# Patient Record
Sex: FEMALE | Race: WHITE | NOT HISPANIC OR LATINO | Employment: OTHER | URBAN - METROPOLITAN AREA
[De-identification: names, ages, dates, MRNs, and addresses within clinical notes are randomized per-mention and may not be internally consistent; named-entity substitution may affect disease eponyms.]

---

## 2017-02-03 ENCOUNTER — OFFICE VISIT (OUTPATIENT)
Dept: INTERNAL MEDICINE | Facility: CLINIC | Age: 57
End: 2017-02-03

## 2017-02-03 VITALS
HEIGHT: 65 IN | TEMPERATURE: 97.6 F | OXYGEN SATURATION: 99 % | SYSTOLIC BLOOD PRESSURE: 118 MMHG | BODY MASS INDEX: 36.02 KG/M2 | WEIGHT: 216.2 LBS | DIASTOLIC BLOOD PRESSURE: 80 MMHG | HEART RATE: 90 BPM

## 2017-02-03 DIAGNOSIS — G47.00 INSOMNIA, UNSPECIFIED TYPE: ICD-10-CM

## 2017-02-03 DIAGNOSIS — E03.8 HYPOTHYROIDISM DUE TO HASHIMOTO'S THYROIDITIS: ICD-10-CM

## 2017-02-03 DIAGNOSIS — E55.9 VITAMIN D DEFICIENCY: Primary | ICD-10-CM

## 2017-02-03 DIAGNOSIS — F41.9 ANXIETY: ICD-10-CM

## 2017-02-03 DIAGNOSIS — M85.80 OSTEOPENIA: ICD-10-CM

## 2017-02-03 DIAGNOSIS — F43.21 ADJUSTMENT REACTION WITH PROLONGED DEPRESSIVE REACTION: ICD-10-CM

## 2017-02-03 DIAGNOSIS — E06.3 HYPOTHYROIDISM DUE TO HASHIMOTO'S THYROIDITIS: ICD-10-CM

## 2017-02-03 PROCEDURE — 99214 OFFICE O/P EST MOD 30 MIN: CPT | Performed by: FAMILY MEDICINE

## 2017-02-03 RX ORDER — ZOLPIDEM TARTRATE 10 MG/1
10 TABLET ORAL NIGHTLY PRN
Qty: 30 TABLET | Refills: 2 | Status: SHIPPED | OUTPATIENT
Start: 2017-02-03 | End: 2017-05-18 | Stop reason: SDUPTHER

## 2017-02-03 RX ORDER — DESVENLAFAXINE SUCCINATE 100 MG/1
100 TABLET, EXTENDED RELEASE ORAL DAILY
Qty: 90 TABLET | Refills: 1 | Status: SHIPPED | OUTPATIENT
Start: 2017-02-03 | End: 2017-05-18 | Stop reason: SDUPTHER

## 2017-02-03 RX ORDER — ERGOCALCIFEROL 1.25 MG/1
50000 CAPSULE ORAL 2 TIMES WEEKLY
Qty: 24 CAPSULE | Refills: 1 | Status: SHIPPED | OUTPATIENT
Start: 2017-02-06 | End: 2017-05-18 | Stop reason: SDUPTHER

## 2017-02-03 RX ORDER — DESVENLAFAXINE SUCCINATE 50 MG/1
50 TABLET, EXTENDED RELEASE ORAL DAILY
Qty: 90 TABLET | Refills: 1 | Status: SHIPPED | OUTPATIENT
Start: 2017-02-03 | End: 2017-05-18 | Stop reason: SDUPTHER

## 2017-02-03 RX ORDER — LEVOTHYROXINE SODIUM 112 UG/1
112 TABLET ORAL DAILY
Qty: 90 TABLET | Refills: 1 | Status: SHIPPED | OUTPATIENT
Start: 2017-02-03 | End: 2017-03-06 | Stop reason: DRUGHIGH

## 2017-02-03 NOTE — PROGRESS NOTES
"Sierra Kraft is a 56 y.o. female.     Chief Complaint   Patient presents with   • Hypothyroidism     3 month follow up   • Anxiety   • Insomnia   • Med Refill       Vitals:    02/03/17 1524   BP: 118/80   Pulse: 90   Temp: 97.6 °F (36.4 °C)   SpO2: 99%   Weight: 216 lb 3.2 oz (98.1 kg)   Height: 65\" (165.1 cm)       Hypothyroidism   This is a chronic problem. The current episode started more than 1 year ago. The problem occurs constantly. The problem has been unchanged. Pertinent negatives include no abdominal pain, anorexia, arthralgias, change in bowel habit, chest pain, chills, congestion, coughing, diaphoresis, fatigue, fever, headaches, joint swelling, myalgias, nausea, neck pain, numbness, rash, sore throat, swollen glands, urinary symptoms, vertigo, visual change, vomiting or weakness. Nothing aggravates the symptoms. Treatments tried: thyroid. The treatment provided significant relief.   Anxiety   Presents for follow-up visit. Symptoms include insomnia. Patient reports no chest pain, compulsions, confusion, decreased concentration, depressed mood, dizziness, dry mouth, excessive worry, feeling of choking, hyperventilation, irritability, malaise, muscle tension, nausea, nervous/anxious behavior, obsessions, palpitations, panic, restlessness, shortness of breath or suicidal ideas. Symptoms occur rarely. The severity of symptoms is mild. The quality of sleep is fair. Nighttime awakenings: one to two.     Her past medical history is significant for depression. Compliance with medications is %.   Insomnia   This is a chronic problem. The current episode started more than 1 year ago. The problem occurs constantly. The problem has been unchanged. Pertinent negatives include no abdominal pain, anorexia, arthralgias, change in bowel habit, chest pain, chills, congestion, coughing, diaphoresis, fatigue, fever, headaches, joint swelling, myalgias, nausea, neck pain, numbness, rash, sore throat, " swollen glands, urinary symptoms, vertigo, visual change, vomiting or weakness. Nothing aggravates the symptoms. Treatments tried: ambien. The treatment provided moderate relief.   Depression   Visit Type: follow-up  Patient presents with the following symptoms: insomnia.  Patient is not experiencing: anhedonia, chest pain, choking sensation, compulsions, confusion, decreased concentration, depressed mood, dizziness, dry mouth, excessive worry, fatigue, feelings of hopelessness, feelings of worthlessness, hypersomnia, hyperventilation, irritability, malaise, memory impairment, muscle tension, nausea, nervousness/anxiety, obsessions, palpitations, panic, psychomotor agitation, psychomotor retardation, restlessness, shortness of breath, suicidal ideas, suicidal planning, thoughts of death, weight gain and weight loss.  Frequency of symptoms: rarely   Severity: mild   Sleep quality: fair  Nighttime awakenings: several  Compliance with medications:  %         Pt is a nonsmoker    The following portions of the patient's history were reviewed and updated as appropriate: allergies, current medications, past family history, past medical history, past social history, past surgical history and problem list.    Past Medical History   Diagnosis Date   • Abnormal thyroid blood test    • Abscess    • Anxiety    • Bilateral ovarian cysts    • Depression    • Encounter for routine gynecological examination    • Foot pain    • H/O bone density study 06/2014   • H/O mammogram 07/2016     [facility]   • Hot flashes, menopausal    • Hypothyroidism    • Hypothyroidism due to Hashimoto's thyroiditis    • Insomnia    • Miscarriage      x3   • Osteopenia    • Pap smear for cervical cancer screening 06/2014   • Routine general medical examination at a health care facility    • Urinary incontinence    • Urinary tract infection    • Vitamin D deficiency        Past Surgical History   Procedure Laterality Date   • Cholecystectomy  2008    • Hernia repair  2010   • Abdominoplasty  2001   • Colonoscopy  07/2012     [facility] in [place] normal   • Ectopic pregnancy surgery Left 1994       No Known Allergies    Family History   Problem Relation Age of Onset   • Mental illness Mother    • Depression Mother    • Thyroid disease Mother    • Hypertension Father    • Thyroid disease Sister    • Breast cancer Other    • Breast cancer Maternal Aunt        Social History     Social History   • Marital status: Unknown     Spouse name: N/A   • Number of children: N/A   • Years of education: N/A     Occupational History   • Not on file.     Social History Main Topics   • Smoking status: Never Smoker   • Smokeless tobacco: Never Used   • Alcohol use No   • Drug use: No   • Sexual activity: Yes     Partners: Male     Birth control/ protection: None, Post-menopausal      Comment:      Other Topics Concern   • Not on file     Social History Narrative       Review of Systems   Constitutional: Negative.  Negative for chills, diaphoresis, fatigue, fever, irritability, weight gain and weight loss.   HENT: Negative.  Negative for congestion, ear pain, postnasal drip, rhinorrhea, sinus pressure, sneezing and sore throat.    Eyes: Negative.  Negative for redness and itching.   Respiratory: Negative.  Negative for cough, choking, shortness of breath and wheezing.    Cardiovascular: Negative.  Negative for chest pain and palpitations.   Gastrointestinal: Negative.  Negative for abdominal pain, anorexia, blood in stool, change in bowel habit, constipation, diarrhea, nausea and vomiting.   Endocrine: Negative.  Negative for cold intolerance and heat intolerance.   Genitourinary: Negative.  Negative for dysuria, hematuria and urgency.   Musculoskeletal: Negative.  Negative for arthralgias, back pain, joint swelling, myalgias and neck pain.   Skin: Negative.  Negative for color change and rash.   Allergic/Immunologic: Negative.  Negative for environmental allergies.    Neurological: Negative.  Negative for dizziness, vertigo, tremors, weakness, numbness and headaches.   Hematological: Negative.  Negative for adenopathy. Does not bruise/bleed easily.   Psychiatric/Behavioral: Negative for confusion, decreased concentration, dysphoric mood and suicidal ideas. The patient has insomnia. The patient is not nervous/anxious.         Depression and anxiety stable on medication       Objective   Physical Exam   Constitutional: She is oriented to person, place, and time. She appears well-developed.   HENT:   Head: Normocephalic.   Right Ear: External ear normal.   Left Ear: External ear normal.   Nose: Nose normal.   Mouth/Throat: Oropharynx is clear and moist.   Eyes: Conjunctivae and EOM are normal. Pupils are equal, round, and reactive to light.   Neck: Normal range of motion. Neck supple.   Cardiovascular: Normal rate and regular rhythm.    No murmur heard.  Pulmonary/Chest: Effort normal and breath sounds normal.   Abdominal: Soft. Bowel sounds are normal. There is no tenderness.   Musculoskeletal: Normal range of motion.   Neurological: She is alert and oriented to person, place, and time.   Skin: Skin is warm and dry.   Psychiatric: She has a normal mood and affect. Her behavior is normal.   Nursing note and vitals reviewed.      Assessment/Plan   Jessica was seen today for hypothyroidism, anxiety, insomnia and med refill.    Diagnoses and all orders for this visit:    Vitamin D deficiency, unspecified  -     vitamin D (ERGOCALCIFEROL) 30400 UNITS capsule capsule; Take 1 capsule by mouth 2 (Two) Times a Week.    Hypothyroidism due to Hashimoto's thyroiditis  -     levothyroxine (SYNTHROID, LEVOTHROID) 112 MCG tablet; Take 1 tablet by mouth Daily.    Osteopenia    Insomnia, unspecified type  -     zolpidem (AMBIEN) 10 MG tablet; Take 1 tablet by mouth At Night As Needed for sleep.    Adjustment reaction with prolonged depressive reaction  -     PRISTIQ 50 MG 24 hr tablet; Take 1  tablet by mouth Daily.  -     PRISTIQ 100 MG 24 hr tablet; Take 1 tablet by mouth Daily. Take with 50 mg    Anxiety  -     PRISTIQ 50 MG 24 hr tablet; Take 1 tablet by mouth Daily.  -     PRISTIQ 100 MG 24 hr tablet; Take 1 tablet by mouth Daily. Take with 50 mg                   Current Outpatient Prescriptions:   •  aspirin 81 MG chewable tablet, Chew 81 mg daily., Disp: , Rfl:   •  levothyroxine (SYNTHROID, LEVOTHROID) 112 MCG tablet, Take 1 tablet by mouth Daily., Disp: 90 tablet, Rfl: 1  •  PRISTIQ 100 MG 24 hr tablet, Take 1 tablet by mouth Daily. Take with 50 mg, Disp: 90 tablet, Rfl: 1  •  PRISTIQ 50 MG 24 hr tablet, Take 1 tablet by mouth Daily., Disp: 90 tablet, Rfl: 1  •  Testosterone Micronized crystals, 1 dose daily., Disp: , Rfl:   •  [START ON 2/6/2017] vitamin D (ERGOCALCIFEROL) 49736 UNITS capsule capsule, Take 1 capsule by mouth 2 (Two) Times a Week., Disp: 24 capsule, Rfl: 1  •  zolpidem (AMBIEN) 10 MG tablet, Take 1 tablet by mouth At Night As Needed for sleep., Disp: 30 tablet, Rfl: 2  •  nitrofurantoin, macrocrystal-monohydrate, (MACROBID) 100 MG capsule, Take 1 capsule by mouth 2 (Two) Times a Day., Disp: 14 capsule, Rfl: 0  •  phentermine 37.5 MG capsule, Take 1 capsule by mouth every morning., Disp: 30 capsule, Rfl: 0    Return in about 3 months (around 5/3/2017) for Recheck.

## 2017-03-06 ENCOUNTER — OFFICE VISIT (OUTPATIENT)
Dept: ENDOCRINOLOGY | Facility: CLINIC | Age: 57
End: 2017-03-06

## 2017-03-06 VITALS
HEART RATE: 77 BPM | BODY MASS INDEX: 35.35 KG/M2 | OXYGEN SATURATION: 99 % | HEIGHT: 65 IN | DIASTOLIC BLOOD PRESSURE: 75 MMHG | WEIGHT: 212.2 LBS | SYSTOLIC BLOOD PRESSURE: 115 MMHG

## 2017-03-06 DIAGNOSIS — E03.8 HYPOTHYROIDISM DUE TO HASHIMOTO'S THYROIDITIS: Primary | ICD-10-CM

## 2017-03-06 DIAGNOSIS — E06.3 HYPOTHYROIDISM DUE TO HASHIMOTO'S THYROIDITIS: Primary | ICD-10-CM

## 2017-03-06 PROCEDURE — 99213 OFFICE O/P EST LOW 20 MIN: CPT | Performed by: INTERNAL MEDICINE

## 2017-03-06 RX ORDER — PHENTERMINE HYDROCHLORIDE 37.5 MG/1
TABLET ORAL
Refills: 0 | COMMUNITY
Start: 2017-02-06 | End: 2017-05-18

## 2017-03-06 RX ORDER — LEVOTHYROXINE SODIUM 125 UG/1
125 TABLET ORAL EVERY MORNING
Qty: 30 TABLET | Refills: 11 | Status: SHIPPED | OUTPATIENT
Start: 2017-03-06 | End: 2017-05-18 | Stop reason: SDUPTHER

## 2017-03-06 NOTE — PROGRESS NOTES
Chief Complaint   Patient presents with   • Hypothyroidism     Follow UP        HPI:   Jessica Kraft is a 56 y.o.female who returns to endocrine clinic for follow-up evaluation of hypothyroidism due to Hashimoto's thyroiditis.  Last visit 5/16/2016. Her history is as follows:    Initially sent to clinic by her [facility] for elevated TPO Abs consistent with her h/o Hashimoto's hypothyroidism.    1) hypothyroidism due to Hashimoto's thyroiditis:  - diagnosed in 1986 after the birth of her second child. She has been on varying doses of levothyroxine since diagnosis.   - She reports that at her last visit in May 2016, she reported the wrong dose of levothyroxine on her medication list. She reported taking levothyroxine 150 mcg; however, she confirmed with her pharmacy she has been taking 112 µg of generic levothyroxine since January 2015.     Current Dose: Levothyroxine 112 µg  - Takes in the a.m. with her antidepressant.  Waits at least 30 minutes before eating or drinking hot liquids.  Does not take in close proximity to iron or calcium supplements.  Does not take with soy products.  Has good compliance with medication. Denies her medication being exposed to heat.    Since her last visit she reports having fluctuating TSH values on the same dose of generic levothyroxine. She uses the same pharmacy.    Lab review:  (03/2016): TSH 1.46 (0.34 - 4.00) on 112 mcg levothyroxine  (08/2016): TSH 0.876 (0.450 - 4.500) on 112 mcg  (02/2017): TSH 6.23 (0.34 - 5.00) on 112 mcg    Review of Systems   Constitutional: Negative for fatigue.        Weight stable   HENT: Negative.    Eyes: Negative.    Respiratory: Negative.    Cardiovascular: Negative.    Gastrointestinal: Negative.    Endocrine: Negative.    Genitourinary: Negative.    Musculoskeletal: Negative.    Skin: Negative.    Neurological: Negative.    Psychiatric/Behavioral: Negative.      The following portions of the patient's history were reviewed and updated as  "appropriate: allergies, current medications, past family history, past medical history, past social history, past surgical history and problem list.    No Known Allergies    Visit Vitals   • /75   • Pulse 77   • Ht 65\" (165.1 cm)   • Wt 212 lb 3.2 oz (96.3 kg)   • SpO2 99%   • BMI 35.31 kg/m2     Physical Exam   Constitutional: She is oriented to person, place, and time. She appears well-developed and well-nourished.   HENT:   Mouth/Throat: Oropharynx is clear and moist.   Eyes: Conjunctivae are normal. Pupils are equal, round, and reactive to light.   Neck: Neck supple. No thyromegaly present.   No palpable thyroid nodules     Cardiovascular: Normal rate, regular rhythm and normal heart sounds.    Pulmonary/Chest: Effort normal and breath sounds normal.   Lymphadenopathy:     She has no cervical adenopathy.   Neurological: She is alert and oriented to person, place, and time. No cranial nerve deficit.   Skin: Skin is warm and dry.   Psychiatric: She has a normal mood and affect. Her behavior is normal.   Vitals reviewed.    LABS/IMAGING: outside records reviewed and summarized in HPI    ASSESSMENT/PLAN:  1) Hypothyroidism due to Hashimoto's thyroiditis:  - clinically euthyroid on exam  - confirmed that patient is taking levothyroxine appropriately without interfering substances, taking with good compliance, and storing the medication appropriately  - based on the fluctuation of her TSH level on various generic levothyroxine prescriptions of the same dose of 112 mcg, will have patient try taking Brand thyroid hormone consistently.  - Starting Levoxyl 125 mcg daily  - Reviewed proper levothyroxine administration, and factors to avoid that decrease medication potency and medication absorption.     Will have patient check TSH level on 4/28/2017 on the day of her PCP visit with Dr. Guajardo. Will adjust dose if indicated.     Pt to RTC in 6 months                    "

## 2017-05-18 ENCOUNTER — OFFICE VISIT (OUTPATIENT)
Dept: INTERNAL MEDICINE | Facility: CLINIC | Age: 57
End: 2017-05-18

## 2017-05-18 VITALS
WEIGHT: 214.6 LBS | OXYGEN SATURATION: 97 % | HEART RATE: 94 BPM | SYSTOLIC BLOOD PRESSURE: 116 MMHG | BODY MASS INDEX: 35.75 KG/M2 | DIASTOLIC BLOOD PRESSURE: 78 MMHG | TEMPERATURE: 98.2 F | HEIGHT: 65 IN

## 2017-05-18 DIAGNOSIS — E06.3 HYPOTHYROIDISM DUE TO HASHIMOTO'S THYROIDITIS: ICD-10-CM

## 2017-05-18 DIAGNOSIS — E03.8 HYPOTHYROIDISM DUE TO HASHIMOTO'S THYROIDITIS: ICD-10-CM

## 2017-05-18 DIAGNOSIS — G47.00 INSOMNIA, UNSPECIFIED TYPE: ICD-10-CM

## 2017-05-18 DIAGNOSIS — F43.21 ADJUSTMENT REACTION WITH PROLONGED DEPRESSIVE REACTION: ICD-10-CM

## 2017-05-18 DIAGNOSIS — F41.9 ANXIETY: ICD-10-CM

## 2017-05-18 DIAGNOSIS — E55.9 VITAMIN D DEFICIENCY: ICD-10-CM

## 2017-05-18 PROCEDURE — 99214 OFFICE O/P EST MOD 30 MIN: CPT | Performed by: FAMILY MEDICINE

## 2017-05-18 RX ORDER — DESVENLAFAXINE SUCCINATE 50 MG/1
50 TABLET, EXTENDED RELEASE ORAL DAILY
Qty: 90 TABLET | Refills: 1 | Status: SHIPPED | OUTPATIENT
Start: 2017-05-18

## 2017-05-18 RX ORDER — ZOLPIDEM TARTRATE 10 MG/1
10 TABLET ORAL NIGHTLY PRN
Qty: 30 TABLET | Refills: 2 | Status: SHIPPED | OUTPATIENT
Start: 2017-05-18 | End: 2017-08-02 | Stop reason: SDUPTHER

## 2017-05-18 RX ORDER — LEVOTHYROXINE SODIUM 125 UG/1
125 TABLET ORAL EVERY MORNING
Qty: 90 TABLET | Refills: 1 | Status: SHIPPED | OUTPATIENT
Start: 2017-05-18

## 2017-05-18 RX ORDER — DESVENLAFAXINE SUCCINATE 100 MG/1
100 TABLET, EXTENDED RELEASE ORAL DAILY
Qty: 90 TABLET | Refills: 1 | Status: SHIPPED | OUTPATIENT
Start: 2017-05-18

## 2017-05-18 RX ORDER — ERGOCALCIFEROL 1.25 MG/1
50000 CAPSULE ORAL 2 TIMES WEEKLY
Qty: 24 CAPSULE | Refills: 1 | Status: SHIPPED | OUTPATIENT
Start: 2017-05-18

## 2017-05-18 RX ORDER — LEVOTHYROXINE SODIUM 125 UG/1
125 TABLET ORAL EVERY MORNING
Qty: 90 TABLET | Refills: 1 | Status: SHIPPED | OUTPATIENT
Start: 2017-05-18 | End: 2017-05-18 | Stop reason: SDUPTHER

## 2017-06-07 ENCOUNTER — TELEPHONE (OUTPATIENT)
Dept: INTERNAL MEDICINE | Facility: CLINIC | Age: 57
End: 2017-06-07

## 2017-06-07 NOTE — TELEPHONE ENCOUNTER
PATIENTS OTHER PHYSICIAN CHANGED HER THYROID MEDICATION. SHE WOULD LIKE A CALL BACK FROM OUR OFFICE TO GO OVER THIS INFORMATION. PLEASE CALL HER @ [phone]

## 2017-06-12 NOTE — TELEPHONE ENCOUNTER
Spoke to patient. TSH was 0.23 (0.40 - 4.50). Asked her to stay on Levoxyl 125 mcg until her follow-up with me in August. If her repeat TSH is again low, will decrease to Levoxyl to 112 mcg.    Maame Nuno MD          please get these thyroid lab results

## 2017-07-11 ENCOUNTER — TELEPHONE (OUTPATIENT)
Dept: INTERNAL MEDICINE | Facility: CLINIC | Age: 57
End: 2017-07-11

## 2017-07-11 NOTE — TELEPHONE ENCOUNTER
Would like to get medicine for anxiety, she will be leaving flying, says she was prescribed a anxiety med last year for a trip she had taken, but the med did not work. Please call to advise.

## 2017-07-12 RX ORDER — DIAZEPAM 5 MG/1
5 TABLET ORAL EVERY 12 HOURS PRN
Qty: 4 TABLET | Refills: 0 | Status: SHIPPED | OUTPATIENT
Start: 2017-07-12

## 2017-08-02 ENCOUNTER — OFFICE VISIT (OUTPATIENT)
Dept: INTERNAL MEDICINE | Facility: CLINIC | Age: 57
End: 2017-08-02

## 2017-08-02 VITALS
HEIGHT: 65 IN | TEMPERATURE: 98 F | HEART RATE: 71 BPM | BODY MASS INDEX: 37.36 KG/M2 | OXYGEN SATURATION: 94 % | WEIGHT: 224.2 LBS | SYSTOLIC BLOOD PRESSURE: 126 MMHG | DIASTOLIC BLOOD PRESSURE: 84 MMHG

## 2017-08-02 DIAGNOSIS — R35.1 NOCTURIA: ICD-10-CM

## 2017-08-02 DIAGNOSIS — E55.9 VITAMIN D DEFICIENCY: ICD-10-CM

## 2017-08-02 DIAGNOSIS — G47.00 INSOMNIA, UNSPECIFIED TYPE: ICD-10-CM

## 2017-08-02 DIAGNOSIS — F41.9 ANXIETY: Primary | ICD-10-CM

## 2017-08-02 DIAGNOSIS — F33.42 RECURRENT MAJOR DEPRESSIVE DISORDER, IN FULL REMISSION (HCC): ICD-10-CM

## 2017-08-02 LAB
BILIRUB BLD-MCNC: NEGATIVE MG/DL
CLARITY, POC: CLEAR
COLOR UR: YELLOW
GLUCOSE UR STRIP-MCNC: NEGATIVE MG/DL
KETONES UR QL: NEGATIVE
LEUKOCYTE EST, POC: NEGATIVE
NITRITE UR-MCNC: NEGATIVE MG/ML
PH UR: 6 [PH] (ref 5–8)
PROT UR STRIP-MCNC: NEGATIVE MG/DL
RBC # UR STRIP: NEGATIVE /UL
SP GR UR: 1.03 (ref 1–1.03)
UROBILINOGEN UR QL: NORMAL

## 2017-08-02 PROCEDURE — 99214 OFFICE O/P EST MOD 30 MIN: CPT | Performed by: FAMILY MEDICINE

## 2017-08-02 PROCEDURE — 81003 URINALYSIS AUTO W/O SCOPE: CPT | Performed by: FAMILY MEDICINE

## 2017-08-02 RX ORDER — BUSPIRONE HYDROCHLORIDE 10 MG/1
10 TABLET ORAL 2 TIMES DAILY
Qty: 60 TABLET | Refills: 2 | Status: SHIPPED | OUTPATIENT
Start: 2017-08-02

## 2017-08-02 RX ORDER — ZOLPIDEM TARTRATE 10 MG/1
10 TABLET ORAL NIGHTLY PRN
Qty: 30 TABLET | Refills: 2 | Status: SHIPPED | OUTPATIENT
Start: 2017-08-02

## 2017-08-02 RX ORDER — ERGOCALCIFEROL 1.25 MG/1
50000 CAPSULE ORAL 2 TIMES WEEKLY
Qty: 24 CAPSULE | Refills: 1 | Status: CANCELLED | OUTPATIENT
Start: 2017-08-03

## 2017-08-02 NOTE — PROGRESS NOTES
"Subjective   Jessica Kraft is a 56 y.o. female.     Chief Complaint   Patient presents with   • Hypothyroidism   • Depression   • Insomnia   • Anxiety     discuss Pristique, valium and muscle relaxer   • Med Refill       Visit Vitals   • /84   • Pulse 71   • Temp 98 °F (36.7 °C)   • Ht 65\" (165.1 cm)   • Wt 224 lb 3.2 oz (102 kg)   • LMP  (LMP Unknown)  Comment: N/A   • SpO2 94%   • BMI 37.31 kg/m2       Anxiety   Presents for follow-up visit. Symptoms include compulsions, depressed mood, excessive worry, insomnia, irritability, muscle tension, nervous/anxious behavior, obsessions and palpitations. Patient reports no chest pain, confusion, decreased concentration, dizziness, dry mouth, feeling of choking, hyperventilation, malaise, nausea, panic, restlessness, shortness of breath or suicidal ideas. Symptoms occur constantly. The severity of symptoms is severe. The quality of sleep is poor. Nighttime awakenings: several.     Compliance with medications is %.   Insomnia   This is a chronic problem. The current episode started more than 1 year ago. The problem occurs constantly. The problem has been unchanged. Pertinent negatives include no abdominal pain, anorexia, arthralgias, change in bowel habit, chest pain, chills, congestion, coughing, diaphoresis, fatigue, fever, headaches, joint swelling, myalgias, nausea, neck pain, numbness, rash, sore throat, swollen glands, urinary symptoms, vertigo, visual change, vomiting or weakness. Nothing aggravates the symptoms. Treatments tried: ambien. The treatment provided moderate relief.      Pt tried her brother's muscle relaxer which helped.  Brother recently had MRSA and was in hospital for 3 months.    Pt is very anxious today.   Pt states that the valium 5mg that she uses for plane rides does not help her anxiety.  Pt cannot relax at home.     Pt went on vacation and spent 6 hours on pool side and did relax.   Pt's sister is on zoloft.  Pt worries about " leaves on the ground and if house needs cleaning.   Pt's knees are shaky.   Pt has gained weight 10# since last visit.     The following portions of the patient's history were reviewed and updated as appropriate: allergies, current medications, past family history, past medical history, past social history, past surgical history and problem list.    Past Medical History:   Diagnosis Date   • Abscess    • Anxiety    • Bilateral ovarian cysts    • Depression    • Encounter for routine gynecological examination    • Foot pain    • H/O bone density study 06/2014   • H/[facility] mammogram 07/2016    [facility]   • Hot flashes, menopausal    • Hypothyroidism due to Hashimoto's thyroiditis    • Insomnia    • Miscarriage     x3   • Osteopenia    • Pap smear for cervical cancer screening 06/2014   • Routine general medical examination at a health care facility    • Urinary incontinence    • Urinary tract infection    • Vitamin D deficiency        Past Surgical History:   Procedure Laterality Date   • ABDOMINOPLASTY  2001   • CHOLECYSTECTOMY  2008   • COLONOSCOPY  07/2012    [facility] in [place] normal   • ECTOPIC PREGNANCY SURGERY Left 1994   • HERNIA REPAIR  2010       No Known Allergies    Family History   Problem Relation Age of Onset   • Mental illness Mother    • Depression Mother    • Thyroid disease Mother    • Hypertension Father    • Thyroid disease Sister    • Breast cancer Other    • Breast cancer Maternal Aunt        Social History     Social History   • Marital status: Unknown     Spouse name: N/A   • Number of children: N/A   • Years of education: N/A     Occupational History   • Not on file.     Social History Main Topics   • Smoking status: Never Smoker   • Smokeless tobacco: Never Used   • Alcohol use No   • Drug use: No   • Sexual activity: Yes     Partners: Male     Birth control/ protection: None, Post-menopausal      Comment:      Other Topics Concern   • Not on file     Social History Narrative        Review of Systems   Constitutional: Positive for irritability. Negative for chills, diaphoresis, fatigue and fever.   HENT: Negative for congestion and sore throat.    Respiratory: Negative for cough and shortness of breath.    Cardiovascular: Positive for palpitations. Negative for chest pain.   Gastrointestinal: Negative for abdominal pain, anorexia, change in bowel habit, nausea and vomiting.   Genitourinary:        Nocturia   Musculoskeletal: Negative for arthralgias, joint swelling, myalgias and neck pain.   Skin: Negative for rash.   Neurological: Negative for dizziness, vertigo, weakness, numbness and headaches.   Psychiatric/Behavioral: Positive for dysphoric mood and sleep disturbance. Negative for confusion, decreased concentration and suicidal ideas. The patient is nervous/anxious and has insomnia.        Objective   Physical Exam   Constitutional: She is oriented to person, place, and time. She appears well-developed.   HENT:   Head: Normocephalic.   Right Ear: External ear normal.   Left Ear: External ear normal.   Nose: Nose normal.   Eyes: Conjunctivae and EOM are normal. Pupils are equal, round, and reactive to light.   Neck: Normal range of motion. Neck supple.   Cardiovascular: Normal rate and regular rhythm.    No murmur heard.  Pulmonary/Chest: Effort normal and breath sounds normal.   Abdominal: Soft. Bowel sounds are normal.   Musculoskeletal: Normal range of motion.   Neurological: She is alert and oriented to person, place, and time.   Skin: Skin is warm and dry.   Psychiatric: She has a normal mood and affect. Her behavior is normal.   Nursing note and vitals reviewed.      Assessment/Plan   Jessica was seen today for hypothyroidism, depression, insomnia, anxiety and med refill.    Diagnoses and all orders for this visit:    Anxiety  -     Ambulatory Referral to Behavioral Health  -     busPIRone (BUSPAR) 10 MG tablet; Take 1 tablet by mouth 2 (Two) Times a Day.    Insomnia,  unspecified type  -     zolpidem (AMBIEN) 10 MG tablet; Take 1 tablet by mouth At Night As Needed for Sleep.    Vitamin D deficiency, unspecified    Recurrent major depressive disorder, in full remission  -     Ambulatory Referral to Behavioral Health    Nocturia  -     POC Urinalysis Dipstick, Automated    Other orders  -     Cancel: vitamin D (ERGOCALCIFEROL) 86046 UNITS capsule capsule; Take 1 capsule by mouth 2 (Two) Times a Week.      Patient stating she was extremely anxious and requesting higher dose of Valium to 5 mg she was given to fly with.  Patient states she needed it more than just when she flew home.  Will refer to behavioral health. Declined Valium prescription.  Declined prescription for muscle relaxer to use for anxiety.             Current Outpatient Prescriptions:   •  aspirin 81 MG chewable tablet, Chew 81 mg daily., Disp: , Rfl:   •  LEVOXYL 125 MCG tablet, Take 1 tablet by mouth Every Morning. On an empty stomach, do not substitute, Disp: 90 tablet, Rfl: 1  •  PRISTIQ 100 MG 24 hr tablet, Take 1 tablet by mouth Daily. Take with 50 mg, Disp: 90 tablet, Rfl: 1  •  PRISTIQ 50 MG 24 hr tablet, Take 1 tablet by mouth Daily., Disp: 90 tablet, Rfl: 1  •  Testosterone Micronized crystals, 1 dose daily., Disp: , Rfl:   •  vitamin D (ERGOCALCIFEROL) 87970 UNITS capsule capsule, Take 1 capsule by mouth 2 (Two) Times a Week., Disp: 24 capsule, Rfl: 1  •  zolpidem (AMBIEN) 10 MG tablet, Take 1 tablet by mouth At Night As Needed for Sleep., Disp: 30 tablet, Rfl: 2  •  busPIRone (BUSPAR) 10 MG tablet, Take 1 tablet by mouth 2 (Two) Times a Day., Disp: 60 tablet, Rfl: 2  •  diazePAM (VALIUM) 5 MG tablet, Take 1 tablet by mouth Every 12 (Twelve) Hours As Needed for Anxiety., Disp: 4 tablet, Rfl: 0    Return in about 3 months (around 11/2/2017), or if symptoms worsen or fail to improve, for Recheck.'  Barrett report reviewed and is consistent #[id number]    Recent Results (from the past 168 hour(s))   POC  Urinalysis Dipstick, Automated    Collection Time: 08/02/17  1:31 PM   Result Value Ref Range    Color Yellow Yellow, Straw, Dark Yellow, Kati    Clarity, UA Clear Clear    Glucose, UA Negative Negative, 1000 mg/dL (3+) mg/dL    Bilirubin Negative Negative    Ketones, UA Negative Negative    Specific Gravity  1.030 1.005 - 1.030    Blood, UA Negative Negative    pH, Urine 6.0 5.0 - 8.0    Protein, POC Negative Negative mg/dL    Urobilinogen, UA Normal Normal    Leukocytes Negative Negative    Nitrite, UA Negative Negative

## 2017-08-09 ENCOUNTER — TELEPHONE (OUTPATIENT)
Dept: INTERNAL MEDICINE | Facility: CLINIC | Age: 57
End: 2017-08-09

## 2017-08-10 ENCOUNTER — TELEPHONE (OUTPATIENT)
Dept: INTERNAL MEDICINE | Facility: CLINIC | Age: 57
End: 2017-08-10

## 2017-08-10 NOTE — TELEPHONE ENCOUNTER
Ms. Kraft is taking antibiotic, needs a med sent in for yeast infection, and sinus med.please call to advise

## 2018-01-04 ENCOUNTER — TELEPHONE (OUTPATIENT)
Dept: INTERNAL MEDICINE | Facility: CLINIC | Age: 58
End: 2018-01-04

## 2018-01-04 NOTE — TELEPHONE ENCOUNTER
Pharmacy faxed PA request for pristiq. PA came back that it is a covered drug and does not need PA. Notified pharmacy.

## 2021-02-25 ENCOUNTER — TRANSCRIBE ORDERS (OUTPATIENT)
Dept: ADMINISTRATIVE | Facility: HOSPITAL | Age: 61
End: 2021-02-25

## 2021-02-25 DIAGNOSIS — Z01.818 PRE-OPERATIVE CLEARANCE: Primary | ICD-10-CM

## 2021-03-01 ENCOUNTER — LAB (OUTPATIENT)
Dept: LAB | Facility: HOSPITAL | Age: 61
End: 2021-03-01

## 2021-03-01 DIAGNOSIS — Z01.818 PRE-OPERATIVE CLEARANCE: ICD-10-CM

## 2021-03-01 PROCEDURE — U0004 COV-19 TEST NON-CDC HGH THRU: HCPCS

## 2021-03-01 PROCEDURE — C9803 HOPD COVID-19 SPEC COLLECT: HCPCS

## 2021-03-02 LAB — SARS-COV-2 RNA RESP QL NAA+PROBE: NOT DETECTED

## 2021-03-12 ENCOUNTER — IMMUNIZATION (OUTPATIENT)
Dept: VACCINE CLINIC | Facility: HOSPITAL | Age: 61
End: 2021-03-12

## 2021-03-12 PROCEDURE — 91300 HC SARSCOV02 VAC 30MCG/0.3ML IM: CPT | Performed by: INTERNAL MEDICINE

## 2021-03-12 PROCEDURE — 0001A: CPT | Performed by: INTERNAL MEDICINE

## 2021-04-02 ENCOUNTER — IMMUNIZATION (OUTPATIENT)
Dept: VACCINE CLINIC | Facility: HOSPITAL | Age: 61
End: 2021-04-02

## 2021-04-02 PROCEDURE — 0002A: CPT | Performed by: INTERNAL MEDICINE

## 2021-04-02 PROCEDURE — 91300 HC SARSCOV02 VAC 30MCG/0.3ML IM: CPT | Performed by: INTERNAL MEDICINE

## 2023-02-24 NOTE — TELEPHONE ENCOUNTER
Ice to affected area for approximately 10-15 minutes, 4 times a day for 2 days.  After 2 days use heat for 10-15 minutes 4 times a day.  If heat makes things worse, go back to ice.  Range of motion exercises as tolerated.  Take medications as directed.  GI (Gastrointestinal) side effects with anti-inflammatories discussed.  Take daily for the next 3-5 days then as needed.  Follow-up in 2 week's for follow up and other concern or seek medical attention earlier if worsens.     Printed and placed on your desk for review    Pt stated she went to [facility] and they stated her thyroid levels were off and  wanted to change her thyroid medication.  She did not want to change until/unless you were aware of it.  Did not know if we had received  the Labs.  [facility] , [place].  Cyn Sánchez  [phone] (Checked chart, not there, have called for Labs )

## 2023-10-26 ENCOUNTER — TRANSCRIBE ORDERS (OUTPATIENT)
Dept: ADMINISTRATIVE | Facility: HOSPITAL | Age: 63
End: 2023-10-26
Payer: COMMERCIAL

## 2023-10-26 DIAGNOSIS — R19.06 EPIGASTRIC SWELLING: Primary | ICD-10-CM

## 2023-11-10 ENCOUNTER — HOSPITAL ENCOUNTER (OUTPATIENT)
Dept: CT IMAGING | Facility: HOSPITAL | Age: 63
Discharge: HOME | End: 2023-11-10
Admitting: INTERNAL MEDICINE
Payer: COMMERCIAL

## 2023-11-10 DIAGNOSIS — R19.06 EPIGASTRIC SWELLING: ICD-10-CM

## 2023-11-10 PROCEDURE — 74176 CT ABD & PELVIS W/O CONTRAST: CPT

## 2023-12-06 ENCOUNTER — APPOINTMENT (OUTPATIENT)
Dept: CT IMAGING | Facility: HOSPITAL | Age: 63
End: 2023-12-06
Payer: COMMERCIAL

## 2023-12-06 ENCOUNTER — HOSPITAL ENCOUNTER (EMERGENCY)
Facility: HOSPITAL | Age: 63
Discharge: HOME | End: 2023-12-06
Attending: EMERGENCY MEDICINE
Payer: COMMERCIAL

## 2023-12-06 VITALS
WEIGHT: 214 LBS | TEMPERATURE: 97.7 F | DIASTOLIC BLOOD PRESSURE: 86 MMHG | OXYGEN SATURATION: 93 % | BODY MASS INDEX: 34.39 KG/M2 | HEART RATE: 84 BPM | HEIGHT: 66 IN | RESPIRATION RATE: 14 BRPM | SYSTOLIC BLOOD PRESSURE: 126 MMHG

## 2023-12-06 DIAGNOSIS — R18.8 OTHER ASCITES: ICD-10-CM

## 2023-12-06 DIAGNOSIS — R10.84 GENERALIZED ABDOMINAL PAIN: Primary | ICD-10-CM

## 2023-12-06 LAB
ALBUMIN SERPL-MCNC: 3.5 G/DL (ref 3.5–5.2)
ALBUMIN/GLOB SERPL: 1.1 G/DL
ALP SERPL-CCNC: 118 U/L (ref 39–117)
ALT SERPL W P-5'-P-CCNC: 7 U/L (ref 1–33)
ANION GAP SERPL CALCULATED.3IONS-SCNC: 9 MMOL/L (ref 5–15)
AST SERPL-CCNC: 13 U/L (ref 1–32)
BACTERIA UR QL AUTO: ABNORMAL /HPF
BASOPHILS # BLD AUTO: 0.06 10*3/MM3 (ref 0–0.2)
BASOPHILS NFR BLD AUTO: 0.8 % (ref 0–1.5)
BILIRUB SERPL-MCNC: 0.3 MG/DL (ref 0–1.2)
BILIRUB UR QL STRIP: NEGATIVE
BUN SERPL-MCNC: 17 MG/DL (ref 8–23)
BUN/CREAT SERPL: 22.1 (ref 7–25)
CALCIUM SPEC-SCNC: 9 MG/DL (ref 8.6–10.5)
CHLORIDE SERPL-SCNC: 104 MMOL/L (ref 98–107)
CLARITY UR: ABNORMAL
CO2 SERPL-SCNC: 27 MMOL/L (ref 22–29)
COD CRY URNS QL: ABNORMAL /HPF
COLOR UR: ABNORMAL
CREAT BLDA-MCNC: 0.6 MG/DL
CREAT BLDA-MCNC: 0.6 MG/DL (ref 0.6–1.3)
CREAT SERPL-MCNC: 0.77 MG/DL (ref 0.57–1)
DEPRECATED RDW RBC AUTO: 46.1 FL (ref 37–54)
EGFRCR SERPLBLD CKD-EPI 2021: 87.3 ML/MIN/1.73
EOSINOPHIL # BLD AUTO: 0.26 10*3/MM3 (ref 0–0.4)
EOSINOPHIL NFR BLD AUTO: 3.4 % (ref 0.3–6.2)
ERYTHROCYTE [DISTWIDTH] IN BLOOD BY AUTOMATED COUNT: 14.1 % (ref 12.3–15.4)
GLOBULIN UR ELPH-MCNC: 3.2 GM/DL
GLUCOSE SERPL-MCNC: 119 MG/DL (ref 65–99)
GLUCOSE UR STRIP-MCNC: NEGATIVE MG/DL
HCT VFR BLD AUTO: 39.7 % (ref 34–46.6)
HGB BLD-MCNC: 12.2 G/DL (ref 12–15.9)
HGB UR QL STRIP.AUTO: NEGATIVE
HYALINE CASTS UR QL AUTO: ABNORMAL /LPF
IMM GRANULOCYTES # BLD AUTO: 0.04 10*3/MM3 (ref 0–0.05)
IMM GRANULOCYTES NFR BLD AUTO: 0.5 % (ref 0–0.5)
KETONES UR QL STRIP: ABNORMAL
LEUKOCYTE ESTERASE UR QL STRIP.AUTO: NEGATIVE
LIPASE SERPL-CCNC: 14 U/L (ref 13–60)
LYMPHOCYTES # BLD AUTO: 0.7 10*3/MM3 (ref 0.7–3.1)
LYMPHOCYTES NFR BLD AUTO: 9.1 % (ref 19.6–45.3)
MCH RBC QN AUTO: 27.5 PG (ref 26.6–33)
MCHC RBC AUTO-ENTMCNC: 30.7 G/DL (ref 31.5–35.7)
MCV RBC AUTO: 89.4 FL (ref 79–97)
MONOCYTES # BLD AUTO: 0.64 10*3/MM3 (ref 0.1–0.9)
MONOCYTES NFR BLD AUTO: 8.4 % (ref 5–12)
MUCOUS THREADS URNS QL MICRO: ABNORMAL /HPF
NEUTROPHILS NFR BLD AUTO: 5.96 10*3/MM3 (ref 1.7–7)
NEUTROPHILS NFR BLD AUTO: 77.8 % (ref 42.7–76)
NITRITE UR QL STRIP: NEGATIVE
NRBC BLD AUTO-RTO: 0 /100 WBC (ref 0–0.2)
PH UR STRIP.AUTO: 5.5 [PH] (ref 5–8)
PLATELET # BLD AUTO: 282 10*3/MM3 (ref 140–450)
PMV BLD AUTO: 10.1 FL (ref 6–12)
POTASSIUM SERPL-SCNC: 3.9 MMOL/L (ref 3.5–5.2)
PROT SERPL-MCNC: 6.7 G/DL (ref 6–8.5)
PROT UR QL STRIP: NEGATIVE
RBC # BLD AUTO: 4.44 10*6/MM3 (ref 3.77–5.28)
RBC # UR STRIP: ABNORMAL /HPF
REF LAB TEST METHOD: ABNORMAL
SODIUM SERPL-SCNC: 140 MMOL/L (ref 136–145)
SP GR UR STRIP: 1.03 (ref 1–1.03)
SQUAMOUS #/AREA URNS HPF: ABNORMAL /HPF
UROBILINOGEN UR QL STRIP: ABNORMAL
WBC # UR STRIP: ABNORMAL /HPF
WBC NRBC COR # BLD AUTO: 7.66 10*3/MM3 (ref 3.4–10.8)

## 2023-12-06 PROCEDURE — 83690 ASSAY OF LIPASE: CPT | Performed by: EMERGENCY MEDICINE

## 2023-12-06 PROCEDURE — 96375 TX/PRO/DX INJ NEW DRUG ADDON: CPT

## 2023-12-06 PROCEDURE — 82565 ASSAY OF CREATININE: CPT

## 2023-12-06 PROCEDURE — 25010000002 ONDANSETRON PER 1 MG: Performed by: EMERGENCY MEDICINE

## 2023-12-06 PROCEDURE — 25810000003 SODIUM CHLORIDE 0.9 % SOLUTION: Performed by: EMERGENCY MEDICINE

## 2023-12-06 PROCEDURE — 25010000002 HYDROMORPHONE PER 4 MG: Performed by: EMERGENCY MEDICINE

## 2023-12-06 PROCEDURE — 96374 THER/PROPH/DIAG INJ IV PUSH: CPT

## 2023-12-06 PROCEDURE — 25510000001 IOPAMIDOL 61 % SOLUTION: Performed by: EMERGENCY MEDICINE

## 2023-12-06 PROCEDURE — 74177 CT ABD & PELVIS W/CONTRAST: CPT

## 2023-12-06 PROCEDURE — 81001 URINALYSIS AUTO W/SCOPE: CPT | Performed by: EMERGENCY MEDICINE

## 2023-12-06 PROCEDURE — 85025 COMPLETE CBC W/AUTO DIFF WBC: CPT | Performed by: EMERGENCY MEDICINE

## 2023-12-06 PROCEDURE — 80053 COMPREHEN METABOLIC PANEL: CPT | Performed by: EMERGENCY MEDICINE

## 2023-12-06 PROCEDURE — 36415 COLL VENOUS BLD VENIPUNCTURE: CPT

## 2023-12-06 PROCEDURE — 99285 EMERGENCY DEPT VISIT HI MDM: CPT

## 2023-12-06 RX ORDER — RABEPRAZOLE SODIUM 20 MG/1
20 TABLET, DELAYED RELEASE ORAL DAILY
Status: ON HOLD | COMMUNITY

## 2023-12-06 RX ORDER — IPRATROPIUM BROMIDE AND ALBUTEROL SULFATE 2.5; .5 MG/3ML; MG/3ML
3 SOLUTION RESPIRATORY (INHALATION) ONCE
Status: DISCONTINUED | OUTPATIENT
Start: 2023-12-06 | End: 2023-12-06

## 2023-12-06 RX ORDER — SODIUM CHLORIDE 0.9 % (FLUSH) 0.9 %
10 SYRINGE (ML) INJECTION AS NEEDED
Status: DISCONTINUED | OUTPATIENT
Start: 2023-12-06 | End: 2023-12-06 | Stop reason: HOSPADM

## 2023-12-06 RX ORDER — CYCLOBENZAPRINE HCL 10 MG
10 TABLET ORAL DAILY
Status: ON HOLD | COMMUNITY

## 2023-12-06 RX ORDER — METOCLOPRAMIDE 5 MG/1
5 TABLET ORAL 3 TIMES DAILY PRN
Qty: 20 TABLET | Refills: 0 | Status: ON HOLD | OUTPATIENT
Start: 2023-12-06

## 2023-12-06 RX ORDER — ONDANSETRON 2 MG/ML
4 INJECTION INTRAMUSCULAR; INTRAVENOUS ONCE
Status: COMPLETED | OUTPATIENT
Start: 2023-12-06 | End: 2023-12-06

## 2023-12-06 RX ORDER — ALPRAZOLAM 2 MG/1
1 TABLET ORAL NIGHTLY PRN
COMMUNITY
End: 2023-12-08

## 2023-12-06 RX ORDER — DICYCLOMINE HCL 20 MG
20 TABLET ORAL EVERY 8 HOURS PRN
Qty: 20 TABLET | Refills: 0 | Status: ON HOLD | OUTPATIENT
Start: 2023-12-06

## 2023-12-06 RX ORDER — VENLAFAXINE HYDROCHLORIDE 150 MG/1
150 CAPSULE, EXTENDED RELEASE ORAL DAILY
Status: ON HOLD | COMMUNITY

## 2023-12-06 RX ORDER — HYDROMORPHONE HYDROCHLORIDE 1 MG/ML
0.5 INJECTION, SOLUTION INTRAMUSCULAR; INTRAVENOUS; SUBCUTANEOUS ONCE
Status: COMPLETED | OUTPATIENT
Start: 2023-12-06 | End: 2023-12-06

## 2023-12-06 RX ORDER — METHYLPREDNISOLONE SODIUM SUCCINATE 125 MG/2ML
125 INJECTION, POWDER, LYOPHILIZED, FOR SOLUTION INTRAMUSCULAR; INTRAVENOUS ONCE
Status: DISCONTINUED | OUTPATIENT
Start: 2023-12-06 | End: 2023-12-06

## 2023-12-06 RX ADMIN — HYDROMORPHONE HYDROCHLORIDE 0.5 MG: 1 INJECTION, SOLUTION INTRAMUSCULAR; INTRAVENOUS; SUBCUTANEOUS at 12:01

## 2023-12-06 RX ADMIN — IOPAMIDOL 85 ML: 612 INJECTION, SOLUTION INTRAVENOUS at 12:38

## 2023-12-06 RX ADMIN — SODIUM CHLORIDE 500 ML: 9 INJECTION, SOLUTION INTRAVENOUS at 12:00

## 2023-12-06 RX ADMIN — ONDANSETRON 4 MG: 2 INJECTION INTRAMUSCULAR; INTRAVENOUS at 12:01

## 2023-12-06 NOTE — DISCHARGE INSTRUCTIONS
Please follow-up with your gastroenterologist, Dr. Josue, within the next week as previously scheduled.

## 2023-12-06 NOTE — ED PROVIDER NOTES
Subjective   History of Present Illness  63-year-old female presents for evaluation of acute on chronic abdominal pain and distention.  The patient tells me that she began experiencing abdominal pain and distention about 6 months ago.  She saw her primary care physician regarding her symptoms last month and had an outpatient CT scan that showed some mild ascites.  However, she notes that over the past few weeks she has had worsening pain and distention and notes constipation as well despite trying multiple modalities at home to help her with her bowel movement issues.  As a result of her ongoing issues, she came here to the emergency department to be evaluated today.  She denies any fevers.  No chills.  No night sweats.  No unintentional weight loss.  She has been referred to gastroenterology in [place], [place] and has upcoming outpatient studies scheduled for next week.  She currently rates her pain at 6 out of 10 in severity.      Review of Systems   Gastrointestinal:  Positive for abdominal pain, constipation and nausea.   All other systems reviewed and are negative.      Past Medical History:   Diagnosis Date    Abscess     Anxiety     Bilateral ovarian cysts     Depression     Encounter for routine gynecological examination     Foot pain     H/O bone density study 06/2014    H/O mammogram 07/2016    [facility]    Hot flashes, menopausal     Hypothyroidism due to Hashimoto's thyroiditis     Insomnia     Miscarriage     x3    Osteopenia     Pap smear for cervical cancer screening 06/2014    Routine general medical examination at a health care facility     Urinary incontinence     Urinary tract infection     Vitamin D deficiency        No Known Allergies    Past Surgical History:   Procedure Laterality Date    ABDOMINOPLASTY  2001    CHOLECYSTECTOMY  2008    COLONOSCOPY  07/2012    Quang Gaines in [facility] normal    ECTOPIC PREGNANCY SURGERY Left 1994    HERNIA REPAIR  2010       Family History   Problem Relation  Age of Onset    Mental illness Mother     Depression Mother     Thyroid disease Mother     Hypertension Father     Thyroid disease Sister     Breast cancer Other     Breast cancer Maternal Aunt        Social History     Socioeconomic History    Marital status:    Tobacco Use    Smoking status: Never    Smokeless tobacco: Never   Substance and Sexual Activity    Alcohol use: No    Drug use: No    Sexual activity: Yes     Partners: Male     Birth control/protection: None, Post-menopausal     Comment:            Objective   Physical Exam  Vitals and nursing note reviewed.   Constitutional:       General: She is not in acute distress.     Appearance: She is well-developed. She is obese. She is not diaphoretic.      Comments: Nontoxic-appearing female   HENT:      Head: Normocephalic and atraumatic.   Eyes:      Pupils: Pupils are equal, round, and reactive to light.   Cardiovascular:      Rate and Rhythm: Normal rate and regular rhythm.      Heart sounds: Normal heart sounds. No murmur heard.     No friction rub. No gallop.   Pulmonary:      Effort: Pulmonary effort is normal. No respiratory distress.      Breath sounds: Normal breath sounds. No wheezing or rales.   Abdominal:      General: Bowel sounds are normal. There is no distension.      Palpations: Abdomen is soft. There is no mass.      Tenderness: There is abdominal tenderness. There is no rebound.      Comments: Mild generalized abdominal tenderness present, no peritoneal signs or pain out of proportion to exam, abdomen appears mildly distended   Genitourinary:     Comments: No CVA tenderness noted  Musculoskeletal:         General: Normal range of motion.      Cervical back: Neck supple.   Skin:     General: Skin is warm and dry.      Findings: No erythema or rash.      Comments: No dermatomal rash noted   Neurological:      Mental Status: She is alert and oriented to person, place, and time.   Psychiatric:         Mood and Affect: Mood normal.          Thought Content: Thought content normal.         Judgment: Judgment normal.         Procedures           ED Course  ED Course as of 12/06/23 1525   Wed Dec 06, 2023   1113 63-year-old female presents for evaluation of acute on chronic abdominal pain and distention.  She tells me that she began experiencing abdominal pain and distention about 6 months ago.  She saw her primary care physician regarding her symptoms last month and had an outpatient CT scan that showed mild ascites.  However, she notes that over the past few weeks she has had worsening pain and distention and notes constipation as well despite trying multiple modalities at home to help her with her bowel movement issues.  Given her ongoing symptoms, she came here to the ED to be evaluated today.  On arrival, the patient is nontoxic-appearing.  She has generalized abdominal tenderness.  Her abdomen is mildly distended.  No pain out of proportion to exam.  I reviewed the patient's prior records as well as her CT scan results from 4 weeks ago. [DD]   1114 We will obtain labs and imaging, and we will reassess following initial interventions. [DD]   1306 I personally and independently reviewed the patient's CT images and findings, and I am in agreement with the radiologist regarding CT interpretation--particularly there is no emergent/surgical intra-abdominal process present.  Her ascites does seem to be increased when compared to prior imaging. [DD]   1322 Clinical presentation clearly is not consistent with pneumonia. [DD]   1328 So back that is salty alwaysUpon reevaluation, the patient looks and feels improved.  We discussed her CT findings most notably her, cirrhotic appearing liver and her increased ascites fluid when compared to her prior scan from 4 weeks ago.  She has outpatient follow-up scheduled with her gastroenterologist, Dr. Josue, in [facility] scheduled for next week.  She will follow-up as previously scheduled.  No indication  for admission or emergent paracentesis at this time.  Prescription for Reglan and Bentyl as needed in the interim for symptom relief.  She will follow-up as directed.  Agreeable with plan and given appropriate strict return precautions. [DD]      ED Course User Index  [DD] Sam Shearer MD                                   Recent Results (from the past 24 hour(s))   Comprehensive Metabolic Panel    Collection Time: 12/06/23 11:42 AM    Specimen: Blood   Result Value Ref Range    Glucose 119 (H) 65 - 99 mg/dL    BUN 17 8 - 23 mg/dL    Creatinine 0.77 0.57 - 1.00 mg/dL    Sodium 140 136 - 145 mmol/L    Potassium 3.9 3.5 - 5.2 mmol/L    Chloride 104 98 - 107 mmol/L    CO2 27.0 22.0 - 29.0 mmol/L    Calcium 9.0 8.6 - 10.5 mg/dL    Total Protein 6.7 6.0 - 8.5 g/dL    Albumin 3.5 3.5 - 5.2 g/dL    ALT (SGPT) 7 1 - 33 U/L    AST (SGOT) 13 1 - 32 U/L    Alkaline Phosphatase 118 (H) 39 - 117 U/L    Total Bilirubin 0.3 0.0 - 1.2 mg/dL    Globulin 3.2 gm/dL    A/G Ratio 1.1 g/dL    BUN/Creatinine Ratio 22.1 7.0 - 25.0    Anion Gap 9.0 5.0 - 15.0 mmol/L    eGFR 87.3 >60.0 mL/min/1.73   Lipase    Collection Time: 12/06/23 11:42 AM    Specimen: Blood   Result Value Ref Range    Lipase 14 13 - 60 U/L   CBC Auto Differential    Collection Time: 12/06/23 11:42 AM    Specimen: Blood   Result Value Ref Range    WBC 7.66 3.40 - 10.80 10*3/mm3    RBC 4.44 3.77 - 5.28 10*6/mm3    Hemoglobin 12.2 12.0 - 15.9 g/dL    Hematocrit 39.7 34.0 - 46.6 %    MCV 89.4 79.0 - 97.0 fL    MCH 27.5 26.6 - 33.0 pg    MCHC 30.7 (L) 31.5 - 35.7 g/dL    RDW 14.1 12.3 - 15.4 %    RDW-SD 46.1 37.0 - 54.0 fl    MPV 10.1 6.0 - 12.0 fL    Platelets 282 140 - 450 10*3/mm3    Neutrophil % 77.8 (H) 42.7 - 76.0 %    Lymphocyte % 9.1 (L) 19.6 - 45.3 %    Monocyte % 8.4 5.0 - 12.0 %    Eosinophil % 3.4 0.3 - 6.2 %    Basophil % 0.8 0.0 - 1.5 %    Immature Grans % 0.5 0.0 - 0.5 %    Neutrophils, Absolute 5.96 1.70 - 7.00 10*3/mm3    Lymphocytes, Absolute 0.70  0.70 - 3.10 10*3/mm3    Monocytes, Absolute 0.64 0.10 - 0.90 10*3/mm3    Eosinophils, Absolute 0.26 0.00 - 0.40 10*3/mm3    Basophils, Absolute 0.06 0.00 - 0.20 10*3/mm3    Immature Grans, Absolute 0.04 0.00 - 0.05 10*3/mm3    nRBC 0.0 0.0 - 0.2 /100 WBC   Urinalysis With Culture If Indicated - Urine, Clean Catch    Collection Time: 12/06/23 11:43 AM    Specimen: Urine, Clean Catch   Result Value Ref Range    Color, UA Dark Yellow (A) Yellow, Straw    Appearance, UA Cloudy (A) Clear    pH, UA 5.5 5.0 - 8.0    Specific Gravity, UA 1.032 (H) 1.001 - 1.030    Glucose, UA Negative Negative    Ketones, UA Trace (A) Negative    Bilirubin, UA Negative Negative    Blood, UA Negative Negative    Protein, UA Negative Negative    Leuk Esterase, UA Negative Negative    Nitrite, UA Negative Negative    Urobilinogen, UA 1.0 E.U./dL 0.2 - 1.0 E.U./dL   Urinalysis, Microscopic Only - Urine, Clean Catch    Collection Time: 12/06/23 11:43 AM    Specimen: Urine, Clean Catch   Result Value Ref Range    RBC, UA 0-2 None Seen, 0-2 /HPF    WBC, UA 0-2 None Seen, 0-2 /HPF    Bacteria, UA None Seen None Seen, Trace /HPF    Squamous Epithelial Cells, UA 0-2 None Seen, 0-2 /HPF    Hyaline Casts, UA 0-6 0 - 6 /LPF    Calcium Oxalate Crystals, UA Moderate/2+ None Seen /HPF    Mucus, UA Small/1+ (A) None Seen, Trace /HPF    Methodology Manual Light Microscopy    POC Creatinine    Collection Time: 12/06/23 11:51 AM    Specimen: Blood   Result Value Ref Range    Creatinine 0.60 0.60 - 1.30 mg/dL   POC Creatinine    Collection Time: 12/06/23 11:52 AM    Specimen: Blood   Result Value Ref Range    Creatinine 0.60 mg/dL     Note: In addition to lab results from this visit, the labs listed above may include labs taken at another facility or during a different encounter within the last 24 hours. Please correlate lab times with ED admission and discharge times for further clarification of the services performed during this visit.    CT Abdomen Pelvis  With Contrast   Final Result   Impression:      1. Bibasilar atelectasis or pneumonia and small bilateral pleural effusions      2. Moderate hiatal hernia      3. Cirrhotic appearing liver. There are few small hypodense liver lesions appear to represent cysts but few of them too small to characterize.      4. Moderate ascites increased as compared to the previous exam      5. Diffuse pancreatic atrophy. Diffusely nonspecific dilated pancreatic duct along the body and tail of the pancreas. Clinical correlation and follow-up recommended         Electronically Signed: Leighton Spivey MD     12/6/2023 1:16 PM EST     Workstation ID: [id number]        Vitals:    12/06/23 1215 12/06/23 1217 12/06/23 1300 12/06/23 1330   BP:   130/91 126/86   BP Location:       Patient Position:       Pulse: 92  87 84   Resp:       Temp:       TempSrc:       SpO2: (!) 88% 92% 96% 93%   Weight:       Height:         Medications   sodium chloride 0.9 % flush 10 mL (has no administration in time range)   sodium chloride 0.9 % bolus 500 mL (0 mL Intravenous Stopped 12/6/23 1347)   HYDROmorphone (DILAUDID) injection 0.5 mg (0.5 mg Intravenous Given 12/6/23 1201)   ondansetron (ZOFRAN) injection 4 mg (4 mg Intravenous Given 12/6/23 1201)   iopamidol (ISOVUE-300) 61 % injection 100 mL (85 mL Intravenous Given 12/6/23 1238)     ECG/EMG Results (last 24 hours)       ** No results found for the last 24 hours. **          No orders to display                 Medical Decision Making  Problems Addressed:  Generalized abdominal pain: complicated acute illness or injury  Other ascites: complicated acute illness or injury    Amount and/or Complexity of Data Reviewed  Labs: ordered.  Radiology: ordered.    Risk  Prescription drug management.        Final diagnoses:   Generalized abdominal pain   Other ascites       ED Disposition  ED Disposition       ED Disposition   Discharge    Condition   Stable    Comment   --               No follow-up provider  specified.       Medication List        New Prescriptions      dicyclomine 20 MG tablet  Commonly known as: BENTYL  Take 1 tablet by mouth Every 8 (Eight) Hours As Needed for Abdominal Cramping.     metoclopramide 5 MG tablet  Commonly known as: REGLAN  Take 1 tablet by mouth 3 (Three) Times a Day As Needed (nausea, abd pain).               Where to Get Your Medications        These medications were sent to [facility] [id number] - [place], [place] - [address] [address] - [phone] [facility] [phone]   [address] [address]      Phone: [phone]   dicyclomine 20 MG tablet  metoclopramide 5 MG tablet            Sam Shearer MD  12/06/23 1854

## 2023-12-07 ENCOUNTER — HOSPITAL ENCOUNTER (EMERGENCY)
Facility: HOSPITAL | Age: 63
Discharge: LEFT BEFORE DISCHARGE | End: 2023-12-07
Payer: COMMERCIAL

## 2023-12-07 ENCOUNTER — APPOINTMENT (OUTPATIENT)
Dept: GENERAL RADIOLOGY | Facility: HOSPITAL | Age: 63
DRG: 435 | End: 2023-12-07
Payer: COMMERCIAL

## 2023-12-07 ENCOUNTER — HOSPITAL ENCOUNTER (INPATIENT)
Facility: HOSPITAL | Age: 63
LOS: 2 days | Discharge: HOME | DRG: 435 | End: 2023-12-15
Attending: EMERGENCY MEDICINE | Admitting: INTERNAL MEDICINE
Payer: COMMERCIAL

## 2023-12-07 ENCOUNTER — APPOINTMENT (OUTPATIENT)
Dept: CT IMAGING | Facility: HOSPITAL | Age: 63
DRG: 435 | End: 2023-12-07
Payer: COMMERCIAL

## 2023-12-07 VITALS
SYSTOLIC BLOOD PRESSURE: 153 MMHG | BODY MASS INDEX: 33.74 KG/M2 | RESPIRATION RATE: 18 BRPM | OXYGEN SATURATION: 91 % | TEMPERATURE: 97.7 F | WEIGHT: 215 LBS | DIASTOLIC BLOOD PRESSURE: 93 MMHG | HEART RATE: 92 BPM | HEIGHT: 67 IN

## 2023-12-07 DIAGNOSIS — A41.9 SEPSIS, DUE TO UNSPECIFIED ORGANISM, UNSPECIFIED WHETHER ACUTE ORGAN DYSFUNCTION PRESENT: Primary | ICD-10-CM

## 2023-12-07 DIAGNOSIS — C25.9 MALIGNANT NEOPLASM OF PANCREAS, UNSPECIFIED LOCATION OF MALIGNANCY: ICD-10-CM

## 2023-12-07 DIAGNOSIS — K86.89 PANCREATIC MASS: ICD-10-CM

## 2023-12-07 DIAGNOSIS — K20.90 ESOPHAGITIS: ICD-10-CM

## 2023-12-07 LAB
ALBUMIN SERPL-MCNC: 4 G/DL (ref 3.5–5.2)
ALBUMIN/GLOB SERPL: 1 G/DL
ALP SERPL-CCNC: 149 U/L (ref 39–117)
ALT SERPL W P-5'-P-CCNC: 11 U/L (ref 1–33)
ANION GAP SERPL CALCULATED.3IONS-SCNC: 15 MMOL/L (ref 5–15)
AST SERPL-CCNC: 21 U/L (ref 1–32)
BACTERIA UR QL AUTO: ABNORMAL /HPF
BASOPHILS # BLD AUTO: 0.05 10*3/MM3 (ref 0–0.2)
BASOPHILS NFR BLD AUTO: 0.2 % (ref 0–1.5)
BILIRUB SERPL-MCNC: 0.6 MG/DL (ref 0–1.2)
BILIRUB UR QL STRIP: ABNORMAL
BUN SERPL-MCNC: 21 MG/DL (ref 8–23)
BUN/CREAT SERPL: 22.8 (ref 7–25)
CALCIUM SPEC-SCNC: 9.3 MG/DL (ref 8.6–10.5)
CHLORIDE SERPL-SCNC: 101 MMOL/L (ref 98–107)
CLARITY UR: ABNORMAL
CO2 SERPL-SCNC: 21 MMOL/L (ref 22–29)
COD CRY URNS QL: ABNORMAL /HPF
COLOR UR: ABNORMAL
CREAT SERPL-MCNC: 0.92 MG/DL (ref 0.57–1)
D-LACTATE SERPL-SCNC: 2.3 MMOL/L (ref 0.5–2)
DEPRECATED RDW RBC AUTO: 45.6 FL (ref 37–54)
EGFRCR SERPLBLD CKD-EPI 2021: 70.5 ML/MIN/1.73
EOSINOPHIL # BLD AUTO: 0.01 10*3/MM3 (ref 0–0.4)
EOSINOPHIL NFR BLD AUTO: 0 % (ref 0.3–6.2)
ERYTHROCYTE [DISTWIDTH] IN BLOOD BY AUTOMATED COUNT: 14.3 % (ref 12.3–15.4)
FINE GRAN CASTS URNS QL MICRO: ABNORMAL /LPF
GEN 5 2HR TROPONIN T REFLEX: 20 NG/L
GLOBULIN UR ELPH-MCNC: 4 GM/DL
GLUCOSE SERPL-MCNC: 163 MG/DL (ref 65–99)
GLUCOSE UR STRIP-MCNC: NEGATIVE MG/DL
HCT VFR BLD AUTO: 47.3 % (ref 34–46.6)
HGB BLD-MCNC: 14.6 G/DL (ref 12–15.9)
HGB UR QL STRIP.AUTO: NEGATIVE
HOLD SPECIMEN: NORMAL
HOLD SPECIMEN: NORMAL
HYALINE CASTS UR QL AUTO: ABNORMAL /LPF
IMM GRANULOCYTES # BLD AUTO: 0.09 10*3/MM3 (ref 0–0.05)
IMM GRANULOCYTES NFR BLD AUTO: 0.4 % (ref 0–0.5)
INR PPP: 1.12 (ref 0.89–1.12)
KETONES UR QL STRIP: ABNORMAL
LEUKOCYTE ESTERASE UR QL STRIP.AUTO: ABNORMAL
LIPASE SERPL-CCNC: 9 U/L (ref 13–60)
LYMPHOCYTES # BLD AUTO: 0.66 10*3/MM3 (ref 0.7–3.1)
LYMPHOCYTES NFR BLD AUTO: 2.9 % (ref 19.6–45.3)
MCH RBC QN AUTO: 27.3 PG (ref 26.6–33)
MCHC RBC AUTO-ENTMCNC: 30.9 G/DL (ref 31.5–35.7)
MCV RBC AUTO: 88.6 FL (ref 79–97)
MONOCYTES # BLD AUTO: 1.88 10*3/MM3 (ref 0.1–0.9)
MONOCYTES NFR BLD AUTO: 8.2 % (ref 5–12)
MUCOUS THREADS URNS QL MICRO: ABNORMAL /HPF
NEUTROPHILS NFR BLD AUTO: 20.35 10*3/MM3 (ref 1.7–7)
NEUTROPHILS NFR BLD AUTO: 88.3 % (ref 42.7–76)
NITRITE UR QL STRIP: POSITIVE
NRBC BLD AUTO-RTO: 0 /100 WBC (ref 0–0.2)
NT-PROBNP SERPL-MCNC: 107.5 PG/ML (ref 0–900)
PH UR STRIP.AUTO: <=5 [PH] (ref 5–8)
PLATELET # BLD AUTO: 468 10*3/MM3 (ref 140–450)
PMV BLD AUTO: 9.8 FL (ref 6–12)
POTASSIUM SERPL-SCNC: 4.1 MMOL/L (ref 3.5–5.2)
PROCALCITONIN SERPL-MCNC: 0.49 NG/ML (ref 0–0.25)
PROT SERPL-MCNC: 8 G/DL (ref 6–8.5)
PROT UR QL STRIP: ABNORMAL
PROTHROMBIN TIME: 14.6 SECONDS (ref 12.2–14.5)
RBC # BLD AUTO: 5.34 10*6/MM3 (ref 3.77–5.28)
RBC # UR STRIP: ABNORMAL /HPF
REF LAB TEST METHOD: ABNORMAL
SODIUM SERPL-SCNC: 137 MMOL/L (ref 136–145)
SP GR UR STRIP: 1.05 (ref 1–1.03)
SQUAMOUS #/AREA URNS HPF: ABNORMAL /HPF
TROPONIN T DELTA: -10 NG/L
TROPONIN T SERPL HS-MCNC: 30 NG/L
UROBILINOGEN UR QL STRIP: ABNORMAL
WBC # UR STRIP: ABNORMAL /HPF
WBC NRBC COR # BLD AUTO: 23.04 10*3/MM3 (ref 3.4–10.8)
WHOLE BLOOD HOLD COAG: NORMAL
WHOLE BLOOD HOLD SPECIMEN: NORMAL

## 2023-12-07 PROCEDURE — 80050 GENERAL HEALTH PANEL: CPT | Performed by: EMERGENCY MEDICINE

## 2023-12-07 PROCEDURE — 36415 COLL VENOUS BLD VENIPUNCTURE: CPT

## 2023-12-07 PROCEDURE — 99285 EMERGENCY DEPT VISIT HI MDM: CPT

## 2023-12-07 PROCEDURE — 83036 HEMOGLOBIN GLYCOSYLATED A1C: CPT | Performed by: INTERNAL MEDICINE

## 2023-12-07 PROCEDURE — 25010000002 ONDANSETRON PER 1 MG: Performed by: EMERGENCY MEDICINE

## 2023-12-07 PROCEDURE — 87040 BLOOD CULTURE FOR BACTERIA: CPT | Performed by: EMERGENCY MEDICINE

## 2023-12-07 PROCEDURE — 25010000002 VANCOMYCIN 10 G RECONSTITUTED SOLUTION: Performed by: PHYSICIAN ASSISTANT

## 2023-12-07 PROCEDURE — 83605 ASSAY OF LACTIC ACID: CPT | Performed by: EMERGENCY MEDICINE

## 2023-12-07 PROCEDURE — 87088 URINE BACTERIA CULTURE: CPT | Performed by: PHYSICIAN ASSISTANT

## 2023-12-07 PROCEDURE — 82550 ASSAY OF CK (CPK): CPT | Performed by: INTERNAL MEDICINE

## 2023-12-07 PROCEDURE — 93005 ELECTROCARDIOGRAM TRACING: CPT

## 2023-12-07 PROCEDURE — 85610 PROTHROMBIN TIME: CPT | Performed by: PHYSICIAN ASSISTANT

## 2023-12-07 PROCEDURE — 74177 CT ABD & PELVIS W/CONTRAST: CPT

## 2023-12-07 PROCEDURE — 93005 ELECTROCARDIOGRAM TRACING: CPT | Performed by: EMERGENCY MEDICINE

## 2023-12-07 PROCEDURE — 87086 URINE CULTURE/COLONY COUNT: CPT | Performed by: PHYSICIAN ASSISTANT

## 2023-12-07 PROCEDURE — 87186 SC STD MICRODIL/AGAR DIL: CPT | Performed by: PHYSICIAN ASSISTANT

## 2023-12-07 PROCEDURE — 71045 X-RAY EXAM CHEST 1 VIEW: CPT

## 2023-12-07 PROCEDURE — 84145 PROCALCITONIN (PCT): CPT | Performed by: EMERGENCY MEDICINE

## 2023-12-07 PROCEDURE — 25510000001 IOPAMIDOL PER 1 ML: Performed by: EMERGENCY MEDICINE

## 2023-12-07 PROCEDURE — 25010000002 PIPERACILLIN SOD-TAZOBACTAM PER 1 G: Performed by: PHYSICIAN ASSISTANT

## 2023-12-07 PROCEDURE — 83880 ASSAY OF NATRIURETIC PEPTIDE: CPT | Performed by: EMERGENCY MEDICINE

## 2023-12-07 PROCEDURE — 25810000003 SODIUM CHLORIDE 0.9 % SOLUTION: Performed by: PHYSICIAN ASSISTANT

## 2023-12-07 PROCEDURE — 71275 CT ANGIOGRAPHY CHEST: CPT

## 2023-12-07 PROCEDURE — 83690 ASSAY OF LIPASE: CPT | Performed by: EMERGENCY MEDICINE

## 2023-12-07 PROCEDURE — 25010000002 HYDROMORPHONE PER 4 MG: Performed by: EMERGENCY MEDICINE

## 2023-12-07 PROCEDURE — 99211 OFF/OP EST MAY X REQ PHY/QHP: CPT

## 2023-12-07 PROCEDURE — 80307 DRUG TEST PRSMV CHEM ANLYZR: CPT | Performed by: NURSE PRACTITIONER

## 2023-12-07 PROCEDURE — 84484 ASSAY OF TROPONIN QUANT: CPT | Performed by: EMERGENCY MEDICINE

## 2023-12-07 PROCEDURE — 81001 URINALYSIS AUTO W/SCOPE: CPT | Performed by: PHYSICIAN ASSISTANT

## 2023-12-07 RX ORDER — VANCOMYCIN 2 GRAM/500 ML IN 0.9 % SODIUM CHLORIDE INTRAVENOUS
20 ONCE
Qty: 500 ML | Refills: 0 | Status: COMPLETED | OUTPATIENT
Start: 2023-12-07 | End: 2023-12-08

## 2023-12-07 RX ORDER — ONDANSETRON 2 MG/ML
4 INJECTION INTRAMUSCULAR; INTRAVENOUS ONCE
Status: COMPLETED | OUTPATIENT
Start: 2023-12-07 | End: 2023-12-07

## 2023-12-07 RX ORDER — SODIUM CHLORIDE 0.9 % (FLUSH) 0.9 %
10 SYRINGE (ML) INJECTION AS NEEDED
Status: DISCONTINUED | OUTPATIENT
Start: 2023-12-07 | End: 2023-12-07

## 2023-12-07 RX ORDER — HYDROMORPHONE HYDROCHLORIDE 1 MG/ML
0.5 INJECTION, SOLUTION INTRAMUSCULAR; INTRAVENOUS; SUBCUTANEOUS ONCE
Status: COMPLETED | OUTPATIENT
Start: 2023-12-07 | End: 2023-12-07

## 2023-12-07 RX ORDER — SODIUM CHLORIDE 0.9 % (FLUSH) 0.9 %
10 SYRINGE (ML) INJECTION AS NEEDED
Status: DISCONTINUED | OUTPATIENT
Start: 2023-12-07 | End: 2023-12-15 | Stop reason: HOSPADM

## 2023-12-07 RX ORDER — ASPIRIN 81 MG/1
324 TABLET, CHEWABLE ORAL ONCE
Status: COMPLETED | OUTPATIENT
Start: 2023-12-07 | End: 2023-12-07

## 2023-12-07 RX ADMIN — HYDROMORPHONE HYDROCHLORIDE 0.5 MG: 1 INJECTION, SOLUTION INTRAMUSCULAR; INTRAVENOUS; SUBCUTANEOUS at 21:46

## 2023-12-07 RX ADMIN — PIPERACILLIN SODIUM AND TAZOBACTAM SODIUM 3.38 G: 3; .375 INJECTION, SOLUTION INTRAVENOUS at 21:22

## 2023-12-07 RX ADMIN — VANCOMYCIN HYDROCHLORIDE 2000 MG: 10 INJECTION, POWDER, LYOPHILIZED, FOR SOLUTION INTRAVENOUS at 22:12

## 2023-12-07 RX ADMIN — ONDANSETRON 4 MG: 2 INJECTION INTRAMUSCULAR; INTRAVENOUS at 21:46

## 2023-12-07 RX ADMIN — IOPAMIDOL 90 ML: 755 INJECTION, SOLUTION INTRAVENOUS at 21:35

## 2023-12-07 RX ADMIN — ASPIRIN 324 MG: 81 TABLET, CHEWABLE ORAL at 19:55

## 2023-12-07 NOTE — Clinical Note
Level of Care: Telemetry [5]   Diagnosis: Sepsis [[id number]]   Admitting Physician: LORI VERDUZCO [[id number]]   Attending Physician: LORI VERDUZCO [[id number]]   Bed Request Comments: tele

## 2023-12-08 ENCOUNTER — APPOINTMENT (OUTPATIENT)
Dept: ULTRASOUND IMAGING | Facility: HOSPITAL | Age: 63
DRG: 435 | End: 2023-12-08
Payer: COMMERCIAL

## 2023-12-08 ENCOUNTER — APPOINTMENT (OUTPATIENT)
Dept: GENERAL RADIOLOGY | Facility: HOSPITAL | Age: 63
DRG: 435 | End: 2023-12-08
Payer: COMMERCIAL

## 2023-12-08 PROBLEM — N39.0 ACUTE UTI (URINARY TRACT INFECTION): Status: ACTIVE | Noted: 2023-12-08

## 2023-12-08 PROBLEM — C76.2 ABDOMINAL CARCINOMATOSIS: Status: ACTIVE | Noted: 2023-12-08

## 2023-12-08 PROBLEM — K86.89 PANCREATIC MASS: Status: ACTIVE | Noted: 2023-12-08

## 2023-12-08 PROBLEM — R91.8 LUNG NODULES: Status: ACTIVE | Noted: 2023-12-08

## 2023-12-08 PROBLEM — C78.6 PERITONEAL CARCINOMATOSIS: Status: ACTIVE | Noted: 2023-12-08

## 2023-12-08 PROBLEM — K21.9 GERD WITHOUT ESOPHAGITIS: Status: ACTIVE | Noted: 2023-12-08

## 2023-12-08 PROBLEM — K76.9 LIVER LESION: Status: ACTIVE | Noted: 2023-12-08

## 2023-12-08 PROBLEM — R13.19 OTHER DYSPHAGIA: Status: ACTIVE | Noted: 2023-12-08

## 2023-12-08 PROBLEM — A41.9 SEPSIS: Status: ACTIVE | Noted: 2023-12-08

## 2023-12-08 PROBLEM — R18.8 ASCITES: Status: ACTIVE | Noted: 2023-12-08

## 2023-12-08 PROBLEM — K44.9 HIATAL HERNIA: Status: ACTIVE | Noted: 2023-12-08

## 2023-12-08 LAB
ALBUMIN FLD-MCNC: 2.9 G/DL
ALBUMIN SERPL-MCNC: 3.7 G/DL (ref 3.5–5.2)
ALBUMIN/GLOB SERPL: 1 G/DL
ALP SERPL-CCNC: 137 U/L (ref 39–117)
ALT SERPL W P-5'-P-CCNC: 15 U/L (ref 1–33)
AMPHET+METHAMPHET UR QL: POSITIVE
AMPHETAMINES UR QL: NEGATIVE
AMYLASE FLD-CCNC: 32 U/L
ANION GAP SERPL CALCULATED.3IONS-SCNC: 14 MMOL/L (ref 5–15)
APPEARANCE FLD: ABNORMAL
ARTERIAL PATENCY WRIST A: ABNORMAL
AST SERPL-CCNC: 26 U/L (ref 1–32)
ATMOSPHERIC PRESS: ABNORMAL MM[HG]
B PARAPERT DNA SPEC QL NAA+PROBE: NOT DETECTED
B PERT DNA SPEC QL NAA+PROBE: NOT DETECTED
BARBITURATES UR QL SCN: NEGATIVE
BASE EXCESS BLDA CALC-SCNC: -3.8 MMOL/L (ref 0–2)
BASOPHILS # BLD MANUAL: 0 10*3/MM3 (ref 0–0.2)
BASOPHILS NFR BLD MANUAL: 0 % (ref 0–1.5)
BDY SITE: ABNORMAL
BENZODIAZ UR QL SCN: POSITIVE
BILIRUB SERPL-MCNC: 0.6 MG/DL (ref 0–1.2)
BODY TEMPERATURE: 37
BUN SERPL-MCNC: 24 MG/DL (ref 8–23)
BUN/CREAT SERPL: 24 (ref 7–25)
BUPRENORPHINE SERPL-MCNC: NEGATIVE NG/ML
C PNEUM DNA NPH QL NAA+NON-PROBE: NOT DETECTED
CALCIUM SPEC-SCNC: 9.2 MG/DL (ref 8.6–10.5)
CANCER AG125 SERPL QL: 323 U/ML (ref 0–38.1)
CANCER AG15-3 SERPL-ACNC: 21.5 U/ML
CANCER AG19-9 SERPL-ACNC: 6.7 U/ML
CANNABINOIDS SERPL QL: NEGATIVE
CEA SERPL-MCNC: 5.75 NG/ML
CHLORIDE SERPL-SCNC: 100 MMOL/L (ref 98–107)
CK SERPL-CCNC: 59 U/L (ref 20–180)
CO2 BLDA-SCNC: 21.7 MMOL/L (ref 22–33)
CO2 SERPL-SCNC: 21 MMOL/L (ref 22–29)
COCAINE UR QL: NEGATIVE
COHGB MFR BLD: 1.3 % (ref 0–2)
COLOR FLD: YELLOW
CREAT SERPL-MCNC: 1 MG/DL (ref 0.57–1)
D-LACTATE SERPL-SCNC: 1.3 MMOL/L (ref 0.5–2)
DEPRECATED RDW RBC AUTO: 45.7 FL (ref 37–54)
EGFRCR SERPLBLD CKD-EPI 2021: 63.8 ML/MIN/1.73
EOSINOPHIL # BLD MANUAL: 0 10*3/MM3 (ref 0–0.4)
EOSINOPHIL NFR BLD MANUAL: 0 % (ref 0.3–6.2)
EOSINOPHIL NFR FLD MANUAL: 2 %
EPAP: 0
ERYTHROCYTE [DISTWIDTH] IN BLOOD BY AUTOMATED COUNT: 14.3 % (ref 12.3–15.4)
FENTANYL UR-MCNC: NEGATIVE NG/ML
FLUAV SUBTYP SPEC NAA+PROBE: NOT DETECTED
FLUBV RNA ISLT QL NAA+PROBE: NOT DETECTED
GLOBULIN UR ELPH-MCNC: 3.7 GM/DL
GLUCOSE FLD-MCNC: 150 MG/DL
GLUCOSE SERPL-MCNC: 164 MG/DL (ref 65–99)
HADV DNA SPEC NAA+PROBE: NOT DETECTED
HBA1C MFR BLD: 5.7 % (ref 4.8–5.6)
HCO3 BLDA-SCNC: 20.7 MMOL/L (ref 20–26)
HCOV 229E RNA SPEC QL NAA+PROBE: NOT DETECTED
HCOV HKU1 RNA SPEC QL NAA+PROBE: NOT DETECTED
HCOV NL63 RNA SPEC QL NAA+PROBE: NOT DETECTED
HCOV OC43 RNA SPEC QL NAA+PROBE: NOT DETECTED
HCT VFR BLD AUTO: 46.2 % (ref 34–46.6)
HCT VFR BLD CALC: 43.8 % (ref 38–51)
HGB BLD-MCNC: 14.2 G/DL (ref 12–15.9)
HGB BLDA-MCNC: 14.3 G/DL (ref 14–18)
HMPV RNA NPH QL NAA+NON-PROBE: NOT DETECTED
HOLD SPECIMEN: NORMAL
HPIV1 RNA ISLT QL NAA+PROBE: NOT DETECTED
HPIV2 RNA SPEC QL NAA+PROBE: NOT DETECTED
HPIV3 RNA NPH QL NAA+PROBE: NOT DETECTED
HPIV4 P GENE NPH QL NAA+PROBE: NOT DETECTED
INHALED O2 CONCENTRATION: 36 %
IPAP: 0
LDH FLD-CCNC: 169 U/L
LYMPHOCYTES # BLD MANUAL: 0.59 10*3/MM3 (ref 0.7–3.1)
LYMPHOCYTES NFR BLD MANUAL: 11 % (ref 5–12)
LYMPHOCYTES NFR FLD MANUAL: 20 %
M PNEUMO IGG SER IA-ACNC: NOT DETECTED
MACROPHAGE FLUID: 47 %
MAGNESIUM SERPL-MCNC: 2.3 MG/DL (ref 1.6–2.4)
MCH RBC QN AUTO: 26.9 PG (ref 26.6–33)
MCHC RBC AUTO-ENTMCNC: 30.7 G/DL (ref 31.5–35.7)
MCV RBC AUTO: 87.5 FL (ref 79–97)
MESOTHL CELL NFR FLD MANUAL: 2 %
METAMYELOCYTES NFR BLD MANUAL: 2 % (ref 0–0)
METHADONE UR QL SCN: NEGATIVE
METHGB BLD QL: 0.6 % (ref 0–1.5)
MODALITY: ABNORMAL
MONOCYTES # BLD: 1.61 10*3/MM3 (ref 0.1–0.9)
MYELOCYTES NFR BLD MANUAL: 1 % (ref 0–0)
NEUTROPHILS # BLD AUTO: 12 10*3/MM3 (ref 1.7–7)
NEUTROPHILS NFR BLD MANUAL: 51 % (ref 42.7–76)
NEUTROPHILS NFR FLD MANUAL: 29 %
NEUTS BAND NFR BLD MANUAL: 31 % (ref 0–5)
OPIATES UR QL: POSITIVE
OXYCODONE UR QL SCN: NEGATIVE
OXYHGB MFR BLDV: 94.7 % (ref 94–99)
PAW @ PEAK INSP FLOW SETTING VENT: 0 CMH2O
PCO2 BLDA: 35 MM HG (ref 35–45)
PCO2 TEMP ADJ BLD: 35 MM HG (ref 35–45)
PCP UR QL SCN: NEGATIVE
PH BLDA: 7.38 PH UNITS (ref 7.35–7.45)
PH, TEMP CORRECTED: 7.38 PH UNITS
PHOSPHATE SERPL-MCNC: 4.5 MG/DL (ref 2.5–4.5)
PLAT MORPH BLD: NORMAL
PLATELET # BLD AUTO: 469 10*3/MM3 (ref 140–450)
PMV BLD AUTO: 10.2 FL (ref 6–12)
PO2 BLDA: 80.8 MM HG (ref 83–108)
PO2 TEMP ADJ BLD: 80.8 MM HG (ref 83–108)
POTASSIUM SERPL-SCNC: 4.8 MMOL/L (ref 3.5–5.2)
PROT FLD-MCNC: 4.2 G/DL
PROT SERPL-MCNC: 7.4 G/DL (ref 6–8.5)
RBC # BLD AUTO: 5.28 10*6/MM3 (ref 3.77–5.28)
RBC # FLD AUTO: <2000 /MM3
RBC MORPH BLD: NORMAL
RHINOVIRUS RNA SPEC NAA+PROBE: NOT DETECTED
RSV RNA NPH QL NAA+NON-PROBE: NOT DETECTED
SARS-COV-2 RNA NPH QL NAA+NON-PROBE: NOT DETECTED
SODIUM SERPL-SCNC: 135 MMOL/L (ref 136–145)
TOTAL RATE: 0 BREATHS/MINUTE
TRICYCLICS UR QL SCN: POSITIVE
TSH SERPL DL<=0.05 MIU/L-ACNC: 1.73 UIU/ML (ref 0.27–4.2)
VARIANT LYMPHS NFR BLD MANUAL: 4 % (ref 19.6–45.3)
WBC # FLD AUTO: 830 /MM3
WBC MORPH BLD: NORMAL
WBC NRBC COR # BLD AUTO: 14.64 10*3/MM3 (ref 3.4–10.8)

## 2023-12-08 PROCEDURE — 25010000002 PIPERACILLIN SOD-TAZOBACTAM PER 1 G: Performed by: INTERNAL MEDICINE

## 2023-12-08 PROCEDURE — 83735 ASSAY OF MAGNESIUM: CPT | Performed by: INTERNAL MEDICINE

## 2023-12-08 PROCEDURE — 25010000002 VANCOMYCIN PER 500 MG

## 2023-12-08 PROCEDURE — 82247 BILIRUBIN TOTAL: CPT | Performed by: NURSE PRACTITIONER

## 2023-12-08 PROCEDURE — 87205 SMEAR GRAM STAIN: CPT | Performed by: NURSE PRACTITIONER

## 2023-12-08 PROCEDURE — 0202U NFCT DS 22 TRGT SARS-COV-2: CPT | Performed by: INTERNAL MEDICINE

## 2023-12-08 PROCEDURE — 99204 OFFICE O/P NEW MOD 45 MIN: CPT | Performed by: INTERNAL MEDICINE

## 2023-12-08 PROCEDURE — 89051 BODY FLUID CELL COUNT: CPT | Performed by: NURSE PRACTITIONER

## 2023-12-08 PROCEDURE — 87070 CULTURE OTHR SPECIMN AEROBIC: CPT | Performed by: NURSE PRACTITIONER

## 2023-12-08 PROCEDURE — 83615 LACTATE (LD) (LDH) ENZYME: CPT | Performed by: NURSE PRACTITIONER

## 2023-12-08 PROCEDURE — 86301 IMMUNOASSAY TUMOR CA 19-9: CPT | Performed by: INTERNAL MEDICINE

## 2023-12-08 PROCEDURE — 99222 1ST HOSP IP/OBS MODERATE 55: CPT | Performed by: INTERNAL MEDICINE

## 2023-12-08 PROCEDURE — 84157 ASSAY OF PROTEIN OTHER: CPT | Performed by: NURSE PRACTITIONER

## 2023-12-08 PROCEDURE — 87102 FUNGUS ISOLATION CULTURE: CPT | Performed by: NURSE PRACTITIONER

## 2023-12-08 PROCEDURE — 0W9G3ZZ DRAINAGE OF PERITONEAL CAVITY, PERCUTANEOUS APPROACH: ICD-10-PCS | Performed by: RADIOLOGY

## 2023-12-08 PROCEDURE — G0378 HOSPITAL OBSERVATION PER HR: HCPCS

## 2023-12-08 PROCEDURE — 86304 IMMUNOASSAY TUMOR CA 125: CPT | Performed by: INTERNAL MEDICINE

## 2023-12-08 PROCEDURE — 82805 BLOOD GASES W/O2 SATURATION: CPT

## 2023-12-08 PROCEDURE — 84100 ASSAY OF PHOSPHORUS: CPT | Performed by: INTERNAL MEDICINE

## 2023-12-08 PROCEDURE — 86300 IMMUNOASSAY TUMOR CA 15-3: CPT | Performed by: INTERNAL MEDICINE

## 2023-12-08 PROCEDURE — 85025 COMPLETE CBC W/AUTO DIFF WBC: CPT | Performed by: INTERNAL MEDICINE

## 2023-12-08 PROCEDURE — 82042 OTHER SOURCE ALBUMIN QUAN EA: CPT | Performed by: NURSE PRACTITIONER

## 2023-12-08 PROCEDURE — 25010000002 ONDANSETRON PER 1 MG: Performed by: INTERNAL MEDICINE

## 2023-12-08 PROCEDURE — 94799 UNLISTED PULMONARY SVC/PX: CPT

## 2023-12-08 PROCEDURE — 82375 ASSAY CARBOXYHB QUANT: CPT

## 2023-12-08 PROCEDURE — 99223 1ST HOSP IP/OBS HIGH 75: CPT | Performed by: INTERNAL MEDICINE

## 2023-12-08 PROCEDURE — 82945 GLUCOSE OTHER FLUID: CPT | Performed by: NURSE PRACTITIONER

## 2023-12-08 PROCEDURE — 94761 N-INVAS EAR/PLS OXIMETRY MLT: CPT

## 2023-12-08 PROCEDURE — 82378 CARCINOEMBRYONIC ANTIGEN: CPT | Performed by: INTERNAL MEDICINE

## 2023-12-08 PROCEDURE — 76856 US EXAM PELVIC COMPLETE: CPT

## 2023-12-08 PROCEDURE — 80053 COMPREHEN METABOLIC PANEL: CPT | Performed by: INTERNAL MEDICINE

## 2023-12-08 PROCEDURE — 87015 SPECIMEN INFECT AGNT CONCNTJ: CPT | Performed by: NURSE PRACTITIONER

## 2023-12-08 PROCEDURE — 76942 ECHO GUIDE FOR BIOPSY: CPT

## 2023-12-08 PROCEDURE — 36600 WITHDRAWAL OF ARTERIAL BLOOD: CPT

## 2023-12-08 PROCEDURE — 87075 CULTR BACTERIA EXCEPT BLOOD: CPT | Performed by: NURSE PRACTITIONER

## 2023-12-08 PROCEDURE — 87206 SMEAR FLUORESCENT/ACID STAI: CPT | Performed by: NURSE PRACTITIONER

## 2023-12-08 PROCEDURE — 82150 ASSAY OF AMYLASE: CPT | Performed by: NURSE PRACTITIONER

## 2023-12-08 PROCEDURE — 83050 HGB METHEMOGLOBIN QUAN: CPT

## 2023-12-08 RX ORDER — HYDROMORPHONE HYDROCHLORIDE 1 MG/ML
0.5 INJECTION, SOLUTION INTRAMUSCULAR; INTRAVENOUS; SUBCUTANEOUS
Status: DISCONTINUED | OUTPATIENT
Start: 2023-12-08 | End: 2023-12-14

## 2023-12-08 RX ORDER — SODIUM CHLORIDE 0.9 % (FLUSH) 0.9 %
10 SYRINGE (ML) INJECTION AS NEEDED
Status: DISCONTINUED | OUTPATIENT
Start: 2023-12-08 | End: 2023-12-15 | Stop reason: HOSPADM

## 2023-12-08 RX ORDER — VANCOMYCIN HYDROCHLORIDE 1 G/200ML
1000 INJECTION, SOLUTION INTRAVENOUS EVERY 12 HOURS
Status: DISCONTINUED | OUTPATIENT
Start: 2023-12-08 | End: 2023-12-09

## 2023-12-08 RX ORDER — PROMETHAZINE HYDROCHLORIDE 12.5 MG/1
6.25 SUPPOSITORY RECTAL EVERY 6 HOURS PRN
Status: DISCONTINUED | OUTPATIENT
Start: 2023-12-08 | End: 2023-12-15 | Stop reason: HOSPADM

## 2023-12-08 RX ORDER — ALPRAZOLAM 1 MG/1
1 TABLET ORAL NIGHTLY PRN
Status: DISCONTINUED | OUTPATIENT
Start: 2023-12-08 | End: 2023-12-15 | Stop reason: HOSPADM

## 2023-12-08 RX ORDER — ACETAMINOPHEN 325 MG/1
650 TABLET ORAL EVERY 4 HOURS PRN
Status: DISCONTINUED | OUTPATIENT
Start: 2023-12-08 | End: 2023-12-15 | Stop reason: HOSPADM

## 2023-12-08 RX ORDER — POLYETHYLENE GLYCOL 3350 17 G/17G
17 POWDER, FOR SOLUTION ORAL DAILY PRN
Status: DISCONTINUED | OUTPATIENT
Start: 2023-12-08 | End: 2023-12-15

## 2023-12-08 RX ORDER — AMOXICILLIN 250 MG
2 CAPSULE ORAL 2 TIMES DAILY
Status: DISCONTINUED | OUTPATIENT
Start: 2023-12-08 | End: 2023-12-15 | Stop reason: HOSPADM

## 2023-12-08 RX ORDER — SODIUM CHLORIDE 9 MG/ML
40 INJECTION, SOLUTION INTRAVENOUS AS NEEDED
Status: DISCONTINUED | OUTPATIENT
Start: 2023-12-08 | End: 2023-12-15 | Stop reason: HOSPADM

## 2023-12-08 RX ORDER — FAMOTIDINE 10 MG/ML
20 INJECTION, SOLUTION INTRAVENOUS EVERY 12 HOURS SCHEDULED
Status: DISCONTINUED | OUTPATIENT
Start: 2023-12-08 | End: 2023-12-08

## 2023-12-08 RX ORDER — POLYETHYLENE GLYCOL 3350 17 G/17G
17 POWDER, FOR SOLUTION ORAL DAILY
Status: DISCONTINUED | OUTPATIENT
Start: 2023-12-08 | End: 2023-12-15 | Stop reason: HOSPADM

## 2023-12-08 RX ORDER — PROCHLORPERAZINE EDISYLATE 5 MG/ML
2.5 INJECTION INTRAMUSCULAR; INTRAVENOUS EVERY 6 HOURS PRN
Status: DISCONTINUED | OUTPATIENT
Start: 2023-12-08 | End: 2023-12-15 | Stop reason: HOSPADM

## 2023-12-08 RX ORDER — ACETAMINOPHEN 650 MG/1
650 SUPPOSITORY RECTAL EVERY 4 HOURS PRN
Status: DISCONTINUED | OUTPATIENT
Start: 2023-12-08 | End: 2023-12-15 | Stop reason: HOSPADM

## 2023-12-08 RX ORDER — BISACODYL 10 MG
10 SUPPOSITORY, RECTAL RECTAL DAILY PRN
Status: DISCONTINUED | OUTPATIENT
Start: 2023-12-08 | End: 2023-12-15 | Stop reason: HOSPADM

## 2023-12-08 RX ORDER — PROMETHAZINE HYDROCHLORIDE 12.5 MG/1
6.25 TABLET ORAL EVERY 6 HOURS PRN
Status: DISCONTINUED | OUTPATIENT
Start: 2023-12-08 | End: 2023-12-15 | Stop reason: HOSPADM

## 2023-12-08 RX ORDER — ROSUVASTATIN CALCIUM 10 MG/1
5 TABLET, COATED ORAL DAILY
Status: DISCONTINUED | OUTPATIENT
Start: 2023-12-08 | End: 2023-12-15 | Stop reason: HOSPADM

## 2023-12-08 RX ORDER — LIDOCAINE HYDROCHLORIDE 10 MG/ML
5 INJECTION, SOLUTION EPIDURAL; INFILTRATION; INTRACAUDAL; PERINEURAL ONCE
Status: COMPLETED | OUTPATIENT
Start: 2023-12-08 | End: 2023-12-08

## 2023-12-08 RX ORDER — VENLAFAXINE HYDROCHLORIDE 75 MG/1
150 CAPSULE, EXTENDED RELEASE ORAL DAILY
Status: DISCONTINUED | OUTPATIENT
Start: 2023-12-08 | End: 2023-12-15 | Stop reason: HOSPADM

## 2023-12-08 RX ORDER — PANTOPRAZOLE SODIUM 40 MG/10ML
40 INJECTION, POWDER, LYOPHILIZED, FOR SOLUTION INTRAVENOUS EVERY 12 HOURS SCHEDULED
Status: DISCONTINUED | OUTPATIENT
Start: 2023-12-08 | End: 2023-12-13

## 2023-12-08 RX ORDER — ONDANSETRON 2 MG/ML
4 INJECTION INTRAMUSCULAR; INTRAVENOUS EVERY 6 HOURS PRN
Status: DISCONTINUED | OUTPATIENT
Start: 2023-12-08 | End: 2023-12-08

## 2023-12-08 RX ORDER — DICYCLOMINE HCL 20 MG
20 TABLET ORAL EVERY 8 HOURS PRN
Status: DISCONTINUED | OUTPATIENT
Start: 2023-12-08 | End: 2023-12-15 | Stop reason: HOSPADM

## 2023-12-08 RX ORDER — BISACODYL 5 MG/1
5 TABLET, DELAYED RELEASE ORAL DAILY PRN
Status: DISCONTINUED | OUTPATIENT
Start: 2023-12-08 | End: 2023-12-15 | Stop reason: HOSPADM

## 2023-12-08 RX ORDER — CHOLECALCIFEROL (VITAMIN D3) 125 MCG
5 CAPSULE ORAL NIGHTLY PRN
Status: DISCONTINUED | OUTPATIENT
Start: 2023-12-08 | End: 2023-12-15 | Stop reason: HOSPADM

## 2023-12-08 RX ORDER — NALOXONE HCL 0.4 MG/ML
0.4 VIAL (ML) INJECTION
Status: DISCONTINUED | OUTPATIENT
Start: 2023-12-08 | End: 2023-12-15 | Stop reason: HOSPADM

## 2023-12-08 RX ORDER — IPRATROPIUM BROMIDE AND ALBUTEROL SULFATE 2.5; .5 MG/3ML; MG/3ML
3 SOLUTION RESPIRATORY (INHALATION)
Status: DISCONTINUED | OUTPATIENT
Start: 2023-12-08 | End: 2023-12-10

## 2023-12-08 RX ORDER — ROSUVASTATIN CALCIUM 5 MG/1
5 TABLET, COATED ORAL DAILY
Status: ON HOLD | COMMUNITY
Start: 2023-10-27

## 2023-12-08 RX ORDER — SODIUM CHLORIDE 0.9 % (FLUSH) 0.9 %
10 SYRINGE (ML) INJECTION EVERY 12 HOURS SCHEDULED
Status: DISCONTINUED | OUTPATIENT
Start: 2023-12-08 | End: 2023-12-15 | Stop reason: HOSPADM

## 2023-12-08 RX ORDER — ASPIRIN 81 MG/1
81 TABLET, CHEWABLE ORAL DAILY
Status: DISCONTINUED | OUTPATIENT
Start: 2023-12-08 | End: 2023-12-15 | Stop reason: HOSPADM

## 2023-12-08 RX ORDER — NITROGLYCERIN 0.4 MG/1
0.4 TABLET SUBLINGUAL
Status: DISCONTINUED | OUTPATIENT
Start: 2023-12-08 | End: 2023-12-15 | Stop reason: HOSPADM

## 2023-12-08 RX ORDER — ACETAMINOPHEN 160 MG/5ML
650 SOLUTION ORAL EVERY 4 HOURS PRN
Status: DISCONTINUED | OUTPATIENT
Start: 2023-12-08 | End: 2023-12-15 | Stop reason: HOSPADM

## 2023-12-08 RX ORDER — ONDANSETRON 4 MG/1
4 TABLET, FILM COATED ORAL EVERY 6 HOURS PRN
Status: DISCONTINUED | OUTPATIENT
Start: 2023-12-08 | End: 2023-12-08

## 2023-12-08 RX ORDER — ONDANSETRON 2 MG/ML
4 INJECTION INTRAMUSCULAR; INTRAVENOUS EVERY 6 HOURS
Status: DISPENSED | OUTPATIENT
Start: 2023-12-08 | End: 2023-12-10

## 2023-12-08 RX ORDER — LEVOTHYROXINE SODIUM 0.12 MG/1
125 TABLET ORAL EVERY MORNING
Status: DISCONTINUED | OUTPATIENT
Start: 2023-12-09 | End: 2023-12-15 | Stop reason: HOSPADM

## 2023-12-08 RX ADMIN — VENLAFAXINE HYDROCHLORIDE 150 MG: 75 CAPSULE, EXTENDED RELEASE ORAL at 08:09

## 2023-12-08 RX ADMIN — VANCOMYCIN HYDROCHLORIDE 1000 MG: 1 INJECTION, SOLUTION INTRAVENOUS at 08:07

## 2023-12-08 RX ADMIN — ONDANSETRON 4 MG: 2 INJECTION INTRAMUSCULAR; INTRAVENOUS at 20:21

## 2023-12-08 RX ADMIN — IPRATROPIUM BROMIDE AND ALBUTEROL SULFATE 3 ML: 2.5; .5 SOLUTION RESPIRATORY (INHALATION) at 07:55

## 2023-12-08 RX ADMIN — ONDANSETRON 4 MG: 2 INJECTION INTRAMUSCULAR; INTRAVENOUS at 14:13

## 2023-12-08 RX ADMIN — ROSUVASTATIN 5 MG: 10 TABLET, FILM COATED ORAL at 08:09

## 2023-12-08 RX ADMIN — Medication 10 ML: at 19:35

## 2023-12-08 RX ADMIN — PIPERACILLIN SODIUM AND TAZOBACTAM SODIUM 3.38 G: 3; .375 INJECTION, SOLUTION INTRAVENOUS at 06:53

## 2023-12-08 RX ADMIN — IPRATROPIUM BROMIDE AND ALBUTEROL SULFATE 3 ML: 2.5; .5 SOLUTION RESPIRATORY (INHALATION) at 12:42

## 2023-12-08 RX ADMIN — Medication 10 ML: at 20:21

## 2023-12-08 RX ADMIN — PIPERACILLIN SODIUM AND TAZOBACTAM SODIUM 3.38 G: 3; .375 INJECTION, SOLUTION INTRAVENOUS at 22:32

## 2023-12-08 RX ADMIN — LIDOCAINE HYDROCHLORIDE 5 ML: 10 INJECTION, SOLUTION EPIDURAL; INFILTRATION; INTRACAUDAL; PERINEURAL at 10:46

## 2023-12-08 RX ADMIN — IPRATROPIUM BROMIDE AND ALBUTEROL SULFATE 3 ML: 2.5; .5 SOLUTION RESPIRATORY (INHALATION) at 16:34

## 2023-12-08 RX ADMIN — PIPERACILLIN SODIUM AND TAZOBACTAM SODIUM 3.38 G: 3; .375 INJECTION, SOLUTION INTRAVENOUS at 16:45

## 2023-12-08 RX ADMIN — PANTOPRAZOLE SODIUM 40 MG: 40 INJECTION, POWDER, LYOPHILIZED, FOR SOLUTION INTRAVENOUS at 08:08

## 2023-12-08 RX ADMIN — PANTOPRAZOLE SODIUM 40 MG: 40 INJECTION, POWDER, LYOPHILIZED, FOR SOLUTION INTRAVENOUS at 20:21

## 2023-12-08 RX ADMIN — ONDANSETRON 4 MG: 2 INJECTION INTRAMUSCULAR; INTRAVENOUS at 08:29

## 2023-12-08 RX ADMIN — ASPIRIN 81 MG CHEWABLE TABLET 81 MG: 81 TABLET CHEWABLE at 08:09

## 2023-12-08 RX ADMIN — VANCOMYCIN HYDROCHLORIDE 1000 MG: 1 INJECTION, SOLUTION INTRAVENOUS at 22:33

## 2023-12-08 RX ADMIN — IPRATROPIUM BROMIDE AND ALBUTEROL SULFATE 3 ML: 2.5; .5 SOLUTION RESPIRATORY (INHALATION) at 19:44

## 2023-12-08 NOTE — CONSULTS
HEMATOLOGY/ONCOLOGY INPATIENT CONSULTATION      REFERRING PHYSICIAN: Emily Leach MD    PRIMARY CARE PROVIDER: Leonarda Díaz MD    REASON FOR CONSULTATION: ascites, omental nodules and pancreatic mass concerning for metastatic cancer    HISTORY OF PRESENT ILLNESS: 63-year-old health female with no substantial medical history admitted with progressive abdominal pain and distention and with findings concerning for metastatic malignancy.     She endorses a several month history of gradually worsening abdominal pain and bloating associated with early satiety and weight loss. She endorses back pain worse when lying flat. No fatigue. No cough.  She ultimately developed painful constipation prompting ER evaluation.  She was seen in the ER on 12/6 and then returned on 12/7/2023.    CT chest/abdomen/pelvis showed a mass involving the mid body of the pancreas spanning 2.4 cm concerning for pancreatic adenocarcinoma.  There was narrowing of the portal vein with encasement of the celiac axis.  Peritoneal and omental carcinomatosis and large volume ascites.  Small indeterminate liver lesions.  Nodularity adjacent to the ovary.  Small pulmonary nodules up to 3 mm.  Bilateral pleural effusions.  Pelvic ultrasound did not confirm an adnexal mass.    She underwent a diagnostic and therapeutic paracentesis today.  Cytology is pending.  She is feeling symptomatically much improved compared to prior.  She was able to have a bowel movement today.    She is healthy.  Very active at baseline.    Family history notable for a maternal aunt with breast cancer postmenopausal and a maternal cousin with breast cancer prior to the age of 50.    No Known Allergies    Past Medical History:   Diagnosis Date    Abscess     Anxiety     Bilateral ovarian cysts     Depression     Encounter for routine gynecological examination     Foot pain     H/O bone density study 06/2014    H/O mammogram 07/2016    [facility] clinic    Hot flashes, menopausal      Hypothyroidism due to Hashimoto's thyroiditis     Insomnia     Miscarriage     x3    Osteopenia     Pap smear for cervical cancer screening 06/2014    Routine general medical examination at a health care facility     Urinary incontinence     Urinary tract infection     Vitamin D deficiency          Current Facility-Administered Medications:     [START ON 12/9/2023] !vanc trough 12/9 with am labs, please do not hang dose until lab drawn, , Does not apply, Once, Lukas Acosta, PharmD    acetaminophen (TYLENOL) tablet 650 mg, 650 mg, Oral, Q4H PRN **OR** acetaminophen (TYLENOL) 160 MG/5ML oral solution 650 mg, 650 mg, Oral, Q4H PRN **OR** acetaminophen (TYLENOL) suppository 650 mg, 650 mg, Rectal, Q4H PRN, Danielle Loera, DO    aspirin chewable tablet 81 mg, 81 mg, Oral, Daily, Jen Lucas, ANGELICA, 81 mg at 12/08/23 0809    sennosides-docusate (PERICOLACE) 8.6-50 MG per tablet 2 tablet, 2 tablet, Oral, BID **AND** polyethylene glycol (MIRALAX) packet 17 g, 17 g, Oral, Daily PRN **AND** bisacodyl (DULCOLAX) EC tablet 5 mg, 5 mg, Oral, Daily PRN **AND** bisacodyl (DULCOLAX) suppository 10 mg, 10 mg, Rectal, Daily PRN, Danielle Loera, DO    dicyclomine (BENTYL) tablet 20 mg, 20 mg, Oral, Q8H PRN, Jen Lucas, APRN    HYDROmorphone (DILAUDID) injection 0.5 mg, 0.5 mg, Intravenous, Q2H PRN **AND** naloxone (NARCAN) injection 0.4 mg, 0.4 mg, Intravenous, Q5 Min PRN, Danielle Loera, DO    influenza vac split quad (FLUZONE,FLUARIX,AFLURIA,FLULAVAL) injection 0.5 mL, 0.5 mL, Intramuscular, During Hospitalization, Danielle Loera, DO    ipratropium-albuterol (DUO-NEB) nebulizer solution 3 mL, 3 mL, Nebulization, 4x Daily - RT, Jen Lucas, APRN, 3 mL at 12/08/23 1242    melatonin tablet 5 mg, 5 mg, Oral, Nightly PRN, Danielle Loera, DO    nitroglycerin (NITROSTAT) SL tablet 0.4 mg, 0.4 mg, Sublingual, Q5 Min PRN, Danielle Loera,     ondansetron (ZOFRAN) injection 4 mg, 4 mg, Intravenous, Q6H, Flakito Abrams MD     pantoprazole (PROTONIX) injection 40 mg, 40 mg, Intravenous, Q12H, Jen Lucas, APRN, 40 mg at 12/08/23 0808    Pharmacy to dose vancomycin, , Does not apply, Continuous PRN, Marianne Loerasie G, DO    piperacillin-tazobactam (ZOSYN) 3.375 g in iso-osmotic dextrose 50 ml (premix), 3.375 g, Intravenous, Q8H, Evaristo, Danielle G, DO, 3.375 g at 12/08/23 0653    polyethylene glycol (MIRALAX) packet 17 g, 17 g, Oral, Daily, Flakito Abrams MD    prochlorperazine (COMPAZINE) injection 2.5 mg, 2.5 mg, Intravenous, Q6H PRN, Marianne Loerasie G, DO    promethazine (PHENERGAN) tablet 6.25 mg, 6.25 mg, Oral, Q6H PRN **OR** promethazine (PHENERGAN) suppository 6.25 mg, 6.25 mg, Rectal, Q6H PRN, Emily Leach MD    rosuvastatin (CRESTOR) tablet 5 mg, 5 mg, Oral, Daily, Jen Lucas, APRN, 5 mg at 12/08/23 0809    sodium chloride 0.9 % flush 10 mL, 10 mL, Intravenous, PRN, Evaristo, Danielle G, DO    sodium chloride 0.9 % flush 10 mL, 10 mL, Intravenous, Q12H, Evaristo, Danielle G, DO    sodium chloride 0.9 % flush 10 mL, 10 mL, Intravenous, PRN, Evaristo, Danielle G, DO    sodium chloride 0.9 % infusion 40 mL, 40 mL, Intravenous, PRN, Evaristo, Danielle G, DO    vancomycin (VANCOCIN) 1000 mg/200 mL dextrose 5% IVPB, 1,000 mg, Intravenous, Q12H, Lukas Acosta, PharmD, 1,000 mg at 12/08/23 0807    venlafaxine XR (EFFEXOR-XR) 24 hr capsule 150 mg, 150 mg, Oral, Daily, Jen Lucas, APRN, 150 mg at 12/08/23 0809    Past Surgical History:   Procedure Laterality Date    ABDOMINOPLASTY  2001    CHOLECYSTECTOMY  2008    COLONOSCOPY  07/2012    Quang Gaines in [facility] normal    ECTOPIC PREGNANCY SURGERY Left 1994    HERNIA REPAIR  2010       Social History     Socioeconomic History    Marital status:    Tobacco Use    Smoking status: Never    Smokeless tobacco: Never   Vaping Use    Vaping Use: Never used   Substance and Sexual Activity    Alcohol use: No    Drug use: No    Sexual activity: Yes     Partners: Male     Birth control/protection: None,  Post-menopausal     Comment:    Retired .  Lives in [facility].    Family History   Problem Relation Age of Onset    Mental illness Mother     Depression Mother     Thyroid disease Mother     Hypertension Father     Thyroid disease Sister     Breast cancer Other     Breast cancer Maternal Aunt        Oncology/Hematology History    No history exists.       Objective     Vitals:    12/08/23 1015 12/08/23 1030 12/08/23 1040 12/08/23 1242   BP: 138/91 130/84 139/82    BP Location:       Patient Position:       Pulse: 108 105 101 97   Resp: 20 20 16 18   Temp:       TempSrc:       SpO2: 95% 93% 96% 98%   Weight:       Height:                       Temp:  [97.5 °F (36.4 °C)-97.8 °F (36.6 °C)] 97.7 °F (36.5 °C)     Performance Status: 1    Physical Exam    General: well appearing female in no acute distress  HEENT: sclerae anicteric, oropharynx clear  Lymphatics: no cervical, supraclavicular, or axillary adenopathy  Cardiovascular: regular rate and rhythm, no murmurs  Lungs: clear to auscultation bilaterally  Abdomen: Distended.  Positive ascites.  1.5 cm subcutaneous nodule superior to her navel.  Extremities: no lower extremity edema  Skin: no rashes, lesions, bruising, or petechiae      LABS:    Lab Results   Component Value Date    HGB 14.2 12/08/2023    HCT 46.2 12/08/2023    MCV 87.5 12/08/2023     (H) 12/08/2023    WBC 14.64 (H) 12/08/2023    NEUTROABS 12.00 (H) 12/08/2023    LYMPHSABS 0.66 (L) 12/07/2023    MONOSABS 1.88 (H) 12/07/2023    EOSABS 0.00 12/08/2023    BASOSABS 0.00 12/08/2023     Lab Results   Component Value Date    GLUCOSE 164 (H) 12/08/2023    BUN 24 (H) 12/08/2023    CREATININE 1.00 12/08/2023     (L) 12/08/2023    K 4.8 12/08/2023     12/08/2023    CO2 21.0 (L) 12/08/2023    CALCIUM 9.2 12/08/2023    PROTEINTOT 7.4 12/08/2023    ALBUMIN 3.7 12/08/2023    BILITOT 0.6 12/08/2023    ALKPHOS 137 (H) 12/08/2023    AST 26 12/08/2023    ALT 15 12/08/2023          IMAGING    US Paracentesis    Result Date: 12/8/2023  DATE OF EXAM: 12/8/2023 8:59 AM EST PROCEDURE: US PARACENTESIS INDICATIONS: large volume ascites COMPARISON: No Comparisons Available FLUOROSCOPIC TIME: None PHYSICIAN MONITORED CONSCIOUS SEDATION TIME: None minutes TECHNIQUE: A detailed explanation of the procedure, including the risks, benefits, and alternatives was provided. A preprocedure timeout was performed. The interventional radiology nurse monitored the patient at all times. Limited ultrasound of the abdomen demonstrated a moderate amount of ascites. An appropriate access site was selected and the area was prepped and draped in the usual sterile fashion. The skin was anesthetized with 1% lidocaine and a small skin incision was made. Next, a catheter was advanced into the ascitic fluid. A total of 4.2 L of clear fluid was removed and the catheter was removed. The patient tolerated the procedure well without immediate complication. FINDINGS: See above     1. Successful right lower quadrant paracentesis yielding 4.2 L of clear fluid Electronically Signed: Hesham Eastman MD  12/8/2023 12:41 PM EST  Workstation ID: [id number]    US Pelvis Complete    Result Date: 12/8/2023  US PELVIS COMPLETE Date of Exam: 12/8/2023 9:50 AM CST Indication: r/o right adnexal mass. Comparison: CT abdomen and pelvis 12/7/2023 Technique: Transabdominal ultrasound evaluation of the pelvis was performed utilizing grayscale and color Doppler technique.  Doppler spectral analysis was performed. Transvaginal exam unable to be performed. Findings: The uterus measures 6.0 x 2.7 x 4.6 cm. The uterus is mildly heterogeneous without focal parenchymal abnormality. The endometrial stripe measures 0.3 mm. The right ovary measures 3.2 x 3.1 x 2.1 cm and the left ovary measures 2.5 x 1.8 x 1.9 cm. Ovarian vascularity appears intact.  There is no evidence of a solid ovarian mass. There is no significant free fluid identified within the  pelvis. No definite adnexal abnormality noted however exam is limited due to bowel gas.     Impression: Limited exam without identifiable adnexal mass on this study. Electronically Signed: Vy Gomez MD  12/8/2023 10:37 AM CST  Workstation ID: [id number]    CT Abdomen Pelvis With Contrast    Result Date: 12/7/2023  CT ABDOMEN PELVIS W CONTRAST Date of Exam: 12/7/2023 9:26 PM EST Indication: worsening abdominal pain and swelling. Comparison: CT abdomen pelvis 12/6/2023 Technique: Axial CT images were obtained of the abdomen and pelvis following the uneventful intravenous administration of 90 mL Isovue 370. Reconstructed coronal and sagittal images were also obtained. Automated exposure control and iterative construction methods were used. Findings: Please see separate dictated chest CT for findings above the diaphragm. Indeterminate right hepatic lobe lesion measuring 9 mm (5/41). Left hepatic lobe lesion too small to characterize measuring 8 mm (5/32). The spleen is normal in size. Gallbladder absent. The adrenal glands are normal. There is atrophy of the pancreatic tail with dilatation of the main pancreatic duct which is dilated up to 8 mm (2/27). There is abrupt termination of the main pancreatic duct at the mid pancreatic body with hypoenhancing pancreatic mass measuring approximately 2.4 x 2.3 cm (2/47). Abnormal soft tissue extends from this mass proximally to the level of the celiac axis bifurcation encasing splenic artery and common hepatic artery (2/46). There is severe short segment narrowing of the portal vein (2/50) with portal vein encasement. Splenic vein is poorly visualized but suspected narrowed in the region of the mass. The SMV is patent. No abutment of the superior mesenteric artery. Kidneys are symmetrically enhancing. Negative for hydronephrosis or hydroureter. Urinary bladder is collapsed. Uterus and left adnexa are without acute abnormality. Nodularity adjacent to the right ovary which  appears secondary to collapsed adjacent bowel loops (2/124). Large hiatal hernia. Large volume ascites. There is diffuse infiltrative appearance of the omentum most consistent with peritoneal and omental carcinomatosis. Multiple areas of peritoneal nodularity noted for example in the right lower quadrant peritoneal nodule measuring 8 mm (2/136). Peritoneal nodularity within the left lower pelvis (2/149).. Nodularity involving the anterior abdominal wall which measures 1.8 x 1.8 cm (2/105) concerning for metastatic peritoneal involvement extending to the rectus muscle on the right. Scattered irregular areas of nodularity are noted in the mesentery concerning for metastasis for example nodule measuring 1.3 x 1.3 cm likely carcinomatosis and/or metastatic adenopathy (2/112). Gastrohepatic ligament node measures up to 10 mm (2/27). There is fluid-filled distention of the colon. Colonic diverticulosis without diverticulitis. The appendix is nonvisualized. Abdominal aortic branch vasculature patent. No aggressive osseous lesion or acute fracture. Delayed images demonstrate normal contrast excretion into the nondilated kidneys. Normal filling of the bladder. No acute fracture or aggressive osseous lesion.     Impression: 1. Mass involving the mid body of pancreas measuring 2.4 cm highly concerning for pancreatic adenocarcinoma. This results in severe narrowing of the portal vein with encasement of the celiac axis bifurcation as above. 2. Peritoneal and omental carcinomatosis with large volume ascites. 3. Several small indeterminate low-attenuation liver lesions which could relate to metastasis, nonspecific, small size limits assessment. These could be better assessed with MRI follow-up if clinically indicated. 4. Nodularity adjacent to right ovary appears secondary to adjacent bowel loops which are underdistended, follow-up pelvic ultrasound may be helpful to exclude right adnexal mass as large ascites limits assessment.  5. See separately dictated chest CT for findings above the diaphragm. Electronically Signed: Fransico Ramírez MD  12/7/2023 10:52 PM EST  Workstation ID: [id number]    CT Angiogram Chest Pulmonary Embolism    Result Date: 12/7/2023  CT ANGIOGRAM CHEST PULMONARY EMBOLISM Date of Exam: 12/7/2023 9:26 PM EST Indication: soa. Comparison: Chest radiograph 12/7/2023, CT abdomen pelvis 12/6/2023 Technique: Axial CT images were obtained of the chest after the uneventful intravenous administration of 90 mL Isovue 370 utilizing pulmonary embolism protocol.  Reconstructed coronal and sagittal images were also obtained. Automated exposure control and  iterative construction methods were used. Findings: Soft tissue of the lower neck are without acute abnormality. The aortic arch branch vasculature is patent. There is fluid-filled distention of the esophagus. There is a large hiatal hernia again noted. Heart size normal. Pulmonary arteries are well-opacified with contrast. Negative for pulmonary embolus. No mediastinal or hilar adenopathy. No axillary adenopathy. Small bilateral pleural effusions with bibasilar atelectasis. Trachea and mainstem bronchi are patent. No pneumothorax. Several small bilateral pulmonary nodules are nonspecific for example in the right upper lobe measuring 5mm (6/25). Within the right upper lobe measuring 3 mm (6/22). Within the left upper lobe measuring 3 mm (6/46). Several other small to 3 mm nodules are noted bilaterally. Linear atelectasis at the lingula. Thoracic vertebral bodies are maintained in height. No aggressive osseous lesion or fracture. Please see separately dictated abdominal CT for findings below the diaphragm.     Impression: 1. Negative for pulmonary embolus. 2. Increase in size of small bilateral pleural effusions with lower lobe airspace disease likely atelectasis. 3. Multiple small nonspecific pulmonary nodules most pronounced in the right upper lobe measuring up to 5 mm, recommend  attention on follow-up imaging, these could be reassessed in 6 months. 4. Large hiatal hernia with fluid-filled distention of esophagus which may relate to delayed transit or reflux, the amount of fluid in the esophagus places patient at risk for aspiration. 5. Please see separately dictated abdominal CT for findings below the diaphragm. Electronically Signed: Fransico Ramírez MD  12/7/2023 10:17 PM EST  Workstation ID: [id number]    XR Chest 1 View    Result Date: 12/7/2023  XR CHEST 1 VW Date of Exam: 12/7/2023 7:56 PM EST Indication: Chest Pain Triage Protocol Comparison: None available. Findings: The lungs are poorly inflated with bibasilar atelectasis particularly on the left. Small bilateral pleural effusions are not excluded. The remainder of the lungs are clear cardiac, hilar, and mediastinal silhouettes are within normal limits. No pneumothorax. The trachea is midline. Pulmonary vascularity is normal. Visualized bony structures are intact.     Impression: Poor inspiration with bibasilar atelectasis and possible small bilateral pleural effusions. Electronically Signed: Danilo Yip DO  12/7/2023 8:20 PM EST  Workstation ID: [id number]    CT Abdomen Pelvis With Contrast    Result Date: 12/6/2023  CT ABDOMEN PELVIS W CONTRAST Date of Exam: 12/6/2023 12:36 PM EST Indication: abd pain and distension. Comparison: CT abdomen pelvis dated 11/10/2023 Technique: Axial CT images were obtained of the abdomen and pelvis following the uneventful intravenous administration of 83 mL Isovue-300. Reconstructed coronal and sagittal images were also obtained. Automated exposure control and iterative construction methods were used. Findings: Lung Bases:  There are basilar atelectasis or pneumonia and small bilateral pleural effusions Moderate to large hiatal hernia Liver: The liver shows mild diffuse nodular surface suspicious for cirrhosis few small round hypodense nonenhancing liver lesions compatible with cysts. There is moderate  ascites Biliary/Gallbladder: Cholecystectomy changes with postcholecystectomy dilatation of the common bile duct Spleen: Punctate calcification in the spleen. The spleen does not appear enlarged Pancreas: Diffuse pancreatic atrophy. There is dilatation of the pancreatic duct in particular along the body and tail of the pancreas. No definite pancreatic lesions or peripancreatic inflammatory changes Kidneys: Kidneys are normal in size. There are no stones or hydronephrosis. Adrenals: Adrenal glands are unremarkable. Retroperitoneal/Lymph Nodes/Vasculature: There are multiple predominately subcentimeter mesenteric and retroperitoneal lymph nodes Gastrointestinal/Mesentery: The small bowel loops are normal in caliber. The large bowel loops are normal in caliber. There is similar reticulosis Bladder: The bladder is unremarkable   Bony Structures:  Visualized bony structures are consistent with the patient's age.     Impression: 1. Bibasilar atelectasis or pneumonia and small bilateral pleural effusions 2. Moderate hiatal hernia 3. Cirrhotic appearing liver. There are few small hypodense liver lesions appear to represent cysts but few of them too small to characterize. 4. Moderate ascites increased as compared to the previous exam 5. Diffuse pancreatic atrophy. Diffusely nonspecific dilated pancreatic duct along the body and tail of the pancreas. Clinical correlation and follow-up recommended Electronically Signed: Leighton Spivey MD  12/6/2023 1:16 PM EST  Workstation ID: [id number]    CT Abdomen Pelvis Without Contrast    Result Date: 11/10/2023  EXAM:   CT Abdomen and Pelvis Without Intravenous Contrast  EXAM DATE:   11/10/2023 11:48 AM  CLINICAL HISTORY:   R17.06; R19.06-Epigastric swelling, mass or lump  TECHNIQUE:   Axial computed tomography images of the abdomen and pelvis without intravenous contrast.  Sagittal and coronal reformatted images were created and reviewed.  This CT exam was performed using one or more of  the following dose reduction techniques:  automated exposure control, adjustment of the mA and/or kV according to patient size, and/or use of iterative reconstruction technique.  COMPARISON:   No relevant prior studies available.  FINDINGS:   LUNG BASES:  Bibasilar atelectasis.   PLEURAL SPACE:  Tiny bilateral pleural effusions.   HEART:  Cardiomegaly.   MEDIASTINUM:  Moderate hiatal hernia.   ABDOMEN:   LIVER:  Unremarkable as visualized.   GALLBLADDER AND BILE DUCTS:  Unremarkable as visualized.  No calcified stones.  No ductal dilation.   PANCREAS:  Unremarkable as visualized.  No ductal dilation.   SPLEEN:  Unremarkable as visualized.  No splenomegaly.   ADRENALS:  Unremarkable as visualized.  No mass.   KIDNEYS AND URETERS:  Unremarkable as visualized.  No obstructing stones.  No hydronephrosis.   STOMACH AND BOWEL:  Unremarkable as visualized.  No obstruction.  No mucosal thickening.   PELVIS:   APPENDIX:  No findings to suggest acute appendicitis.   BLADDER:  Unremarkable as visualized.  No stones.   REPRODUCTIVE:  Unremarkable as visualized.   ABDOMEN and PELVIS:   INTRAPERITONEAL SPACE:  Small ascites.  Anterior mesenteric and omental stranding noted.  No free air.   BONES/JOINTS:  Degenerative disc disease throughout the lumbar spine. Degenerative facet arthropathy throughout the lumbar spine, most prominent in the lower lumbar spine.  Grade 1 anterolisthesis of L3-4 which appears to be on a degenerative basis.  No acute fracture.  No dislocation.   SOFT TISSUES:  Unremarkable as visualized.   VASCULATURE:  Atherosclerotic disease.  No abdominal aortic aneurysm.   LYMPH NODES:  Unremarkable as visualized.  No enlarged lymph nodes.      1.  Small ascites. 2.  Tiny bilateral pleural effusions. 3.  Cardiomegaly. 4.  Moderate hiatal hernia. 5.  Degenerative changes lumbar spine as described.   This report was finalized on 11/10/2023 10:56 AM by Dr. Roberto Carrero MD.        ASSESSMENT/PLAN:  1.  Pancreatic  mass  2.  Peritoneal/omental implants  3.  Indeterminate lung nodules  4.  Indeterminate liver hypodensities  5.  Ascites  -I reviewed the patient's CT chest abdomen and pelvis personally together with the patient.  I reviewed the results of her CBC and CMP and pelvic ultrasound.  -I met with the patient together with her supportive sisters, son, and   -We discussed the clinical impression of metastatic malignancy.  We reviewed the rationale for awaiting biopsy results to confirm malignancy and determine treatment plan.  -Leading differential is metastatic pancreatic cancer.  -Will obtain tumor markers including CA 19-9, , CA 15-3, CA 2729, and CEA.  Will await pending cytology.  -Reviewed gastroenterology notes.  Agree with obtaining EUS with pancreatic mass biopsy for further tissue.   -May benefit from an outpatient PET if unclear primary site of malignancy.  -Will obtain a brain MRI while inpatient to complete staging    -Discussed general prognosis and treatment options of metastatic malignancy.  Discussed rationale for awaiting cytology results and additional testing to determine definitive treatment plan.    -Anticipate outpatient genomic testing.  With her family history she could also consider genetic counseling.  -Discussed potential port placement once malignancy is confirmed.    -Discussed the potential role of chemotherapy with a goal of disease control pending final confirmation of diagnosis  -Oncology service will continue to follow.    Sherri العراقي MD    12/8/2023

## 2023-12-08 NOTE — PROGRESS NOTES
"Pharmacy Consult-Vancomycin Dosing  Jessica Kraft is a  63 y.o. female receiving vancomycin therapy.     Indication: sepsis  Consulting Provider: hospitalist  ID Consult:     Goal AUC: 400 - 600 mg/L*hr    Current Antimicrobial Therapy  Anti-Infectives (From admission, onward)      Ordered     Dose/Rate Route Frequency Start Stop    12/08/23 0228  vancomycin (VANCOCIN) 1000 mg/200 mL dextrose 5% IVPB        Ordering Provider: Lukas Acosta PharmD    1,000 mg  over 60 Minutes Intravenous Every 12 Hours 12/08/23 0900 12/13/23 0859    12/08/23 0225  piperacillin-tazobactam (ZOSYN) 3.375 g in iso-osmotic dextrose 50 ml (premix)        Ordering Provider: Danielle Loera DO    3.375 g  over 4 Hours Intravenous Every 8 Hours 12/08/23 0600 12/11/23 0559    12/08/23 0225  Pharmacy to dose vancomycin        Ordering Provider: Danielle Loera DO     Does not apply Continuous PRN 12/08/23 0225 12/11/23 0224 12/07/23 2115  vancomycin IVPB 2000 mg in 0.9% Sodium Chloride 500 mL        Ordering Provider: Dalton Estrada Jr., PA-C    20 mg/kg × 95.3 kg  250 mL/hr over 120 Minutes Intravenous Once 12/07/23 2200 12/08/23 0038    12/07/23 2115  piperacillin-tazobactam (ZOSYN) 3.375 g in iso-osmotic dextrose 50 ml (premix)        Ordering Provider: Dalton Estrada Jr., PA-C    3.375 g  over 30 Minutes Intravenous Once 12/07/23 2130 12/07/23 2200            Allergies  Allergies as of 12/07/2023    (No Known Allergies)       Labs    Results from last 7 days   Lab Units 12/07/23 1959 12/06/23  1152 12/06/23  1151 12/06/23  1142   BUN mg/dL 21  --   --  17   CREATININE mg/dL 0.92 0.60 0.60 0.77       Results from last 7 days   Lab Units 12/07/23 1959 12/06/23  1142   WBC 10*3/mm3 23.04* 7.66       Evaluation of Dosing     Last Dose Received in the ED/Outside Facility: 2000mg  Is Patient on Dialysis or Renal Replacement: no    Ht - 167.6 cm (66\")  Wt - 95.3 kg (210 lb)    Estimated Creatinine Clearance: 73.8 mL/min (by C-G " formula based on SCr of 0.92 mg/dL).    Intake & Output (last 3 days)         12/05 0701  12/06 0700 12/06 0701 12/07 0700 12/07 0701  12/08 0700    IV Piggyback   550    Total Intake(mL/kg)   550 (5.8)    Net   +550                   Microbiology and Radiology  Microbiology Results (last 10 days)       ** No results found for the last 240 hours. **            Reported Vancomycin Levels                         [facility] AUC Calculation:    Current AUC:   0 mg/L*hr    Predicted Steady State AUC :   529 mg/L*hr    Assessment/Plan: The patient will be started on a bolus of 20mg/kg for a dose of 2000mg . Will initiate maintenance dose at 1000 mg IV every 12 hours.  Plan for trough as patient approaches steady state, prior to the 4th dose.  Due to infection severity, will target an AUC of 400-600.      Pharmacy will continue to follow the patient’s culture results and clinical progress daily.    Lukas Acosta, CarolineD

## 2023-12-08 NOTE — NURSING NOTE
Image guided paracentesis performed per Dr Eastman. Patient tolerted procedure well. VSS. 4.2L fluid drained from right lower abdomen. Report called to JESS Matthew.

## 2023-12-08 NOTE — PROGRESS NOTES
[facility] Medicine Services  ADMISSION FOLLOW-UP NOTE          Patient admitted after midnight, H&P by my partner performed earlier on today's date reviewed.  Interim findings, labs, and charting also reviewed.        The [facility] Hospital Problem List has been managed and updated to include any new diagnoses:  Active Hospital Problems    Diagnosis  POA    **Sepsis [A41.9]  Yes    Acute UTI (urinary tract infection) [N39.0]  Unknown    Pancreatic mass [K86.89]  Unknown    Lung nodules [R91.8]  Unknown    Liver lesion [K76.9]  Unknown    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         ADDITIONAL PLAN:  - detailed assessment and plan from admission reviewed    Jessica Kraft is a 63 y.o. female with hx of anxiety, depression, insomnia, hypothyroidism, osteopenia, Vit D deficiency and UTI who presents to the ED for evaluation of abdominal pain with swelling and fever.  CT abdomen pelvis with contrast in ED showed a 2.4 cm pancreatic mass concerning for pancreatic adenocarcinoma with narrowing of the portal vein with encasement of the celiac axis bifurcation.  Peritoneal and omental carcinomatosis with large volume ascites, several small indeterminate low-attenuation liver lesions which could represent metastases.  Initial presentation was concerning for sepsis with the source of the acute UTI    - Sepsis / Acute UTI  - likely r/t acute UTI  - blood cultures x 2 obtained in ED  - lactate improved w/o fluids  - afebrile  - send ascites for culture  - continue broad spectrum coverage with  vancomycin IV and zosyn for now       Hypoxia--improving after paracentesis  - respiratory PCR  - chest XR w/o concern for pneumonia, small bilateral pleural effusions  - CTA chest w/o PE  - likely r/t abdominal distention and difficulty taking a deep breath currently  - IS / pulmonary toilet  - nebs  - oxygen prn       Ascites, concern for malignancy  Pancreatic mass, concern for metastasis  Omental and peritoneal carcinomatosis  Liver lesions  Pulmonary nodules  - IR consult for CT guided paracentesis, send fluid for analysis / cytology  - GI consult, appreciate input  -  hem/onc consult, new diagnosis  - pain control  - nausea control  - obtain pelvic US to evaluate right adnexal abnormality     Constipation  - likely r/t ascites  - bowel regimen     GERD  Dysphagia  Hiatal hernia  - protonix BID  - s/p NG tube w decompression  -CLD per GI     Anxiety   Depression  Insomnia  - check UDS    Expected Discharge   Expected Discharge Date: 12/10/2023; Expected Discharge Time:      Emily Leach MD  12/08/23

## 2023-12-08 NOTE — CONSULTS
Clinical Nutrition   Nutrition Support Assessment  Reason for Visit: Consult      Patient Name: Jessica Kraft  YOB: 1960  MRN: [id number]  Date of Encounter: 12/08/23 09:42 EST  Admission date: 12/7/2023    Comments:  Pt meets criteria for severe malnutrition in the context of acute illness indicated by po intake </=50% EEN x >/=5 days, moderate muscle wasting and subcutaneous fat loss. Of note, pt also reports 15lb wt loss x 3-4 months however limited data available in EMR to verify wt changes.     Ordered Boost daily w/dinner once diet is advanced.    Nutrition Assessment   Admission Diagnosis:  Sepsis [A41.9]      Problem List:    Sepsis    Hypothyroidism due to Hashimoto's thyroiditis    Depression    Insomnia    Anxiety    Acute UTI (urinary tract infection)    Pancreatic mass    Lung nodules    Liver lesion    Abdominal carcinomatosis - omental    Peritoneal carcinomatosis    GERD without esophagitis    Hiatal hernia    Other dysphagia    Ascites        PMH:   She  has a past medical history of Abscess, Anxiety, Bilateral ovarian cysts, Depression, Encounter for routine gynecological examination, Foot pain, H/O bone density study (06/2014), H/O mammogram (07/2016), Hot flashes, menopausal, Hypothyroidism due to Hashimoto's thyroiditis, Insomnia, Miscarriage, Osteopenia, Pap smear for cervical cancer screening (06/2014), Routine general medical examination at a health care facility, Urinary incontinence, Urinary tract infection, and Vitamin D deficiency.    PSH:  She  has a past surgical history that includes Cholecystectomy (2008); Hernia repair (2010); Abdominoplasty (2001); Colonoscopy (07/2012); and Ectopic pregnancy surgery (Left, 1994).    Applicable Nutrition Concerns:   Skin:  Oral:  GI:    Applicable Interval History:         Reported/Observed/Food/Nutrition Related History:     Pt reports poor po intake x 3-4 months exacerbated in the past 3 weeks, eating mainly  "crackers, ginger ale. Pt also reports 15lb wt loss x 3-4 months, stating [id number]. Pt does not like milk to drink but is agreeable to trying Boost once diet advanced. H&P notes hx of dysphagia however pt denies chewing/swallowing difficulty at this time.     Anthropometrics     Flowsheet Rows      Flowsheet Row First Filed Value   Admission Height 167.6 cm (66\") Documented at 12/07/2023 1928   Admission Weight 95.3 kg (210 lb) Documented at 12/07/2023 1928              Height: Height: 167.6 cm (66\")  Last Filed Weight: Weight: 99 kg (218 lb 3.2 oz) (12/08/23 0233)  Method: Weight Method: Standing scale  BMI: BMI (Calculated): 35.2  BMI classification: Obese Class II: 35-39.9kg/m2  IBW:  59kg    UBW:  225lbs per pt report  Weight change:      Weight       Weight (kg) Weight (lbs) Weight Method Visit Report   8/2/2017 101.696 kg  224 lb 3.2 oz   --    12/6/2023 97.07 kg  214 lb  Stated     12/7/2023 95.255 kg  210 lb  Stated      97.523 kg  215 lb  Stated     12/8/2023 98.975 kg  218 lb 3.2 oz  Standing scale       Nutrition Focused Physical Exam     Date:     12/8    Patient meets criteria for malnutrition diagnosis, see MSA note.    Current Nutrition Prescription   PO: NPO Diet NPO Type: Sips with Meds  Oral Nutrition Supplement:   Intake: N/A    Nutrition Diagnosis   Date:  12/8            Updated:    Problem Malnutrition severe acute   Etiology Early satiety, bloating   Signs/Symptoms po intake </=50% EEN x >/=5 days, moderate muscle wasting and subcutaneous fat loss   Status:     Date:   12/8    Updated:     Problem Inadequate oral intake   Etiology Pending procedure   Signs/Symptoms NPO   Status:     Goal:   General: Nutrition to support treatment  PO: Advace diet as medically feasible/appropriate  EN/PN: N/A    Nutrition Intervention      Follow treatment progress, Care plan reviewed, Supplement provided    Boost (caity) once advanced to appropriate diet.   ? Pending results of SLP " eval    Monitoring/Evaluation:   Per protocol, PO intake, Supplement intake, GI status, Swallow function      Kitty Santacruz, MS,RD,LD  Time Spent: 30

## 2023-12-08 NOTE — CONSULTS
[facility]    Referring Provider: No ref. provider found    PCP: Leonarda Díaz MD    Reason for Consultation: Pancreatic mass, ascites, abdominal pain    Chief complaint: Pancreatic mass, ascites, abdominal pain    History of present illness:    Jessica Kraft is a 63 y.o. female with history of anxiety, depression, hypothyroidism who presents to the hospital for abdominal pain.    She reports she has had abdominal pain with radiation to her upper back and shoulders that began in July 2023.  She reports associated abdominal swelling and distention along with constipation for the past 3 weeks.  She reports she was finally able to have a fairly substantial bowel movement today.  She reports taking several enemas yesterday.  She reports poor p.o. intake with early satiety as well as weight loss.  No fever or chills currently.  She reports generalized fatigue and has felt dyspneic recently.  She has required O2 since admission and has been on 4 L this morning.  She reports she had been having some nausea as well as emesis however reports her nausea as well as her breathing is much improved after paracentesis this morning.    She denies any history of pancreatitis.  She denies any history of pancreatic cancer in the family.    CTA of her chest negative for PE but does show pulmonary nodules, small bilateral pleural effusions, large hiatal hernia.    CT abdomen pelvis with contrast shows atrophic pancreatic tail with dilation of the pancreatic duct up to 8 mm with termination of the duct in the pancreatic body where there is a 2.4 x 2.3 cm hypoenhancing mass with encasement of the splenic artery and common hepatic artery as well as severe short segment narrowing of the portal vein with portal vein encasement and suspected narrowing of the splenic vein in the region of the mass, large volume ascites as well as diffuse infiltrative appearance of the omentum consistent with peritoneal and omental  carcinomatosis with multiple areas of peritoneal nodularity noted as well as nodularity involving anterior abdominal wall concerning for metastatic peritoneal involvement extending to the rectus muscle, as well as indeterminate hepatic lesions.        Allergies:  Patient has no known allergies.    Scheduled Meds:  [START ON 12/9/2023] Pharmacy Consult, , Does not apply, Once  aspirin, 81 mg, Oral, Daily  ipratropium-albuterol, 3 mL, Nebulization, 4x Daily - RT  pantoprazole, 40 mg, Intravenous, Q12H  piperacillin-tazobactam, 3.375 g, Intravenous, Q8H  rosuvastatin, 5 mg, Oral, Daily  senna-docusate sodium, 2 tablet, Oral, BID  sodium chloride, 10 mL, Intravenous, Q12H  vancomycin, 1,000 mg, Intravenous, Q12H  venlafaxine XR, 150 mg, Oral, Daily         Infusions:  Pharmacy to dose vancomycin,         PRN Meds:    acetaminophen **OR** acetaminophen **OR** acetaminophen    senna-docusate sodium **AND** polyethylene glycol **AND** bisacodyl **AND** bisacodyl    dicyclomine    HYDROmorphone **AND** naloxone    influenza vaccine    melatonin    nitroglycerin    ondansetron **OR** ondansetron    Pharmacy to dose vancomycin    prochlorperazine    sodium chloride    sodium chloride    sodium chloride    Home Meds:  Medications Prior to Admission   Medication Sig Dispense Refill Last Dose    cyclobenzaprine (FLEXERIL) 10 MG tablet Take 1 tablet by mouth Daily.       dicyclomine (BENTYL) 20 MG tablet Take 1 tablet by mouth Every 8 (Eight) Hours As Needed for Abdominal Cramping. 20 tablet 0     LEVOXYL 125 MCG tablet Take 1 tablet by mouth Every Morning. On an empty stomach, do not substitute 90 tablet 1     metoclopramide (REGLAN) 5 MG tablet Take 1 tablet by mouth 3 (Three) Times a Day As Needed (nausea, abd pain). 20 tablet 0     RABEprazole (ACIPHEX) 20 MG EC tablet Take 1 tablet by mouth Daily.       rosuvastatin (CRESTOR) 5 MG tablet Take 1 tablet by mouth Daily.       venlafaxine XR (EFFEXOR-XR) 150 MG 24 hr capsule  Take 1 capsule by mouth Daily.       aspirin 81 MG chewable tablet Chew 1 tablet Daily.          ROS: Review of Systems   Constitutional:  Negative for chills and fever.   Respiratory:  Positive for shortness of breath. Negative for cough.    Gastrointestinal:  Positive for abdominal distention, abdominal pain, nausea and vomiting.   Skin:  Negative for color change and rash.   All other systems reviewed and are negative.      PAST MED HX:  Past Medical History:   Diagnosis Date    Abscess     Anxiety     Bilateral ovarian cysts     Depression     Encounter for routine gynecological examination     Foot pain     H/O bone density study 06/2014    H/O mammogram 07/2016    [facility]    Hot flashes, menopausal     Hypothyroidism due to Hashimoto's thyroiditis     Insomnia     Miscarriage     x3    Osteopenia     Pap smear for cervical cancer screening 06/2014    Routine general medical examination at a health care facility     Urinary incontinence     Urinary tract infection     Vitamin D deficiency        PAST SURG HX:  Past Surgical History:   Procedure Laterality Date    ABDOMINOPLASTY  2001    CHOLECYSTECTOMY  2008    COLONOSCOPY  07/2012    [facility] in [facility] normal    ECTOPIC PREGNANCY SURGERY Left 1994    HERNIA REPAIR  2010       FAM HX:  Family History   Problem Relation Age of Onset    Mental illness Mother     Depression Mother     Thyroid disease Mother     Hypertension Father     Thyroid disease Sister     Breast cancer Other     Breast cancer Maternal Aunt        SOC HX:  Social History     Socioeconomic History    Marital status:    Tobacco Use    Smoking status: Never    Smokeless tobacco: Never   Vaping Use    Vaping Use: Never used   Substance and Sexual Activity    Alcohol use: No    Drug use: No    Sexual activity: Yes     Partners: Male     Birth control/protection: None, Post-menopausal     Comment:        PHYSICAL EXAM  /88 (BP Location: Left arm, Patient Position:  "Lying)   Pulse 101   Temp 97.7 °F (36.5 °C) (Oral)   Resp 16   Ht 167.6 cm (66\")   Wt 99 kg (218 lb 3.2 oz)   LMP  (LMP Unknown) Comment: N/A  SpO2 96%   BMI 35.22 kg/m²   Wt Readings from Last 3 Encounters:   12/08/23 99 kg (218 lb 3.2 oz)   12/07/23 97.5 kg (215 lb)   12/06/23 97.1 kg (214 lb)   ,body mass index is 35.22 kg/m².  Physical Exam   General: Patient awake, alert and cooperative   Eyes: Normal lids and lashes, no scleral icterus   Neck: Supple, normal ROM   Skin: Warm and dry, not jaundiced   Cardiovascular: Regular rate, well-perfused extremities   Pulm: Equal expansion bilaterally, no increased WOB   Abdomen: Nontender, distended, somewhat more firm to palpation than expected   Extremities: No rash or edema              Neuro: A&O, No obvious sign of focal deficit   Psychiatric: Normal mood and behavior; memory intact    Results Review:   I reviewed the patient's new clinical results.    Lab Results   Component Value Date    WBC 14.64 (H) 12/08/2023    HGB 14.2 12/08/2023    HGB 14.6 12/07/2023    HGB 12.2 12/06/2023    HCT 46.2 12/08/2023    MCV 87.5 12/08/2023     (H) 12/08/2023       Lab Results   Component Value Date    INR 1.12 12/07/2023       Lab Results   Component Value Date    GLUCOSE 164 (H) 12/08/2023    BUN 24 (H) 12/08/2023    CREATININE 1.00 12/08/2023    EGFRIFNONA 85 08/22/2016    EGFRIFAFRI 98 08/22/2016    BCR 24.0 12/08/2023     (L) 12/08/2023    K 4.8 12/08/2023    CO2 21.0 (L) 12/08/2023    CALCIUM 9.2 12/08/2023    PROTENTOTREF 6.6 08/22/2016    ALBUMIN 3.7 12/08/2023    ALKPHOS 137 (H) 12/08/2023    BILITOT 0.6 12/08/2023    ALT 15 12/08/2023    AST 26 12/08/2023       ASSESSMENTS/PLANS  Impression:  Abdominal pain  Pancreatic mass in the body of the pancreas with vascular involvement  Ascites  Image findings concerning for peritoneal and omental carcinomatosis  Pulmonary nodules  Hiatal hernia, large  Leukocytosis    Plan:  -Overall constellation of " signs/symptoms and image findings concerning for metastatic malignancy with suspected pancreatic primary  -I discussed image findings in detail with patient and her family at bedside today  -Agree with paracentesis and sending fluid studies for cytology along with cell count, culture, albumin, protein, LDH and glucose.  Given findings, ascites is highly suspicious for malignant etiology.  4.2 L removed today.  Cell count notable for 830 white blood cells and 29% neutrophils (240) consistent with bacterial peritonitis.  Still awaiting majority of the studies.  -Consult hematology/oncology to weigh in on diagnostic approach and potential treatment option discussions moving forward.  EUS with FNB of pancreatic body mass can be considered following ascitic fluid analysis and input from hematology/oncology to help ensure the benefit of EUS with FNB outweighs risks.  If needed this could be considered early next week, possibly Monday.  -Start daily MiraLAX as bowel regimen, reports fairly substantial bowel movement today  -I removed NG tube at bedside which was partially protruding from her mouth.  Will schedule Zofran for nausea.  If has persistent emesis and nausea can consider replacement of NG tube to intermittent low wall suction but hopefully this will not be needed.  -Okay with clear liquid diet today from GI standpoint.    I discussed the patient's findings and my recommendations with patient, family, and nursing staff    Flakito Abrams MD  12/08/23  10:00 EST

## 2023-12-08 NOTE — H&P
[facility] Medicine Services  HISTORY AND PHYSICAL    Patient Name: Jessica Kraft  : 1960  MRN: [id number]  Primary Care Physician: Leonarda Díaz MD  Date of admission: 2023    Subjective   Subjective     Chief Complaint:  Abdominal pain and swelling, fever    HPI:  Jessica Kraft is a 63 y.o. female with hx of anxiety, depression, insomnia, hypothyroidism, osteopenia, Vit D deficiency and UTI who presents to the ED for evaluation of abdominal pain with swelling and fever. Pt reports generalized abdominal pain with radiation to her upper back, shoulders and neck started in 2023; most recently the pain is generalized abdominal pain w/o radiation and over the past 3 weeks she reports abdominal swelling / distention and constipation. She has early satiety, feels full all the time, not eating; has been drinking liquids - no difficulty urinating; had subjective fever today. Denies hematemesis or bloody / dark stools. She does report acid reflux and has been taking Tums w/o improvement. Also has history of dysphagia which she has not been evaluated for, it is only occasional per her report. She has been followed by her PCP, has an outpatient GI consult scheduled. She had recent CT abd/pelvis 11/10 showing small ascites, tiny bilateral pleural effusions, cardiomegaly, moderate hiatal hernia and degenerative changes lumbar spine. Had colonoscopy 3/3/21 normal w/ stage III hemorrhoids.    CT abd/pelvis w/ contrast in the ED tonight showin.4 cm pancreatic mass concerning for pancreatic adenocarcinoma w/ narrowing of the portal vein w/ encasement of the celiac axis bifurcation; also peritoneal and omental carcinomatosis w/ large volume ascites; several small indeterminate low attenuation liver lesions which could r/t metastasis, could be better assessed w/ MRI followup; nodularity adjacent to right ovary appears secondary to adjacent bowel loops which are under distended,  f/u pelvic US d/t difficult visualization to exclude adnexal mass. CTA chest negative for PE; multiple small nonspecific pulmonary nodules most prononuced in the RUL measuring 5 mm; large hiatal hernia w/ fluid filled distention of the esophagus which may relate to the delayed transit or reflux, the amount of fluid places the patient at risk for aspiration. Pertinent labs: HS troponin 30, repeat 20; proBNP 107.5; ; lactate 2.3, reflex 1.3; lipase 9; procal 0.49; pt/inr 14.6/1.12; WBC 23.04, plts 468. Urinalysis w/ +nitrites, 1+ leuks, 3+ bilirubin, 3+ bacteria, mod mucous.    On my exam she is on 6 liters oxygen by NC w/ oxygen saturation 91%. She denies respiratory symptoms but is unable to take a deep breath d/t nausea and abdominal distention. She is tachycardic, HR low 100's; BP is elevated 153/104. She reports generalized abdominal discomfort rating 8/10. She is asking for nausea medication. She will be admitted to the hospital medicine service for further evaluation and treatment.    Review of Systems   Constitutional:  Positive for activity change, appetite change, fatigue and fever. Negative for chills.   Respiratory:  Positive for shortness of breath.    Gastrointestinal:  Positive for abdominal distention, abdominal pain, constipation, nausea and vomiting.   Genitourinary:  Positive for dysuria.   Musculoskeletal:  Positive for back pain.   Psychiatric/Behavioral:  Positive for sleep disturbance.    All other systems reviewed and are negative.               Personal History     Past Medical History:   Diagnosis Date    Abscess     Anxiety     Bilateral ovarian cysts     Depression     Encounter for routine gynecological examination     Foot pain     H/O bone density study 06/2014    H/O mammogram 07/2016    [facility]    Hot flashes, menopausal     Hypothyroidism due to Hashimoto's thyroiditis     Insomnia     Miscarriage     x3    Osteopenia     Pap smear for cervical cancer screening 06/2014     Routine general medical examination at a health care facility     Urinary incontinence     Urinary tract infection     Vitamin D deficiency              Past Surgical History:   Procedure Laterality Date    ABDOMINOPLASTY  2001    CHOLECYSTECTOMY  2008    COLONOSCOPY  07/2012    [facility] in [place] normal    ECTOPIC PREGNANCY SURGERY Left 1994    HERNIA REPAIR  2010       Family History:  family history includes Breast cancer in her maternal aunt and another family member; Depression in her mother; Hypertension in her father; Mental illness in her mother; Thyroid disease in her mother and sister.     Social History:  reports that she has never smoked. She has never used smokeless tobacco. She reports that she does not drink alcohol and does not use drugs.  Social History     Social History Narrative    Not on file       Medications:  RABEprazole, aspirin, cyclobenzaprine, dicyclomine, levothyroxine, metoclopramide, rosuvastatin, and venlafaxine XR    No Known Allergies    Objective   Objective     Vital Signs:   Temp:  [97.5 °F (36.4 °C)-97.8 °F (36.6 °C)] 97.8 °F (36.6 °C)  Heart Rate:  [] 104  Resp:  [18] 18  BP: (143-163)/() 157/101  Flow (L/min):  [4] 4    Physical Exam  Constitutional:       General: She is not in acute distress.     Appearance: She is well-developed. She is ill-appearing.   HENT:      Head: Normocephalic and atraumatic.      Nose: Nose normal.      Mouth/Throat:      Pharynx: Oropharynx is clear.   Eyes:      Extraocular Movements: Extraocular movements intact.      Conjunctiva/sclera: Conjunctivae normal.      Pupils: Pupils are equal, round, and reactive to light.   Cardiovascular:      Rate and Rhythm: Regular rhythm. Tachycardia present.      Pulses: Normal pulses.      Heart sounds: Normal heart sounds. No murmur heard.  Pulmonary:      Effort: Pulmonary effort is normal. No respiratory distress.      Breath sounds: Normal breath sounds. No wheezing.      Comments:  Diminished throughout    Abdominal:      General: Bowel sounds are normal. There is distension (large, taut abdomen).      Palpations: Abdomen is soft.      Tenderness: There is abdominal tenderness (generalized).   Musculoskeletal:         General: Normal range of motion.      Cervical back: Normal range of motion and neck supple.      Right lower leg: No edema.      Left lower leg: No edema.   Skin:     General: Skin is warm and dry.      Capillary Refill: Capillary refill takes less than 2 seconds.   Neurological:      Mental Status: She is alert and oriented to person, place, and time.      Cranial Nerves: No cranial nerve deficit.   Psychiatric:         Mood and Affect: Mood normal.         Behavior: Behavior normal.            Result Review:  I have personally reviewed the results from the time of this admission to 12/8/2023 02:54 EST and agree with these findings:  [x]  Laboratory list / accordion  [x]  Microbiology  [x]  Radiology  [x]  EKG/Telemetry   []  Cardiology/Vascular   []  Pathology  []  Old records  []  Other:  Most notable findings include:     LAB RESULTS:      Lab 12/07/23 2311 12/07/23 1959 12/06/23  1142   WBC  --  23.04* 7.66   HEMOGLOBIN  --  14.6 12.2   HEMATOCRIT  --  47.3* 39.7   PLATELETS  --  468* 282   NEUTROS ABS  --  20.35* 5.96   IMMATURE GRANS (ABS)  --  0.09* 0.04   LYMPHS ABS  --  0.66* 0.70   MONOS ABS  --  1.88* 0.64   EOS ABS  --  0.01 0.26   MCV  --  88.6 89.4   PROCALCITONIN  --  0.49*  --    LACTATE 1.3 2.3*  --    PROTIME  --  14.6*  --          Lab 12/07/23 1959 12/06/23  1152 12/06/23  1151 12/06/23  1142   SODIUM 137  --   --  140   POTASSIUM 4.1  --   --  3.9   CHLORIDE 101  --   --  104   CO2 21.0*  --   --  27.0   ANION GAP 15.0  --   --  9.0   BUN 21  --   --  17   CREATININE 0.92 0.60 0.60 0.77   EGFR 70.5  --   --  87.3   GLUCOSE 163*  --   --  119*   CALCIUM 9.3  --   --  9.0         Lab 12/07/23 1959 12/06/23  1142   TOTAL PROTEIN 8.0 6.7   ALBUMIN 4.0 3.5    GLOBULIN 4.0 3.2   ALT (SGPT) 11 7   AST (SGOT) 21 13   BILIRUBIN 0.6 0.3   ALK PHOS 149* 118*   LIPASE 9* 14         Lab 12/07/23 2157 12/07/23 1959   PROBNP  --  107.5   HSTROP T 20* 30*   PROTIME  --  14.6*   INR  --  1.12                 Brief Urine Lab Results  (Last result in the past 365 days)        Color   Clarity   Blood   Leuk Est   Nitrite   Protein   CREAT   Urine HCG        12/07/23 2157 [place]   Cloudy   Negative   Small (1+)   Positive   30 mg/dL (1+)                 Microbiology Results (last 10 days)       ** No results found for the last 240 hours. **            CT Abdomen Pelvis With Contrast    Result Date: 12/7/2023  CT ABDOMEN PELVIS W CONTRAST Date of Exam: 12/7/2023 9:26 PM EST Indication: worsening abdominal pain and swelling. Comparison: CT abdomen pelvis 12/6/2023 Technique: Axial CT images were obtained of the abdomen and pelvis following the uneventful intravenous administration of 90 mL Isovue 370. Reconstructed coronal and sagittal images were also obtained. Automated exposure control and iterative construction methods were used. Findings: Please see separate dictated chest CT for findings above the diaphragm. Indeterminate right hepatic lobe lesion measuring 9 mm (5/41). Left hepatic lobe lesion too small to characterize measuring 8 mm (5/32). The spleen is normal in size. Gallbladder absent. The adrenal glands are normal. There is atrophy of the pancreatic tail with dilatation of the main pancreatic duct which is dilated up to 8 mm (2/27). There is abrupt termination of the main pancreatic duct at the mid pancreatic body with hypoenhancing pancreatic mass measuring approximately 2.4 x 2.3 cm (2/47). Abnormal soft tissue extends from this mass proximally to the level of the celiac axis bifurcation encasing splenic artery and common hepatic artery (2/46). There is severe short segment narrowing of the portal vein (2/50) with portal vein encasement. Splenic vein is poorly  visualized but suspected narrowed in the region of the mass. The SMV is patent. No abutment of the superior mesenteric artery. Kidneys are symmetrically enhancing. Negative for hydronephrosis or hydroureter. Urinary bladder is collapsed. Uterus and left adnexa are without acute abnormality. Nodularity adjacent to the right ovary which appears secondary to collapsed adjacent bowel loops (2/124). Large hiatal hernia. Large volume ascites. There is diffuse infiltrative appearance of the omentum most consistent with peritoneal and omental carcinomatosis. Multiple areas of peritoneal nodularity noted for example in the right lower quadrant peritoneal nodule measuring 8 mm (2/136). Peritoneal nodularity within the left lower pelvis (2/149).. Nodularity involving the anterior abdominal wall which measures 1.8 x 1.8 cm (2/105) concerning for metastatic peritoneal involvement extending to the rectus muscle on the right. Scattered irregular areas of nodularity are noted in the mesentery concerning for metastasis for example nodule measuring 1.3 x 1.3 cm likely carcinomatosis and/or metastatic adenopathy (2/112). Gastrohepatic ligament node measures up to 10 mm (2/27). There is fluid-filled distention of the colon. Colonic diverticulosis without diverticulitis. The appendix is nonvisualized. Abdominal aortic branch vasculature patent. No aggressive osseous lesion or acute fracture. Delayed images demonstrate normal contrast excretion into the nondilated kidneys. Normal filling of the bladder. No acute fracture or aggressive osseous lesion.     Impression: Impression: 1. Mass involving the mid body of pancreas measuring 2.4 cm highly concerning for pancreatic adenocarcinoma. This results in severe narrowing of the portal vein with encasement of the celiac axis bifurcation as above. 2. Peritoneal and omental carcinomatosis with large volume ascites. 3. Several small indeterminate low-attenuation liver lesions which could relate  to metastasis, nonspecific, small size limits assessment. These could be better assessed with MRI follow-up if clinically indicated. 4. Nodularity adjacent to right ovary appears secondary to adjacent bowel loops which are underdistended, follow-up pelvic ultrasound may be helpful to exclude right adnexal mass as large ascites limits assessment. 5. See separately dictated chest CT for findings above the diaphragm. Electronically Signed: Fransico Ramírez MD  12/7/2023 10:52 PM EST  Workstation ID: [id number]    CT Angiogram Chest Pulmonary Embolism    Result Date: 12/7/2023  CT ANGIOGRAM CHEST PULMONARY EMBOLISM Date of Exam: 12/7/2023 9:26 PM EST Indication: soa. Comparison: Chest radiograph 12/7/2023, CT abdomen pelvis 12/6/2023 Technique: Axial CT images were obtained of the chest after the uneventful intravenous administration of 90 mL Isovue 370 utilizing pulmonary embolism protocol.  Reconstructed coronal and sagittal images were also obtained. Automated exposure control and  iterative construction methods were used. Findings: Soft tissue of the lower neck are without acute abnormality. The aortic arch branch vasculature is patent. There is fluid-filled distention of the esophagus. There is a large hiatal hernia again noted. Heart size normal. Pulmonary arteries are well-opacified with contrast. Negative for pulmonary embolus. No mediastinal or hilar adenopathy. No axillary adenopathy. Small bilateral pleural effusions with bibasilar atelectasis. Trachea and mainstem bronchi are patent. No pneumothorax. Several small bilateral pulmonary nodules are nonspecific for example in the right upper lobe measuring 5mm (6/25). Within the right upper lobe measuring 3 mm (6/22). Within the left upper lobe measuring 3 mm (6/46). Several other small to 3 mm nodules are noted bilaterally. Linear atelectasis at the lingula. Thoracic vertebral bodies are maintained in height. No aggressive osseous lesion or fracture. Please see  separately dictated abdominal CT for findings below the diaphragm.     Impression: Impression: 1. Negative for pulmonary embolus. 2. Increase in size of small bilateral pleural effusions with lower lobe airspace disease likely atelectasis. 3. Multiple small nonspecific pulmonary nodules most pronounced in the right upper lobe measuring up to 5 mm, recommend attention on follow-up imaging, these could be reassessed in 6 months. 4. Large hiatal hernia with fluid-filled distention of esophagus which may relate to delayed transit or reflux, the amount of fluid in the esophagus places patient at risk for aspiration. 5. Please see separately dictated abdominal CT for findings below the diaphragm. Electronically Signed: Fransico Ramírez MD  12/7/2023 10:17 PM EST  Workstation ID: [id number]    XR Chest 1 View    Result Date: 12/7/2023  XR CHEST 1 VW Date of Exam: 12/7/2023 7:56 PM EST Indication: Chest Pain Triage Protocol Comparison: None available. Findings: The lungs are poorly inflated with bibasilar atelectasis particularly on the left. Small bilateral pleural effusions are not excluded. The remainder of the lungs are clear cardiac, hilar, and mediastinal silhouettes are within normal limits. No pneumothorax. The trachea is midline. Pulmonary vascularity is normal. Visualized bony structures are intact.     Impression: Impression: Poor inspiration with bibasilar atelectasis and possible small bilateral pleural effusions. Electronically Signed: Danilo Yip DO  12/7/2023 8:20 PM EST  Workstation ID: [id number]    CT Abdomen Pelvis With Contrast    Result Date: 12/6/2023  CT ABDOMEN PELVIS W CONTRAST Date of Exam: 12/6/2023 12:36 PM EST Indication: abd pain and distension. Comparison: CT abdomen pelvis dated 11/10/2023 Technique: Axial CT images were obtained of the abdomen and pelvis following the uneventful intravenous administration of 83 mL Isovue-300. Reconstructed coronal and sagittal images were also obtained.  Automated exposure control and iterative construction methods were used. Findings: Lung Bases:  There are basilar atelectasis or pneumonia and small bilateral pleural effusions Moderate to large hiatal hernia Liver: The liver shows mild diffuse nodular surface suspicious for cirrhosis few small round hypodense nonenhancing liver lesions compatible with cysts. There is moderate ascites Biliary/Gallbladder: Cholecystectomy changes with postcholecystectomy dilatation of the common bile duct Spleen: Punctate calcification in the spleen. The spleen does not appear enlarged Pancreas: Diffuse pancreatic atrophy. There is dilatation of the pancreatic duct in particular along the body and tail of the pancreas. No definite pancreatic lesions or peripancreatic inflammatory changes Kidneys: Kidneys are normal in size. There are no stones or hydronephrosis. Adrenals: Adrenal glands are unremarkable. Retroperitoneal/Lymph Nodes/Vasculature: There are multiple predominately subcentimeter mesenteric and retroperitoneal lymph nodes Gastrointestinal/Mesentery: The small bowel loops are normal in caliber. The large bowel loops are normal in caliber. There is similar reticulosis Bladder: The bladder is unremarkable   Bony Structures:  Visualized bony structures are consistent with the patient's age.     Impression: Impression: 1. Bibasilar atelectasis or pneumonia and small bilateral pleural effusions 2. Moderate hiatal hernia 3. Cirrhotic appearing liver. There are few small hypodense liver lesions appear to represent cysts but few of them too small to characterize. 4. Moderate ascites increased as compared to the previous exam 5. Diffuse pancreatic atrophy. Diffusely nonspecific dilated pancreatic duct along the body and tail of the pancreas. Clinical correlation and follow-up recommended Electronically Signed: Leighton Spivey MD  12/6/2023 1:16 PM EST  Workstation ID: [id number]         Assessment & Plan   Assessment & Plan        Sepsis    Hypothyroidism due to Hashimoto's thyroiditis    Depression    Insomnia    Anxiety    Acute UTI (urinary tract infection)    Pancreatic mass    Lung nodules    Liver lesion    Abdominal carcinomatosis - omental    Peritoneal carcinomatosis    GERD without esophagitis    Hiatal hernia    Other dysphagia    Ascites    Sepsis / Acute UTI  - likely r/t acute UTI  - blood cultures x 2 obtained in ED  - lactate improved w/o fluids  - afebrile  - send ascites for culture  - continue broad spectrum coverage with vancomycin IV and zosyn for now  - hold on fluid sepsis bolus, BP elevated, pleural effusions; would reconsider if pt becomes hypotensive    Hypoxia  - check ABG  - respiratory PCR  - chest XR w/o concern for pneumonia, small bilateral pleural effusions  - CTA chest w/o PE  - likely r/t abdominal distention and difficulty taking a deep breath currently  - IS / pulmonary toilet  - nebs  - oxygen prn    Ascites, concern for malignancy  Pancreatic mass, concern for metastasis  Omental and peritoneal carcinomatosis  Liver lesions  Pulmonary nodules  - IR consult for CT guided paracentesis, send fluid for analysis / cytology  - GI consult, appreciate input  - consider hem/onc consult, new diagnosis  - pain control  - nausea control  - obtain pelvic US to evaluate right adnexal abnormality    Constipation  - likely r/t ascites  - bowel regimen    GERD  Dysphagia  Hiatal hernia  - protonix BID  - concern for fluid in esophagus increasing risk for aspiration, try to place NG tube to decompress stomach if pt can tolerate  - may need SLP evaluation, defer for now as pt is NPO    Anxiety   Depression  Insomnia  - check UDS      DVT prophylaxis:  SCDS    CODE STATUS:    Code Status (Patient has no pulse and is not breathing): CPR (Attempt to Resuscitate)  Medical Interventions (Patient has pulse or is breathing): Full Support      Expected Discharge    Expected Discharge Date: 12/11/2023; Expected Discharge Time:        This note has been completed as part of a split-shared workflow.     Signature: Electronically signed by ANGELICA Arias, 12/08/23, 2:54 AM EST      Total time: 80 minutes      Attending   Admission Attestation       I have performed an independent face-to-face diagnostic evaluation including performing an independent physical examination.  The documented plan of care above was reviewed and developed with the advanced practice clinician (APC).  I have updated the HPI as appropriate.    Brief HPI    This is a 63-year-old female patient with a PMH significant for anxiety and depression, insomnia, hypothyroidism who comes to the ED due to abdominal pain and fever.  Patient complains of diffuse abdominal pain which began this past July.  3 weeks ago she had new onset constipation, has not had a bowel movement since then.  She was seen by GI outpatient, plans for colonoscopy soon and pill endoscopy.  And a week ago she began to experience significant abdominal distention.  Yesterday she had increased abdominal pain, nausea, some vomiting.    Attending Physical Exam:  Temp:  [97.5 °F (36.4 °C)-97.8 °F (36.6 °C)] 97.8 °F (36.6 °C)  Heart Rate:  [] 115  Resp:  [18] 18  BP: (143-163)/() 157/101  Flow (L/min):  [4] 4    Constitutional: Awake, alert  Eyes: PERRLA, sclerae anicteric, no conjunctival injection  HENT: NCAT, mucous membranes moist  Neck: Supple, no thyromegaly, no lymphadenopathy, trachea midline  Respiratory: Clear to auscultation bilaterally, nonlabored respirations   Cardiovascular: RRR, no murmurs, rubs, or gallops, palpable pedal pulses bilaterally  Gastrointestinal: Positive bowel sounds, soft, nontender, distended  Musculoskeletal: No bilateral ankle edema, no clubbing or cyanosis to extremities  Psychiatric: Appropriate affect, cooperative  Neurologic: Oriented x 3, strength symmetric in all extremities, Cranial Nerves grossly intact to confrontation, speech clear  Skin: No  aman      Assessment and Plan:    See assessment and plan documented by APC above and updated/edited by me as appropriate.    Danielle Loera,   12/08/23

## 2023-12-08 NOTE — PLAN OF CARE
Goal Outcome Evaluation:  Plan of Care Reviewed With: patient           Outcome Evaluation: Pt is A&O with VSS, sinus tachy on the monitor, and on 4L NC. We attempted to do an NG to LWS 3 times but the pt projectile vomited during it so we held it off and the pt then had multiple very large incontinent BMs this morning. She states she feels much better but is now starting to feel weak and dizzy. Pt has remained NPO except sips with meds. There are no other complaints at this time.

## 2023-12-08 NOTE — ED PROVIDER NOTES
Subjective   History of Present Illness    Review of Systems    Past Medical History:   Diagnosis Date    Abscess     Anxiety     Bilateral ovarian cysts     Depression     Encounter for routine gynecological examination     Foot pain     H/O bone density study 06/2014    H/O mammogram 07/2016    [facility]    Hot flashes, menopausal     Hypothyroidism due to Hashimoto's thyroiditis     Insomnia     Miscarriage     x3    Osteopenia     Pap smear for cervical cancer screening 06/2014    Routine general medical examination at a health care facility     Urinary incontinence     Urinary tract infection     Vitamin D deficiency        No Known Allergies    Past Surgical History:   Procedure Laterality Date    ABDOMINOPLASTY  2001    CHOLECYSTECTOMY  2008    COLONOSCOPY  07/2012    [facility] G[facility] in [facility] normal    ECTOPIC PREGNANCY SURGERY Left 1994    HERNIA REPAIR  2010       Family History   Problem Relation Age of Onset    Mental illness Mother     Depression Mother     Thyroid disease Mother     Hypertension Father     Thyroid disease Sister     Breast cancer Other     Breast cancer Maternal Aunt        Social History     Socioeconomic History    Marital status:    Tobacco Use    Smoking status: Never    Smokeless tobacco: Never   Vaping Use    Vaping Use: Never used   Substance and Sexual Activity    Alcohol use: No    Drug use: No    Sexual activity: Yes     Partners: Male     Birth control/protection: None, Post-menopausal     Comment:            Objective   Physical Exam    Critical Care    Performed by: Dalton Estrada Jr., PA-C  Authorized by: Jamar Sheth MD    Critical care provider statement:     Critical care time (minutes):  45    Critical care time was exclusive of:  Separately billable procedures and treating other patients and teaching time    Critical care was necessary to treat or prevent imminent or life-threatening deterioration of the following conditions:  Sepsis (Sepsis  requiring broad-spectrum antibiotics, close monitoring, and admission intra-abdominal infection, and urinary tract infection)    Critical care was time spent personally by me on the following activities:  Blood draw for specimens, development of treatment plan with patient or surrogate, discussions with consultants, discussions with primary provider, evaluation of patient's response to treatment, examination of patient, interpretation of cardiac output measurements, obtaining history from patient or surrogate, ordering and performing treatments and interventions, ordering and review of laboratory studies, ordering and review of radiographic studies, pulse oximetry, re-evaluation of patient's condition and review of old charts    Care discussed with: admitting provider               ED Course  ED Course as of 12/08/23 1843   Thu Dec 07, 2023   2307 Message sent to the hospitalist for admission [CS]   2335 Call placed through the exchange for hospitalist for admission [CS]   2345 Discussed with Dr. Loera the hospitalist on-call, she requested GI consult before admission [CS]   Fri Dec 08, 2023   0003 Attempted 2nd call to Dr. Abrams thru the exchange no answer.      [CS]   0004 Discussed with Dr. Reza, he stated he does not operate pancreatic masses [CS]   0014 Discussed with Dr. Narvaez at , the patient would not be an acute surgical candidate, they are on medical divert, they do not feel that the pancreatic mass, omental or peritoneal carcinoma is causing her sepsis, they are on [facility] divert and recommended we take care of the patient and have her follow-up at a later date. [CS]   0021 Dr. Loera accepted the patient for admission [CS]      ED Course User Index  [CS] Dalton Estrada Jr., PA-C                                             Medical Decision Making  Patient Presentation 63-year-old female presenting with abdominal pain, intermittent fever, distended abdomen, decreased bowel movement    DDX bowel  obstruction, sepsis, UTI, enteritis, gastroenteritis, colitis,    Data Review/ Non ED Records /Analysis/Ordering unique tests. Review of previous visits, prior labs, prior imaging, available notes from prior evaluations or visits with specialists, medication list, allergies, past medical history, past surgical history CBC, CMP, lipase, lactic, procalcitonin, urinalysis were performed        Independent Review Studies I independent reviewed and interpreted all labs including the CBC, CMP, lipase, lactic, procalcitonin and urinalysis, discussed all results with patient and family    Intervention/Re-evaluation intervention included broad-spectrum IV antibiotics pain medicine and antiemetics on reevaluation she remained guarded but stable    Independent Clinician discussed with the on-call hospitalist Dr. Dutton, discussed with my supervising physician Dr. Sheth, discussed with  transfer physician, discussed with the on-call general surgeon Dr. Reza    Risk Stratification tools/clinical decision rules patient presented with abdominal pain, distention, decreased bowel movement, tachycardia, fever, found to be septic, had a UTI, intra-abdominal ascites, pancreatic mass, and carcinomatosis.  Significant risk for worsening sepsis, endorgan damage, disability or death, she was admitted to the hospital, for further care    Shared Decision Making discussed this plan with the patient and family they agree    Disposition patient stable for admission    Problems Addressed:  Sepsis, due to unspecified organism, unspecified whether acute organ dysfunction present: complicated acute illness or injury    Amount and/or Complexity of Data Reviewed  Labs: ordered.  Radiology: ordered.  ECG/medicine tests: ordered.    Risk  OTC drugs.  Prescription drug management.  Decision regarding hospitalization.        Final diagnoses:   Sepsis, due to unspecified organism, unspecified whether acute organ dysfunction present       ED  Disposition  ED Disposition       ED Disposition   Decision to Admit    Condition   --    Comment   Level of Care: Telemetry [5]   Diagnosis: Sepsis [[id number]]   Admitting Physician: LORI VERDUZCO [[id number]]   Attending Physician: LORI VERDUZCO [[id number]]   Bed Request Comments: tele                 No follow-up provider specified.       Medication List      No changes were made to your prescriptions during this visit.            Dalton Estrada Jr., PA-C  12/08/23 0182

## 2023-12-08 NOTE — CASE MANAGEMENT/SOCIAL WORK
Discharge Planning Assessment  [facility]     Patient Name: Jessica Kraft  MRN: [id number]  Today's Date: 12/8/2023    Admit Date: 12/7/2023    Plan: Home   Discharge Needs Assessment       Row Name 12/08/23 1246       Living Environment    People in Home spouse    Current Living Arrangements home    Potentially Unsafe Housing Conditions none    Primary Care Provided by self    Provides Primary Care For no one    Family Caregiver if Needed spouse;child(rachel), adult    Quality of Family Relationships involved;supportive    Able to Return to Prior Arrangements yes       Resource/Environmental Concerns    Resource/Environmental Concerns none       Transition Planning    Patient/Family Anticipates Transition to home with family    Patient/Family Anticipated Services at Transition     Transportation Anticipated family or friend will provide       Discharge Needs Assessment    Readmission Within the Last 30 Days no previous admission in last 30 days    Equipment Currently Used at Home none    Concerns to be Addressed discharge planning    Anticipated Changes Related to Illness none                   Discharge Plan       Row Name 12/08/23 1247       Plan    Plan Home    Patient/Family in Agreement with Plan yes    Plan Comments Spoke with patient in room to initiate discharge planning.  She lives with her  in [facility].   She is independent with ADL's.  She has no DME at home and is not current with home health.  Her PCP is Leonarda Díaz.  She does not have an advanced directive.  Verified that she has [place].  Ms. Kraft has RX coverage and has her scripts [facility].  Her plan is to return home at discharge.  No needs noted at this time.  CM will continue to follow.    Final Discharge Disposition Code 01 - home or self-care                  Continued Care and Services - Admitted Since 12/7/2023    Coordination has not been started for this encounter.       Expected Discharge Date and Time        Expected Discharge Date Expected Discharge Time    Dec 10, 2023            Demographic Summary       Row Name 12/08/23 1245       General Information    Admission Type observation    Arrived From emergency department    Referral Source admission list    Reason for Consult discharge planning    Preferred Language English                   Functional Status       Row Name 12/08/23 1246       Functional Status    Usual Activity Tolerance moderate    Current Activity Tolerance moderate       Functional Status, IADL    Medications independent    Meal Preparation independent    Housekeeping independent    Laundry independent    Shopping independent                   Psychosocial    No documentation.                  Abuse/Neglect    No documentation.                  Legal    No documentation.                  Substance Abuse    No documentation.                  Patient Forms    No documentation.                     Deanna Saha RN

## 2023-12-09 ENCOUNTER — APPOINTMENT (OUTPATIENT)
Dept: MRI IMAGING | Facility: HOSPITAL | Age: 63
DRG: 435 | End: 2023-12-09
Payer: COMMERCIAL

## 2023-12-09 ENCOUNTER — APPOINTMENT (OUTPATIENT)
Dept: GENERAL RADIOLOGY | Facility: HOSPITAL | Age: 63
DRG: 435 | End: 2023-12-09
Payer: COMMERCIAL

## 2023-12-09 PROBLEM — E43 SEVERE MALNUTRITION: Status: ACTIVE | Noted: 2023-12-09

## 2023-12-09 LAB
ANION GAP SERPL CALCULATED.3IONS-SCNC: 10 MMOL/L (ref 5–15)
BUN SERPL-MCNC: 21 MG/DL (ref 8–23)
BUN/CREAT SERPL: 28.4 (ref 7–25)
CALCIUM SPEC-SCNC: 8.7 MG/DL (ref 8.6–10.5)
CHLORIDE SERPL-SCNC: 104 MMOL/L (ref 98–107)
CO2 SERPL-SCNC: 25 MMOL/L (ref 22–29)
CREAT SERPL-MCNC: 0.74 MG/DL (ref 0.57–1)
EGFRCR SERPLBLD CKD-EPI 2021: 91.6 ML/MIN/1.73
GLUCOSE SERPL-MCNC: 116 MG/DL (ref 65–99)
POTASSIUM SERPL-SCNC: 4 MMOL/L (ref 3.5–5.2)
QT INTERVAL: 340 MS
QT INTERVAL: 378 MS
QTC INTERVAL: 445 MS
QTC INTERVAL: 495 MS
SODIUM SERPL-SCNC: 139 MMOL/L (ref 136–145)
VANCOMYCIN TROUGH SERPL-MCNC: 17.5 MCG/ML (ref 5–20)

## 2023-12-09 PROCEDURE — 99214 OFFICE O/P EST MOD 30 MIN: CPT | Performed by: INTERNAL MEDICINE

## 2023-12-09 PROCEDURE — 70200 X-RAY EXAM OF EYE SOCKETS: CPT

## 2023-12-09 PROCEDURE — 80202 ASSAY OF VANCOMYCIN: CPT

## 2023-12-09 PROCEDURE — 80048 BASIC METABOLIC PNL TOTAL CA: CPT

## 2023-12-09 PROCEDURE — 25010000002 VANCOMYCIN PER 500 MG

## 2023-12-09 PROCEDURE — 94799 UNLISTED PULMONARY SVC/PX: CPT

## 2023-12-09 PROCEDURE — G0378 HOSPITAL OBSERVATION PER HR: HCPCS

## 2023-12-09 PROCEDURE — 70553 MRI BRAIN STEM W/O & W/DYE: CPT

## 2023-12-09 PROCEDURE — 99232 SBSQ HOSP IP/OBS MODERATE 35: CPT | Performed by: STUDENT IN AN ORGANIZED HEALTH CARE EDUCATION/TRAINING PROGRAM

## 2023-12-09 PROCEDURE — 25010000002 PIPERACILLIN SOD-TAZOBACTAM PER 1 G: Performed by: INTERNAL MEDICINE

## 2023-12-09 PROCEDURE — 94664 DEMO&/EVAL PT USE INHALER: CPT

## 2023-12-09 PROCEDURE — A9577 INJ MULTIHANCE: HCPCS | Performed by: STUDENT IN AN ORGANIZED HEALTH CARE EDUCATION/TRAINING PROGRAM

## 2023-12-09 PROCEDURE — 99233 SBSQ HOSP IP/OBS HIGH 50: CPT | Performed by: INTERNAL MEDICINE

## 2023-12-09 PROCEDURE — 25010000002 ONDANSETRON PER 1 MG: Performed by: INTERNAL MEDICINE

## 2023-12-09 PROCEDURE — 0 GADOBENATE DIMEGLUMINE 529 MG/ML SOLUTION: Performed by: STUDENT IN AN ORGANIZED HEALTH CARE EDUCATION/TRAINING PROGRAM

## 2023-12-09 RX ADMIN — PANTOPRAZOLE SODIUM 40 MG: 40 INJECTION, POWDER, LYOPHILIZED, FOR SOLUTION INTRAVENOUS at 21:34

## 2023-12-09 RX ADMIN — IPRATROPIUM BROMIDE AND ALBUTEROL SULFATE 3 ML: 2.5; .5 SOLUTION RESPIRATORY (INHALATION) at 08:30

## 2023-12-09 RX ADMIN — IPRATROPIUM BROMIDE AND ALBUTEROL SULFATE 3 ML: 2.5; .5 SOLUTION RESPIRATORY (INHALATION) at 15:11

## 2023-12-09 RX ADMIN — POLYETHYLENE GLYCOL 3350 17 G: 17 POWDER, FOR SOLUTION ORAL at 09:25

## 2023-12-09 RX ADMIN — Medication 10 ML: at 21:34

## 2023-12-09 RX ADMIN — LEVOTHYROXINE SODIUM 125 MCG: 125 TABLET ORAL at 06:16

## 2023-12-09 RX ADMIN — IPRATROPIUM BROMIDE AND ALBUTEROL SULFATE 3 ML: 2.5; .5 SOLUTION RESPIRATORY (INHALATION) at 19:22

## 2023-12-09 RX ADMIN — ASPIRIN 81 MG CHEWABLE TABLET 81 MG: 81 TABLET CHEWABLE at 09:25

## 2023-12-09 RX ADMIN — VENLAFAXINE HYDROCHLORIDE 150 MG: 75 CAPSULE, EXTENDED RELEASE ORAL at 09:24

## 2023-12-09 RX ADMIN — SENNOSIDES AND DOCUSATE SODIUM 2 TABLET: 8.6; 5 TABLET ORAL at 09:24

## 2023-12-09 RX ADMIN — PIPERACILLIN SODIUM AND TAZOBACTAM SODIUM 3.38 G: 3; .375 INJECTION, SOLUTION INTRAVENOUS at 22:59

## 2023-12-09 RX ADMIN — PANTOPRAZOLE SODIUM 40 MG: 40 INJECTION, POWDER, LYOPHILIZED, FOR SOLUTION INTRAVENOUS at 09:24

## 2023-12-09 RX ADMIN — GADOBENATE DIMEGLUMINE 20 ML: 529 INJECTION, SOLUTION INTRAVENOUS at 04:22

## 2023-12-09 RX ADMIN — PIPERACILLIN SODIUM AND TAZOBACTAM SODIUM 3.38 G: 3; .375 INJECTION, SOLUTION INTRAVENOUS at 06:12

## 2023-12-09 RX ADMIN — PIPERACILLIN SODIUM AND TAZOBACTAM SODIUM 3.38 G: 3; .375 INJECTION, SOLUTION INTRAVENOUS at 19:33

## 2023-12-09 RX ADMIN — Medication 10 ML: at 09:27

## 2023-12-09 RX ADMIN — VANCOMYCIN HYDROCHLORIDE 1000 MG: 1 INJECTION, SOLUTION INTRAVENOUS at 09:24

## 2023-12-09 RX ADMIN — ONDANSETRON 4 MG: 2 INJECTION INTRAMUSCULAR; INTRAVENOUS at 02:26

## 2023-12-09 RX ADMIN — SENNOSIDES AND DOCUSATE SODIUM 2 TABLET: 8.6; 5 TABLET ORAL at 21:34

## 2023-12-09 RX ADMIN — ONDANSETRON 4 MG: 2 INJECTION INTRAMUSCULAR; INTRAVENOUS at 21:34

## 2023-12-09 RX ADMIN — ROSUVASTATIN 5 MG: 10 TABLET, FILM COATED ORAL at 09:25

## 2023-12-09 NOTE — PROGRESS NOTES
"Pharmacy Consult-Vancomycin Dosing  Jessica Kraft is a  63 y.o. female receiving vancomycin therapy.     Indication: sepsis  Consulting Provider: hospitalist  ID Consult: No    Goal AUC: 400 - 600 mg/L*hr    Current Antimicrobial Therapy  Anti-Infectives (From admission, onward)      Ordered     Dose/Rate Route Frequency Start Stop    12/08/23 0228  vancomycin (VANCOCIN) 1000 mg/200 mL dextrose 5% IVPB        Ordering Provider: Lukas Acosta PharmD    1,000 mg  over 60 Minutes Intravenous Every 12 Hours 12/08/23 0900 12/13/23 0859    12/08/23 0225  piperacillin-tazobactam (ZOSYN) 3.375 g in iso-osmotic dextrose 50 ml (premix)        Ordering Provider: Danielle Loera DO    3.375 g  over 4 Hours Intravenous Every 8 Hours 12/08/23 0600 12/11/23 0559    12/08/23 0225  Pharmacy to dose vancomycin        Ordering Provider: Danielle Loera DO     Does not apply Continuous PRN 12/08/23 0225 12/11/23 0224 12/07/23 2115  vancomycin IVPB 2000 mg in 0.9% Sodium Chloride 500 mL        Ordering Provider: Dalton Estrada Jr., PA-C    20 mg/kg × 95.3 kg  250 mL/hr over 120 Minutes Intravenous Once 12/07/23 2200 12/08/23 0038    12/07/23 2115  piperacillin-tazobactam (ZOSYN) 3.375 g in iso-osmotic dextrose 50 ml (premix)        Ordering Provider: Dalton Estrada Jr., PA-C    3.375 g  over 30 Minutes Intravenous Once 12/07/23 2130 12/07/23 2200            Allergies  Allergies as of 12/07/2023    (No Known Allergies)       Labs    Results from last 7 days   Lab Units 12/09/23  0640 12/08/23  0321 12/07/23 1959   BUN mg/dL 21 24* 21   CREATININE mg/dL 0.74 1.00 0.92       Results from last 7 days   Lab Units 12/08/23  0321 12/07/23 1959 12/06/23  1142   WBC 10*3/mm3 14.64* 23.04* 7.66       Evaluation of Dosing     Last Dose Received in the ED/Outside Facility: 2000mg  Is Patient on Dialysis or Renal Replacement: no    Ht - 167.6 cm (66\")  Wt - 97.6 kg (215 lb 1.6 oz)    Estimated Creatinine Clearance: 92.8 mL/min (by " C-G formula based on SCr of 0.74 mg/dL).    Intake & Output (last 3 days)         12/06 0701 12/07 0700 12/07 0701  12/08 0700 12/08 0701  12/09 0700 12/09 0701  12/10 0700    P.O.   840     IV Piggyback  550      Total Intake(mL/kg)  550 (5.6) 840 (8.6)     Urine (mL/kg/hr)   0 (0)     Emesis/NG output   200     Stool   0     Total Output   200     Net  +550 +640             Urine Unmeasured Occurrence  1 x 1 x     Stool Unmeasured Occurrence  6 x 4 x     Emesis Unmeasured Occurrence  1 x 1 x             Microbiology and Radiology  Microbiology Results (last 10 days)       Procedure Component Value - Date/Time    Body Fluid Culture - Body Fluid, Peritoneum [[id number]] Collected: 12/08/23 1036    Lab Status: Preliminary result Specimen: Body Fluid from Peritoneum Updated: 12/08/23 1421     Gram Stain Rare (1+) WBCs seen      No organisms seen    Respiratory Panel PCR w/COVID-19(SARS-CoV-2) [place] In-House, NP Swab in UTM/VTM, 2 HR TAT - Swab, Nasopharynx [[id number]]  (Normal) Collected: 12/08/23 0259    Lab Status: Final result Specimen: Swab from Nasopharynx Updated: 12/08/23 0427     ADENOVIRUS, PCR Not Detected     Coronavirus 229E Not Detected     Coronavirus HKU1 Not Detected     Coronavirus NL63 Not Detected     Coronavirus OC43 Not Detected     COVID19 Not Detected     Human Metapneumovirus Not Detected     Human Rhinovirus/Enterovirus Not Detected     Influenza A PCR Not Detected     Influenza B PCR Not Detected     Parainfluenza Virus 1 Not Detected     Parainfluenza Virus 2 Not Detected     Parainfluenza Virus 3 Not Detected     Parainfluenza Virus 4 Not Detected     RSV, PCR Not Detected     Bordetella pertussis pcr Not Detected     Bordetella parapertussis PCR Not Detected     Chlamydophila pneumoniae PCR Not Detected     Mycoplasma pneumo by PCR Not Detected    Narrative:      In the setting of a positive respiratory panel with a viral infection PLUS a negative procalcitonin without  other underlying concern for bacterial infection, consider observing off antibiotics or discontinuation of antibiotics and continue supportive care. If the respiratory panel is positive for atypical bacterial infection (Bordetella pertussis, Chlamydophila pneumoniae, or Mycoplasma pneumoniae), consider antibiotic de-escalation to target atypical bacterial infection.    Urine Culture - Urine, Urine, Clean Catch [[id number]]  (Normal) Collected: 12/07/23 2157    Lab Status: Preliminary result Specimen: Urine, Clean Catch Updated: 12/08/23 1015     Urine Culture Culture in progress    Blood Culture - Blood, Arm, Left [[id number]]  (Normal) Collected: 12/07/23 2042    Lab Status: Preliminary result Specimen: Blood from Arm, Left Updated: 12/08/23 2246     Blood Culture No growth at 24 hours    Blood Culture - Blood, Arm, Right [[id number]]  (Normal) Collected: 12/07/23 2042    Lab Status: Preliminary result Specimen: Blood from Arm, Right Updated: 12/08/23 2246     Blood Culture No growth at 24 hours            Reported Vancomycin Levels                Results from last 7 days   Lab Units 12/09/23  0640   VANCOMYCIN TR mcg/mL 17.50          [employer] AUC Calculation:    Current AUC:   432 mg/L*hr    Predicted Steady State AUC :   442 mg/L*hr    Assessment/Plan:     Pharmacy consulted to does vancomycin for sepsis/Acute UTI.  Patient was loaded with vancomycin 2000mg (20mg/kg) and started on a maintenance dose of 1000mg IV Q12h.  Vancomycin level today (prior to 4th dose) is 17.5 mg/L (~8h level).  Current AUC is 432, with predicted AUC at steady state of 442.  Will continue with current dose of vancomycin 1000mg IV q12h.  Pharmacy will conitnue to follow the patient's culture results and clinical progress daily.      Delisa Lynne, PharmD  12/9/2023  08:32 EST

## 2023-12-09 NOTE — PLAN OF CARE
Goal Outcome Evaluation:           Progress: improving  Outcome Evaluation: PT is A/O x 4, on 2/3 L NC humidified. Paracentesis procedure took of 4.2 L of fluid this afternoon. She reports an improvement in her breathing. Clear liquid diet. PT verbalizes no needs at this time, bed low, call light in reach.

## 2023-12-09 NOTE — PROGRESS NOTES
"HEMATOLOGY/ONCOLOGY PROGRESS NOTE    S:She continues to feel improved from admission, denies nausea or vomiting. No persistent abdominal pain.         Past medical history, social history and family history was reviewed and unchanged from prior visit.      Medications:  The current medication list was reviewed in the EMR    ALLERGIES:  No Known Allergies      Physical Exam    VITAL SIGNS:  /73 (BP Location: Right arm, Patient Position: Lying)   Pulse 97   Temp 98.3 °F (36.8 °C) (Oral)   Resp 16   Ht 167.6 cm (66\")   Wt 97.6 kg (215 lb 1.6 oz)   LMP  (LMP Unknown) Comment: N/A  SpO2 94%   BMI 34.72 kg/m²   Temp:  [98.2 °F (36.8 °C)-98.6 °F (37 °C)] 98.3 °F (36.8 °C)      Performance Status: 0                Physical Exam    General: well appearing, in no acute distress  HEENT: sclerae anicteric, oropharynx clear, neck is supple  Lymphatics: no cervical, supraclavicular, or axillary adenopathy  Cardiovascular: regular rate and rhythm, no murmurs, rubs or gallops  Lungs: clear to auscultation bilaterally  Abdomen: +ascites  Extremities: no lower extremity edema  Skin: palpable nodule superior to umbilicus  Msk:  Shows no weakness of the large muscle groups  Psych: Mood is stable        RECENT LABS:    Lab Results   Component Value Date    HGB 14.2 12/08/2023    HCT 46.2 12/08/2023    MCV 87.5 12/08/2023     (H) 12/08/2023    WBC 14.64 (H) 12/08/2023    NEUTROABS 12.00 (H) 12/08/2023    LYMPHSABS 0.66 (L) 12/07/2023    MONOSABS 1.88 (H) 12/07/2023    EOSABS 0.00 12/08/2023    BASOSABS 0.00 12/08/2023       Lab Results   Component Value Date    GLUCOSE 116 (H) 12/09/2023    BUN 21 12/09/2023    CREATININE 0.74 12/09/2023     12/09/2023    K 4.0 12/09/2023     12/09/2023    CO2 25.0 12/09/2023    CALCIUM 8.7 12/09/2023    PROTEINTOT 7.4 12/08/2023    ALBUMIN 3.7 12/08/2023    BILITOT 0.6 12/08/2023    ALKPHOS 137 (H) 12/08/2023    AST 26 12/08/2023    ALT 15 12/08/2023     Component      " Latest Ref Rng 12/8/2023   CEA      ng/mL 5.75    CA 15-3      <=25.0 U/mL 21.5    CA 19-9      <=35.0 U/mL 6.7          0.0 - 38.1 U/mL 323.0 (H)      Assessment/Plan    1.  Pancreatic mass  2.  Peritoneal/omental implants  3.  Indeterminate lung nodules  4.  Indeterminate liver hypodensities  5. Indeterminate right ovarian abnormality  6.  Ascites  -I reviewed the patient's CT chest abdomen and pelvis personally together with the patient.  I reviewed the results of her CBC and CMP and pelvic ultrasound.  -I met with the patient together with her supportive sisters, son, and   -We discussed the clinical impression of metastatic malignancy.  We reviewed the rationale for awaiting Cytology results to confirm malignancy and determine treatment plan.  -Tumor CA 19-9, , CA 15-3, CA 2729, and CEA reviewed and notable for CA 19-9 normal and  markedly elevated. This is suggestive but not diagnostic of GYN primary. No convincing findings of a primary malignancy on pelvic US but noted to be a limited study.  Will await pending cytology.  -Recommend GYN oncology consult on Monday and awaiting their input prior to obtaining EUS/pancreas biopsy. Subcutaneous umbicical nodule may also be amenable to percutaneous biopsy.    -May benefit from an outpatient PET if unclear primary site of malignancy.  -MRI reviewed and negative.     Sherri العراقي MD  [facility] Hematology [facility] Oncology    12/9/2023      Time spent on day of service 50 min including chart/imaging/lab review, time at bedside evaluating,examining, counseling pt, communicating with GYN, placing orders, documentation

## 2023-12-09 NOTE — PROGRESS NOTES
[facility] Medicine Services  PROGRESS NOTE    Patient Name: Jessica Kraft  : 1960  MRN: [id number]    Date of Admission: 2023  Primary Care Physician: Leonarda Díaz MD    Subjective   Subjective     CC:  F/u abd pain, new malignancy diagnosis    HPI:  She feels much better after paracentesis yesterday.  Wanting to advance her diet.  Pain is controlled.  She also feels more energetic today      Objective   Objective     Vital Signs:   Temp:  [98.2 °F (36.8 °C)-98.6 °F (37 °C)] 98.3 °F (36.8 °C)  Heart Rate:  [] 97  Resp:  [16-20] 16  BP: (121-135)/(63-78) 121/73  Flow (L/min):  [2-3] 3     Physical Exam:  Constitutional: No acute distress, awake, alert  HENT: NCAT, mucous membranes moist  Respiratory: Clear to auscultation bilaterally, respiratory effort normal   Cardiovascular: RRR, no murmurs, rubs, or gallops  Gastrointestinal: Positive bowel sounds, soft, very slight tenderness, nondistended  Musculoskeletal: No bilateral ankle edema  Psychiatric: Appropriate affect, cooperative  Neurologic: Oriented x 3, strength symmetric in all extremities, Cranial Nerves grossly intact to confrontation, speech clear  Skin: No rashes      Results Reviewed:  LAB RESULTS:      Lab 23  0321 23  2311 23  1959 23  1142   WBC 14.64*  --  23.04* 7.66   HEMOGLOBIN 14.2  --  14.6 12.2   HEMATOCRIT 46.2  --  47.3* 39.7   PLATELETS 469*  --  468* 282   NEUTROS ABS 12.00*  --  20.35* 5.96   IMMATURE GRANS (ABS)  --   --  0.09* 0.04   LYMPHS ABS  --   --  0.66* 0.70   MONOS ABS  --   --  1.88* 0.64   EOS ABS 0.00  --  0.01 0.26   MCV 87.5  --  88.6 89.4   PROCALCITONIN  --   --  0.49*  --    LACTATE  --  1.3 2.3*  --    PROTIME  --   --  14.6*  --          Lab 23  0640 23  0321 23  2157 23  1959 23  1152 23  1151 23  1142   SODIUM 139 135*  --  137  --   --  140   POTASSIUM 4.0 4.8  --  4.1  --   --  3.9   CHLORIDE 104 100   --  101  --   --  104   CO2 25.0 21.0*  --  21.0*  --   --  27.0   ANION GAP 10.0 14.0  --  15.0  --   --  9.0   BUN 21 24*  --  21  --   --  17   CREATININE 0.74 1.00  --  0.92 0.60 0.60 0.77   EGFR 91.6 63.8  --  70.5  --   --  87.3   GLUCOSE 116* 164*  --  163*  --   --  119*   CALCIUM 8.7 9.2  --  9.3  --   --  9.0   MAGNESIUM  --  2.3  --   --   --   --   --    PHOSPHORUS  --  4.5  --   --   --   --   --    HEMOGLOBIN A1C  --   --   --  5.70*  --   --   --    TSH  --   --  1.730  --   --   --   --          Lab 12/08/23  0321 12/07/23 1959 12/06/23  1142   TOTAL PROTEIN 7.4 8.0 6.7   ALBUMIN 3.7 4.0 3.5   GLOBULIN 3.7 4.0 3.2   ALT (SGPT) 15 11 7   AST (SGOT) 26 21 13   BILIRUBIN 0.6 0.6 0.3   ALK PHOS 137* 149* 118*   LIPASE  --  9* 14         Lab 12/07/23 2157 12/07/23 1959   PROBNP  --  107.5   HSTROP T 20* 30*   PROTIME  --  14.6*   INR  --  1.12                 Lab 12/08/23  0332   PH, ARTERIAL 7.379   PCO2, ARTERIAL 35.0   PO2 ART 80.8*   FIO2 36   HCO3 ART 20.7   BASE EXCESS ART -3.8*   CARBOXYHEMOGLOBIN 1.3     Brief Urine Lab Results  (Last result in the past 365 days)        Color   Clarity   Blood   Leuk Est   Nitrite   Protein   CREAT   Urine HCG        12/07/23 2157 [place]   Cloudy   Negative   Small (1+)   Positive   30 mg/dL (1+)                   Microbiology Results Abnormal       Procedure Component Value - Date/Time    Body Fluid Culture - Body Fluid, Peritoneum [[id number]] Collected: 12/08/23 1036    Lab Status: Preliminary result Specimen: Body Fluid from Peritoneum Updated: 12/09/23 1012     Body Fluid Culture No growth     Gram Stain Rare (1+) WBCs seen      No organisms seen    Blood Culture - Blood, Arm, Left [[id number]]  (Normal) Collected: 12/07/23 2042    Lab Status: Preliminary result Specimen: Blood from Arm, Left Updated: 12/08/23 2246     Blood Culture No growth at 24 hours    Blood Culture - Blood, Arm, Right [[id number]]  (Normal) Collected: 12/07/23 2042    Lab Status:  Preliminary result Specimen: Blood from Arm, Right Updated: 12/08/23 2246     Blood Culture No growth at 24 hours    Urine Culture - Urine, Urine, Clean Catch [[id number]]  (Normal) Collected: 12/07/23 2157    Lab Status: Preliminary result Specimen: Urine, Clean Catch Updated: 12/08/23 1015     Urine Culture Culture in progress    Respiratory Panel PCR w/COVID-19(SARS-CoV-2) [place]/[facility] In-House, NP Swab in UTM/VTM, 2 HR TAT - Swab, Nasopharynx [[id number]]  (Normal) Collected: 12/08/23 0259    Lab Status: Final result Specimen: Swab from Nasopharynx Updated: 12/08/23 0427     ADENOVIRUS, PCR Not Detected     Coronavirus 229E Not Detected     Coronavirus HKU1 Not Detected     Coronavirus NL63 Not Detected     Coronavirus OC43 Not Detected     COVID19 Not Detected     Human Metapneumovirus Not Detected     Human Rhinovirus/Enterovirus Not Detected     Influenza A PCR Not Detected     Influenza B PCR Not Detected     Parainfluenza Virus 1 Not Detected     Parainfluenza Virus 2 Not Detected     Parainfluenza Virus 3 Not Detected     Parainfluenza Virus 4 Not Detected     RSV, PCR Not Detected     Bordetella pertussis pcr Not Detected     Bordetella parapertussis PCR Not Detected     Chlamydophila pneumoniae PCR Not Detected     Mycoplasma pneumo by PCR Not Detected    Narrative:      In the setting of a positive respiratory panel with a viral infection PLUS a negative procalcitonin without other underlying concern for bacterial infection, consider observing off antibiotics or discontinuation of antibiotics and continue supportive care. If the respiratory panel is positive for atypical bacterial infection (Bordetella pertussis, Chlamydophila pneumoniae, or Mycoplasma pneumoniae), consider antibiotic de-escalation to target atypical bacterial infection.            MRI Brain With & Without Contrast    Result Date: 12/9/2023  MRI BRAIN W WO CONTRAST Date of Exam: 12/9/2023 3:51 AM EST Indication: metastatic  cancer staging.  Comparison: None available. Technique:  Routine multiplanar/multisequence sequence images of the brain were obtained before and after the uneventful administration of 20 mL Multihance. Findings: There is no diffusion restriction to suggest acute infarct. There is no evidence of acute or chronic intracranial hemorrhage.  No mass effect or midline shift. No abnormal extra-axial collections. There are numerous scattered small rounded and punctate foci of FLAIR hyperintense signal within the cerebral white matter. The major vascular flow voids appear intact.  The basal ganglia, brainstem and cerebellum appear within normal limits.  Calvarial and superficial soft tissue signal is within normal limits. There is asymmetric thinning of the right orbital lens suggesting prior cataract surgery. The paranasal sinuses and the mastoid air cells appear well aerated.  Midline structures are intact.  There is no abnormal intracranial enhancement.  There is normal enhancement within the intracranial vasculature including the dural venous sinuses.     Impression: Impression: 1.No acute intracranial abnormality. No evidence of intracranial metastatic disease. 2.Mild chronic small vessel ischemic change. Electronically Signed: Ildefonso Newman MD  12/9/2023 5:05 AM EST  Workstation ID: [id number]    XR Orbits 4+ View    Result Date: 12/9/2023  XR ORBITS 4+ VW Date of Exam: 12/9/2023 3:27 AM EST Indication: mri clearance Comparison: None available. Findings: Orbits appear within normal limits. There is metallic dental restoration hardware. No unexpected metal artifact. Facial bones appear grossly intact.     Impression: Impression: No contraindication to MRI. Electronically Signed: Ildefonso Newman MD  12/9/2023 3:45 AM EST  Workstation ID: [id number]    US Paracentesis    Result Date: 12/8/2023  DATE OF EXAM: 12/8/2023 8:59 AM EST PROCEDURE: US PARACENTESIS INDICATIONS: large volume ascites COMPARISON: No Comparisons Available  FLUOROSCOPIC TIME: None PHYSICIAN MONITORED CONSCIOUS SEDATION TIME: None minutes TECHNIQUE: A detailed explanation of the procedure, including the risks, benefits, and alternatives was provided. A preprocedure timeout was performed. The interventional radiology nurse monitored the patient at all times. Limited ultrasound of the abdomen demonstrated a moderate amount of ascites. An appropriate access site was selected and the area was prepped and draped in the usual sterile fashion. The skin was anesthetized with 1% lidocaine and a small skin incision was made. Next, a catheter was advanced into the ascitic fluid. A total of 4.2 L of clear fluid was removed and the catheter was removed. The patient tolerated the procedure well without immediate complication. FINDINGS: See above     Impression: 1. Successful right lower quadrant paracentesis yielding 4.2 L of clear fluid Electronically Signed: Hesham Eastman MD  12/8/2023 12:41 PM EST  Workstation ID: [id number]    US Pelvis Complete    Result Date: 12/8/2023  US PELVIS COMPLETE Date of Exam: 12/8/2023 9:50 AM CST Indication: r/o right adnexal mass. Comparison: CT abdomen and pelvis 12/7/2023 Technique: Transabdominal ultrasound evaluation of the pelvis was performed utilizing grayscale and color Doppler technique.  Doppler spectral analysis was performed. Transvaginal exam unable to be performed. Findings: The uterus measures 6.0 x 2.7 x 4.6 cm. The uterus is mildly heterogeneous without focal parenchymal abnormality. The endometrial stripe measures 0.3 mm. The right ovary measures 3.2 x 3.1 x 2.1 cm and the left ovary measures 2.5 x 1.8 x 1.9 cm. Ovarian vascularity appears intact.  There is no evidence of a solid ovarian mass. There is no significant free fluid identified within the pelvis. No definite adnexal abnormality noted however exam is limited due to bowel gas.     Impression: Impression: Limited exam without identifiable adnexal mass on this study.  Electronically Signed: Vy Gomez MD  12/8/2023 10:37 AM CST  Workstation ID: [id number]    CT Abdomen Pelvis With Contrast    Result Date: 12/7/2023  CT ABDOMEN PELVIS W CONTRAST Date of Exam: 12/7/2023 9:26 PM EST Indication: worsening abdominal pain and swelling. Comparison: CT abdomen pelvis 12/6/2023 Technique: Axial CT images were obtained of the abdomen and pelvis following the uneventful intravenous administration of 90 mL Isovue 370. Reconstructed coronal and sagittal images were also obtained. Automated exposure control and iterative construction methods were used. Findings: Please see separate dictated chest CT for findings above the diaphragm. Indeterminate right hepatic lobe lesion measuring 9 mm (5/41). Left hepatic lobe lesion too small to characterize measuring 8 mm (5/32). The spleen is normal in size. Gallbladder absent. The adrenal glands are normal. There is atrophy of the pancreatic tail with dilatation of the main pancreatic duct which is dilated up to 8 mm (2/27). There is abrupt termination of the main pancreatic duct at the mid pancreatic body with hypoenhancing pancreatic mass measuring approximately 2.4 x 2.3 cm (2/47). Abnormal soft tissue extends from this mass proximally to the level of the celiac axis bifurcation encasing splenic artery and common hepatic artery (2/46). There is severe short segment narrowing of the portal vein (2/50) with portal vein encasement. Splenic vein is poorly visualized but suspected narrowed in the region of the mass. The SMV is patent. No abutment of the superior mesenteric artery. Kidneys are symmetrically enhancing. Negative for hydronephrosis or hydroureter. Urinary bladder is collapsed. Uterus and left adnexa are without acute abnormality. Nodularity adjacent to the right ovary which appears secondary to collapsed adjacent bowel loops (2/124). Large hiatal hernia. Large volume ascites. There is diffuse infiltrative appearance of the omentum most  consistent with peritoneal and omental carcinomatosis. Multiple areas of peritoneal nodularity noted for example in the right lower quadrant peritoneal nodule measuring 8 mm (2/136). Peritoneal nodularity within the left lower pelvis (2/149).. Nodularity involving the anterior abdominal wall which measures 1.8 x 1.8 cm (2/105) concerning for metastatic peritoneal involvement extending to the rectus muscle on the right. Scattered irregular areas of nodularity are noted in the mesentery concerning for metastasis for example nodule measuring 1.3 x 1.3 cm likely carcinomatosis and/or metastatic adenopathy (2/112). Gastrohepatic ligament node measures up to 10 mm (2/27). There is fluid-filled distention of the colon. Colonic diverticulosis without diverticulitis. The appendix is nonvisualized. Abdominal aortic branch vasculature patent. No aggressive osseous lesion or acute fracture. Delayed images demonstrate normal contrast excretion into the nondilated kidneys. Normal filling of the bladder. No acute fracture or aggressive osseous lesion.     Impression: Impression: 1. Mass involving the mid body of pancreas measuring 2.4 cm highly concerning for pancreatic adenocarcinoma. This results in severe narrowing of the portal vein with encasement of the celiac axis bifurcation as above. 2. Peritoneal and omental carcinomatosis with large volume ascites. 3. Several small indeterminate low-attenuation liver lesions which could relate to metastasis, nonspecific, small size limits assessment. These could be better assessed with MRI follow-up if clinically indicated. 4. Nodularity adjacent to right ovary appears secondary to adjacent bowel loops which are underdistended, follow-up pelvic ultrasound may be helpful to exclude right adnexal mass as large ascites limits assessment. 5. See separately dictated chest CT for findings above the diaphragm. Electronically Signed: Fransico Ramírez MD  12/7/2023 10:52 PM EST  Workstation ID:  [id number]    CT Angiogram Chest Pulmonary Embolism    Result Date: 12/7/2023  CT ANGIOGRAM CHEST PULMONARY EMBOLISM Date of Exam: 12/7/2023 9:26 PM EST Indication: soa. Comparison: Chest radiograph 12/7/2023, CT abdomen pelvis 12/6/2023 Technique: Axial CT images were obtained of the chest after the uneventful intravenous administration of 90 mL Isovue 370 utilizing pulmonary embolism protocol.  Reconstructed coronal and sagittal images were also obtained. Automated exposure control and  iterative construction methods were used. Findings: Soft tissue of the lower neck are without acute abnormality. The aortic arch branch vasculature is patent. There is fluid-filled distention of the esophagus. There is a large hiatal hernia again noted. Heart size normal. Pulmonary arteries are well-opacified with contrast. Negative for pulmonary embolus. No mediastinal or hilar adenopathy. No axillary adenopathy. Small bilateral pleural effusions with bibasilar atelectasis. Trachea and mainstem bronchi are patent. No pneumothorax. Several small bilateral pulmonary nodules are nonspecific for example in the right upper lobe measuring 5mm (6/25). Within the right upper lobe measuring 3 mm (6/22). Within the left upper lobe measuring 3 mm (6/46). Several other small to 3 mm nodules are noted bilaterally. Linear atelectasis at the lingula. Thoracic vertebral bodies are maintained in height. No aggressive osseous lesion or fracture. Please see separately dictated abdominal CT for findings below the diaphragm.     Impression: Impression: 1. Negative for pulmonary embolus. 2. Increase in size of small bilateral pleural effusions with lower lobe airspace disease likely atelectasis. 3. Multiple small nonspecific pulmonary nodules most pronounced in the right upper lobe measuring up to 5 mm, recommend attention on follow-up imaging, these could be reassessed in 6 months. 4. Large hiatal hernia with fluid-filled distention of esophagus which  may relate to delayed transit or reflux, the amount of fluid in the esophagus places patient at risk for aspiration. 5. Please see separately dictated abdominal CT for findings below the diaphragm. Electronically Signed: Fransico Ramírez MD  12/7/2023 10:17 PM EST  Workstation ID: [id number]    XR Chest 1 View    Result Date: 12/7/2023  XR CHEST 1 VW Date of Exam: 12/7/2023 7:56 PM EST Indication: Chest Pain Triage Protocol Comparison: None available. Findings: The lungs are poorly inflated with bibasilar atelectasis particularly on the left. Small bilateral pleural effusions are not excluded. The remainder of the lungs are clear cardiac, hilar, and mediastinal silhouettes are within normal limits. No pneumothorax. The trachea is midline. Pulmonary vascularity is normal. Visualized bony structures are intact.     Impression: Impression: Poor inspiration with bibasilar atelectasis and possible small bilateral pleural effusions. Electronically Signed: Danilo Yip DO  12/7/2023 8:20 PM EST  Workstation ID: [id number]         Current medications:  Scheduled Meds:aspirin, 81 mg, Oral, Daily  ipratropium-albuterol, 3 mL, Nebulization, 4x Daily - RT  levothyroxine, 125 mcg, Oral, QAM  ondansetron, 4 mg, Intravenous, Q6H  pantoprazole, 40 mg, Intravenous, Q12H  piperacillin-tazobactam, 3.375 g, Intravenous, Q8H  polyethylene glycol, 17 g, Oral, Daily  rosuvastatin, 5 mg, Oral, Daily  senna-docusate sodium, 2 tablet, Oral, BID  sodium chloride, 10 mL, Intravenous, Q12H  venlafaxine XR, 150 mg, Oral, Daily      Continuous Infusions:   PRN Meds:.  acetaminophen **OR** acetaminophen **OR** acetaminophen    ALPRAZolam    senna-docusate sodium **AND** polyethylene glycol **AND** bisacodyl **AND** bisacodyl    dicyclomine    HYDROmorphone **AND** naloxone    influenza vaccine    melatonin    nitroglycerin    prochlorperazine    promethazine **OR** promethazine    sodium chloride    sodium chloride    sodium chloride    Assessment &  Plan   Assessment & Plan     Active Hospital Problems    Diagnosis  POA    **Sepsis [A41.9]  Yes    Acute UTI (urinary tract infection) [N39.0]  Unknown    Pancreatic mass [K86.89]  Unknown    Lung nodules [R91.8]  Unknown    Liver lesion [K76.9]  Unknown    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        Brief Hospital Course to date:  Jessica Kraft is a 63 y.o. female with hx of anxiety, depression, insomnia, hypothyroidism, osteopenia, Vit D deficiency and UTI who presents to the ED for evaluation of abdominal pain with swelling and fever.  CT abdomen pelvis with contrast in ED showed a 2.4 cm pancreatic mass concerning for pancreatic adenocarcinoma, ascites, omental carcinomatosis, metastatic lesions involving the liver.  Malignancy diagnosis is new for the patient.       Ascites, concern for malignancy  Pancreatic mass, concern for metastasis  Omental and peritoneal carcinomatosis  Liver lesions  Pulmonary nodules  - s/p paracentesis on 12/8, fluid studies still in process.  Preliminary data with elevated white blood cells, will continue Zosyn for now.  Okay to discontinue vancomycin  - GI consult, appreciate input.  Tentative plan for biopsy on Monday with EUS with FNB of pancreatic body mass  -  hem/onc consult, new diagnosis.  Tumor markers ordered.  So far  is positive.  Brain MRI negative for metastases.  Patient has a lot of questions about treatment plan.  I did discuss with her that finalize treatment plan will depend on biopsy and final fluid results.  Appreciate oncology assistance  -  pain control  - nausea control    UTI  -covered w current abx. Cultures in process       Constipation  - likely r/t ascites  - bowel regimen.  Advance diet as tolerated today     GERD  Dysphagia  Hiatal hernia  - protonix BID  - s/p NG tube w decompression. Now removed  -advance diet as tolerated     Anxiety   Depression  Insomnia    Expected Discharge Location and Transportation: home  Expected Discharge   Expected Discharge Date: 12/12/2023; Expected Discharge Time:      DVT prophylaxis:  Mechanical DVT prophylaxis orders are present.     AM-PAC 6 Clicks Score (PT): 24 (12/09/23 0800)    CODE STATUS:   Code Status and Medical Interventions:   Ordered at: 12/08/23 0205     Code Status (Patient has no pulse and is not breathing):    CPR (Attempt to Resuscitate)     Medical Interventions (Patient has pulse or is breathing):    Full Support       Emily Leach MD  12/09/23

## 2023-12-09 NOTE — PROGRESS NOTES
"GI Daily Progress Note  Subjective:    Chief Complaint: Abdominal pain    Patient reports feeling some better today.  Denies any emesis.  Reports Zofran helping.  Tolerating some p.o. intake.  No bowel movements today but reports she had 2 large bowel movements within the past couple days.    Objective:    /73 (BP Location: Right arm, Patient Position: Lying)   Pulse 97   Temp 98.3 °F (36.8 °C) (Oral)   Resp 16   Ht 167.6 cm (66\")   Wt 97.6 kg (215 lb 1.6 oz)   LMP  (LMP Unknown) Comment: N/A  SpO2 94%   BMI 34.72 kg/m²     Physical Exam   General: Patient awake, alert and cooperative   Eyes: Normal lids and lashes, no scleral icterus   Skin: Warm and dry, not jaundiced   Cardiovascular: Regular rate, well-perfused extremities   Pulm: Equal expansion bilaterally, no increased WOB   Abdomen: Mild distention              Neuro: A&O, No obvious sign of focal deficit   Psychiatric: Normal mood and behavior; memory intact    Lab  Lab Results   Component Value Date    WBC 14.64 (H) 12/08/2023    HGB 14.2 12/08/2023    HGB 14.6 12/07/2023    HGB 12.2 12/06/2023    MCV 87.5 12/08/2023     (H) 12/08/2023    INR 1.12 12/07/2023       Lab Results   Component Value Date    GLUCOSE 116 (H) 12/09/2023    BUN 21 12/09/2023    CREATININE 0.74 12/09/2023    EGFRIFNONA 85 08/22/2016    EGFRIFAFRI 98 08/22/2016    BCR 28.4 (H) 12/09/2023     12/09/2023    K 4.0 12/09/2023    CO2 25.0 12/09/2023    CALCIUM 8.7 12/09/2023    PROTENTOTREF 6.6 08/22/2016    ALBUMIN 3.7 12/08/2023    ALKPHOS 137 (H) 12/08/2023    BILITOT 0.6 12/08/2023    ALT 15 12/08/2023    AST 26 12/08/2023       Assessment:  Abdominal pain  Pancreatic mass in the body of the pancreas with vascular involvement  Ascites  Image findings concerning for peritoneal and omental carcinomatosis  Pulmonary nodules  Hiatal hernia, large  Leukocytosis  Elevated     Plan:  -Overall constellation of signs/symptoms and image findings concerning for " metastatic malignancy with possible pancreatic primary however noted  is elevated while CA 19-9 is normal, CEA also normal  -I discussed image findings in detail with patient and her family at bedside today  -Ascites is highly suspicious for malignant etiology.  4.2 L removed 12/9.  Cell count notable for 830 white blood cells and 29% neutrophils (240) consistent with bacterial peritonitis.  SAAG less than 1.1 with elevated total protein, no growth from fluid culture today, cytology  -EUS with FNB of pancreatic body mass can be considered following ascitic fluid analysis and input from hematology/oncology and possibly gynecologic oncology to help ensure the benefit of EUS with FNB outweighs risks.  If needed this could be considered early next week, possibly Monday.  -Started daily MiraLAX as bowel regimen, reports fairly substantial bowel movement yesterday  -Nausea improved on Zofran, will continue on schedule at this time  -Advance diet as tolerated    Flakito Abrams MD  12/09/23  17:57 EST

## 2023-12-10 LAB
ANION GAP SERPL CALCULATED.3IONS-SCNC: 8 MMOL/L (ref 5–15)
BACTERIA SPEC AEROBE CULT: ABNORMAL
BASOPHILS # BLD AUTO: 0.04 10*3/MM3 (ref 0–0.2)
BASOPHILS NFR BLD AUTO: 0.5 % (ref 0–1.5)
BUN SERPL-MCNC: 15 MG/DL (ref 8–23)
BUN/CREAT SERPL: 19.7 (ref 7–25)
CALCIUM SPEC-SCNC: 8.7 MG/DL (ref 8.6–10.5)
CANCER AG27-29 SERPL-ACNC: 28.5 U/ML (ref 0–38.6)
CHLORIDE SERPL-SCNC: 100 MMOL/L (ref 98–107)
CO2 SERPL-SCNC: 27 MMOL/L (ref 22–29)
CREAT SERPL-MCNC: 0.76 MG/DL (ref 0.57–1)
DEPRECATED RDW RBC AUTO: 44.9 FL (ref 37–54)
EGFRCR SERPLBLD CKD-EPI 2021: 88.7 ML/MIN/1.73
EOSINOPHIL # BLD AUTO: 0.17 10*3/MM3 (ref 0–0.4)
EOSINOPHIL NFR BLD AUTO: 2.3 % (ref 0.3–6.2)
ERYTHROCYTE [DISTWIDTH] IN BLOOD BY AUTOMATED COUNT: 14.1 % (ref 12.3–15.4)
GLUCOSE SERPL-MCNC: 99 MG/DL (ref 65–99)
HCT VFR BLD AUTO: 35.2 % (ref 34–46.6)
HGB BLD-MCNC: 11.1 G/DL (ref 12–15.9)
IMM GRANULOCYTES # BLD AUTO: 0.05 10*3/MM3 (ref 0–0.05)
IMM GRANULOCYTES NFR BLD AUTO: 0.7 % (ref 0–0.5)
LYMPHOCYTES # BLD AUTO: 0.63 10*3/MM3 (ref 0.7–3.1)
LYMPHOCYTES NFR BLD AUTO: 8.6 % (ref 19.6–45.3)
MCH RBC QN AUTO: 27.3 PG (ref 26.6–33)
MCHC RBC AUTO-ENTMCNC: 31.5 G/DL (ref 31.5–35.7)
MCV RBC AUTO: 86.5 FL (ref 79–97)
MONOCYTES # BLD AUTO: 0.65 10*3/MM3 (ref 0.1–0.9)
MONOCYTES NFR BLD AUTO: 8.9 % (ref 5–12)
NEUTROPHILS NFR BLD AUTO: 5.78 10*3/MM3 (ref 1.7–7)
NEUTROPHILS NFR BLD AUTO: 79 % (ref 42.7–76)
NRBC BLD AUTO-RTO: 0 /100 WBC (ref 0–0.2)
PLATELET # BLD AUTO: 230 10*3/MM3 (ref 140–450)
PMV BLD AUTO: 9.9 FL (ref 6–12)
POTASSIUM SERPL-SCNC: 3.2 MMOL/L (ref 3.5–5.2)
RBC # BLD AUTO: 4.07 10*6/MM3 (ref 3.77–5.28)
SODIUM SERPL-SCNC: 135 MMOL/L (ref 136–145)
WBC NRBC COR # BLD AUTO: 7.32 10*3/MM3 (ref 3.4–10.8)

## 2023-12-10 PROCEDURE — 99232 SBSQ HOSP IP/OBS MODERATE 35: CPT | Performed by: STUDENT IN AN ORGANIZED HEALTH CARE EDUCATION/TRAINING PROGRAM

## 2023-12-10 PROCEDURE — 25010000002 PIPERACILLIN SOD-TAZOBACTAM PER 1 G

## 2023-12-10 PROCEDURE — 85025 COMPLETE CBC W/AUTO DIFF WBC: CPT | Performed by: STUDENT IN AN ORGANIZED HEALTH CARE EDUCATION/TRAINING PROGRAM

## 2023-12-10 PROCEDURE — 25010000002 PIPERACILLIN SOD-TAZOBACTAM PER 1 G: Performed by: INTERNAL MEDICINE

## 2023-12-10 PROCEDURE — 99223 1ST HOSP IP/OBS HIGH 75: CPT | Performed by: OBSTETRICS & GYNECOLOGY

## 2023-12-10 PROCEDURE — G0378 HOSPITAL OBSERVATION PER HR: HCPCS

## 2023-12-10 PROCEDURE — 25010000002 ONDANSETRON PER 1 MG: Performed by: INTERNAL MEDICINE

## 2023-12-10 PROCEDURE — 80048 BASIC METABOLIC PNL TOTAL CA: CPT | Performed by: STUDENT IN AN ORGANIZED HEALTH CARE EDUCATION/TRAINING PROGRAM

## 2023-12-10 RX ORDER — IPRATROPIUM BROMIDE AND ALBUTEROL SULFATE 2.5; .5 MG/3ML; MG/3ML
3 SOLUTION RESPIRATORY (INHALATION) EVERY 4 HOURS PRN
Status: DISCONTINUED | OUTPATIENT
Start: 2023-12-10 | End: 2023-12-15 | Stop reason: HOSPADM

## 2023-12-10 RX ADMIN — ONDANSETRON 4 MG: 2 INJECTION INTRAMUSCULAR; INTRAVENOUS at 02:46

## 2023-12-10 RX ADMIN — PIPERACILLIN SODIUM AND TAZOBACTAM SODIUM 3.38 G: 3; .375 INJECTION, SOLUTION INTRAVENOUS at 18:53

## 2023-12-10 RX ADMIN — PANTOPRAZOLE SODIUM 40 MG: 40 INJECTION, POWDER, LYOPHILIZED, FOR SOLUTION INTRAVENOUS at 09:12

## 2023-12-10 RX ADMIN — Medication 10 ML: at 20:44

## 2023-12-10 RX ADMIN — PIPERACILLIN SODIUM AND TAZOBACTAM SODIUM 3.38 G: 3; .375 INJECTION, SOLUTION INTRAVENOUS at 20:43

## 2023-12-10 RX ADMIN — SENNOSIDES AND DOCUSATE SODIUM 2 TABLET: 8.6; 5 TABLET ORAL at 20:42

## 2023-12-10 RX ADMIN — POLYETHYLENE GLYCOL 3350 17 G: 17 POWDER, FOR SOLUTION ORAL at 09:13

## 2023-12-10 RX ADMIN — Medication 10 ML: at 09:14

## 2023-12-10 RX ADMIN — LEVOTHYROXINE SODIUM 125 MCG: 125 TABLET ORAL at 06:00

## 2023-12-10 RX ADMIN — PIPERACILLIN SODIUM AND TAZOBACTAM SODIUM 3.38 G: 3; .375 INJECTION, SOLUTION INTRAVENOUS at 06:00

## 2023-12-10 RX ADMIN — ALPRAZOLAM 1 MG: 1 TABLET ORAL at 20:47

## 2023-12-10 RX ADMIN — SENNOSIDES AND DOCUSATE SODIUM 2 TABLET: 8.6; 5 TABLET ORAL at 09:12

## 2023-12-10 RX ADMIN — PANTOPRAZOLE SODIUM 40 MG: 40 INJECTION, POWDER, LYOPHILIZED, FOR SOLUTION INTRAVENOUS at 20:42

## 2023-12-10 RX ADMIN — ROSUVASTATIN 5 MG: 10 TABLET, FILM COATED ORAL at 09:13

## 2023-12-10 RX ADMIN — ASPIRIN 81 MG CHEWABLE TABLET 81 MG: 81 TABLET CHEWABLE at 09:13

## 2023-12-10 RX ADMIN — VENLAFAXINE HYDROCHLORIDE 150 MG: 75 CAPSULE, EXTENDED RELEASE ORAL at 09:13

## 2023-12-10 NOTE — PLAN OF CARE
Goal Outcome Evaluation:  Plan of Care Reviewed With: patient           Outcome Evaluation: Pt is A&O with VSS, NSR on the monitor, and on RA. She complained of some heartburn at the start of shift that was relieved after receiving scheduled IV protonix. She's also had minimal nausea that was relieved with scheduled IV zofran. She hasn't complained of pain in her abdomen or SOB. There are no other complaints at this time.

## 2023-12-10 NOTE — NURSING NOTE
"PT is A/O x 4 on RA. She is improving AEB being on room air, VSS, and she had a shower today and \"feels better.\" She has had no c/o Nausea/Vomiting and I haven't administered any Zofran this shift. Her diet was advanced from, clear liquids, to GI soft diet, to Regular/House diet. Family is visiting and supportive of PT. No acute events today. PT verbalizes no needs at this time, bed low, call light in reach.  "

## 2023-12-10 NOTE — PROGRESS NOTES
[facility] Medicine Services  PROGRESS NOTE    Patient Name: Jessica Kraft  : 1960  MRN: [id number]    Date of Admission: 2023  Primary Care Physician: Leonarda Díaz MD    Subjective   Subjective     CC:  F/u abd pain, new malignancy diagnosis    HPI:  She did not sleep much overnight, she has been thinking a lot about her underlying malignancy diagnosis.  But otherwise tolerating a diet and symptomatically feels better since admission      Objective   Objective     Vital Signs:   Temp:  [98.1 °F (36.7 °C)-98.3 °F (36.8 °C)] 98.3 °F (36.8 °C)  Heart Rate:  [] 83  Resp:  [16-18] 18  BP: (116-133)/(71-81) 133/81     Physical Exam:  Constitutional: No acute distress, awake, alert  HENT: NCAT, mucous membranes moist  Respiratory: Clear to auscultation bilaterally, respiratory effort normal   Cardiovascular: RRR, no murmurs, rubs, or gallops  Gastrointestinal: Positive bowel sounds, soft, very slight tenderness, nondistended  Musculoskeletal: No bilateral ankle edema  Psychiatric: Appropriate affect, cooperative  Neurologic: Oriented x 3, strength symmetric in all extremities, Cranial Nerves grossly intact to confrontation, speech clear  Skin: No rashes      Results Reviewed:  LAB RESULTS:      Lab 12/10/23  0518 23  0321 23  2311 23  1959 23  1142   WBC 7.32 14.64*  --  23.04* 7.66   HEMOGLOBIN 11.1* 14.2  --  14.6 12.2   HEMATOCRIT 35.2 46.2  --  47.3* 39.7   PLATELETS 230 469*  --  468* 282   NEUTROS ABS 5.78 12.00*  --  20.35* 5.96   IMMATURE GRANS (ABS) 0.05  --   --  0.09* 0.04   LYMPHS ABS 0.63*  --   --  0.66* 0.70   MONOS ABS 0.65  --   --  1.88* 0.64   EOS ABS 0.17 0.00  --  0.01 0.26   MCV 86.5 87.5  --  88.6 89.4   PROCALCITONIN  --   --   --  0.49*  --    LACTATE  --   --  1.3 2.3*  --    PROTIME  --   --   --  14.6*  --          Lab 12/10/23  0518 23  0640 23  0321 23  2157 23  1959 23  1152  12/06/23  1151 12/06/23  1142   SODIUM 135* 139 135*  --  137  --   --  140   POTASSIUM 3.2* 4.0 4.8  --  4.1  --   --  3.9   CHLORIDE 100 104 100  --  101  --   --  104   CO2 27.0 25.0 21.0*  --  21.0*  --   --  27.0   ANION GAP 8.0 10.0 14.0  --  15.0  --   --  9.0   BUN 15 21 24*  --  21  --   --  17   CREATININE 0.76 0.74 1.00  --  0.92 0.60   < > 0.77   EGFR 88.7 91.6 63.8  --  70.5  --   --  87.3   GLUCOSE 99 116* 164*  --  163*  --   --  119*   CALCIUM 8.7 8.7 9.2  --  9.3  --   --  9.0   MAGNESIUM  --   --  2.3  --   --   --   --   --    PHOSPHORUS  --   --  4.5  --   --   --   --   --    HEMOGLOBIN A1C  --   --   --   --  5.70*  --   --   --    TSH  --   --   --  1.730  --   --   --   --     < > = values in this interval not displayed.         Lab 12/08/23  0321 12/07/23 1959 12/06/23  1142   TOTAL PROTEIN 7.4 8.0 6.7   ALBUMIN 3.7 4.0 3.5   GLOBULIN 3.7 4.0 3.2   ALT (SGPT) 15 11 7   AST (SGOT) 26 21 13   BILIRUBIN 0.6 0.6 0.3   ALK PHOS 137* 149* 118*   LIPASE  --  9* 14         Lab 12/07/23 2157 12/07/23 1959   PROBNP  --  107.5   HSTROP T 20* 30*   PROTIME  --  14.6*   INR  --  1.12                 Lab 12/08/23  0332   PH, ARTERIAL 7.379   PCO2, ARTERIAL 35.0   PO2 ART 80.8*   FIO2 36   HCO3 ART 20.7   BASE EXCESS ART -3.8*   CARBOXYHEMOGLOBIN 1.3     Brief Urine Lab Results  (Last result in the past 365 days)        Color   Clarity   Blood   Leuk Est   Nitrite   Protein   CREAT   Urine HCG        12/07/23 2157 [place]   Cloudy   Negative   Small (1+)   Positive   30 mg/dL (1+)                   Microbiology Results Abnormal       Procedure Component Value - Date/Time    Body Fluid Culture - Body Fluid, Peritoneum [[id number]] Collected: 12/08/23 1036    Lab Status: Preliminary result Specimen: Body Fluid from Peritoneum Updated: 12/10/23 0808     Body Fluid Culture No growth at 2 days     Gram Stain Rare (1+) WBCs seen      No organisms seen    Blood Culture - Blood, Arm, Left [[id number]]  (Normal)  Collected: 12/07/23 2042    Lab Status: Preliminary result Specimen: Blood from Arm, Left Updated: 12/09/23 2246     Blood Culture No growth at 2 days    Blood Culture - Blood, Arm, Right [[id number]]  (Normal) Collected: 12/07/23 2042    Lab Status: Preliminary result Specimen: Blood from Arm, Right Updated: 12/09/23 2246     Blood Culture No growth at 2 days    Respiratory Panel PCR w/COVID-19(SARS-CoV-2) [place] In-House, NP Swab in UTM/VTM, 2 HR TAT - Swab, Nasopharynx [[id number]]  (Normal) Collected: 12/08/23 0259    Lab Status: Final result Specimen: Swab from Nasopharynx Updated: 12/08/23 0427     ADENOVIRUS, PCR Not Detected     Coronavirus 229E Not Detected     Coronavirus HKU1 Not Detected     Coronavirus NL63 Not Detected     Coronavirus OC43 Not Detected     COVID19 Not Detected     Human Metapneumovirus Not Detected     Human Rhinovirus/Enterovirus Not Detected     Influenza A PCR Not Detected     Influenza B PCR Not Detected     Parainfluenza Virus 1 Not Detected     Parainfluenza Virus 2 Not Detected     Parainfluenza Virus 3 Not Detected     Parainfluenza Virus 4 Not Detected     RSV, PCR Not Detected     Bordetella pertussis pcr Not Detected     Bordetella parapertussis PCR Not Detected     Chlamydophila pneumoniae PCR Not Detected     Mycoplasma pneumo by PCR Not Detected    Narrative:      In the setting of a positive respiratory panel with a viral infection PLUS a negative procalcitonin without other underlying concern for bacterial infection, consider observing off antibiotics or discontinuation of antibiotics and continue supportive care. If the respiratory panel is positive for atypical bacterial infection (Bordetella pertussis, Chlamydophila pneumoniae, or Mycoplasma pneumoniae), consider antibiotic de-escalation to target atypical bacterial infection.            MRI Brain With & Without Contrast    Result Date: 12/9/2023  MRI BRAIN W WO CONTRAST Date of Exam: 12/9/2023 3:51 AM  EST Indication: metastatic cancer staging.  Comparison: None available. Technique:  Routine multiplanar/multisequence sequence images of the brain were obtained before and after the uneventful administration of 20 mL Multihance. Findings: There is no diffusion restriction to suggest acute infarct. There is no evidence of acute or chronic intracranial hemorrhage.  No mass effect or midline shift. No abnormal extra-axial collections. There are numerous scattered small rounded and punctate foci of FLAIR hyperintense signal within the cerebral white matter. The major vascular flow voids appear intact.  The basal ganglia, brainstem and cerebellum appear within normal limits.  Calvarial and superficial soft tissue signal is within normal limits. There is asymmetric thinning of the right orbital lens suggesting prior cataract surgery. The paranasal sinuses and the mastoid air cells appear well aerated.  Midline structures are intact.  There is no abnormal intracranial enhancement.  There is normal enhancement within the intracranial vasculature including the dural venous sinuses.     Impression: Impression: 1.No acute intracranial abnormality. No evidence of intracranial metastatic disease. 2.Mild chronic small vessel ischemic change. Electronically Signed: Ildefonso Newman MD  12/9/2023 5:05 AM EST  Workstation ID: [id number]    XR Orbits 4+ View    Result Date: 12/9/2023  XR ORBITS 4+ VW Date of Exam: 12/9/2023 3:27 AM EST Indication: mri clearance Comparison: None available. Findings: Orbits appear within normal limits. There is metallic dental restoration hardware. No unexpected metal artifact. Facial bones appear grossly intact.     Impression: Impression: No contraindication to MRI. Electronically Signed: Ildefonso Newman MD  12/9/2023 3:45 AM EST  Workstation ID: [id number]    US Paracentesis    Result Date: 12/8/2023  DATE OF EXAM: 12/8/2023 8:59 AM EST PROCEDURE: US PARACENTESIS INDICATIONS: large volume ascites  COMPARISON: No Comparisons Available FLUOROSCOPIC TIME: None PHYSICIAN MONITORED CONSCIOUS SEDATION TIME: None minutes TECHNIQUE: A detailed explanation of the procedure, including the risks, benefits, and alternatives was provided. A preprocedure timeout was performed. The interventional radiology nurse monitored the patient at all times. Limited ultrasound of the abdomen demonstrated a moderate amount of ascites. An appropriate access site was selected and the area was prepped and draped in the usual sterile fashion. The skin was anesthetized with 1% lidocaine and a small skin incision was made. Next, a catheter was advanced into the ascitic fluid. A total of 4.2 L of clear fluid was removed and the catheter was removed. The patient tolerated the procedure well without immediate complication. FINDINGS: See above     Impression: 1. Successful right lower quadrant paracentesis yielding 4.2 L of clear fluid Electronically Signed: Hesham Easmtan MD  12/8/2023 12:41 PM EST  Workstation ID: [id number]    US Pelvis Complete    Result Date: 12/8/2023  US PELVIS COMPLETE Date of Exam: 12/8/2023 9:50 AM CST Indication: r/o right adnexal mass. Comparison: CT abdomen and pelvis 12/7/2023 Technique: Transabdominal ultrasound evaluation of the pelvis was performed utilizing grayscale and color Doppler technique.  Doppler spectral analysis was performed. Transvaginal exam unable to be performed. Findings: The uterus measures 6.0 x 2.7 x 4.6 cm. The uterus is mildly heterogeneous without focal parenchymal abnormality. The endometrial stripe measures 0.3 mm. The right ovary measures 3.2 x 3.1 x 2.1 cm and the left ovary measures 2.5 x 1.8 x 1.9 cm. Ovarian vascularity appears intact.  There is no evidence of a solid ovarian mass. There is no significant free fluid identified within the pelvis. No definite adnexal abnormality noted however exam is limited due to bowel gas.     Impression: Impression: Limited exam without identifiable  adnexal mass on this study. Electronically Signed: Vy Gomez MD  12/8/2023 10:37 AM CST  Workstation ID: [id number]         Current medications:  Scheduled Meds:aspirin, 81 mg, Oral, Daily  levothyroxine, 125 mcg, Oral, QAM  ondansetron, 4 mg, Intravenous, Q6H  pantoprazole, 40 mg, Intravenous, Q12H  piperacillin-tazobactam, 3.375 g, Intravenous, Q8H  polyethylene glycol, 17 g, Oral, Daily  rosuvastatin, 5 mg, Oral, Daily  senna-docusate sodium, 2 tablet, Oral, BID  sodium chloride, 10 mL, Intravenous, Q12H  venlafaxine XR, 150 mg, Oral, Daily      Continuous Infusions:   PRN Meds:.  acetaminophen **OR** acetaminophen **OR** acetaminophen    ALPRAZolam    senna-docusate sodium **AND** polyethylene glycol **AND** bisacodyl **AND** bisacodyl    dicyclomine    HYDROmorphone **AND** naloxone    influenza vaccine    ipratropium-albuterol    melatonin    nitroglycerin    prochlorperazine    promethazine **OR** promethazine    sodium chloride    sodium chloride    sodium chloride    Assessment & Plan   Assessment & Plan     Active Hospital Problems    Diagnosis  POA    **Sepsis [A41.9]  Yes    Severe malnutrition [E43]  Yes    Acute UTI (urinary tract infection) [N39.0]  Unknown    Pancreatic mass [K86.89]  Unknown    Lung nodules [R91.8]  Unknown    Liver lesion [K76.9]  Unknown    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        Brief Hospital Course to date:  Jessica Kraft is a 63 y.o. female with hx of anxiety, depression, insomnia, hypothyroidism, osteopenia, Vit D deficiency and UTI who presents to the ED  for evaluation of abdominal pain with swelling and fever.  CT abdomen pelvis with contrast in ED showed a 2.4 cm pancreatic mass concerning for pancreatic adenocarcinoma, ascites, omental carcinomatosis, metastatic lesions involving the liver.  Malignancy diagnosis is new for the patient.  Workup so far has revealed a elevated  suggesting a potential ovarian primary however ultrasound of the pelvic region was unremarkable.  GI and oncology are following       Ascites, concern for malignancy  Pancreatic mass, concern for metastasis  Omental and peritoneal carcinomatosis  Liver lesions  Pulmonary nodules  - s/p paracentesis on 12/8, fluid studies still in process.  Preliminary data with elevated white blood cells, will continue Zosyn for now.    - GI consult, appreciate input.  Tentative plan for biopsy on Monday with EUS with FNB of pancreatic body mass, if felt appropriate by onc and gyn.   -  hem/onc consult, new diagnosis.  Tumor markers ordered.  So far  is positive.  Brain MRI negative for metastases.  Given elevated tumor marker, suspicion of ovarian primary remains on differential. Oncology has recommended gyn onc input before proceeding with biopsy. Keep NPO at midnight for now  - pain control  - nausea control    UTI  -covered w current abx. Cultures in process--growing E coli susceptible to zosyn. Cont current regimen until more culture data from para       Constipation--resolved  - likely r/t ascites  - bowel regimen.  Tolerating diet     GERD  Dysphagia  Hiatal hernia  - protonix BID  - s/p NG tube w decompression. Now removed  -tolerating diet. Much of GI symptoms on admission related to ascites and has since resolved from para w 4.2L removal     Anxiety   Depression  Insomnia    Expected Discharge Location and Transportation: home  Expected Discharge   Expected Discharge Date: 12/12/2023; Expected Discharge Time:      DVT prophylaxis:  Mechanical DVT prophylaxis orders are present.      AM-PAC 6 Clicks Score (PT): 24 (12/09/23 5264)    CODE STATUS:   Code Status and Medical Interventions:   Ordered at: 12/08/23 0204     Code Status (Patient has no pulse and is not breathing):    CPR (Attempt to Resuscitate)     Medical Interventions (Patient has pulse or is breathing):    Full Support       Emily Leach MD  12/10/23

## 2023-12-10 NOTE — PROGRESS NOTES
Saw patient this afternoon. Imaging most consistent with metastatic pancreatic cancer.  elevated. Agree with plan with biopsy of pancreatic mass with GI tomorrow. Full consult to follow.    Freya Camacho MD  Gyn Onc

## 2023-12-11 LAB
ANION GAP SERPL CALCULATED.3IONS-SCNC: 10 MMOL/L (ref 5–15)
BACTERIA FLD CULT: NORMAL
BASOPHILS # BLD AUTO: 0.05 10*3/MM3 (ref 0–0.2)
BASOPHILS NFR BLD AUTO: 0.6 % (ref 0–1.5)
BUN SERPL-MCNC: 13 MG/DL (ref 8–23)
BUN/CREAT SERPL: 18.6 (ref 7–25)
CALCIUM SPEC-SCNC: 8.6 MG/DL (ref 8.6–10.5)
CHLORIDE SERPL-SCNC: 103 MMOL/L (ref 98–107)
CO2 SERPL-SCNC: 26 MMOL/L (ref 22–29)
CREAT SERPL-MCNC: 0.7 MG/DL (ref 0.57–1)
DEPRECATED RDW RBC AUTO: 44.5 FL (ref 37–54)
EGFRCR SERPLBLD CKD-EPI 2021: 97.9 ML/MIN/1.73
EOSINOPHIL # BLD AUTO: 0.22 10*3/MM3 (ref 0–0.4)
EOSINOPHIL NFR BLD AUTO: 2.4 % (ref 0.3–6.2)
ERYTHROCYTE [DISTWIDTH] IN BLOOD BY AUTOMATED COUNT: 14.1 % (ref 12.3–15.4)
GLUCOSE SERPL-MCNC: 105 MG/DL (ref 65–99)
GRAM STN SPEC: NORMAL
GRAM STN SPEC: NORMAL
HCT VFR BLD AUTO: 35.6 % (ref 34–46.6)
HGB BLD-MCNC: 11.2 G/DL (ref 12–15.9)
IMM GRANULOCYTES # BLD AUTO: 0.1 10*3/MM3 (ref 0–0.05)
IMM GRANULOCYTES NFR BLD AUTO: 1.1 % (ref 0–0.5)
LYMPHOCYTES # BLD AUTO: 0.68 10*3/MM3 (ref 0.7–3.1)
LYMPHOCYTES NFR BLD AUTO: 7.6 % (ref 19.6–45.3)
MCH RBC QN AUTO: 27.2 PG (ref 26.6–33)
MCHC RBC AUTO-ENTMCNC: 31.5 G/DL (ref 31.5–35.7)
MCV RBC AUTO: 86.4 FL (ref 79–97)
MONOCYTES # BLD AUTO: 0.76 10*3/MM3 (ref 0.1–0.9)
MONOCYTES NFR BLD AUTO: 8.5 % (ref 5–12)
NEUTROPHILS NFR BLD AUTO: 7.17 10*3/MM3 (ref 1.7–7)
NEUTROPHILS NFR BLD AUTO: 79.8 % (ref 42.7–76)
NRBC BLD AUTO-RTO: 0 /100 WBC (ref 0–0.2)
PLATELET # BLD AUTO: 230 10*3/MM3 (ref 140–450)
PMV BLD AUTO: 9.9 FL (ref 6–12)
POTASSIUM SERPL-SCNC: 3.5 MMOL/L (ref 3.5–5.2)
POTASSIUM SERPL-SCNC: 4 MMOL/L (ref 3.5–5.2)
RBC # BLD AUTO: 4.12 10*6/MM3 (ref 3.77–5.28)
SODIUM SERPL-SCNC: 139 MMOL/L (ref 136–145)
WBC NRBC COR # BLD AUTO: 8.98 10*3/MM3 (ref 3.4–10.8)

## 2023-12-11 PROCEDURE — G0378 HOSPITAL OBSERVATION PER HR: HCPCS

## 2023-12-11 PROCEDURE — 25010000002 HYDROMORPHONE PER 4 MG: Performed by: INTERNAL MEDICINE

## 2023-12-11 PROCEDURE — 25010000002 PIPERACILLIN SOD-TAZOBACTAM PER 1 G

## 2023-12-11 PROCEDURE — 99232 SBSQ HOSP IP/OBS MODERATE 35: CPT | Performed by: PEDIATRICS

## 2023-12-11 PROCEDURE — 99213 OFFICE O/P EST LOW 20 MIN: CPT | Performed by: INTERNAL MEDICINE

## 2023-12-11 PROCEDURE — 85025 COMPLETE CBC W/AUTO DIFF WBC: CPT | Performed by: STUDENT IN AN ORGANIZED HEALTH CARE EDUCATION/TRAINING PROGRAM

## 2023-12-11 PROCEDURE — 84132 ASSAY OF SERUM POTASSIUM: CPT | Performed by: PEDIATRICS

## 2023-12-11 PROCEDURE — 80048 BASIC METABOLIC PNL TOTAL CA: CPT | Performed by: STUDENT IN AN ORGANIZED HEALTH CARE EDUCATION/TRAINING PROGRAM

## 2023-12-11 RX ORDER — POTASSIUM CHLORIDE 20 MEQ/1
40 TABLET, EXTENDED RELEASE ORAL EVERY 4 HOURS
Status: COMPLETED | OUTPATIENT
Start: 2023-12-11 | End: 2023-12-11

## 2023-12-11 RX ADMIN — ALPRAZOLAM 1 MG: 1 TABLET ORAL at 21:23

## 2023-12-11 RX ADMIN — SENNOSIDES AND DOCUSATE SODIUM 2 TABLET: 8.6; 5 TABLET ORAL at 21:23

## 2023-12-11 RX ADMIN — ASPIRIN 81 MG CHEWABLE TABLET 81 MG: 81 TABLET CHEWABLE at 08:59

## 2023-12-11 RX ADMIN — POTASSIUM CHLORIDE 40 MEQ: 1500 TABLET, EXTENDED RELEASE ORAL at 12:11

## 2023-12-11 RX ADMIN — PIPERACILLIN SODIUM AND TAZOBACTAM SODIUM 3.38 G: 3; .375 INJECTION, SOLUTION INTRAVENOUS at 15:03

## 2023-12-11 RX ADMIN — Medication 10 ML: at 09:01

## 2023-12-11 RX ADMIN — PIPERACILLIN SODIUM AND TAZOBACTAM SODIUM 3.38 G: 3; .375 INJECTION, SOLUTION INTRAVENOUS at 21:22

## 2023-12-11 RX ADMIN — VENLAFAXINE HYDROCHLORIDE 150 MG: 75 CAPSULE, EXTENDED RELEASE ORAL at 08:59

## 2023-12-11 RX ADMIN — PANTOPRAZOLE SODIUM 40 MG: 40 INJECTION, POWDER, LYOPHILIZED, FOR SOLUTION INTRAVENOUS at 21:22

## 2023-12-11 RX ADMIN — ROSUVASTATIN 5 MG: 10 TABLET, FILM COATED ORAL at 09:00

## 2023-12-11 RX ADMIN — HYDROMORPHONE HYDROCHLORIDE 0.5 MG: 1 INJECTION, SOLUTION INTRAMUSCULAR; INTRAVENOUS; SUBCUTANEOUS at 21:27

## 2023-12-11 RX ADMIN — POTASSIUM CHLORIDE 40 MEQ: 1500 TABLET, EXTENDED RELEASE ORAL at 08:59

## 2023-12-11 RX ADMIN — Medication 10 ML: at 21:23

## 2023-12-11 RX ADMIN — PANTOPRAZOLE SODIUM 40 MG: 40 INJECTION, POWDER, LYOPHILIZED, FOR SOLUTION INTRAVENOUS at 09:00

## 2023-12-11 RX ADMIN — PIPERACILLIN SODIUM AND TAZOBACTAM SODIUM 3.38 G: 3; .375 INJECTION, SOLUTION INTRAVENOUS at 05:40

## 2023-12-11 NOTE — PROGRESS NOTES
[facility]  PROGRESS NOTE    Patient Name: Jessica Kraft  : 1960  MRN: [id number]    Date of Admission: 2023  Primary Care Physician: Leonarda Díaz MD    Subjective   Subjective     CC:  F/u abd pain, new malignancy diagnosis    HPI:  Overnight she did well.  Does continue to have some fullness in her belly, and she feels that some of the fluid is reaccumulating.  Otherwise she is doing well.    Spoke with Dr. Camacho this morning in terms of location of biopsy and she will would like to evaluate the possibility of getting a biopsy of the omentum.      Objective   Objective     Vital Signs:   Temp:  [97 °F (36.1 °C)-98.4 °F (36.9 °C)] 98.1 °F (36.7 °C)  Heart Rate:  [72-93] 72  Resp:  [18-20] 18  BP: (127-150)/(78-94) 146/85     Physical Exam:  Constitutional: No acute distress, awake, alert  HENT: NCAT, mucous membranes moist  Respiratory: Clear to auscultation bilaterally, respiratory effort normal   Cardiovascular: RRR, no murmurs, rubs, or gallops  Gastrointestinal: Positive bowel sounds, soft, very slight tenderness and some fullness, nondistended  Musculoskeletal: No bilateral ankle edema  Psychiatric: Appropriate affect, cooperative  Neurologic: Oriented x 3, strength symmetric in all extremities, Cranial Nerves grossly intact to confrontation, speech clear  Skin: No rashes      Results Reviewed:  LAB RESULTS:      Lab 23  0332 12/10/23  0518 23  0321 23  2311 23  1959 23  1142   WBC 8.98 7.32 14.64*  --  23.04* 7.66   HEMOGLOBIN 11.2* 11.1* 14.2  --  14.6 12.2   HEMATOCRIT 35.6 35.2 46.2  --  47.3* 39.7   PLATELETS 230 230 469*  --  468* 282   NEUTROS ABS 7.17* 5.78 12.00*  --  20.35* 5.96   IMMATURE GRANS (ABS) 0.10* 0.05  --   --  0.09* 0.04   LYMPHS ABS 0.68* 0.63*  --   --  0.66* 0.70   MONOS ABS 0.76 0.65  --   --  1.88* 0.64   EOS ABS 0.22 0.17 0.00  --  0.01 0.26   MCV 86.4 86.5 87.5  --  88.6 89.4    PROCALCITONIN  --   --   --   --  0.49*  --    LACTATE  --   --   --  1.3 2.3*  --    PROTIME  --   --   --   --  14.6*  --          Lab 12/11/23 0332 12/10/23  0518 12/09/23  0640 12/08/23 0321 12/07/23 2157 12/07/23 1959   SODIUM 139 135* 139 135*  --  137   POTASSIUM 3.5 3.2* 4.0 4.8  --  4.1   CHLORIDE 103 100 104 100  --  101   CO2 26.0 27.0 25.0 21.0*  --  21.0*   ANION GAP 10.0 8.0 10.0 14.0  --  15.0   BUN 13 15 21 24*  --  21   CREATININE 0.70 0.76 0.74 1.00  --  0.92   EGFR 97.9 88.7 91.6 63.8  --  70.5   GLUCOSE 105* 99 116* 164*  --  163*   CALCIUM 8.6 8.7 8.7 9.2  --  9.3   MAGNESIUM  --   --   --  2.3  --   --    PHOSPHORUS  --   --   --  4.5  --   --    HEMOGLOBIN A1C  --   --   --   --   --  5.70*   TSH  --   --   --   --  1.730  --          Lab 12/08/23 0321 12/07/23 1959 12/06/23  1142   TOTAL PROTEIN 7.4 8.0 6.7   ALBUMIN 3.7 4.0 3.5   GLOBULIN 3.7 4.0 3.2   ALT (SGPT) 15 11 7   AST (SGOT) 26 21 13   BILIRUBIN 0.6 0.6 0.3   ALK PHOS 137* 149* 118*   LIPASE  --  9* 14         Lab 12/07/23 2157 12/07/23 1959   PROBNP  --  107.5   HSTROP T 20* 30*   PROTIME  --  14.6*   INR  --  1.12                 Lab 12/08/23 0332   PH, ARTERIAL 7.379   PCO2, ARTERIAL 35.0   PO2 ART 80.8*   FIO2 36   HCO3 ART 20.7   BASE EXCESS ART -3.8*   CARBOXYHEMOGLOBIN 1.3     Brief Urine Lab Results  (Last result in the past 365 days)        Color   Clarity   Blood   Leuk Est   Nitrite   Protein   CREAT   Urine HCG        12/07/23 2157 Jones   Cloudy   Negative   Small (1+)   Positive   30 mg/dL (1+)                   Microbiology Results Abnormal       Procedure Component Value - Date/Time    Anaerobic Culture - Body Fluid, Peritoneum [[id number]]  (Normal) Collected: 12/08/23 1036    Lab Status: Preliminary result Specimen: Body Fluid from Peritoneum Updated: 12/11/23 0944     Anaerobic Culture No anaerobes isolated at 3 days    Body Fluid Culture - Body Fluid, Peritoneum [[id number]] Collected: 12/08/23 1036     Lab Status: Final result Specimen: Body Fluid from Peritoneum Updated: 12/11/23 0817     Body Fluid Culture No growth at 3 days     Gram Stain Rare (1+) WBCs seen      No organisms seen    Blood Culture - Blood, Arm, Left [[id number]]  (Normal) Collected: 12/07/23 2042    Lab Status: Preliminary result Specimen: Blood from Arm, Left Updated: 12/10/23 2246     Blood Culture No growth at 3 days    Blood Culture - Blood, Arm, Right [[id number]]  (Normal) Collected: 12/07/23 2042    Lab Status: Preliminary result Specimen: Blood from Arm, Right Updated: 12/10/23 2246     Blood Culture No growth at 3 days    Respiratory Panel PCR w/COVID-19(SARS-CoV-2) [place] In-House, NP Swab in UTM/VTM, 2 HR TAT - Swab, Nasopharynx [[id number]]  (Normal) Collected: 12/08/23 0259    Lab Status: Final result Specimen: Swab from Nasopharynx Updated: 12/08/23 0427     ADENOVIRUS, PCR Not Detected     Coronavirus 229E Not Detected     Coronavirus HKU1 Not Detected     Coronavirus NL63 Not Detected     Coronavirus OC43 Not Detected     COVID19 Not Detected     Human Metapneumovirus Not Detected     Human Rhinovirus/Enterovirus Not Detected     Influenza A PCR Not Detected     Influenza B PCR Not Detected     Parainfluenza Virus 1 Not Detected     Parainfluenza Virus 2 Not Detected     Parainfluenza Virus 3 Not Detected     Parainfluenza Virus 4 Not Detected     RSV, PCR Not Detected     Bordetella pertussis pcr Not Detected     Bordetella parapertussis PCR Not Detected     Chlamydophila pneumoniae PCR Not Detected     Mycoplasma pneumo by PCR Not Detected    Narrative:      In the setting of a positive respiratory panel with a viral infection PLUS a negative procalcitonin without other underlying concern for bacterial infection, consider observing off antibiotics or discontinuation of antibiotics and continue supportive care. If the respiratory panel is positive for atypical bacterial infection (Bordetella pertussis,  Chlamydophila pneumoniae, or Mycoplasma pneumoniae), consider antibiotic de-escalation to target atypical bacterial infection.            No radiology results from the last 24 hrs        Current medications:  Scheduled Meds:aspirin, 81 mg, Oral, Daily  levothyroxine, 125 mcg, Oral, QAM  pantoprazole, 40 mg, Intravenous, Q12H  piperacillin-tazobactam, 3.375 g, Intravenous, Q8H  polyethylene glycol, 17 g, Oral, Daily  potassium chloride ER, 40 mEq, Oral, Q4H  rosuvastatin, 5 mg, Oral, Daily  senna-docusate sodium, 2 tablet, Oral, BID  sodium chloride, 10 mL, Intravenous, Q12H  venlafaxine XR, 150 mg, Oral, Daily      Continuous Infusions:   PRN Meds:.  acetaminophen **OR** acetaminophen **OR** acetaminophen    ALPRAZolam    senna-docusate sodium **AND** polyethylene glycol **AND** bisacodyl **AND** bisacodyl    dicyclomine    HYDROmorphone **AND** naloxone    influenza vaccine    ipratropium-albuterol    melatonin    nitroglycerin    Potassium Replacement - Follow Nurse / BPA Driven Protocol    prochlorperazine    promethazine **OR** promethazine    sodium chloride    sodium chloride    sodium chloride    Assessment & Plan   Assessment & Plan     Active Hospital Problems    Diagnosis  POA    **Sepsis [A41.9]  Yes    Severe malnutrition [E43]  Yes    Acute UTI (urinary tract infection) [N39.0]  Unknown    Pancreatic mass [K86.89]  Unknown    Lung nodules [R91.8]  Unknown    Liver lesion [K76.9]  Unknown    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        Brief Hospital Course to date:  Jessica Kraft is a 63 y.o.  female with hx of anxiety, depression, insomnia, hypothyroidism, osteopenia, Vit D deficiency and UTI who presents to the ED for evaluation of abdominal pain with swelling and fever.  CT abdomen pelvis with contrast in ED showed a 2.4 cm pancreatic mass concerning for pancreatic adenocarcinoma, ascites, omental carcinomatosis, metastatic lesions involving the liver.  Malignancy diagnosis is new for the patient.  Workup so far has revealed a elevated  suggesting a potential ovarian primary however ultrasound of the pelvic region was unremarkable.  GI and oncology are following       Ascites, concern for malignancy  Pancreatic mass, concern for metastasis  Omental and peritoneal carcinomatosis  Liver lesions  Pulmonary nodules  - s/p paracentesis on 12/8, fluid studies still in process.  Preliminary data with elevated white blood cells, will continue Zosyn for now.    - GI consult, appreciate input.  Holding off on pancreatic bx at this time.  Plan for an omental biopsy early this week, timing is still pending.    -  hem/onc consult, new diagnosis.  Tumor markers ordered.  So far  is positive.  Brain MRI negative for metastases.  Given elevated tumor marker, suspicion of ovarian primary remains on differential. Appreciate Gyn Onc input.  - pain control  - nausea control  --stop zosyn    UTI--E.coli  --completed treatment.        Constipation--resolved  - likely r/t ascites  - bowel regimen.  Tolerating diet     GERD  Dysphagia  Hiatal hernia  - protonix BID  - s/p NG tube w decompression. Now removed  -tolerating diet. Much of GI symptoms on admission related to ascites and has since resolved from para w 4.2L removal     Anxiety   Depression  Insomnia    Expected Discharge Location and Transportation: home  Expected Discharge   Expected Discharge Date: 12/13/2023; Expected Discharge Time:      DVT prophylaxis:  Mechanical DVT prophylaxis orders are present.     AM-PAC 6 Clicks Score (PT): 24 (12/11/23  0800)    CODE STATUS:   Code Status and Medical Interventions:   Ordered at: 12/08/23 0205     Code Status (Patient has no pulse and is not breathing):    CPR (Attempt to Resuscitate)     Medical Interventions (Patient has pulse or is breathing):    Full Support       Princess Aguirre MD  12/11/23

## 2023-12-11 NOTE — CASE MANAGEMENT/SOCIAL WORK
"Continued Stay Note  [facility]     Patient Name: Jessica Kraft  MRN: [id number]  Today's Date: 12/11/2023    Admit Date: 12/7/2023    Plan: Home   Discharge Plan       Row Name 12/11/23 5867       Plan    Plan Home    Plan Comments Per MDR, Heme/Onc has been consulted r/t \"CT scan findings look most consistent with a metastatic pancreatic cancer\".  Per Provider note, \"Plan for Pancreatic Biopsy soon\".  CM will cont to follow the evolving plan of care and assist with discharge planning as recommendations become available.    Final Discharge Disposition Code 01 - home or self-care                   Discharge Codes    No documentation.                 Expected Discharge Date and Time       Expected Discharge Date Expected Discharge Time    Dec 13, 2023               Amy Abrams RN    "

## 2023-12-11 NOTE — PROGRESS NOTES
"GI Daily Progress Note  Subjective:    Chief Complaint: Abdominal pain    Patient resting this evening in chair at bedside.  She denies any nausea or vomiting.  No bowel movements today.  Reports that she feels like her abdominal distention is increasing.    Objective:    /83 (BP Location: Right arm, Patient Position: Lying)   Pulse 91   Temp 98.1 °F (36.7 °C) (Oral)   Resp 18   Ht 167.6 cm (66\")   Wt 93 kg (205 lb)   LMP  (LMP Unknown) Comment: N/A  SpO2 93%   BMI 33.09 kg/m²     Physical Exam  Physical Exam:   General: Patient awake, alert and cooperative   Skin: Warm and dry, not jaundiced   Pulm: Equal expansion bilaterally, no increased WOB   Abdomen: Mildly distended              Neuro: A&O, No obvious sign of focal deficit   Psychiatric: Normal mood and behavior; memory intact    Lab  Lab Results   Component Value Date    WBC 8.98 12/11/2023    HGB 11.2 (L) 12/11/2023    HGB 11.1 (L) 12/10/2023    HGB 14.2 12/08/2023    MCV 86.4 12/11/2023     12/11/2023    INR 1.12 12/07/2023       Lab Results   Component Value Date    GLUCOSE 105 (H) 12/11/2023    BUN 13 12/11/2023    CREATININE 0.70 12/11/2023    EGFRIFNONA 85 08/22/2016    EGFRIFAFRI 98 08/22/2016    BCR 18.6 12/11/2023     12/11/2023    K 4.0 12/11/2023    CO2 26.0 12/11/2023    CALCIUM 8.6 12/11/2023    PROTENTOTREF 6.6 08/22/2016    ALBUMIN 3.7 12/08/2023    ALKPHOS 137 (H) 12/08/2023    BILITOT 0.6 12/08/2023    ALT 15 12/08/2023    AST 26 12/08/2023       Assessment:    Abdominal pain  Pancreatic mass in the body of the pancreas with vascular involvement  Ascites  Image findings concerning for peritoneal and omental carcinomatosis  Pulmonary nodules  Hiatal hernia, large  Leukocytosis  Elevated     Plan:  -Overall constellation of signs/symptoms and image findings concerning for metastatic malignancy with possible pancreatic primary however noted  is elevated while CA 19-9 is normal, CEA also normal  -Ascites is " highly suspicious for malignant etiology.  4.2 L removed 12/9.  Cell count notable for 830 white blood cells and 29% neutrophils (240) consistent with bacterial peritonitis.  SAAG less than 1.1 with elevated total protein, no growth from fluid culture today, cytology pending  -EUS with FNB of pancreatic body mass can be considered following ascitic fluid analysis and/or omental biopsy with IR.    -Started daily MiraLAX as bowel regimen  -Antiemetics as needed    I discussed plan with patient and family at bedside this evening and discussed with consulting physician.    Flakito Abrams MD  12/11/23  18:47 EST

## 2023-12-11 NOTE — NURSING NOTE
"PT is A/O x 4 on RA. She is improving AEB being on room air, VSS, and she had a shower today and \"feels better.\" She has had no c/o Nausea/Vomiting and I haven't administered any Zofran this shift. Her diet is Regular/House diet and her appetite is good. PT is aware that she has an EGD scheduled in the morning and is NPO at midnite. Family is visiting and supportive of PT. No acute events today. PT verbalizes no needs at this time, bed low, call light in reach.     "

## 2023-12-11 NOTE — PLAN OF CARE
Goal Outcome Evaluation:  Plan of Care Reviewed With: patient, spouse           Outcome Evaluation: PT is A&O x4, VSS on Rm air, No complaints of N/V this shift. EGD cancelled per GI MD, patient aware. Regular diet ordered, tolerated food. Family at bedside, no acute events this shift, call light within reach. Continue POC.

## 2023-12-11 NOTE — CONSULTS
Gynecologic Oncology Admission H&P     Patient Name: Jessica Kraft  : 1960   MRN: [id number]     Reason for Consultation: ? ovarian peritoneal cancer, ascites cytology pending    History of Present Illness: Jessica Kraft is a 63 y.o. female who presents with a several month history of abdominal pain and distension. She was seen in the ED last week and had a scan showing a pancreatic tumor and ascites. She was initially discharged home but represented to the ED with complaints of fevers, pain and decreased bowel movements. She has been clinically improving from a sepsis standpoint. She underwent a paracentesis where 4L of fluid was removed. Her abdominal pain is better and she is now tolerating a regular diet.     During the workup for her likely advanced malignancy, tumor markers were performed and showed an elevated CA-125. I was then consulted for evaluation.    Oncologic History:  Oncology/Hematology History    No history exists.        Past Medical History:   Past Medical History:   Diagnosis Date    Abscess     Anxiety     Bilateral ovarian cysts     Depression     Encounter for routine gynecological examination     Foot pain     H/O bone density study 2014    H/O mammogram 2016    [facility]    Hot flashes, menopausal     Hypothyroidism due to Hashimoto's thyroiditis     Insomnia     Miscarriage     x3    Osteopenia     Pap smear for cervical cancer screening 2014    Routine general medical examination at a health care facility     Urinary incontinence     Urinary tract infection     Vitamin D deficiency        Past Surgical History:   Past Surgical History:   Procedure Laterality Date    ABDOMINOPLASTY      CHOLECYSTECTOMY      COLONOSCOPY  2012    [facility] Gaines in [facility] normal    ECTOPIC PREGNANCY SURGERY Left 1994    HERNIA REPAIR         Family History:   Family History   Problem Relation Age of Onset    Mental illness Mother     Depression Mother     Thyroid  disease Mother     Hypertension Father     Thyroid disease Sister     Breast cancer Other     Breast cancer Maternal Aunt        Social History:   Social History     Socioeconomic History    Marital status:    Tobacco Use    Smoking status: Never    Smokeless tobacco: Never   Vaping Use    Vaping Use: Never used   Substance and Sexual Activity    Alcohol use: No    Drug use: No    Sexual activity: Yes     Partners: Male     Birth control/protection: None, Post-menopausal     Comment:        OB History:   OB History   No obstetric history on file.        Medications:     Current Facility-Administered Medications:     acetaminophen (TYLENOL) tablet 650 mg, 650 mg, Oral, Q4H PRN **OR** acetaminophen (TYLENOL) 160 MG/5ML oral solution 650 mg, 650 mg, Oral, Q4H PRN **OR** acetaminophen (TYLENOL) suppository 650 mg, 650 mg, Rectal, Q4H PRN, Danielle Loera G, DO    ALPRAZolam (XANAX) tablet 1 mg, 1 mg, Oral, Nightly PRN, Emily Leach MD, 1 mg at 12/10/23 2047    aspirin chewable tablet 81 mg, 81 mg, Oral, Daily, Jen Lucas, APRN, 81 mg at 12/10/23 0913    sennosides-docusate (PERICOLACE) 8.6-50 MG per tablet 2 tablet, 2 tablet, Oral, BID, 2 tablet at 12/10/23 2042 **AND** polyethylene glycol (MIRALAX) packet 17 g, 17 g, Oral, Daily PRN **AND** bisacodyl (DULCOLAX) EC tablet 5 mg, 5 mg, Oral, Daily PRN **AND** bisacodyl (DULCOLAX) suppository 10 mg, 10 mg, Rectal, Daily PRN, Danielle Loera G, DO    dicyclomine (BENTYL) tablet 20 mg, 20 mg, Oral, Q8H PRN, Jen Lucas, APRN    HYDROmorphone (DILAUDID) injection 0.5 mg, 0.5 mg, Intravenous, Q2H PRN **AND** naloxone (NARCAN) injection 0.4 mg, 0.4 mg, Intravenous, Q5 Min PRN, Evaristo, Danielle G, DO    influenza vac split quad (FLUZONE,FLUARIX,AFLURIA,FLULAVAL) injection 0.5 mL, 0.5 mL, Intramuscular, During Hospitalization, Danielle Loera DO    ipratropium-albuterol (DUO-NEB) nebulizer solution 3 mL, 3 mL, Nebulization, Q4H PRN, Emily Leach MD     levothyroxine (SYNTHROID, LEVOTHROID) tablet 125 mcg, 125 mcg, Oral, QAM, Emily Leach MD, 125 mcg at 12/10/23 0600    melatonin tablet 5 mg, 5 mg, Oral, Nightly PRN, Evaristo, Danielle G, DO    nitroglycerin (NITROSTAT) SL tablet 0.4 mg, 0.4 mg, Sublingual, Q5 Min PRN, Evaristo, Danielle G, DO    pantoprazole (PROTONIX) injection 40 mg, 40 mg, Intravenous, Q12H, Jen Lucas, APRN, 40 mg at 12/10/23 2042    piperacillin-tazobactam (ZOSYN) 3.375 g in iso-osmotic dextrose 50 ml (premix), 3.375 g, Intravenous, Q8H, Delisa Lynne, [facility], Last Rate: 0 mL/hr at 12/09/23 2135, 3.375 g at 12/11/23 0540    polyethylene glycol (MIRALAX) packet 17 g, 17 g, Oral, Daily, Flakito Abrams MD, 17 g at 12/10/23 0913    Potassium Replacement - Follow Nurse / BPA Driven Protocol, , Does not apply, PRN, Emily Leach MD    prochlorperazine (COMPAZINE) injection 2.5 mg, 2.5 mg, Intravenous, Q6H PRN, Marianne Loerasie G, DO    promethazine (PHENERGAN) tablet 6.25 mg, 6.25 mg, Oral, Q6H PRN **OR** promethazine (PHENERGAN) suppository 6.25 mg, 6.25 mg, Rectal, Q6H PRN, Emily Leach MD    rosuvastatin (CRESTOR) tablet 5 mg, 5 mg, Oral, Daily, Jen Lucas, APRN, 5 mg at 12/10/23 0913    sodium chloride 0.9 % flush 10 mL, 10 mL, Intravenous, PRN, Evaristo, Danielle G, DO    sodium chloride 0.9 % flush 10 mL, 10 mL, Intravenous, Q12H, Evaritso, Danielle G, DO, 10 mL at 12/10/23 2044    sodium chloride 0.9 % flush 10 mL, 10 mL, Intravenous, PRN, Evaristo, Danielle G, DO    sodium chloride 0.9 % infusion 40 mL, 40 mL, Intravenous, PRN, Evaristo, Danielle G, DO    venlafaxine XR (EFFEXOR-XR) 24 hr capsule 150 mg, 150 mg, Oral, Daily, Jen Lucas, APRN, 150 mg at 12/10/23 0913    Allergies:   No Known Allergies      Objective   Physical Exam:  Vital Signs:   Vitals:    12/10/23 1650 12/10/23 2001 12/10/23 2352 12/11/23 0432   BP: 137/85 148/78 142/83 150/81   BP Location: Right arm Right arm Right arm Right arm   Patient Position: Lying Lying Lying Lying   Pulse:  89  93 89   Resp: 20 18 18 18   Temp: 98.2 °F (36.8 °C) 98.4 °F (36.9 °C) 98.2 °F (36.8 °C) 98.1 °F (36.7 °C)   TempSrc: Oral Oral Oral Oral   SpO2:  94% 95% 93%   Weight:    93 kg (205 lb)   Height:         BMI: Body mass index is 33.09 kg/m².   Weight:   Wt Readings from Last 3 Encounters:   12/11/23 93 kg (205 lb)   12/07/23 97.5 kg (215 lb)   12/06/23 97.1 kg (214 lb)       ECOG PS: 2    Physical Examination:   General appearance - alert, well appearing, and in no distress and oriented to person, place, and time  Mental status - normal mood, behavior, speech, dress, motor activity, and thought processes  Eyes - sclera anicteric, left eye normal, right eye normal, no scleral icterus  Ears - right ear normal, left ear normal  Lymphatics - no palpable lymphadenopathy, no hepatosplenomegaly  Lungs - normal respiratory effort, clear to auscultation bilaterally  Heart - normal rate, regular rhythm, normal S1, S2, no murmurs, rubs, clicks or gallops  Abdomen - obese, mildly tender, nondistended, no masses or organomegaly  Neurological - alert, oriented, normal speech, no focal findings or movement disorder noted  Musculoskeletal - no joint tenderness, deformity or swelling  Extremities - peripheral pulses normal, no pedal edema, no clubbing or cyanosis  Skin - normal coloration and turgor, no rashes, no suspicious skin lesions noted     Results:   Lab Results   Component Value Date    WBC 8.98 12/11/2023    HGB 11.2 (L) 12/11/2023    HCT 35.6 12/11/2023    MCV 86.4 12/11/2023     12/11/2023       Lab Results   Component Value Date    GLUCOSE 105 (H) 12/11/2023    BUN 13 12/11/2023    CREATININE 0.70 12/11/2023    EGFR 97.9 12/11/2023    BCR 18.6 12/11/2023    K 3.5 12/11/2023    CO2 26.0 12/11/2023    CALCIUM 8.6 12/11/2023    PROTENTOTREF 6.6 08/22/2016    ALBUMIN 3.7 12/08/2023    BILITOT 0.6 12/08/2023    AST 26 12/08/2023    ALT 15 12/08/2023         Date Value Ref Range Status   12/08/2023 323.0 (H) 0.0 -  38.1 U/mL Final     CA 19-9   Date Value Ref Range Status   12/08/2023 6.7 <=35.0 U/mL Final     CEA   Date Value Ref Range Status   12/08/2023 5.75 ng/mL Final         Imaging:  MRI Brain With & Without Contrast    Result Date: 12/9/2023  MRI BRAIN W WO CONTRAST Date of Exam: 12/9/2023 3:51 AM EST Indication: metastatic cancer staging.  Comparison: None available. Technique:  Routine multiplanar/multisequence sequence images of the brain were obtained before and after the uneventful administration of 20 mL Multihance. Findings: There is no diffusion restriction to suggest acute infarct. There is no evidence of acute or chronic intracranial hemorrhage.  No mass effect or midline shift. No abnormal extra-axial collections. There are numerous scattered small rounded and punctate foci of FLAIR hyperintense signal within the cerebral white matter. The major vascular flow voids appear intact.  The basal ganglia, brainstem and cerebellum appear within normal limits.  Calvarial and superficial soft tissue signal is within normal limits. There is asymmetric thinning of the right orbital lens suggesting prior cataract surgery. The paranasal sinuses and the mastoid air cells appear well aerated.  Midline structures are intact.  There is no abnormal intracranial enhancement.  There is normal enhancement within the intracranial vasculature including the dural venous sinuses.     Impression: 1.No acute intracranial abnormality. No evidence of intracranial metastatic disease. 2.Mild chronic small vessel ischemic change. Electronically Signed: Ildefonso Newman MD  12/9/2023 5:05 AM EST  Workstation ID: [id number]    XR Orbits 4+ View    Result Date: 12/9/2023  XR ORBITS 4+ VW Date of Exam: 12/9/2023 3:27 AM EST Indication: mri clearance Comparison: None available. Findings: Orbits appear within normal limits. There is metallic dental restoration hardware. No unexpected metal artifact. Facial bones appear grossly intact.      Impression: No contraindication to MRI. Electronically Signed: Ildefonso Newman MD  12/9/2023 3:45 AM EST  Workstation ID: [id number]    US Paracentesis    Result Date: 12/8/2023  DATE OF EXAM: 12/8/2023 8:59 AM EST PROCEDURE: US PARACENTESIS INDICATIONS: large volume ascites COMPARISON: No Comparisons Available FLUOROSCOPIC TIME: None PHYSICIAN MONITORED CONSCIOUS SEDATION TIME: None minutes TECHNIQUE: A detailed explanation of the procedure, including the risks, benefits, and alternatives was provided. A preprocedure timeout was performed. The interventional radiology nurse monitored the patient at all times. Limited ultrasound of the abdomen demonstrated a moderate amount of ascites. An appropriate access site was selected and the area was prepped and draped in the usual sterile fashion. The skin was anesthetized with 1% lidocaine and a small skin incision was made. Next, a catheter was advanced into the ascitic fluid. A total of 4.2 L of clear fluid was removed and the catheter was removed. The patient tolerated the procedure well without immediate complication. FINDINGS: See above     1. Successful right lower quadrant paracentesis yielding 4.2 L of clear fluid Electronically Signed: Hesham Eastman MD  12/8/2023 12:41 PM EST  Workstation ID: [id number]    US Pelvis Complete    Result Date: 12/8/2023  US PELVIS COMPLETE Date of Exam: 12/8/2023 9:50 AM CST Indication: r/o right adnexal mass. Comparison: CT abdomen and pelvis 12/7/2023 Technique: Transabdominal ultrasound evaluation of the pelvis was performed utilizing grayscale and color Doppler technique.  Doppler spectral analysis was performed. Transvaginal exam unable to be performed. Findings: The uterus measures 6.0 x 2.7 x 4.6 cm. The uterus is mildly heterogeneous without focal parenchymal abnormality. The endometrial stripe measures 0.3 mm. The right ovary measures 3.2 x 3.1 x 2.1 cm and the left ovary measures 2.5 x 1.8 x 1.9 cm. Ovarian vascularity  appears intact.  There is no evidence of a solid ovarian mass. There is no significant free fluid identified within the pelvis. No definite adnexal abnormality noted however exam is limited due to bowel gas.     Impression: Limited exam without identifiable adnexal mass on this study. Electronically Signed: Vy Gomez MD  12/8/2023 10:37 AM CST  Workstation ID: [id number]    CT Abdomen Pelvis With Contrast    Result Date: 12/7/2023  CT ABDOMEN PELVIS W CONTRAST Date of Exam: 12/7/2023 9:26 PM EST Indication: worsening abdominal pain and swelling. Comparison: CT abdomen pelvis 12/6/2023 Technique: Axial CT images were obtained of the abdomen and pelvis following the uneventful intravenous administration of 90 mL Isovue 370. Reconstructed coronal and sagittal images were also obtained. Automated exposure control and iterative construction methods were used. Findings: Please see separate dictated chest CT for findings above the diaphragm. Indeterminate right hepatic lobe lesion measuring 9 mm (5/41). Left hepatic lobe lesion too small to characterize measuring 8 mm (5/32). The spleen is normal in size. Gallbladder absent. The adrenal glands are normal. There is atrophy of the pancreatic tail with dilatation of the main pancreatic duct which is dilated up to 8 mm (2/27). There is abrupt termination of the main pancreatic duct at the mid pancreatic body with hypoenhancing pancreatic mass measuring approximately 2.4 x 2.3 cm (2/47). Abnormal soft tissue extends from this mass proximally to the level of the celiac axis bifurcation encasing splenic artery and common hepatic artery (2/46). There is severe short segment narrowing of the portal vein (2/50) with portal vein encasement. Splenic vein is poorly visualized but suspected narrowed in the region of the mass. The SMV is patent. No abutment of the superior mesenteric artery. Kidneys are symmetrically enhancing. Negative for hydronephrosis or hydroureter. Urinary  bladder is collapsed. Uterus and left adnexa are without acute abnormality. Nodularity adjacent to the right ovary which appears secondary to collapsed adjacent bowel loops (2/124). Large hiatal hernia. Large volume ascites. There is diffuse infiltrative appearance of the omentum most consistent with peritoneal and omental carcinomatosis. Multiple areas of peritoneal nodularity noted for example in the right lower quadrant peritoneal nodule measuring 8 mm (2/136). Peritoneal nodularity within the left lower pelvis (2/149).. Nodularity involving the anterior abdominal wall which measures 1.8 x 1.8 cm (2/105) concerning for metastatic peritoneal involvement extending to the rectus muscle on the right. Scattered irregular areas of nodularity are noted in the mesentery concerning for metastasis for example nodule measuring 1.3 x 1.3 cm likely carcinomatosis and/or metastatic adenopathy (2/112). Gastrohepatic ligament node measures up to 10 mm (2/27). There is fluid-filled distention of the colon. Colonic diverticulosis without diverticulitis. The appendix is nonvisualized. Abdominal aortic branch vasculature patent. No aggressive osseous lesion or acute fracture. Delayed images demonstrate normal contrast excretion into the nondilated kidneys. Normal filling of the bladder. No acute fracture or aggressive osseous lesion.     Impression: 1. Mass involving the mid body of pancreas measuring 2.4 cm highly concerning for pancreatic adenocarcinoma. This results in severe narrowing of the portal vein with encasement of the celiac axis bifurcation as above. 2. Peritoneal and omental carcinomatosis with large volume ascites. 3. Several small indeterminate low-attenuation liver lesions which could relate to metastasis, nonspecific, small size limits assessment. These could be better assessed with MRI follow-up if clinically indicated. 4. Nodularity adjacent to right ovary appears secondary to adjacent bowel loops which are  underdistended, follow-up pelvic ultrasound may be helpful to exclude right adnexal mass as large ascites limits assessment. 5. See separately dictated chest CT for findings above the diaphragm. Electronically Signed: Fransico Ramírez MD  12/7/2023 10:52 PM EST  Workstation ID: [id number]    CT Angiogram Chest Pulmonary Embolism    Result Date: 12/7/2023  CT ANGIOGRAM CHEST PULMONARY EMBOLISM Date of Exam: 12/7/2023 9:26 PM EST Indication: soa. Comparison: Chest radiograph 12/7/2023, CT abdomen pelvis 12/6/2023 Technique: Axial CT images were obtained of the chest after the uneventful intravenous administration of 90 mL Isovue 370 utilizing pulmonary embolism protocol.  Reconstructed coronal and sagittal images were also obtained. Automated exposure control and  iterative construction methods were used. Findings: Soft tissue of the lower neck are without acute abnormality. The aortic arch branch vasculature is patent. There is fluid-filled distention of the esophagus. There is a large hiatal hernia again noted. Heart size normal. Pulmonary arteries are well-opacified with contrast. Negative for pulmonary embolus. No mediastinal or hilar adenopathy. No axillary adenopathy. Small bilateral pleural effusions with bibasilar atelectasis. Trachea and mainstem bronchi are patent. No pneumothorax. Several small bilateral pulmonary nodules are nonspecific for example in the right upper lobe measuring 5mm (6/25). Within the right upper lobe measuring 3 mm (6/22). Within the left upper lobe measuring 3 mm (6/46). Several other small to 3 mm nodules are noted bilaterally. Linear atelectasis at the lingula. Thoracic vertebral bodies are maintained in height. No aggressive osseous lesion or fracture. Please see separately dictated abdominal CT for findings below the diaphragm.     Impression: 1. Negative for pulmonary embolus. 2. Increase in size of small bilateral pleural effusions with lower lobe airspace disease likely atelectasis.  3. Multiple small nonspecific pulmonary nodules most pronounced in the right upper lobe measuring up to 5 mm, recommend attention on follow-up imaging, these could be reassessed in 6 months. 4. Large hiatal hernia with fluid-filled distention of esophagus which may relate to delayed transit or reflux, the amount of fluid in the esophagus places patient at risk for aspiration. 5. Please see separately dictated abdominal CT for findings below the diaphragm. Electronically Signed: Fransico Ramírez MD  12/7/2023 10:17 PM EST  Workstation ID: [id number]    XR Chest 1 View    Result Date: 12/7/2023  XR CHEST 1 VW Date of Exam: 12/7/2023 7:56 PM EST Indication: Chest Pain Triage Protocol Comparison: None available. Findings: The lungs are poorly inflated with bibasilar atelectasis particularly on the left. Small bilateral pleural effusions are not excluded. The remainder of the lungs are clear cardiac, hilar, and mediastinal silhouettes are within normal limits. No pneumothorax. The trachea is midline. Pulmonary vascularity is normal. Visualized bony structures are intact.     Impression: Poor inspiration with bibasilar atelectasis and possible small bilateral pleural effusions. Electronically Signed: Danilo Yip DO  12/7/2023 8:20 PM EST  Workstation ID: [id number]    CT Abdomen Pelvis With Contrast    Result Date: 12/6/2023  CT ABDOMEN PELVIS W CONTRAST Date of Exam: 12/6/2023 12:36 PM EST Indication: abd pain and distension. Comparison: CT abdomen pelvis dated 11/10/2023 Technique: Axial CT images were obtained of the abdomen and pelvis following the uneventful intravenous administration of 83 mL Isovue-300. Reconstructed coronal and sagittal images were also obtained. Automated exposure control and iterative construction methods were used. Findings: Lung Bases:  There are basilar atelectasis or pneumonia and small bilateral pleural effusions Moderate to large hiatal hernia Liver: The liver shows mild diffuse nodular surface  suspicious for cirrhosis few small round hypodense nonenhancing liver lesions compatible with cysts. There is moderate ascites Biliary/Gallbladder: Cholecystectomy changes with postcholecystectomy dilatation of the common bile duct Spleen: Punctate calcification in the spleen. The spleen does not appear enlarged Pancreas: Diffuse pancreatic atrophy. There is dilatation of the pancreatic duct in particular along the body and tail of the pancreas. No definite pancreatic lesions or peripancreatic inflammatory changes Kidneys: Kidneys are normal in size. There are no stones or hydronephrosis. Adrenals: Adrenal glands are unremarkable. Retroperitoneal/Lymph Nodes/Vasculature: There are multiple predominately subcentimeter mesenteric and retroperitoneal lymph nodes Gastrointestinal/Mesentery: The small bowel loops are normal in caliber. The large bowel loops are normal in caliber. There is similar reticulosis Bladder: The bladder is unremarkable   Bony Structures:  Visualized bony structures are consistent with the patient's age.     Impression: 1. Bibasilar atelectasis or pneumonia and small bilateral pleural effusions 2. Moderate hiatal hernia 3. Cirrhotic appearing liver. There are few small hypodense liver lesions appear to represent cysts but few of them too small to characterize. 4. Moderate ascites increased as compared to the previous exam 5. Diffuse pancreatic atrophy. Diffusely nonspecific dilated pancreatic duct along the body and tail of the pancreas. Clinical correlation and follow-up recommended Electronically Signed: Leighton Spivey MD  12/6/2023 1:16 PM EST  Workstation ID: [id number]        Assessment / Plan    This is a 63 y.o. woman found to have a pancreatic mass, carcinomatosis, and ascites concerning for malignancy.    Pancreatic mass, carcinomatosis, and ascites concerning for malignancy   -CT scan findings look most consistent with a metastatic pancreatic cancer   - elevated at 323. While this  may suggest gyn cancer, this can also be seen in other metastatic cancers with significant peritoneal involvement   -Primary peritoneal/fallopian tube cancer seems less likely given her normal pelvic ultrasound and in light of the fact that pancreatic mets are quite rare in these cancers   -Cytology from ascites pending   -Strongly agree with GI's plan of EUS with FNB of the pancreatic mass for tissue diagonsis   -In the event a gynecologic malignancy diagnosis, she is not a candidate for primary debulking due to extent and distribution of disease with the pancreatic tumor involving the portal system. She would require neoadjuvant chemotherapy. This was briefly discussed with the patient.    In the event she is stable for discharge following the biopsy, she can follow up with Dr. العراقي as an outpatient. If a gynecologic malignancy is diagnosed Dr. العراقي can facilitate follow up with Dr. Evans as an outpatient.      TURNER Camacho MD  Gynecologic Oncology   Date: 12/10/2023  Time: 13:31 EST     ========================================  Discussed plan with Dr. Abrams. He would like to hold on EUS at this time in hopes that cytology is diagnostic. It would be helpful to have actual tissue for possible molecular testing. Consider IR consult for possible biopsy of one of the peritoneal lesions if possible.    Electronically signed by Freya Camacho MD, 12/11/23, 8:11 AM EST.

## 2023-12-12 ENCOUNTER — PREP FOR SURGERY (OUTPATIENT)
Dept: OTHER | Facility: HOSPITAL | Age: 63
End: 2023-12-12
Payer: COMMERCIAL

## 2023-12-12 DIAGNOSIS — C25.1 MALIGNANT NEOPLASM OF BODY OF PANCREAS: Primary | ICD-10-CM

## 2023-12-12 PROBLEM — C25.9 PANCREATIC CANCER: Status: ACTIVE | Noted: 2023-12-12

## 2023-12-12 LAB
BACTERIA SPEC AEROBE CULT: NORMAL
BACTERIA SPEC AEROBE CULT: NORMAL
GIE STN SPEC: NORMAL
REF LAB TEST METHOD: NORMAL

## 2023-12-12 PROCEDURE — 99233 SBSQ HOSP IP/OBS HIGH 50: CPT | Performed by: PEDIATRICS

## 2023-12-12 PROCEDURE — 99214 OFFICE O/P EST MOD 30 MIN: CPT | Performed by: PHYSICIAN ASSISTANT

## 2023-12-12 PROCEDURE — 25010000002 HYDROMORPHONE PER 4 MG: Performed by: INTERNAL MEDICINE

## 2023-12-12 PROCEDURE — 99233 SBSQ HOSP IP/OBS HIGH 50: CPT | Performed by: INTERNAL MEDICINE

## 2023-12-12 PROCEDURE — G0378 HOSPITAL OBSERVATION PER HR: HCPCS

## 2023-12-12 RX ADMIN — Medication 10 ML: at 08:27

## 2023-12-12 RX ADMIN — PANTOPRAZOLE SODIUM 40 MG: 40 INJECTION, POWDER, LYOPHILIZED, FOR SOLUTION INTRAVENOUS at 08:27

## 2023-12-12 RX ADMIN — VENLAFAXINE HYDROCHLORIDE 150 MG: 75 CAPSULE, EXTENDED RELEASE ORAL at 08:26

## 2023-12-12 RX ADMIN — HYDROMORPHONE HYDROCHLORIDE 0.5 MG: 1 INJECTION, SOLUTION INTRAMUSCULAR; INTRAVENOUS; SUBCUTANEOUS at 12:43

## 2023-12-12 RX ADMIN — SENNOSIDES AND DOCUSATE SODIUM 2 TABLET: 8.6; 5 TABLET ORAL at 20:49

## 2023-12-12 RX ADMIN — SENNOSIDES AND DOCUSATE SODIUM 2 TABLET: 8.6; 5 TABLET ORAL at 08:26

## 2023-12-12 RX ADMIN — LEVOTHYROXINE SODIUM 125 MCG: 125 TABLET ORAL at 08:26

## 2023-12-12 RX ADMIN — Medication 10 ML: at 20:49

## 2023-12-12 RX ADMIN — PANTOPRAZOLE SODIUM 40 MG: 40 INJECTION, POWDER, LYOPHILIZED, FOR SOLUTION INTRAVENOUS at 20:49

## 2023-12-12 RX ADMIN — ROSUVASTATIN 5 MG: 10 TABLET, FILM COATED ORAL at 08:26

## 2023-12-12 RX ADMIN — HYDROMORPHONE HYDROCHLORIDE 0.5 MG: 1 INJECTION, SOLUTION INTRAMUSCULAR; INTRAVENOUS; SUBCUTANEOUS at 20:49

## 2023-12-12 NOTE — PROGRESS NOTES
Cytology consistent with pancreatic or GI primary. Defer the need for additional tissue to Dr. العراقي. Gyn onc will sign off.

## 2023-12-12 NOTE — PROGRESS NOTES
"GI Daily Progress Note  Subjective:    Chief Complaint:  Follow up abdominal pain     In good spirits.  Denies any complaints today.       Objective:    /100 (BP Location: Right arm, Patient Position: Lying)   Pulse 107   Temp 98 °F (36.7 °C) (Oral)   Resp 18   Ht 167.6 cm (66\")   Wt 93.8 kg (206 lb 12.8 oz)   LMP  (LMP Unknown) Comment: N/A  SpO2 95%   BMI 33.38 kg/m²     Physical Exam  Constitutional:       General: She is not in acute distress.  Cardiovascular:      Rate and Rhythm: Normal rate and regular rhythm.   Pulmonary:      Effort: Pulmonary effort is normal. No respiratory distress.   Abdominal:      General: Bowel sounds are normal.      Palpations: Abdomen is soft.      Tenderness: There is no abdominal tenderness.   Skin:     General: Skin is warm and dry.   Neurological:      Mental Status: She is alert and oriented to person, place, and time.       Lab  Lab Results   Component Value Date    WBC 8.98 12/11/2023    HGB 11.2 (L) 12/11/2023    HGB 11.1 (L) 12/10/2023    HGB 14.2 12/08/2023    MCV 86.4 12/11/2023     12/11/2023    INR 1.12 12/07/2023       Lab Results   Component Value Date    GLUCOSE 105 (H) 12/11/2023    BUN 13 12/11/2023    CREATININE 0.70 12/11/2023    EGFRIFNONA 85 08/22/2016    EGFRIFAFRI 98 08/22/2016    BCR 18.6 12/11/2023     12/11/2023    K 4.0 12/11/2023    CO2 26.0 12/11/2023    CALCIUM 8.6 12/11/2023    PROTENTOTREF 6.6 08/22/2016    ALBUMIN 3.7 12/08/2023    ALKPHOS 137 (H) 12/08/2023    BILITOT 0.6 12/08/2023    ALT 15 12/08/2023    AST 26 12/08/2023      Latest Reference Range & Units 12/08/23 18:01   CA 15-3 <=25.0 U/mL 21.5   CA 19-9 <=35.0 U/mL 6.7   CA 27.29 0.0 - 38.6 U/mL 28.5    0.0 - 38.1 U/mL 323.0 (H)   CEA ng/mL 5.75     Cytology:   MICROSCOPIC DESCRIPTION:  Malignant cells are present.  Professional interpretation rendered by Corina Copeland D.O., F.C.A.P. at [facility], [address] [place] [address] " [id number].  COMMENT:  The following immunohistochemical stains were performed and reviewed with  appropriate controls:  TTF-1: Negative  CK7: Positive  CK20: Positive  CDX2: Positive  GATA3: Negative  PAX8: Negative  Given the CK7, CK20 and CDX2 positivity a primary of gastrointestinal or  pancreaticobiliary origin is favored.  Clinical and radiologic correlation is required  to determine primary site.     Assessment:    Abdominal pain  2. Pancreatic mass in the body of the pancreas with vascular involvement  3. Ascites, cytology fluid shows malignant cells with GI or pancreaticobiliary origin.   4. Image findings concerning for peritoneal and omental carcinomatosis  5. Pulmonary nodules  6. Hiatal hernia, large  7. Leukocytosis  8. Elevated     Plan:    Cytology of ascitic fluid shows malignant cells, with gastrointestinal or pancreaticobiliary origin favored.       >> Recommend EUS with FNB of pancreatic body mass tomorrow with Dr. Abrams  >> NPO at midnight     EVERARDO Johnson  12/12/23  14:47 EST

## 2023-12-12 NOTE — H&P (VIEW-ONLY)
"GI Daily Progress Note  Subjective:    Chief Complaint:  Follow up abdominal pain     In good spirits.  Denies any complaints today.       Objective:    /100 (BP Location: Right arm, Patient Position: Lying)   Pulse 107   Temp 98 °F (36.7 °C) (Oral)   Resp 18   Ht 167.6 cm (66\")   Wt 93.8 kg (206 lb 12.8 oz)   LMP  (LMP Unknown) Comment: N/A  SpO2 95%   BMI 33.38 kg/m²     Physical Exam  Constitutional:       General: She is not in acute distress.  Cardiovascular:      Rate and Rhythm: Normal rate and regular rhythm.   Pulmonary:      Effort: Pulmonary effort is normal. No respiratory distress.   Abdominal:      General: Bowel sounds are normal.      Palpations: Abdomen is soft.      Tenderness: There is no abdominal tenderness.   Skin:     General: Skin is warm and dry.   Neurological:      Mental Status: She is alert and oriented to person, place, and time.       Lab  Lab Results   Component Value Date    WBC 8.98 12/11/2023    HGB 11.2 (L) 12/11/2023    HGB 11.1 (L) 12/10/2023    HGB 14.2 12/08/2023    MCV 86.4 12/11/2023     12/11/2023    INR 1.12 12/07/2023       Lab Results   Component Value Date    GLUCOSE 105 (H) 12/11/2023    BUN 13 12/11/2023    CREATININE 0.70 12/11/2023    EGFRIFNONA 85 08/22/2016    EGFRIFAFRI 98 08/22/2016    BCR 18.6 12/11/2023     12/11/2023    K 4.0 12/11/2023    CO2 26.0 12/11/2023    CALCIUM 8.6 12/11/2023    PROTENTOTREF 6.6 08/22/2016    ALBUMIN 3.7 12/08/2023    ALKPHOS 137 (H) 12/08/2023    BILITOT 0.6 12/08/2023    ALT 15 12/08/2023    AST 26 12/08/2023      Latest Reference Range & Units 12/08/23 18:01   CA 15-3 <=25.0 U/mL 21.5   CA 19-9 <=35.0 U/mL 6.7   CA 27.29 0.0 - 38.6 U/mL 28.5    0.0 - 38.1 U/mL 323.0 (H)   CEA ng/mL 5.75     Cytology:   MICROSCOPIC DESCRIPTION:  Malignant cells are present.  Professional interpretation rendered by Corina Copeland D.O., F.C.A.P. at [facility] [facility], [address] [place] [address] " [id number].  COMMENT:  The following immunohistochemical stains were performed and reviewed with  appropriate controls:  TTF-1: Negative  CK7: Positive  CK20: Positive  CDX2: Positive  GATA3: Negative  PAX8: Negative  Given the CK7, CK20 and CDX2 positivity a primary of gastrointestinal or  pancreaticobiliary origin is favored.  Clinical and radiologic correlation is required  to determine primary site.     Assessment:    Abdominal pain  2. Pancreatic mass in the body of the pancreas with vascular involvement  3. Ascites, cytology fluid shows malignant cells with GI or pancreaticobiliary origin.   4. Image findings concerning for peritoneal and omental carcinomatosis  5. Pulmonary nodules  6. Hiatal hernia, large  7. Leukocytosis  8. Elevated     Plan:    Cytology of ascitic fluid shows malignant cells, with gastrointestinal or pancreaticobiliary origin favored.       >> Recommend EUS with FNB of pancreatic body mass tomorrow with Dr. Abrams  >> NPO at midnight     EVERARDO Johnson  12/12/23  14:47 EST

## 2023-12-13 ENCOUNTER — ANESTHESIA EVENT (OUTPATIENT)
Dept: GASTROENTEROLOGY | Facility: HOSPITAL | Age: 63
End: 2023-12-13
Payer: COMMERCIAL

## 2023-12-13 ENCOUNTER — ANESTHESIA (OUTPATIENT)
Dept: GASTROENTEROLOGY | Facility: HOSPITAL | Age: 63
End: 2023-12-13
Payer: COMMERCIAL

## 2023-12-13 DIAGNOSIS — C76.2 ABDOMINAL CARCINOMATOSIS: ICD-10-CM

## 2023-12-13 DIAGNOSIS — C25.1 MALIGNANT NEOPLASM OF BODY OF PANCREAS: Primary | ICD-10-CM

## 2023-12-13 LAB
BACTERIA SPEC ANAEROBE CULT: NORMAL
BILIRUB FLD-MCNC: 0.4 MG/DL

## 2023-12-13 PROCEDURE — 25010000002 PROCHLORPERAZINE 10 MG/2ML SOLUTION: Performed by: INTERNAL MEDICINE

## 2023-12-13 PROCEDURE — 25810000003 LACTATED RINGERS PER 1000 ML: Performed by: ANESTHESIOLOGY

## 2023-12-13 PROCEDURE — 25010000002 ONDANSETRON PER 1 MG

## 2023-12-13 PROCEDURE — 88173 CYTOPATH EVAL FNA REPORT: CPT | Performed by: INTERNAL MEDICINE

## 2023-12-13 PROCEDURE — 99233 SBSQ HOSP IP/OBS HIGH 50: CPT | Performed by: INTERNAL MEDICINE

## 2023-12-13 PROCEDURE — 0DB58ZX EXCISION OF ESOPHAGUS, VIA NATURAL OR ARTIFICIAL OPENING ENDOSCOPIC, DIAGNOSTIC: ICD-10-PCS | Performed by: INTERNAL MEDICINE

## 2023-12-13 PROCEDURE — 0FBG8ZX EXCISION OF PANCREAS, VIA NATURAL OR ARTIFICIAL OPENING ENDOSCOPIC, DIAGNOSTIC: ICD-10-PCS | Performed by: INTERNAL MEDICINE

## 2023-12-13 PROCEDURE — 25010000002 DEXAMETHASONE PER 1 MG

## 2023-12-13 PROCEDURE — 88312 SPECIAL STAINS GROUP 1: CPT | Performed by: INTERNAL MEDICINE

## 2023-12-13 PROCEDURE — 43239 EGD BIOPSY SINGLE/MULTIPLE: CPT | Performed by: INTERNAL MEDICINE

## 2023-12-13 PROCEDURE — 25010000002 HYDROMORPHONE PER 4 MG: Performed by: INTERNAL MEDICINE

## 2023-12-13 PROCEDURE — 99232 SBSQ HOSP IP/OBS MODERATE 35: CPT | Performed by: PEDIATRICS

## 2023-12-13 PROCEDURE — 88172 CYTP DX EVAL FNA 1ST EA SITE: CPT | Performed by: INTERNAL MEDICINE

## 2023-12-13 PROCEDURE — 88305 TISSUE EXAM BY PATHOLOGIST: CPT | Performed by: INTERNAL MEDICINE

## 2023-12-13 PROCEDURE — 25010000002 PROPOFOL 10 MG/ML EMULSION

## 2023-12-13 RX ORDER — IPRATROPIUM BROMIDE AND ALBUTEROL SULFATE 2.5; .5 MG/3ML; MG/3ML
3 SOLUTION RESPIRATORY (INHALATION) ONCE AS NEEDED
Status: DISCONTINUED | OUTPATIENT
Start: 2023-12-13 | End: 2023-12-13 | Stop reason: HOSPADM

## 2023-12-13 RX ORDER — LIDOCAINE HYDROCHLORIDE 10 MG/ML
INJECTION, SOLUTION EPIDURAL; INFILTRATION; INTRACAUDAL; PERINEURAL AS NEEDED
Status: DISCONTINUED | OUTPATIENT
Start: 2023-12-13 | End: 2023-12-13 | Stop reason: SURG

## 2023-12-13 RX ORDER — PROPOFOL 10 MG/ML
VIAL (ML) INTRAVENOUS AS NEEDED
Status: DISCONTINUED | OUTPATIENT
Start: 2023-12-13 | End: 2023-12-13 | Stop reason: SURG

## 2023-12-13 RX ORDER — PHENYLEPHRINE HCL IN 0.9% NACL 1 MG/10 ML
SYRINGE (ML) INTRAVENOUS AS NEEDED
Status: DISCONTINUED | OUTPATIENT
Start: 2023-12-13 | End: 2023-12-13 | Stop reason: SURG

## 2023-12-13 RX ORDER — ONDANSETRON 2 MG/ML
4 INJECTION INTRAMUSCULAR; INTRAVENOUS ONCE AS NEEDED
Status: COMPLETED | OUTPATIENT
Start: 2023-12-13 | End: 2023-12-13

## 2023-12-13 RX ORDER — PANTOPRAZOLE SODIUM 40 MG/1
40 TABLET, DELAYED RELEASE ORAL
Status: DISCONTINUED | OUTPATIENT
Start: 2023-12-13 | End: 2023-12-15 | Stop reason: HOSPADM

## 2023-12-13 RX ORDER — SUCCINYLCHOLINE/SOD CL,ISO/PF 200MG/10ML
SYRINGE (ML) INTRAVENOUS AS NEEDED
Status: DISCONTINUED | OUTPATIENT
Start: 2023-12-13 | End: 2023-12-13 | Stop reason: SURG

## 2023-12-13 RX ORDER — ONDANSETRON 2 MG/ML
INJECTION INTRAMUSCULAR; INTRAVENOUS AS NEEDED
Status: DISCONTINUED | OUTPATIENT
Start: 2023-12-13 | End: 2023-12-13 | Stop reason: SURG

## 2023-12-13 RX ORDER — SODIUM CHLORIDE, SODIUM LACTATE, POTASSIUM CHLORIDE, CALCIUM CHLORIDE 600; 310; 30; 20 MG/100ML; MG/100ML; MG/100ML; MG/100ML
20 INJECTION, SOLUTION INTRAVENOUS CONTINUOUS
Status: DISCONTINUED | OUTPATIENT
Start: 2023-12-13 | End: 2023-12-15 | Stop reason: HOSPADM

## 2023-12-13 RX ORDER — DEXAMETHASONE SODIUM PHOSPHATE 4 MG/ML
INJECTION, SOLUTION INTRA-ARTICULAR; INTRALESIONAL; INTRAMUSCULAR; INTRAVENOUS; SOFT TISSUE AS NEEDED
Status: DISCONTINUED | OUTPATIENT
Start: 2023-12-13 | End: 2023-12-13 | Stop reason: SURG

## 2023-12-13 RX ADMIN — DEXAMETHASONE SODIUM PHOSPHATE 12 MG: 4 INJECTION, SOLUTION INTRAMUSCULAR; INTRAVENOUS at 13:07

## 2023-12-13 RX ADMIN — SODIUM CHLORIDE, POTASSIUM CHLORIDE, SODIUM LACTATE AND CALCIUM CHLORIDE: 600; 310; 30; 20 INJECTION, SOLUTION INTRAVENOUS at 14:30

## 2023-12-13 RX ADMIN — ONDANSETRON 4 MG: 2 INJECTION INTRAMUSCULAR; INTRAVENOUS at 14:52

## 2023-12-13 RX ADMIN — Medication 100 MCG: at 14:27

## 2023-12-13 RX ADMIN — Medication 100 MG: at 13:04

## 2023-12-13 RX ADMIN — HYDROMORPHONE HYDROCHLORIDE 0.5 MG: 1 INJECTION, SOLUTION INTRAMUSCULAR; INTRAVENOUS; SUBCUTANEOUS at 08:25

## 2023-12-13 RX ADMIN — PROCHLORPERAZINE EDISYLATE 2.5 MG: 5 INJECTION INTRAMUSCULAR; INTRAVENOUS at 22:45

## 2023-12-13 RX ADMIN — PROPOFOL 25 MCG/KG/MIN: 10 INJECTION, EMULSION INTRAVENOUS at 13:06

## 2023-12-13 RX ADMIN — Medication 10 ML: at 08:19

## 2023-12-13 RX ADMIN — ONDANSETRON 4 MG: 2 INJECTION INTRAMUSCULAR; INTRAVENOUS at 13:07

## 2023-12-13 RX ADMIN — SENNOSIDES AND DOCUSATE SODIUM 2 TABLET: 8.6; 5 TABLET ORAL at 08:18

## 2023-12-13 RX ADMIN — HYDROMORPHONE HYDROCHLORIDE 0.5 MG: 1 INJECTION, SOLUTION INTRAMUSCULAR; INTRAVENOUS; SUBCUTANEOUS at 04:38

## 2023-12-13 RX ADMIN — PROCHLORPERAZINE EDISYLATE 2.5 MG: 5 INJECTION INTRAMUSCULAR; INTRAVENOUS at 16:41

## 2023-12-13 RX ADMIN — PANTOPRAZOLE SODIUM 40 MG: 40 INJECTION, POWDER, LYOPHILIZED, FOR SOLUTION INTRAVENOUS at 08:18

## 2023-12-13 RX ADMIN — Medication 10 ML: at 21:35

## 2023-12-13 RX ADMIN — HYDROMORPHONE HYDROCHLORIDE 0.5 MG: 1 INJECTION, SOLUTION INTRAMUSCULAR; INTRAVENOUS; SUBCUTANEOUS at 21:34

## 2023-12-13 RX ADMIN — SODIUM CHLORIDE, POTASSIUM CHLORIDE, SODIUM LACTATE AND CALCIUM CHLORIDE 20 ML/HR: 600; 310; 30; 20 INJECTION, SOLUTION INTRAVENOUS at 12:32

## 2023-12-13 RX ADMIN — ONDANSETRON 4 MG: 2 INJECTION INTRAMUSCULAR; INTRAVENOUS at 15:47

## 2023-12-13 RX ADMIN — PANTOPRAZOLE SODIUM 40 MG: 40 TABLET, DELAYED RELEASE ORAL at 18:08

## 2023-12-13 RX ADMIN — Medication 100 MCG: at 13:27

## 2023-12-13 RX ADMIN — LIDOCAINE HYDROCHLORIDE 50 MG: 10 INJECTION, SOLUTION EPIDURAL; INFILTRATION; INTRACAUDAL; PERINEURAL at 13:04

## 2023-12-13 RX ADMIN — SENNOSIDES AND DOCUSATE SODIUM 2 TABLET: 8.6; 5 TABLET ORAL at 21:34

## 2023-12-13 RX ADMIN — Medication 100 MCG: at 13:22

## 2023-12-13 RX ADMIN — PROPOFOL 200 MG: 10 INJECTION, EMULSION INTRAVENOUS at 13:04

## 2023-12-13 RX ADMIN — Medication 200 MCG: at 13:36

## 2023-12-13 RX ADMIN — VENLAFAXINE HYDROCHLORIDE 150 MG: 75 CAPSULE, EXTENDED RELEASE ORAL at 08:18

## 2023-12-13 RX ADMIN — LEVOTHYROXINE SODIUM 125 MCG: 125 TABLET ORAL at 06:11

## 2023-12-13 RX ADMIN — ROSUVASTATIN 5 MG: 10 TABLET, FILM COATED ORAL at 08:18

## 2023-12-13 NOTE — ANESTHESIA PREPROCEDURE EVALUATION
Anesthesia Evaluation     Patient summary reviewed and Nursing notes reviewed   NPO Solid Status: > 8 hours  NPO Liquid Status: > 2 hours           Airway   Mallampati: III  TM distance: >3 FB  Neck ROM: full  Possible difficult intubation  Dental      Pulmonary    (-) asthma, shortness of breath, recent URI, sleep apnea, not a smoker  Cardiovascular     ECG reviewed    (-) hypertension, past MI, dysrhythmias, angina, cardiac stents    ROS comment: ECG ST     Neuro/Psych  (+) psychiatric history  (-) seizures, CVA  GI/Hepatic/Renal/Endo    (+) hiatal hernia, GERD, liver disease (lesion), thyroid problem hypothyroidism  (-) no renal disease, diabetes    ROS Comment: Dilation of the pancreatic duct up to 8 mm   Pancreatic body where there is a 2.4 x 2.3 cm mass  Encasement of the splenic/common hepatic artery   Involvement  portal/splenic vein   Large volume ascites   Diffuse infiltrative appearance of the omentum consistent with peritoneal and omental carcinomatosis     Musculoskeletal     Abdominal    Substance History      OB/GYN          Other      history of cancer (carcinomatosis)    ROS/Med Hx Other: HCT 35   Abdo pain                 Anesthesia Plan    ASA 3     general   Rapid sequence  (RSI CP ETT ( Mass effect )  Propofol infusion as part of an anti PONV technique  [facility] )  intravenous induction     Anesthetic plan, risks, benefits, and alternatives have been provided, discussed and informed consent has been obtained with: patient.    Plan discussed with CRNA.      CODE STATUS:    Code Status (Patient has no pulse and is not breathing): CPR (Attempt to Resuscitate)  Medical Interventions (Patient has pulse or is breathing): Full Support

## 2023-12-13 NOTE — PROGRESS NOTES
"HEMATOLOGY/ONCOLOGY PROGRESS NOTE    S:She feels like the bloating and SOA, abdominal discomfort have continued to gradually recur over the last 48 hours. Underwent endoscopy with EUS today. Limited sample size due to large hiatal hernia, but no obvious alternative primary site. Esophagitis biopsied      Past medical history, social history and family history was reviewed and unchanged from prior visit.      Medications:  The current medication list was reviewed in the EMR    ALLERGIES:  No Known Allergies      Physical Exam    VITAL SIGNS:  /91 (BP Location: Right arm, Patient Position: Lying)   Pulse 93   Temp 97.9 °F (36.6 °C) (Oral)   Resp 16   Ht 167.6 cm (65.98\")   Wt 105 kg (231 lb 7.7 oz)   LMP  (LMP Unknown) Comment: N/A  SpO2 98%   BMI 37.38 kg/m²   Temp:  [96.9 °F (36.1 °C)-98.4 °F (36.9 °C)] 97.9 °F (36.6 °C)      Performance Status: 0                Physical Exam    General: well appearing, in no acute distress  HEENT: sclerae anicteric, oropharynx clear, neck is supple  Lymphatics: no cervical, supraclavicular, or axillary adenopathy  Cardiovascular: regular rate and rhythm, no murmurs, rubs or gallops  Lungs: clear to auscultation bilaterally  Abdomen: +ascites, more distention +BS  Extremities: no lower extremity edema  Skin: palpable nodule superior to umbilicus  Msk:  Shows no weakness of the large muscle groups  Psych: Mood is stable        RECENT LABS:    Lab Results   Component Value Date    HGB 11.2 (L) 12/11/2023    HCT 35.6 12/11/2023    MCV 86.4 12/11/2023     12/11/2023    WBC 8.98 12/11/2023    NEUTROABS 7.17 (H) 12/11/2023    LYMPHSABS 0.68 (L) 12/11/2023    MONOSABS 0.76 12/11/2023    EOSABS 0.22 12/11/2023    BASOSABS 0.05 12/11/2023       Lab Results   Component Value Date    GLUCOSE 105 (H) 12/11/2023    BUN 13 12/11/2023    CREATININE 0.70 12/11/2023     12/11/2023    K 4.0 12/11/2023     12/11/2023    CO2 26.0 12/11/2023    CALCIUM 8.6 12/11/2023    " PROTEINTOT 7.4 12/08/2023    ALBUMIN 3.7 12/08/2023    BILITOT 0.6 12/08/2023    ALKPHOS 137 (H) 12/08/2023    AST 26 12/08/2023    ALT 15 12/08/2023     Component      Latest Ref Rng 12/8/2023   CEA      ng/mL 5.75    CA 15-3      <=25.0 U/mL 21.5    CA 19-9      <=35.0 U/mL 6.7          0.0 - 38.1 U/mL 323.0 (H)           12/12/2023    DIAGNOSIS:  PERITONEAL FLUID:  Malignant, see comment    MICROSCOPIC DESCRIPTION:  Malignant cells are present.    Professional interpretation rendered by Corina Copeland D.O., F.C.A.P. at [facility], [address] [place] [address].    COMMENT:  The following immunohistochemical stains were performed and reviewed with  appropriate controls:    TTF-1: Negative  CK7: Positive  CK20: Positive  CDX2: Positive  GATA3: Negative  PAX8: Negative    Given the CK7, CK20 and CDX2 positivity a primary of gastrointestinal or  pancreaticobiliary origin is favored.  Clinical and radiologic correlation is required  to determine primary site.     Assessment/Plan    1.  Pancreatic mass  2.  Peritoneal/omental implants  3.  Indeterminate lung nodules  4.  Indeterminate liver hypodensities  5.  Malignant Ascites  -We discussed the clinical impression of metastatic malignancy. We extensively reviewed her cytology results and negative flow cytometry. This confirms malignant ascites with upper GI/pancreatobiliary source favored. EGD with pancreatic EUS/biopsy planned tomorrow.   -We discussed presumptive diagnosis of metastatic malignancy of most likely pancreatic origin. Unless another candidate primary site is identified on pending endoscopy, will plan with palliative chemotherapy with FOLFIRINOX first line given healthy and fit. Chemo orders placed, will initiate PA, chemo ed while awaiting final results. She prefers [place] location for treatment.  -Will send [facility] on blood after discharge. This will assess for targetable mutations and will also assess for BRCA mutation in  context of family history. May benefit from outpatient Genetic counseling at a future date.   -Tumor CA 19-9, , CA 15-3, CA 2729, and CEA reviewed and notable for CA 19-9 normal and  markedly elevated.   -Discussed endoscopy results with GI attending and reviewed EGD and EUS reports. Await final pathology but consistent clinically with metastatic pancreatic cancer, reviewed treatment, side effect management and prognosis.   -Port appointment cannot be confirmed until after discharge, but is tentatively scheduled 12/26 at 11 AM.  -Because of worsening ascites reaccumulation, she wishes to consider repeat therapeutic paracentesis prior to discharge.  I will tentatively order this to see if it could be coordinated tomorrow.  She has been n.p.o. for her EGD and if her symptoms improve overnight this can be canceled, but she notes that her symptoms burning began to increase yesterday.  If she is remaining inpatient for a paracentesis tomorrow we could see if the port could be coordinated at the same time but I told the patient I am unsure about whether that would occur.  Otherwise, she is tentatively scheduled 1226.  Hopefully her paracentesis requirements will decrease with treatment initiation, but consider drainage catheter if frequent paracentesis requirements.  -Plan chemotherapy education in 1 week and treatment start 12/27.    Sherri العراقي MD  [facility]ology [facility]    12/13/2023      Time spent 50 minutes throughout the day including direct patient counseling, history and exam, placing orders, discussion with GI, discussing with nursing scheduling/coordinating care

## 2023-12-13 NOTE — INTERVAL H&P NOTE
H&P reviewed. The patient was examined and there are no changes to the H&P.    Peritoneal fluid with malignant cells present likely pancreaticobiliary origin.  Oncology requesting biopsy of pancreatic body mass for additional tissue acquisition for further testing.  Discussed risk/benefits with patient in full and all of her questions were answered at this time.  Plan for EGD and EUS today for FNB of pancreatic body mass.

## 2023-12-13 NOTE — ANESTHESIA PROCEDURE NOTES
Airway  Urgency: elective    Date/Time: 12/13/2023 1:05 PM  Airway not difficult    General Information and Staff    Patient location during procedure: OR  CRNA/CAA: Lizzeth Dalton CRNA    Indications and Patient Condition  Indications for airway management: airway protection    Preoxygenated: yes  MILS not maintained throughout  Mask difficulty assessment: 0 - not attempted    Final Airway Details  Final airway type: endotracheal airway      Successful airway: ETT  Cuffed: yes   Successful intubation technique: video laryngoscopy and RSI  Facilitating devices/methods: intubating stylet and cricoid pressure  Endotracheal tube insertion site: oral  Blade: [place]  Blade size: 3.5  ETT size (mm): 7.5  Cormack-Lehane Classification: grade I - full view of glottis  Placement verified by: chest auscultation and capnometry   Measured from: lips  ETT/EBT  to lips (cm): 20  Number of attempts at approach: 1  Assessment: lips, teeth, and gum same as pre-op and atraumatic intubation    Additional Comments  Negative epigastric sounds, Breath sound equal bilaterally with symmetric chest rise and fall

## 2023-12-13 NOTE — PROGRESS NOTES
[facility] Medicine Services  PROGRESS NOTE    Patient Name: Jessica Kraft  : 1960  MRN: [id number]    Date of Admission: 2023  Primary Care Physician: Leonarda Díaz MD    Subjective   Subjective     CC:  F/u abd pain, new malignancy diagnosis    HPI:  Doing okay today, still having some increased pain but is more willing to accept pain medicine.  No shortness of breath.  Ready to complete this upcoming procedure.  Objective   Objective     Vital Signs:   Temp:  [97.8 °F (36.6 °C)-98.4 °F (36.9 °C)] 98.2 °F (36.8 °C)  Heart Rate:  [86-90] 90  Resp:  [18-22] 22  BP: (138-154)/() 153/101     Physical Exam:  Constitutional: No acute distress, awake, alert, in good spirits  HENT: NCAT, mucous membranes moist  Respiratory: Clear to auscultation bilaterally, respiratory effort normal   Cardiovascular: RRR, no murmurs, rubs, or gallops  Gastrointestinal: Positive bowel sounds, soft, very slight tenderness and some fullness, nondistended  Musculoskeletal: No bilateral ankle edema  Psychiatric: Appropriate affect, cooperative  Neurologic: Oriented x 3, strength symmetric in all extremities, Cranial Nerves grossly intact to confrontation, speech clear  Skin: No rashes    Unchanged from yesterday    Results Reviewed:  LAB RESULTS:      Lab 23  0332 12/10/23  0518 23  0321 23  2311 23   WBC 8.98 7.32 14.64*  --  23.04*   HEMOGLOBIN 11.2* 11.1* 14.2  --  14.6   HEMATOCRIT 35.6 35.2 46.2  --  47.3*   PLATELETS 230 230 469*  --  468*   NEUTROS ABS 7.17* 5.78 12.00*  --  20.35*   IMMATURE GRANS (ABS) 0.10* 0.05  --   --  0.09*   LYMPHS ABS 0.68* 0.63*  --   --  0.66*   MONOS ABS 0.76 0.65  --   --  1.88*   EOS ABS 0.22 0.17 0.00  --  0.01   MCV 86.4 86.5 87.5  --  88.6   PROCALCITONIN  --   --   --   --  0.49*   LACTATE  --   --   --  1.3 2.3*   PROTIME  --   --   --   --  14.6*         Lab 23  1556 23  0332 12/10/23  0518 23  0640  12/08/23  0321 12/07/23 2157 12/07/23 1959   SODIUM  --  139 135* 139 135*  --  137   POTASSIUM 4.0 3.5 3.2* 4.0 4.8  --  4.1   CHLORIDE  --  103 100 104 100  --  101   CO2  --  26.0 27.0 25.0 21.0*  --  21.0*   ANION GAP  --  10.0 8.0 10.0 14.0  --  15.0   BUN  --  13 15 21 24*  --  21   CREATININE  --  0.70 0.76 0.74 1.00  --  0.92   EGFR  --  97.9 88.7 91.6 63.8  --  70.5   GLUCOSE  --  105* 99 116* 164*  --  163*   CALCIUM  --  8.6 8.7 8.7 9.2  --  9.3   MAGNESIUM  --   --   --   --  2.3  --   --    PHOSPHORUS  --   --   --   --  4.5  --   --    HEMOGLOBIN A1C  --   --   --   --   --   --  5.70*   TSH  --   --   --   --   --  1.730  --          Lab 12/08/23 0321 12/07/23 1959   TOTAL PROTEIN 7.4 8.0   ALBUMIN 3.7 4.0   GLOBULIN 3.7 4.0   ALT (SGPT) 15 11   AST (SGOT) 26 21   BILIRUBIN 0.6 0.6   ALK PHOS 137* 149*   LIPASE  --  9*         Lab 12/07/23 2157 12/07/23 1959   PROBNP  --  107.5   HSTROP T 20* 30*   PROTIME  --  14.6*   INR  --  1.12                 Lab 12/08/23  0332   PH, ARTERIAL 7.379   PCO2, ARTERIAL 35.0   PO2 ART 80.8*   FIO2 36   HCO3 ART 20.7   BASE EXCESS ART -3.8*   CARBOXYHEMOGLOBIN 1.3     Brief Urine Lab Results  (Last result in the past 365 days)        Color   Clarity   Blood   Leuk Est   Nitrite   Protein   CREAT   Urine HCG        12/07/23 2157 [place]   Cloudy   Negative   Small (1+)   Positive   30 mg/dL (1+)                   Microbiology Results Abnormal       Procedure Component Value - Date/Time    Fungus Culture - Peritoneal Fluid, Peritoneum [[id number]] Collected: 12/08/23 1037    Lab Status: Preliminary result Specimen: Peritoneal Fluid from Peritoneum Updated: 12/13/23 1102     Fungus Culture No fungus isolated at less than 1 week    Anaerobic Culture - Body Fluid, Peritoneum [[id number]]  (Normal) Collected: 12/08/23 1036    Lab Status: Final result Specimen: Body Fluid from Peritoneum Updated: 12/13/23 0704     Anaerobic Culture No anaerobes isolated at 5 days    Blood  Culture - Blood, Arm, Left [[id number]]  (Normal) Collected: 12/07/23 2042    Lab Status: Final result Specimen: Blood from Arm, Left Updated: 12/12/23 2246     Blood Culture No growth at 5 days    Blood Culture - Blood, Arm, Right [[id number]]  (Normal) Collected: 12/07/23 2042    Lab Status: Final result Specimen: Blood from Arm, Right Updated: 12/12/23 2246     Blood Culture No growth at 5 days    Fungus Smear - Peritoneal Fluid, Peritoneum [[id number]] Collected: 12/08/23 1037    Lab Status: Final result Specimen: Peritoneal Fluid from Peritoneum Updated: 12/12/23 1445     Fungal Stain No yeast or hyphal elements seen    Body Fluid Culture - Body Fluid, Peritoneum [[id number]] Collected: 12/08/23 1036    Lab Status: Final result Specimen: Body Fluid from Peritoneum Updated: 12/11/23 0817     Body Fluid Culture No growth at 3 days     Gram Stain Rare (1+) WBCs seen      No organisms seen    Respiratory Panel PCR w/COVID-19(SARS-CoV-2) [place] In-House, NP Swab in UTM/VTM, 2 HR TAT - Swab, Nasopharynx [[id number]]  (Normal) Collected: 12/08/23 0259    Lab Status: Final result Specimen: Swab from Nasopharynx Updated: 12/08/23 0427     ADENOVIRUS, PCR Not Detected     Coronavirus 229E Not Detected     Coronavirus HKU1 Not Detected     Coronavirus NL63 Not Detected     Coronavirus OC43 Not Detected     COVID19 Not Detected     Human Metapneumovirus Not Detected     Human Rhinovirus/Enterovirus Not Detected     Influenza A PCR Not Detected     Influenza B PCR Not Detected     Parainfluenza Virus 1 Not Detected     Parainfluenza Virus 2 Not Detected     Parainfluenza Virus 3 Not Detected     Parainfluenza Virus 4 Not Detected     RSV, PCR Not Detected     Bordetella pertussis pcr Not Detected     Bordetella parapertussis PCR Not Detected     Chlamydophila pneumoniae PCR Not Detected     Mycoplasma pneumo by PCR Not Detected    Narrative:      In the setting of a positive respiratory panel with a viral  infection PLUS a negative procalcitonin without other underlying concern for bacterial infection, consider observing off antibiotics or discontinuation of antibiotics and continue supportive care. If the respiratory panel is positive for atypical bacterial infection (Bordetella pertussis, Chlamydophila pneumoniae, or Mycoplasma pneumoniae), consider antibiotic de-escalation to target atypical bacterial infection.            No radiology results from the last 24 hrs        Current medications:  Scheduled Meds:[Held by provider] aspirin, 81 mg, Oral, Daily  levothyroxine, 125 mcg, Oral, QAM  pantoprazole, 40 mg, Intravenous, Q12H  polyethylene glycol, 17 g, Oral, Daily  rosuvastatin, 5 mg, Oral, Daily  senna-docusate sodium, 2 tablet, Oral, BID  sodium chloride, 10 mL, Intravenous, Q12H  venlafaxine XR, 150 mg, Oral, Daily      Continuous Infusions:lactated ringers, 20 mL/hr, Last Rate: 20 mL/hr (12/13/23 1258)      PRN Meds:.  acetaminophen **OR** acetaminophen **OR** acetaminophen    ALPRAZolam    senna-docusate sodium **AND** polyethylene glycol **AND** bisacodyl **AND** bisacodyl    dicyclomine    HYDROmorphone **AND** naloxone    influenza vaccine    ipratropium-albuterol    ipratropium-albuterol    melatonin    nitroglycerin    ondansetron    Potassium Replacement - Follow Nurse / BPA Driven Protocol    prochlorperazine    promethazine **OR** promethazine    sodium chloride    sodium chloride    sodium chloride    Assessment & Plan   Assessment & Plan     Active Hospital Problems    Diagnosis  POA    **Sepsis [A41.9]  Yes    Pancreatic cancer [C25.9]  Unknown    Severe malnutrition [E43]  Yes    Acute UTI (urinary tract infection) [N39.0]  Unknown    Pancreatic mass [K86.89]  Unknown    Lung nodules [R91.8]  Unknown    Liver lesion [K76.9]  Unknown    Abdominal carcinomatosis - omental [C76.2]  Unknown    Peritoneal carcinomatosis [C78.6]  Unknown    GERD without esophagitis [K21.9]  Unknown    Hiatal hernia  [K44.9]  Unknown    Other dysphagia [R13.19]  Unknown    Ascites [R18.8]  Unknown    Anxiety [F41.9]  Yes    Insomnia [G47.00]  Yes    Depression [F32.A]  Yes    Hypothyroidism due to Hashimoto's thyroiditis [E03.8, E06.3]  Yes      Resolved Hospital Problems   No resolved problems to display.        Brief Hospital Course to date:  Jessica Kraft is a 63 y.o. female with hx of anxiety, depression, insomnia, hypothyroidism, osteopenia, Vit D deficiency and UTI who presents to the ED for evaluation of abdominal pain with swelling and fever.  CT abdomen pelvis with contrast in ED showed a 2.4 cm pancreatic mass concerning for pancreatic adenocarcinoma, ascites, omental carcinomatosis, metastatic lesions involving the liver.  Malignancy diagnosis is new for the patient.  Workup so far has revealed a elevated .  Cytology from the ascites is consistent with possible GI or pancreaticbiliary primary site.       Ascites, malignancy  Pancreatic mass, concern for metastasis  Omental and peritoneal carcinomatosis  Liver lesions  Pulmonary nodules  - s/p paracentesis on 12/8, Malignant with GI or pancreatic/biliary primary.-   - Spoke with GYN, H/O and GI--regarding her fluid results.  H/O would like further tissue to delineate the primary site and do further testing.  GI-to perform an EGD with EUS for biopsy today.   is positive.  Brain MRI negative for metastases.    - pain control  - nausea control  --Oncology to follow after.  Will need to decide on timing of port placement.    UTI--E.coli  --completed treatment.        Constipation--resolved  - likely r/t ascites  - bowel regimen.  Tolerating diet     GERD  Dysphagia  Hiatal hernia  - protonix BID  - s/p NG tube w decompression. Now removed  -tolerating diet. Much of GI symptoms on admission related to ascites and has since resolved from para w 4.2L removal     Anxiety   Depression  Insomnia    Expected Discharge Location and Transportation: home  Expected  Discharge   Expected Discharge Date: 12/14/2023; Expected Discharge Time:      DVT prophylaxis:  Mechanical DVT prophylaxis orders are present.     AM-PAC 6 Clicks Score (PT): 24 (12/12/23 2049)    CODE STATUS:   Code Status and Medical Interventions:   Ordered at: 12/08/23 0205     Code Status (Patient has no pulse and is not breathing):    CPR (Attempt to Resuscitate)     Medical Interventions (Patient has pulse or is breathing):    Full Support       Princess Aguirre MD  12/13/23

## 2023-12-13 NOTE — PLAN OF CARE
Goal Outcome Evaluation:  Plan of Care Reviewed With: patient        Progress: improving  Outcome Evaluation: Slept most of the night. No acute distress noted. SR/ST on tele. Denies CP or SOA. Pain controlled with PRN med. No c/o N/V. Kept NPO p MN as ordered. VSS. Cont current POC

## 2023-12-13 NOTE — PLAN OF CARE
Goal Outcome Evaluation:  Plan of Care Reviewed With: patient, spouse           Outcome Evaluation: Pt A&O, VSS, sats>90% on Room air. patient ambulated in room this shift. EGD procedure completed this shift, pt tolerated well.  Family at bedside. PRN pain meds administered with relief.No acute events this shift. Call light within reach, bed in lowest postion, no other complaints at this time.

## 2023-12-13 NOTE — ANESTHESIA POSTPROCEDURE EVALUATION
Patient: Jessica Kraft    Procedure Summary       Date: 12/13/23 Room / Location: [facility] ENDOSCOPY 2 /  [facility]    Anesthesia Start: 1258 Anesthesia Stop: 1509    Procedures:       ENDOSCOPIC ULTRASOUND (UPPER)      ESOPHAGOGASTRODUODENOSCOPY Diagnosis:     Surgeons: Flakito Abrams MD Provider: Chuck Matta MD    Anesthesia Type: general ASA Status: 3            Anesthesia Type: general    Vitals  Vitals Value Taken Time   /79 12/13/23 1510   Temp 96.9 °F (36.1 °C) 12/13/23 1504   Pulse 93 12/13/23 1513   Resp 16 12/13/23 1504   SpO2 94 % 12/13/23 1513   Vitals shown include unfiled device data.        Post Anesthesia Care and Evaluation    Patient location during evaluation: PACU  Patient participation: complete - patient participated  Level of consciousness: sleepy but conscious  Pain management: adequate    Airway patency: patent  Anesthetic complications: No anesthetic complications  PONV Status: none  Cardiovascular status: hemodynamically stable and acceptable  Respiratory status: nonlabored ventilation, acceptable and nasal cannula  Hydration status: acceptable

## 2023-12-13 NOTE — PROGRESS NOTES
[facility] Medicine Services  PROGRESS NOTE    Patient Name: Jessica Kraft  : 1960  MRN: [id number]    Date of Admission: 2023  Primary Care Physician: Leonarda Díaz MD    Subjective   Subjective     CC:  F/u abd pain, new malignancy diagnosis    HPI:  Pt overall did well.  Having a little more pain today in her abdomen.  Was able to get some sleep.    Objective   Objective     Vital Signs:   Temp:  [97.9 °F (36.6 °C)-98.3 °F (36.8 °C)] 98.3 °F (36.8 °C)  Heart Rate:  [] 90  Resp:  [18] 18  BP: (138-163)/() 138/88     Physical Exam:  Constitutional: No acute distress, awake, alert  HENT: NCAT, mucous membranes moist  Respiratory: Clear to auscultation bilaterally, respiratory effort normal   Cardiovascular: RRR, no murmurs, rubs, or gallops  Gastrointestinal: Positive bowel sounds, soft, very slight tenderness and some fullness, nondistended  Musculoskeletal: No bilateral ankle edema  Psychiatric: Appropriate affect, cooperative  Neurologic: Oriented x 3, strength symmetric in all extremities, Cranial Nerves grossly intact to confrontation, speech clear  Skin: No rashes    Unchanged from yesterday    Results Reviewed:  LAB RESULTS:      Lab 23  0332 12/10/23  0518 23  0321 23  2311 23  1959 23  1142   WBC 8.98 7.32 14.64*  --  23.04* 7.66   HEMOGLOBIN 11.2* 11.1* 14.2  --  14.6 12.2   HEMATOCRIT 35.6 35.2 46.2  --  47.3* 39.7   PLATELETS 230 230 469*  --  468* 282   NEUTROS ABS 7.17* 5.78 12.00*  --  20.35* 5.96   IMMATURE GRANS (ABS) 0.10* 0.05  --   --  0.09* 0.04   LYMPHS ABS 0.68* 0.63*  --   --  0.66* 0.70   MONOS ABS 0.76 0.65  --   --  1.88* 0.64   EOS ABS 0.22 0.17 0.00  --  0.01 0.26   MCV 86.4 86.5 87.5  --  88.6 89.4   PROCALCITONIN  --   --   --   --  0.49*  --    LACTATE  --   --   --  1.3 2.3*  --    PROTIME  --   --   --   --  14.6*  --          Lab 23  1556 23  0332 12/10/23  0518 23  0640  12/08/23  0321 12/07/23 2157 12/07/23 1959   SODIUM  --  139 135* 139 135*  --  137   POTASSIUM 4.0 3.5 3.2* 4.0 4.8  --  4.1   CHLORIDE  --  103 100 104 100  --  101   CO2  --  26.0 27.0 25.0 21.0*  --  21.0*   ANION GAP  --  10.0 8.0 10.0 14.0  --  15.0   BUN  --  13 15 21 24*  --  21   CREATININE  --  0.70 0.76 0.74 1.00  --  0.92   EGFR  --  97.9 88.7 91.6 63.8  --  70.5   GLUCOSE  --  105* 99 116* 164*  --  163*   CALCIUM  --  8.6 8.7 8.7 9.2  --  9.3   MAGNESIUM  --   --   --   --  2.3  --   --    PHOSPHORUS  --   --   --   --  4.5  --   --    HEMOGLOBIN A1C  --   --   --   --   --   --  5.70*   TSH  --   --   --   --   --  1.730  --          Lab 12/08/23 0321 12/07/23 1959 12/06/23  1142   TOTAL PROTEIN 7.4 8.0 6.7   ALBUMIN 3.7 4.0 3.5   GLOBULIN 3.7 4.0 3.2   ALT (SGPT) 15 11 7   AST (SGOT) 26 21 13   BILIRUBIN 0.6 0.6 0.3   ALK PHOS 137* 149* 118*   LIPASE  --  9* 14         Lab 12/07/23 2157 12/07/23 1959   PROBNP  --  107.5   HSTROP T 20* 30*   PROTIME  --  14.6*   INR  --  1.12                 Lab 12/08/23  0332   PH, ARTERIAL 7.379   PCO2, ARTERIAL 35.0   PO2 ART 80.8*   FIO2 36   HCO3 ART 20.7   BASE EXCESS ART -3.8*   CARBOXYHEMOGLOBIN 1.3     Brief Urine Lab Results  (Last result in the past 365 days)        Color   Clarity   Blood   Leuk Est   Nitrite   Protein   CREAT   Urine HCG        12/07/23 2157 [place]   Cloudy   Negative   Small (1+)   Positive   30 mg/dL (1+)                   Microbiology Results Abnormal       Procedure Component Value - Date/Time    Fungus Smear - Peritoneal Fluid, Peritoneum [[id number]] Collected: 12/08/23 1037    Lab Status: Final result Specimen: Peritoneal Fluid from Peritoneum Updated: 12/12/23 1445     Fungal Stain No yeast or hyphal elements seen    Blood Culture - Blood, Arm, Left [[id number]]  (Normal) Collected: 12/07/23 2042    Lab Status: Preliminary result Specimen: Blood from Arm, Left Updated: 12/11/23 2246     Blood Culture No growth at 4 days     Blood Culture - Blood, Arm, Right [[id number]]  (Normal) Collected: 12/07/23 2042    Lab Status: Preliminary result Specimen: Blood from Arm, Right Updated: 12/11/23 2246     Blood Culture No growth at 4 days    Anaerobic Culture - Body Fluid, Peritoneum [[id number]]  (Normal) Collected: 12/08/23 1036    Lab Status: Preliminary result Specimen: Body Fluid from Peritoneum Updated: 12/11/23 0944     Anaerobic Culture No anaerobes isolated at 3 days    Body Fluid Culture - Body Fluid, Peritoneum [[id number]] Collected: 12/08/23 1036    Lab Status: Final result Specimen: Body Fluid from Peritoneum Updated: 12/11/23 0817     Body Fluid Culture No growth at 3 days     Gram Stain Rare (1+) WBCs seen      No organisms seen    Respiratory Panel PCR w/COVID-19(SARS-CoV-2) [place] In-House, NP Swab in UTM/VTM, 2 HR TAT - Swab, Nasopharynx [[id number]]  (Normal) Collected: 12/08/23 0259    Lab Status: Final result Specimen: Swab from Nasopharynx Updated: 12/08/23 0427     ADENOVIRUS, PCR Not Detected     Coronavirus 229E Not Detected     Coronavirus HKU1 Not Detected     Coronavirus NL63 Not Detected     Coronavirus OC43 Not Detected     COVID19 Not Detected     Human Metapneumovirus Not Detected     Human Rhinovirus/Enterovirus Not Detected     Influenza A PCR Not Detected     Influenza B PCR Not Detected     Parainfluenza Virus 1 Not Detected     Parainfluenza Virus 2 Not Detected     Parainfluenza Virus 3 Not Detected     Parainfluenza Virus 4 Not Detected     RSV, PCR Not Detected     Bordetella pertussis pcr Not Detected     Bordetella parapertussis PCR Not Detected     Chlamydophila pneumoniae PCR Not Detected     Mycoplasma pneumo by PCR Not Detected    Narrative:      In the setting of a positive respiratory panel with a viral infection PLUS a negative procalcitonin without other underlying concern for bacterial infection, consider observing off antibiotics or discontinuation of antibiotics and continue  supportive care. If the respiratory panel is positive for atypical bacterial infection (Bordetella pertussis, Chlamydophila pneumoniae, or Mycoplasma pneumoniae), consider antibiotic de-escalation to target atypical bacterial infection.            No radiology results from the last 24 hrs        Current medications:  Scheduled Meds:[Held by provider] aspirin, 81 mg, Oral, Daily  levothyroxine, 125 mcg, Oral, QAM  pantoprazole, 40 mg, Intravenous, Q12H  polyethylene glycol, 17 g, Oral, Daily  rosuvastatin, 5 mg, Oral, Daily  senna-docusate sodium, 2 tablet, Oral, BID  sodium chloride, 10 mL, Intravenous, Q12H  venlafaxine XR, 150 mg, Oral, Daily      Continuous Infusions:   PRN Meds:.  acetaminophen **OR** acetaminophen **OR** acetaminophen    ALPRAZolam    senna-docusate sodium **AND** polyethylene glycol **AND** bisacodyl **AND** bisacodyl    dicyclomine    HYDROmorphone **AND** naloxone    influenza vaccine    ipratropium-albuterol    melatonin    nitroglycerin    Potassium Replacement - Follow Nurse / BPA Driven Protocol    prochlorperazine    promethazine **OR** promethazine    sodium chloride    sodium chloride    sodium chloride    Assessment & Plan   Assessment & Plan     Active Hospital Problems    Diagnosis  POA    **Sepsis [A41.9]  Yes    Severe malnutrition [E43]  Yes    Acute UTI (urinary tract infection) [N39.0]  Unknown    Pancreatic mass [K86.89]  Unknown    Lung nodules [R91.8]  Unknown    Liver lesion [K76.9]  Unknown    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        Brief Hospital Course to  date:  Jessica Kraft is a 63 y.o. female with hx of anxiety, depression, insomnia, hypothyroidism, osteopenia, Vit D deficiency and UTI who presents to the ED for evaluation of abdominal pain with swelling and fever.  CT abdomen pelvis with contrast in ED showed a 2.4 cm pancreatic mass concerning for pancreatic adenocarcinoma, ascites, omental carcinomatosis, metastatic lesions involving the liver.  Malignancy diagnosis is new for the patient.  Workup so far has revealed a elevated .  Cytology from the ascites is consistent with possible GI or pancreaticbiliary primary site.       Ascites, malignancy  Pancreatic mass, concern for metastasis  Omental and peritoneal carcinomatosis  Liver lesions  Pulmonary nodules  - s/p paracentesis on 12/8, Malignant with GI or pancreatic/biliary primary.-   - Spoke with GYN, H/O and GI today regarding her fluid results.  H/O would like further tissue to delineate the primary site and do further testing.  Discussion with GI--agreeable to perform an EGD with EUS for biopsy.  Plan for tomorrow.  Pt will need to be NPO after MN.   is positive.  Brain MRI negative for metastases.    --given results, GYN is signing off.  - pain control  - nausea control    UTI--E.coli  --completed treatment.        Constipation--resolved  - likely r/t ascites  - bowel regimen.  Tolerating diet     GERD  Dysphagia  Hiatal hernia  - protonix BID  - s/p NG tube w decompression. Now removed  -tolerating diet. Much of GI symptoms on admission related to ascites and has since resolved from para w 4.2L removal     Anxiety   Depression  Insomnia    Expected Discharge Location and Transportation: home  Expected Discharge   Expected Discharge Date: 12/14/2023; Expected Discharge Time:      DVT prophylaxis:  Mechanical DVT prophylaxis orders are present.     AM-PAC 6 Clicks Score (PT): 24 (12/11/23 0800)    CODE STATUS:   Code Status and Medical Interventions:   Ordered at: 12/08/23 0204     Code  Status (Patient has no pulse and is not breathing):    CPR (Attempt to Resuscitate)     Medical Interventions (Patient has pulse or is breathing):    Full Support       Princess Aguirre MD  12/12/23    Total time spent: Time Spent: Time Spent: 60 minutes  Time spent includes time reviewing chart, face-to-face time, counseling patient/family/caregiver, ordering medications/tests/procedures, communicating with other health care professionals, documenting clinical information in the electronic health record, and coordination of care.

## 2023-12-13 NOTE — PROGRESS NOTES
"HEMATOLOGY/ONCOLOGY PROGRESS NOTE    S:Mild abdominal pain after prolonged sitting in recliner, responded to medication and now resolved. No persistent abdominal pain.   Abdominal swelling remains improved from prior to para      Past medical history, social history and family history was reviewed and unchanged from prior visit.      Medications:  The current medication list was reviewed in the EMR    ALLERGIES:  No Known Allergies      Physical Exam    VITAL SIGNS:  /89 (BP Location: Right arm, Patient Position: Lying)   Pulse 90   Temp 97.8 °F (36.6 °C) (Oral)   Resp 18   Ht 167.6 cm (66\")   Wt 93.8 kg (206 lb 12.8 oz)   LMP  (LMP Unknown) Comment: N/A  SpO2 93%   BMI 33.38 kg/m²   Temp:  [97.8 °F (36.6 °C)-98.3 °F (36.8 °C)] 97.8 °F (36.6 °C)      Performance Status: 0                Physical Exam    General: well appearing, in no acute distress  HEENT: sclerae anicteric, oropharynx clear, neck is supple  Lymphatics: no cervical, supraclavicular, or axillary adenopathy  Cardiovascular: regular rate and rhythm, no murmurs, rubs or gallops  Lungs: clear to auscultation bilaterally  Abdomen: +ascites, mild distention +BS  Extremities: no lower extremity edema  Skin: palpable nodule superior to umbilicus  Msk:  Shows no weakness of the large muscle groups  Psych: Mood is stable        RECENT LABS:    Lab Results   Component Value Date    HGB 11.2 (L) 12/11/2023    HCT 35.6 12/11/2023    MCV 86.4 12/11/2023     12/11/2023    WBC 8.98 12/11/2023    NEUTROABS 7.17 (H) 12/11/2023    LYMPHSABS 0.68 (L) 12/11/2023    MONOSABS 0.76 12/11/2023    EOSABS 0.22 12/11/2023    BASOSABS 0.05 12/11/2023       Lab Results   Component Value Date    GLUCOSE 105 (H) 12/11/2023    BUN 13 12/11/2023    CREATININE 0.70 12/11/2023     12/11/2023    K 4.0 12/11/2023     12/11/2023    CO2 26.0 12/11/2023    CALCIUM 8.6 12/11/2023    PROTEINTOT 7.4 12/08/2023    ALBUMIN 3.7 12/08/2023    BILITOT 0.6 12/08/2023 "    ALKPHOS 137 (H) 12/08/2023    AST 26 12/08/2023    ALT 15 12/08/2023     Component      Latest Ref Rng 12/8/2023   CEA      ng/mL 5.75    CA 15-3      <=25.0 U/mL 21.5    CA 19-9      <=35.0 U/mL 6.7          0.0 - 38.1 U/mL 323.0 (H)           12/12/2023    DIAGNOSIS:  PERITONEAL FLUID:  Malignant, see comment    MICROSCOPIC DESCRIPTION:  Malignant cells are present.    Professional interpretation rendered by Corina Copeland D.O., F.C.A.P. at [facility] [employer], [address] [place] [address].    COMMENT:  The following immunohistochemical stains were performed and reviewed with  appropriate controls:    TTF-1: Negative  CK7: Positive  CK20: Positive  CDX2: Positive  GATA3: Negative  PAX8: Negative    Given the CK7, CK20 and CDX2 positivity a primary of gastrointestinal or  pancreaticobiliary origin is favored.  Clinical and radiologic correlation is required  to determine primary site.     Assessment/Plan    1.  Pancreatic mass  2.  Peritoneal/omental implants  3.  Indeterminate lung nodules  4.  Indeterminate liver hypodensities  5.  Malignant Ascites  -We discussed the clinical impression of metastatic malignancy. We extensively reviewed her cytology results and negative flow cytometry. This confirms malignant ascites with upper GI/pancreatobiliary source favored. EGD with pancreatic EUS/biopsy planned tomorrow.   -We discussed presumptive diagnosis of metastatic malignancy of most likely pancreatic origin. Unless another candidate primary site is identified on pending endoscopy, will plan with palliative chemotherapy with FOLFIRINOX first line given healthy and fit. Chemo orders placed, will initiate PA, chemo ed while awaiting final results. She prefers [facility] location for treatment.  -Will send [facility] on blood after discharge. This will assess for targetable mutations and will also assess for BRCA mutation in context of family history. May benefit from outpatient Genetic counseling at a  future date.   -Tumor CA 19-9, , CA 15-3, CA 2729, and CEA reviewed and notable for CA 19-9 normal and  markedly elevated.   -Anticipate she will require port placement. Will see if this can be arranged post discharge but inpatient placement is also acceptable based on patient preference or if unable to obtain timely outpatient date.   -She would like to delay treatment start until after [facility] and we will set her up tentatively for treatment start 12/27.   -Discussed risk of recurrent ascites accumulation/possible need for repeat para.     Sherri العراقي MD  [facility] Hematology and Oncology    12/12/2023      Time spent > 50 min including discussion with hospitalist and nursing, reviewing path, discussion with pt/family, placing orders, coordinating care

## 2023-12-13 NOTE — CASE MANAGEMENT/SOCIAL WORK
Continued Stay Note  [facility]     Patient Name: Jessica Kraft  MRN: [id number]  Today's Date: 12/13/2023    Admit Date: 12/7/2023    Plan: discharge plan   Discharge Plan       Row Name 12/13/23 1612       Plan    Plan discharge plan    Plan Comments Per discussion in MDR, pt having an EGD today. Plan is home with spouse at discharge. CM will cont to follow                   Discharge Codes    No documentation.                 Expected Discharge Date and Time       Expected Discharge Date Expected Discharge Time    Dec 14, 2023               Amy Singleton RN

## 2023-12-14 ENCOUNTER — APPOINTMENT (OUTPATIENT)
Dept: GENERAL RADIOLOGY | Facility: HOSPITAL | Age: 63
DRG: 435 | End: 2023-12-14
Payer: COMMERCIAL

## 2023-12-14 ENCOUNTER — APPOINTMENT (OUTPATIENT)
Dept: INTERVENTIONAL RADIOLOGY/VASCULAR | Facility: HOSPITAL | Age: 63
DRG: 435 | End: 2023-12-14
Payer: COMMERCIAL

## 2023-12-14 ENCOUNTER — APPOINTMENT (OUTPATIENT)
Dept: ULTRASOUND IMAGING | Facility: HOSPITAL | Age: 63
DRG: 435 | End: 2023-12-14
Payer: COMMERCIAL

## 2023-12-14 LAB
ANION GAP SERPL CALCULATED.3IONS-SCNC: 10 MMOL/L (ref 5–15)
BASOPHILS # BLD AUTO: 0.11 10*3/MM3 (ref 0–0.2)
BASOPHILS # BLD AUTO: 0.16 10*3/MM3 (ref 0–0.2)
BASOPHILS NFR BLD AUTO: 0.6 % (ref 0–1.5)
BASOPHILS NFR BLD AUTO: 0.9 % (ref 0–1.5)
BUN SERPL-MCNC: 13 MG/DL (ref 8–23)
BUN/CREAT SERPL: 16.3 (ref 7–25)
CALCIUM SPEC-SCNC: 9.3 MG/DL (ref 8.6–10.5)
CHLORIDE SERPL-SCNC: 100 MMOL/L (ref 98–107)
CO2 SERPL-SCNC: 25 MMOL/L (ref 22–29)
CREAT SERPL-MCNC: 0.8 MG/DL (ref 0.57–1)
CRP SERPL-MCNC: 5.53 MG/DL (ref 0–0.5)
D-LACTATE SERPL-SCNC: 1.6 MMOL/L (ref 0.5–2)
DEPRECATED RDW RBC AUTO: 45.1 FL (ref 37–54)
DEPRECATED RDW RBC AUTO: 45.6 FL (ref 37–54)
EGFRCR SERPLBLD CKD-EPI 2021: 83.4 ML/MIN/1.73
EOSINOPHIL # BLD AUTO: 0.06 10*3/MM3 (ref 0–0.4)
EOSINOPHIL # BLD AUTO: 0.13 10*3/MM3 (ref 0–0.4)
EOSINOPHIL NFR BLD AUTO: 0.3 % (ref 0.3–6.2)
EOSINOPHIL NFR BLD AUTO: 0.8 % (ref 0.3–6.2)
ERYTHROCYTE [DISTWIDTH] IN BLOOD BY AUTOMATED COUNT: 14.2 % (ref 12.3–15.4)
ERYTHROCYTE [DISTWIDTH] IN BLOOD BY AUTOMATED COUNT: 14.5 % (ref 12.3–15.4)
GLUCOSE SERPL-MCNC: 111 MG/DL (ref 65–99)
HCT VFR BLD AUTO: 41.8 % (ref 34–46.6)
HCT VFR BLD AUTO: 41.9 % (ref 34–46.6)
HGB BLD-MCNC: 12.9 G/DL (ref 12–15.9)
HGB BLD-MCNC: 13.1 G/DL (ref 12–15.9)
IMM GRANULOCYTES # BLD AUTO: 0.93 10*3/MM3 (ref 0–0.05)
IMM GRANULOCYTES # BLD AUTO: 1.09 10*3/MM3 (ref 0–0.05)
IMM GRANULOCYTES NFR BLD AUTO: 5.4 % (ref 0–0.5)
IMM GRANULOCYTES NFR BLD AUTO: 6 % (ref 0–0.5)
LYMPHOCYTES # BLD AUTO: 1.39 10*3/MM3 (ref 0.7–3.1)
LYMPHOCYTES # BLD AUTO: 1.49 10*3/MM3 (ref 0.7–3.1)
LYMPHOCYTES NFR BLD AUTO: 8.1 % (ref 19.6–45.3)
LYMPHOCYTES NFR BLD AUTO: 8.2 % (ref 19.6–45.3)
MAGNESIUM SERPL-MCNC: 2 MG/DL (ref 1.6–2.4)
MCH RBC QN AUTO: 26.9 PG (ref 26.6–33)
MCH RBC QN AUTO: 27.1 PG (ref 26.6–33)
MCHC RBC AUTO-ENTMCNC: 30.9 G/DL (ref 31.5–35.7)
MCHC RBC AUTO-ENTMCNC: 31.3 G/DL (ref 31.5–35.7)
MCV RBC AUTO: 86.7 FL (ref 79–97)
MCV RBC AUTO: 87.1 FL (ref 79–97)
MONOCYTES # BLD AUTO: 1.61 10*3/MM3 (ref 0.1–0.9)
MONOCYTES # BLD AUTO: 1.82 10*3/MM3 (ref 0.1–0.9)
MONOCYTES NFR BLD AUTO: 10.6 % (ref 5–12)
MONOCYTES NFR BLD AUTO: 8.8 % (ref 5–12)
NEUTROPHILS NFR BLD AUTO: 12.87 10*3/MM3 (ref 1.7–7)
NEUTROPHILS NFR BLD AUTO: 13.8 10*3/MM3 (ref 1.7–7)
NEUTROPHILS NFR BLD AUTO: 74.5 % (ref 42.7–76)
NEUTROPHILS NFR BLD AUTO: 75.8 % (ref 42.7–76)
NRBC BLD AUTO-RTO: 0 /100 WBC (ref 0–0.2)
NRBC BLD AUTO-RTO: 0 /100 WBC (ref 0–0.2)
PHOSPHATE SERPL-MCNC: 3.9 MG/DL (ref 2.5–4.5)
PLATELET # BLD AUTO: 367 10*3/MM3 (ref 140–450)
PLATELET # BLD AUTO: 372 10*3/MM3 (ref 140–450)
PMV BLD AUTO: 9.5 FL (ref 6–12)
PMV BLD AUTO: 9.5 FL (ref 6–12)
POTASSIUM SERPL-SCNC: 5.1 MMOL/L (ref 3.5–5.2)
PROCALCITONIN SERPL-MCNC: 0.14 NG/ML (ref 0–0.25)
RBC # BLD AUTO: 4.8 10*6/MM3 (ref 3.77–5.28)
RBC # BLD AUTO: 4.83 10*6/MM3 (ref 3.77–5.28)
SODIUM SERPL-SCNC: 135 MMOL/L (ref 136–145)
WBC NRBC COR # BLD AUTO: 17.25 10*3/MM3 (ref 3.4–10.8)
WBC NRBC COR # BLD AUTO: 18.21 10*3/MM3 (ref 3.4–10.8)

## 2023-12-14 PROCEDURE — 85025 COMPLETE CBC W/AUTO DIFF WBC: CPT | Performed by: INTERNAL MEDICINE

## 2023-12-14 PROCEDURE — 86140 C-REACTIVE PROTEIN: CPT | Performed by: INTERNAL MEDICINE

## 2023-12-14 PROCEDURE — 99233 SBSQ HOSP IP/OBS HIGH 50: CPT | Performed by: INTERNAL MEDICINE

## 2023-12-14 PROCEDURE — 25010000002 HYDROMORPHONE PER 4 MG: Performed by: INTERNAL MEDICINE

## 2023-12-14 PROCEDURE — 83605 ASSAY OF LACTIC ACID: CPT | Performed by: NURSE PRACTITIONER

## 2023-12-14 PROCEDURE — 71045 X-RAY EXAM CHEST 1 VIEW: CPT

## 2023-12-14 PROCEDURE — 84100 ASSAY OF PHOSPHORUS: CPT | Performed by: INTERNAL MEDICINE

## 2023-12-14 PROCEDURE — 83735 ASSAY OF MAGNESIUM: CPT | Performed by: INTERNAL MEDICINE

## 2023-12-14 PROCEDURE — 25010000002 LIDOCAINE 1 % SOLUTION: Performed by: INTERNAL MEDICINE

## 2023-12-14 PROCEDURE — 80048 BASIC METABOLIC PNL TOTAL CA: CPT | Performed by: INTERNAL MEDICINE

## 2023-12-14 PROCEDURE — 85025 COMPLETE CBC W/AUTO DIFF WBC: CPT | Performed by: NURSE PRACTITIONER

## 2023-12-14 PROCEDURE — 76942 ECHO GUIDE FOR BIOPSY: CPT

## 2023-12-14 PROCEDURE — 0W9G3ZX DRAINAGE OF PERITONEAL CAVITY, PERCUTANEOUS APPROACH, DIAGNOSTIC: ICD-10-PCS | Performed by: RADIOLOGY

## 2023-12-14 PROCEDURE — 84145 PROCALCITONIN (PCT): CPT | Performed by: NURSE PRACTITIONER

## 2023-12-14 PROCEDURE — 87040 BLOOD CULTURE FOR BACTERIA: CPT | Performed by: NURSE PRACTITIONER

## 2023-12-14 RX ORDER — HYDROCODONE BITARTRATE AND ACETAMINOPHEN 5; 325 MG/1; MG/1
1 TABLET ORAL EVERY 4 HOURS PRN
Status: DISCONTINUED | OUTPATIENT
Start: 2023-12-14 | End: 2023-12-15 | Stop reason: HOSPADM

## 2023-12-14 RX ORDER — LIDOCAINE HYDROCHLORIDE 10 MG/ML
5 INJECTION, SOLUTION INFILTRATION; PERINEURAL ONCE
Status: COMPLETED | OUTPATIENT
Start: 2023-12-14 | End: 2023-12-14

## 2023-12-14 RX ADMIN — HYDROMORPHONE HYDROCHLORIDE 0.5 MG: 1 INJECTION, SOLUTION INTRAMUSCULAR; INTRAVENOUS; SUBCUTANEOUS at 01:45

## 2023-12-14 RX ADMIN — LIDOCAINE HYDROCHLORIDE 5 ML: 10 INJECTION, SOLUTION INFILTRATION; PERINEURAL at 11:38

## 2023-12-14 RX ADMIN — LEVOTHYROXINE SODIUM 125 MCG: 125 TABLET ORAL at 08:52

## 2023-12-14 RX ADMIN — HYDROCODONE BITARTRATE AND ACETAMINOPHEN 1 TABLET: 5; 325 TABLET ORAL at 21:48

## 2023-12-14 RX ADMIN — VENLAFAXINE HYDROCHLORIDE 150 MG: 75 CAPSULE, EXTENDED RELEASE ORAL at 08:50

## 2023-12-14 RX ADMIN — Medication 10 ML: at 08:51

## 2023-12-14 RX ADMIN — PANTOPRAZOLE SODIUM 40 MG: 40 TABLET, DELAYED RELEASE ORAL at 08:52

## 2023-12-14 RX ADMIN — SENNOSIDES AND DOCUSATE SODIUM 2 TABLET: 8.6; 5 TABLET ORAL at 08:49

## 2023-12-14 RX ADMIN — SENNOSIDES AND DOCUSATE SODIUM 2 TABLET: 8.6; 5 TABLET ORAL at 21:40

## 2023-12-14 RX ADMIN — PANTOPRAZOLE SODIUM 40 MG: 40 TABLET, DELAYED RELEASE ORAL at 18:03

## 2023-12-14 RX ADMIN — ROSUVASTATIN 5 MG: 10 TABLET, FILM COATED ORAL at 08:50

## 2023-12-14 RX ADMIN — ALPRAZOLAM 1 MG: 1 TABLET ORAL at 21:44

## 2023-12-14 RX ADMIN — POLYETHYLENE GLYCOL 3350 17 G: 17 POWDER, FOR SOLUTION ORAL at 08:52

## 2023-12-14 NOTE — PRE-SEDATION DOCUMENTATION
[facility]   Vascular Interventional Radiology  History & Physicial        Patient Name:Jessica Kraft    : 1960    MRN: [id number]    Primary Care Physician: Leonarda Díaz MD    Referring Physician: No ref. provider found     Date of admission: 2023    Subjective     Reason for Consult: Para    History of Present Illness     Jessica Kraft is a 63 y.o. female referred to IR as noted above.      Active Hospital Problems:  Active Hospital Problems    Diagnosis     **Sepsis     Pancreatic cancer     Severe malnutrition     Acute UTI (urinary tract infection)     Pancreatic mass     Lung nodules     Liver lesion     Abdominal carcinomatosis - omental     Peritoneal carcinomatosis     GERD without esophagitis     Hiatal hernia     Other dysphagia     Ascites     Anxiety     Insomnia     Depression     Hypothyroidism due to Hashimoto's thyroiditis        Personal History     Past Medical History:   Diagnosis Date    Abscess     Anxiety     Bilateral ovarian cysts     Depression     Encounter for routine gynecological examination     Foot pain     H/O bone density study 2014    H/O mammogram 2016    [facility]    Hot flashes, menopausal     Hypothyroidism due to Hashimoto's thyroiditis     Insomnia     Miscarriage     x3    Osteopenia     Pap smear for cervical cancer screening 2014    Routine general medical examination at a health care facility     Urinary incontinence     Urinary tract infection     Vitamin D deficiency        Past Surgical History:   Procedure Laterality Date    ABDOMINOPLASTY      CHOLECYSTECTOMY      COLONOSCOPY  2012    [facility] in [facility] normal    ECTOPIC PREGNANCY SURGERY Left     ENDOSCOPY N/A 2023    Procedure: ESOPHAGOGASTRODUODENOSCOPY;  Surgeon: Flakito Abrams MD;  Location: [facility] ENDOSCOPY;  Service: Gastroenterology;  Laterality: N/A;    HERNIA REPAIR         Family History: Her family history includes Breast cancer in her  "maternal aunt and another family member; Depression in her mother; Hypertension in her father; Mental illness in her mother; Thyroid disease in her mother and sister.     Social History: She  reports that she has never smoked. She has never used smokeless tobacco. She reports that she does not drink alcohol and does not use drugs.    Home Medications:  RABEprazole, aspirin, cyclobenzaprine, dicyclomine, levothyroxine, metoclopramide, rosuvastatin, and venlafaxine XR    Current Medications:    acetaminophen **OR** acetaminophen **OR** acetaminophen    ALPRAZolam    [Held by provider] aspirin    senna-docusate sodium **AND** polyethylene glycol **AND** bisacodyl **AND** bisacodyl    dicyclomine    influenza vaccine    ipratropium-albuterol    lactated ringers    levothyroxine    melatonin    [DISCONTINUED] HYDROmorphone **AND** naloxone    nitroglycerin    pantoprazole    polyethylene glycol    Potassium Replacement - Follow Nurse / BPA Driven Protocol    prochlorperazine    promethazine **OR** promethazine    rosuvastatin    sodium chloride    sodium chloride    sodium chloride    sodium chloride    venlafaxine XR     Allergies:  No Known Allergies    Review of Systems    IR Procedure pertinent significant findings are mentioned in the PMH and PSH above.    Objective     Visit Vitals  /88   Pulse 83   Temp 97.7 °F (36.5 °C) (Oral)   Resp 16   Ht 167.6 cm (65.98\")   Wt 94.1 kg (207 lb 6.4 oz)   LMP  (LMP Unknown) Comment: N/A   SpO2 99%   BMI 33.49 kg/m²        Physical Exam    A&Ox3.   Able to communicate  No Apparent Distress  Average physique   CVS: VS as noted. Chart reviewed. Stable for the procedure.  Respiratory: Non labored breathing. No signs of respiratory compromise.    Result Review      I have personally reviewed the results from the time of this admission to 12/14/2023 11:31 EST and agree with these findings.  [x]  Laboratory  []  Microbiology  [x]  Radiology  []  EKG/Telemetry   []  " "Cardiology/Vascular   []  Pathology  []  Old records  []  Other:    Most notable findings include: As noted:    Results from last 7 days   Lab Units 12/14/23  0452 12/11/23  0332 12/10/23  0518 12/08/23  0321 12/07/23  1959   INR   --   --   --   --  1.12   WBC 10*3/mm3 18.21* 8.98 7.32 14.64* 23.04*   HEMOGLOBIN g/dL 12.9 11.2* 11.1* 14.2 14.6   HEMATOCRIT % 41.8 35.6 35.2 46.2 47.3*   PLATELETS 10*3/mm3 367 230 230 469* 468*       Results from last 7 days   Lab Units 12/14/23 0452 12/11/23  1556 12/11/23  0332 12/10/23  0518 12/09/23  0640 12/08/23 0321 12/07/23 1959   SODIUM mmol/L 135*  --  139 135* 139 135* 137   POTASSIUM mmol/L 5.1 4.0 3.5 3.2* 4.0 4.8 4.1   CHLORIDE mmol/L 100  --  103 100 104 100 101   CO2 mmol/L 25.0  --  26.0 27.0 25.0 21.0* 21.0*   BUN mg/dL 13  --  13 15 21 24* 21   CREATININE mg/dL 0.80  --  0.70 0.76 0.74 1.00 0.92   EGFR mL/min/1.73 83.4  --  97.9 88.7 91.6 63.8 70.5   GLUCOSE mg/dL 111*  --  105* 99 116* 164* 163*           Lab 12/08/23 0321 12/07/23 1959   TOTAL PROTEIN 7.4 8.0   ALBUMIN 3.7 4.0   GLOBULIN 3.7 4.0   ALT (SGPT) 15 11   AST (SGOT) 26 21   BILIRUBIN 0.6 0.6   ALK PHOS 137* 149*   LIPASE  --  9*       Estimated Creatinine Clearance: 84.3 mL/min (by C-G formula based on SCr of 0.8 mg/dL).   Creatinine   Date Value Ref Range Status   12/14/2023 0.80 0.57 - 1.00 mg/dL Final       COVID19   Date Value Ref Range Status   12/08/2023 Not Detected Not Detected - Ref. Range Final        No results found for: \"PREGTESTUR\", \"PREGSERUM\", \"HCG\", \"HCGQUANT\"     ASA SCALE ASSESSMENT (applicable ONLY if sedation planned):        MALLAMPATI CLASSIFICATION (applicable ONLY if sedation planned):       Assessment / Plan     Jessica Kraft is a 63 y.o. female referred to the IR service with above problem.    Plan:   As above.    Notice: The note was created before the performance of the procedure. It might have been left in the pending status and signed off after the procedure. The " time stamp on the note may be misleading.    Rajat Bhandari MD   Vascular Interventional Radiology  12/14/23   11:31 AM EST

## 2023-12-14 NOTE — PROCEDURES
The following procedure was performed: Para LLQ    Please see corresponding Radiology report for in detail procedural information. The Radiology report will be dictated shortly, if not done so already. Please see the IR RN note for the information regarding medicines administered if any, corrie-procedural vitals and I/O information.

## 2023-12-14 NOTE — NURSING NOTE
US guided paracentesis performed by Dr Bhandari.  1.6 Liters of fluid removed from peritoneum. Patient tolerated well. Report called to RN

## 2023-12-14 NOTE — CASE MANAGEMENT/SOCIAL WORK
Continued Stay Note   [facility]     Patient Name: Jessica Kraft  MRN: [id number]  Today's Date: 12/14/2023    Admit Date: 12/7/2023    Plan: discharge plan   Discharge Plan       Row Name 12/14/23 1513       Plan    Plan discharge plan    Plan Comments I met with pt and pt's spouse in room regarding discharge plan. Plan is home with family. Pt denies discharge needs. CM will cont to follow    Final Discharge Disposition Code 01 - home or self-care                   Discharge Codes    No documentation.                 Expected Discharge Date and Time       Expected Discharge Date Expected Discharge Time    Dec 15, 2023               Amy Singleton RN

## 2023-12-14 NOTE — PROGRESS NOTES
[facility] Medicine Services  PROGRESS NOTE    Patient Name: Jessica Kraft  : 1960  MRN: [id number]    Date of Admission: 2023  Primary Care Physician: Leonarda Díaz MD    Subjective   Subjective     CC:  F/u abd pain, new malignancy diagnosis    HPI:  Resting in bed no acute distress and tells me she feels a little better today.  Her abdomen is not as tight as it was yesterday.  Denies any fever or chills.  No chest pain or palpitation or shortness of breath.  No nausea vomiting or diarrhea.      Objective   Objective     Vital Signs:   Temp:  [97.7 °F (36.5 °C)-98.5 °F (36.9 °C)] 97.8 °F (36.6 °C)  Heart Rate:  [83-95] 95  Resp:  [16-18] 16  BP: (125-172)/(68-93) 141/80  Flow (L/min):  [2] 2     Physical Exam:  Constitutional: No acute distress, looks comfortable  HENT: NCAT, mucous membranes moist  Respiratory: Clear to auscultation bilaterally, respiratory effort normal   Cardiovascular: RRR, no murmurs, rubs, or gallops  Gastrointestinal: Positive bowel sounds, there are hard areas, not tender and slightly distended   musculoskeletal: No bilateral ankle edema  Psychiatric: Appropriate affect, cooperative  Neurologic: Oriented x 3, strength symmetric in all extremities, Cranial Nerves grossly intact to confrontation, speech clear  Skin: No rashes    Unchanged from yesterday    Results Reviewed:  LAB RESULTS:      Lab 23  0452 23  0332 12/10/23  0518 23  0321 23  2311 239   WBC 18.21* 8.98 7.32 14.64*  --  23.04*   HEMOGLOBIN 12.9 11.2* 11.1* 14.2  --  14.6   HEMATOCRIT 41.8 35.6 35.2 46.2  --  47.3*   PLATELETS 367 230 230 469*  --  468*   NEUTROS ABS 13.80* 7.17* 5.78 12.00*  --  20.35*   IMMATURE GRANS (ABS) 1.09* 0.10* 0.05  --   --  0.09*   LYMPHS ABS 1.49 0.68* 0.63*  --   --  0.66*   MONOS ABS 1.61* 0.76 0.65  --   --  1.88*   EOS ABS 0.06 0.22 0.17 0.00  --  0.01   MCV 87.1 86.4 86.5 87.5  --  88.6   PROCALCITONIN  --   --   --    --   --  0.49*   LACTATE  --   --   --   --  1.3 2.3*   PROTIME  --   --   --   --   --  14.6*         Lab 12/14/23  0452 12/11/23  1556 12/11/23  0332 12/10/23  0518 12/09/23  0640 12/08/23 0321 12/07/23 2157 12/07/23 1959   SODIUM 135*  --  139 135* 139 135*  --  137   POTASSIUM 5.1 4.0 3.5 3.2* 4.0 4.8  --  4.1   CHLORIDE 100  --  103 100 104 100  --  101   CO2 25.0  --  26.0 27.0 25.0 21.0*  --  21.0*   ANION GAP 10.0  --  10.0 8.0 10.0 14.0  --  15.0   BUN 13  --  13 15 21 24*  --  21   CREATININE 0.80  --  0.70 0.76 0.74 1.00  --  0.92   EGFR 83.4  --  97.9 88.7 91.6 63.8  --  70.5   GLUCOSE 111*  --  105* 99 116* 164*  --  163*   CALCIUM 9.3  --  8.6 8.7 8.7 9.2  --  9.3   MAGNESIUM 2.0  --   --   --   --  2.3  --   --    PHOSPHORUS 3.9  --   --   --   --  4.5  --   --    HEMOGLOBIN A1C  --   --   --   --   --   --   --  5.70*   TSH  --   --   --   --   --   --  1.730  --          Lab 12/08/23 0321 12/07/23 1959   TOTAL PROTEIN 7.4 8.0   ALBUMIN 3.7 4.0   GLOBULIN 3.7 4.0   ALT (SGPT) 15 11   AST (SGOT) 26 21   BILIRUBIN 0.6 0.6   ALK PHOS 137* 149*   LIPASE  --  9*         Lab 12/07/23 2157 12/07/23 1959   PROBNP  --  107.5   HSTROP T 20* 30*   PROTIME  --  14.6*   INR  --  1.12                 Lab 12/08/23 0332   PH, ARTERIAL 7.379   PCO2, ARTERIAL 35.0   PO2 ART 80.8*   FIO2 36   HCO3 ART 20.7   BASE EXCESS ART -3.8*   CARBOXYHEMOGLOBIN 1.3     Brief Urine Lab Results  (Last result in the past 365 days)        Color   Clarity   Blood   Leuk Est   Nitrite   Protein   CREAT   Urine HCG        12/07/23 2157 [place]   Cloudy   Negative   Small (1+)   Positive   30 mg/dL (1+)                   Microbiology Results Abnormal       Procedure Component Value - Date/Time    Fungus Culture - Peritoneal Fluid, Peritoneum [[id number]] Collected: 12/08/23 1037    Lab Status: Preliminary result Specimen: Peritoneal Fluid from Peritoneum Updated: 12/13/23 1102     Fungus Culture No fungus isolated at less than 1  week    Anaerobic Culture - Body Fluid, Peritoneum [[id number]]  (Normal) Collected: 12/08/23 1036    Lab Status: Final result Specimen: Body Fluid from Peritoneum Updated: 12/13/23 0704     Anaerobic Culture No anaerobes isolated at 5 days    Blood Culture - Blood, Arm, Left [[id number]]  (Normal) Collected: 12/07/23 2042    Lab Status: Final result Specimen: Blood from Arm, Left Updated: 12/12/23 2246     Blood Culture No growth at 5 days    Blood Culture - Blood, Arm, Right [[id number]]  (Normal) Collected: 12/07/23 2042    Lab Status: Final result Specimen: Blood from Arm, Right Updated: 12/12/23 2246     Blood Culture No growth at 5 days    Fungus Smear - Peritoneal Fluid, Peritoneum [[id number]] Collected: 12/08/23 1037    Lab Status: Final result Specimen: Peritoneal Fluid from Peritoneum Updated: 12/12/23 1445     Fungal Stain No yeast or hyphal elements seen    Body Fluid Culture - Body Fluid, Peritoneum [[id number]] Collected: 12/08/23 1036    Lab Status: Final result Specimen: Body Fluid from Peritoneum Updated: 12/11/23 0817     Body Fluid Culture No growth at 3 days     Gram Stain Rare (1+) WBCs seen      No organisms seen    Respiratory Panel PCR w/COVID-19(SARS-CoV-2) [place] In-House, NP Swab in UTM/VTM, 2 HR TAT - Swab, Nasopharynx [[id number]]  (Normal) Collected: 12/08/23 0259    Lab Status: Final result Specimen: Swab from Nasopharynx Updated: 12/08/23 0427     ADENOVIRUS, PCR Not Detected     Coronavirus 229E Not Detected     Coronavirus HKU1 Not Detected     Coronavirus NL63 Not Detected     Coronavirus OC43 Not Detected     COVID19 Not Detected     Human Metapneumovirus Not Detected     Human Rhinovirus/Enterovirus Not Detected     Influenza A PCR Not Detected     Influenza B PCR Not Detected     Parainfluenza Virus 1 Not Detected     Parainfluenza Virus 2 Not Detected     Parainfluenza Virus 3 Not Detected     Parainfluenza Virus 4 Not Detected     RSV, PCR Not Detected      Bordetella pertussis pcr Not Detected     Bordetella parapertussis PCR Not Detected     Chlamydophila pneumoniae PCR Not Detected     Mycoplasma pneumo by PCR Not Detected    Narrative:      In the setting of a positive respiratory panel with a viral infection PLUS a negative procalcitonin without other underlying concern for bacterial infection, consider observing off antibiotics or discontinuation of antibiotics and continue supportive care. If the respiratory panel is positive for atypical bacterial infection (Bordetella pertussis, Chlamydophila pneumoniae, or Mycoplasma pneumoniae), consider antibiotic de-escalation to target atypical bacterial infection.            US Paracentesis    Result Date: 12/14/2023  US PARACENTESIS  History: malignant ascites  : Rajat Bhandari MD.  Modality: Ultrasonography                   Sedation: None. Anesthesia: Lidocaine 1% local infiltration. Estimated blood loss:  0 cc.        Technique: A thorough discussion of the risks, benefits, and alternatives of the procedure, and if applicable, moderate sedation, was carried out with the patient. They were encouraged to ask any questions. Any questions were answered. They verbalized understanding. A written informed consent was then signed.  A multi-component timeout was performed prior to starting the procedure using the departmental protocol. The procedure room personnel used personal protective equipment. The operators used sterile gloves and if indicated, sterile gowns. The surgical site was prepped with chlorhexidine gluconate  and draped in the maximal applicable sterile fashion. A preliminary ultrasonography was performed to assess the target and determine a safe access site. It showed ascites. Pertinent ultrasound images were stored to the PACS for documentation. The access site was sterilely prepped and draped. Local anesthesia was administered. A dermatotomy was performed if needed. A catheter over the needle  system was advanced into the peritoneal cavity. Straw colored fluid was aspirated and the plastic catheter advanced into the peritoneum. The catheter was then connected to a fluid recovery system. At the end of the procedure, the catheter was withdrawn and an aseptic dressing applied. The patient tolerated the procedure well. After uneventful recovery recovery, the patient was discharged from the department in stable condition. The total amount of fluid recovered is given below. Albumin was infused intravenously if ordered by the referring doctor. Complications: None immediate. Specimen: The specimen was labeled and sent to the lab in appropriate carriers & containers if such was requested by the ordering provider.     Impression: Impression:                                                              Successful ultrasound-guided paracentesis using a left lower quadrant access with recovery of 1.6 liters of fluid as described above. Thank you for the opportunity to assist in the care of your patient. Electronically Signed: Rajat Bhandari MD  12/14/2023 12:38 PM EST  Workstation ID: [id number]         Current medications:  Scheduled Meds:[Held by provider] aspirin, 81 mg, Oral, Daily  levothyroxine, 125 mcg, Oral, QAM  pantoprazole, 40 mg, Oral, BID AC  polyethylene glycol, 17 g, Oral, Daily  rosuvastatin, 5 mg, Oral, Daily  senna-docusate sodium, 2 tablet, Oral, BID  sodium chloride, 10 mL, Intravenous, Q12H  venlafaxine XR, 150 mg, Oral, Daily      Continuous Infusions:lactated ringers, 20 mL/hr, Last Rate: 20 mL/hr (12/13/23 1232)      PRN Meds:.  acetaminophen **OR** acetaminophen **OR** acetaminophen    ALPRAZolam    senna-docusate sodium **AND** polyethylene glycol **AND** bisacodyl **AND** bisacodyl    dicyclomine    influenza vaccine    ipratropium-albuterol    melatonin    [DISCONTINUED] HYDROmorphone **AND** naloxone    nitroglycerin    Potassium Replacement - Follow Nurse / BPA Driven Protocol     prochlorperazine    promethazine **OR** promethazine    sodium chloride    sodium chloride    sodium chloride    Assessment & Plan   Assessment & Plan     Active Hospital Problems    Diagnosis  POA    **Sepsis [A41.9]  Yes    Pancreatic cancer [C25.9]  Unknown    Severe malnutrition [E43]  Yes    Acute UTI (urinary tract infection) [N39.0]  Unknown    Pancreatic mass [K86.89]  Unknown    Lung nodules [R91.8]  Unknown    Liver lesion [K76.9]  Unknown    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        Brief Hospital Course to date:  Jessica Kraft is a 63 y.o. female with hx of anxiety, depression, insomnia, hypothyroidism, osteopenia, Vit D deficiency and UTI who presents to the ED for evaluation of abdominal pain with swelling and fever.  CT abdomen pelvis with contrast in ED showed a 2.4 cm pancreatic mass concerning for pancreatic adenocarcinoma, ascites, omental carcinomatosis, metastatic lesions involving the liver.  Malignancy diagnosis is new for the patient.  Workup so far has revealed a elevated .  Cytology from the ascites is consistent with possible GI or pancreaticbiliary primary site.       Ascites, malignancy  Pancreatic mass, concern for metastasis  Omental and peritoneal carcinomatosis  Liver lesions  Pulmonary nodules  - s/p paracentesis on 12/8, Malignant with GI or pancreatic/biliary primary.-   - Spoke with GYN, H/O and GI--regarding her fluid results.  H/O would like further tissue to delineate the primary site and do further testing.  GI-to perform an EGD with EUS for biopsy today.    is currently elevated.  Brain MRI negative for metastases.    - pain control  - nausea control  --Oncology to follow after.  Will need to decide on timing of port placement.  Patient has elevated WBC today.  Although patient is afebrile and there is no known source of infection port placement at this point should be postponed.  -- She also had paracentesis again today and 1.6 L of fluid was removed.  Cell count and differential was ordered and will follow.    UTI--E.coli  --completed treatment.        Constipation--resolved  - likely r/t ascites  - bowel regimen.  Tolerating diet     GERD  Dysphagia  Hiatal hernia  - protonix BID  - s/p NG tube w decompression. Now removed  -tolerating diet. Much of GI symptoms on admission related to ascites and has since resolved from para w 4.2L removal     Anxiety   Depression  Insomnia    Expected Discharge Location and Transportation: home  Expected Discharge   Expected Discharge Date: 12/15/2023; Expected Discharge Time:      DVT prophylaxis:  Mechanical DVT prophylaxis orders are present.     AM-PAC 6 Clicks Score (PT): 24 (12/14/23 0800)    CODE STATUS:   Code Status and Medical Interventions:   Ordered at: 12/08/23 0205     Code Status (Patient has no pulse and is not breathing):    CPR (Attempt to Resuscitate)     Medical Interventions (Patient has pulse or is breathing):    Full Support       Mark Alba MD  12/14/23

## 2023-12-15 ENCOUNTER — APPOINTMENT (OUTPATIENT)
Dept: GENERAL RADIOLOGY | Facility: HOSPITAL | Age: 63
DRG: 435 | End: 2023-12-15
Payer: COMMERCIAL

## 2023-12-15 ENCOUNTER — READMISSION MANAGEMENT (OUTPATIENT)
Dept: CALL CENTER | Facility: HOSPITAL | Age: 63
End: 2023-12-15
Payer: COMMERCIAL

## 2023-12-15 ENCOUNTER — TELEPHONE (OUTPATIENT)
Dept: ONCOLOGY | Facility: CLINIC | Age: 63
End: 2023-12-15
Payer: COMMERCIAL

## 2023-12-15 VITALS
HEART RATE: 91 BPM | HEIGHT: 66 IN | TEMPERATURE: 98 F | DIASTOLIC BLOOD PRESSURE: 89 MMHG | WEIGHT: 203 LBS | BODY MASS INDEX: 32.62 KG/M2 | SYSTOLIC BLOOD PRESSURE: 152 MMHG | RESPIRATION RATE: 18 BRPM | OXYGEN SATURATION: 91 %

## 2023-12-15 DIAGNOSIS — B37.81 CANDIDAL ESOPHAGITIS: Primary | ICD-10-CM

## 2023-12-15 DIAGNOSIS — C25.1 MALIGNANT NEOPLASM OF BODY OF PANCREAS: ICD-10-CM

## 2023-12-15 DIAGNOSIS — C25.1 MALIGNANT NEOPLASM OF BODY OF PANCREAS: Primary | ICD-10-CM

## 2023-12-15 LAB
ANION GAP SERPL CALCULATED.3IONS-SCNC: 9 MMOL/L (ref 5–15)
BACTERIA UR QL AUTO: ABNORMAL /HPF
BASOPHILS # BLD MANUAL: 0 10*3/MM3 (ref 0–0.2)
BASOPHILS NFR BLD MANUAL: 0 % (ref 0–1.5)
BILIRUB UR QL STRIP: NEGATIVE
BUN SERPL-MCNC: 13 MG/DL (ref 8–23)
BUN/CREAT SERPL: 21 (ref 7–25)
CALCIUM SPEC-SCNC: 8.3 MG/DL (ref 8.6–10.5)
CHLORIDE SERPL-SCNC: 101 MMOL/L (ref 98–107)
CLARITY UR: ABNORMAL
CO2 SERPL-SCNC: 27 MMOL/L (ref 22–29)
COD CRY URNS QL: ABNORMAL /HPF
COLOR UR: YELLOW
CREAT SERPL-MCNC: 0.62 MG/DL (ref 0.57–1)
CRP SERPL-MCNC: 4.96 MG/DL (ref 0–0.5)
CYTO UR: NORMAL
DEPRECATED RDW RBC AUTO: 45.1 FL (ref 37–54)
EGFRCR SERPLBLD CKD-EPI 2021: 100.8 ML/MIN/1.73
EOSINOPHIL # BLD MANUAL: 0.22 10*3/MM3 (ref 0–0.4)
EOSINOPHIL NFR BLD MANUAL: 2 % (ref 0.3–6.2)
ERYTHROCYTE [DISTWIDTH] IN BLOOD BY AUTOMATED COUNT: 14.3 % (ref 12.3–15.4)
FUNGUS WND CULT: NORMAL
GEN 5 2HR TROPONIN T REFLEX: 10 NG/L
GLUCOSE SERPL-MCNC: 112 MG/DL (ref 65–99)
GLUCOSE UR STRIP-MCNC: NEGATIVE MG/DL
HCT VFR BLD AUTO: 39.1 % (ref 34–46.6)
HGB BLD-MCNC: 12.2 G/DL (ref 12–15.9)
HGB UR QL STRIP.AUTO: NEGATIVE
HYALINE CASTS UR QL AUTO: ABNORMAL /LPF
HYPOCHROMIA BLD QL: ABNORMAL
KETONES UR QL STRIP: ABNORMAL
LAB AP CASE REPORT: NORMAL
LAB AP CLINICAL INFORMATION: NORMAL
LAB AP DIAGNOSIS COMMENT: NORMAL
LEUKOCYTE ESTERASE UR QL STRIP.AUTO: ABNORMAL
LYMPHOCYTES # BLD MANUAL: 1.1 10*3/MM3 (ref 0.7–3.1)
LYMPHOCYTES NFR BLD MANUAL: 7 % (ref 5–12)
Lab: NORMAL
MCH RBC QN AUTO: 27.1 PG (ref 26.6–33)
MCHC RBC AUTO-ENTMCNC: 31.2 G/DL (ref 31.5–35.7)
MCV RBC AUTO: 86.7 FL (ref 79–97)
METAMYELOCYTES NFR BLD MANUAL: 1 % (ref 0–0)
MONOCYTES # BLD: 0.77 10*3/MM3 (ref 0.1–0.9)
MYELOCYTES NFR BLD MANUAL: 1 % (ref 0–0)
NEUTROPHILS # BLD AUTO: 8.7 10*3/MM3 (ref 1.7–7)
NEUTROPHILS NFR BLD MANUAL: 66 % (ref 42.7–76)
NEUTS BAND NFR BLD MANUAL: 13 % (ref 0–5)
NITRITE UR QL STRIP: NEGATIVE
PATH REPORT.FINAL DX SPEC: NORMAL
PATH REPORT.GROSS SPEC: NORMAL
PH UR STRIP.AUTO: 5.5 [PH] (ref 5–8)
PLAT MORPH BLD: NORMAL
PLATELET # BLD AUTO: 278 10*3/MM3 (ref 140–450)
PMV BLD AUTO: 9.5 FL (ref 6–12)
POTASSIUM SERPL-SCNC: 3.6 MMOL/L (ref 3.5–5.2)
PROT UR QL STRIP: NEGATIVE
RBC # BLD AUTO: 4.51 10*6/MM3 (ref 3.77–5.28)
RBC # UR STRIP: ABNORMAL /HPF
REF LAB TEST METHOD: ABNORMAL
SODIUM SERPL-SCNC: 137 MMOL/L (ref 136–145)
SP GR UR STRIP: 1.02 (ref 1–1.03)
SQUAMOUS #/AREA URNS HPF: ABNORMAL /HPF
TROPONIN T DELTA: 1 NG/L
TROPONIN T SERPL HS-MCNC: 9 NG/L
UROBILINOGEN UR QL STRIP: ABNORMAL
VARIANT LYMPHS NFR BLD MANUAL: 2 % (ref 0–5)
VARIANT LYMPHS NFR BLD MANUAL: 8 % (ref 19.6–45.3)
WBC # UR STRIP: ABNORMAL /HPF
WBC MORPH BLD: NORMAL
WBC NRBC COR # BLD AUTO: 11.01 10*3/MM3 (ref 3.4–10.8)

## 2023-12-15 PROCEDURE — 87086 URINE CULTURE/COLONY COUNT: CPT | Performed by: NURSE PRACTITIONER

## 2023-12-15 PROCEDURE — 85025 COMPLETE CBC W/AUTO DIFF WBC: CPT | Performed by: NURSE PRACTITIONER

## 2023-12-15 PROCEDURE — 80048 BASIC METABOLIC PNL TOTAL CA: CPT | Performed by: NURSE PRACTITIONER

## 2023-12-15 PROCEDURE — 74018 RADEX ABDOMEN 1 VIEW: CPT

## 2023-12-15 PROCEDURE — 63710000001 PROMETHAZINE PER 12.5 MG: Performed by: INTERNAL MEDICINE

## 2023-12-15 PROCEDURE — 99233 SBSQ HOSP IP/OBS HIGH 50: CPT | Performed by: INTERNAL MEDICINE

## 2023-12-15 PROCEDURE — 81001 URINALYSIS AUTO W/SCOPE: CPT | Performed by: NURSE PRACTITIONER

## 2023-12-15 PROCEDURE — 84484 ASSAY OF TROPONIN QUANT: CPT | Performed by: NURSE PRACTITIONER

## 2023-12-15 PROCEDURE — 25010000002 KETOROLAC TROMETHAMINE PER 15 MG: Performed by: NURSE PRACTITIONER

## 2023-12-15 PROCEDURE — 85007 BL SMEAR W/DIFF WBC COUNT: CPT | Performed by: NURSE PRACTITIONER

## 2023-12-15 PROCEDURE — 25010000002 METOCLOPRAMIDE PER 10 MG: Performed by: NURSE PRACTITIONER

## 2023-12-15 PROCEDURE — 86140 C-REACTIVE PROTEIN: CPT | Performed by: NURSE PRACTITIONER

## 2023-12-15 RX ORDER — HYDROCODONE BITARTRATE AND ACETAMINOPHEN 5; 325 MG/1; MG/1
1 TABLET ORAL EVERY 8 HOURS PRN
Qty: 30 TABLET | Refills: 0 | OUTPATIENT
Start: 2023-12-15 | End: 2023-12-15 | Stop reason: HOSPADM

## 2023-12-15 RX ORDER — FLUCONAZOLE 100 MG/1
TABLET ORAL
Qty: 32 TABLET | Refills: 0 | Status: SHIPPED | OUTPATIENT
Start: 2023-12-15 | End: 2023-12-30

## 2023-12-15 RX ORDER — POTASSIUM CHLORIDE 1.5 G/1.58G
40 POWDER, FOR SOLUTION ORAL EVERY 4 HOURS
Status: DISCONTINUED | OUTPATIENT
Start: 2023-12-15 | End: 2023-12-15 | Stop reason: HOSPADM

## 2023-12-15 RX ORDER — METOCLOPRAMIDE HYDROCHLORIDE 5 MG/ML
10 INJECTION INTRAMUSCULAR; INTRAVENOUS ONCE
Status: COMPLETED | OUTPATIENT
Start: 2023-12-15 | End: 2023-12-15

## 2023-12-15 RX ORDER — HYDROCODONE BITARTRATE AND ACETAMINOPHEN 5; 325 MG/1; MG/1
1 TABLET ORAL EVERY 8 HOURS PRN
Qty: 30 TABLET | Refills: 0 | Status: SHIPPED | OUTPATIENT
Start: 2023-12-15 | End: 2023-12-15 | Stop reason: SDUPTHER

## 2023-12-15 RX ORDER — KETOROLAC TROMETHAMINE 15 MG/ML
15 INJECTION, SOLUTION INTRAMUSCULAR; INTRAVENOUS ONCE
Status: COMPLETED | OUTPATIENT
Start: 2023-12-15 | End: 2023-12-15

## 2023-12-15 RX ORDER — HYDROCODONE BITARTRATE AND ACETAMINOPHEN 5; 325 MG/1; MG/1
1-2 TABLET ORAL EVERY 8 HOURS PRN
Qty: 30 TABLET | Refills: 0 | Status: ON HOLD | OUTPATIENT
Start: 2023-12-15

## 2023-12-15 RX ADMIN — METOCLOPRAMIDE 10 MG: 5 INJECTION, SOLUTION INTRAMUSCULAR; INTRAVENOUS at 06:01

## 2023-12-15 RX ADMIN — Medication 5 MG: at 00:54

## 2023-12-15 RX ADMIN — PROMETHAZINE HYDROCHLORIDE 6.25 MG: 12.5 TABLET ORAL at 00:54

## 2023-12-15 RX ADMIN — PANTOPRAZOLE SODIUM 40 MG: 40 TABLET, DELAYED RELEASE ORAL at 08:24

## 2023-12-15 RX ADMIN — KETOROLAC TROMETHAMINE 15 MG: 15 INJECTION, SOLUTION INTRAMUSCULAR; INTRAVENOUS at 06:01

## 2023-12-15 RX ADMIN — POTASSIUM CHLORIDE 40 MEQ: 1.5 FOR SOLUTION ORAL at 08:23

## 2023-12-15 RX ADMIN — HYDROCODONE BITARTRATE AND ACETAMINOPHEN 1 TABLET: 5; 325 TABLET ORAL at 04:21

## 2023-12-15 RX ADMIN — Medication 10 ML: at 08:25

## 2023-12-15 RX ADMIN — LEVOTHYROXINE SODIUM 125 MCG: 125 TABLET ORAL at 06:01

## 2023-12-15 RX ADMIN — VENLAFAXINE HYDROCHLORIDE 150 MG: 75 CAPSULE, EXTENDED RELEASE ORAL at 08:23

## 2023-12-15 RX ADMIN — ROSUVASTATIN 5 MG: 10 TABLET, FILM COATED ORAL at 08:24

## 2023-12-15 NOTE — CASE MANAGEMENT/SOCIAL WORK
Case Management Discharge Note      Final Note: Pt is being discharged today and plan is home with spouse. Pt denies discharge needs.         Selected Continued Care - Discharged on 12/15/2023 Admission date: 12/7/2023 - Discharge disposition: Home or Self Care      Destination    No services have been selected for the patient.                Durable Medical Equipment    No services have been selected for the patient.                Dialysis/Infusion    No services have been selected for the patient.                Home Medical Care    No services have been selected for the patient.                Therapy    No services have been selected for the patient.                Community Resources    No services have been selected for the patient.                Community & DME    No services have been selected for the patient.                         Final Discharge Disposition Code: 01 - home or self-care

## 2023-12-15 NOTE — PROGRESS NOTES
"HEMATOLOGY/ONCOLOGY PROGRESS NOTE    S: She is feeling much better.  She says she feels the best she has in the last 8 days.  She has GERD that has been controlled with as needed Tums at home.  She has also been having intermittent pain and requiring as needed [place] here in the hospital.  She has questions regarding pain management home.  She usually uses Aleve.  She is status post paracentesis yesterday.  She has questions regarding vaccinations, chemo schedule, port schedule, and side effect management.      Past medical history, social history and family history was reviewed and unchanged from prior visit.      Medications:  The current medication list was reviewed in the EMR    ALLERGIES:  No Known Allergies      Physical Exam    VITAL SIGNS:  /89 (BP Location: Right arm, Patient Position: Lying)   Pulse 91   Temp 98 °F (36.7 °C) (Oral)   Resp 18   Ht 167.6 cm (65.98\")   Wt 92.1 kg (203 lb)   LMP  (LMP Unknown) Comment: N/A  SpO2 91%   BMI 32.78 kg/m²   Temp:  [97.8 °F (36.6 °C)-98.3 °F (36.8 °C)] 98 °F (36.7 °C)      Performance Status: 0                Physical Exam    General: well appearing, in no acute distress  HEENT: sclerae anicteric, oropharynx clear, neck is supple  Lymphatics: no cervical, supraclavicular, or axillary adenopathy  Cardiovascular: regular rate and rhythm, no murmurs, rubs or gallops  Lungs: clear to auscultation bilaterally  Abdomen: +ascites, less distended, positive bowel sounds  Extremities: no lower extremity edema  Skin: palpable nodule superior to umbilicus  Msk:  Shows no weakness of the large muscle groups  Psych: Mood is stable        RECENT LABS:    Lab Results   Component Value Date    HGB 12.2 12/15/2023    HCT 39.1 12/15/2023    MCV 86.7 12/15/2023     12/15/2023    WBC 11.01 (H) 12/15/2023    NEUTROABS 12.87 (H) 12/14/2023    LYMPHSABS 1.39 12/14/2023    MONOSABS 1.82 (H) 12/14/2023    EOSABS 0.13 12/14/2023    BASOSABS 0.11 12/14/2023       Lab " Results   Component Value Date    GLUCOSE 112 (H) 12/15/2023    BUN 13 12/15/2023    CREATININE 0.62 12/15/2023     12/15/2023    K 3.6 12/15/2023     12/15/2023    CO2 27.0 12/15/2023    CALCIUM 8.3 (L) 12/15/2023    PROTEINTOT 7.4 12/08/2023    ALBUMIN 3.7 12/08/2023    BILITOT 0.6 12/08/2023    ALKPHOS 137 (H) 12/08/2023    AST 26 12/08/2023    ALT 15 12/08/2023     Component      Latest Ref Rng 12/8/2023   CEA      ng/mL 5.75    CA 15-3      <=25.0 U/mL 21.5    CA 19-9      <=35.0 U/mL 6.7          0.0 - 38.1 U/mL 323.0 (H)           12/12/2023    DIAGNOSIS:  PERITONEAL FLUID:  Malignant, see comment    MICROSCOPIC DESCRIPTION:  Malignant cells are present.    Professional interpretation rendered by Corina Copeland D.O., F.C.A.P. at [employer], [address] [place] [address].    COMMENT:  The following immunohistochemical stains were performed and reviewed with  appropriate controls:    TTF-1: Negative  CK7: Positive  CK20: Positive  CDX2: Positive  GATA3: Negative  PAX8: Negative    Given the CK7, CK20 and CDX2 positivity a primary of gastrointestinal or  pancreaticobiliary origin is favored.  Clinical and radiologic correlation is required  to determine primary site.     Assessment/Plan    1.  Pancreatic mass  2.  Peritoneal/omental implants  3.  Indeterminate lung nodules  4.  Indeterminate liver hypodensities  5.  Malignant Ascites  -We discussed the clinical impression of metastatic malignancy. We extensively reviewed her cytology results and negative flow cytometry. This confirms malignant ascites with upper GI/pancreatobiliary source favored. EGD with pancreatic EUS/biopsy results are still pending.   -Tumor CA 19-9, , CA 15-3, CA 2729, and CEA reviewed and notable for CA 19-9 normal and  markedly elevated.    -She has been seen by GYN and no gynecologic primary identified.  Cytology was most consistent with pancreatic/upper GI origin. Consolation of findings is  consistent with metastatic carcinoma most likely of pancreatic origin.  -We will plan with palliative chemotherapy with FOLFIRINOX first line given healthy and fit.  She prefers to defer treatment start until after the Christmas holiday, so I have tentatively scheduled her for port placement on 12/26 and chemotherapy initiation on 12/27.  Because of expected treatment related cytopenias and distance she lives from the infusion center, have requested On pro.    -Will send Tempus on blood after discharge. This will assess for targetable mutations and will also assess for BRCA mutation in context of family history. May benefit from outpatient Genetic counseling at a future date.     -For her malignant ascites, she is status post repeat paracentesis yesterday with symptomatic relief.  We discussed that if she continues to have rapid reaccumulation of fluid we may consider a drain, but I anticipate that ascites may decrease with treatment initiation so we will defer this for now.  -Discussed endoscopy results with GI attending and reviewed EGD and EUS reports. Await final pathology but consistent clinically with metastatic pancreatic cancer, reviewed treatment, side effect management and prognosis.     -CBC and CMP reviewed today and stable.    6.  Cancer related pain  -I will give her a prescription for as needed Norco on discharge.    Case discussed with attending hospitalist, bedside RN and scheduling team.    Sherri العراقي MD  [facility]    12/15/2023      I have spent a total of 50 min on reviewing test results/preparing to see patient, counseling patient, performing medically appropriate exam, discussion with team members, writing orders, coordinating care and documenting clinical information in the electronic or other health record

## 2023-12-15 NOTE — PLAN OF CARE
Goal Outcome Evaluation:   Please see additional notes.  AOX4, VSS stable, Pain management with prn, ambulated to bathroom but no BM.

## 2023-12-15 NOTE — NURSING NOTE
PT A/O x 4, VSS, independent with ADLs. She was discharged today at 1210 via wheelchair accompanied by her , Dominick. Her IV was removed and the site dressed with coband and a 2x2. Telemetry box was retrieved, disinfected and placed in bed. D/C teaching done with PT and she verbalized understanding of the instructions. PT verbalized no questions at D/C.

## 2023-12-15 NOTE — NURSING NOTE
2115 spoke with NP Jen CADET, new orders placed d/t patient WBC being abnormal.     0440 Spoke with Jen CADET. NP, patient had new lower mediastinal chest discomfort, EKG collected, and new orders placed. D/t patient not having a BM and hypoactive bs new orders were also placed for Colten Francis.

## 2023-12-16 LAB — BACTERIA SPEC AEROBE CULT: ABNORMAL

## 2023-12-16 NOTE — PROGRESS NOTES
I called patient and discussed pathology results this evening.    Biopsies from the esophagus show inflammation with focal area of necrosis and rare fungal organisms.    FNB of pancreatic body mass with rare atypical cells, rare bland appearing epithelial cells and fibrin and blood.    Patient is already following with oncology with plan for chemotherapy already made for metastatic pancreatic cancer.  Should further tissue acquisition be needed, would consider peritoneal or omental biopsy with IR.    I have sent in prescription for 14-day course of fluconazole to treat fungal esophagitis.  She denies any current issues with dysphagia at this time.  She will also continue on p.o. proton pump inhibitor.

## 2023-12-16 NOTE — OUTREACH NOTE
Prep Survey      Flowsheet Row Responses   [facility] facility patient discharged from? [facility]   Is LACE score < 7 ? No   Eligibility Readm Mgmt   Discharge diagnosis Sepsis   Does the patient have one of the following disease processes/diagnoses(primary or secondary)? Sepsis   Does the patient have Home health ordered? No   Is there a DME ordered? No   Prep survey completed? Yes            JAMES LANIER - Registered Nurse

## 2023-12-18 ENCOUNTER — TELEPHONE (OUTPATIENT)
Dept: ONCOLOGY | Facility: CLINIC | Age: 63
End: 2023-12-18
Payer: COMMERCIAL

## 2023-12-18 ENCOUNTER — READMISSION MANAGEMENT (OUTPATIENT)
Dept: CALL CENTER | Facility: HOSPITAL | Age: 63
End: 2023-12-18
Payer: COMMERCIAL

## 2023-12-18 NOTE — TELEPHONE ENCOUNTER
Spoke with Matt regarding denial for Neulasta.  No preferred biosimilar.  Dr. العراقي notified and that peer to peer can be done.  Per MD, no peer to peer, remove from plan, notify authorization team and patient.  Message sent to auth team and patient notified.  See telephone encounter from 12/15/23 for details.

## 2023-12-18 NOTE — TELEPHONE ENCOUNTER
Patient son called back [phone], he is requesting a return phone call to discuss patient care and stated leave a message if he maybe not able to answer.

## 2023-12-18 NOTE — TELEPHONE ENCOUNTER
Notified Ilya that patient's insurance denied Neulasta Onpro and that Dr. العراقي will monitor her blood counts after treatment as well and if she is needing Neulasta or a biosimilar at that point, we can re-submit for it at that time.  He verbalized understanding.  Advised him that if patient wants a referral placed, medical records will be sent but if patient is not wanting a referral at this point, he can call medical records to have records sent.  He verbalized understanding.  Ilya asked if Dr. العراقي thinks a referral for a second opinion is needed at this time and if she is 100 % sure the mass in pancreas is cancer.  Dr. العراقي notified of Ilya's questions and per MD, Ilya advised that Dr. العراقي offers referrals to all of her patients and if she wants one or wants to speak to another oncologist regarding clinical trials, she would be happy to place one.  Ilya verbalized understanding.  Also, per MD, Ilya advised that she is 100 % sure that the cancer is in the fluid or primary site known and that she does have metastatic cancer.  Advised Ilya that if patient is okay with calling him during follow up appointments, he can be included in the discussion, if he cannot make it in person.  Ilya verbalized understanding. Medical records phone number provided to Ilya.

## 2023-12-18 NOTE — TELEPHONE ENCOUNTER
Patient stated she does not want a referral to [facility] at this point.  She stated it is okay to speak to Ilya, her son, regarding her medical care.  Patient stated she is taking Norco and Tylenol intermittently for her dull stomach pain (pt ranked at a 4 on a 0/10 scale and that pain is unchanged).  Patient wanted to know how much Tylenol is to much to take.  She stated she has only taken Tylenol 500 mg 2 tab 3 times over the weekend.  Advised her Dr. العراقي will be notified and someone will contact her back.  Patient verbalized understanding.

## 2023-12-18 NOTE — TELEPHONE ENCOUNTER
"Spoke with patient and advised her that per Dr. العراقي, if the medication for pain is not controlling her pain to let this office know.  Also, educated patient per Dr. العراقي on [place] having acetaminophen in it and taking OTC acetaminophen as well.  Educated her per MD to not take more than 4 g of acetaminophen per day.  Patient verbalized understanding and stated she is \"taking no where near that amount and its controlling it.\"  Advised her that her insurance denied Neulasta Onpro and that Dr. العراقي will monitor her blood counts after treatment as well and if she is needing Neulasta or a biosimilar at that point, we can re-submit for it at that time.  Patient verbalized understanding.   "

## 2023-12-18 NOTE — TELEPHONE ENCOUNTER
PATIENT CALLING IN TO LET NATHANIEL THE NURSE TO KNOW THAT SHE WILL HAVE HER SON DEBBIE CALL WHEN HE CAN HE IS IN COURT RIGHT NOW.    HIS NUMBER [phone] IF NEEDED.

## 2023-12-19 ENCOUNTER — HOSPITAL ENCOUNTER (OUTPATIENT)
Dept: ONCOLOGY | Facility: HOSPITAL | Age: 63
Discharge: HOME | End: 2023-12-19
Payer: COMMERCIAL

## 2023-12-19 ENCOUNTER — OFFICE VISIT (OUTPATIENT)
Dept: ONCOLOGY | Facility: CLINIC | Age: 63
End: 2023-12-19
Payer: COMMERCIAL

## 2023-12-19 ENCOUNTER — SPECIALTY PHARMACY (OUTPATIENT)
Dept: ONCOLOGY | Facility: HOSPITAL | Age: 63
End: 2023-12-19
Payer: COMMERCIAL

## 2023-12-19 ENCOUNTER — LAB (OUTPATIENT)
Dept: LAB | Facility: HOSPITAL | Age: 63
End: 2023-12-19
Payer: COMMERCIAL

## 2023-12-19 VITALS
HEIGHT: 66 IN | BODY MASS INDEX: 32.3 KG/M2 | RESPIRATION RATE: 16 BRPM | OXYGEN SATURATION: 96 % | WEIGHT: 201 LBS | TEMPERATURE: 98.1 F | HEART RATE: 102 BPM | DIASTOLIC BLOOD PRESSURE: 99 MMHG | SYSTOLIC BLOOD PRESSURE: 166 MMHG

## 2023-12-19 DIAGNOSIS — C76.2 ABDOMINAL CARCINOMATOSIS: ICD-10-CM

## 2023-12-19 DIAGNOSIS — C25.1 MALIGNANT NEOPLASM OF BODY OF PANCREAS: Primary | ICD-10-CM

## 2023-12-19 DIAGNOSIS — C25.9 MALIGNANT NEOPLASM OF PANCREAS, UNSPECIFIED LOCATION OF MALIGNANCY: Primary | ICD-10-CM

## 2023-12-19 LAB
BACTERIA SPEC AEROBE CULT: NORMAL
BACTERIA SPEC AEROBE CULT: NORMAL

## 2023-12-19 PROCEDURE — 99214 OFFICE O/P EST MOD 30 MIN: CPT | Performed by: NURSE PRACTITIONER

## 2023-12-19 RX ORDER — LIDOCAINE AND PRILOCAINE 25; 25 MG/G; MG/G
1 CREAM TOPICAL AS NEEDED
Qty: 30 G | Refills: 3 | Status: ON HOLD | OUTPATIENT
Start: 2023-12-19

## 2023-12-19 RX ORDER — ONDANSETRON HYDROCHLORIDE 8 MG/1
8 TABLET, FILM COATED ORAL 3 TIMES DAILY PRN
Qty: 30 TABLET | Refills: 5 | Status: ON HOLD | OUTPATIENT
Start: 2023-12-19

## 2023-12-19 NOTE — OUTREACH NOTE
Sepsis Week 1 Survey      Flowsheet Row Responses   [facility] [place] patient discharged from? [facility]   Does the patient have one of the following disease processes/diagnoses(primary or secondary)? Sepsis   Week 1 attempt successful? Yes   Call start time 1923   Call end time 1927   Discharge diagnosis Sepsis   Meds reviewed with patient/caregiver? Yes   Is the patient having any side effects they believe may be caused by any medication additions or changes? No   Does the patient have all medications related to this admission filled (includes all antibiotics, inhalers, nebulizers,steroids,etc.) Yes   Is the patient taking all medications as directed (includes completed medication regime)? Yes   Does the patient have a primary care provider?  Yes   Does the patient have an appointment with their PCP within 7 days of discharge? Yes   Has the patient kept scheduled appointments due by today? N/A   Psychosocial issues? No   Is the patient/caregiver able to teach back TIME? M ental Decline - confused, sleepy, difficult to arouse, I nfection - may have signs and symptoms of an infection, T emperature - higher or lower than normal, E xtremely Ill - severe pain, discomfort, shortness of breath   Is patient/caregiver able to teach back steps to recovery at home? Set small, achievable goals for return to baseline health, Eat a balanced diet   If the patient is a current smoker, are they able to teach back resources for cessation? Not a smoker   Is the patient/caregiver able to teach back the hierarchy of who to call/visit for symptoms/problems? PCP, Specialist, Home health nurse, Urgent Care, ED, 911 Yes   Additional teach back comments Statest she is doing better.  She will be starting chemo 2 days after [facility].   Week 1 call completed? Yes   Wrap up additional comments Denies questions or needs at this time.   Call end time 1927            Elizabet PRINGLE - Licensed Nurse

## 2023-12-19 NOTE — PROGRESS NOTES
Outpatient Infusion  [address] [id number]  [phone]      CHEMOTHERAPY EDUCATION    NAME:  Jessica Kraft      : 1960           DATE: 23    Medication Education Sheets: (select all that apply)  Fluorouracil, Irinotecan, Leucovorin, and Oxaliplatin    Other Education Sheets: (select all that apply)  CINV, Constipation, Diarrhea, Hand-Foot Syndrome, and Symptom Tracker Sheet and ALE Information    Chemotherapy Regimen:   OP PANCREATIC mFOLFIRINOX (OXALIplatin / Leucovorin / Irinotecan / Fluorouracil CIV) every 14 days  -Leucovorin IV at the infusion center on day 1 of each 14-day cycle  -Irinotecan IV at the infusion center on day 1 of each 14-day cycle  -Oxaliplatin IV at the infusion center on day 1 of each 14-day cycle  -Fluorouracil continuous infusion over 46 hours days 1-3 of each 14-day cycle    TOPICS EDUCATION PROVIDED COMMENTS   ANEMIA:  role of RBC, cause, s/s, ways to manage, role of transfusion [x] Reviewed the role of RBC and the use of transfusions if hemoglobin decreases too much.  Patient to notify us if she experiences shortness of breath, dizziness, or palpitations.  Also let patient know she could feel more tired than usual and to try to stay active, but rest if she needs to.    THROMBOCYTOPENIA:  role of platelet, cause, s/s, ways to prevent bleeding, things to avoid, when to seek help [x] Reviewed the role of platelets in blood clotting and when to call clinic (bloody nose that bleeds for 5 minutes despite pressure, a cut that won't stop bleeding despite pressure, gums that bleed excessively with brushing or flossing). Recommend using a soft bristle toothbrush and blowing the nose gently.    NEUTROPENIA:  role of WBC, cause, infection precautions, s/s of infection, when to call MD [x] Reviewed the role of WBC, good infection prevention practices, and when to call the clinic (temperature 100.4F, sore throat, burning urination, etc).    NUTRITION & APPETITE CHANGES:  importance of maintaining healthy diet & weight, ways to manage to improve intake, dietary consult, exercise regimen, electrolyte and/or blood glucose abnormalities [x] Decreased Appetite: Discussed the risk of decreased appetite. Recommended eating smaller, more frequent meals. Instructed the patient to contact the clinic if losing weight or having difficulty eating enough to maintain energy level.  Increased Blood Sugar: This patient's oncology therapy can increase blood sugar.  Recommended that the patient contact her primary care provider for management recommendations if blood glucose values start trending upward.  Also reviewed the possibility of developing a metallic taste, and recommended various ways to mitigate this side effect.   DIARRHEA:  causes, s/s of dehydration, ways to manage, dietary changes, when to call MD [x] Chemotherapy : Discussed the risk of diarrhea. Instructed patient on use OTC loperamide with diarrhea onset, but emphasized the importance of calling the clinic if 4-6 episodes in 24 hours not relieved by OTC loperamide.   CONSTIPATION:  causes, ways to manage, dietary changes, when to call MD [x] Provided supplementary handout with instructions for use of docusate and other OTC therapies to manage constipation.  Instructed patient to call us if medications aren't working. Note she has historically suffered more with constipation, and she takes a stool softener daily.    NAUSEA & VOMITING:  cause, use of antiemetics, dietary changes, when to call MD [x] Emetic risk: Moderate  Premeds: Dexamethasone and Palonosetron  PRN home meds: Ondansetron 8mg PO TID PRN N/V  Pharmacy home meds sent to: [facility]    Instructed the patient to take a dose of the PRN medication at the first onset of nausea and if it's not working to call us for additional medications.  Also provided non-drug measures to mitigate nausea.   MOUTH SORES:  causes, oral care, ways to  manage [x] Mouth sores can be prevented by making a mouth wash mixture of salt, baking soda, and water. The patient was instructed to swish and spit four times daily after meals and before bedtime. Also recommended using a soft bristle toothbrush and avoiding alcohol-containing OTC mouthwashes.    ALOPECIA:  cause, ways to manage, resources [x] Discussed the possibility of hair loss with the patient. Informed patient that she could request a prescription for a wig if desired and most of the cost is usually covered by insurance. Recommended covering the head with a hat and/or protecting the skin on the head with SPF 30 or higher.    NERVOUS SYSTEM CHANGES:  causes, s/s, neuropathies, cognitive changes, ways to manage [x] Discussed the adverse effect of peripheral neuropathy and signs/symptoms associated with this adverse effect. Instructed patient to call if symptoms become more frequent or worsen.   Oxaliplatin Cold-Sensitivity: Discussed signs, symptoms, and typical onset and duration of the unique cold sensitivity after receiving oxaliplatin.  Recommended avoiding drinking cold beverages, breathing cold air, or touching cold things (use a glove/towel if reaching into fridge or freezer).  Instructed patient to warm themselves up if this occurs and symptoms should subside.   PAIN:  causes, ways to manage [x] EMLA Cream: Discussed rx for EMLA cream, and the benefit of use 45-60 minutes prior to access of port for lab draws / infusions. Rx sent to [facility].  Patient also advised to notify the clinic of any significant abdominal pain.  Note Neulasta was denied by insurance, but we still reviewed use of Claritin if it is approved / reordered at a later date. This should be taken on days 3-9 of each cycle to prevent bone pain associated with growth factor use.   SKIN & NAIL CHANGES:  cause, s/s, ways to manage [x] HFS: Hand-Foot Syndrome was discussed, including signs/symptoms, prevention measures, and  treatment measures. A supplementary handout with this information was also given to the patient. Also reviewed sun sensitivity on this treatment regimen and recommended SPF of 30 or greater.   ORGAN TOXICITIES:  cause, s/s, need for diagnostic tests, labs, when to notify MD [x] Discussed potential effects on organ systems, monitoring, diagnostic tests, labs, and when to notify the clinic. Discussed the signs/symptoms of the following: cardiotoxicity, eye changes (blurry vision, watery eyes, photophobia), hepatotoxicity, and lung changes.   INFUSION RELATED REACTIONS or INJECTION-SITE REACTION:  Cause, s/s, anaphylaxis, monitoring, etc. [x] Premeds: None    Discussed the risk of an infusion and / or allergic reaction and symptoms such as: fever, chills, dizziness, itchiness or rash, flushing, trouble breathing, wheezing, sudden back pain, or feeling faint.  Instructed the patient to notify her nurse if she starts feeling weird at any point during her infusion.    Reviewed how infusion reactions are managed.   MISCELLANEOUS:  drug interactions, administration, labs, etc. [x] Discussed chemotherapy schedule, lab draws, infusion times, and total expected visit time.   DDIs: No significant DDIs  Drug-food interactions:  No significant interactions  Lab draws: On or before day 1 of each cycle, no sooner than 3 days early.   INFERTILITY & SEXUALITY:    causes, fertility preservation options, sexuality changes, ways to manage, importance of birth control [x] IV Oncology Therapy: Reviewed safe sex practices and the importance of minimizing exposure to body fluids for 48 hours after each dose of IV oncology therapy (days 1-5 of each cycle). The patient is not of childbearing potential.   HOME CARE:  storing of oral chemo, how to manage bodily fluids [x] IV - Counseled on management of soiled linens and proper flush technique.  Discussed how to manage all the side effects at home and advised when to contact the MD office.      Medications:  Prior to Admission medications    Medication Sig Start Date End Date Taking? Authorizing Provider   aspirin 81 MG chewable tablet Chew 1 tablet Daily.    Milena Johnson MD   cyclobenzaprine (FLEXERIL) 10 MG tablet Take 1 tablet by mouth Daily.    Milena Johnson MD   dicyclomine (BENTYL) 20 MG tablet Take 1 tablet by mouth Every 8 (Eight) Hours As Needed for Abdominal Cramping. 12/6/23   Sam Shearer MD   fluconazole (Diflucan) 100 MG tablet Take 4 tablets by mouth Daily for 1 day, THEN 2 tablets Daily for 14 days. 12/15/23 12/30/23  Flakito Abrams MD   HYDROcodone-acetaminophen (NORCO) 5-325 MG per tablet Take 1-2 tablet(s) by mouth Every 8 Hours As Needed for Moderate Pain. 12/15/23   Sherri العراقي MD   LEVOXYL 125 MCG tablet Take 1 tablet by mouth Every Morning. On an empty stomach, do not substitute 5/18/17   Kika Guajardo MD   metoclopramide (REGLAN) 5 MG tablet Take 1 tablet by mouth 3 (Three) Times a Day As Needed (nausea, abd pain). 12/6/23   Sam Shearer MD   RABEprazole (ACIPHEX) 20 MG EC tablet Take 1 tablet by mouth Daily.    Milena Johnson MD   rosuvastatin (CRESTOR) 5 MG tablet Take 1 tablet by mouth Daily. 10/27/23   Milena Johnson MD   venlafaxine XR (EFFEXOR-XR) 150 MG 24 hr capsule Take 1 capsule by mouth Daily.    Milena Johnson MD   ALPRAZolam (XANAX) 2 MG tablet Take 0.5 tablets by mouth At Night As Needed for Anxiety.  12/8/23  Milena Johnson MD   busPIRone (BUSPAR) 10 MG tablet Take 1 tablet by mouth 2 (Two) Times a Day. 8/2/17 12/8/23  Kika Guajardo MD   diazePAM (VALIUM) 5 MG tablet Take 1 tablet by mouth Every 12 (Twelve) Hours As Needed for Anxiety. 7/12/17 12/8/23  Kika Guajardo MD   HYDROcodone-acetaminophen (NORCO) 5-325 MG per tablet Take 1 tablet by mouth Every 8 (Eight) Hours As Needed for Moderate Pain. 12/15/23 12/15/23  Sherri العراقي MD   HYDROcodone-acetaminophen (NORCO) 5-325 MG per  tablet Take 1 tablet by mouth Every 8 (Eight) Hours As Needed for Moderate Pain. 12/15/23 12/15/23  Sherri العراقي MD   HYDROcodone-acetaminophen (NORCO) 5-325 MG per tablet Take 1 tablet by mouth Every 8 (Eight) Hours As Needed for Moderate Pain. 12/15/23 12/15/23  Sherri العراقي MD   PRISTIQ 100 MG 24 hr tablet Take 1 tablet by mouth Daily. Take with 50 mg 5/18/17 12/8/23  Kika Guajardo MD   PRISTIQ 50 MG 24 hr tablet Take 1 tablet by mouth Daily. 5/18/17 12/8/23  Kika Guajardo MD   Testosterone Micronized crystals 1 dose daily.  12/8/23  Provider, MD Milena   vitamin D (ERGOCALCIFEROL) 12260 UNITS capsule capsule Take 1 capsule by mouth 2 (Two) Times a Week. 5/18/17 12/8/23  Kika Guajardo MD   zolpidem (AMBIEN) 10 MG tablet Take 1 tablet by mouth At Night As Needed for Sleep. 8/2/17 12/8/23  Kika Guajardo MD     Notes: All questions and concerns were addressed. Provided a personalized treatment calendar to patient (includes treatment and lab schedule). Provided patient with contact information for the pharmacist and clinic while instructing her to call if any questions or concerns arise. Informed consent for treatment was obtained. Patient and her  were both receptive to information and expressed understanding.     Ann Cowden Mayer, PharmD, [facility]  Oncology Clinical Pharmacist  Phone [phone]    12/19/2023  13:47 EST

## 2023-12-19 NOTE — PROGRESS NOTES
CHEMOTHERAPY PREPARATION    Jessica Kraft  [id number]  1960    Chief Complaint: Treatment preparation and needs assessment    History of present illness:  Jessica Kraft is a 63 y.o. year old female who is here today with her  for treatment preparation and needs assessment.  The patient has been diagnosed with pancreatic cancer and is scheduled to begin treatment with OXALIplatin / Leucovorin / Irinotecan / Fluorouracil.     Oncology History:    Oncology/Hematology History   Abdominal carcinomatosis - omental   12/8/2023 Initial Diagnosis    Abdominal carcinomatosis - omental     12/27/2023 -  Chemotherapy    OP PANCREATIC mFOLFIRINOX (OXALIplatin / Leucovorin / Irinotecan / Fluorouracil CIV)     Pancreatic cancer   12/12/2023 Initial Diagnosis    Pancreatic cancer     12/27/2023 -  Chemotherapy    OP PANCREATIC mFOLFIRINOX (OXALIplatin / Leucovorin / Irinotecan / Fluorouracil CIV)         The current medication list and allergy list were reviewed and reconciled.     Past Medical History, Past Surgical History, Social History, Family History have been reviewed and are without significant changes except as mentioned.    Review of Systems:    Review of Systems   Constitutional:  Negative for appetite change, fatigue, fever and unexpected weight change.   HENT:  Negative for mouth sores, sore throat and trouble swallowing.    Respiratory:  Negative for cough, shortness of breath and wheezing.    Cardiovascular:  Negative for chest pain, palpitations and leg swelling.   Gastrointestinal:  Positive for abdominal pain. Negative for abdominal distention, constipation, diarrhea, nausea and vomiting.   Genitourinary:  Negative for difficulty urinating, dysuria and frequency.   Musculoskeletal:  Negative for arthralgias.   Skin:  Negative for pallor, rash and wound.   Neurological:  Negative for dizziness and weakness.   Hematological:  Does not bruise/bleed easily.   Psychiatric/Behavioral:  Positive for sleep  disturbance. Negative for confusion. The patient is not nervous/anxious.        Physical Exam:    Vitals:    12/19/23 1506   BP: 166/99   Pulse: 102   Resp: 16   Temp: 98.1 °F (36.7 °C)   SpO2: 96%     Vitals:    12/19/23 1506   PainSc:   4   PainLoc: Abdomen          ECOG: (2) Ambulatory & Capable of Self Care, Unable to Carry Out Work Activity, Up & About Greater Than 50% of Waking Hours    General: well appearing, in no acute distress    Psych: Mood is stable            NEEDS ASSESSMENTS    Genetics  The patient's new diagnosis and family history have been reviewed for genetic counseling needs. A genetic referral is not recommended.     Psychosocial  The patient has completed a PHQ-9 Depression Screening and the Distress Thermometer (DT) today.   PHQ-9 results show 1-4 (Minimal Depression). The patient scored their distress today as 2 on a scale of 0-10 with 0 being no distress and 10 being extreme distress.   Problems marked by the patient as being an issue for them within the last week include physical problems.   Results were reviewed along with psychosocial resources offered by our cancer center.  Our oncology social worker will be flagged for a DT score of 4 or above, and a same day call will be made for a score of 9 or 10.  A mental health referral is offered at this time. The patient is not interested in a referral to [facility].   Copies of patient's questionnaires will be scanned into EMR for details and further reference.    Barriers to care  A barriers form was also completed by the patient today. We discussed services offered by our facility to help her have adequate access to care. The patient was given the name and contact information for our Oncology Social Worker, Tanvi Petersen.  Based upon barriers assessment today, the patient will not require a follow-up call from the  to further discuss needs.   A copy of the barriers form will also be scanned into EMR for details and  "further reference.     VAD Assessment  The patient and I discussed planned intervenous chemotherapy as well as other IV treatments that are often needed throughout the course of treatment. These may include, but are not limited to blood transfusions, antibiotics, and IV hydration. The vasculature does appear to be adequate for multiple peripheral IVs throughout their treatment course. Patient is scheduled for Port-A-Cath placement next week.     Advanced Care Planning  The patient and I discussed advanced care planning, \"Conversations that Matter\".   This service was offered, free of charge, for development of advance directives with a certified ACP facilitator.  The patient does not have an up-to-date advanced directive. This document is not on file with our office. The patient is not interested in an appointment with one of our facilitators to create or update their advanced directives.      Palliative Care  The patient and I discussed palliative care services. Palliative care is not the same as Hospice care. This is specialized medical care for people living with serious illness with the goal of improving quality of life for the patient and their family. [facility] offers our patients outpatient palliative care early along with their treatment to assist in coordination of care, symptom management, pain management, and medical decision making.  Oncology criteria for palliative care referral is not met at this time. The patient is not interested in a palliative care consultation.     Additional Referral needs  none      CHEMOTHERAPY EDUCATION    Chemotherapy education completed and consent obtained per oncology pharmacist.  See their documentation for further details.    Booklets Given: Chemotherapy and You [x]  Nutrition for the Patient with Cancer During Treatment [x]    Sexuality/Fertility Books []     Chemotherapy Regimen:   Treatment Plans       Name Type Plan Dates Plan Provider         Active    OP PANCREATIC " mFOLFIRINOX (OXALIplatin / Leucovorin / Irinotecan / Fluorouracil CIV) ONCOLOGY TREATMENT  12/26/2023 - Present Sherri العراقي MD                      Chemotherapy education comprehension reviewed. Questions answered.      Assessment and Plan:    Diagnoses and all orders for this visit:    1. Malignant neoplasm of body of pancreas (Primary)  -     Provider Communication  -     ondansetron (ZOFRAN) 8 MG tablet; Take 1 tablet by mouth 3 (Three) Times a Day As Needed for Nausea or Vomiting.  Dispense: 30 tablet; Refill: 5  -     lidocaine-prilocaine (EMLA) 2.5-2.5 % cream; Apply 1 application topically to the appropriate area as directed As Needed (45-60 minutes prior to port access.  Cover with saran/plastic wrap.).  Dispense: 30 g; Refill: 3    2. Abdominal carcinomatosis  -     Provider Communication  -     ondansetron (ZOFRAN) 8 MG tablet; Take 1 tablet by mouth 3 (Three) Times a Day As Needed for Nausea or Vomiting.  Dispense: 30 tablet; Refill: 5  -     lidocaine-prilocaine (EMLA) 2.5-2.5 % cream; Apply 1 application topically to the appropriate area as directed As Needed (45-60 minutes prior to port access.  Cover with saran/plastic wrap.).  Dispense: 30 g; Refill: 3      The patient and I have reviewed their cancer diagnosis and scheduled treatment plan. Needs assessment was completed including genetics, psychosocial needs, barriers to care, VAD evaluation, advanced care planning, and palliative care services. Referrals have been ordered as appropriate based upon our evaluation and patient desires.     Chemotherapy teaching was also completed today per pharmacy.  Adequate time was given to answer questions.  Patient and family are aware of their care team members and contact information if they have questions or problems throughout the treatment course. The patient is adequately prepared to begin treatment as scheduled on 12/27/2023.     Reviewed with patient education regarding EMLA cream and Zofran  prescriptions sent to pharmacy.       Patient will have pretreatment labs drawn on 12/26/2023.    I spent 30 minutes caring for Jessica on this date of service. This time includes time spent by me in the following activities: preparing for the visit, performing a medically appropriate examination and/or evaluation, counseling and educating the patient/family/caregiver, ordering medications, tests, or procedures, referring and communicating with other health care professionals, documenting information in the medical record, and care coordination.     Rebeca Shepherd, APRN  12/19/23

## 2023-12-21 NOTE — DISCHARGE SUMMARY
[facility] Medicine Services  DISCHARGE SUMMARY    Patient Name: Jessica Kraft  : 1960  MRN: [id number]    Date of Admission: 2023  7:39 PM  Date of Discharge:  12/15/23  Primary Care Physician: Leonarda Díaz MD    Consults       Date and Time Order Name Status Description    2023  2:54 PM Inpatient Gynecologic Oncology Consult Completed     2023  8:11 AM Inpatient Hematology & Oncology Consult Completed     2023  2:25 AM Inpatient Gastroenterology Consult Completed             Hospital Course     Presenting Problem: Abdominal pain    Active Hospital Problems    Diagnosis  POA    **Sepsis [A41.9]  Yes    Pancreatic cancer [C25.9]  Unknown    Severe malnutrition [E43]  Yes    Acute UTI (urinary tract infection) [N39.0]  Unknown    Pancreatic mass [K86.89]  Unknown    Lung nodules [R91.8]  Unknown    Liver lesion [K76.9]  Unknown    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          Hospital Course:  Jessica Kraft is a 63 y.o. female with hx of anxiety, depression, insomnia, hypothyroidism, osteopenia, Vit D deficiency and UTI who presents to the ED for evaluation of abdominal pain with swelling and fever.  CT abdomen pelvis with contrast in ED showed a 2.4 cm pancreatic mass concerning for pancreatic adenocarcinoma, ascites, omental carcinomatosis, metastatic lesions involving the liver.  Malignancy diagnosis is new for the patient.  Workup so far has revealed a elevated .  Cytology from the ascites is consistent with possible GI or  pancreaticbiliary primary site.       Ascites, malignancy  Pancreatic mass, concern for metastasis  Omental and peritoneal carcinomatosis  Liver lesions  Pulmonary nodules  - s/p paracentesis on 12/8, Malignant with GI or pancreatic/biliary primary.-   - Spoke with GYN, H/O and GI--regarding her fluid results.  H/O would like further tissue to delineate the primary site and do further testing.  GI-to perform an EGD with EUS for biopsy today.   is currently elevated.  Brain MRI negative for metastases.    - pain control  - nausea control  --Oncology to follow after.  Will need to decide on timing of port placement.  Patient has elevated WBC today.  Although patient is afebrile and there is no known source of infection port placement at this point should be postponed.  -- She also had paracentesis again on 12/14/2023 and 1.6 L of fluid was removed.  Cell count and differential was ordered and will follow.  --Today, 12/15/2023 WBC has come down significantly.  Patient does not have any abdominal pain.  No fever or chills.  She is very eager to go home.     UTI--E.coli  --completed treatment.         Constipation--resolved  - likely r/t ascites  - bowel regimen.  Tolerating diet     GERD  Dysphagia  Hiatal hernia  - protonix BID  - s/p NG tube w decompression. Now removed  -tolerating diet. Much of GI symptoms on admission related to ascites and has since resolved from para w 4.2L removal     Anxiety   Depression  Insomnia      Discharge Follow Up Recommendations for outpatient labs/diagnostics:       Day of Discharge     HPI:   Resting in bed in no acute distress and tells me that feels better today.  No fever or chills.  No chest pain or palpitation or shortness of breath.  No nausea vomiting or diarrhea or abdominal pain.  Patient is very eager to go home.    Review of Systems      Vital Signs:          Physical Exam:  Constitutional: No acute distress, looks comfortable  HENT: NCAT, mucous membranes  moist  Respiratory: Clear to auscultation bilaterally, respiratory effort normal   Cardiovascular: RRR, no murmurs, rubs, or gallops  Gastrointestinal: Positive bowel sounds, there are hard areas, not tender and slightly distended   musculoskeletal: No bilateral ankle edema  Psychiatric: Appropriate affect, cooperative  Neurologic: Oriented x 3, strength symmetric in all extremities, Cranial Nerves grossly intact to confrontation, speech clear  Skin: No rashes       Pertinent  and/or Most Recent Results     LAB RESULTS:      Lab 12/15/23  0450 12/14/23 2212 12/14/23 0452   WBC 11.01* 17.25* 18.21*   HEMOGLOBIN 12.2 13.1 12.9   HEMATOCRIT 39.1 41.9 41.8   PLATELETS 278 372 367   NEUTROS ABS 8.70* 12.87* 13.80*   IMMATURE GRANS (ABS)  --  0.93* 1.09*   LYMPHS ABS  --  1.39 1.49   MONOS ABS  --  1.82* 1.61*   EOS ABS 0.22 0.13 0.06   MCV 86.7 86.7 87.1   CRP 4.96* 5.53*  --    PROCALCITONIN  --  0.14  --    LACTATE  --  1.6  --          Lab 12/15/23  0450 12/14/23  0452   SODIUM 137 135*   POTASSIUM 3.6 5.1   CHLORIDE 101 100   CO2 27.0 25.0   ANION GAP 9.0 10.0   BUN 13 13   CREATININE 0.62 0.80   EGFR 100.8 83.4   GLUCOSE 112* 111*   CALCIUM 8.3* 9.3   MAGNESIUM  --  2.0   PHOSPHORUS  --  3.9             Lab 12/15/23  0640 12/15/23  0450   HSTROP T 10 9                 Brief Urine Lab Results  (Last result in the past 365 days)        Color   Clarity   Blood   Leuk Est   Nitrite   Protein   CREAT   Urine HCG        12/15/23 0412 Yellow   Cloudy   Negative   Small (1+)   Negative   Negative                 Microbiology Results (last 10 days)       Procedure Component Value - Date/Time    Urine Culture - Urine, Urine, Clean Catch [[id number]]  (Abnormal) Collected: 12/15/23 0412    Lab Status: Final result Specimen: Urine, Clean Catch Updated: 12/16/23 7010     Urine Culture Yeast isolated     Comment: No further workup.         Narrative:      Colonization of the urinary tract without infection is common. Treatment  is discouraged unless the patient is symptomatic, pregnant, or undergoing an invasive urologic procedure.    Blood Culture - Blood, Arm, Right [[id number]]  (Normal) Collected: 12/14/23 2211    Lab Status: Final result Specimen: Blood from Arm, Right Updated: 12/19/23 2315     Blood Culture No growth at 5 days    Blood Culture - Blood, Arm, Left [[id number]]  (Normal) Collected: 12/14/23 2208    Lab Status: Final result Specimen: Blood from Arm, Left Updated: 12/19/23 2315     Blood Culture No growth at 5 days            XR Abdomen KUB    Result Date: 12/15/2023  XR ABDOMEN KUB Date of Exam: 12/15/2023 5:20 AM EST Indication: abd pain; constipation Comparison: CT abdomen and pelvis 12/7/2023. Findings: The patient is status post cholecystectomy. There are phleboliths in the pelvis. There is no distended bowel. There is normal colonic stool and gas. No definite pathologic abdominal calcification. No pneumoperitoneum. There is left basilar atelectasis. No acute osseous abnormality.     Impression: No acute abdominal abnormality. Electronically Signed: Ildefonso Newman MD  12/15/2023 5:38 AM EST  Workstation ID: [id number]    XR Chest 1 View    Result Date: 12/14/2023  XR CHEST 1 VW Date of Exam: 12/14/2023 10:02 PM EST Indication: leukocytosis Comparison: Chest CT 74876 23. Chest radiograph 12/7/2023. Findings: Improved inspiratory effort compared to prior. Hazy appearance of the lung bases which could reflect persistent small layering effusions. Hiatal hernia noted. Cardiomediastinal silhouette unchanged. No new lobar infiltrate, worsening edema or pneumothorax. Degenerative osseous changes noted.     Impression: Improved inspiration. Questionable residual layering effusions, otherwise no new acute finding. Electronically Signed: Marco A Jane MD  12/14/2023 10:26 PM EST  Workstation ID: [id number]    US Paracentesis    Result Date: 12/14/2023  US PARACENTESIS  History: malignant ascites  : Rajat Bhandari  MD.  Modality: Ultrasonography                   Sedation: None. Anesthesia: Lidocaine 1% local infiltration. Estimated blood loss:  0 cc.        Technique: A thorough discussion of the risks, benefits, and alternatives of the procedure, and if applicable, moderate sedation, was carried out with the patient. They were encouraged to ask any questions. Any questions were answered. They verbalized understanding. A written informed consent was then signed.  A multi-component timeout was performed prior to starting the procedure using the departmental protocol. The procedure room personnel used personal protective equipment. The operators used sterile gloves and if indicated, sterile gowns. The surgical site was prepped with chlorhexidine gluconate  and draped in the maximal applicable sterile fashion. A preliminary ultrasonography was performed to assess the target and determine a safe access site. It showed ascites. Pertinent ultrasound images were stored to the PACS for documentation. The access site was sterilely prepped and draped. Local anesthesia was administered. A dermatotomy was performed if needed. A catheter over the needle system was advanced into the peritoneal cavity. Straw colored fluid was aspirated and the plastic catheter advanced into the peritoneum. The catheter was then connected to a fluid recovery system. At the end of the procedure, the catheter was withdrawn and an aseptic dressing applied. The patient tolerated the procedure well. After uneventful recovery recovery, the patient was discharged from the department in stable condition. The total amount of fluid recovered is given below. Albumin was infused intravenously if ordered by the referring doctor. Complications: None immediate. Specimen: The specimen was labeled and sent to the lab in appropriate carriers & containers if such was requested by the ordering provider.     Impression:                                                               Successful ultrasound-guided paracentesis using a left lower quadrant access with recovery of 1.6 liters of fluid as described above. Thank you for the opportunity to assist in the care of your patient. Electronically Signed: Rajat Bhandari MD  12/14/2023 12:38 PM EST  Workstation ID: [id number]                 Plan for Follow-up of Pending Labs/Results:   Pending Labs       Order Current Status    Fungus Culture - Peritoneal Fluid, Peritoneum Preliminary result          Discharge Details        Discharge Medications        New Medications        Instructions Start Date   HYDROcodone-acetaminophen 5-325 MG per tablet  Commonly known as: NORCO   Take 1-2 tablet(s) by mouth Every 8 Hours As Needed for Moderate Pain.             Continue These Medications        Instructions Start Date   aspirin 81 MG chewable tablet   81 mg, Oral, Daily      cyclobenzaprine 10 MG tablet  Commonly known as: FLEXERIL   10 mg, Oral, Daily      dicyclomine 20 MG tablet  Commonly known as: BENTYL   20 mg, Oral, Every 8 Hours PRN      Levoxyl 125 MCG tablet  Generic drug: levothyroxine   125 mcg, Oral, Every Morning, On an empty stomach, do not substitute      metoclopramide 5 MG tablet  Commonly known as: REGLAN   5 mg, Oral, 3 Times Daily PRN      RABEprazole 20 MG EC tablet  Commonly known as: ACIPHEX   20 mg, Oral, Daily      rosuvastatin 5 MG tablet  Commonly known as: CRESTOR   5 mg, Oral, Daily      venlafaxine  MG 24 hr capsule  Commonly known as: EFFEXOR-XR   150 mg, Oral, Daily               No Known Allergies      Discharge Disposition:  Home or Self Care    Diet:  Hospital:No active diet order      Diet Instructions       Diet: Regular/House Diet; Thin (IDDSI 0)      Discharge Diet: Regular/House Diet    Fluid Consistency: Thin (IDDSI 0)             Activity:  Activity Instructions       Activity as Tolerated              Restrictions or Other Recommendations:         CODE STATUS:    Code Status and Medical Interventions:    Ordered at: 12/08/23 0205     Code Status (Patient has no pulse and is not breathing):    CPR (Attempt to Resuscitate)     Medical Interventions (Patient has pulse or is breathing):    Full Support       Future Appointments   Date Time Provider Department Center   12/26/2023  9:45 AM ACCESS AND LAB DRAW CHAIR 2  [facility]   12/26/2023 11:00 AM [facility]  [facility]   12/27/2023  7:45 AM Sherri العراقي MD [facility]   12/27/2023  9:00 AM CHAIR 14  [facility]       Additional Instructions for the Follow-ups that You Need to Schedule       Discharge Follow-up with PCP   As directed       Currently Documented PCP:    Leonarda Díaz MD    PCP Phone Number:    [phone]     Follow Up Details: within one week        Discharge Follow-up with Specified Provider: Dr. العراقي as per schedule   As directed      To: Dr. العراقي as per schedule                      Mark Alba MD  12/20/23      Time Spent on Discharge:  I spent  38  minutes on this discharge activity which included: face-to-face encounter with the patient, reviewing the data in the system, coordination of the care with the nursing staff as well as consultants, documentation, and entering orders.

## 2023-12-22 ENCOUNTER — HOSPITAL ENCOUNTER (EMERGENCY)
Facility: HOSPITAL | Age: 63
Discharge: HOME | End: 2023-12-22
Attending: STUDENT IN AN ORGANIZED HEALTH CARE EDUCATION/TRAINING PROGRAM
Payer: COMMERCIAL

## 2023-12-22 ENCOUNTER — APPOINTMENT (OUTPATIENT)
Dept: ULTRASOUND IMAGING | Facility: HOSPITAL | Age: 63
End: 2023-12-22
Payer: COMMERCIAL

## 2023-12-22 ENCOUNTER — TELEPHONE (OUTPATIENT)
Dept: ONCOLOGY | Facility: CLINIC | Age: 63
End: 2023-12-22
Payer: COMMERCIAL

## 2023-12-22 VITALS
WEIGHT: 201 LBS | TEMPERATURE: 97.6 F | OXYGEN SATURATION: 95 % | HEART RATE: 93 BPM | SYSTOLIC BLOOD PRESSURE: 128 MMHG | DIASTOLIC BLOOD PRESSURE: 82 MMHG | HEIGHT: 66 IN | BODY MASS INDEX: 32.3 KG/M2 | RESPIRATION RATE: 16 BRPM

## 2023-12-22 DIAGNOSIS — C25.1 MALIGNANT NEOPLASM OF BODY OF PANCREAS: ICD-10-CM

## 2023-12-22 DIAGNOSIS — C25.1 MALIGNANT NEOPLASM OF BODY OF PANCREAS: Primary | ICD-10-CM

## 2023-12-22 DIAGNOSIS — R14.0 ABDOMINAL DISTENTION: Primary | ICD-10-CM

## 2023-12-22 DIAGNOSIS — C76.2 ABDOMINAL CARCINOMATOSIS: ICD-10-CM

## 2023-12-22 DIAGNOSIS — R18.0 MALIGNANT ASCITES: ICD-10-CM

## 2023-12-22 LAB
ALBUMIN SERPL-MCNC: 3.1 G/DL (ref 3.5–5.2)
ALBUMIN/GLOB SERPL: 0.8 G/DL
ALP SERPL-CCNC: 218 U/L (ref 39–117)
ALT SERPL W P-5'-P-CCNC: 18 U/L (ref 1–33)
ANION GAP SERPL CALCULATED.3IONS-SCNC: 11 MMOL/L (ref 5–15)
APTT PPP: 29.1 SECONDS (ref 60–90)
AST SERPL-CCNC: 27 U/L (ref 1–32)
BACTERIA UR QL AUTO: ABNORMAL /HPF
BASOPHILS # BLD AUTO: 0.07 10*3/MM3 (ref 0–0.2)
BASOPHILS NFR BLD AUTO: 0.5 % (ref 0–1.5)
BILIRUB SERPL-MCNC: 0.3 MG/DL (ref 0–1.2)
BILIRUB UR QL STRIP: NEGATIVE
BUN SERPL-MCNC: 13 MG/DL (ref 8–23)
BUN/CREAT SERPL: 18.1 (ref 7–25)
CALCIUM SPEC-SCNC: 8.8 MG/DL (ref 8.6–10.5)
CHLORIDE SERPL-SCNC: 96 MMOL/L (ref 98–107)
CLARITY UR: ABNORMAL
CO2 SERPL-SCNC: 26 MMOL/L (ref 22–29)
COD CRY URNS QL: ABNORMAL /HPF
COLOR UR: ABNORMAL
CREAT SERPL-MCNC: 0.72 MG/DL (ref 0.57–1)
D-LACTATE SERPL-SCNC: 1.4 MMOL/L (ref 0.5–2)
DEPRECATED RDW RBC AUTO: 48.3 FL (ref 37–54)
EGFRCR SERPLBLD CKD-EPI 2021: 94.7 ML/MIN/1.73
EOSINOPHIL # BLD AUTO: 0.34 10*3/MM3 (ref 0–0.4)
EOSINOPHIL NFR BLD AUTO: 2.5 % (ref 0.3–6.2)
ERYTHROCYTE [DISTWIDTH] IN BLOOD BY AUTOMATED COUNT: 15.3 % (ref 12.3–15.4)
FUNGUS WND CULT: NORMAL
GLOBULIN UR ELPH-MCNC: 3.7 GM/DL
GLUCOSE SERPL-MCNC: 122 MG/DL (ref 65–99)
GLUCOSE UR STRIP-MCNC: NEGATIVE MG/DL
HCT VFR BLD AUTO: 41.9 % (ref 34–46.6)
HGB BLD-MCNC: 13.3 G/DL (ref 12–15.9)
HGB UR QL STRIP.AUTO: NEGATIVE
HOLD SPECIMEN: NORMAL
HYALINE CASTS UR QL AUTO: ABNORMAL /LPF
IMM GRANULOCYTES # BLD AUTO: 0.15 10*3/MM3 (ref 0–0.05)
IMM GRANULOCYTES NFR BLD AUTO: 1.1 % (ref 0–0.5)
INR PPP: 1.02 (ref 0.89–1.12)
KETONES UR QL STRIP: ABNORMAL
LEUKOCYTE ESTERASE UR QL STRIP.AUTO: NEGATIVE
LIPASE SERPL-CCNC: 13 U/L (ref 13–60)
LYMPHOCYTES # BLD AUTO: 1.23 10*3/MM3 (ref 0.7–3.1)
LYMPHOCYTES NFR BLD AUTO: 8.9 % (ref 19.6–45.3)
MCH RBC QN AUTO: 27.8 PG (ref 26.6–33)
MCHC RBC AUTO-ENTMCNC: 31.7 G/DL (ref 31.5–35.7)
MCV RBC AUTO: 87.7 FL (ref 79–97)
MONOCYTES # BLD AUTO: 0.96 10*3/MM3 (ref 0.1–0.9)
MONOCYTES NFR BLD AUTO: 7 % (ref 5–12)
MUCOUS THREADS URNS QL MICRO: ABNORMAL /HPF
NEUTROPHILS NFR BLD AUTO: 11.05 10*3/MM3 (ref 1.7–7)
NEUTROPHILS NFR BLD AUTO: 80 % (ref 42.7–76)
NITRITE UR QL STRIP: NEGATIVE
NRBC BLD AUTO-RTO: 0 /100 WBC (ref 0–0.2)
PH UR STRIP.AUTO: 5.5 [PH] (ref 5–8)
PLATELET # BLD AUTO: 476 10*3/MM3 (ref 140–450)
PMV BLD AUTO: 9.5 FL (ref 6–12)
POTASSIUM SERPL-SCNC: 4.1 MMOL/L (ref 3.5–5.2)
PROT SERPL-MCNC: 6.8 G/DL (ref 6–8.5)
PROT UR QL STRIP: ABNORMAL
PROTHROMBIN TIME: 13.5 SECONDS (ref 12.2–14.5)
RBC # BLD AUTO: 4.78 10*6/MM3 (ref 3.77–5.28)
RBC # UR STRIP: ABNORMAL /HPF
REF LAB TEST METHOD: ABNORMAL
SODIUM SERPL-SCNC: 133 MMOL/L (ref 136–145)
SP GR UR STRIP: 1.04 (ref 1–1.03)
SQUAMOUS #/AREA URNS HPF: ABNORMAL /HPF
UROBILINOGEN UR QL STRIP: ABNORMAL
WBC # UR STRIP: ABNORMAL /HPF
WBC NRBC COR # BLD AUTO: 13.8 10*3/MM3 (ref 3.4–10.8)
WHOLE BLOOD HOLD COAG: NORMAL
WHOLE BLOOD HOLD SPECIMEN: NORMAL

## 2023-12-22 PROCEDURE — 83605 ASSAY OF LACTIC ACID: CPT

## 2023-12-22 PROCEDURE — 83690 ASSAY OF LIPASE: CPT

## 2023-12-22 PROCEDURE — 76942 ECHO GUIDE FOR BIOPSY: CPT

## 2023-12-22 PROCEDURE — 96375 TX/PRO/DX INJ NEW DRUG ADDON: CPT

## 2023-12-22 PROCEDURE — 85730 THROMBOPLASTIN TIME PARTIAL: CPT | Performed by: EMERGENCY MEDICINE

## 2023-12-22 PROCEDURE — 25010000002 ONDANSETRON PER 1 MG: Performed by: EMERGENCY MEDICINE

## 2023-12-22 PROCEDURE — 80053 COMPREHEN METABOLIC PANEL: CPT

## 2023-12-22 PROCEDURE — P9047 ALBUMIN (HUMAN), 25%, 50ML: HCPCS | Performed by: EMERGENCY MEDICINE

## 2023-12-22 PROCEDURE — 99284 EMERGENCY DEPT VISIT MOD MDM: CPT

## 2023-12-22 PROCEDURE — 81001 URINALYSIS AUTO W/SCOPE: CPT | Performed by: STUDENT IN AN ORGANIZED HEALTH CARE EDUCATION/TRAINING PROGRAM

## 2023-12-22 PROCEDURE — 96374 THER/PROPH/DIAG INJ IV PUSH: CPT

## 2023-12-22 PROCEDURE — 85025 COMPLETE CBC W/AUTO DIFF WBC: CPT

## 2023-12-22 PROCEDURE — 85610 PROTHROMBIN TIME: CPT | Performed by: EMERGENCY MEDICINE

## 2023-12-22 PROCEDURE — 36415 COLL VENOUS BLD VENIPUNCTURE: CPT

## 2023-12-22 PROCEDURE — 25010000002 ALBUMIN HUMAN 25% PER 50 ML: Performed by: EMERGENCY MEDICINE

## 2023-12-22 PROCEDURE — 63710000001 ONDANSETRON ODT 4 MG TABLET DISPERSIBLE: Performed by: EMERGENCY MEDICINE

## 2023-12-22 PROCEDURE — 25010000002 KETOROLAC TROMETHAMINE PER 15 MG: Performed by: EMERGENCY MEDICINE

## 2023-12-22 RX ORDER — SODIUM CHLORIDE 9 MG/ML
10 INJECTION INTRAVENOUS AS NEEDED
Status: DISCONTINUED | OUTPATIENT
Start: 2023-12-22 | End: 2023-12-22 | Stop reason: HOSPADM

## 2023-12-22 RX ORDER — ONDANSETRON 2 MG/ML
4 INJECTION INTRAMUSCULAR; INTRAVENOUS ONCE
Status: COMPLETED | OUTPATIENT
Start: 2023-12-22 | End: 2023-12-22

## 2023-12-22 RX ORDER — LIDOCAINE HYDROCHLORIDE 10 MG/ML
5 INJECTION, SOLUTION EPIDURAL; INFILTRATION; INTRACAUDAL; PERINEURAL ONCE
Status: DISCONTINUED | OUTPATIENT
Start: 2023-12-22 | End: 2023-12-22 | Stop reason: HOSPADM

## 2023-12-22 RX ORDER — ALBUMIN (HUMAN) 12.5 G/50ML
25 SOLUTION INTRAVENOUS ONCE
Status: COMPLETED | OUTPATIENT
Start: 2023-12-22 | End: 2023-12-22

## 2023-12-22 RX ORDER — ONDANSETRON 4 MG/1
4 TABLET, ORALLY DISINTEGRATING ORAL ONCE
Status: COMPLETED | OUTPATIENT
Start: 2023-12-22 | End: 2023-12-22

## 2023-12-22 RX ORDER — KETOROLAC TROMETHAMINE 15 MG/ML
15 INJECTION, SOLUTION INTRAMUSCULAR; INTRAVENOUS ONCE
Status: COMPLETED | OUTPATIENT
Start: 2023-12-22 | End: 2023-12-22

## 2023-12-22 RX ADMIN — ALBUMIN (HUMAN) 25 G: 0.25 INJECTION, SOLUTION INTRAVENOUS at 17:17

## 2023-12-22 RX ADMIN — ONDANSETRON 4 MG: 4 TABLET, ORALLY DISINTEGRATING ORAL at 18:52

## 2023-12-22 RX ADMIN — KETOROLAC TROMETHAMINE 15 MG: 15 INJECTION, SOLUTION INTRAMUSCULAR; INTRAVENOUS at 17:51

## 2023-12-22 RX ADMIN — ONDANSETRON 4 MG: 2 INJECTION INTRAMUSCULAR; INTRAVENOUS at 17:12

## 2023-12-22 NOTE — TELEPHONE ENCOUNTER
Patient called she feels like she is retaining fluid again, wants to know if she needs to come today to get it drained off? Please call.

## 2023-12-22 NOTE — TELEPHONE ENCOUNTER
Called patient back and advised her that next available paracentesis is not until next month per scheduling.  Patient stated she is in enough discomfort that she is going to go to [facility] ER instead.  Patient advised that if she is SOA or in discomfort, she should go to nearest ER.  Patient verbalized understanding and stated she is coming to [facility] ER.  Called and spoke to Chris, charge nurse in ER, advised patient stated she is coming here.  Report given.  Dr. Hairston, on call provider, notified.

## 2023-12-22 NOTE — ED PROVIDER NOTES
EMERGENCY DEPARTMENT ENCOUNTER    Pt Name: Jessica Kraft  MRN: [id number]  Pt :   1960  Room Number:    Date of encounter:  2023  PCP: Leonarda Díaz MD  ED Provider: Talon Dobbs MD    Historian: Patient and her       HPI:  Chief Complaint: Abdominal swelling with concern for need for paracentesis        Context: Jessica Kraft is a 63 y.o. female who presents to the ED c/o abdominal bloating/swelling.  The patient unfortunately is suffering from metastatic pancreatic cancer.  She has developed ascites and underwent paracentesis 15 days ago and then 8 days ago.  She now is uncomfortable secondary to abdominal distention.  She has not been running any fevers.  The patient follows with Dr. العراقي and is scheduled to start chemotherapy next week.      PAST MEDICAL HISTORY  Past Medical History:   Diagnosis Date    Abscess     Anxiety     Bilateral ovarian cysts     Depression     Encounter for routine gynecological examination     Foot pain     H/O bone density study 2014    H/O mammogram 2016    [facility]    Hot flashes, menopausal     Hypothyroidism due to Hashimoto's thyroiditis     Insomnia     Miscarriage     x3    Osteopenia     Pap smear for cervical cancer screening 2014    Routine general medical examination at a health care facility     Urinary incontinence     Urinary tract infection     Vitamin D deficiency          PAST SURGICAL HISTORY  Past Surgical History:   Procedure Laterality Date    ABDOMINOPLASTY      CHOLECYSTECTOMY      COLONOSCOPY  2012    Quang Gaines in [facility] normal    ECTOPIC PREGNANCY SURGERY Left     ENDOSCOPY N/A 2023    Procedure: ESOPHAGOGASTRODUODENOSCOPY;  Surgeon: Flakito Abrams MD;  Location: [facility] ENDOSCOPY;  Service: Gastroenterology;  Laterality: N/A;    HERNIA REPAIR           FAMILY HISTORY  Family History   Problem Relation Age of Onset    Mental illness Mother     Depression Mother     Thyroid disease  Mother     Hypertension Father     Thyroid disease Sister     Breast cancer Other     Breast cancer Maternal Aunt          SOCIAL HISTORY  Social History     Socioeconomic History    Marital status:    Tobacco Use    Smoking status: Never    Smokeless tobacco: Never   Vaping Use    Vaping Use: Never used   Substance and Sexual Activity    Alcohol use: No    Drug use: No    Sexual activity: Yes     Partners: Male     Birth control/protection: None, Post-menopausal     Comment:          ALLERGIES  Patient has no known allergies.        REVIEW OF SYSTEMS  Review of Systems       All systems reviewed and negative except for those discussed in HPI.       PHYSICAL EXAM    I have reviewed the triage vital signs and nursing notes.    ED Triage Vitals [12/22/23 1209]   Temp Heart Rate Resp BP SpO2   97.6 °F (36.4 °C) 106 18 141/96 97 %      Temp src Heart Rate Source Patient Position BP Location FiO2 (%)   Oral Monitor Sitting Left arm --       Physical Exam  GENERAL:   Appears in no acute distress.  She does appear somewhat weakened for a 63-year-old.  HENT: Nares patent.  EYES: No scleral icterus.  CV: Regular rhythm, tachycardic rate.  No murmurs gallops rubs  RESPIRATORY: Normal effort.  No audible wheezes, rales or rhonchi.  Clear to auscultation  ABDOMEN: Soft, distended and mildly tender to palpation without true firmness.  MUSCULOSKELETAL: No deformities.   NEURO: Alert, moves all extremities, follows commands.  SKIN: Warm, dry, no rash visualized.      LAB RESULTS  Recent Results (from the past 24 hour(s))   Comprehensive Metabolic Panel    Collection Time: 12/22/23 12:51 PM    Specimen: Blood   Result Value Ref Range    Glucose 122 (H) 65 - 99 mg/dL    BUN 13 8 - 23 mg/dL    Creatinine 0.72 0.57 - 1.00 mg/dL    Sodium 133 (L) 136 - 145 mmol/L    Potassium 4.1 3.5 - 5.2 mmol/L    Chloride 96 (L) 98 - 107 mmol/L    CO2 26.0 22.0 - 29.0 mmol/L    Calcium 8.8 8.6 - 10.5 mg/dL    Total Protein 6.8 6.0 -  8.5 g/dL    Albumin 3.1 (L) 3.5 - 5.2 g/dL    ALT (SGPT) 18 1 - 33 U/L    AST (SGOT) 27 1 - 32 U/L    Alkaline Phosphatase 218 (H) 39 - 117 U/L    Total Bilirubin 0.3 0.0 - 1.2 mg/dL    Globulin 3.7 gm/dL    A/G Ratio 0.8 g/dL    BUN/Creatinine Ratio 18.1 7.0 - 25.0    Anion Gap 11.0 5.0 - 15.0 mmol/L    eGFR 94.7 >60.0 mL/min/1.73   Lipase    Collection Time: 12/22/23 12:51 PM    Specimen: Blood   Result Value Ref Range    Lipase 13 13 - 60 U/L   Lactic Acid, Plasma    Collection Time: 12/22/23 12:51 PM    Specimen: Blood   Result Value Ref Range    Lactate 1.4 0.5 - 2.0 mmol/L   Green Top (Gel)    Collection Time: 12/22/23 12:51 PM   Result Value Ref Range    Extra Tube Hold for add-ons.    Lavender Top    Collection Time: 12/22/23 12:51 PM   Result Value Ref Range    Extra Tube hold for add-on    Gold Top - SST    Collection Time: 12/22/23 12:51 PM   Result Value Ref Range    Extra Tube Hold for add-ons.    Gray Top    Collection Time: 12/22/23 12:51 PM   Result Value Ref Range    Extra Tube Hold for add-ons.    Light Blue Top    Collection Time: 12/22/23 12:51 PM   Result Value Ref Range    Extra Tube Hold for add-ons.    CBC Auto Differential    Collection Time: 12/22/23 12:51 PM    Specimen: Blood   Result Value Ref Range    WBC 13.80 (H) 3.40 - 10.80 10*3/mm3    RBC 4.78 3.77 - 5.28 10*6/mm3    Hemoglobin 13.3 12.0 - 15.9 g/dL    Hematocrit 41.9 34.0 - 46.6 %    MCV 87.7 79.0 - 97.0 fL    MCH 27.8 26.6 - 33.0 pg    MCHC 31.7 31.5 - 35.7 g/dL    RDW 15.3 12.3 - 15.4 %    RDW-SD 48.3 37.0 - 54.0 fl    MPV 9.5 6.0 - 12.0 fL    Platelets 476 (H) 140 - 450 10*3/mm3    Neutrophil % 80.0 (H) 42.7 - 76.0 %    Lymphocyte % 8.9 (L) 19.6 - 45.3 %    Monocyte % 7.0 5.0 - 12.0 %    Eosinophil % 2.5 0.3 - 6.2 %    Basophil % 0.5 0.0 - 1.5 %    Immature Grans % 1.1 (H) 0.0 - 0.5 %    Neutrophils, Absolute 11.05 (H) 1.70 - 7.00 10*3/mm3    Lymphocytes, Absolute 1.23 0.70 - 3.10 10*3/mm3    Monocytes, Absolute 0.96 (H) 0.10 -  0.90 10*3/mm3    Eosinophils, Absolute 0.34 0.00 - 0.40 10*3/mm3    Basophils, Absolute 0.07 0.00 - 0.20 10*3/mm3    Immature Grans, Absolute 0.15 (H) 0.00 - 0.05 10*3/mm3    nRBC 0.0 0.0 - 0.2 /100 WBC   Protime-INR    Collection Time: 12/22/23 12:51 PM    Specimen: Blood   Result Value Ref Range    Protime 13.5 12.2 - 14.5 Seconds    INR 1.02 0.89 - 1.12   aPTT    Collection Time: 12/22/23 12:51 PM    Specimen: Blood   Result Value Ref Range    PTT 29.1 (L) 60.0 - 90.0 seconds   Urinalysis With Microscopic If Indicated (No Culture) - Urine, Clean Catch    Collection Time: 12/22/23  5:09 PM    Specimen: Urine, Clean Catch   Result Value Ref Range    Color, UA Dark Yellow (A) Yellow, Straw    Appearance, UA Cloudy (A) Clear    pH, UA 5.5 5.0 - 8.0    Specific Gravity, UA 1.036 (H) 1.001 - 1.030    Glucose, UA Negative Negative    Ketones, UA Trace (A) Negative    Bilirubin, UA Negative Negative    Blood, UA Negative Negative    Protein, UA Trace (A) Negative    Leuk Esterase, UA Negative Negative    Nitrite, UA Negative Negative    Urobilinogen, UA 1.0 E.U./dL 0.2 - 1.0 E.U./dL   Urinalysis, Microscopic Only - Urine, Clean Catch    Collection Time: 12/22/23  5:09 PM    Specimen: Urine, Clean Catch   Result Value Ref Range    RBC, UA 3-5 (A) None Seen, 0-2 /HPF    WBC, UA 6-10 (A) None Seen, 0-2 /HPF    Bacteria, UA None Seen None Seen, Trace /HPF    Squamous Epithelial Cells, UA 13-20 (A) None Seen, 0-2 /HPF    Hyaline Casts, UA 0-6 0 - 6 /LPF    Calcium Oxalate Crystals, UA Large/3+ None Seen /HPF    Mucus, UA Moderate/2+ (A) None Seen, Trace /HPF    Methodology Manual Light Microscopy        If labs were ordered, I independently reviewed the results and considered them in treating the patient.        RADIOLOGY  US Paracentesis    Result Date: 12/22/2023  US PARACENTESIS Date of Exam: 12/22/2023 4:32 PM EST Indication: ascites. Peritoneal carcinomatosis Comparison: None available. Technique: The procedure, risks  and options were discussed with the patient and informed written consent was obtained. Ultrasound was performed of the abdomen and a site was chosen over the right side of the abdomen. The skin was marked prepped draped and anesthetized 1% Xylocaine. A 6 Faroese catheter was inserted by trocar technique under local anesthesia. A total of 4.7 L of fluid was removed and discarded. Sterile dressings were applied and the patient was returned to the emergency department following the procedure.     Impression: Successful ultrasound-guided paracentesis with removal of 4.7 L of fluid Electronically Signed: Flakito Andino MD  12/22/2023 4:46 PM EST  Workstation ID: [id number]     I ordered and independently reviewed the above noted radiographic studies.      I viewed images of abdominal ultrasound which showed ascites per my independent interpretation.    See radiologist's dictation for official interpretation.        PROCEDURES    Procedures    No orders to display       MEDICATIONS GIVEN IN ER    Medications   Sodium Chloride (PF) 0.9 % 10 mL (has no administration in time range)   lidocaine PF 1% (XYLOCAINE) injection 5 mL (5 mL Injection Not Given 12/22/23 1856)   ondansetron (ZOFRAN) injection 4 mg (4 mg Intravenous Given 12/22/23 1712)   albumin human 25 % IV SOLN 25 g (0 g Intravenous Stopped 12/22/23 1751)   ketorolac (TORADOL) injection 15 mg (15 mg Intravenous Given 12/22/23 1751)   ondansetron ODT (ZOFRAN-ODT) disintegrating tablet 4 mg (4 mg Oral Given 12/22/23 1852)         MEDICAL DECISION MAKING, PROGRESS, and CONSULTS    All labs, if obtained, have been independently reviewed by me.  All radiology studies, if obtained, have been reviewed by me and the radiologist dictating the report.  All EKG's, if obtained, have been independently viewed and interpreted by me/my attending physician.      Discussion below represents my analysis of pertinent findings related to patient's condition, differential diagnosis,  treatment plan and final disposition.                         Differential diagnosis:    Malignant ascites versus spontaneous bacterial peritonitis versus occult UTI, etc.      Additional sources:    - Discussed/ obtained information from independent historians: Patient's  was a good historian and provided history at the bedside.    - External (non-ED) record review: I reviewed multiple records on this patient to include the patient's discharge summary dated 12/15/2023 when the patient had been admitted for sepsis, pancreatic cancer, malnutrition, UTI.    I reviewed the patient's KUB radiograph of 12/15/2023 which showed no acute abnormality.    - Chronic or social conditions impacting care: Pancreatic cancer.  .    - Shared decision making: Patient and  in complete agreement with current plan for evaluation and treatment.      Orders placed during this visit:  Orders Placed This Encounter   Procedures    US Paracentesis    US Paracentesis    [place] Draw    Comprehensive Metabolic Panel    Lipase    Urinalysis With Microscopic If Indicated (No Culture) - Urine, Clean Catch    Lactic Acid, Plasma    CBC Auto Differential    Protime-INR    aPTT    Urinalysis, Microscopic Only - Urine, Clean Catch    NPO Diet NPO Type: Strict NPO    Undress & Gown    Insert Peripheral IV    CBC & Differential    Green Top (Gel)    Lavender Top    Gold Top - SST    Gray Top    Light Blue Top         Additional orders considered but not ordered:  CT scan of the abdomen pelvis.    ED Course:    Consultants:      ED Course as of 12/22/23 1934   Fri Dec 22, 2023   1525 WBC(!): 13.80 [KG]   1525 Lactate: 1.4 [KG]   1525 Sodium(!): 133 [KG]   1600 I have contacted Dr. Andino, interventional radiology and requested IR guided paracentesis. [MS]   1600 I went to order the IR paracentesis and it appears that it is already been ordered. [MS]   1624 Dr. Andino is currently performing paracentesis. [MS]   1709 Patient is  receiving albumin IV.  Orders have been placed by Yomi patino emergency pharmacist.  4.7 L of fluid were removed by Dr. Andino. [MS]      ED Course User Index  [KG] Jessica Figueroa APRN  [MS] Talon Dobbs MD              Shared Decision Making:  After my consideration of clinical presentation and any laboratory/radiology studies obtained, I discussed the findings with the patient/patient representative who is in agreement with the treatment plan and the final disposition.   Risks and benefits of discharge and/or observation/admission were discussed.       AS OF 19:34 EST VITALS:    BP - 128/82  HR - 93  TEMP - 97.6 °F (36.4 °C) (Oral)  O2 SATS - 95%                  DIAGNOSIS  Final diagnoses:   Abdominal distention   Malignant ascites         DISPOSITION  DISCHARGE    Patient discharged in stable condition.    Reviewed implications of results, diagnosis, meds, responsibility to follow up, warning signs and symptoms of possible worsening, potential complications and reasons to return to ER.    Patient/Family voiced understanding of above instructions.    Discussed plan for discharge, as there is no emergent indication for admission.  Pt/family is agreeable and understands need for follow up and possible repeat testing.  Pt/family is aware that discharge does not mean that nothing is wrong but that it indicates no emergency is currently present that requires admission and they must continue care with follow-up as given below or with a physician of their choice.     FOLLOW-UP  Leonarda Díaz MD  [address] [facility] [facility]    [place] [address]  [phone]      NEXT AVAILABLE APPOINTMENT - RECHECK OF CONDITION    Sherri العراقي MD  [address]  [place] [address]  [phone]          [facility] EMERGENCY DEPARTMENT  [address] [place] [id number] [phone]    IF YOU HAVE ANY CONCERN OF WORSENING CONDITION         Medication List      No changes  were made to your prescriptions during this visit.             Please note that portions of this document were completed with voice recognition software.        Talon Dobbs MD  12/24/23 4159

## 2023-12-22 NOTE — NURSING NOTE
US guided paracentesis performed by Dr Andino.  4.7 Liters of fluid removed from peritoneum. Patient tolerated well. Report given to Rn in ER

## 2023-12-22 NOTE — TELEPHONE ENCOUNTER
"Called patient at 09:55.  She stated she feels like her abdomen is full, distended and hard again.  She stated she is uncomfortable and is not sleeping.  Patient denied SOA today but stated she was SOA yesterday.  She stated taking a shower today \"wore her out.\"  Patient stated she is having \"a small amount of dizziness.\"  Patient stated she is feeling like she did before they did the paracentesis while in the hospital.  Advised her this nurse will consult with on call physician and call her back.  Patient verbalized understanding.  Spoke with Dr. Amaya and per MD, patient contacted back at 10:15 and advised orders placed for paracentesis but my not be able to get it scheduled until next week but office will schedule it, but if patient is uncomfortable and doesn't feel like she can wait, she needs to go to the emergency room, nearest to her if needed.  Patient verbalized understanding and stated to see when she can have it scheduled but she may end up going to the ER.    "

## 2023-12-25 ENCOUNTER — READMISSION MANAGEMENT (OUTPATIENT)
Dept: CALL CENTER | Facility: HOSPITAL | Age: 63
End: 2023-12-25
Payer: COMMERCIAL

## 2023-12-25 ENCOUNTER — APPOINTMENT (OUTPATIENT)
Dept: CT IMAGING | Facility: HOSPITAL | Age: 63
End: 2023-12-25
Payer: COMMERCIAL

## 2023-12-25 ENCOUNTER — HOSPITAL ENCOUNTER (INPATIENT)
Facility: HOSPITAL | Age: 63
LOS: 5 days | Discharge: HOME | End: 2024-01-01
Attending: EMERGENCY MEDICINE | Admitting: INTERNAL MEDICINE
Payer: COMMERCIAL

## 2023-12-25 DIAGNOSIS — R18.0 MALIGNANT ASCITES: ICD-10-CM

## 2023-12-25 DIAGNOSIS — C25.1 MALIGNANT NEOPLASM OF BODY OF PANCREAS: ICD-10-CM

## 2023-12-25 DIAGNOSIS — R11.2 NAUSEA AND VOMITING, UNSPECIFIED VOMITING TYPE: Primary | ICD-10-CM

## 2023-12-25 DIAGNOSIS — C76.2 ABDOMINAL CARCINOMATOSIS: ICD-10-CM

## 2023-12-25 DIAGNOSIS — C79.9 METASTASIS FROM PANCREATIC CANCER: ICD-10-CM

## 2023-12-25 DIAGNOSIS — C25.9 METASTASIS FROM PANCREATIC CANCER: ICD-10-CM

## 2023-12-25 PROBLEM — D75.839 THROMBOCYTOSIS: Status: ACTIVE | Noted: 2023-12-25

## 2023-12-25 PROBLEM — K59.00 CONSTIPATION: Status: ACTIVE | Noted: 2023-12-25

## 2023-12-25 PROBLEM — D72.9 NEUTROPHILIC LEUKOCYTOSIS: Status: ACTIVE | Noted: 2023-12-25

## 2023-12-25 LAB
ALBUMIN SERPL-MCNC: 3.2 G/DL (ref 3.5–5.2)
ALBUMIN/GLOB SERPL: 0.9 G/DL
ALP SERPL-CCNC: 231 U/L (ref 39–117)
ALT SERPL W P-5'-P-CCNC: 12 U/L (ref 1–33)
ANION GAP SERPL CALCULATED.3IONS-SCNC: 11 MMOL/L (ref 5–15)
AST SERPL-CCNC: 15 U/L (ref 1–32)
BASOPHILS # BLD AUTO: 0.06 10*3/MM3 (ref 0–0.2)
BASOPHILS NFR BLD AUTO: 0.5 % (ref 0–1.5)
BILIRUB SERPL-MCNC: 0.3 MG/DL (ref 0–1.2)
BILIRUB UR QL STRIP: NEGATIVE
BUN SERPL-MCNC: 17 MG/DL (ref 8–23)
BUN/CREAT SERPL: 23.9 (ref 7–25)
CALCIUM SPEC-SCNC: 9.1 MG/DL (ref 8.6–10.5)
CHLORIDE SERPL-SCNC: 94 MMOL/L (ref 98–107)
CLARITY UR: CLEAR
CO2 SERPL-SCNC: 28 MMOL/L (ref 22–29)
COLOR UR: YELLOW
CREAT SERPL-MCNC: 0.71 MG/DL (ref 0.57–1)
D-LACTATE SERPL-SCNC: 2 MMOL/L (ref 0.5–2)
DEPRECATED RDW RBC AUTO: 49.3 FL (ref 37–54)
EGFRCR SERPLBLD CKD-EPI 2021: 96.3 ML/MIN/1.73
EOSINOPHIL # BLD AUTO: 0.16 10*3/MM3 (ref 0–0.4)
EOSINOPHIL NFR BLD AUTO: 1.4 % (ref 0.3–6.2)
ERYTHROCYTE [DISTWIDTH] IN BLOOD BY AUTOMATED COUNT: 15.5 % (ref 12.3–15.4)
GLOBULIN UR ELPH-MCNC: 3.6 GM/DL
GLUCOSE SERPL-MCNC: 117 MG/DL (ref 65–99)
GLUCOSE UR STRIP-MCNC: NEGATIVE MG/DL
HCT VFR BLD AUTO: 44.8 % (ref 34–46.6)
HGB BLD-MCNC: 14 G/DL (ref 12–15.9)
HGB UR QL STRIP.AUTO: NEGATIVE
HOLD SPECIMEN: NORMAL
IMM GRANULOCYTES # BLD AUTO: 0.14 10*3/MM3 (ref 0–0.05)
IMM GRANULOCYTES NFR BLD AUTO: 1.2 % (ref 0–0.5)
KETONES UR QL STRIP: NEGATIVE
LEUKOCYTE ESTERASE UR QL STRIP.AUTO: NEGATIVE
LIPASE SERPL-CCNC: 12 U/L (ref 13–60)
LYMPHOCYTES # BLD AUTO: 1.47 10*3/MM3 (ref 0.7–3.1)
LYMPHOCYTES NFR BLD AUTO: 12.8 % (ref 19.6–45.3)
MCH RBC QN AUTO: 27.3 PG (ref 26.6–33)
MCHC RBC AUTO-ENTMCNC: 31.3 G/DL (ref 31.5–35.7)
MCV RBC AUTO: 87.3 FL (ref 79–97)
MONOCYTES # BLD AUTO: 0.87 10*3/MM3 (ref 0.1–0.9)
MONOCYTES NFR BLD AUTO: 7.6 % (ref 5–12)
NEUTROPHILS NFR BLD AUTO: 76.5 % (ref 42.7–76)
NEUTROPHILS NFR BLD AUTO: 8.76 10*3/MM3 (ref 1.7–7)
NITRITE UR QL STRIP: NEGATIVE
NRBC BLD AUTO-RTO: 0 /100 WBC (ref 0–0.2)
PH UR STRIP.AUTO: 5.5 [PH] (ref 5–8)
PLATELET # BLD AUTO: 589 10*3/MM3 (ref 140–450)
PMV BLD AUTO: 9.3 FL (ref 6–12)
POTASSIUM SERPL-SCNC: 3.7 MMOL/L (ref 3.5–5.2)
PROT SERPL-MCNC: 6.8 G/DL (ref 6–8.5)
PROT UR QL STRIP: ABNORMAL
RBC # BLD AUTO: 5.13 10*6/MM3 (ref 3.77–5.28)
SODIUM SERPL-SCNC: 133 MMOL/L (ref 136–145)
SP GR UR STRIP: 1.07 (ref 1–1.03)
UROBILINOGEN UR QL STRIP: ABNORMAL
WBC NRBC COR # BLD AUTO: 11.46 10*3/MM3 (ref 3.4–10.8)
WHOLE BLOOD HOLD COAG: NORMAL
WHOLE BLOOD HOLD SPECIMEN: NORMAL

## 2023-12-25 PROCEDURE — 83605 ASSAY OF LACTIC ACID: CPT | Performed by: EMERGENCY MEDICINE

## 2023-12-25 PROCEDURE — 25010000002 ONDANSETRON PER 1 MG: Performed by: EMERGENCY MEDICINE

## 2023-12-25 PROCEDURE — 99223 1ST HOSP IP/OBS HIGH 75: CPT | Performed by: INTERNAL MEDICINE

## 2023-12-25 PROCEDURE — 85025 COMPLETE CBC W/AUTO DIFF WBC: CPT | Performed by: EMERGENCY MEDICINE

## 2023-12-25 PROCEDURE — 25010000002 SODIUM CHLORIDE 0.9 % WITH KCL 20 MEQ 20-0.9 MEQ/L-% SOLUTION: Performed by: NURSE PRACTITIONER

## 2023-12-25 PROCEDURE — 81003 URINALYSIS AUTO W/O SCOPE: CPT | Performed by: EMERGENCY MEDICINE

## 2023-12-25 PROCEDURE — 25510000001 IOPAMIDOL 61 % SOLUTION: Performed by: EMERGENCY MEDICINE

## 2023-12-25 PROCEDURE — 25010000002 HYDROMORPHONE PER 4 MG: Performed by: NURSE PRACTITIONER

## 2023-12-25 PROCEDURE — 99285 EMERGENCY DEPT VISIT HI MDM: CPT

## 2023-12-25 PROCEDURE — 74177 CT ABD & PELVIS W/CONTRAST: CPT

## 2023-12-25 PROCEDURE — 80053 COMPREHEN METABOLIC PANEL: CPT | Performed by: EMERGENCY MEDICINE

## 2023-12-25 PROCEDURE — 25010000002 PROCHLORPERAZINE 10 MG/2ML SOLUTION: Performed by: NURSE PRACTITIONER

## 2023-12-25 PROCEDURE — 25010000002 HYDROMORPHONE PER 4 MG: Performed by: EMERGENCY MEDICINE

## 2023-12-25 PROCEDURE — 83690 ASSAY OF LIPASE: CPT | Performed by: EMERGENCY MEDICINE

## 2023-12-25 RX ORDER — ROSUVASTATIN CALCIUM 10 MG/1
5 TABLET, COATED ORAL NIGHTLY
Status: DISCONTINUED | OUTPATIENT
Start: 2023-12-25 | End: 2023-12-27

## 2023-12-25 RX ORDER — AMOXICILLIN 250 MG
2 CAPSULE ORAL 2 TIMES DAILY
Status: DISCONTINUED | OUTPATIENT
Start: 2023-12-25 | End: 2023-12-25

## 2023-12-25 RX ORDER — POLYETHYLENE GLYCOL 3350 17 G/17G
17 POWDER, FOR SOLUTION ORAL 2 TIMES DAILY
Status: DISCONTINUED | OUTPATIENT
Start: 2023-12-25 | End: 2023-12-27

## 2023-12-25 RX ORDER — BISACODYL 10 MG
10 SUPPOSITORY, RECTAL RECTAL DAILY PRN
Status: DISCONTINUED | OUTPATIENT
Start: 2023-12-25 | End: 2023-12-27

## 2023-12-25 RX ORDER — HYDROMORPHONE HYDROCHLORIDE 1 MG/ML
0.25 INJECTION, SOLUTION INTRAMUSCULAR; INTRAVENOUS; SUBCUTANEOUS
Status: DISCONTINUED | OUTPATIENT
Start: 2023-12-25 | End: 2023-12-27

## 2023-12-25 RX ORDER — ACETAMINOPHEN 325 MG/1
650 TABLET ORAL EVERY 4 HOURS PRN
Status: DISCONTINUED | OUTPATIENT
Start: 2023-12-25 | End: 2023-12-27

## 2023-12-25 RX ORDER — VENLAFAXINE HYDROCHLORIDE 75 MG/1
150 CAPSULE, EXTENDED RELEASE ORAL DAILY
Status: DISCONTINUED | OUTPATIENT
Start: 2023-12-25 | End: 2023-12-28

## 2023-12-25 RX ORDER — ALPRAZOLAM 0.5 MG/1
0.5 TABLET ORAL NIGHTLY PRN
COMMUNITY

## 2023-12-25 RX ORDER — PANTOPRAZOLE SODIUM 40 MG/1
40 TABLET, DELAYED RELEASE ORAL
Status: DISCONTINUED | OUTPATIENT
Start: 2023-12-26 | End: 2023-12-28

## 2023-12-25 RX ORDER — BISACODYL 5 MG/1
5 TABLET, DELAYED RELEASE ORAL DAILY PRN
Status: DISCONTINUED | OUTPATIENT
Start: 2023-12-25 | End: 2023-12-27

## 2023-12-25 RX ORDER — SODIUM CHLORIDE 0.9 % (FLUSH) 0.9 %
10 SYRINGE (ML) INJECTION AS NEEDED
Status: DISCONTINUED | OUTPATIENT
Start: 2023-12-25 | End: 2024-01-01 | Stop reason: HOSPADM

## 2023-12-25 RX ORDER — BISACODYL 5 MG/1
5 TABLET, DELAYED RELEASE ORAL DAILY PRN
Status: DISCONTINUED | OUTPATIENT
Start: 2023-12-25 | End: 2023-12-25

## 2023-12-25 RX ORDER — HYDROMORPHONE HYDROCHLORIDE 1 MG/ML
0.5 INJECTION, SOLUTION INTRAMUSCULAR; INTRAVENOUS; SUBCUTANEOUS
Status: DISCONTINUED | OUTPATIENT
Start: 2023-12-25 | End: 2024-01-01 | Stop reason: HOSPADM

## 2023-12-25 RX ORDER — SODIUM CHLORIDE 0.9 % (FLUSH) 0.9 %
10 SYRINGE (ML) INJECTION EVERY 12 HOURS SCHEDULED
Status: DISCONTINUED | OUTPATIENT
Start: 2023-12-25 | End: 2024-01-01 | Stop reason: HOSPADM

## 2023-12-25 RX ORDER — SODIUM CHLORIDE AND POTASSIUM CHLORIDE 150; 900 MG/100ML; MG/100ML
100 INJECTION, SOLUTION INTRAVENOUS CONTINUOUS
Status: ACTIVE | OUTPATIENT
Start: 2023-12-25 | End: 2023-12-26

## 2023-12-25 RX ORDER — PROCHLORPERAZINE EDISYLATE 5 MG/ML
5 INJECTION INTRAMUSCULAR; INTRAVENOUS EVERY 6 HOURS PRN
Status: DISCONTINUED | OUTPATIENT
Start: 2023-12-25 | End: 2023-12-28

## 2023-12-25 RX ORDER — CHOLECALCIFEROL (VITAMIN D3) 125 MCG
5 CAPSULE ORAL NIGHTLY PRN
Status: DISCONTINUED | OUTPATIENT
Start: 2023-12-25 | End: 2023-12-27

## 2023-12-25 RX ORDER — BISACODYL 10 MG
10 SUPPOSITORY, RECTAL RECTAL DAILY PRN
Status: DISCONTINUED | OUTPATIENT
Start: 2023-12-25 | End: 2023-12-25

## 2023-12-25 RX ORDER — NALOXONE HCL 0.4 MG/ML
0.4 VIAL (ML) INJECTION
Status: DISCONTINUED | OUTPATIENT
Start: 2023-12-25 | End: 2024-01-01 | Stop reason: HOSPADM

## 2023-12-25 RX ORDER — CYCLOBENZAPRINE HCL 10 MG
10 TABLET ORAL 3 TIMES DAILY PRN
Status: DISCONTINUED | OUTPATIENT
Start: 2023-12-25 | End: 2023-12-27

## 2023-12-25 RX ORDER — ONDANSETRON 2 MG/ML
4 INJECTION INTRAMUSCULAR; INTRAVENOUS EVERY 6 HOURS PRN
Status: DISCONTINUED | OUTPATIENT
Start: 2023-12-25 | End: 2024-01-01 | Stop reason: HOSPADM

## 2023-12-25 RX ORDER — AMOXICILLIN 250 MG
2 CAPSULE ORAL 2 TIMES DAILY
Status: DISCONTINUED | OUTPATIENT
Start: 2023-12-25 | End: 2023-12-27

## 2023-12-25 RX ORDER — ONDANSETRON 2 MG/ML
4 INJECTION INTRAMUSCULAR; INTRAVENOUS ONCE
Status: COMPLETED | OUTPATIENT
Start: 2023-12-25 | End: 2023-12-25

## 2023-12-25 RX ORDER — SODIUM CHLORIDE 9 MG/ML
40 INJECTION, SOLUTION INTRAVENOUS AS NEEDED
Status: DISCONTINUED | OUTPATIENT
Start: 2023-12-25 | End: 2024-01-01 | Stop reason: HOSPADM

## 2023-12-25 RX ORDER — POLYETHYLENE GLYCOL 3350 17 G/17G
17 POWDER, FOR SOLUTION ORAL DAILY
Status: DISCONTINUED | OUTPATIENT
Start: 2023-12-25 | End: 2023-12-25

## 2023-12-25 RX ORDER — SODIUM CHLORIDE 9 MG/ML
10 INJECTION INTRAVENOUS AS NEEDED
Status: DISCONTINUED | OUTPATIENT
Start: 2023-12-25 | End: 2024-01-01 | Stop reason: HOSPADM

## 2023-12-25 RX ORDER — LEVOTHYROXINE SODIUM 0.12 MG/1
125 TABLET ORAL EVERY MORNING
Status: DISCONTINUED | OUTPATIENT
Start: 2023-12-26 | End: 2023-12-27

## 2023-12-25 RX ORDER — OXYCODONE HYDROCHLORIDE AND ACETAMINOPHEN 5; 325 MG/1; MG/1
1 TABLET ORAL EVERY 4 HOURS PRN
Status: DISCONTINUED | OUTPATIENT
Start: 2023-12-25 | End: 2023-12-27

## 2023-12-25 RX ADMIN — Medication 5 MG: at 20:19

## 2023-12-25 RX ADMIN — ROSUVASTATIN 5 MG: 10 TABLET, FILM COATED ORAL at 20:11

## 2023-12-25 RX ADMIN — HYDROMORPHONE HYDROCHLORIDE 0.25 MG: 1 INJECTION, SOLUTION INTRAMUSCULAR; INTRAVENOUS; SUBCUTANEOUS at 15:48

## 2023-12-25 RX ADMIN — OXYCODONE AND ACETAMINOPHEN 1 TABLET: 5; 325 TABLET ORAL at 18:42

## 2023-12-25 RX ADMIN — POLYETHYLENE GLYCOL 3350 17 G: 17 POWDER, FOR SOLUTION ORAL at 20:11

## 2023-12-25 RX ADMIN — POTASSIUM CHLORIDE AND SODIUM CHLORIDE 100 ML/HR: 900; 150 INJECTION, SOLUTION INTRAVENOUS at 19:17

## 2023-12-25 RX ADMIN — SENNOSIDES AND DOCUSATE SODIUM 2 TABLET: 8.6; 5 TABLET ORAL at 20:11

## 2023-12-25 RX ADMIN — VENLAFAXINE HYDROCHLORIDE 150 MG: 75 CAPSULE, EXTENDED RELEASE ORAL at 20:11

## 2023-12-25 RX ADMIN — PROCHLORPERAZINE EDISYLATE 5 MG: 5 INJECTION INTRAMUSCULAR; INTRAVENOUS at 18:42

## 2023-12-25 RX ADMIN — NALOXEGOL OXALATE 25 MG: 25 TABLET, FILM COATED ORAL at 20:11

## 2023-12-25 RX ADMIN — HYDROMORPHONE HYDROCHLORIDE 0.5 MG: 1 INJECTION, SOLUTION INTRAMUSCULAR; INTRAVENOUS; SUBCUTANEOUS at 20:19

## 2023-12-25 RX ADMIN — Medication 10 ML: at 20:19

## 2023-12-25 RX ADMIN — ONDANSETRON 4 MG: 2 INJECTION INTRAMUSCULAR; INTRAVENOUS at 15:49

## 2023-12-25 RX ADMIN — IOPAMIDOL 85 ML: 612 INJECTION, SOLUTION INTRAVENOUS at 16:36

## 2023-12-25 NOTE — Clinical Note
Level of Care: Med/Surg [1]   Diagnosis: Pancreatic cancer [559997]   Admitting Physician: JOHN BEASLEY [[id number]]   Attending Physician: JOHN BEASLEY [[id number]]

## 2023-12-25 NOTE — ED PROVIDER NOTES
Subjective   History of Present Illness  Patient is a 63-year-old female presenting to the emergency department with generalized weakness, fatigue, nausea, vomiting, and constipation.  Patient has a complex history including pancreatic cancer.  Patient is scheduled for port placement tomorrow and initiation of chemotherapy on Wednesday.  Patient is not currently on any chemotherapy.  She was admitted to the hospital earlier this month for urinary tract infection as well as complications.  Patient does have history of ascites with the need for paracentesis as well.  Patient denies any fever or chills.  No blood in the vomit.    History provided by:  Patient and spouse      Review of Systems    Past Medical History:   Diagnosis Date    Abscess     Anxiety     Bilateral ovarian cysts     Depression     Encounter for routine gynecological examination     Foot pain     H/O bone density study 06/2014    H/O mammogram 07/2016    [facility]    Hot flashes, menopausal     Hypothyroidism due to Hashimoto's thyroiditis     Insomnia     Miscarriage     x3    Osteopenia     Pap smear for cervical cancer screening 06/2014    Routine general medical examination at a health care facility     Urinary incontinence     Urinary tract infection     Vitamin D deficiency        No Known Allergies    Past Surgical History:   Procedure Laterality Date    ABDOMINOPLASTY  2001    CHOLECYSTECTOMY  2008    COLONOSCOPY  07/2012    Qu[facility] Gaines in [facility] normal    ECTOPIC PREGNANCY SURGERY Left 1994    ENDOSCOPY N/A 12/13/2023    Procedure: ESOPHAGOGASTRODUODENOSCOPY;  Surgeon: Flakito Abrams MD;  Location: [facility] ENDOSCOPY;  Service: Gastroenterology;  Laterality: N/A;    HERNIA REPAIR  2010       Family History   Problem Relation Age of Onset    Mental illness Mother     Depression Mother     Thyroid disease Mother     Hypertension Father     Thyroid disease Sister     Breast cancer Other     Breast cancer Maternal Aunt        Social History      Socioeconomic History    Marital status:    Tobacco Use    Smoking status: Never    Smokeless tobacco: Never   Vaping Use    Vaping Use: Never used   Substance and Sexual Activity    Alcohol use: No    Drug use: No    Sexual activity: Yes     Partners: Male     Birth control/protection: None, Post-menopausal     Comment:            Objective   Physical Exam  Vitals and nursing note reviewed.   Constitutional:       General: She is not in acute distress.     Appearance: Normal appearance. She is obese. She is ill-appearing. She is not toxic-appearing.   Cardiovascular:      Rate and Rhythm: Regular rhythm. Tachycardia present.      Pulses: Normal pulses.   Pulmonary:      Effort: Pulmonary effort is normal. No respiratory distress.      Breath sounds: Normal breath sounds.   Abdominal:      General: Abdomen is protuberant. There is distension.      Palpations: There is fluid wave.      Tenderness: There is generalized abdominal tenderness. There is no guarding.   Musculoskeletal:      Right lower leg: Edema present.      Left lower leg: Edema present.   Neurological:      Mental Status: She is alert and oriented to person, place, and time.   Psychiatric:         Mood and Affect: Mood normal.         Behavior: Behavior normal.         Procedures           ED Course  ED Course as of 12/25/23 1921   Mon Dec 25, 2023   1724 Urinalysis With Microscopic If Indicated (No Culture) - Urine, Clean Catch(!)  Urinalysis reviewed without evidence of infection. [RS]   1724 CT Abdomen Pelvis With Contrast  Personally reviewed CT scan of the abdomen and pelvis with ascites noted on my interpretation.  See report from radiology for further details of the pancreatic cancer with carcinomatosis. [RS]      ED Course User Index  [RS] Feliciano Hancock MD                                             Medical Decision Making  Problems Addressed:  Malignant ascites: complicated acute illness or injury  Metastasis from  pancreatic cancer: complicated acute illness or injury  Nausea and vomiting, unspecified vomiting type: complicated acute illness or injury    Amount and/or Complexity of Data Reviewed  Independent Historian: spouse and EMS  External Data Reviewed: labs and notes.  Labs: ordered. Decision-making details documented in ED Course.  Radiology: ordered. Decision-making details documented in ED Course.    Risk  Prescription drug management.  Decision regarding hospitalization.        Final diagnoses:   Nausea and vomiting, unspecified vomiting type   Metastasis from pancreatic cancer   Malignant ascites       ED Disposition  ED Disposition       ED Disposition   Decision to Admit    Condition   --    Comment   Level of Care: Med/Surg [1]   Diagnosis: Pancreatic cancer [250471]   Admitting Physician: JOHN BEASLEY [[id number]]   Attending Physician: JOHN BEASLEY [[id number]]   Certification: I Certify That Inpatient Hospital Services Are Medically Necessary For Greater Than 2 Midnights                 No follow-up provider specified.       Medication List      No changes were made to your prescriptions during this visit.            Feliciano Hancock MD  12/25/23 5061

## 2023-12-25 NOTE — ED NOTES
Jessica Kraft    Nursing Report ED to Floor:  Mental status: AOX4, GCS 15  Ambulatory status: UP AD VIC  Oxygen Therapy:  ROOM AIR  Cardiac Rhythm: NSR  Admitted from: ER  Safety Concerns:  NA  Social Issues: NA - SON AT BEDSIDE  ED Room #:  22    ED Nurse Phone Extension - [phone] or may call [phone].      HPI:   Chief Complaint   Patient presents with    Vomiting       Past Medical History:  Past Medical History:   Diagnosis Date    Abscess     Anxiety     Bilateral ovarian cysts     Depression     Encounter for routine gynecological examination     Foot pain     H/O bone density study 06/2014    H/O mammogram 07/2016    [facility]    Hot flashes, menopausal     Hypothyroidism due to Hashimoto's thyroiditis     Insomnia     Miscarriage     x3    Osteopenia     Pap smear for cervical cancer screening 06/2014    Routine general medical examination at a health care facility     Urinary incontinence     Urinary tract infection     Vitamin D deficiency         Past Surgical History:  Past Surgical History:   Procedure Laterality Date    ABDOMINOPLASTY  2001    CHOLECYSTECTOMY  2008    COLONOSCOPY  07/2012    [facility] in [facility] normal    ECTOPIC PREGNANCY SURGERY Left 1994    ENDOSCOPY N/A 12/13/2023    Procedure: ESOPHAGOGASTRODUODENOSCOPY;  Surgeon: Flakito Abrams MD;  Location: [facility] ENDOSCOPY;  Service: Gastroenterology;  Laterality: N/A;    HERNIA REPAIR  2010        Admitting Doctor:   Sergey Talbot MD    Consulting Provider(s):  Consults       Date and Time Order Name Status Description    12/9/2023  2:54 PM Inpatient Gynecologic Oncology Consult Completed     12/8/2023  8:11 AM Inpatient Hematology & Oncology Consult Completed     12/8/2023  2:25 AM Inpatient Gastroenterology Consult Completed              Admitting Diagnosis:   The primary encounter diagnosis was Nausea and vomiting, unspecified vomiting type. Diagnoses of Metastasis from pancreatic cancer and Malignant ascites were also pertinent to this  "visit.    Most Recent Vitals:   Vitals:    12/25/23 1523 12/25/23 1545   BP: 135/100    BP Location: Left arm    Patient Position: Sitting    Pulse: 103 94   Resp: 16    Temp: 97.7 °F (36.5 °C)    TempSrc: Oral    SpO2: 96% 97%   Weight: 91.2 kg (201 lb)    Height: 167.6 cm (66\")        Active LDAs/IV Access:   Lines, Drains & Airways       Active LDAs       Name Placement date Placement time Site Days    Peripheral IV 12/25/23 1536 Right Antecubital 12/25/23  1536  Antecubital  less than 1                    Labs (abnormal labs have a star):   Labs Reviewed   COMPREHENSIVE METABOLIC PANEL - Abnormal; Notable for the following components:       Result Value    Glucose 117 (*)     Sodium 133 (*)     Chloride 94 (*)     Albumin 3.2 (*)     Alkaline Phosphatase 231 (*)     All other components within normal limits    Narrative:     GFR Normal >60  Chronic Kidney Disease <60  Kidney Failure <15     LIPASE - Abnormal; Notable for the following components:    Lipase 12 (*)     All other components within normal limits   URINALYSIS W/ MICROSCOPIC IF INDICATED (NO CULTURE) - Abnormal; Notable for the following components:    Specific Gravity, UA 1.070 (*)     Protein, UA Trace (*)     All other components within normal limits    Narrative:     Urine microscopic not indicated.   CBC WITH AUTO DIFFERENTIAL - Abnormal; Notable for the following components:    WBC 11.46 (*)     MCHC 31.3 (*)     RDW 15.5 (*)     Platelets 589 (*)     Neutrophil % 76.5 (*)     Lymphocyte % 12.8 (*)     Immature Grans % 1.2 (*)     Neutrophils, Absolute 8.76 (*)     Immature Grans, Absolute 0.14 (*)     All other components within normal limits   LACTIC ACID, PLASMA - Normal   RAINBOW DRAW    Narrative:     The following orders were created for panel order [place] Draw.  Procedure                               Abnormality         Status                     ---------                               -----------         ------                   "   Green Top (Gel)[[id number]]                                  Final result               Lavender Top[[id number]]                                     Final result               Gold Top - SST[[id number]]                                   Final result               Blank Top[[id number]]                                         In process                 Light Blue Top[[id number]]                                   Final result                 Please view results for these tests on the individual orders.   CBC AND DIFFERENTIAL    Narrative:     The following orders were created for panel order CBC & Differential.  Procedure                               Abnormality         Status                     ---------                               -----------         ------                     CBC Auto Differential[[id number]]        Abnormal            Final result                 Please view results for these tests on the individual orders.   GREEN TOP   LAVENDER TOP   GOLD TOP - SST   LIGHT BLUE TOP   GRAY TOP       Meds Given in ED:   Medications   Sodium Chloride (PF) 0.9 % 10 mL (has no administration in time range)   HYDROmorphone (DILAUDID) injection 0.25 mg (0.25 mg Intravenous Given 12/25/23 1548)   ondansetron (ZOFRAN) injection 4 mg (4 mg Intravenous Given 12/25/23 1549)   iopamidol (ISOVUE-300) 61 % injection 85 mL (85 mL Intravenous Given 12/25/23 1636)

## 2023-12-25 NOTE — H&P
[facility] Medicine Services  HISTORY AND PHYSICAL    Patient Name: Jessica Kraft  : 1960  MRN: [id number]  Primary Care Physician: Leonarda Díaz MD  Date of admission: 2023    Subjective   Subjective     Chief Complaint:  N/V/ abdominal pain    HPI:  Jessica Kraft is a 63 y.o. female with pmh significant for hypothyroidism, anxiety/ depression, osteopenia, GERD/ HH and recent diagnosis of pancreatic cancer with peritoneal carcinomatosis during admission -12/15/23 where she was also treated for urinary sepsis. Patient has underwent paracentesis x 3, last being during ED visit on  with 4.7L removed. Today she presents with acute n/v, abdominal distention and pain. Patient states vomiting started this morning upon waking up. Has not been able to eat, but keeping down some water. No fever, chills, urinary sx. Mild dyspnea with movement and laying down, but also reports increasing abdominal fluid over the past few days with tightness and discomfort. + constipation. Labs unremarkable and remain stable from previous. CT a/p with known cancer/ ascites and large stool burden.        Review of Systems   Constitutional:  Positive for activity change, appetite change and fatigue. Negative for fever.   HENT: Negative.     Eyes: Negative.    Respiratory: Negative.     Cardiovascular: Negative.    Gastrointestinal:  Positive for abdominal distention, abdominal pain, constipation, nausea and vomiting.   Genitourinary:  Positive for decreased urine volume.   Musculoskeletal:  Positive for back pain.   Neurological:  Positive for weakness and light-headedness.   Psychiatric/Behavioral: Negative.                  Personal History     Past Medical History:   Diagnosis Date    Abscess     Anxiety     Bilateral ovarian cysts     Depression     Encounter for routine gynecological examination     Foot pain     H/O bone density study 2014    H/O mammogram 2016    [facility]     Hot flashes, menopausal     Hypothyroidism due to Hashimoto's thyroiditis     Insomnia     Miscarriage     x3    Osteopenia     Pap smear for cervical cancer screening 06/2014    Routine general medical examination at a health care facility     Urinary incontinence     Urinary tract infection     Vitamin D deficiency          Oncology Problem List:  Pancreatic cancer (12/12/2023; Status: Active)  Abdominal carcinomatosis - omental (12/08/2023; Status: Active)  Peritoneal carcinomatosis (12/08/2023; Status: Active)    Oncology/Hematology History   Abdominal carcinomatosis - omental   12/8/2023 Initial Diagnosis    Abdominal carcinomatosis - omental     12/27/2023 -  Chemotherapy    OP PANCREATIC mFOLFIRINOX (OXALIplatin / Leucovorin / Irinotecan / Fluorouracil CIV)     Pancreatic cancer   12/12/2023 Initial Diagnosis    Pancreatic cancer     12/27/2023 -  Chemotherapy    OP PANCREATIC mFOLFIRINOX (OXALIplatin / Leucovorin / Irinotecan / Fluorouracil CIV)         Past Surgical History:   Procedure Laterality Date    ABDOMINOPLASTY  2001    CHOLECYSTECTOMY  2008    COLONOSCOPY  07/2012    Quang Gaines in [facility] normal    ECTOPIC PREGNANCY SURGERY Left 1994    ENDOSCOPY N/A 12/13/2023    Procedure: ESOPHAGOGASTRODUODENOSCOPY;  Surgeon: Flakito Abrams MD;  Location: [facility] ENDOSCOPY;  Service: Gastroenterology;  Laterality: N/A;    HERNIA REPAIR  2010       Family History:  family history includes Breast cancer in her maternal aunt and another family member; Depression in her mother; Hypertension in her father; Mental illness in her mother; Thyroid disease in her mother and sister.     Social History:  reports that she has never smoked. She has never used smokeless tobacco. She reports that she does not drink alcohol and does not use drugs.  Social History     Social History Narrative    Not on file       Medications:  HYDROcodone-acetaminophen, RABEprazole, aspirin, cyclobenzaprine, dicyclomine, fluconazole,  levothyroxine, lidocaine-prilocaine, metoclopramide, ondansetron, rosuvastatin, and venlafaxine XR    No Known Allergies    Objective   Objective     Vital Signs:   Temp:  [97.2 °F (36.2 °C)-97.7 °F (36.5 °C)] 97.2 °F (36.2 °C)  Heart Rate:  [] 101  Resp:  [16] 16  BP: (135-141)/() 141/82    Physical Exam   Constitutional: No acute distress, awake, alert, appear comfortable  HENT: NCAT, mucous membranes moist  Respiratory: Clear to auscultation bilaterally, respiratory effort normal   Cardiovascular: RRR, no murmurs, rubs, or gallops  Gastrointestinal: Positive bowel sounds, firm, moderately distended with ascites. - no significant tenderness  Musculoskeletal: No bilateral ankle edema  Psychiatric: Appropriate affect, cooperative  Neurologic: Oriented x 3, strength symmetric in all extremities, Cranial Nerves grossly intact to confrontation, speech clear  Skin: No rashes      Result Review:  I have personally reviewed the results from the time of this admission to 12/25/2023 18:44 EST and agree with these findings:  [x]  Laboratory list / accordion  []  Microbiology  [x]  Radiology  []  EKG/Telemetry   []  Cardiology/Vascular   []  Pathology  [x]  Old records  []  Other:  Most notable findings include:     LAB RESULTS:      Lab 12/25/23  1535 12/22/23  1251   WBC 11.46* 13.80*   HEMOGLOBIN 14.0 13.3   HEMATOCRIT 44.8 41.9   PLATELETS 589* 476*   NEUTROS ABS 8.76* 11.05*   IMMATURE GRANS (ABS) 0.14* 0.15*   LYMPHS ABS 1.47 1.23   MONOS ABS 0.87 0.96*   EOS ABS 0.16 0.34   MCV 87.3 87.7   LACTATE 2.0 1.4   PROTIME  --  13.5   APTT  --  29.1*         Lab 12/25/23  1535 12/22/23  1251   SODIUM 133* 133*   POTASSIUM 3.7 4.1   CHLORIDE 94* 96*   CO2 28.0 26.0   ANION GAP 11.0 11.0   BUN 17 13   CREATININE 0.71 0.72   EGFR 96.3 94.7   GLUCOSE 117* 122*   CALCIUM 9.1 8.8         Lab 12/25/23  1535 12/22/23  1251   TOTAL PROTEIN 6.8 6.8   ALBUMIN 3.2* 3.1*   GLOBULIN 3.6 3.7   ALT (SGPT) 12 18   AST (SGOT) 15 27    BILIRUBIN 0.3 0.3   ALK PHOS 231* 218*   LIPASE 12* 13         Lab 12/22/23  1251   PROTIME 13.5   INR 1.02                 Brief Urine Lab Results  (Last result in the past 365 days)        Color   Clarity   Blood   Leuk Est   Nitrite   Protein   CREAT   Urine HCG        12/25/23 1654 Yellow   Clear   Negative   Negative   Negative   Trace                 Microbiology Results (last 10 days)       ** No results found for the last 240 hours. **            CT Abdomen Pelvis With Contrast    Result Date: 12/25/2023  CT ABDOMEN PELVIS W CONTRAST Date of Exam: 12/25/2023 4:31 PM EST Indication: history pancreatic cancer with constipation, N/V. Comparison: 12/7/2023 and prior Technique: Axial CT images were obtained of the abdomen and pelvis following the uneventful intravenous administration of 85 mL Isovue-300. Reconstructed coronal and sagittal images were also obtained. Automated exposure control and iterative construction methods were used. FINDINGS: Abdomen/Pelvis: Lower Chest: Small bilateral pleural effusions are noted with associated dependent opacity at the lung bases. No free air noted below the diaphragm. Organs: Patient is status post cholecystectomy. Low-attenuation foci within the liver appear grossly stable given limitations of current exam secondary to motion and streak artifact from patient's upper extremities. The kidneys, spleen and adrenal glands  are unremarkable. The pancreas demonstrates atrophy and prominence of the main duct which is poorly visualized on current study. Mass seen previous study is not as well visualized on current study. Omental and peritoneal carcinomatosis again noted. Narrowing of the celiac axis and portal vein are better seen on previous study. GI/Bowel: There is a moderate-sized hiatal hernia. The stomach demonstrates no acute abnormality. Small bowel demonstrates no obstruction. There is a heavy stool burden. No focal inflammatory changes of the GI tract noted. Moderate  to large degree of ascites noted. Findings are similar to the prior study. Scattered mesenteric nodularity densities compatible adenopathy. This is similar to the previous study. Underlying mesenteric implants in the right lower quadrant again suspected Pelvis: The uterus is unremarkable. Ovaries are not well visualized. Urinary bladder is incompletely distended and grossly unremarkable Peritoneum/Retroperitoneum: The aorta is normal in caliber. There is no suspicious retroperitoneal adenopathy Bones/Soft Tissues: No destructive bone lesion     Impression: 1. Known pancreatic mass with atrophy of the distal body and tail of the pancreas and dilation of the pancreatic duct is not as well visualized due to technical limitations and artifact on the current study. 2. Peritoneal and omental carcinomatosis and ascites again noted. 3. Heavy stool burden with a nonobstructing pattern 4. Bilateral pleural effusions and dependent consolidation likely atelectasis 5. Moderate size hiatal hernia. 6. Other findings as above Electronically Signed: Jayy Lewis MD  12/25/2023 4:59 PM EST  Workstation ID: [id number]         Assessment & Plan   Assessment & Plan       Pancreatic cancer    Hypothyroidism due to Hashimoto's thyroiditis    Depression    Osteopenia    Insomnia    Anxiety    Peritoneal carcinomatosis    GERD without esophagitis    Hiatal hernia    Ascites    Nausea and vomiting    Constipation    Neutrophilic leukocytosis    Thrombocytosis      Acute Nausea and Vomiting  Pancreatic cancer, peritoneal and omental carcinomatosis (recently diagnosed)  A/C constipation  -- labs stable from previous  -- CT a/p: Known pancreatic mass with atrophy of the distal body and tail of the pancreas and dilation of the pancreatic duct is not as well visualized. Peritoneal and omental carcinomatosis and ascites.  Heavy stool burden with a nonobstructing pattern   -- start IVF  -- zofran and compazine prn  -- start bowel regimen,  increase miralax to bid, add movantik with home norco  -- will continue pain control. Reports home norco does not help. Change to percocet and prn dilaudid. Palliative consult in am to assist with symptoms.  -- GI soft diet.   -- Hem/onc consult in am. Plan for chemo initiation on 12/27  -- IR order for Port placement in am and paracentesis for accumulating ascites. Previously scheduled for IR port on 12/26 as outpatient. NPO after MN  --PT/OT    Leukocytosis  thrombocytosis  -- no s/s infection. Likely reactive 2/2 to disease state  -- labs stable from previous    Hypothyroidism  -- continue levothyroxine    GERD/ HH  -- PPI    Anxiety/ depression  -- effexor    DVT prophylaxis:  mechanical pending IR port placement 12/26    CODE STATUS:    Level Of Support Discussed With: Patient  Code Status (Patient has no pulse and is not breathing): CPR (Attempt to Resuscitate)  Medical Interventions (Patient has pulse or is breathing): Full Support      Expected Discharge  Expected Discharge Date: 12/29/2023; Expected Discharge Time:       This note has been completed as part of a split-shared workflow.     Electronically signed by ANGELICA Hoover, 12/25/23, 6:30 PM EST.        Attending   Admission Attestation       I have performed an independent face-to-face diagnostic evaluation including performing an independent physical examination.  The documented plan of care above was reviewed and developed with the advanced practice clinician (APC).  I have updated the HPI as appropriate.    Brief HPI  Mr. Martinez is a 63-year-old female recently admitted from 12/7 through 12/15 with a new diagnosis of metastatic pancreatic cancer and seen by Dr. العراقي from oncology.  During that stay she had 2 paracenteses for ascites.  Ultimately discharged home and was scheduled for an outpatient port placement tomorrow with plans for starting chemotherapy on 12/27.  She was seen in the ER on 12/22 for abdominal discomfort and had a  paracentesis of 4.7 L done at that time.  Since then she has continued nausea and vomiting and discomfort.  Scan shows stable malignant findings, moderate to large amount of ascites, and a large amount of stool.  Has not had a bowel movement in 4 to 5 days despite taking Colace and daily MiraLAX.  She will be admitted for further evaluation.    Attending Physical Exam:  Temp:  [97.2 °F (36.2 °C)-97.7 °F (36.5 °C)] 97.2 °F (36.2 °C)  Heart Rate:  [] 101  Resp:  [16] 16  BP: (135-141)/() 141/82    Constitutional: sleeping but arouses, looks a little uncomfortable  Eyes: PERRLA, sclerae anicteric, no conjunctival injection  HENT: NCAT, mucous membranes moist  Neck: Supple, no thyromegaly, no lymphadenopathy, trachea midline  Respiratory: Clear to auscultation bilaterally, nonlabored respirations on RA  Cardiovascular: RRR, no murmurs, rubs, or gallops  Gastrointestinal: Positive bowel sounds, mild distention, minimal diffuse tenderness without periotenal signs  Musculoskeletal: No bilateral ankle edema, no clubbing or cyanosis to extremities  Psychiatric: Appropriate affect, cooperative  Neurologic: Oriented x 3,  Skin: No rashes      Assessment and Plan:    See assessment and plan documented by APC above and updated/edited by me as appropriate.    Sergey Talbot MD  12/25/23

## 2023-12-26 ENCOUNTER — APPOINTMENT (OUTPATIENT)
Dept: INTERVENTIONAL RADIOLOGY/VASCULAR | Facility: HOSPITAL | Age: 63
End: 2023-12-26
Payer: COMMERCIAL

## 2023-12-26 ENCOUNTER — APPOINTMENT (OUTPATIENT)
Dept: ULTRASOUND IMAGING | Facility: HOSPITAL | Age: 63
End: 2023-12-26
Payer: COMMERCIAL

## 2023-12-26 LAB
ALBUMIN SERPL-MCNC: 2.8 G/DL (ref 3.5–5.2)
ALBUMIN/GLOB SERPL: 0.8 G/DL
ALP SERPL-CCNC: 224 U/L (ref 39–117)
ALT SERPL W P-5'-P-CCNC: 11 U/L (ref 1–33)
ANION GAP SERPL CALCULATED.3IONS-SCNC: 11 MMOL/L (ref 5–15)
AST SERPL-CCNC: 22 U/L (ref 1–32)
BASOPHILS # BLD AUTO: 0.06 10*3/MM3 (ref 0–0.2)
BASOPHILS NFR BLD AUTO: 0.4 % (ref 0–1.5)
BILIRUB SERPL-MCNC: 0.4 MG/DL (ref 0–1.2)
BUN SERPL-MCNC: 15 MG/DL (ref 8–23)
BUN/CREAT SERPL: 21.4 (ref 7–25)
CALCIUM SPEC-SCNC: 8.5 MG/DL (ref 8.6–10.5)
CHLORIDE SERPL-SCNC: 97 MMOL/L (ref 98–107)
CO2 SERPL-SCNC: 26 MMOL/L (ref 22–29)
CREAT SERPL-MCNC: 0.7 MG/DL (ref 0.57–1)
DEPRECATED RDW RBC AUTO: 49.2 FL (ref 37–54)
EGFRCR SERPLBLD CKD-EPI 2021: 97.9 ML/MIN/1.73
EOSINOPHIL # BLD AUTO: 0.1 10*3/MM3 (ref 0–0.4)
EOSINOPHIL NFR BLD AUTO: 0.7 % (ref 0.3–6.2)
ERYTHROCYTE [DISTWIDTH] IN BLOOD BY AUTOMATED COUNT: 15.8 % (ref 12.3–15.4)
GLOBULIN UR ELPH-MCNC: 3.4 GM/DL
GLUCOSE SERPL-MCNC: 125 MG/DL (ref 65–99)
HCT VFR BLD AUTO: 42.1 % (ref 34–46.6)
HGB BLD-MCNC: 13.2 G/DL (ref 12–15.9)
IMM GRANULOCYTES # BLD AUTO: 0.09 10*3/MM3 (ref 0–0.05)
IMM GRANULOCYTES NFR BLD AUTO: 0.6 % (ref 0–0.5)
LYMPHOCYTES # BLD AUTO: 1.18 10*3/MM3 (ref 0.7–3.1)
LYMPHOCYTES NFR BLD AUTO: 8.3 % (ref 19.6–45.3)
MAGNESIUM SERPL-MCNC: 1.8 MG/DL (ref 1.6–2.4)
MCH RBC QN AUTO: 27.3 PG (ref 26.6–33)
MCHC RBC AUTO-ENTMCNC: 31.4 G/DL (ref 31.5–35.7)
MCV RBC AUTO: 87.2 FL (ref 79–97)
MONOCYTES # BLD AUTO: 0.95 10*3/MM3 (ref 0.1–0.9)
MONOCYTES NFR BLD AUTO: 6.6 % (ref 5–12)
NEUTROPHILS NFR BLD AUTO: 11.91 10*3/MM3 (ref 1.7–7)
NEUTROPHILS NFR BLD AUTO: 83.4 % (ref 42.7–76)
NRBC BLD AUTO-RTO: 0 /100 WBC (ref 0–0.2)
PLATELET # BLD AUTO: 578 10*3/MM3 (ref 140–450)
PMV BLD AUTO: 9.5 FL (ref 6–12)
POTASSIUM SERPL-SCNC: 4.8 MMOL/L (ref 3.5–5.2)
PROT SERPL-MCNC: 6.2 G/DL (ref 6–8.5)
RBC # BLD AUTO: 4.83 10*6/MM3 (ref 3.77–5.28)
SODIUM SERPL-SCNC: 134 MMOL/L (ref 136–145)
WBC NRBC COR # BLD AUTO: 14.29 10*3/MM3 (ref 3.4–10.8)

## 2023-12-26 PROCEDURE — C1729 CATH, DRAINAGE: HCPCS

## 2023-12-26 PROCEDURE — 76942 ECHO GUIDE FOR BIOPSY: CPT

## 2023-12-26 PROCEDURE — G0378 HOSPITAL OBSERVATION PER HR: HCPCS

## 2023-12-26 PROCEDURE — 25010000002 FENTANYL CITRATE (PF) 50 MCG/ML SOLUTION: Performed by: RADIOLOGY

## 2023-12-26 PROCEDURE — C1894 INTRO/SHEATH, NON-LASER: HCPCS

## 2023-12-26 PROCEDURE — 83735 ASSAY OF MAGNESIUM: CPT | Performed by: NURSE PRACTITIONER

## 2023-12-26 PROCEDURE — 80053 COMPREHEN METABOLIC PANEL: CPT | Performed by: NURSE PRACTITIONER

## 2023-12-26 PROCEDURE — 25010000002 HEPARIN LOCK FLUSH PER 10 UNITS

## 2023-12-26 PROCEDURE — B5131ZA FLUOROSCOPY OF RIGHT JUGULAR VEINS USING LOW OSMOLAR CONTRAST, GUIDANCE: ICD-10-PCS | Performed by: RADIOLOGY

## 2023-12-26 PROCEDURE — 25010000002 PROCHLORPERAZINE 10 MG/2ML SOLUTION: Performed by: NURSE PRACTITIONER

## 2023-12-26 PROCEDURE — 76937 US GUIDE VASCULAR ACCESS: CPT

## 2023-12-26 PROCEDURE — 0W9G3ZZ DRAINAGE OF PERITONEAL CAVITY, PERCUTANEOUS APPROACH: ICD-10-PCS | Performed by: RADIOLOGY

## 2023-12-26 PROCEDURE — 77001 FLUOROGUIDE FOR VEIN DEVICE: CPT

## 2023-12-26 PROCEDURE — 25010000002 MIDAZOLAM PER 1 MG: Performed by: RADIOLOGY

## 2023-12-26 PROCEDURE — B543ZZA ULTRASONOGRAPHY OF RIGHT JUGULAR VEINS, GUIDANCE: ICD-10-PCS | Performed by: RADIOLOGY

## 2023-12-26 PROCEDURE — 99152 MOD SED SAME PHYS/QHP 5/>YRS: CPT

## 2023-12-26 PROCEDURE — 05HM33Z INSERTION OF INFUSION DEVICE INTO RIGHT INTERNAL JUGULAR VEIN, PERCUTANEOUS APPROACH: ICD-10-PCS | Performed by: RADIOLOGY

## 2023-12-26 PROCEDURE — 25010000002 HYDROMORPHONE PER 4 MG: Performed by: NURSE PRACTITIONER

## 2023-12-26 PROCEDURE — 85025 COMPLETE CBC W/AUTO DIFF WBC: CPT | Performed by: NURSE PRACTITIONER

## 2023-12-26 PROCEDURE — C1788 PORT, INDWELLING, IMP: HCPCS

## 2023-12-26 PROCEDURE — 0JH63WZ INSERTION OF TOTALLY IMPLANTABLE VASCULAR ACCESS DEVICE INTO CHEST SUBCUTANEOUS TISSUE AND FASCIA, PERCUTANEOUS APPROACH: ICD-10-PCS | Performed by: RADIOLOGY

## 2023-12-26 PROCEDURE — 25010000002 ONDANSETRON PER 1 MG: Performed by: NURSE PRACTITIONER

## 2023-12-26 RX ORDER — MIDAZOLAM HYDROCHLORIDE 1 MG/ML
INJECTION INTRAMUSCULAR; INTRAVENOUS AS NEEDED
Status: COMPLETED | OUTPATIENT
Start: 2023-12-26 | End: 2023-12-26

## 2023-12-26 RX ORDER — LIDOCAINE HYDROCHLORIDE 10 MG/ML
INJECTION, SOLUTION EPIDURAL; INFILTRATION; INTRACAUDAL; PERINEURAL
Status: COMPLETED
Start: 2023-12-26 | End: 2023-12-26

## 2023-12-26 RX ORDER — FENTANYL CITRATE 50 UG/ML
INJECTION, SOLUTION INTRAMUSCULAR; INTRAVENOUS
Status: DISPENSED
Start: 2023-12-26 | End: 2023-12-27

## 2023-12-26 RX ORDER — HEPARIN SODIUM (PORCINE) LOCK FLUSH IV SOLN 100 UNIT/ML 100 UNIT/ML
SOLUTION INTRAVENOUS
Status: COMPLETED
Start: 2023-12-26 | End: 2023-12-26

## 2023-12-26 RX ORDER — MIDAZOLAM HYDROCHLORIDE 1 MG/ML
INJECTION INTRAMUSCULAR; INTRAVENOUS
Status: DISPENSED
Start: 2023-12-26 | End: 2023-12-27

## 2023-12-26 RX ORDER — FENTANYL CITRATE 50 UG/ML
INJECTION, SOLUTION INTRAMUSCULAR; INTRAVENOUS AS NEEDED
Status: COMPLETED | OUTPATIENT
Start: 2023-12-26 | End: 2023-12-26

## 2023-12-26 RX ORDER — ALPRAZOLAM 1 MG/1
2 TABLET ORAL NIGHTLY PRN
Status: DISCONTINUED | OUTPATIENT
Start: 2023-12-26 | End: 2023-12-27

## 2023-12-26 RX ADMIN — OXYCODONE AND ACETAMINOPHEN 1 TABLET: 5; 325 TABLET ORAL at 09:01

## 2023-12-26 RX ADMIN — POLYETHYLENE GLYCOL 3350 17 G: 17 POWDER, FOR SOLUTION ORAL at 21:30

## 2023-12-26 RX ADMIN — POLYETHYLENE GLYCOL 3350 17 G: 17 POWDER, FOR SOLUTION ORAL at 16:32

## 2023-12-26 RX ADMIN — VENLAFAXINE HYDROCHLORIDE 150 MG: 75 CAPSULE, EXTENDED RELEASE ORAL at 09:01

## 2023-12-26 RX ADMIN — MIDAZOLAM HYDROCHLORIDE 1 MG: 1 INJECTION, SOLUTION INTRAMUSCULAR; INTRAVENOUS at 15:02

## 2023-12-26 RX ADMIN — NALOXEGOL OXALATE 25 MG: 25 TABLET, FILM COATED ORAL at 06:10

## 2023-12-26 RX ADMIN — LIDOCAINE HYDROCHLORIDE 8 ML: 10; .005 INJECTION, SOLUTION EPIDURAL; INFILTRATION; INTRACAUDAL; PERINEURAL at 15:08

## 2023-12-26 RX ADMIN — ONDANSETRON 4 MG: 2 INJECTION INTRAMUSCULAR; INTRAVENOUS at 07:01

## 2023-12-26 RX ADMIN — PANTOPRAZOLE SODIUM 40 MG: 40 TABLET, DELAYED RELEASE ORAL at 05:53

## 2023-12-26 RX ADMIN — SENNOSIDES AND DOCUSATE SODIUM 2 TABLET: 8.6; 5 TABLET ORAL at 09:01

## 2023-12-26 RX ADMIN — HEPARIN 5 ML: 100 SYRINGE at 15:08

## 2023-12-26 RX ADMIN — OXYCODONE AND ACETAMINOPHEN 1 TABLET: 5; 325 TABLET ORAL at 17:32

## 2023-12-26 RX ADMIN — LIDOCAINE HYDROCHLORIDE 8 ML: 10 INJECTION, SOLUTION EPIDURAL; INFILTRATION; INTRACAUDAL; PERINEURAL at 15:05

## 2023-12-26 RX ADMIN — ROSUVASTATIN 5 MG: 10 TABLET, FILM COATED ORAL at 21:30

## 2023-12-26 RX ADMIN — MIDAZOLAM HYDROCHLORIDE 1 MG: 1 INJECTION, SOLUTION INTRAMUSCULAR; INTRAVENOUS at 15:08

## 2023-12-26 RX ADMIN — ONDANSETRON 4 MG: 2 INJECTION INTRAMUSCULAR; INTRAVENOUS at 21:30

## 2023-12-26 RX ADMIN — FENTANYL CITRATE 50 MCG: 50 INJECTION, SOLUTION INTRAMUSCULAR; INTRAVENOUS at 15:02

## 2023-12-26 RX ADMIN — ALPRAZOLAM 2 MG: 1 TABLET ORAL at 21:33

## 2023-12-26 RX ADMIN — HYDROMORPHONE HYDROCHLORIDE 0.5 MG: 1 INJECTION, SOLUTION INTRAMUSCULAR; INTRAVENOUS; SUBCUTANEOUS at 07:00

## 2023-12-26 RX ADMIN — OXYCODONE AND ACETAMINOPHEN 1 TABLET: 5; 325 TABLET ORAL at 21:33

## 2023-12-26 RX ADMIN — PROCHLORPERAZINE EDISYLATE 5 MG: 5 INJECTION INTRAMUSCULAR; INTRAVENOUS at 09:01

## 2023-12-26 RX ADMIN — Medication 10 ML: at 21:30

## 2023-12-26 RX ADMIN — LEVOTHYROXINE SODIUM 125 MCG: 125 TABLET ORAL at 06:10

## 2023-12-26 RX ADMIN — FENTANYL CITRATE 50 MCG: 50 INJECTION, SOLUTION INTRAMUSCULAR; INTRAVENOUS at 15:08

## 2023-12-26 RX ADMIN — SENNOSIDES AND DOCUSATE SODIUM 2 TABLET: 8.6; 5 TABLET ORAL at 21:32

## 2023-12-26 RX ADMIN — Medication 5 MG: at 21:30

## 2023-12-26 NOTE — PRE-SEDATION DOCUMENTATION
[facility]   Vascular Interventional Radiology  History & Physicial        Patient Name:Jessica Kraft    : 1960    MRN: [id number]    Primary Care Physician: Leonarda Díaz MD    Referring Physician: No ref. provider found     Date of admission: 2023    Subjective     Reason for Consult: Port pl + Para    History of Present Illness     Jessica Kraft is a 63 y.o. female referred to IR as noted above.      Active Hospital Problems:  Active Hospital Problems    Diagnosis     **Pancreatic cancer     Nausea and vomiting     Constipation     Neutrophilic leukocytosis     Thrombocytosis     GERD without esophagitis     Hiatal hernia     Ascites     Peritoneal carcinomatosis     Osteopenia     Insomnia     Anxiety     Hypothyroidism due to Hashimoto's thyroiditis     Depression        Personal History     Past Medical History:   Diagnosis Date    Abscess     Anxiety     Bilateral ovarian cysts     Depression     Encounter for routine gynecological examination     Foot pain     H/O bone density study 2014    H/O mammogram 2016    [facility]    Hot flashes, menopausal     Hypothyroidism due to Hashimoto's thyroiditis     Insomnia     Miscarriage     x3    Osteopenia     Pap smear for cervical cancer screening 2014    Routine general medical examination at a health care facility     Urinary incontinence     Urinary tract infection     Vitamin D deficiency        Past Surgical History:   Procedure Laterality Date    ABDOMINOPLASTY  2001    CHOLECYSTECTOMY  2008    COLONOSCOPY  2012    [facility] Gaines in [facility] normal    ECTOPIC PREGNANCY SURGERY Left     ENDOSCOPY N/A 2023    Procedure: ESOPHAGOGASTRODUODENOSCOPY;  Surgeon: Flakito Abrams MD;  Location: [facility] ENDOSCOPY;  Service: Gastroenterology;  Laterality: N/A;    HERNIA REPAIR         Family History: Her family history includes Breast cancer in her maternal aunt and another family member; Depression in her mother;  "Hypertension in her father; Mental illness in her mother; Thyroid disease in her mother and sister.     Social History: She  reports that she has never smoked. She has never used smokeless tobacco. She reports that she does not drink alcohol and does not use drugs.    Home Medications:  ALPRAZolam, HYDROcodone-acetaminophen, RABEprazole, aspirin, cyclobenzaprine, dicyclomine, fluconazole, levothyroxine, lidocaine-prilocaine, metoclopramide, ondansetron, rosuvastatin, and venlafaxine XR    Current Medications:    acetaminophen    senna-docusate sodium **AND** polyethylene glycol **AND** bisacodyl **AND** bisacodyl    Calcium Replacement - Follow Nurse / BPA Driven Protocol    cyclobenzaprine    fentaNYL citrate (PF)    heparin    HYDROmorphone    HYDROmorphone **AND** naloxone    levothyroxine    lidocaine PF 1%    lidocaine-EPINEPHrine    magnesium hydroxide    Magnesium Standard Dose Replacement - Follow Nurse / BPA Driven Protocol    melatonin    midazolam    Naloxegol Oxalate    ondansetron    oxyCODONE-acetaminophen    pantoprazole    Phosphorus Replacement - Follow Nurse / BPA Driven Protocol    Potassium Replacement - Follow Nurse / BPA Driven Protocol    prochlorperazine    rosuvastatin    Sodium Chloride (PF)    sodium chloride    sodium chloride    sodium chloride    sodium chloride 0.9 % with KCl 20 mEq    venlafaxine XR     Allergies:  No Known Allergies    Review of Systems    IR Procedure pertinent significant findings are mentioned in the PMH and PSH above.    Objective     Visit Vitals  /85   Pulse 91   Temp 97.9 °F (36.6 °C)   Resp 16   Ht 167.6 cm (66\")   Wt 91.2 kg (201 lb)   LMP  (LMP Unknown) Comment: N/A   SpO2 94%   BMI 32.44 kg/m²        Physical Exam    A&Ox3.   Able to communicate  Apparent Distress  Average physique   CVS: VS as noted. Chart reviewed. Stable for the procedure.  Respiratory: Non labored breathing. No signs of respiratory compromise.    Result Review      I have " "personally reviewed the results from the time of this admission to 12/26/2023 14:36 EST and agree with these findings.  [x]  Laboratory  []  Microbiology  [x]  Radiology  []  EKG/Telemetry   []  Cardiology/Vascular   []  Pathology  []  Old records  []  Other:    Most notable findings include: As noted:    Results from last 7 days   Lab Units 12/26/23  0544 12/25/23  1535 12/22/23  1251   INR   --   --  1.02   APTT seconds  --   --  29.1*   WBC 10*3/mm3 14.29* 11.46* 13.80*   HEMOGLOBIN g/dL 13.2 14.0 13.3   HEMATOCRIT % 42.1 44.8 41.9   PLATELETS 10*3/mm3 578* 589* 476*       Results from last 7 days   Lab Units 12/26/23  0544 12/25/23  1535 12/22/23  1251   SODIUM mmol/L 134* 133* 133*   POTASSIUM mmol/L 4.8 3.7 4.1   CHLORIDE mmol/L 97* 94* 96*   CO2 mmol/L 26.0 28.0 26.0   BUN mg/dL 15 17 13   CREATININE mg/dL 0.70 0.71 0.72   EGFR mL/min/1.73 97.9 96.3 94.7   GLUCOSE mg/dL 125* 117* 122*           Lab 12/26/23  0544 12/25/23  1535 12/22/23  1251   TOTAL PROTEIN 6.2 6.8 6.8   ALBUMIN 2.8* 3.2* 3.1*   GLOBULIN 3.4 3.6 3.7   ALT (SGPT) 11 12 18   AST (SGOT) 22 15 27   BILIRUBIN 0.4 0.3 0.3   ALK PHOS 224* 231* 218*   LIPASE  --  12* 13       Estimated Creatinine Clearance: 94.8 mL/min (by C-G formula based on SCr of 0.7 mg/dL).   Creatinine   Date Value Ref Range Status   12/26/2023 0.70 0.57 - 1.00 mg/dL Final   12/25/2023 0.71 0.57 - 1.00 mg/dL Final       COVID19   Date Value Ref Range Status   12/08/2023 Not Detected Not Detected - Ref. Range Final        No results found for: \"PREGTESTUR\", \"PREGSERUM\", \"HCG\", \"HCGQUANT\"     ASA SCALE ASSESSMENT (applicable ONLY if sedation planned):   3     MALLAMPATI CLASSIFICATION (applicable ONLY if sedation planned):   3    Assessment / Plan     Jessica Kraft is a 63 y.o. female referred to the IR service with above problem.    Plan:   As above.    Notice: The note was created before the performance of the procedure. It might have been left in the pending status and " signed off after the procedure. The time stamp on the note may be misleading.    Rajat Bhandari MD   Vascular Interventional Radiology  12/26/23   2:36 PM EST

## 2023-12-26 NOTE — PROGRESS NOTES
[facility] Medicine Services  PROGRESS NOTE    Patient Name: Jessica Kraft  : 1960  MRN: [id number]    Date of Admission: 2023  Primary Care Physician: Leonarda Díaz MD    Subjective   Subjective     CC:  Abd pain    HPI:  Had some abdominal pain earlier, pain meds effective. Still no BM currently. Planned for port placement and paracentesis today.      Objective   Objective     Vital Signs:   Temp:  [96.4 °F (35.8 °C)-97.9 °F (36.6 °C)] 97.9 °F (36.6 °C)  Heart Rate:  [] 100  Resp:  [15-20] 18  BP: (117-147)/(80-89) 136/89  Flow (L/min):  [2-3] 3     Physical Exam:  Constitutional: Awake, alert, sitting in bedside chair in NAD  HENT: NCAT, mucous membranes moist  Respiratory: Clear to auscultation bilaterally, respiratory effort normal   Cardiovascular: RRR, palpable radial pulse  Gastrointestinal: Positive bowel sounds, soft, moderately distended, BS present  Musculoskeletal: BL LE edema  Psychiatric: Appropriate affect, cooperative  Neurologic: Speech clear and fluent    Results Reviewed:  LAB RESULTS:      Lab 23  0544 23  1535 23  1251   WBC 14.29* 11.46* 13.80*   HEMOGLOBIN 13.2 14.0 13.3   HEMATOCRIT 42.1 44.8 41.9   PLATELETS 578* 589* 476*   NEUTROS ABS 11.91* 8.76* 11.05*   IMMATURE GRANS (ABS) 0.09* 0.14* 0.15*   LYMPHS ABS 1.18 1.47 1.23   MONOS ABS 0.95* 0.87 0.96*   EOS ABS 0.10 0.16 0.34   MCV 87.2 87.3 87.7   LACTATE  --  2.0 1.4   PROTIME  --   --  13.5   APTT  --   --  29.1*         Lab 23  0544 23  1535 23  1251   SODIUM 134* 133* 133*   POTASSIUM 4.8 3.7 4.1   CHLORIDE 97* 94* 96*   CO2 26.0 28.0 26.0   ANION GAP 11.0 11.0 11.0   BUN 15 17 13   CREATININE 0.70 0.71 0.72   EGFR 97.9 96.3 94.7   GLUCOSE 125* 117* 122*   CALCIUM 8.5* 9.1 8.8   MAGNESIUM 1.8  --   --          Lab 23  0544 23  1535 23  1251   TOTAL PROTEIN 6.2 6.8 6.8   ALBUMIN 2.8* 3.2* 3.1*   GLOBULIN 3.4 3.6 3.7   ALT (SGPT) 11  12 18   AST (SGOT) 22 15 27   BILIRUBIN 0.4 0.3 0.3   ALK PHOS 224* 231* 218*   LIPASE  --  12* 13         Lab 12/22/23  1251   PROTIME 13.5   INR 1.02                 Brief Urine Lab Results  (Last result in the past 365 days)        Color   Clarity   Blood   Leuk Est   Nitrite   Protein   CREAT   Urine HCG        12/25/23 1654 Yellow   Clear   Negative   Negative   Negative   Trace                   Microbiology Results Abnormal       None            IR Port Placement    Result Date: 12/26/2023  IR PORT PLACEMENT  History: starting chemo 12/27  : Rajat Bhandari MD.  Modality: Sonography and fluoroscopy  DOSE REDUCTION: The examination was performed according to departmental dose-optimization program which includes automated exposure control, adjustment of the mA and/or kV. Fluoro time: 0.0 Radiation dose: 0.1 mGy air Kerma.  SEDATION: Moderate sedation was administered. 2 milligram of Versed and 100 micrograms of fentanyl IV was used for moderate sedation. Total intra service time of sedation was 20 minutes. The sedation was administered and the patient's vital signs monitored throughout the procedure and recorded in the patient's medical record by the nurse under my direct supervision.  Anesthesia: Lidocaine 1% with epinephrine, local infiltration Medicines: None      Estimated blood loss:  < 5 cc.        Technique: A thorough discussion of the risks, benefits, and alternatives of the procedure, and if applicable, moderate sedation, was carried out with the patient. They were encouraged to ask any questions. Any questions were answered. They verbalized understanding. A written informed consent was then signed.  A multi-component timeout was performed prior to starting the procedure using the departmental protocol. The procedure room personnel used personal protective equipment. The operators used sterile gloves and if indicated, sterile gowns. The surgical site was prepped with chlorhexidine  gluconate  and draped in the maximal applicable sterile fashion. A preliminary ultrasonogram was performed of the neck that revealed a patent and compressible internal jugular vein. Pertinent ultrasound images were stored in the PACS for documentation. Using aseptic precautions, real-time ultrasound guidance, the internal jugular vein was accessed with a micropuncture needle after local anesthetic infiltration. A 018 guidewire was advanced into the central venous system under fluoroscopic guidance. Over the wire a micropuncture sheath was placed. Through the micropuncture sheath, a 035 wire was advanced into the venous system under fluoroscopic guidance. Over the wire a peel-away sheath was placed. After local anesthesia, using sharp and blunt dissection, a reservoir pocket of appropriate size was created in the infra clavicular chest wall. The port catheter was connected to the port reservoir. The catheter was tunneled to the venotomy site using a  tunneling device. The the reservoir was placed in the reservoir pocket. The catheter was trimmed to an appropriate length. The catheter was advanced into the venous system through the peel-away sheath which was removed. The port aspirated and flushed well and was terminally packed with heparin 100 units per cc, total 500 units. The port reservoir was irrigated with saline. The incision was closed in layers using absorbable suture and Dermabond. The venotomy site was closed using Dermabond. An aseptic dressing was applied using the protocol for Dermabond. The patient was transferred to the recovery area and was discharged from the department in stable condition.  Complications: None immediate.    Findings: Patent and compressible internal jugular vein. Final image shows the ale catheter to be in good position with the catheter tip at the cavoatrial junction/right atrium, an excellent position for use. There is no immediate complication.      Impression: Impression:     Successful ultrasound and fluoroscopic guided right internal jugular vein route single lumen Port-A-Cath placement as described above. Thank you for the opportunity to assist in the care of your patient. Electronically Signed: Rajat Bhandari MD  12/26/2023 3:38 PM EST  Workstation ID: [id number]    US Paracentesis    Result Date: 12/26/2023  US PARACENTESIS  History: ascites/ metastatic pancreatic cancer  : Rajat Bhandari MD.  Modality: Ultrasonography                   Sedation: None. Anesthesia: Lidocaine 1% local infiltration. Estimated blood loss:  0 cc.        Technique: A thorough discussion of the risks, benefits, and alternatives of the procedure, and if applicable, moderate sedation, was carried out with the patient. They were encouraged to ask any questions. Any questions were answered. They verbalized understanding. A written informed consent was then signed.  A multi-component timeout was performed prior to starting the procedure using the departmental protocol. The procedure room personnel used personal protective equipment. The operators used sterile gloves and if indicated, sterile gowns. The surgical site was prepped with chlorhexidine gluconate  and draped in the maximal applicable sterile fashion. A preliminary ultrasonography was performed to assess the target and determine a safe access site. It showed ascites. Pertinent ultrasound images were stored to the PACS for documentation. The access site was sterilely prepped and draped. Local anesthesia was administered. A dermatotomy was performed if needed. A catheter over the needle system was advanced into the peritoneal cavity. Straw colored fluid was aspirated and the plastic catheter advanced into the peritoneum. The catheter was then connected to a fluid recovery system. At the end of the procedure, the catheter was withdrawn and an aseptic dressing applied. The patient tolerated the procedure well. After uneventful recovery  recovery, the patient was discharged from the department in stable condition. The total amount of fluid recovered is given below. Albumin was infused intravenously if ordered by the referring doctor. Complications: None immediate. Specimen: The specimen was labeled and sent to the lab in appropriate carriers & containers if such was requested by the ordering provider.     Impression: Impression:                                                              Successful ultrasound-guided paracentesis using a Right upper quadrant access with recovery of 3 liters of fluid as described above. Please note that the patient is developing septated loculated ascites. Thank you for the opportunity to assist in the care of your patient. Electronically Signed: Rajat Bhandari MD  12/26/2023 3:37 PM EST  Workstation ID: [id number]    CT Abdomen Pelvis With Contrast    Result Date: 12/25/2023  CT ABDOMEN PELVIS W CONTRAST Date of Exam: 12/25/2023 4:31 PM EST Indication: history pancreatic cancer with constipation, N/V. Comparison: 12/7/2023 and prior Technique: Axial CT images were obtained of the abdomen and pelvis following the uneventful intravenous administration of 85 mL Isovue-300. Reconstructed coronal and sagittal images were also obtained. Automated exposure control and iterative construction methods were used. FINDINGS: Abdomen/Pelvis: Lower Chest: Small bilateral pleural effusions are noted with associated dependent opacity at the lung bases. No free air noted below the diaphragm. Organs: Patient is status post cholecystectomy. Low-attenuation foci within the liver appear grossly stable given limitations of current exam secondary to motion and streak artifact from patient's upper extremities. The kidneys, spleen and adrenal glands  are unremarkable. The pancreas demonstrates atrophy and prominence of the main duct which is poorly visualized on current study. Mass seen previous study is not as well visualized on current  study. Omental and peritoneal carcinomatosis again noted. Narrowing of the celiac axis and portal vein are better seen on previous study. GI/Bowel: There is a moderate-sized hiatal hernia. The stomach demonstrates no acute abnormality. Small bowel demonstrates no obstruction. There is a heavy stool burden. No focal inflammatory changes of the GI tract noted. Moderate to large degree of ascites noted. Findings are similar to the prior study. Scattered mesenteric nodularity densities compatible adenopathy. This is similar to the previous study. Underlying mesenteric implants in the right lower quadrant again suspected Pelvis: The uterus is unremarkable. Ovaries are not well visualized. Urinary bladder is incompletely distended and grossly unremarkable Peritoneum/Retroperitoneum: The aorta is normal in caliber. There is no suspicious retroperitoneal adenopathy Bones/Soft Tissues: No destructive bone lesion     Impression: 1. Known pancreatic mass with atrophy of the distal body and tail of the pancreas and dilation of the pancreatic duct is not as well visualized due to technical limitations and artifact on the current study. 2. Peritoneal and omental carcinomatosis and ascites again noted. 3. Heavy stool burden with a nonobstructing pattern 4. Bilateral pleural effusions and dependent consolidation likely atelectasis 5. Moderate size hiatal hernia. 6. Other findings as above Electronically Signed: Jayy Lewis MD  12/25/2023 4:59 PM EST  Workstation ID: [id number]         Current medications:  Scheduled Meds:levothyroxine, 125 mcg, Oral, QAM  Naloxegol Oxalate, 25 mg, Oral, QAM  pantoprazole, 40 mg, Oral, Q AM  senna-docusate sodium, 2 tablet, Oral, BID   And  polyethylene glycol, 17 g, Oral, BID  rosuvastatin, 5 mg, Oral, Nightly  sodium chloride, 10 mL, Intravenous, Q12H  venlafaxine XR, 150 mg, Oral, Daily      Continuous Infusions:sodium chloride 0.9 % with KCl 20 mEq, 100 mL/hr, Last Rate: 100 mL/hr (12/25/23  1917)      PRN Meds:.  acetaminophen    senna-docusate sodium **AND** polyethylene glycol **AND** bisacodyl **AND** bisacodyl    Calcium Replacement - Follow Nurse / BPA Driven Protocol    cyclobenzaprine    HYDROmorphone    HYDROmorphone **AND** naloxone    magnesium hydroxide    Magnesium Standard Dose Replacement - Follow Nurse / BPA Driven Protocol    melatonin    ondansetron    oxyCODONE-acetaminophen    Phosphorus Replacement - Follow Nurse / BPA Driven Protocol    Potassium Replacement - Follow Nurse / BPA Driven Protocol    prochlorperazine    Sodium Chloride (PF)    sodium chloride    sodium chloride    Assessment & Plan   Assessment & Plan     Active Hospital Problems    Diagnosis  POA    **Pancreatic cancer [C25.9]  Yes    Nausea and vomiting [R11.2]  Yes    Constipation [K59.00]  Yes    Neutrophilic leukocytosis [D72.9]  Yes    Thrombocytosis [D75.839]  Yes    GERD without esophagitis [K21.9]  Yes    Hiatal hernia [K44.9]  Yes    Ascites [R18.8]  Yes    Peritoneal carcinomatosis [C78.6]  Yes    Osteopenia [M85.80]  Yes    Insomnia [G47.00]  Yes    Anxiety [F41.9]  Yes    Hypothyroidism due to Hashimoto's thyroiditis [E03.8, E06.3]  Yes    Depression [F32.A]  Yes      Resolved Hospital Problems   No resolved problems to display.        Brief Hospital Course to date:  Jessica Kraft is a 63 y.o. female w/ Hx anxiety, depression, hypothyroidism, GERD, recently diagnosed pancreatic cancer w/ peritoneal carcinomatosis and ascites (recent admit 12/7/23-12/15/23), she was planned for outpatient port placement and chemotherapy initiation; she presented early w/ inc abd pain, distention, vomiting, no BM's in several days; CT imaging showed ascites and large stool burden    The following problems are new to me today    Assessment/Plan    Acute nausea and vomiting  Acute/chronic constipation  Recently Dx pancreatic cancer w/ omental and peritoneal carcinomatosis w/ ascites  -CT abd/pel w/ known malignancy findings,  large stool burden without obstructive pattern  -cont symptomatic care  -palliative consulted  -orders already in for IR port placement and paracentesis  -heme/onc consulted on admission, d/w primary oncologist, they are out of the office until tomorrow but can see tomorrow, patient could in theory start chemo inpatient  -cont increased bowel regimen    Hypothyroidism  -levothyroxine    GERD  Hiatal hernia  -PPI    Anxiety  Depression  -effexor      Expected Discharge Location and Transportation: home  Expected Discharge   Expected Discharge Date: 12/29/2023; Expected Discharge Time:      DVT prophylaxis:  Mechanical DVT prophylaxis orders are present.     AM-PAC 6 Clicks Score (PT): 20 (12/25/23 2000)    CODE STATUS:   Code Status and Medical Interventions:   Ordered at: 12/25/23 2854     Level Of Support Discussed With:    Patient     Code Status (Patient has no pulse and is not breathing):    CPR (Attempt to Resuscitate)     Medical Interventions (Patient has pulse or is breathing):    Full Support       Ildefonso Montejo, DO  12/26/23

## 2023-12-26 NOTE — NURSING NOTE
Image guided paracentesis and right chest port inserted per Dr Bhandari. 3L yellow pink tinged fluid drained from right lower abdomen, island dressing applied. Right chest port site with dermabond and island dressing. Patient received 2mg of versed and 100 mcg of fentanyl for sedation time of 20min. Report given to bedside,RN

## 2023-12-26 NOTE — PLAN OF CARE
Goal Outcome Evaluation:  Plan of Care Reviewed With: patient        Progress: no change  Outcome Evaluation: Pt rested well throughout the shift. No c/o N/V or pain. VSS on 2L NC. Pt up ad mimi. NPO @ 0000. No other concerns noted at this time.

## 2023-12-26 NOTE — PROCEDURES
The following procedure was performed: Para and Port pl.    Please see corresponding Radiology report for in detail procedural information. The Radiology report will be dictated shortly, if not done so already. Please see the IR RN note for the information regarding medicines administered if any, corrie-procedural vitals and I/O information.

## 2023-12-26 NOTE — OUTREACH NOTE
Sepsis Week 2 Survey      Flowsheet Row Responses   [facility] facility patient discharged from? [facility]   Does the patient have one of the following disease processes/diagnoses(primary or secondary)? Sepsis   Week 2 attempt successful? No   Unsuccessful attempts Attempt 1   Revoke Readmitted            JAMES LANIER - Registered Nurse

## 2023-12-26 NOTE — PLAN OF CARE
Goal Outcome Evaluation:  Plan of Care Reviewed With: patient        Progress: no change  Outcome Evaluation: New palliative consult for assistance with symptom management per ANGELICA Sewell.  No ACP on file.  NOK is spouse Dominick Kraft and son Ilya Kraft.  Dr. TAI Fuentes and palliative RN saw pt for initial consultation.  Pt endorsed abdominal discomfort after prn pain med at a 5/10 but stated that it was tolerable.  No changes made to pain regimen by Dr. Fuentes at this time.  Pt. also endorsed SOA with exertion.  She is to have another paracentesis.  She stated her appetite is good but she has early satiety.  Pt. denied nausea.  Per RN report, oncology consult pending.  Palliative care to follow along for support and ongoing symptom management.        Problem: Palliative Care  Goal: Enhanced Quality of Life  Intervention: Promote Advance Care Planning  Flowsheets (Taken 12/26/2023 1434)  Life Transition/Adjustment:   palliative care initiated   palliative care discussed

## 2023-12-26 NOTE — CONSULTS
Leonarda Díaz MD  Consulting physician: Nikia    Chief Complaint   Patient presents with    Vomiting       Reason for consult: Sx mgmnt    HPI: Patient is an 63-year-old female with a somewhat new diagnosis, over the last month, of pancreatic cancer.  Patient brought hospital due to nausea and vomiting as well as increasing abdominal pain and swelling.  Patient states that she had a very painful night last night mostly related to her abdominal pain.  States that today the pain seems to be doing somewhat better.  Currently rates the pain a 5 out of 10 which she states is tolerable for her.  Pain is fairly constant in nature but helped with current pain regimen.  At this point time she also reports that the nausea seems to be improving as well.    Pain assesment: See above    Dyspnea: Denies    N/V: Positive but improved    PPS: 50      Past Medical History:   Diagnosis Date    Abscess     Anxiety     Bilateral ovarian cysts     Depression     Encounter for routine gynecological examination     Foot pain     H/O bone density study 06/2014    H/O mammogram 07/2016    [facility]    Hot flashes, menopausal     Hypothyroidism due to Hashimoto's thyroiditis     Insomnia     Miscarriage     x3    Osteopenia     Pap smear for cervical cancer screening 06/2014    Routine general medical examination at a health care facility     Urinary incontinence     Urinary tract infection     Vitamin D deficiency      Past Surgical History:   Procedure Laterality Date    ABDOMINOPLASTY  2001    CHOLECYSTECTOMY  2008    COLONOSCOPY  07/2012    [facility] G[facility] in [facility] normal    ECTOPIC PREGNANCY SURGERY Left 1994    ENDOSCOPY N/A 12/13/2023    Procedure: ESOPHAGOGASTRODUODENOSCOPY;  Surgeon: Flakito Abrams MD;  Location: [facility] ENDOSCOPY;  Service: Gastroenterology;  Laterality: N/A;    HERNIA REPAIR  2010     Current Code Status       Date Active Code Status Order ID Comments User Context       12/25/2023 1417 CPR (Attempt to  Resuscitate) [id number]  Yamilet Ba APRN ED        Question Answer    Code Status (Patient has no pulse and is not breathing) CPR (Attempt to Resuscitate)    Medical Interventions (Patient has pulse or is breathing) Full Support    Level Of Support Discussed With Patient                  Current Facility-Administered Medications   Medication Dose Route Frequency Provider Last Rate Last Admin    acetaminophen (TYLENOL) tablet 650 mg  650 mg Oral Q4H PRN Yamilet Ba APRN        sennosides-docusate (PERICOLACE) 8.6-50 MG per tablet 2 tablet  2 tablet Oral BID Sergey Talbot MD   2 tablet at 12/26/23 0901    And    polyethylene glycol (MIRALAX) packet 17 g  17 g Oral BID Sergey Talbot MD   17 g at 12/25/23 2011    And    bisacodyl (DULCOLAX) EC tablet 5 mg  5 mg Oral Daily PRN Sergey Talbot MD        And    bisacodyl (DULCOLAX) suppository 10 mg  10 mg Rectal Daily PRN Sergey Talbot MD        Calcium Replacement - Follow Nurse / BPA Driven Protocol   Does not apply PRN Yamilet Ba APRN        cyclobenzaprine (FLEXERIL) tablet 10 mg  10 mg Oral TID PRN Yamilet Ba APRN        HYDROmorphone (DILAUDID) injection 0.25 mg  0.25 mg Intravenous Q1H PRN Feliciano Hancock MD   0.25 mg at 12/25/23 1548    HYDROmorphone (DILAUDID) injection 0.5 mg  0.5 mg Intravenous Q2H PRN Yamilet Ba APRN   0.5 mg at 12/26/23 0700    And    naloxone (NARCAN) injection 0.4 mg  0.4 mg Intravenous Q5 Min PRN Yamilet Ba APRN        levothyroxine (SYNTHROID, LEVOTHROID) tablet 125 mcg  125 mcg Oral QAM Yamilet Ba APRN   125 mcg at 12/26/23 0610    magnesium hydroxide (MILK OF MAGNESIA) 400 MG/5ML suspension 10 mL  10 mL Oral Nightly PRN Yamilet Ba APRIFRAH        Magnesium Standard Dose Replacement - Follow Nurse / BPA Driven Protocol   Does not apply PRN Yamilet Ba APRN        melatonin tablet 5 mg  5 mg Oral Nightly PRN Yamilet Ba APRN   5 mg at 12/25/23 2019     Naloxegol Oxalate (MOVANTIK) tablet 25 mg  25 mg Oral QAM Yamilet Ba, APRN   25 mg at 12/25/23 2011    ondansetron (ZOFRAN) injection 4 mg  4 mg Intravenous Q6H PRN Yamilet Ba, APRN   4 mg at 12/26/23 0701    oxyCODONE-acetaminophen (PERCOCET) 5-325 MG per tablet 1 tablet  1 tablet Oral Q4H PRN Yamilet Ba APRN   1 tablet at 12/26/23 0901    pantoprazole (PROTONIX) EC tablet 40 mg  40 mg Oral Q AM Yamilet Ba, APRN   40 mg at 12/26/23 0553    Phosphorus Replacement - Follow Nurse / BPA Driven Protocol   Does not apply PRN Yamilet Ba APRIFRAH        Potassium Replacement - Follow Nurse / BPA Driven Protocol   Does not apply PRN Yamilet Ba, APRN        prochlorperazine (COMPAZINE) injection 5 mg  5 mg Intravenous Q6H PRN Yamilet Ba APRN   5 mg at 12/26/23 0901    rosuvastatin (CRESTOR) tablet 5 mg  5 mg Oral Nightly Yamilet Ba, APRN   5 mg at 12/25/23 2011    Sodium Chloride (PF) 0.9 % 10 mL  10 mL Intravenous PRN Feliciano Hancock MD        sodium chloride 0.9 % flush 10 mL  10 mL Intravenous Q12H [place] Baca R, APRN   10 mL at 12/25/23 2019    sodium chloride 0.9 % flush 10 mL  10 mL Intravenous PRN Yamilet Ba APRIFRAH        sodium chloride 0.9 % infusion 40 mL  40 mL Intravenous PRN Yamilet Ba, APRIFRAH        sodium chloride 0.9 % with KCl 20 mEq/L infusion  100 mL/hr Intravenous Continuous Yamilet Ba, APRN 100 mL/hr at 12/25/23 1917 100 mL/hr at 12/25/23 1917    venlafaxine XR (EFFEXOR-XR) 24 hr capsule 150 mg  150 mg Oral Daily Yamilet Ba, APRN   150 mg at 12/26/23 0901     sodium chloride 0.9 % with KCl 20 mEq, 100 mL/hr, Last Rate: 100 mL/hr (12/25/23 1917)        acetaminophen    senna-docusate sodium **AND** polyethylene glycol **AND** bisacodyl **AND** bisacodyl    Calcium Replacement - Follow Nurse / BPA Driven Protocol    cyclobenzaprine    HYDROmorphone    HYDROmorphone **AND** naloxone    magnesium hydroxide    Magnesium  "Standard Dose Replacement - Follow Nurse / BPA Driven Protocol    melatonin    ondansetron    oxyCODONE-acetaminophen    Phosphorus Replacement - Follow Nurse / BPA Driven Protocol    Potassium Replacement - Follow Nurse / BPA Driven Protocol    prochlorperazine    Sodium Chloride (PF)    sodium chloride    sodium chloride  No Known Allergies  Family History   Problem Relation Age of Onset    Mental illness Mother     Depression Mother     Thyroid disease Mother     Hypertension Father     Thyroid disease Sister     Breast cancer Other     Breast cancer Maternal Aunt      Social History     Socioeconomic History    Marital status:    Tobacco Use    Smoking status: Never    Smokeless tobacco: Never   Vaping Use    Vaping Use: Never used   Substance and Sexual Activity    Alcohol use: No    Drug use: No    Sexual activity: Yes     Partners: Male     Birth control/protection: None, Post-menopausal     Comment:      Review of Systems -all others reviewed and found negative except mentioned in HPI      /85   Pulse 91   Temp 97.9 °F (36.6 °C)   Resp 16   Ht 167.6 cm (66\")   Wt 91.2 kg (201 lb)   LMP  (LMP Unknown) Comment: N/A  SpO2 94%   BMI 32.44 kg/m²     Intake/Output Summary (Last 24 hours) at 12/26/2023 1227  Last data filed at 12/26/2023 0508  Gross per 24 hour   Intake --   Output 75 ml   Net -75 ml     Physical Exam:      General Appearance:  Alert, cooperative, in no acute distress   Head:  Normocephalic, without obvious abnormality, atraumatic   Eyes:  Lids and lashes normal, conjunctivae and sclerae normal, no icterus, no pallor, corneas clear, PERRLA   Ears:  Ears appear intact with no abnormalities noted   Throat:  No oral lesions, no thrush, oral mucosa moist   Neck:  No adenopathy, supple, trachea midline, no thyromegaly, no carotid bruit, no JVD   Back:  No kyphosis present, no scoliosis present, no skin lesions, erythema or scars, no tenderness to percussion, or palpation, " range of motion normal   Lungs:  Clear to auscultation,respirations regular, even and unlabored    Heart:  Regular rhythm and normal rate, normal S1 and S2, no murmur, no gallop, no rub, no click   Breast Exam:  Deferred   Abdomen:  Normal bowel sounds, no masses, no organomegaly, soft non-tender, non-distended, no guarding, no rebound tenderness   Genitalia:  Deferred   Extremities:  Moves all extremities well, no edema, no cyanosis, no redness   Pulses:  Pulses palpable and equal bilaterally   Skin:  No bleeding, bruising or rash   Lymph nodes:  No palpable adenopathy   Neurologic:  Cranial nerves 2 - 12 grossly intact, sensation intact, DTR present and equal bilaterally       Results from last 7 days   Lab Units 12/26/23  0544   WBC 10*3/mm3 14.29*   HEMOGLOBIN g/dL 13.2   HEMATOCRIT % 42.1   PLATELETS 10*3/mm3 578*     Results from last 7 days   Lab Units 12/26/23  0544   SODIUM mmol/L 134*   POTASSIUM mmol/L 4.8   CHLORIDE mmol/L 97*   CO2 mmol/L 26.0   BUN mg/dL 15   CREATININE mg/dL 0.70   CALCIUM mg/dL 8.5*   BILIRUBIN mg/dL 0.4   ALK PHOS U/L 224*   ALT (SGPT) U/L 11   AST (SGOT) U/L 22   GLUCOSE mg/dL 125*     Results from last 7 days   Lab Units 12/26/23  0544   SODIUM mmol/L 134*   POTASSIUM mmol/L 4.8   CHLORIDE mmol/L 97*   CO2 mmol/L 26.0   BUN mg/dL 15   CREATININE mg/dL 0.70   GLUCOSE mg/dL 125*   CALCIUM mg/dL 8.5*     Imaging Results (Last 72 Hours)       Procedure Component Value Units Date/Time    CT Abdomen Pelvis With Contrast [[id number]] Collected: 12/25/23 1642     Updated: 12/25/23 1702    Narrative:      CT ABDOMEN PELVIS W CONTRAST    Date of Exam: 12/25/2023 4:31 PM EST    Indication: history pancreatic cancer with constipation, N/V.    Comparison: 12/7/2023 and prior    Technique: Axial CT images were obtained of the abdomen and pelvis following the uneventful intravenous administration of 85 mL Isovue-300. Reconstructed coronal and sagittal images were also obtained. Automated exposure  control and iterative   construction methods were used.        FINDINGS:    Abdomen/Pelvis:    Lower Chest: Small bilateral pleural effusions are noted with associated dependent opacity at the lung bases. No free air noted below the diaphragm.    Organs: Patient is status post cholecystectomy. Low-attenuation foci within the liver appear grossly stable given limitations of current exam secondary to motion and streak artifact from patient's upper extremities. The kidneys, spleen and adrenal glands   are unremarkable. The pancreas demonstrates atrophy and prominence of the main duct which is poorly visualized on current study. Mass seen previous study is not as well visualized on current study. Omental and peritoneal carcinomatosis again noted.   Narrowing of the celiac axis and portal vein are better seen on previous study.      GI/Bowel: There is a moderate-sized hiatal hernia. The stomach demonstrates no acute abnormality. Small bowel demonstrates no obstruction. There is a heavy stool burden. No focal inflammatory changes of the GI tract noted. Moderate to large degree of   ascites noted. Findings are similar to the prior study. Scattered mesenteric nodularity densities compatible adenopathy. This is similar to the previous study. Underlying mesenteric implants in the right lower quadrant again suspected    Pelvis: The uterus is unremarkable. Ovaries are not well visualized. Urinary bladder is incompletely distended and grossly unremarkable    Peritoneum/Retroperitoneum: The aorta is normal in caliber. There is no suspicious retroperitoneal adenopathy    Bones/Soft Tissues: No destructive bone lesion      Impression:        1. Known pancreatic mass with atrophy of the distal body and tail of the pancreas and dilation of the pancreatic duct is not as well visualized due to technical limitations and artifact on the current study.    2. Peritoneal and omental carcinomatosis and ascites again noted.    3. Heavy stool  burden with a nonobstructing pattern    4. Bilateral pleural effusions and dependent consolidation likely atelectasis    5. Moderate size hiatal hernia.    6. Other findings as above      Electronically Signed: Jayy Lewis MD    12/25/2023 4:59 PM EST    Workstation ID: [id number]          Impression: Pancreatic Cancer  Neoplastic pain  Nausea    Plan: At this point, I did defer any goals of care conversation as we are currently waiting on the input from oncology.  In respect to symptoms the patient's pain seems to be improved at this point in time.  With that in mind we will continue patient with current symptom management regimen with no adjustments.  Continue to follow as needed usage as well as seeing how the patient does after a paracentesis which has been ordered.        Timoteo Fuentes DO  12/26/23  12:27 EST

## 2023-12-27 ENCOUNTER — APPOINTMENT (OUTPATIENT)
Dept: GENERAL RADIOLOGY | Facility: HOSPITAL | Age: 63
End: 2023-12-27
Payer: COMMERCIAL

## 2023-12-27 DIAGNOSIS — C25.1 MALIGNANT NEOPLASM OF BODY OF PANCREAS: Primary | ICD-10-CM

## 2023-12-27 LAB — REF LAB TEST METHOD: NORMAL

## 2023-12-27 PROCEDURE — G0378 HOSPITAL OBSERVATION PER HR: HCPCS

## 2023-12-27 PROCEDURE — 25010000002 HYDROMORPHONE PER 4 MG: Performed by: NURSE PRACTITIONER

## 2023-12-27 PROCEDURE — 25010000002 ONDANSETRON PER 1 MG: Performed by: NURSE PRACTITIONER

## 2023-12-27 PROCEDURE — 74018 RADEX ABDOMEN 1 VIEW: CPT

## 2023-12-27 PROCEDURE — 99214 OFFICE O/P EST MOD 30 MIN: CPT | Performed by: INTERNAL MEDICINE

## 2023-12-27 RX ORDER — LEVOTHYROXINE SODIUM 0.12 MG/1
125 TABLET ORAL EVERY MORNING
Status: DISCONTINUED | OUTPATIENT
Start: 2023-12-28 | End: 2024-01-01 | Stop reason: HOSPADM

## 2023-12-27 RX ORDER — CYCLOBENZAPRINE HCL 10 MG
10 TABLET ORAL 3 TIMES DAILY PRN
Status: DISCONTINUED | OUTPATIENT
Start: 2023-12-27 | End: 2024-01-01 | Stop reason: HOSPADM

## 2023-12-27 RX ORDER — LACTULOSE 10 G/15ML
300 SOLUTION ORAL ONCE
Status: DISCONTINUED | OUTPATIENT
Start: 2023-12-27 | End: 2024-01-01 | Stop reason: HOSPADM

## 2023-12-27 RX ORDER — POLYETHYLENE GLYCOL 3350 17 G/17G
17 POWDER, FOR SOLUTION ORAL 2 TIMES DAILY
Status: DISCONTINUED | OUTPATIENT
Start: 2023-12-27 | End: 2024-01-01 | Stop reason: HOSPADM

## 2023-12-27 RX ORDER — ALPRAZOLAM 1 MG/1
2 TABLET ORAL NIGHTLY PRN
Status: DISCONTINUED | OUTPATIENT
Start: 2023-12-27 | End: 2024-01-01 | Stop reason: HOSPADM

## 2023-12-27 RX ORDER — ROSUVASTATIN CALCIUM 10 MG/1
5 TABLET, COATED ORAL NIGHTLY
Status: DISCONTINUED | OUTPATIENT
Start: 2023-12-27 | End: 2024-01-01 | Stop reason: HOSPADM

## 2023-12-27 RX ORDER — ACETAMINOPHEN 325 MG/1
650 TABLET ORAL EVERY 4 HOURS PRN
Status: DISCONTINUED | OUTPATIENT
Start: 2023-12-27 | End: 2024-01-01 | Stop reason: HOSPADM

## 2023-12-27 RX ORDER — SODIUM PHOSPHATE,MONO-DIBASIC 19G-7G/118
1 ENEMA (ML) RECTAL ONCE
Status: COMPLETED | OUTPATIENT
Start: 2023-12-27 | End: 2023-12-27

## 2023-12-27 RX ORDER — LACTULOSE 10 G/15ML
10 SOLUTION ORAL
Status: DISCONTINUED | OUTPATIENT
Start: 2023-12-27 | End: 2024-01-01 | Stop reason: HOSPADM

## 2023-12-27 RX ORDER — AMOXICILLIN 250 MG
2 CAPSULE ORAL 2 TIMES DAILY
Status: DISCONTINUED | OUTPATIENT
Start: 2023-12-27 | End: 2024-01-01 | Stop reason: HOSPADM

## 2023-12-27 RX ORDER — CHOLECALCIFEROL (VITAMIN D3) 125 MCG
5 CAPSULE ORAL NIGHTLY PRN
Status: DISCONTINUED | OUTPATIENT
Start: 2023-12-27 | End: 2024-01-01 | Stop reason: HOSPADM

## 2023-12-27 RX ORDER — BISACODYL 10 MG
10 SUPPOSITORY, RECTAL RECTAL DAILY PRN
Status: DISCONTINUED | OUTPATIENT
Start: 2023-12-27 | End: 2024-01-01 | Stop reason: HOSPADM

## 2023-12-27 RX ORDER — LACTULOSE 10 G/15ML
30 SOLUTION ORAL 2 TIMES DAILY
Status: COMPLETED | OUTPATIENT
Start: 2023-12-27 | End: 2023-12-27

## 2023-12-27 RX ORDER — OXYCODONE HYDROCHLORIDE AND ACETAMINOPHEN 5; 325 MG/1; MG/1
1 TABLET ORAL EVERY 4 HOURS PRN
Status: DISCONTINUED | OUTPATIENT
Start: 2023-12-27 | End: 2024-01-01 | Stop reason: HOSPADM

## 2023-12-27 RX ORDER — LACTULOSE 10 G/15ML
10 SOLUTION ORAL
Status: DISCONTINUED | OUTPATIENT
Start: 2023-12-27 | End: 2023-12-27

## 2023-12-27 RX ORDER — BISACODYL 5 MG/1
5 TABLET, DELAYED RELEASE ORAL DAILY PRN
Status: DISCONTINUED | OUTPATIENT
Start: 2023-12-27 | End: 2024-01-01 | Stop reason: HOSPADM

## 2023-12-27 RX ADMIN — LEVOTHYROXINE SODIUM 125 MCG: 125 TABLET ORAL at 05:29

## 2023-12-27 RX ADMIN — SENNOSIDES AND DOCUSATE SODIUM 2 TABLET: 8.6; 5 TABLET ORAL at 08:44

## 2023-12-27 RX ADMIN — Medication 10 ML: at 21:22

## 2023-12-27 RX ADMIN — Medication 10 ML: at 08:45

## 2023-12-27 RX ADMIN — HYDROMORPHONE HYDROCHLORIDE 0.5 MG: 1 INJECTION, SOLUTION INTRAMUSCULAR; INTRAVENOUS; SUBCUTANEOUS at 18:46

## 2023-12-27 RX ADMIN — LACTULOSE 30 G: 20 SOLUTION ORAL at 08:43

## 2023-12-27 RX ADMIN — LACTULOSE 10 G: 20 SOLUTION ORAL at 12:28

## 2023-12-27 RX ADMIN — ALPRAZOLAM 2 MG: 1 TABLET ORAL at 12:07

## 2023-12-27 RX ADMIN — NALOXEGOL OXALATE 25 MG: 25 TABLET, FILM COATED ORAL at 05:30

## 2023-12-27 RX ADMIN — ALPRAZOLAM 2 MG: 1 TABLET ORAL at 21:20

## 2023-12-27 RX ADMIN — ROSUVASTATIN 5 MG: 10 TABLET, FILM COATED ORAL at 21:21

## 2023-12-27 RX ADMIN — BISACODYL 10 MG: 10 SUPPOSITORY RECTAL at 21:21

## 2023-12-27 RX ADMIN — PANTOPRAZOLE SODIUM 40 MG: 40 TABLET, DELAYED RELEASE ORAL at 05:29

## 2023-12-27 RX ADMIN — POLYETHYLENE GLYCOL 3350 17 G: 17 POWDER, FOR SOLUTION ORAL at 08:44

## 2023-12-27 RX ADMIN — VENLAFAXINE HYDROCHLORIDE 150 MG: 75 CAPSULE, EXTENDED RELEASE ORAL at 08:44

## 2023-12-27 RX ADMIN — ONDANSETRON 4 MG: 2 INJECTION INTRAMUSCULAR; INTRAVENOUS at 17:33

## 2023-12-27 RX ADMIN — Medication 5 MG: at 21:21

## 2023-12-27 RX ADMIN — SENNOSIDES AND DOCUSATE SODIUM 2 TABLET: 8.6; 5 TABLET ORAL at 21:20

## 2023-12-27 RX ADMIN — LACTULOSE 10 G: 20 SOLUTION ORAL at 14:14

## 2023-12-27 RX ADMIN — HYDROMORPHONE HYDROCHLORIDE 0.5 MG: 1 INJECTION, SOLUTION INTRAMUSCULAR; INTRAVENOUS; SUBCUTANEOUS at 22:27

## 2023-12-27 RX ADMIN — SODIUM PHOSPHATE 1 ENEMA: 7; 19 ENEMA RECTAL at 11:19

## 2023-12-27 RX ADMIN — POLYETHYLENE GLYCOL 3350 17 G: 17 POWDER, FOR SOLUTION ORAL at 21:21

## 2023-12-27 RX ADMIN — OXYCODONE AND ACETAMINOPHEN 1 TABLET: 5; 325 TABLET ORAL at 05:29

## 2023-12-27 RX ADMIN — LACTULOSE 30 G: 20 SOLUTION ORAL at 21:21

## 2023-12-27 RX ADMIN — LACTULOSE 10 G: 20 SOLUTION ORAL at 15:38

## 2023-12-27 NOTE — CASE MANAGEMENT/SOCIAL WORK
Discharge Planning Assessment  [facility]     Patient Name: Jessica Kraft  MRN: [id number]  Today's Date: 12/27/2023    Admit Date: 12/25/2023    Plan: Home   Discharge Needs Assessment       Row Name 12/27/23 1801       Living Environment    People in Home spouse    Name(s) of People in Home Lives with spouse, Dominick Kraft, in [facility]    Current Living Arrangements home    Potentially Unsafe Housing Conditions none    In the past 12 months has the electric, gas, oil, or water company threatened to shut off services in your home? No    Primary Care Provided by self    Provides Primary Care For no one    Caregiving Concerns Independent at baseline    Family Caregiver if Needed spouse    Family Caregiver Names Spouse, Dominick Kraft at [phone]    Quality of Family Relationships helpful;involved;supportive    Able to Return to Prior Arrangements yes    Living Arrangement Comments Resides in private residence with spouse       Resource/Environmental Concerns    Resource/Environmental Concerns none    Transportation Concerns none       Food Insecurity    Within the past 12 months, you worried that your food would run out before you got the money to buy more. Never true    Within the past 12 months, the food you bought just didn't last and you didn't have money to get more. Never true       Transition Planning    Patient/Family Anticipates Transition to home with family    Patient/Family Anticipated Services at Transition     Transportation Anticipated family or friend will provide       Discharge Needs Assessment    Readmission Within the Last 30 Days no previous admission in last 30 days    Equipment Currently Used at Home none    Concerns to be Addressed discharge planning    Concerns Comments Case Management following for all needs/discharge planning when medically stable    Discharge Coordination/Progress Anticipate home at discharge                   Discharge Plan       Row Name  12/27/23 1804       Plan    Plan Home    Plan Comments Resides with spouse in [place], independent at baseline.  Anticipate home when medically stable.    Final Discharge Disposition Code 01 - home or self-care                  Continued Care and Services - Admitted Since 12/25/2023    Coordination has not been started for this encounter.       Expected Discharge Date and Time       Expected Discharge Date Expected Discharge Time    Dec 29, 2023            Demographic Summary       Row Name 12/27/23 1801       General Information    Referral Source admission list;interdisciplinary rounds    Reason for Consult discharge planning    Preferred Language English                   Functional Status    No documentation.                  Psychosocial    No documentation.                  Abuse/Neglect    No documentation.                  Legal    No documentation.                  Substance Abuse    No documentation.                  Patient Forms    No documentation.                     SITA Fortune

## 2023-12-27 NOTE — PLAN OF CARE
Goal Outcome Evaluation:  Plan of Care Reviewed With: patient, spouse        Progress: no change  Outcome Evaluation: PRN lactulose and xanax given, c/o abominal cramping, nausea, no luck with BM at this time,  at bedside, up ad mimi

## 2023-12-27 NOTE — PLAN OF CARE
"  Problem: Palliative Care  Goal: Enhanced Quality of Life  Intervention: Optimize Psychosocial Wellbeing  Recent Flowsheet Documentation  Taken 12/27/2023 1300 by Gauri Montelongo \"Taina\" ALLYSON VALDERRAMA  Supportive Measures: (palliative IDT: MD, APRN, ALLYSON, RN) --  Taken 12/27/2023 1105 by Gauri Montelongo \"Taina\" ALLYSON VALDERRAMA  Supportive Measures: (symptom assessment)   active listening utilized   other (see comments)  Family/Support System Care: support provided  Brief visit with patient and spouse at bedside.  Plan for chemotherapy after patient has bowel movement, patient requesting enema - with abdominal cramping after movantik, lactulose, pericolace, miralax.  Per charting no bm even after enema.  Discussed with pallaitive np.  Palliative Care continues to follow for support and assist with plan of care.     "

## 2023-12-27 NOTE — CONSULTS
HEMATOLOGY/ONCOLOGY INPATIENT CONSULTATION      REFERRING PHYSICIAN: Blossom Copeland MD    PRIMARY CARE PROVIDER: Leonarda Díaz MD    REASON FOR CONSULTATION: Metastatic pancreatic adenocarcinoma      HISTORY OF PRESENT ILLNESS: 63-year-old female with recently diagnosed metastatic pancreatic adenocarcinoma presenting with malignant ascites who was readmitted with ascites reaccumulation,  nausea, vomiting, and constipation.  She is status post port placement and paracentesis yesterday with substantial improvement in her symptoms.  She has not had a bowel movement in several days despite MiraLAX overnight.  With respect to her metastatic adenocarcinoma, she was diagnosed during her recent admission earlier this month.  She presented with a pancreatic mass, peritoneal/omental implants, indeterminate lung nodules and indeterminate liver hypodensities.  She underwent a cytology result which showed malignant ascites with an upper GI/pancreatic biliary source favored.  Pancreatic EUS was nondiagnostic.  There was no alternative upper GI source identified on endoscopy.  CA 19-9 was within normal limits.   was elevated.  She was evaluated by GYN oncology and had a normal pelvic ultrasound.  Consolation of findings was most consistent with metastatic carcinoma of most likely pancreatic origin.    She elected to return home for the Christmas holiday and was scheduled for outpatient chemotherapy administration for cycle 1 today.  However she presented with recurrent ascites accumulation in the interim.  She denies any chills, fever, or cough.  Nausea has resolved and abdominal pain is controlled.  She has not been using frequent narcotics as an outpatient.  She gets substantial symptomatic relief from paracentesis, but wants to avoid peritoneal drain if possible.    No Known Allergies    Past Medical History:   Diagnosis Date    Abscess     Anxiety     Bilateral ovarian cysts     Depression     Encounter for  routine gynecological examination     Foot pain     H/O bone density study 06/2014    H/O mammogram 07/2016    [facility]    Hot flashes, menopausal     Hypothyroidism due to Hashimoto's thyroiditis     Insomnia     Miscarriage     x3    Osteopenia     Pap smear for cervical cancer screening 06/2014    Routine general medical examination at a health care facility     Urinary incontinence     Urinary tract infection     Vitamin D deficiency          Current Facility-Administered Medications:     acetaminophen (TYLENOL) tablet 650 mg, 650 mg, Oral, Q4H PRN, Yamilet Ba APRN    ALPRAZolam (XANAX) tablet 2 mg, 2 mg, Oral, Nightly PRN, Ildefonso Montejo, DO, 2 mg at 12/26/23 2133    sennosides-docusate (PERICOLACE) 8.6-50 MG per tablet 2 tablet, 2 tablet, Oral, BID, 2 tablet at 12/26/23 2132 **AND** polyethylene glycol (MIRALAX) packet 17 g, 17 g, Oral, BID, 17 g at 12/26/23 2130 **AND** bisacodyl (DULCOLAX) EC tablet 5 mg, 5 mg, Oral, Daily PRN **AND** bisacodyl (DULCOLAX) suppository 10 mg, 10 mg, Rectal, Daily PRN, Sergey Talbot MD    Calcium Replacement - Follow Nurse / BPA Driven Protocol, , Does not apply, PRN, Yamilet Ba APRN    cyclobenzaprine (FLEXERIL) tablet 10 mg, 10 mg, Oral, TID PRN, Yamilet Ba APRN    HYDROmorphone (DILAUDID) injection 0.25 mg, 0.25 mg, Intravenous, Q1H PRN, Feilciano Hancock MD, 0.25 mg at 12/25/23 1548    HYDROmorphone (DILAUDID) injection 0.5 mg, 0.5 mg, Intravenous, Q2H PRN, 0.5 mg at 12/26/23 0700 **AND** naloxone (NARCAN) injection 0.4 mg, 0.4 mg, Intravenous, Q5 Min PRN, Yamilet Ba, APRN    levothyroxine (SYNTHROID, LEVOTHROID) tablet 125 mcg, 125 mcg, Oral, QAM, Yamilet Ba APRN, 125 mcg at 12/27/23 0529    magnesium hydroxide (MILK OF MAGNESIA) 400 MG/5ML suspension 10 mL, 10 mL, Oral, Nightly PRN, Yamilet Ba APRN    Magnesium Standard Dose Replacement - Follow Nurse / BPA Driven Protocol, , Does not apply, PRN, Yamilet Ba  R, APRN    melatonin tablet 5 mg, 5 mg, Oral, Nightly PRN, Angle Baecca R, APRN, 5 mg at 12/26/23 2130    Naloxegol Oxalate (MOVANTIK) tablet 25 mg, 25 mg, Oral, QAM, Mejia Yamilet R, APRN, 25 mg at 12/27/23 0530    ondansetron (ZOFRAN) injection 4 mg, 4 mg, Intravenous, Q6H PRN, Yamilet Ba, APRN, 4 mg at 12/26/23 2130    oxyCODONE-acetaminophen (PERCOCET) 5-325 MG per tablet 1 tablet, 1 tablet, Oral, Q4H PRN, Sal[place] Baca JAZZ, APRN, 1 tablet at 12/27/23 0529    pantoprazole (PROTONIX) EC tablet 40 mg, 40 mg, Oral, Q AM, Sal[place] Baca JAZZ, APRN, 40 mg at 12/27/23 0529    Phosphorus Replacement - Follow Nurse / BPA Driven Protocol, , Does not apply, PRN, Yamilet Ba, APRN    Potassium Replacement - Follow Nurse / BPA Driven Protocol, , Does not apply, PRN, Sal[place] Baca R, APRN    prochlorperazine (COMPAZINE) injection 5 mg, 5 mg, Intravenous, Q6H PRN, Sal[place] Baca JAZZ, APRN, 5 mg at 12/26/23 0901    rosuvastatin (CRESTOR) tablet 5 mg, 5 mg, Oral, Nightly, [place] Baca R, APRN, 5 mg at 12/26/23 2130    Sodium Chloride (PF) 0.9 % 10 mL, 10 mL, Intravenous, PRN, Feliciano Hancock MD    sodium chloride 0.9 % flush 10 mL, 10 mL, Intravenous, Q12H, Angle Baecca R, APRN, 10 mL at 12/26/23 2130    sodium chloride 0.9 % flush 10 mL, 10 mL, Intravenous, PRN, Angle Baecca R, APRN    sodium chloride 0.9 % infusion 40 mL, 40 mL, Intravenous, PRN, Mejia Yamilet R, APRN    venlafaxine XR (EFFEXOR-XR) 24 hr capsule 150 mg, 150 mg, Oral, Daily, Yamilet Ba APRN, 150 mg at 12/26/23 0901    Past Surgical History:   Procedure Laterality Date    ABDOMINOPLASTY  2001    CHOLECYSTECTOMY  2008    COLONOSCOPY  07/2012    Quang Gaines in [facility] normal    ECTOPIC PREGNANCY SURGERY Left 1994    ENDOSCOPY N/A 12/13/2023    Procedure: ESOPHAGOGASTRODUODENOSCOPY;  Surgeon: Flakito Abrams MD;  Location: [facility];  Service: Gastroenterology;  Laterality: N/A;    HERNIA REPAIR  2010       Social  History     Socioeconomic History    Marital status:    Tobacco Use    Smoking status: Never    Smokeless tobacco: Never   Vaping Use    Vaping Use: Never used   Substance and Sexual Activity    Alcohol use: No    Drug use: No    Sexual activity: Yes     Partners: Male     Birth control/protection: None, Post-menopausal     Comment:        Family History   Problem Relation Age of Onset    Mental illness Mother     Depression Mother     Thyroid disease Mother     Hypertension Father     Thyroid disease Sister     Breast cancer Other     Breast cancer Maternal Aunt        Oncology/Hematology History   Abdominal carcinomatosis - omental   12/8/2023 Initial Diagnosis    Abdominal carcinomatosis - omental     12/27/2023 -  Chemotherapy    OP PANCREATIC mFOLFIRINOX (OXALIplatin / Leucovorin / Irinotecan / Fluorouracil CIV)     Pancreatic cancer   12/12/2023 Initial Diagnosis    Pancreatic cancer     12/27/2023 -  Chemotherapy    OP PANCREATIC mFOLFIRINOX (OXALIplatin / Leucovorin / Irinotecan / Fluorouracil CIV)             Objective     Vitals:    12/26/23 1602 12/26/23 1900 12/26/23 2330 12/27/23 0330   BP: 136/89 128/84 130/85 138/85   BP Location:  Left arm Left arm Left arm   Patient Position:  Lying Lying Lying   Pulse: 100 98 98 91   Resp: 18 18 18 18   Temp: 97.9 °F (36.6 °C) 97.6 °F (36.4 °C) 98 °F (36.7 °C) 98.1 °F (36.7 °C)   TempSrc:  Temporal Temporal Temporal   SpO2: 96% 94% 92% 91%   Weight:       Height:                       Temp:  [97.6 °F (36.4 °C)-98.1 °F (36.7 °C)] 98.1 °F (36.7 °C)     Performance Status: 2 patient    Physical Exam    General: well appearing female in no acute distress  HEENT: sclerae anicteric, oropharynx clear  Lymphatics: no cervical, supraclavicular, or axillary adenopathy  Cardiovascular: regular rate and rhythm, no murmurs  Lungs: clear to auscultation bilaterally  Abdomen: soft, nontender, nondistended.  No palpable organomegaly  Extremities: no lower extremity  edema  Skin: no rashes, lesions, bruising, or petechiae      LABS:    Lab Results   Component Value Date    HGB 13.2 12/26/2023    HCT 42.1 12/26/2023    MCV 87.2 12/26/2023     (H) 12/26/2023    WBC 14.29 (H) 12/26/2023    NEUTROABS 11.91 (H) 12/26/2023    LYMPHSABS 1.18 12/26/2023    MONOSABS 0.95 (H) 12/26/2023    EOSABS 0.10 12/26/2023    BASOSABS 0.06 12/26/2023     Lab Results   Component Value Date    GLUCOSE 125 (H) 12/26/2023    BUN 15 12/26/2023    CREATININE 0.70 12/26/2023     (L) 12/26/2023    K 4.8 12/26/2023    CL 97 (L) 12/26/2023    CO2 26.0 12/26/2023    CALCIUM 8.5 (L) 12/26/2023    PROTEINTOT 6.2 12/26/2023    ALBUMIN 2.8 (L) 12/26/2023    BILITOT 0.4 12/26/2023    ALKPHOS 224 (H) 12/26/2023    AST 22 12/26/2023    ALT 11 12/26/2023     Trying to get she is supposed to get assuming she has a bowel movement they are working on her constipation so  IMAGING    IR Port Placement    Result Date: 12/26/2023  IR PORT PLACEMENT  History: starting chemo 12/27  : Rajat Bhandari MD.  Modality: Sonography and fluoroscopy  DOSE REDUCTION: The examination was performed according to departmental dose-optimization program which includes automated exposure control, adjustment of the mA and/or kV. Fluoro time: 0.0 Radiation dose: 0.1 mGy air Kerma.  SEDATION: Moderate sedation was administered. 2 milligram of Versed and 100 micrograms of fentanyl IV was used for moderate sedation. Total intra service time of sedation was 20 minutes. The sedation was administered and the patient's vital signs monitored throughout the procedure and recorded in the patient's medical record by the nurse under my direct supervision.  Anesthesia: Lidocaine 1% with epinephrine, local infiltration Medicines: None      Estimated blood loss:  < 5 cc.        Technique: A thorough discussion of the risks, benefits, and alternatives of the procedure, and if applicable, moderate sedation, was carried out with the  patient. They were encouraged to ask any questions. Any questions were answered. They verbalized understanding. A written informed consent was then signed.  A multi-component timeout was performed prior to starting the procedure using the departmental protocol. The procedure room personnel used personal protective equipment. The operators used sterile gloves and if indicated, sterile gowns. The surgical site was prepped with chlorhexidine gluconate  and draped in the maximal applicable sterile fashion. A preliminary ultrasonogram was performed of the neck that revealed a patent and compressible internal jugular vein. Pertinent ultrasound images were stored in the PACS for documentation. Using aseptic precautions, real-time ultrasound guidance, the internal jugular vein was accessed with a micropuncture needle after local anesthetic infiltration. A 018 guidewire was advanced into the central venous system under fluoroscopic guidance. Over the wire a micropuncture sheath was placed. Through the micropuncture sheath, a 035 wire was advanced into the venous system under fluoroscopic guidance. Over the wire a peel-away sheath was placed. After local anesthesia, using sharp and blunt dissection, a reservoir pocket of appropriate size was created in the infra clavicular chest wall. The port catheter was connected to the port reservoir. The catheter was tunneled to the venotomy site using a  tunneling device. The the reservoir was placed in the reservoir pocket. The catheter was trimmed to an appropriate length. The catheter was advanced into the venous system through the peel-away sheath which was removed. The port aspirated and flushed well and was terminally packed with heparin 100 units per cc, total 500 units. The port reservoir was irrigated with saline. The incision was closed in layers using absorbable suture and Dermabond. The venotomy site was closed using Dermabond. An aseptic dressing was applied using the  protocol for Dermabond. The patient was transferred to the recovery area and was discharged from the department in stable condition.  Complications: None immediate.    Findings: Patent and compressible internal jugular vein. Final image shows the ale catheter to be in good position with the catheter tip at the cavoatrial junction/right atrium, an excellent position for use. There is no immediate complication.      Impression:    Successful ultrasound and fluoroscopic guided right internal jugular vein route single lumen Port-A-Cath placement as described above. Thank you for the opportunity to assist in the care of your patient. Electronically Signed: Rajat Bhandari MD  12/26/2023 3:38 PM EST  Workstation ID: [id number]    US Paracentesis    Result Date: 12/26/2023  US PARACENTESIS  History: ascites/ metastatic pancreatic cancer  : Rajat Bhandari MD.  Modality: Ultrasonography                   Sedation: None. Anesthesia: Lidocaine 1% local infiltration. Estimated blood loss:  0 cc.        Technique: A thorough discussion of the risks, benefits, and alternatives of the procedure, and if applicable, moderate sedation, was carried out with the patient. They were encouraged to ask any questions. Any questions were answered. They verbalized understanding. A written informed consent was then signed.  A multi-component timeout was performed prior to starting the procedure using the departmental protocol. The procedure room personnel used personal protective equipment. The operators used sterile gloves and if indicated, sterile gowns. The surgical site was prepped with chlorhexidine gluconate  and draped in the maximal applicable sterile fashion. A preliminary ultrasonography was performed to assess the target and determine a safe access site. It showed ascites. Pertinent ultrasound images were stored to the PACS for documentation. The access site was sterilely prepped and draped. Local anesthesia was  administered. A dermatotomy was performed if needed. A catheter over the needle system was advanced into the peritoneal cavity. Straw colored fluid was aspirated and the plastic catheter advanced into the peritoneum. The catheter was then connected to a fluid recovery system. At the end of the procedure, the catheter was withdrawn and an aseptic dressing applied. The patient tolerated the procedure well. After uneventful recovery recovery, the patient was discharged from the department in stable condition. The total amount of fluid recovered is given below. Albumin was infused intravenously if ordered by the referring doctor. Complications: None immediate. Specimen: The specimen was labeled and sent to the lab in appropriate carriers & containers if such was requested by the ordering provider.     Impression:                                                              Successful ultrasound-guided paracentesis using a Right upper quadrant access with recovery of 3 liters of fluid as described above. Please note that the patient is developing septated loculated ascites. Thank you for the opportunity to assist in the care of your patient. Electronically Signed: Rajat Bhandari MD  12/26/2023 3:37 PM EST  Workstation ID: [id number]    CT Abdomen Pelvis With Contrast    Result Date: 12/25/2023  CT ABDOMEN PELVIS W CONTRAST Date of Exam: 12/25/2023 4:31 PM EST Indication: history pancreatic cancer with constipation, N/V. Comparison: 12/7/2023 and prior Technique: Axial CT images were obtained of the abdomen and pelvis following the uneventful intravenous administration of 85 mL Isovue-300. Reconstructed coronal and sagittal images were also obtained. Automated exposure control and iterative construction methods were used. FINDINGS: Abdomen/Pelvis: Lower Chest: Small bilateral pleural effusions are noted with associated dependent opacity at the lung bases. No free air noted below the diaphragm. Organs: Patient is status post  cholecystectomy. Low-attenuation foci within the liver appear grossly stable given limitations of current exam secondary to motion and streak artifact from patient's upper extremities. The kidneys, spleen and adrenal glands  are unremarkable. The pancreas demonstrates atrophy and prominence of the main duct which is poorly visualized on current study. Mass seen previous study is not as well visualized on current study. Omental and peritoneal carcinomatosis again noted. Narrowing of the celiac axis and portal vein are better seen on previous study. GI/Bowel: There is a moderate-sized hiatal hernia. The stomach demonstrates no acute abnormality. Small bowel demonstrates no obstruction. There is a heavy stool burden. No focal inflammatory changes of the GI tract noted. Moderate to large degree of ascites noted. Findings are similar to the prior study. Scattered mesenteric nodularity densities compatible adenopathy. This is similar to the previous study. Underlying mesenteric implants in the right lower quadrant again suspected Pelvis: The uterus is unremarkable. Ovaries are not well visualized. Urinary bladder is incompletely distended and grossly unremarkable Peritoneum/Retroperitoneum: The aorta is normal in caliber. There is no suspicious retroperitoneal adenopathy Bones/Soft Tissues: No destructive bone lesion     1. Known pancreatic mass with atrophy of the distal body and tail of the pancreas and dilation of the pancreatic duct is not as well visualized due to technical limitations and artifact on the current study. 2. Peritoneal and omental carcinomatosis and ascites again noted. 3. Heavy stool burden with a nonobstructing pattern 4. Bilateral pleural effusions and dependent consolidation likely atelectasis 5. Moderate size hiatal hernia. 6. Other findings as above Electronically Signed: Jayy Lewis MD  12/25/2023 4:59 PM EST  Workstation ID: [id number]     Paracentesis    Result Date: 12/22/2023    PARACENTESIS Date of Exam: 12/22/2023 4:32 PM EST Indication: ascites. Peritoneal carcinomatosis Comparison: None available. Technique: The procedure, risks and options were discussed with the patient and informed written consent was obtained. Ultrasound was performed of the abdomen and a site was chosen over the right side of the abdomen. The skin was marked prepped draped and anesthetized 1% Xylocaine. A 6 Pashto catheter was inserted by trocar technique under local anesthesia. A total of 4.7 L of fluid was removed and discarded. Sterile dressings were applied and the patient was returned to the emergency department following the procedure.     Impression: Successful ultrasound-guided paracentesis with removal of 4.7 L of fluid Electronically Signed: Flakito Andino MD  12/22/2023 4:46 PM EST  Workstation ID: [id number]    CT Abdomen Pelvis With Contrast    Addendum Date: 12/19/2023    ADDENDUM #1 The exam was compared to a CT abdomen pelvis dated 12/7/2023 which has improved contrast opacification. At the body of the pancreas at the level of the abrupt termination of the main pancreatic duct dilatation there is evidence of a pancreatic mass measuring approximately 2.4 x 2.3 cm with soft tissue extending to the level of the celiac axis bifurcation encasing the splenic artery and common hepatic artery. There is also a short segment of narrowing of the portal vein . Additional characterization with MRI of the abdomen with and without contrast recommended Electronically Signed: Leighton Spivey MD  12/19/2023 10:25 AM EST  Workstation ID: [id number] ORIGINAL REPORT: CT ABDOMEN PELVIS W CONTRAST Date of Exam: 12/6/2023 12:36 PM EST Indication: abd pain and distension. Comparison: CT abdomen pelvis dated 11/10/2023 Technique: Axial CT images were obtained of the abdomen and pelvis following the uneventful intravenous administration of 83 mL Isovue-300. Reconstructed coronal and sagittal images were also obtained. Automated  exposure control and iterative construction methods were used. Findings: Lung Bases:  There are basilar atelectasis or pneumonia and small bilateral pleural effusions Moderate to large hiatal hernia Liver: The liver shows mild diffuse nodular surface suspicious for cirrhosis few small round hypodense nonenhancing liver lesions compatible with cysts. There is moderate ascites Biliary/Gallbladder: Cholecystectomy changes with postcholecystectomy dilatation of the common bile duct Spleen: Punctate calcification in the spleen. The spleen does not appear enlarged Pancreas: Diffuse pancreatic atrophy. There is dilatation of the pancreatic duct in particular along the body and tail of the pancreas. No definite pancreatic lesions or peripancreatic inflammatory changes Kidneys: Kidneys are normal in size. There are no stones or hydronephrosis. Adrenals: Adrenal glands are unremarkable. Retroperitoneal/Lymph Nodes/Vasculature: There are multiple predominately subcentimeter mesenteric and retroperitoneal lymph nodes Gastrointestinal/Mesentery: The small bowel loops are normal in caliber. The large bowel loops are normal in caliber. There is similar reticulosis Bladder: The bladder is unremarkable   Bony Structures:  Visualized bony structures are consistent with the patient's age. Impression: 1. Bibasilar atelectasis or pneumonia and small bilateral pleural effusions 2. Moderate hiatal hernia 3. Cirrhotic appearing liver. There are few small hypodense liver lesions appear to represent cysts but few of them too small to characterize. 4. Moderate ascites increased as compared to the previous exam 5. Diffuse pancreatic atrophy. Diffusely nonspecific dilated pancreatic duct along the body and tail of the pancreas. Clinical correlation and follow-up recommended Electronically Signed: Leighton Spivey MD  12/6/2023 1:16 PM EST  Workstation ID: [id number]    Result Date: 12/19/2023  CT ABDOMEN PELVIS W CONTRAST Date of Exam: 12/6/2023  12:36 PM EST Indication: abd pain and distension. Comparison: CT abdomen pelvis dated 11/10/2023 Technique: Axial CT images were obtained of the abdomen and pelvis following the uneventful intravenous administration of 83 mL Isovue-300. Reconstructed coronal and sagittal images were also obtained. Automated exposure control and iterative construction methods were used. Findings: Lung Bases:  There are basilar atelectasis or pneumonia and small bilateral pleural effusions Moderate to large hiatal hernia Liver: The liver shows mild diffuse nodular surface suspicious for cirrhosis few small round hypodense nonenhancing liver lesions compatible with cysts. There is moderate ascites Biliary/Gallbladder: Cholecystectomy changes with postcholecystectomy dilatation of the common bile duct Spleen: Punctate calcification in the spleen. The spleen does not appear enlarged Pancreas: Diffuse pancreatic atrophy. There is dilatation of the pancreatic duct in particular along the body and tail of the pancreas. No definite pancreatic lesions or peripancreatic inflammatory changes Kidneys: Kidneys are normal in size. There are no stones or hydronephrosis. Adrenals: Adrenal glands are unremarkable. Retroperitoneal/Lymph Nodes/Vasculature: There are multiple predominately subcentimeter mesenteric and retroperitoneal lymph nodes Gastrointestinal/Mesentery: The small bowel loops are normal in caliber. The large bowel loops are normal in caliber. There is similar reticulosis Bladder: The bladder is unremarkable   Bony Structures:  Visualized bony structures are consistent with the patient's age.     Impression: 1. Bibasilar atelectasis or pneumonia and small bilateral pleural effusions 2. Moderate hiatal hernia 3. Cirrhotic appearing liver. There are few small hypodense liver lesions appear to represent cysts but few of them too small to characterize. 4. Moderate ascites increased as compared to the previous exam 5. Diffuse pancreatic  atrophy. Diffusely nonspecific dilated pancreatic duct along the body and tail of the pancreas. Clinical correlation and follow-up recommended Electronically Signed: Leighton Spivey MD  12/6/2023 1:16 PM EST  Workstation ID: [id number]    XR Abdomen KUB    Result Date: 12/15/2023  XR ABDOMEN KUB Date of Exam: 12/15/2023 5:20 AM EST Indication: abd pain; constipation Comparison: CT abdomen and pelvis 12/7/2023. Findings: The patient is status post cholecystectomy. There are phleboliths in the pelvis. There is no distended bowel. There is normal colonic stool and gas. No definite pathologic abdominal calcification. No pneumoperitoneum. There is left basilar atelectasis. No acute osseous abnormality.     Impression: No acute abdominal abnormality. Electronically Signed: Ildefonso Newman MD  12/15/2023 5:38 AM EST  Workstation ID: [id number]    XR Chest 1 View    Result Date: 12/14/2023  XR CHEST 1 VW Date of Exam: 12/14/2023 10:02 PM EST Indication: leukocytosis Comparison: Chest CT 6[address] 23. Chest radiograph 12/7/2023. Findings: Improved inspiratory effort compared to prior. Hazy appearance of the lung bases which could reflect persistent small layering effusions. Hiatal hernia noted. Cardiomediastinal silhouette unchanged. No new lobar infiltrate, worsening edema or pneumothorax. Degenerative osseous changes noted.     Impression: Improved inspiration. Questionable residual layering effusions, otherwise no new acute finding. Electronically Signed: Marco A Jane MD  12/14/2023 10:26 PM EST  Workstation ID: [id number]    US Paracentesis    Result Date: 12/14/2023  US PARACENTESIS  History: malignant ascites  : Rajat Bhandari MD.  Modality: Ultrasonography                   Sedation: None. Anesthesia: Lidocaine 1% local infiltration. Estimated blood loss:  0 cc.        Technique: A thorough discussion of the risks, benefits, and alternatives of the procedure, and if applicable, moderate sedation, was carried  out with the patient. They were encouraged to ask any questions. Any questions were answered. They verbalized understanding. A written informed consent was then signed.  A multi-component timeout was performed prior to starting the procedure using the departmental protocol. The procedure room personnel used personal protective equipment. The operators used sterile gloves and if indicated, sterile gowns. The surgical site was prepped with chlorhexidine gluconate  and draped in the maximal applicable sterile fashion. A preliminary ultrasonography was performed to assess the target and determine a safe access site. It showed ascites. Pertinent ultrasound images were stored to the PACS for documentation. The access site was sterilely prepped and draped. Local anesthesia was administered. A dermatotomy was performed if needed. A catheter over the needle system was advanced into the peritoneal cavity. Straw colored fluid was aspirated and the plastic catheter advanced into the peritoneum. The catheter was then connected to a fluid recovery system. At the end of the procedure, the catheter was withdrawn and an aseptic dressing applied. The patient tolerated the procedure well. After uneventful recovery recovery, the patient was discharged from the department in stable condition. The total amount of fluid recovered is given below. Albumin was infused intravenously if ordered by the referring doctor. Complications: None immediate. Specimen: The specimen was labeled and sent to the lab in appropriate carriers & containers if such was requested by the ordering provider.     Impression:                                                              Successful ultrasound-guided paracentesis using a left lower quadrant access with recovery of 1.6 liters of fluid as described above. Thank you for the opportunity to assist in the care of your patient. Electronically Signed: Rajat Bhandari MD  12/14/2023 12:38 PM EST  Workstation ID:  [id number]    MRI Brain With & Without Contrast    Result Date: 12/9/2023  MRI BRAIN W WO CONTRAST Date of Exam: 12/9/2023 3:51 AM EST Indication: metastatic cancer staging.  Comparison: None available. Technique:  Routine multiplanar/multisequence sequence images of the brain were obtained before and after the uneventful administration of 20 mL Multihance. Findings: There is no diffusion restriction to suggest acute infarct. There is no evidence of acute or chronic intracranial hemorrhage.  No mass effect or midline shift. No abnormal extra-axial collections. There are numerous scattered small rounded and punctate foci of FLAIR hyperintense signal within the cerebral white matter. The major vascular flow voids appear intact.  The basal ganglia, brainstem and cerebellum appear within normal limits.  Calvarial and superficial soft tissue signal is within normal limits. There is asymmetric thinning of the right orbital lens suggesting prior cataract surgery. The paranasal sinuses and the mastoid air cells appear well aerated.  Midline structures are intact.  There is no abnormal intracranial enhancement.  There is normal enhancement within the intracranial vasculature including the dural venous sinuses.     Impression: 1.No acute intracranial abnormality. No evidence of intracranial metastatic disease. 2.Mild chronic small vessel ischemic change. Electronically Signed: Ildefonso Newman MD  12/9/2023 5:05 AM EST  Workstation ID: [id number]    XR Orbits 4+ View    Result Date: 12/9/2023  XR ORBITS 4+ VW Date of Exam: 12/9/2023 3:27 AM EST Indication: mri clearance Comparison: None available. Findings: Orbits appear within normal limits. There is metallic dental restoration hardware. No unexpected metal artifact. Facial bones appear grossly intact.     Impression: No contraindication to MRI. Electronically Signed: Ildefonso Newman MD  12/9/2023 3:45 AM EST  Workstation ID: [id number]    US Paracentesis    Result Date:  12/8/2023  DATE OF EXAM: 12/8/2023 8:59 AM EST PROCEDURE: US PARACENTESIS INDICATIONS: large volume ascites COMPARISON: No Comparisons Available FLUOROSCOPIC TIME: None PHYSICIAN MONITORED CONSCIOUS SEDATION TIME: None minutes TECHNIQUE: A detailed explanation of the procedure, including the risks, benefits, and alternatives was provided. A preprocedure timeout was performed. The interventional radiology nurse monitored the patient at all times. Limited ultrasound of the abdomen demonstrated a moderate amount of ascites. An appropriate access site was selected and the area was prepped and draped in the usual sterile fashion. The skin was anesthetized with 1% lidocaine and a small skin incision was made. Next, a catheter was advanced into the ascitic fluid. A total of 4.2 L of clear fluid was removed and the catheter was removed. The patient tolerated the procedure well without immediate complication. FINDINGS: See above     1. Successful right lower quadrant paracentesis yielding 4.2 L of clear fluid Electronically Signed: Hesham Eastman MD  12/8/2023 12:41 PM EST  Workstation ID: [id number]    US Pelvis Complete    Result Date: 12/8/2023  US PELVIS COMPLETE Date of Exam: 12/8/2023 9:50 AM CST Indication: r/o right adnexal mass. Comparison: CT abdomen and pelvis 12/7/2023 Technique: Transabdominal ultrasound evaluation of the pelvis was performed utilizing grayscale and color Doppler technique.  Doppler spectral analysis was performed. Transvaginal exam unable to be performed. Findings: The uterus measures 6.0 x 2.7 x 4.6 cm. The uterus is mildly heterogeneous without focal parenchymal abnormality. The endometrial stripe measures 0.3 mm. The right ovary measures 3.2 x 3.1 x 2.1 cm and the left ovary measures 2.5 x 1.8 x 1.9 cm. Ovarian vascularity appears intact.  There is no evidence of a solid ovarian mass. There is no significant free fluid identified within the pelvis. No definite adnexal abnormality noted however  exam is limited due to bowel gas.     Impression: Limited exam without identifiable adnexal mass on this study. Electronically Signed: Vy Gomez MD  12/8/2023 10:37 AM CST  Workstation ID: [id number]    CT Abdomen Pelvis With Contrast    Result Date: 12/7/2023  CT ABDOMEN PELVIS W CONTRAST Date of Exam: 12/7/2023 9:26 PM EST Indication: worsening abdominal pain and swelling. Comparison: CT abdomen pelvis 12/6/2023 Technique: Axial CT images were obtained of the abdomen and pelvis following the uneventful intravenous administration of 90 mL Isovue 370. Reconstructed coronal and sagittal images were also obtained. Automated exposure control and iterative construction methods were used. Findings: Please see separate dictated chest CT for findings above the diaphragm. Indeterminate right hepatic lobe lesion measuring 9 mm (5/41). Left hepatic lobe lesion too small to characterize measuring 8 mm (5/32). The spleen is normal in size. Gallbladder absent. The adrenal glands are normal. There is atrophy of the pancreatic tail with dilatation of the main pancreatic duct which is dilated up to 8 mm (2/27). There is abrupt termination of the main pancreatic duct at the mid pancreatic body with hypoenhancing pancreatic mass measuring approximately 2.4 x 2.3 cm (2/47). Abnormal soft tissue extends from this mass proximally to the level of the celiac axis bifurcation encasing splenic artery and common hepatic artery (2/46). There is severe short segment narrowing of the portal vein (2/50) with portal vein encasement. Splenic vein is poorly visualized but suspected narrowed in the region of the mass. The SMV is patent. No abutment of the superior mesenteric artery. Kidneys are symmetrically enhancing. Negative for hydronephrosis or hydroureter. Urinary bladder is collapsed. Uterus and left adnexa are without acute abnormality. Nodularity adjacent to the right ovary which appears secondary to collapsed adjacent bowel loops  (2/124). Large hiatal hernia. Large volume ascites. There is diffuse infiltrative appearance of the omentum most consistent with peritoneal and omental carcinomatosis. Multiple areas of peritoneal nodularity noted for example in the right lower quadrant peritoneal nodule measuring 8 mm (2/136). Peritoneal nodularity within the left lower pelvis (2/149).. Nodularity involving the anterior abdominal wall which measures 1.8 x 1.8 cm (2/105) concerning for metastatic peritoneal involvement extending to the rectus muscle on the right. Scattered irregular areas of nodularity are noted in the mesentery concerning for metastasis for example nodule measuring 1.3 x 1.3 cm likely carcinomatosis and/or metastatic adenopathy (2/112). Gastrohepatic ligament node measures up to 10 mm (2/27). There is fluid-filled distention of the colon. Colonic diverticulosis without diverticulitis. The appendix is nonvisualized. Abdominal aortic branch vasculature patent. No aggressive osseous lesion or acute fracture. Delayed images demonstrate normal contrast excretion into the nondilated kidneys. Normal filling of the bladder. No acute fracture or aggressive osseous lesion.     Impression: 1. Mass involving the mid body of pancreas measuring 2.4 cm highly concerning for pancreatic adenocarcinoma. This results in severe narrowing of the portal vein with encasement of the celiac axis bifurcation as above. 2. Peritoneal and omental carcinomatosis with large volume ascites. 3. Several small indeterminate low-attenuation liver lesions which could relate to metastasis, nonspecific, small size limits assessment. These could be better assessed with MRI follow-up if clinically indicated. 4. Nodularity adjacent to right ovary appears secondary to adjacent bowel loops which are underdistended, follow-up pelvic ultrasound may be helpful to exclude right adnexal mass as large ascites limits assessment. 5. See separately dictated chest CT for findings  above the diaphragm. Electronically Signed: Fransico Ramírez MD  12/7/2023 10:52 PM EST  Workstation ID: [id number]    CT Angiogram Chest Pulmonary Embolism    Result Date: 12/7/2023  CT ANGIOGRAM CHEST PULMONARY EMBOLISM Date of Exam: 12/7/2023 9:26 PM EST Indication: soa. Comparison: Chest radiograph 12/7/2023, CT abdomen pelvis 12/6/2023 Technique: Axial CT images were obtained of the chest after the uneventful intravenous administration of 90 mL Isovue 370 utilizing pulmonary embolism protocol.  Reconstructed coronal and sagittal images were also obtained. Automated exposure control and  iterative construction methods were used. Findings: Soft tissue of the lower neck are without acute abnormality. The aortic arch branch vasculature is patent. There is fluid-filled distention of the esophagus. There is a large hiatal hernia again noted. Heart size normal. Pulmonary arteries are well-opacified with contrast. Negative for pulmonary embolus. No mediastinal or hilar adenopathy. No axillary adenopathy. Small bilateral pleural effusions with bibasilar atelectasis. Trachea and mainstem bronchi are patent. No pneumothorax. Several small bilateral pulmonary nodules are nonspecific for example in the right upper lobe measuring 5mm (6/25). Within the right upper lobe measuring 3 mm (6/22). Within the left upper lobe measuring 3 mm (6/46). Several other small to 3 mm nodules are noted bilaterally. Linear atelectasis at the lingula. Thoracic vertebral bodies are maintained in height. No aggressive osseous lesion or fracture. Please see separately dictated abdominal CT for findings below the diaphragm.     Impression: 1. Negative for pulmonary embolus. 2. Increase in size of small bilateral pleural effusions with lower lobe airspace disease likely atelectasis. 3. Multiple small nonspecific pulmonary nodules most pronounced in the right upper lobe measuring up to 5 mm, recommend attention on follow-up imaging, these could be  reassessed in 6 months. 4. Large hiatal hernia with fluid-filled distention of esophagus which may relate to delayed transit or reflux, the amount of fluid in the esophagus places patient at risk for aspiration. 5. Please see separately dictated abdominal CT for findings below the diaphragm. Electronically Signed: Fransico Ramírez MD  12/7/2023 10:17 PM EST  Workstation ID: [id number]    XR Chest 1 View    Result Date: 12/7/2023  XR CHEST 1 VW Date of Exam: 12/7/2023 7:56 PM EST Indication: Chest Pain Triage Protocol Comparison: None available. Findings: The lungs are poorly inflated with bibasilar atelectasis particularly on the left. Small bilateral pleural effusions are not excluded. The remainder of the lungs are clear cardiac, hilar, and mediastinal silhouettes are within normal limits. No pneumothorax. The trachea is midline. Pulmonary vascularity is normal. Visualized bony structures are intact.     Impression: Poor inspiration with bibasilar atelectasis and possible small bilateral pleural effusions. Electronically Signed: Danilo Yip DO  12/7/2023 8:20 PM EST  Workstation ID: [id number]    ASSESSMENT/PLAN:  Pancreatic mass  2.  Peritoneal/omental implants  3.  Indeterminate lung nodules  4.  Indeterminate liver hypodensities  5.  Malignant Ascites  -We discussed the clinical impression of metastatic malignancy. Cytology confirms malignant ascites with upper GI/pancreatobiliary source favored. EGD with pancreatic EUS/biopsy to with difficult to access pancreatic mass and atypical cells.  No other candidate primary site identified on upper endoscopy.  Planning palliative chemotherapy with FOLFIRINOX first-line.  She was scheduled to initiate this as an outpatient today.  However she returned in the interim with recurrent malignant ascites.  We discussed the option of peritoneal drain placement, but given plan for chemotherapy initiation she would like to defer this to see if her symptoms and paracentesis needs  decrease with therapy initiation.  -CBC and CMP reviewed and adequate to proceed with cycle # 1.  -Hospitalist team is managing her constipation.  Assuming she does have a bowel movement later today, would recommend initiating inpatient chemotherapy to avoid further treatment initiation delays.    6. Access  Port    7. Constipation  -Status post MiraLAX.  Planning laxative this morning and also has a suppository ordered for later today.  After she has a bowel movement we will plan to initiate chemotherapy.    Sherri العراقي MD    12/27/2023

## 2023-12-27 NOTE — PLAN OF CARE
Goal Outcome Evaluation:  Plan of Care Reviewed With: patient        Progress: improving  Outcome Evaluation: Paracentesis and port placement completed. No co nausea this evening. Now co back pain left lower side. States pain meds are helping. Tolerating small amount po intake. No BM today.

## 2023-12-27 NOTE — PLAN OF CARE
Goal Outcome Evaluation:  Plan of Care Reviewed With: patient        Progress: improving  Outcome Evaluation: pt rested well last night, prn meds for pain and anxiety. vss, up ad mimi

## 2023-12-27 NOTE — PROGRESS NOTES
[facility] Medicine Services  PROGRESS NOTE    Patient Name: Jessica Kraft  : 1960  MRN: [id number]    Date of Admission: 2023  Primary Care Physician: Leonarda Díaz MD    Subjective   Subjective     CC:  Abd pain    HPI:  Still no BM yet despite having had Lactulose. Later notified by nursing that even Fleets enema had not resulted in BM and patient was very anxious.       Objective   Objective     Vital Signs:   Temp:  [97.6 °F (36.4 °C)-98.1 °F (36.7 °C)] 98.1 °F (36.7 °C)  Heart Rate:  [] 91  Resp:  [15-20] 18  BP: (128-147)/(80-89) 138/85  Flow (L/min):  [2-3] 3     Physical Exam:  Constitutional: Awake, alert, sitting in bedside chair in NAD  HENT: NCAT, mucous membranes moist  Respiratory: Clear to auscultation bilaterally, respiratory effort normal   Cardiovascular: RRR, palpable radial pulse  Gastrointestinal: Positive bowel sounds, soft, moderately distended, BS present  Musculoskeletal: BL LE edema  Psychiatric: Appropriate affect, cooperative  Neurologic: Speech clear and fluent    Results Reviewed:  LAB RESULTS:      Lab 23  0544 23  1535 23  1251   WBC 14.29* 11.46* 13.80*   HEMOGLOBIN 13.2 14.0 13.3   HEMATOCRIT 42.1 44.8 41.9   PLATELETS 578* 589* 476*   NEUTROS ABS 11.91* 8.76* 11.05*   IMMATURE GRANS (ABS) 0.09* 0.14* 0.15*   LYMPHS ABS 1.18 1.47 1.23   MONOS ABS 0.95* 0.87 0.96*   EOS ABS 0.10 0.16 0.34   MCV 87.2 87.3 87.7   LACTATE  --  2.0 1.4   PROTIME  --   --  13.5   APTT  --   --  29.1*         Lab 23  0544 23  1535 23  1251   SODIUM 134* 133* 133*   POTASSIUM 4.8 3.7 4.1   CHLORIDE 97* 94* 96*   CO2 26.0 28.0 26.0   ANION GAP 11.0 11.0 11.0   BUN 15 17 13   CREATININE 0.70 0.71 0.72   EGFR 97.9 96.3 94.7   GLUCOSE 125* 117* 122*   CALCIUM 8.5* 9.1 8.8   MAGNESIUM 1.8  --   --          Lab 23  0544 23  1535 23  1251   TOTAL PROTEIN 6.2 6.8 6.8   ALBUMIN 2.8* 3.2* 3.1*   GLOBULIN 3.4 3.6  3.7   ALT (SGPT) 11 12 18   AST (SGOT) 22 15 27   BILIRUBIN 0.4 0.3 0.3   ALK PHOS 224* 231* 218*   LIPASE  --  12* 13         Lab 12/22/23  1251   PROTIME 13.5   INR 1.02                 Brief Urine Lab Results  (Last result in the past 365 days)        Color   Clarity   Blood   Leuk Est   Nitrite   Protein   CREAT   Urine HCG        12/25/23 1654 Yellow   Clear   Negative   Negative   Negative   Trace                   Microbiology Results Abnormal       None            IR Port Placement    Result Date: 12/26/2023  IR PORT PLACEMENT  History: starting chemo 12/27  : Rajat Bhandari MD.  Modality: Sonography and fluoroscopy  DOSE REDUCTION: The examination was performed according to departmental dose-optimization program which includes automated exposure control, adjustment of the mA and/or kV. Fluoro time: 0.0 Radiation dose: 0.1 mGy air Kerma.  SEDATION: Moderate sedation was administered. 2 milligram of Versed and 100 micrograms of fentanyl IV was used for moderate sedation. Total intra service time of sedation was 20 minutes. The sedation was administered and the patient's vital signs monitored throughout the procedure and recorded in the patient's medical record by the nurse under my direct supervision.  Anesthesia: Lidocaine 1% with epinephrine, local infiltration Medicines: None      Estimated blood loss:  < 5 cc.        Technique: A thorough discussion of the risks, benefits, and alternatives of the procedure, and if applicable, moderate sedation, was carried out with the patient. They were encouraged to ask any questions. Any questions were answered. They verbalized understanding. A written informed consent was then signed.  A multi-component timeout was performed prior to starting the procedure using the departmental protocol. The procedure room personnel used personal protective equipment. The operators used sterile gloves and if indicated, sterile gowns. The surgical site was prepped with  chlorhexidine gluconate  and draped in the maximal applicable sterile fashion. A preliminary ultrasonogram was performed of the neck that revealed a patent and compressible internal jugular vein. Pertinent ultrasound images were stored in the PACS for documentation. Using aseptic precautions, real-time ultrasound guidance, the internal jugular vein was accessed with a micropuncture needle after local anesthetic infiltration. A 018 guidewire was advanced into the central venous system under fluoroscopic guidance. Over the wire a micropuncture sheath was placed. Through the micropuncture sheath, a 035 wire was advanced into the venous system under fluoroscopic guidance. Over the wire a peel-away sheath was placed. After local anesthesia, using sharp and blunt dissection, a reservoir pocket of appropriate size was created in the infra clavicular chest wall. The port catheter was connected to the port reservoir. The catheter was tunneled to the venotomy site using a  tunneling device. The the reservoir was placed in the reservoir pocket. The catheter was trimmed to an appropriate length. The catheter was advanced into the venous system through the peel-away sheath which was removed. The port aspirated and flushed well and was terminally packed with heparin 100 units per cc, total 500 units. The port reservoir was irrigated with saline. The incision was closed in layers using absorbable suture and Dermabond. The venotomy site was closed using Dermabond. An aseptic dressing was applied using the protocol for Dermabond. The patient was transferred to the recovery area and was discharged from the department in stable condition.  Complications: None immediate.    Findings: Patent and compressible internal jugular vein. Final image shows the ale catheter to be in good position with the catheter tip at the cavoatrial junction/right atrium, an excellent position for use. There is no immediate complication.      Impression:  Impression:    Successful ultrasound and fluoroscopic guided right internal jugular vein route single lumen Port-A-Cath placement as described above. Thank you for the opportunity to assist in the care of your patient. Electronically Signed: Rajat Bhandari MD  12/26/2023 3:38 PM EST  Workstation ID: [id number]    US Paracentesis    Result Date: 12/26/2023  US PARACENTESIS  History: ascites/ metastatic pancreatic cancer  : Rajat Bhandari MD.  Modality: Ultrasonography                   Sedation: None. Anesthesia: Lidocaine 1% local infiltration. Estimated blood loss:  0 cc.        Technique: A thorough discussion of the risks, benefits, and alternatives of the procedure, and if applicable, moderate sedation, was carried out with the patient. They were encouraged to ask any questions. Any questions were answered. They verbalized understanding. A written informed consent was then signed.  A multi-component timeout was performed prior to starting the procedure using the departmental protocol. The procedure room personnel used personal protective equipment. The operators used sterile gloves and if indicated, sterile gowns. The surgical site was prepped with chlorhexidine gluconate  and draped in the maximal applicable sterile fashion. A preliminary ultrasonography was performed to assess the target and determine a safe access site. It showed ascites. Pertinent ultrasound images were stored to the PACS for documentation. The access site was sterilely prepped and draped. Local anesthesia was administered. A dermatotomy was performed if needed. A catheter over the needle system was advanced into the peritoneal cavity. Straw colored fluid was aspirated and the plastic catheter advanced into the peritoneum. The catheter was then connected to a fluid recovery system. At the end of the procedure, the catheter was withdrawn and an aseptic dressing applied. The patient tolerated the procedure well. After uneventful  recovery recovery, the patient was discharged from the department in stable condition. The total amount of fluid recovered is given below. Albumin was infused intravenously if ordered by the referring doctor. Complications: None immediate. Specimen: The specimen was labeled and sent to the lab in appropriate carriers & containers if such was requested by the ordering provider.     Impression: Impression:                                                              Successful ultrasound-guided paracentesis using a Right upper quadrant access with recovery of 3 liters of fluid as described above. Please note that the patient is developing septated loculated ascites. Thank you for the opportunity to assist in the care of your patient. Electronically Signed: Rajat Bhandari MD  12/26/2023 3:37 PM EST  Workstation ID: [id number]    CT Abdomen Pelvis With Contrast    Result Date: 12/25/2023  CT ABDOMEN PELVIS W CONTRAST Date of Exam: 12/25/2023 4:31 PM EST Indication: history pancreatic cancer with constipation, N/V. Comparison: 12/7/2023 and prior Technique: Axial CT images were obtained of the abdomen and pelvis following the uneventful intravenous administration of 85 mL Isovue-300. Reconstructed coronal and sagittal images were also obtained. Automated exposure control and iterative construction methods were used. FINDINGS: Abdomen/Pelvis: Lower Chest: Small bilateral pleural effusions are noted with associated dependent opacity at the lung bases. No free air noted below the diaphragm. Organs: Patient is status post cholecystectomy. Low-attenuation foci within the liver appear grossly stable given limitations of current exam secondary to motion and streak artifact from patient's upper extremities. The kidneys, spleen and adrenal glands  are unremarkable. The pancreas demonstrates atrophy and prominence of the main duct which is poorly visualized on current study. Mass seen previous study is not as well visualized on  current study. Omental and peritoneal carcinomatosis again noted. Narrowing of the celiac axis and portal vein are better seen on previous study. GI/Bowel: There is a moderate-sized hiatal hernia. The stomach demonstrates no acute abnormality. Small bowel demonstrates no obstruction. There is a heavy stool burden. No focal inflammatory changes of the GI tract noted. Moderate to large degree of ascites noted. Findings are similar to the prior study. Scattered mesenteric nodularity densities compatible adenopathy. This is similar to the previous study. Underlying mesenteric implants in the right lower quadrant again suspected Pelvis: The uterus is unremarkable. Ovaries are not well visualized. Urinary bladder is incompletely distended and grossly unremarkable Peritoneum/Retroperitoneum: The aorta is normal in caliber. There is no suspicious retroperitoneal adenopathy Bones/Soft Tissues: No destructive bone lesion     Impression: 1. Known pancreatic mass with atrophy of the distal body and tail of the pancreas and dilation of the pancreatic duct is not as well visualized due to technical limitations and artifact on the current study. 2. Peritoneal and omental carcinomatosis and ascites again noted. 3. Heavy stool burden with a nonobstructing pattern 4. Bilateral pleural effusions and dependent consolidation likely atelectasis 5. Moderate size hiatal hernia. 6. Other findings as above Electronically Signed: Jayy Lewis MD  12/25/2023 4:59 PM EST  Workstation ID: [id number]         Current medications:  Scheduled Meds:levothyroxine, 125 mcg, Oral, QAM  Naloxegol Oxalate, 25 mg, Oral, QAM  pantoprazole, 40 mg, Oral, Q AM  senna-docusate sodium, 2 tablet, Oral, BID   And  polyethylene glycol, 17 g, Oral, BID  rosuvastatin, 5 mg, Oral, Nightly  sodium chloride, 10 mL, Intravenous, Q12H  venlafaxine XR, 150 mg, Oral, Daily      Continuous Infusions:     PRN Meds:.  acetaminophen    ALPRAZolam    senna-docusate sodium  **AND** polyethylene glycol **AND** bisacodyl **AND** bisacodyl    Calcium Replacement - Follow Nurse / BPA Driven Protocol    cyclobenzaprine    HYDROmorphone **AND** naloxone    magnesium hydroxide    Magnesium Standard Dose Replacement - Follow Nurse / BPA Driven Protocol    melatonin    ondansetron    oxyCODONE-acetaminophen    Phosphorus Replacement - Follow Nurse / BPA Driven Protocol    Potassium Replacement - Follow Nurse / BPA Driven Protocol    prochlorperazine    Sodium Chloride (PF)    sodium chloride    sodium chloride    Assessment & Plan   Assessment & Plan     Active Hospital Problems    Diagnosis  POA    **Pancreatic cancer [C25.9]  Yes    Nausea and vomiting [R11.2]  Yes    Constipation [K59.00]  Yes    Neutrophilic leukocytosis [D72.9]  Yes    Thrombocytosis [D75.839]  Yes    GERD without esophagitis [K21.9]  Yes    Hiatal hernia [K44.9]  Yes    Ascites [R18.8]  Yes    Peritoneal carcinomatosis [C78.6]  Yes    Osteopenia [M85.80]  Yes    Insomnia [G47.00]  Yes    Anxiety [F41.9]  Yes    Hypothyroidism due to Hashimoto's thyroiditis [E03.8, E06.3]  Yes    Depression [F32.A]  Yes      Resolved Hospital Problems   No resolved problems to display.        Brief Hospital Course to date:  Jessica Kraft is a 63 y.o. female w/ Hx anxiety, depression, hypothyroidism, GERD, recently diagnosed pancreatic cancer w/ peritoneal carcinomatosis and ascites (recent admit 12/7/23-12/15/23), she was planned for outpatient port placement and chemotherapy initiation; she presented early w/ inc abd pain, distention, vomiting, no BM's in several days; CT imaging showed ascites and large stool burden    The following problems are new to me today    Assessment/Plan    Acute nausea and vomiting  Acute/chronic constipation  Recently Dx pancreatic cancer w/ omental and peritoneal carcinomatosis w/ ascites  -CT abd/pel w/ known malignancy findings, large stool burden without obstructive pattern  -cont symptomatic  care  -palliative consulted  -s/p  IR port placement and paracentesis  -heme/onc consulted on admission, d/w primary oncologist, she will be in today (Dr. العراقي), plans to start chemo possibly while inpatient   -cont increased bowel regimen including Movantik.  Add Lactulose BID x 2 doses. Fleets enema.  If none of the above work, will do either Lactulose enema or hourly Lactulose until BM    Hypothyroidism  -levothyroxine    GERD  Hiatal hernia  -PPI    Anxiety  Depression  -effexor    HLD  -- continue statin       Expected Discharge Location and Transportation: home  Expected Discharge   Expected Discharge Date: 12/29/2023; Expected Discharge Time:      DVT prophylaxis:  Mechanical DVT prophylaxis orders are present.     AM-PAC 6 Clicks Score (PT): 20 (12/26/23 2000)    CODE STATUS:   Code Status and Medical Interventions:   Ordered at: 12/25/23 5238     Level Of Support Discussed With:    Patient     Code Status (Patient has no pulse and is not breathing):    CPR (Attempt to Resuscitate)     Medical Interventions (Patient has pulse or is breathing):    Full Support       Blossom Copeland MD  12/27/23

## 2023-12-28 LAB
ALBUMIN SERPL-MCNC: 2.7 G/DL (ref 3.5–5.2)
ALBUMIN/GLOB SERPL: 0.8 G/DL
ALP SERPL-CCNC: 209 U/L (ref 39–117)
ALT SERPL W P-5'-P-CCNC: 7 U/L (ref 1–33)
ANION GAP SERPL CALCULATED.3IONS-SCNC: 12 MMOL/L (ref 5–15)
AST SERPL-CCNC: 11 U/L (ref 1–32)
BASOPHILS # BLD AUTO: 0.05 10*3/MM3 (ref 0–0.2)
BASOPHILS NFR BLD AUTO: 0.5 % (ref 0–1.5)
BILIRUB SERPL-MCNC: 0.2 MG/DL (ref 0–1.2)
BUN SERPL-MCNC: 15 MG/DL (ref 8–23)
BUN/CREAT SERPL: 24.6 (ref 7–25)
CALCIUM SPEC-SCNC: 8.5 MG/DL (ref 8.6–10.5)
CHLORIDE SERPL-SCNC: 96 MMOL/L (ref 98–107)
CO2 SERPL-SCNC: 26 MMOL/L (ref 22–29)
CREAT SERPL-MCNC: 0.61 MG/DL (ref 0.57–1)
DEPRECATED RDW RBC AUTO: 49.1 FL (ref 37–54)
EGFRCR SERPLBLD CKD-EPI 2021: 101.2 ML/MIN/1.73
EOSINOPHIL # BLD AUTO: 0.09 10*3/MM3 (ref 0–0.4)
EOSINOPHIL NFR BLD AUTO: 0.8 % (ref 0.3–6.2)
ERYTHROCYTE [DISTWIDTH] IN BLOOD BY AUTOMATED COUNT: 15.5 % (ref 12.3–15.4)
GLOBULIN UR ELPH-MCNC: 3.2 GM/DL
GLUCOSE SERPL-MCNC: 108 MG/DL (ref 65–99)
HCT VFR BLD AUTO: 41 % (ref 34–46.6)
HGB BLD-MCNC: 12.9 G/DL (ref 12–15.9)
IMM GRANULOCYTES # BLD AUTO: 0.19 10*3/MM3 (ref 0–0.05)
IMM GRANULOCYTES NFR BLD AUTO: 1.8 % (ref 0–0.5)
LYMPHOCYTES # BLD AUTO: 1.11 10*3/MM3 (ref 0.7–3.1)
LYMPHOCYTES NFR BLD AUTO: 10.3 % (ref 19.6–45.3)
MCH RBC QN AUTO: 27.3 PG (ref 26.6–33)
MCHC RBC AUTO-ENTMCNC: 31.5 G/DL (ref 31.5–35.7)
MCV RBC AUTO: 86.7 FL (ref 79–97)
MONOCYTES # BLD AUTO: 1.13 10*3/MM3 (ref 0.1–0.9)
MONOCYTES NFR BLD AUTO: 10.5 % (ref 5–12)
NEUTROPHILS NFR BLD AUTO: 76.1 % (ref 42.7–76)
NEUTROPHILS NFR BLD AUTO: 8.23 10*3/MM3 (ref 1.7–7)
NRBC BLD AUTO-RTO: 0 /100 WBC (ref 0–0.2)
PLATELET # BLD AUTO: 315 10*3/MM3 (ref 140–450)
PMV BLD AUTO: 9.2 FL (ref 6–12)
POTASSIUM SERPL-SCNC: 4.3 MMOL/L (ref 3.5–5.2)
PROT SERPL-MCNC: 5.9 G/DL (ref 6–8.5)
RBC # BLD AUTO: 4.73 10*6/MM3 (ref 3.77–5.28)
SODIUM SERPL-SCNC: 134 MMOL/L (ref 136–145)
WBC NRBC COR # BLD AUTO: 10.8 10*3/MM3 (ref 3.4–10.8)

## 2023-12-28 PROCEDURE — 99232 SBSQ HOSP IP/OBS MODERATE 35: CPT | Performed by: INTERNAL MEDICINE

## 2023-12-28 PROCEDURE — 85025 COMPLETE CBC W/AUTO DIFF WBC: CPT | Performed by: INTERNAL MEDICINE

## 2023-12-28 PROCEDURE — 25010000002 HYDROMORPHONE PER 4 MG: Performed by: NURSE PRACTITIONER

## 2023-12-28 PROCEDURE — 97161 PT EVAL LOW COMPLEX 20 MIN: CPT

## 2023-12-28 PROCEDURE — 80053 COMPREHEN METABOLIC PANEL: CPT | Performed by: INTERNAL MEDICINE

## 2023-12-28 PROCEDURE — 25010000002 METOCLOPRAMIDE PER 10 MG

## 2023-12-28 RX ORDER — HYDROCORTISONE 25 MG/G
CREAM TOPICAL 2 TIMES DAILY
Status: DISCONTINUED | OUTPATIENT
Start: 2023-12-28 | End: 2024-01-01 | Stop reason: HOSPADM

## 2023-12-28 RX ORDER — VENLAFAXINE 75 MG/1
75 TABLET ORAL 2 TIMES DAILY
Status: DISCONTINUED | OUTPATIENT
Start: 2023-12-28 | End: 2024-01-01 | Stop reason: HOSPADM

## 2023-12-28 RX ORDER — METOCLOPRAMIDE HYDROCHLORIDE 5 MG/ML
10 INJECTION INTRAMUSCULAR; INTRAVENOUS EVERY 6 HOURS PRN
Status: DISCONTINUED | OUTPATIENT
Start: 2023-12-28 | End: 2023-12-30

## 2023-12-28 RX ORDER — LIDOCAINE 40 MG/G
1 CREAM TOPICAL ONCE
Status: COMPLETED | OUTPATIENT
Start: 2023-12-28 | End: 2023-12-28

## 2023-12-28 RX ORDER — PANTOPRAZOLE SODIUM 40 MG/10ML
40 INJECTION, POWDER, LYOPHILIZED, FOR SOLUTION INTRAVENOUS
Status: DISCONTINUED | OUTPATIENT
Start: 2023-12-28 | End: 2024-01-01 | Stop reason: HOSPADM

## 2023-12-28 RX ORDER — DIPHENHYDRAMINE HYDROCHLORIDE AND LIDOCAINE HYDROCHLORIDE AND ALUMINUM HYDROXIDE AND MAGNESIUM HYDRO
5 KIT EVERY 6 HOURS
Status: DISCONTINUED | OUTPATIENT
Start: 2023-12-28 | End: 2024-01-01 | Stop reason: HOSPADM

## 2023-12-28 RX ADMIN — PANTOPRAZOLE SODIUM 40 MG: 40 INJECTION, POWDER, FOR SOLUTION INTRAVENOUS at 05:42

## 2023-12-28 RX ADMIN — HYDROMORPHONE HYDROCHLORIDE 0.5 MG: 1 INJECTION, SOLUTION INTRAMUSCULAR; INTRAVENOUS; SUBCUTANEOUS at 02:52

## 2023-12-28 RX ADMIN — HYDROMORPHONE HYDROCHLORIDE 0.5 MG: 1 INJECTION, SOLUTION INTRAMUSCULAR; INTRAVENOUS; SUBCUTANEOUS at 19:28

## 2023-12-28 RX ADMIN — Medication 10 ML: at 09:46

## 2023-12-28 RX ADMIN — HYDROCORTISONE: 25 CREAM TOPICAL at 12:14

## 2023-12-28 RX ADMIN — SENNOSIDES AND DOCUSATE SODIUM 2 TABLET: 8.6; 5 TABLET ORAL at 09:44

## 2023-12-28 RX ADMIN — ROSUVASTATIN 5 MG: 10 TABLET, FILM COATED ORAL at 20:58

## 2023-12-28 RX ADMIN — VENLAFAXINE HYDROCHLORIDE 75 MG: 75 TABLET ORAL at 21:42

## 2023-12-28 RX ADMIN — HYDROMORPHONE HYDROCHLORIDE 0.5 MG: 1 INJECTION, SOLUTION INTRAMUSCULAR; INTRAVENOUS; SUBCUTANEOUS at 10:19

## 2023-12-28 RX ADMIN — HYDROMORPHONE HYDROCHLORIDE 0.5 MG: 1 INJECTION, SOLUTION INTRAMUSCULAR; INTRAVENOUS; SUBCUTANEOUS at 12:43

## 2023-12-28 RX ADMIN — NALOXEGOL OXALATE 25 MG: 25 TABLET, FILM COATED ORAL at 05:42

## 2023-12-28 RX ADMIN — LIDOCAINE 4% 1 APPLICATION: 4 CREAM TOPICAL at 12:14

## 2023-12-28 RX ADMIN — POLYETHYLENE GLYCOL 3350 17 G: 17 POWDER, FOR SOLUTION ORAL at 09:43

## 2023-12-28 RX ADMIN — SENNOSIDES AND DOCUSATE SODIUM 2 TABLET: 8.6; 5 TABLET ORAL at 20:57

## 2023-12-28 RX ADMIN — METOCLOPRAMIDE 10 MG: 5 INJECTION, SOLUTION INTRAMUSCULAR; INTRAVENOUS at 12:43

## 2023-12-28 RX ADMIN — POLYETHYLENE GLYCOL 3350 17 G: 17 POWDER, FOR SOLUTION ORAL at 21:07

## 2023-12-28 RX ADMIN — LEVOTHYROXINE SODIUM 125 MCG: 125 TABLET ORAL at 05:42

## 2023-12-28 RX ADMIN — DIPHENHYDRAMINE HYDROCHLORIDE AND LIDOCAINE HYDROCHLORIDE AND ALUMINUM HYDROXIDE AND MAGNESIUM HYDRO 5 ML: KIT at 10:41

## 2023-12-28 RX ADMIN — VENLAFAXINE HYDROCHLORIDE 75 MG: 75 TABLET ORAL at 09:44

## 2023-12-28 RX ADMIN — Medication 10 ML: at 21:07

## 2023-12-28 NOTE — PLAN OF CARE
Goal Outcome Evaluation:  Plan of Care Reviewed With: patient, spouse           Outcome Evaluation: Patient limited in ambulation distance by pain and nausea. Patient presents below baseline for mobility and would continue to benefit from skilled PT to address mobility deficits.      Anticipated Discharge Disposition (PT): home with assist

## 2023-12-28 NOTE — PROGRESS NOTES
"HEMATOLOGY/ONCOLOGY PROGRESS NOTE    S: She reports a very difficult day yesterday following lactulose administration for constipation.  She developed nausea and emesis overnight.  A NG tube was placed with approximately 500 cc of brown output.  The patient reports she had just eaten beef broth.  She had 2 bowel movements overnight following an enema.  She continues to feel bloated this morning with bandlike upper abdominal pain similar to her diagnosis.  NG tube remains in place.  She reports feeling very dry this morning.      Past medical history, social history and family history was reviewed and unchanged from prior visit.      Medications:  The current medication list was reviewed in the EMR    ALLERGIES:  No Known Allergies      Physical Exam    VITAL SIGNS:  /97 (BP Location: Left arm, Patient Position: Lying)   Pulse 99   Temp 97 °F (36.1 °C) (Temporal)   Resp 16   Ht 167.6 cm (66\")   Wt 91.2 kg (201 lb)   LMP  (LMP Unknown) Comment: N/A  SpO2 91%   BMI 32.44 kg/m²   Temp:  [96.7 °F (35.9 °C)-98.2 °F (36.8 °C)] 97 °F (36.1 °C)      Performance Status:                Physical Exam    General: well appearing, in no acute distress  HEENT: sclerae anicteric, neck is supple.  NG tube in place.  Lymphatics: no cervical, supraclavicular, or axillary adenopathy  Cardiovascular: regular rate and rhythm, no murmurs, rubs or gallops  Lungs: clear to auscultation bilaterally  Abdomen: mildly distended. +Ascited  Positive bowel sounds.  Extremities: no lower extremity edema  Skin: no rashes, lesions, bruising, or petechiae  Msk:  Shows no weakness of the large muscle groups  Psych: Mood is stable        RECENT LABS:    Lab Results   Component Value Date    HGB 13.2 12/26/2023    HCT 42.1 12/26/2023    MCV 87.2 12/26/2023     (H) 12/26/2023    WBC 14.29 (H) 12/26/2023    NEUTROABS 11.91 (H) 12/26/2023    LYMPHSABS 1.18 12/26/2023    MONOSABS 0.95 (H) 12/26/2023    EOSABS 0.10 12/26/2023    BASOSABS " 0.06 12/26/2023       Lab Results   Component Value Date    GLUCOSE 125 (H) 12/26/2023    BUN 15 12/26/2023    CREATININE 0.70 12/26/2023     (L) 12/26/2023    K 4.8 12/26/2023    CL 97 (L) 12/26/2023    CO2 26.0 12/26/2023    CALCIUM 8.5 (L) 12/26/2023    PROTEINTOT 6.2 12/26/2023    ALBUMIN 2.8 (L) 12/26/2023    BILITOT 0.4 12/26/2023    ALKPHOS 224 (H) 12/26/2023    AST 22 12/26/2023    ALT 11 12/26/2023         Assessment/Plan  Pancreatic mass  2.  Peritoneal/omental implants  3.  Indeterminate lung nodules  4.  Indeterminate liver hypodensities  5.  Malignant Ascites  -We discussed the clinical impression of metastatic malignancy. Cytology confirms malignant ascites with upper GI/pancreatobiliary source favored. EGD with pancreatic EUS/biopsy to with difficult to access pancreatic mass and atypical cells.  No other candidate primary site identified on upper endoscopy.  Planning palliative chemotherapy with FOLFIRINOX first-line.  She was scheduled to initiate this as an outpatient today.  However she returned in the interim with recurrent malignant ascites.  We discussed the option of peritoneal drain placement, but given plan for chemotherapy initiation she would like to defer this to see if her symptoms and paracentesis needs decrease with therapy initiation.  -CBC and CMP reviewed and adequate to proceed with cycle # 1.  She remains with an NG tube in place and is n.p.o. this morning.  We discussed initiating cycle 1 chemotherapy when she is clinically optimized and tolerating p.o., hopefully later today or tomorrow.  Discussed with bedside nurse who will update me on her course later this morning..     6. Access  Port     7. Constipation  -Reports 2 small bowel movements overnight following enema.  NG tube in place.  Would benefit from continued aggressive bowel regimen.  X-ray showed no obstruction.  Consider Reglan.    Sherri العراقي MD  [facility]    12/28/2023

## 2023-12-28 NOTE — PROGRESS NOTES
Malnutrition Severity Assessment    Patient Name:  Jessica Kraft  YOB: 1960  MRN: [id number]  Admit Date:  12/25/2023    Patient meets criteria for : Severe Malnutrition (PO intakes <75% EEN >/=1 month and loss 6.1% body weight past month, moderate muscle wasting, and moderate subcutaneous fat loss.)    Malnutrition Severity Assessment  Malnutrition Type: Chronic Disease - Related Malnutrition  Malnutrition Type (last 8 hours)       Malnutrition Severity Assessment       Row Name 12/28/23 [facility]5       Malnutrition Severity Assessment    Malnutrition Type Chronic Disease - Related Malnutrition      Row Name 12/28/23 1435       Insufficient Energy Intake     Insufficient Energy Intake Findings Severe  PO intakes <75% EEN >/=1 month    Insufficient Energy Intake  <75% of est. energy requirement for > or equal to 1 month      Row Name 12/28/23 1435       Unintentional Weight Loss     Unintentional Weight Loss Findings Severe  loss 6.1% body weight past month    Unintentional Weight Loss  Weight loss greater than 5% in one month      Row Name 12/28/23 1435       Muscle Loss    Loss of Muscle Mass Findings Moderate    Clavicle Bone Region Moderate - some protrusion in females, visible in males    Acromion Bone Region Moderate - acromion may slightly protrude    Scapular Bone Region Moderate - mild depression, bones may show slightly    Dorsal Hand Region Moderate - slight depression      Row Name 12/28/23 1434       Fat Loss    Subcutaneous Fat Loss Findings Moderate    Orbital Region  Moderate -  somewhat hollowness, slightly dark circles    Upper Arm Region Moderate - some fat tissue, not ample      Row Name 12/28/23 1437       Criteria Met (Must meet criteria for severity in at least 2 of these categories: M Wasting, Fat Loss, Fluid, Secondary Signs, Wt. Status, Intake)    Patient meets criteria for  Severe Malnutrition  PO intakes <75% EEN >/=1 month and loss 6.1% body weight past month, moderate muscle  wasting, and moderate subcutaneous fat loss.                    Electronically signed by:  Yaneli Kapadia RD  12/28/23 14:36 EST

## 2023-12-28 NOTE — THERAPY EVALUATION
Patient Name: Jessica Kraft  : 1960    MRN: [id number]                              Today's Date: 2023       Admit Date: 2023    Visit Dx:     ICD-10-CM ICD-9-CM   1. Nausea and vomiting, unspecified vomiting type  R11.2 787.01   2. 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 Malignant ascites  R18.0 789.51   4. Malignant neoplasm of body of pancreas  C25.1 157.1   5. Abdominal carcinomatosis  C76.2 195.2     Patient Active Problem List   Diagnosis    Hypothyroidism due to Hashimoto's thyroiditis    Hot flashes, menopausal    Depression    Osteopenia    Insomnia    Vitamin D deficiency, unspecified    Malaise and fatigue    Urinary incontinence    Cobalamin deficiency    Anxiety    Overweight    Adjustment reaction with prolonged depressive reaction    Sepsis    Acute UTI (urinary tract infection)    Pancreatic mass    Lung nodules    Liver lesion    Abdominal carcinomatosis - omental    Peritoneal carcinomatosis    GERD without esophagitis    Hiatal hernia    Other dysphagia    Ascites    Severe malnutrition    Pancreatic cancer    Nausea and vomiting    Constipation    Neutrophilic leukocytosis    Thrombocytosis     Past Medical History:   Diagnosis Date    Abscess     Anxiety     Bilateral ovarian cysts     Depression     Encounter for routine gynecological examination     Foot pain     H/O bone density study 2014    H/O mammogram 2016    [facility]    Hot flashes, menopausal     Hypothyroidism due to Hashimoto's thyroiditis     Insomnia     Miscarriage     x3    Osteopenia     Pap smear for cervical cancer screening 2014    Routine general medical examination at a health care facility     Urinary incontinence     Urinary tract infection     Vitamin D deficiency      Past Surgical History:   Procedure Laterality Date    ABDOMINOPLASTY  2001    CHOLECYSTECTOMY      COLONOSCOPY  2012    [facility] in [facility] normal    ECTOPIC PREGNANCY SURGERY Left      ENDOSCOPY N/A 12/13/2023    Procedure: ESOPHAGOGASTRODUODENOSCOPY;  Surgeon: Flakito Abrams MD;  Location: [facility] ENDOSCOPY;  Service: Gastroenterology;  Laterality: N/A;    HERNIA REPAIR  2010      General Information       Row Name 12/28/23 1040          Physical Therapy Time and Intention    Document Type evaluation  -ML     Mode of Treatment physical therapy  -ML       Row Name 12/28/23 1040          General Information    Patient Profile Reviewed yes  -ML     Prior Level of Function independent:;all household mobility;community mobility;gait;transfer;bed mobility;ADL's;home management;cooking;cleaning;driving;shopping  -ML     Existing Precautions/Restrictions no known precautions/restrictions  -ML     Barriers to Rehab none identified  -ML       Row Name 12/28/23 1040          Living Environment    People in Home spouse  -ML       Row Name 12/28/23 1040          Home Main Entrance    Number of Stairs, Main Entrance three  -ML     Stair Railings, Main Entrance railing on left side (ascending)  -ML       Row Name 12/28/23 1040          Stairs Within Home, Primary    Number of Stairs, Within Home, Primary none  -ML       Row Name 12/28/23 1040          Cognition    Orientation Status (Cognition) oriented x 3  -ML       Row Name 12/28/23 1040          Safety Issues, Functional Mobility    Safety Issues Affecting Function (Mobility) awareness of need for assistance;safety precaution awareness  -ML     Impairments Affecting Function (Mobility) pain  -ML               User Key  (r) = Recorded By, (t) = Taken By, (c) = Cosigned By      Initials Name Provider Type    ML Svitlana Cervantes Physical Therapist                   Mobility       Row Name 12/28/23 1042          Bed Mobility    Bed Mobility supine-sit  -ML     Supine-Sit [place] (Bed Mobility) modified independence  -ML     Assistive Device (Bed Mobility) head of bed elevated  -ML       Row Name 12/28/23 1042          Sit-Stand Transfer    Sit-Stand  [place] (Transfers) standby assist  -ML     Assistive Device (Sit-Stand Transfers) other (see comments)  no AD  -ML       Row Name 12/28/23 1042          Gait/Stairs (Locomotion)    [place] Level (Gait) standby assist  -ML     Assistive Device (Gait) other (see comments)  no AD  -ML     Distance in Feet (Gait) 10 + 15  -ML     Deviations/Abnormal Patterns (Gait) greg decreased  -ML     Bilateral Gait Deviations forward flexed posture  -ML     Comment, (Gait/Stairs) ambulation distance limited by pain/nausea  -ML               User Key  (r) = Recorded By, (t) = Taken By, (c) = Cosigned By      Initials Name Provider Type    ML Svitlana Cervantes Physical Therapist                   Obj/Interventions       Row Name 12/28/23 1043          Range of Motion Comprehensive    General Range of Motion bilateral lower extremity ROM WFL  -ML       Row Name 12/28/23 1043          Strength Comprehensive (MMT)    General Manual Muscle Testing (MMT) Assessment no strength deficits identified  -ML       Row Name 12/28/23 1043          Balance    Balance Assessment sitting static balance;sitting dynamic balance;sit to stand dynamic balance;standing static balance;standing dynamic balance  -ML     Static Sitting Balance independent  -ML     Dynamic Sitting Balance independent  -ML     Position, Sitting Balance unsupported;sitting edge of bed;other (see comments)  sitting on toilet  -ML     Sit to Stand Dynamic Balance standby assist  -ML     Static Standing Balance standby assist  -ML     Dynamic Standing Balance standby assist  -ML     Position/Device Used, Standing Balance unsupported  -ML     Balance Interventions sitting;standing;sit to stand;occupation based/functional task  -ML               User Key  (r) = Recorded By, (t) = Taken By, (c) = Cosigned By      Initials Name Provider Type    ML Svitlana Cervantes Physical Therapist                   Goals/Plan       Row Name 12/28/23 1045          Bed Mobility Goal 1 (PT)     Activity/Assistive Device (Bed Mobility Goal 1, PT) sit to supine/supine to sit  -ML     [place] Level/Cues Needed (Bed Mobility Goal 1, PT) independent  -ML     Time Frame (Bed Mobility Goal 1, PT) 10 days  -ML       Row Name 12/28/23 1045          Transfer Goal 1 (PT)    Activity/Assistive Device (Transfer Goal 1, PT) sit-to-stand/stand-to-sit;bed-to-chair/chair-to-bed  -ML     [place] Level/Cues Needed (Transfer Goal 1, PT) independent  -ML     Time Frame (Transfer Goal 1, PT) 10 days  -ML       Row Name 12/28/23 1045          Gait Training Goal 1 (PT)    Activity/Assistive Device (Gait Training Goal 1, PT) gait (walking locomotion);increase endurance/gait distance  -ML     [place] Level (Gait Training Goal 1, PT) independent  -ML     Distance (Gait Training Goal 1, PT) 500  -ML     Time Frame (Gait Training Goal 1, PT) 10 days  -ML       Row Name 12/28/23 1045          Therapy Assessment/Plan (PT)    Planned Therapy Interventions (PT) balance training;bed mobility training;gait training;home exercise program;neuromuscular re-education;patient/family education;postural re-education;ROM (range of motion);stretching;strengthening;transfer training  -ML               User Key  (r) = Recorded By, (t) = Taken By, (c) = Cosigned By      Initials Name Provider Type     Svitlana Cervantes Physical Therapist                   Clinical Impression       Row Name 12/28/23 1043          Pain    Pretreatment Pain Rating 9/10  -ML     Posttreatment Pain Rating 9/10  -ML     Pain Location generalized  -ML     Pain Location - abdomen;head;throat  -ML     Pain Intervention(s) Repositioned;Ambulation/increased activity;Nursing Notified  -ML       Row Name 12/28/23 1043          Plan of Care Review    Plan of Care Reviewed With patient;spouse  -ML     Outcome Evaluation Patient limited in ambulation distance by pain and nausea. Patient presents below baseline for mobility and would continue to benefit from skilled PT to  address mobility deficits.  -ML       Row Name 12/28/23 1043          Therapy Assessment/Plan (PT)    Rehab Potential (PT) good, to achieve stated therapy goals  -ML     Criteria for Skilled Interventions Met (PT) yes;meets criteria;skilled treatment is necessary  -ML     Therapy Frequency (PT) daily  -ML       Row Name 12/28/23 1043          Vital Signs    Pre Patient Position Supine  -ML     Intra Patient Position Standing  -ML     Post Patient Position Sitting  -ML       Row Name 12/28/23 1043          Positioning and Restraints    Pre-Treatment Position in bed  -ML     Post Treatment Position chair  -ML     In Chair notified nsg;reclined;call light within reach;encouraged to call for assist  -ML               User Key  (r) = Recorded By, (t) = Taken By, (c) = Cosigned By      Initials Name Provider Type    Svitlana Morrison Physical Therapist                   Outcome Measures       Row Name 12/28/23 1045          How much help from another person do you currently need...    Turning from your back to your side while in flat bed without using bedrails? 4  -ML     Moving from lying on back to sitting on the side of a flat bed without bedrails? 4  -ML     Moving to and from a bed to a chair (including a wheelchair)? 4  -ML     Standing up from a chair using your arms (e.g., wheelchair, bedside chair)? 4  -ML     Climbing 3-5 steps with a railing? 3  -ML     To walk in hospital room? 3  -ML     AM-PAC 6 Clicks Score (PT) 22  -ML     Highest Level of Mobility Goal 7 --> Walk 25 feet or more  -ML       Row Name 12/28/23 1045          Functional Assessment    Outcome Measure Options AM-PAC 6 Clicks Basic Mobility (PT)  -ML               User Key  (r) = Recorded By, (t) = Taken By, (c) = Cosigned By      Initials Name Provider Type    Svitlana Morrison Physical Therapist                                 Physical Therapy Education       Title: PT OT SLP Therapies (In Progress)       Topic: Physical Therapy (In Progress)        Point: Mobility training (Done)       Learning Progress Summary             Patient Acceptance, E, VU by ML at 12/28/2023 1047                         Point: Home exercise program (Not Started)       Learner Progress:  Not documented in this visit.              Point: Body mechanics (Not Started)       Learner Progress:  Not documented in this visit.              Point: Precautions (Done)       Learning Progress Summary             Patient Acceptance, E, VU by ML at 12/28/2023 1047                                         User Key       Initials Effective Dates Name Provider Type Discipline     04/22/21 -  Svitlana Cervantes Physical Therapist PT                  PT Recommendation and Plan  Planned Therapy Interventions (PT): balance training, bed mobility training, gait training, home exercise program, neuromuscular re-education, patient/family education, postural re-education, ROM (range of motion), stretching, strengthening, transfer training  Plan of Care Reviewed With: patient, spouse  Outcome Evaluation: Patient limited in ambulation distance by pain and nausea. Patient presents below baseline for mobility and would continue to benefit from skilled PT to address mobility deficits.     Time Calculation:   PT Evaluation Complexity  History, PT Evaluation Complexity: 1-2 personal factors and/or comorbidities  Examination of Body Systems (PT Eval Complexity): 1-2 elements  Clinical Presentation (PT Evaluation Complexity): stable  Clinical Decision Making (PT Evaluation Complexity): low complexity  Overall Complexity (PT Evaluation Complexity): low complexity     PT Charges       Row Name 12/28/23 1048             Time Calculation    Start Time 1006  -ML      PT Received On 12/28/23  -ML      PT Goal Re-Cert Due Date 01/07/24  -ML         Untimed Charges    PT Eval/Re-eval Minutes 35  -ML         Total Minutes    Untimed Charges Total Minutes 35  -ML       Total Minutes 35  -ML                User Key  (r) = Recorded  By, (t) = Taken By, (c) = Cosigned By      Initials Name Provider Type    Svitlana Morrison Physical Therapist                  Therapy Charges for Today       Code Description Service Date Service Provider Modifiers Qty    85104180871 HC PT EVAL LOW COMPLEXITY 3 12/28/2023 Svitlana Cervantes GP 1            PT G-Codes  Outcome Measure Options: AM-PAC 6 Clicks Basic Mobility (PT)  AM-PAC 6 Clicks Score (PT): 22  PT Discharge Summary  Anticipated Discharge Disposition (PT): home with assist    Svitlana Cervantes  12/28/2023

## 2023-12-28 NOTE — PROGRESS NOTES
"Palliative Care Daily Progress Note     C/C: Patient reports abdominal pain, constant ache, at 9-10/10.     S: Medical record reviewed. Follow up visit for GOC and symptom management. Events noted. Patient reports constant aching lower abdominal pain that is 9-10/10, improves with Hydromorphone, she reports upper abdominal pain is improving. She also reports sore throat due to NG, placed overnight due to emesis. She reports headache and hemorrhoid pain to rectum which feels like a, \"razor blade\". Patient reports nausea that is distressing and increases her pain. She reports two small BM's overnight that had some liquid and some formed stool.     ROS: +abdominal pain, lower abdominal, constant ache, 9-10/10, improved with Hydromorphone. +sore throat due to NG. +headache. +nausea. +hemorrhoid pain. Denies anxiety, shortness of breath, vomiting.     O: Code Status:   Code Status and Medical Interventions:   Ordered at: 12/25/23 7302     Level Of Support Discussed With:    Patient     Code Status (Patient has no pulse and is not breathing):    CPR (Attempt to Resuscitate)     Medical Interventions (Patient has pulse or is breathing):    Full Support      Advanced Directives: Advance Directive Status: Patient does not have advance directive   Goals of Care: Ongoing.   Palliative Performance Scale Score: 50%     /97 (BP Location: Left arm, Patient Position: Lying)   Pulse 99   Temp 97 °F (36.1 °C) (Temporal)   Resp 16   Ht 167.6 cm (66\")   Wt 91.2 kg (201 lb)   LMP  (LMP Unknown) Comment: N/A  SpO2 91%   BMI 32.44 kg/m²     Intake/Output Summary (Last 24 hours) at 12/28/2023 1011  Last data filed at 12/28/2023 0257  Gross per 24 hour   Intake 600 ml   Output 900 ml   Net -300 ml       PE:  General Appearance:    Patient laying in bed, awake, alert, acutely ill appearing, cooperative, NAD   HEENT:    NC/AT, EOMI, anicteric, MMM, face relaxed   Neck:   supple, trachea midline, no JVD   Lungs:     CTA bilat, " diminished in bases; respirations regular, even and unlabored; RR 16-18 on exam    Heart:    RRR, normal S1 and S2, no M/R/G   Abdomen:     Distant bowel sounds, soft, tender, distended-ascites   G/U:   Deferred   MSK/Extremities:    no edema   Pulses:   Pulses palpable and equal bilaterally   Skin:   Warm, dry   Neurologic:   A/Ox3, cooperative, RAMSEY   Psych:   Calm, appropriate     Meds: Reviewed and changes noted    Labs:   Results from last 7 days   Lab Units 12/28/23  0852   WBC 10*3/mm3 10.80   HEMOGLOBIN g/dL 12.9   HEMATOCRIT % 41.0   PLATELETS 10*3/mm3 315     Results from last 7 days   Lab Units 12/26/23  0544   SODIUM mmol/L 134*   POTASSIUM mmol/L 4.8   CHLORIDE mmol/L 97*   CO2 mmol/L 26.0   BUN mg/dL 15   CREATININE mg/dL 0.70   GLUCOSE mg/dL 125*   CALCIUM mg/dL 8.5*     Results from last 7 days   Lab Units 12/26/23  0544   SODIUM mmol/L 134*   POTASSIUM mmol/L 4.8   CHLORIDE mmol/L 97*   CO2 mmol/L 26.0   BUN mg/dL 15   CREATININE mg/dL 0.70   CALCIUM mg/dL 8.5*   BILIRUBIN mg/dL 0.4   ALK PHOS U/L 224*   ALT (SGPT) U/L 11   AST (SGOT) U/L 22   GLUCOSE mg/dL 125*     Imaging Results (Last 72 Hours)       Procedure Component Value Units Date/Time    XR Abdomen KUB [[id number]] Collected: 12/27/23 1920     Updated: 12/27/23 1927    Narrative:      XR ABDOMEN KUB    Date of Exam: 12/27/2023 6:40 PM EST    Indication: abdominal pain    Comparison: CT abdomen pelvis 12/25/2023.    Findings:  Gas and stool filled large bowel in a nonobstructive pattern. Cholecystectomy clips. Left greater right pleural effusions.       Impression:      Impression:  Gas and stool filled colon in a nonobstructive pattern.    Electronically Signed: Mansoor Shelley MD    12/27/2023 7:24 PM EST    Workstation ID: [id number]    IR Port Placement [[id number]] Collected: 12/26/23 1537     Updated: 12/26/23 1541    Narrative:      IR PORT PLACEMENT     History: starting chemo 12/27     : Rajat Bhandari MD.     Modality:  Sonography and fluoroscopy     DOSE REDUCTION: The examination was performed according to departmental dose-optimization program which includes automated exposure control, adjustment of the mA and/or kV.    Fluoro time: 0.0    Radiation dose: 0.1 mGy air Kerma.      SEDATION: Moderate sedation was administered. 2 milligram of Versed and 100 micrograms of fentanyl IV was used for moderate sedation. Total intra service time of sedation was 20 minutes. The sedation was administered and the patient's vital signs   monitored throughout the procedure and recorded in the patient's medical record by the nurse under my direct supervision.     Anesthesia:  Lidocaine 1% with epinephrine, local infiltration    Medicines: None          Estimated blood loss:  < 5 cc.            Technique:  A thorough discussion of the risks, benefits, and alternatives of the procedure, and if applicable, moderate sedation, was carried out with the patient. They were encouraged to ask any questions. Any questions were answered. They verbalized   understanding. A written informed consent was then signed.      A multi-component timeout was performed prior to starting the procedure using the departmental protocol.     The procedure room personnel used personal protective equipment. The operators used sterile gloves and if indicated, sterile gowns. The surgical site was prepped with chlorhexidine gluconate  and draped in the maximal applicable sterile fashion.    A preliminary ultrasonogram was performed of the neck that revealed a patent and compressible internal jugular vein. Pertinent ultrasound images were stored in the PACS for documentation.    Using aseptic precautions, real-time ultrasound guidance, the internal jugular vein was accessed with a micropuncture needle after local anesthetic infiltration. A 018 guidewire was advanced into the central venous system under fluoroscopic guidance.   Over the wire a micropuncture sheath was placed.  Through the micropuncture sheath, a 035 wire was advanced into the venous system under fluoroscopic guidance. Over the wire a peel-away sheath was placed.    After local anesthesia, using sharp and blunt dissection, a reservoir pocket of appropriate size was created in the infra clavicular chest wall. The port catheter was connected to the port reservoir. The catheter was tunneled to the venotomy site using a   tunneling device. The the reservoir was placed in the reservoir pocket. The catheter was trimmed to an appropriate length. The catheter was advanced into the venous system through the peel-away sheath which was removed.    The port aspirated and flushed well and was terminally packed with heparin 100 units per cc, total 500 units.    The port reservoir was irrigated with saline. The incision was closed in layers using absorbable suture and Dermabond. The venotomy site was closed using Dermabond. An aseptic dressing was applied using the protocol for Dermabond.    The patient was transferred to the recovery area and was discharged from the department in stable condition.     Complications: None immediate.        Findings: Patent and compressible internal jugular vein. Final image shows the ale catheter to be in good position with the catheter tip at the cavoatrial junction/right atrium, an excellent position for use. There is no immediate complication.       Impression:      Impression:      Successful ultrasound and fluoroscopic guided right internal jugular vein route single lumen Port-A-Cath placement as described above.    Thank you for the opportunity to assist in the care of your patient.      Electronically Signed: Rajat Bhandari MD    12/26/2023 3:38 PM EST    Workstation ID: [id number]    US Paracentesis [[id number]] Collected: 12/26/23 1536    Specimen: Body Fluid Updated: 12/26/23 1540    Narrative:      US PARACENTESIS     History: ascites/ metastatic pancreatic cancer     : Rajat  MD Thony.     Modality: Ultrasonography                       Sedation: None.    Anesthesia:  Lidocaine 1% local infiltration.    Estimated blood loss:  0 cc.            Technique:  A thorough discussion of the risks, benefits, and alternatives of the procedure, and if applicable, moderate sedation, was carried out with the patient. They were encouraged to ask any questions. Any questions were answered. They verbalized   understanding. A written informed consent was then signed.      A multi-component timeout was performed prior to starting the procedure using the departmental protocol.     The procedure room personnel used personal protective equipment. The operators used sterile gloves and if indicated, sterile gowns. The surgical site was prepped with chlorhexidine gluconate  and draped in the maximal applicable sterile fashion.    A preliminary ultrasonography was performed to assess the target and determine a safe access site. It showed ascites. Pertinent ultrasound images were stored to the PACS for documentation.    The access site was sterilely prepped and draped. Local anesthesia was administered. A dermatotomy was performed if needed. A catheter over the needle system was advanced into the peritoneal cavity. Straw colored fluid was aspirated and the plastic   catheter advanced into the peritoneum. The catheter was then connected to a fluid recovery system. At the end of the procedure, the catheter was withdrawn and an aseptic dressing applied. The patient tolerated the procedure well.    After uneventful recovery recovery, the patient was discharged from the department in stable condition.    The total amount of fluid recovered is given below.    Albumin was infused intravenously if ordered by the referring doctor.    Complications: None immediate.    Specimen:  The specimen was labeled and sent to the lab in appropriate carriers & containers if such was requested by the ordering provider.       Impression:      Impression:                                                                Successful ultrasound-guided paracentesis using a Right upper quadrant access with recovery of 3 liters of fluid as described above.    Please note that the patient is developing septated loculated ascites.    Thank you for the opportunity to assist in the care of your patient.      Electronically Signed: Rajat Bhandari MD    12/26/2023 3:37 PM EST    Workstation ID: [id number]    CT Abdomen Pelvis With Contrast [[id number]] Collected: 12/25/23 1642     Updated: 12/25/23 1702    Narrative:      CT ABDOMEN PELVIS W CONTRAST    Date of Exam: 12/25/2023 4:31 PM EST    Indication: history pancreatic cancer with constipation, N/V.    Comparison: 12/7/2023 and prior    Technique: Axial CT images were obtained of the abdomen and pelvis following the uneventful intravenous administration of 85 mL Isovue-300. Reconstructed coronal and sagittal images were also obtained. Automated exposure control and iterative   construction methods were used.        FINDINGS:    Abdomen/Pelvis:    Lower Chest: Small bilateral pleural effusions are noted with associated dependent opacity at the lung bases. No free air noted below the diaphragm.    Organs: Patient is status post cholecystectomy. Low-attenuation foci within the liver appear grossly stable given limitations of current exam secondary to motion and streak artifact from patient's upper extremities. The kidneys, spleen and adrenal glands   are unremarkable. The pancreas demonstrates atrophy and prominence of the main duct which is poorly visualized on current study. Mass seen previous study is not as well visualized on current study. Omental and peritoneal carcinomatosis again noted.   Narrowing of the celiac axis and portal vein are better seen on previous study.      GI/Bowel: There is a moderate-sized hiatal hernia. The stomach demonstrates no acute abnormality. Small bowel demonstrates no  obstruction. There is a heavy stool burden. No focal inflammatory changes of the GI tract noted. Moderate to large degree of   ascites noted. Findings are similar to the prior study. Scattered mesenteric nodularity densities compatible adenopathy. This is similar to the previous study. Underlying mesenteric implants in the right lower quadrant again suspected    Pelvis: The uterus is unremarkable. Ovaries are not well visualized. Urinary bladder is incompletely distended and grossly unremarkable    Peritoneum/Retroperitoneum: The aorta is normal in caliber. There is no suspicious retroperitoneal adenopathy    Bones/Soft Tissues: No destructive bone lesion      Impression:        1. Known pancreatic mass with atrophy of the distal body and tail of the pancreas and dilation of the pancreatic duct is not as well visualized due to technical limitations and artifact on the current study.    2. Peritoneal and omental carcinomatosis and ascites again noted.    3. Heavy stool burden with a nonobstructing pattern    4. Bilateral pleural effusions and dependent consolidation likely atelectasis    5. Moderate size hiatal hernia.    6. Other findings as above      Electronically Signed: Jayy Lewis MD    12/25/2023 4:59 PM EST    Workstation ID: [id number]                  Diagnostics: Reviewed    A:   Pancreatic cancer    Hypothyroidism due to Hashimoto's thyroiditis    Depression    Osteopenia    Insomnia    Anxiety    Peritoneal carcinomatosis    GERD without esophagitis    Hiatal hernia    Ascites    Nausea and vomiting    Constipation    Neutrophilic leukocytosis    Thrombocytosis     63 y.o. female with pancreatic cancer, malignant ascites.   S/Sx:  1. Pain -abdominal, neoplastic, ascites  -continue Hydromorphone 0.5mg IV q 2 hours prn pain   -until nausea controlled, use IV pain medication first  -continue Percocet 5-325mg PO q 4 hours prn (once tolerating PO intake)  -may use Tylenol for headache    2. Nausea -NG  clamped at this time  -continue Zofran 4mg IV q 6 hours prn N/V  -discontinued Compazine  -started Reglan 10mg IV q 6 hour prn N/V    3. Sore throat -started Chloraseptic spray to throat  -continue Magic Mouthwash    4. Hemorrhoids -started Anusol cream TN BID    5. Constipation -continue aggressive bowel regimen    6. GOC -Full Code/Full Support -per discussion with patient  -patient/spouse would like ACP pamphlet to review at home  -goal for continued full treatment    P: Follow up visit for symptom management and GOC. Patient symptomatic this morning with nausea and pain. Reviewed medications and adjustments as above, patient and spouse in agreement. Discussed LW/ACP and they would like a ACP pamphlet to review. Discussed code status and patient electing Full Code/Full Support at this time. Discussed burden versus benefit of CPR/MV in patient's with cancer. Emotional support provided throughout discussion. Patient and spouse hopeful for palliative chemotherapy today or tomorrow.   Palliative Care Team will continue to follow patient. Please do not hesitate to contact us regarding further sx mgmt or GOC needs.  Georgette Walker, APRN  12/28/2023  Time spent: 35 minutes

## 2023-12-28 NOTE — PLAN OF CARE
Goal Outcome Evaluation:  Plan of Care Reviewed With: patient        Progress: no change  Outcome Evaluation: Pt. resting well, NG out and doing well, tolerating clears and advanced to GI soft for dinner, PRN dikaudid given for pain and Reglan gien for nausea, PORT accessed today, plan is for chemo tomorrow

## 2023-12-28 NOTE — PROGRESS NOTES
Clinical Nutrition   Nutrition Assessment  Reason for Visit: MST score 2+, NPO/Clear liquid, Unintentional weight loss, Reduced oral intake    Patient Name: Jessica Kraft  YOB: 1960  MRN: [id number]  Date of Encounter: 12/28/23 14:18 EST  Admission date: 12/25/2023    Pt meets criteria severe malnutrition in the context of chronic illness, see MSA for full assessment. Of note pt first met criteria severe/acute malnutrition at recent admission 12/8/23, unfortunately continue to decline and now meets chronic criteria.     NPO/CLDX3d. Currently feeling much better and tolerating CLD, thinks she may be able to do solids tomorrow. Receptive to education for managing nutrition needs with symptoms. Agreeable to ONS as needed.     Nutrition Assessment   Admission Diagnosis:  Pancreatic cancer [C25.9]    Problem List:    Pancreatic cancer    Hypothyroidism due to Hashimoto's thyroiditis    Depression    Osteopenia    Insomnia    Anxiety    Peritoneal carcinomatosis    GERD without esophagitis    Hiatal hernia    Ascites    Nausea and vomiting    Constipation    Neutrophilic leukocytosis    Thrombocytosis      PMH:   She  has a past medical history of Abscess, Anxiety, Bilateral ovarian cysts, Depression, Encounter for routine gynecological examination, Foot pain, H/O bone density study (06/2014), H/O mammogram (07/2016), Hot flashes, menopausal, Hypothyroidism due to Hashimoto's thyroiditis, Insomnia, Miscarriage, Osteopenia, Pap smear for cervical cancer screening (06/2014), Routine general medical examination at a health care facility, Urinary incontinence, Urinary tract infection, and Vitamin D deficiency.    PSH:  She  has a past surgical history that includes Cholecystectomy (2008); Hernia repair (2010); Abdominoplasty (2001); Colonoscopy (07/2012); Ectopic pregnancy surgery (Left, 1994); and Esophagogastroduodenoscopy (N/A, 12/13/2023).    Applicable Nutrition Concerns:  "  Skin:  Oral:  GI:    Applicable Interval History:       Reported/Observed/Food/Nutrition Related History:     Visited with this pleasant pt at bedside. Pt is a recent readmission, recently dx with pancreatic cancer and unfortunately presented back to the hospital with malignant ascites, N/V/constipation. Had NG to LWS but clamped and started on clears this morning. Pt has had a very difficult past few days with nausea, constipation, and ascites. Tells me she is feeling significantly better today and tolerating liquids. She was able to move bowels after several days of aggressive bowel regimens and nausea better. S/p multiple paracentesis and does feel distention starting to build up again but greatly improved. Tells me has had to change eating habits over the past ~3 months. Has done okay managing nutrition just eats less at a time, did eat more poorly due to symptoms over this past month. Was able to have a good meal mary day, has not had solids since but feels she is ready to advance tomorrow pending continued tolerance. Agreeable to ONS, has tried boost at recent admission and liked. Receptive to education for managing nutrition needs with small frequent meals, power packing (energy/protein dense small meals), ONS as needed, eating slowly and saving liquids for between meals. Denies further dietary needs/preferences, NKFA. Plan is to start Chemo upon tolerating normal diet.     Anthropometrics     Height: Height: 167.6 cm (66\")  Last Filed Weight: Weight: 91.2 kg (201 lb) (12/25/23 1523)  Method: Weight Method: Stated  BMI: BMI (Calculated): 32.5  BMI classification: Obese Class I: 30-34.9kg/m2  IBW:   130 lb    UBW:  225 lb per pt, per EMR:   Weight       Weight (kg) Weight (lbs) Weight Method Visit Report   10/10/2023  212 lb MDOV    11/29/2023  214 lb MDOV    12/8/2023 98.975 kg  218 lb 3.2 oz  Standing scale     12/9/2023 97.569 kg  215 lb 1.6 oz  Bed scale     12/10/2023 97.523 kg  215 lb  Bed scale   "   12/11/2023 92.987 kg  205 lb  Standing scale     12/12/2023 93.804 kg  206 lb 12.8 oz  Standing scale     12/13/2023 104.826 kg  231 lb 1.6 oz  Bed scale      105 kg  231 lb 7.7 oz      12/14/2023 94.076 kg  207 lb 6.4 oz  Standing scale     12/15/2023 92.08 kg  203 lb  Standing scale     12/19/2023 91.173 kg  201 lb  MDOV --    12/22/2023 91.173 kg  201 lb  Stated     12/25/2023 91.173 kg  201 lb  Stated       Weight change: weight loss of 13 lbs (6.1%) over the past 1 month(s)    Intentional?  No    Nutrition Focused Physical Exam     Date:  12/28       Patient meets criteria for malnutrition diagnosis, see MSA note.     Current Nutrition Prescription   PO: Diet: Liquid Diets; Clear Liquid; Fluid Consistency: Thin (IDDSI 0)  Oral Nutrition Supplement:   Intake: 12.5% X 2 meals documented (1 at 0% 1 at 25% of liquids)    Nutrition Diagnosis   Date:  12/28            Updated:    Problem Malnutrition  severe/chronic   Etiology Energy needs>energy intake 2/2 anorexia, early satiety, intermittent N/V/abdominal pain in setting of pancreatic cancer mets omental/peritoneal with ascites   Signs/Symptoms PO intakes <75% EEN >/=1 month and loss 6.1% body weight past month, moderate muscle wasting, and moderate subcutaneous fat loss.    Status: New    Goal:   General: Nutrition to support treatment  PO: Tolerate PO, Increase intake, Advace diet as medically feasible/appropriate  EN/PN: N/A    Nutrition Intervention      Follow treatment progress, Care plan reviewed, Advise alternate selection, Advised available snacks, Interview for preferences, Encourage intake, Supplement provided, Education provided for malnutrition, managing nutrition needs with pancreatic CA/ascites symptoms    Breeze B/D => Boost once diet advanced    Monitoring/Evaluation:   Per protocol, I&O, PO intake, Supplement intake, Pertinent labs, Weight, GI status, Symptoms, POC/GOC      Yaneli Kapadia RD, CNSC  Time Spent: 35m

## 2023-12-28 NOTE — PROGRESS NOTES
[facility] Medicine Services  PROGRESS NOTE    Patient Name: Jessica Kraft  : 1960  MRN: [id number]    Date of Admission: 2023  Primary Care Physician: Leonarda Díaz MD    Subjective   Subjective     CC:  Abd pain    HPI:  NGT placed yesterday afternoon with 500cc out and patient feeling somewhat improved- now she is bothered by throat/mouth pain from NG. Had 2 small BM. Asking for ice  Will clamp NG and hope for removal soon. Oncology considering initiating chemo soon    ROS  As above      Objective   Objective     Vital Signs:   Temp:  [96.9 °F (36.1 °C)-98.2 °F (36.8 °C)] 96.9 °F (36.1 °C)  Heart Rate:  [] 99  Resp:  [16-18] 16  BP: (137-167)/(84-98) 146/92     Physical Exam:  Constitutional: Awake, alert, resting in bed, NG in place   HENT: NCAT, mucous membranes moist  Respiratory: Clear to auscultation bilaterally, respiratory effort normal   Cardiovascular: RRR, palpable radial pulse  Gastrointestinal: Positive bowel sounds, soft, moderately distended, BS present  Musculoskeletal: BL LE edema  Psychiatric: Appropriate affect, cooperative  Neurologic: Speech clear and fluent    Results Reviewed:  LAB RESULTS:      Lab 23  0852 23  0544 23  1535 23  1251   WBC 10.80 14.29* 11.46* 13.80*   HEMOGLOBIN 12.9 13.2 14.0 13.3   HEMATOCRIT 41.0 42.1 44.8 41.9   PLATELETS 315 578* 589* 476*   NEUTROS ABS 8.23* 11.91* 8.76* 11.05*   IMMATURE GRANS (ABS) 0.19* 0.09* 0.14* 0.15*   LYMPHS ABS 1.11 1.18 1.47 1.23   MONOS ABS 1.13* 0.95* 0.87 0.96*   EOS ABS 0.09 0.10 0.16 0.34   MCV 86.7 87.2 87.3 87.7   LACTATE  --   --  2.0 1.4   PROTIME  --   --   --  13.5   APTT  --   --   --  29.1*         Lab 23  0544 23  1535 23  1251   SODIUM 134* 133* 133*   POTASSIUM 4.8 3.7 4.1   CHLORIDE 97* 94* 96*   CO2 26.0 28.0 26.0   ANION GAP 11.0 11.0 11.0   BUN 15 17 13   CREATININE 0.70 0.71 0.72   EGFR 97.9 96.3 94.7   GLUCOSE 125* 117* 122*    CALCIUM 8.5* 9.1 8.8   MAGNESIUM 1.8  --   --          Lab 12/26/23  0544 12/25/23  1535 12/22/23  1251   TOTAL PROTEIN 6.2 6.8 6.8   ALBUMIN 2.8* 3.2* 3.1*   GLOBULIN 3.4 3.6 3.7   ALT (SGPT) 11 12 18   AST (SGOT) 22 15 27   BILIRUBIN 0.4 0.3 0.3   ALK PHOS 224* 231* 218*   LIPASE  --  12* 13         Lab 12/22/23  1251   PROTIME 13.5   INR 1.02                 Brief Urine Lab Results  (Last result in the past 365 days)        Color   Clarity   Blood   Leuk Est   Nitrite   Protein   CREAT   Urine HCG        12/25/23 1654 Yellow   Clear   Negative   Negative   Negative   Trace                   Microbiology Results Abnormal       None            XR Abdomen KUB    Result Date: 12/27/2023  XR ABDOMEN KUB Date of Exam: 12/27/2023 6:40 PM EST Indication: abdominal pain Comparison: CT abdomen pelvis 12/25/2023. Findings: Gas and stool filled large bowel in a nonobstructive pattern. Cholecystectomy clips. Left greater right pleural effusions.     Impression: Impression: Gas and stool filled colon in a nonobstructive pattern. Electronically Signed: Mansoor Shelley MD  12/27/2023 7:24 PM EST  Workstation ID: [id number]    IR Port Placement    Result Date: 12/26/2023  IR PORT PLACEMENT  History: starting chemo 12/27  : Rajat Bhandari MD.  Modality: Sonography and fluoroscopy  DOSE REDUCTION: The examination was performed according to departmental dose-optimization program which includes automated exposure control, adjustment of the mA and/or kV. Fluoro time: 0.0 Radiation dose: 0.1 mGy air Kerma.  SEDATION: Moderate sedation was administered. 2 milligram of Versed and 100 micrograms of fentanyl IV was used for moderate sedation. Total intra service time of sedation was 20 minutes. The sedation was administered and the patient's vital signs monitored throughout the procedure and recorded in the patient's medical record by the nurse under my direct supervision.  Anesthesia: Lidocaine 1% with epinephrine, local  infiltration Medicines: None      Estimated blood loss:  < 5 cc.        Technique: A thorough discussion of the risks, benefits, and alternatives of the procedure, and if applicable, moderate sedation, was carried out with the patient. They were encouraged to ask any questions. Any questions were answered. They verbalized understanding. A written informed consent was then signed.  A multi-component timeout was performed prior to starting the procedure using the departmental protocol. The procedure room personnel used personal protective equipment. The operators used sterile gloves and if indicated, sterile gowns. The surgical site was prepped with chlorhexidine gluconate  and draped in the maximal applicable sterile fashion. A preliminary ultrasonogram was performed of the neck that revealed a patent and compressible internal jugular vein. Pertinent ultrasound images were stored in the PACS for documentation. Using aseptic precautions, real-time ultrasound guidance, the internal jugular vein was accessed with a micropuncture needle after local anesthetic infiltration. A 018 guidewire was advanced into the central venous system under fluoroscopic guidance. Over the wire a micropuncture sheath was placed. Through the micropuncture sheath, a 035 wire was advanced into the venous system under fluoroscopic guidance. Over the wire a peel-away sheath was placed. After local anesthesia, using sharp and blunt dissection, a reservoir pocket of appropriate size was created in the infra clavicular chest wall. The port catheter was connected to the port reservoir. The catheter was tunneled to the venotomy site using a  tunneling device. The the reservoir was placed in the reservoir pocket. The catheter was trimmed to an appropriate length. The catheter was advanced into the venous system through the peel-away sheath which was removed. The port aspirated and flushed well and was terminally packed with heparin 100 units per cc,  total 500 units. The port reservoir was irrigated with saline. The incision was closed in layers using absorbable suture and Dermabond. The venotomy site was closed using Dermabond. An aseptic dressing was applied using the protocol for Dermabond. The patient was transferred to the recovery area and was discharged from the department in stable condition.  Complications: None immediate.    Findings: Patent and compressible internal jugular vein. Final image shows the ale catheter to be in good position with the catheter tip at the cavoatrial junction/right atrium, an excellent position for use. There is no immediate complication.      Impression: Impression:    Successful ultrasound and fluoroscopic guided right internal jugular vein route single lumen Port-A-Cath placement as described above. Thank you for the opportunity to assist in the care of your patient. Electronically Signed: Rajat Bhandari MD  12/26/2023 3:38 PM EST  Workstation ID: [id number]    US Paracentesis    Result Date: 12/26/2023  US PARACENTESIS  History: ascites/ metastatic pancreatic cancer  : Rajat Bhandari MD.  Modality: Ultrasonography                   Sedation: None. Anesthesia: Lidocaine 1% local infiltration. Estimated blood loss:  0 cc.        Technique: A thorough discussion of the risks, benefits, and alternatives of the procedure, and if applicable, moderate sedation, was carried out with the patient. They were encouraged to ask any questions. Any questions were answered. They verbalized understanding. A written informed consent was then signed.  A multi-component timeout was performed prior to starting the procedure using the departmental protocol. The procedure room personnel used personal protective equipment. The operators used sterile gloves and if indicated, sterile gowns. The surgical site was prepped with chlorhexidine gluconate  and draped in the maximal applicable sterile fashion. A preliminary ultrasonography  was performed to assess the target and determine a safe access site. It showed ascites. Pertinent ultrasound images were stored to the PACS for documentation. The access site was sterilely prepped and draped. Local anesthesia was administered. A dermatotomy was performed if needed. A catheter over the needle system was advanced into the peritoneal cavity. Straw colored fluid was aspirated and the plastic catheter advanced into the peritoneum. The catheter was then connected to a fluid recovery system. At the end of the procedure, the catheter was withdrawn and an aseptic dressing applied. The patient tolerated the procedure well. After uneventful recovery recovery, the patient was discharged from the department in stable condition. The total amount of fluid recovered is given below. Albumin was infused intravenously if ordered by the referring doctor. Complications: None immediate. Specimen: The specimen was labeled and sent to the lab in appropriate carriers & containers if such was requested by the ordering provider.     Impression: Impression:                                                              Successful ultrasound-guided paracentesis using a Right upper quadrant access with recovery of 3 liters of fluid as described above. Please note that the patient is developing septated loculated ascites. Thank you for the opportunity to assist in the care of your patient. Electronically Signed: Rajat Bhandari MD  12/26/2023 3:37 PM EST  Workstation ID: [id number]         Current medications:  Scheduled Meds:First Mouthwash (Magic Mouthwash), 5 mL, Swish & Spit, Q6H  Hydrocortisone (Perianal), , Rectal, BID  lactulose, 300 mL, Rectal, Once  levothyroxine, 125 mcg, Nasogastric, QAM  lidocaine, 1 application , Topical, Once  Naloxegol Oxalate, 25 mg, Nasogastric, QAM  pantoprazole, 40 mg, Intravenous, Q AM  senna-docusate sodium, 2 tablet, Nasogastric, BID   And  polyethylene glycol, 17 g, Nasogastric,  BID  rosuvastatin, 5 mg, Nasogastric, Nightly  sodium chloride, 10 mL, Intravenous, Q12H  venlafaxine, 75 mg, Nasogastric, BID      Continuous Infusions:     PRN Meds:.  acetaminophen    ALPRAZolam    senna-docusate sodium **AND** polyethylene glycol **AND** bisacodyl **AND** bisacodyl    Calcium Replacement - Follow Nurse / BPA Driven Protocol    cyclobenzaprine    HYDROmorphone **AND** naloxone    lactulose    magnesium hydroxide    Magnesium Standard Dose Replacement - Follow Nurse / BPA Driven Protocol    melatonin    metoclopramide    ondansetron    oxyCODONE-acetaminophen    phenol    Phosphorus Replacement - Follow Nurse / BPA Driven Protocol    Potassium Replacement - Follow Nurse / BPA Driven Protocol    Sodium Chloride (PF)    sodium chloride    sodium chloride    Assessment & Plan   Assessment & Plan     Active Hospital Problems    Diagnosis  POA    **Pancreatic cancer [C25.9]  Yes    Nausea and vomiting [R11.2]  Yes    Constipation [K59.00]  Yes    Neutrophilic leukocytosis [D72.9]  Yes    Thrombocytosis [D75.839]  Yes    GERD without esophagitis [K21.9]  Yes    Hiatal hernia [K44.9]  Yes    Ascites [R18.8]  Yes    Peritoneal carcinomatosis [C78.6]  Yes    Osteopenia [M85.80]  Yes    Insomnia [G47.00]  Yes    Anxiety [F41.9]  Yes    Hypothyroidism due to Hashimoto's thyroiditis [E03.8, E06.3]  Yes    Depression [F32.A]  Yes      Resolved Hospital Problems   No resolved problems to display.        Brief Hospital Course to date:  Jessica Kraft is a 63 y.o. female w/ Hx anxiety, depression, hypothyroidism, GERD, recently diagnosed pancreatic cancer w/ peritoneal carcinomatosis and ascites (recent admit 12/7/23-12/15/23), she was planned for outpatient port placement and chemotherapy initiation; she presented early w/ inc abd pain, distention, vomiting, no BM's in several days; CT imaging showed ascites and large stool burden    This patient's problems and plans were partially entered by my partner and  updated as appropriate by me 12/28/23.      Assessment/Plan    Acute nausea and vomiting  Acute/chronic constipation  Recently Dx pancreatic cancer w/ omental and peritoneal carcinomatosis w/ ascites  -CT abd/pel w/ known malignancy findings, large stool burden without obstructive pattern. KUB done 12/27 with no findings of obstruction, however NG placed 12/27 afternoon with sig relief and 500 cc out. Will trial clamping NGT, trial of ice chips- if patient does well consider dc NG today  -cont symptomatic care  -palliative consulted  -s/p  IR port placement and paracentesis  -heme/onc consulted on admission, d/w Dr العراقي- planning to initiate chemo with tolerating normal diet     Addnedum -- patient tolerating ice chips, will remove NGT and trial clears and adv diet as tolerated     Hypothyroidism  -levothyroxine    GERD  Hiatal hernia  -PPI    Anxiety  Depression  -effexor    HLD  -- continue statin       Expected Discharge Location and Transportation: home  Expected Discharge   Expected Discharge Date: 1/2/2024; Expected Discharge Time:      DVT prophylaxis:  Mechanical DVT prophylaxis orders are present.     AM-PAC 6 Clicks Score (PT): 22 (12/28/23 8295)    CODE STATUS:   Code Status and Medical Interventions:   Ordered at: 12/25/23 9163     Level Of Support Discussed With:    Patient     Code Status (Patient has no pulse and is not breathing):    CPR (Attempt to Resuscitate)     Medical Interventions (Patient has pulse or is breathing):    Full Support       Faith Teague MD  12/28/23

## 2023-12-28 NOTE — PLAN OF CARE
Goal Outcome Evaluation:  Plan of Care Reviewed With: patient        Progress: no change  Outcome Evaluation: NG placed this shift, pt declines lactulose enema at th time she would prefer AM attempt, prn suppository given with small results, VSS, declines nausea and vomiting, ambulating in halls. NPO except ice chips.

## 2023-12-29 LAB
ALBUMIN SERPL-MCNC: 2.6 G/DL (ref 3.5–5.2)
ALBUMIN/GLOB SERPL: 0.9 G/DL
ALP SERPL-CCNC: 199 U/L (ref 39–117)
ALT SERPL W P-5'-P-CCNC: 8 U/L (ref 1–33)
ANION GAP SERPL CALCULATED.3IONS-SCNC: 9 MMOL/L (ref 5–15)
AST SERPL-CCNC: 12 U/L (ref 1–32)
BASOPHILS # BLD AUTO: 0.06 10*3/MM3 (ref 0–0.2)
BASOPHILS NFR BLD AUTO: 0.6 % (ref 0–1.5)
BILIRUB SERPL-MCNC: 0.2 MG/DL (ref 0–1.2)
BUN SERPL-MCNC: 17 MG/DL (ref 8–23)
BUN/CREAT SERPL: 25.4 (ref 7–25)
CALCIUM SPEC-SCNC: 8.2 MG/DL (ref 8.6–10.5)
CHLORIDE SERPL-SCNC: 96 MMOL/L (ref 98–107)
CO2 SERPL-SCNC: 26 MMOL/L (ref 22–29)
CREAT SERPL-MCNC: 0.67 MG/DL (ref 0.57–1)
DEPRECATED RDW RBC AUTO: 47.9 FL (ref 37–54)
EGFRCR SERPLBLD CKD-EPI 2021: 99 ML/MIN/1.73
EOSINOPHIL # BLD AUTO: 0.1 10*3/MM3 (ref 0–0.4)
EOSINOPHIL NFR BLD AUTO: 1 % (ref 0.3–6.2)
ERYTHROCYTE [DISTWIDTH] IN BLOOD BY AUTOMATED COUNT: 15.4 % (ref 12.3–15.4)
FUNGUS WND CULT: NORMAL
GLOBULIN UR ELPH-MCNC: 2.8 GM/DL
GLUCOSE SERPL-MCNC: 114 MG/DL (ref 65–99)
HCT VFR BLD AUTO: 40.4 % (ref 34–46.6)
HGB BLD-MCNC: 12.9 G/DL (ref 12–15.9)
IMM GRANULOCYTES # BLD AUTO: 0.21 10*3/MM3 (ref 0–0.05)
IMM GRANULOCYTES NFR BLD AUTO: 2.2 % (ref 0–0.5)
LYMPHOCYTES # BLD AUTO: 1.27 10*3/MM3 (ref 0.7–3.1)
LYMPHOCYTES NFR BLD AUTO: 13.1 % (ref 19.6–45.3)
MCH RBC QN AUTO: 27.4 PG (ref 26.6–33)
MCHC RBC AUTO-ENTMCNC: 31.9 G/DL (ref 31.5–35.7)
MCV RBC AUTO: 86 FL (ref 79–97)
MONOCYTES # BLD AUTO: 1.21 10*3/MM3 (ref 0.1–0.9)
MONOCYTES NFR BLD AUTO: 12.4 % (ref 5–12)
NEUTROPHILS NFR BLD AUTO: 6.88 10*3/MM3 (ref 1.7–7)
NEUTROPHILS NFR BLD AUTO: 70.7 % (ref 42.7–76)
NRBC BLD AUTO-RTO: 0 /100 WBC (ref 0–0.2)
PLATELET # BLD AUTO: 309 10*3/MM3 (ref 140–450)
PMV BLD AUTO: 9.2 FL (ref 6–12)
POTASSIUM SERPL-SCNC: 4.6 MMOL/L (ref 3.5–5.2)
PROT SERPL-MCNC: 5.4 G/DL (ref 6–8.5)
RBC # BLD AUTO: 4.7 10*6/MM3 (ref 3.77–5.28)
SODIUM SERPL-SCNC: 131 MMOL/L (ref 136–145)
WBC NRBC COR # BLD AUTO: 9.73 10*3/MM3 (ref 3.4–10.8)

## 2023-12-29 PROCEDURE — 25810000003 SODIUM CHLORIDE 0.9 % SOLUTION 250 ML FLEX CONT: Performed by: INTERNAL MEDICINE

## 2023-12-29 PROCEDURE — 25010000002 ONDANSETRON PER 1 MG: Performed by: INTERNAL MEDICINE

## 2023-12-29 PROCEDURE — 0 DEXTROSE 5 % SOLUTION 250 ML FLEX CONT: Performed by: INTERNAL MEDICINE

## 2023-12-29 PROCEDURE — 25010000002 FLUOROURACIL PER 500 MG: Performed by: INTERNAL MEDICINE

## 2023-12-29 PROCEDURE — 25010000002 LEUCOVORIN CALCIUM PER 50 MG: Performed by: INTERNAL MEDICINE

## 2023-12-29 PROCEDURE — 25010000002 DIPHENHYDRAMINE PER 50 MG: Performed by: INTERNAL MEDICINE

## 2023-12-29 PROCEDURE — 25010000002 LEUCOVORIN 200 MG RECONSTITUTED SOLUTION 200 MG VIAL: Performed by: INTERNAL MEDICINE

## 2023-12-29 PROCEDURE — 99232 SBSQ HOSP IP/OBS MODERATE 35: CPT | Performed by: INTERNAL MEDICINE

## 2023-12-29 PROCEDURE — 85025 COMPLETE CBC W/AUTO DIFF WBC: CPT | Performed by: INTERNAL MEDICINE

## 2023-12-29 PROCEDURE — 0 DEXTROSE 5 % SOLUTION: Performed by: INTERNAL MEDICINE

## 2023-12-29 PROCEDURE — 25010000002 HYDROMORPHONE PER 4 MG: Performed by: NURSE PRACTITIONER

## 2023-12-29 PROCEDURE — 25010000002 HYDROCORTISONE SOD SUC (PF) 100 MG RECONSTITUTED SOLUTION: Performed by: INTERNAL MEDICINE

## 2023-12-29 PROCEDURE — 25010000002 IRINOTECAN PER 20 MG: Performed by: INTERNAL MEDICINE

## 2023-12-29 PROCEDURE — 25010000002 PROCHLORPERAZINE 10 MG/2ML SOLUTION: Performed by: INTERNAL MEDICINE

## 2023-12-29 PROCEDURE — 25010000002 LEUCOVORIN 500 MG RECONSTITUTED SOLUTION 1 EACH VIAL: Performed by: INTERNAL MEDICINE

## 2023-12-29 PROCEDURE — 25010000002 ONDANSETRON PER 1 MG: Performed by: NURSE PRACTITIONER

## 2023-12-29 PROCEDURE — 25010000002 DEXAMETHASONE SODIUM PHOSPHATE 100 MG/10ML SOLUTION: Performed by: INTERNAL MEDICINE

## 2023-12-29 PROCEDURE — 25010000002 ATROPINE SULFATE 1 MG/ML SOLUTION: Performed by: INTERNAL MEDICINE

## 2023-12-29 PROCEDURE — 80053 COMPREHEN METABOLIC PANEL: CPT | Performed by: INTERNAL MEDICINE

## 2023-12-29 PROCEDURE — 3E03305 INTRODUCTION OF OTHER ANTINEOPLASTIC INTO PERIPHERAL VEIN, PERCUTANEOUS APPROACH: ICD-10-PCS | Performed by: INTERNAL MEDICINE

## 2023-12-29 PROCEDURE — 25010000002 OXALIPLATIN PER 0.5 MG: Performed by: INTERNAL MEDICINE

## 2023-12-29 PROCEDURE — 0 DEXTROSE 5 % SOLUTION 500 ML FLEX CONT: Performed by: INTERNAL MEDICINE

## 2023-12-29 RX ORDER — PALONOSETRON 0.05 MG/ML
0.25 INJECTION, SOLUTION INTRAVENOUS ONCE
Status: DISCONTINUED | OUTPATIENT
Start: 2023-12-29 | End: 2023-12-29

## 2023-12-29 RX ORDER — DEXTROSE MONOHYDRATE 50 MG/ML
20 INJECTION, SOLUTION INTRAVENOUS ONCE
Status: COMPLETED | OUTPATIENT
Start: 2023-12-29 | End: 2023-12-29

## 2023-12-29 RX ORDER — DIPHENHYDRAMINE HYDROCHLORIDE 50 MG/ML
50 INJECTION INTRAMUSCULAR; INTRAVENOUS AS NEEDED
Status: COMPLETED | OUTPATIENT
Start: 2023-12-29 | End: 2023-12-29

## 2023-12-29 RX ORDER — FAMOTIDINE 10 MG/ML
20 INJECTION, SOLUTION INTRAVENOUS AS NEEDED
Status: COMPLETED | OUTPATIENT
Start: 2023-12-29 | End: 2023-12-29

## 2023-12-29 RX ORDER — PROCHLORPERAZINE EDISYLATE 5 MG/ML
2.5 INJECTION INTRAMUSCULAR; INTRAVENOUS EVERY 6 HOURS PRN
Status: DISCONTINUED | OUTPATIENT
Start: 2023-12-29 | End: 2024-01-01 | Stop reason: HOSPADM

## 2023-12-29 RX ORDER — ATROPINE SULFATE 1 MG/ML
0.25 INJECTION, SOLUTION INTRAMUSCULAR; INTRAVENOUS; SUBCUTANEOUS
Status: COMPLETED | OUTPATIENT
Start: 2023-12-29 | End: 2023-12-29

## 2023-12-29 RX ORDER — LORAZEPAM 0.5 MG/1
0.5 TABLET ORAL EVERY 8 HOURS PRN
Status: DISCONTINUED | OUTPATIENT
Start: 2023-12-29 | End: 2024-01-01 | Stop reason: HOSPADM

## 2023-12-29 RX ADMIN — VENLAFAXINE HYDROCHLORIDE 75 MG: 75 TABLET ORAL at 09:18

## 2023-12-29 RX ADMIN — FLUOROURACIL 5000 MG: 50 INJECTION, SOLUTION INTRAVENOUS at 17:06

## 2023-12-29 RX ADMIN — DIPHENHYDRAMINE HYDROCHLORIDE 50 MG: 50 INJECTION INTRAMUSCULAR; INTRAVENOUS at 16:09

## 2023-12-29 RX ADMIN — DEXTROSE MONOHYDRATE 300 MG: 50 INJECTION, SOLUTION INTRAVENOUS at 14:32

## 2023-12-29 RX ADMIN — OXYCODONE AND ACETAMINOPHEN 1 TABLET: 5; 325 TABLET ORAL at 01:10

## 2023-12-29 RX ADMIN — ONDANSETRON 4 MG: 2 INJECTION INTRAMUSCULAR; INTRAVENOUS at 22:00

## 2023-12-29 RX ADMIN — Medication 10 ML: at 11:54

## 2023-12-29 RX ADMIN — DEXAMETHASONE SODIUM PHOSPHATE 12 MG: 10 INJECTION, SOLUTION INTRAMUSCULAR; INTRAVENOUS at 11:35

## 2023-12-29 RX ADMIN — ONDANSETRON 4 MG: 2 INJECTION INTRAMUSCULAR; INTRAVENOUS at 15:02

## 2023-12-29 RX ADMIN — HYDROMORPHONE HYDROCHLORIDE 0.5 MG: 1 INJECTION, SOLUTION INTRAMUSCULAR; INTRAVENOUS; SUBCUTANEOUS at 23:46

## 2023-12-29 RX ADMIN — LEUCOVORIN CALCIUM 800 MG: 500 INJECTION, POWDER, LYOPHILIZED, FOR SOLUTION INTRAMUSCULAR; INTRAVENOUS at 14:32

## 2023-12-29 RX ADMIN — OXYCODONE AND ACETAMINOPHEN 1 TABLET: 5; 325 TABLET ORAL at 06:13

## 2023-12-29 RX ADMIN — ATROPINE SULFATE 0.25 MG: 1 INJECTION, SOLUTION INTRAVENOUS at 18:29

## 2023-12-29 RX ADMIN — LEVOTHYROXINE SODIUM 125 MCG: 125 TABLET ORAL at 05:27

## 2023-12-29 RX ADMIN — PANTOPRAZOLE SODIUM 40 MG: 40 INJECTION, POWDER, FOR SOLUTION INTRAVENOUS at 05:28

## 2023-12-29 RX ADMIN — HYDROMORPHONE HYDROCHLORIDE 0.5 MG: 1 INJECTION, SOLUTION INTRAMUSCULAR; INTRAVENOUS; SUBCUTANEOUS at 18:46

## 2023-12-29 RX ADMIN — DEXTROSE MONOHYDRATE 20 ML/HR: 50 INJECTION, SOLUTION INTRAVENOUS at 11:06

## 2023-12-29 RX ADMIN — NALOXEGOL OXALATE 25 MG: 25 TABLET, FILM COATED ORAL at 05:27

## 2023-12-29 RX ADMIN — OXYCODONE AND ACETAMINOPHEN 1 TABLET: 5; 325 TABLET ORAL at 14:14

## 2023-12-29 RX ADMIN — ATROPINE SULFATE 0.25 MG: 1 INJECTION, SOLUTION INTRAVENOUS at 16:30

## 2023-12-29 RX ADMIN — HYDROCORTISONE SODIUM SUCCINATE 100 MG: 100 INJECTION, POWDER, FOR SOLUTION INTRAMUSCULAR; INTRAVENOUS at 16:11

## 2023-12-29 RX ADMIN — Medication 10 ML: at 21:50

## 2023-12-29 RX ADMIN — OXALIPLATIN 175 MG: 5 INJECTION, SOLUTION INTRAVENOUS at 12:15

## 2023-12-29 RX ADMIN — ONDANSETRON 16 MG: 2 INJECTION INTRAMUSCULAR; INTRAVENOUS at 11:53

## 2023-12-29 RX ADMIN — FAMOTIDINE 20 MG: 10 INJECTION, SOLUTION INTRAVENOUS at 16:07

## 2023-12-29 RX ADMIN — LORAZEPAM 0.5 MG: 0.5 TABLET ORAL at 14:26

## 2023-12-29 RX ADMIN — PROCHLORPERAZINE EDISYLATE 2.5 MG: 5 INJECTION INTRAMUSCULAR; INTRAVENOUS at 16:04

## 2023-12-29 NOTE — PLAN OF CARE
Problem: Palliative Care  Goal: Enhanced Quality of Life  Intervention: Optimize Psychosocial Wellbeing  Flowsheets (Taken 12/29/2023 1726)  Supportive Measures:   active listening utilized   verbalization of feelings encouraged   positive reinforcement provided   decision-making supported   goal-setting facilitated  Family/Support System Care:   support provided   involvement promoted   presence promoted     Problem: Adjustment to Illness (Sepsis/Septic Shock)  Goal: Optimal Coping  Intervention: Optimize Psychosocial Adjustment to Illness  Recent Flowsheet Documentation  Taken 12/29/2023 1726 by Claudia Padilla RN  Supportive Measures:   active listening utilized   verbalization of feelings encouraged   positive reinforcement provided   decision-making supported   goal-setting facilitated  Family/Support System Care:   support provided   involvement promoted   presence promoted   Goal Outcome Evaluation:  Plan of Care Reviewed With: patient        Progress: no change  Outcome Evaluation: Pt reports nausea improved and NG  removed but still has some mild chronic nausea. Pain managed with percocet and iv dilaudid.  Pt is starting iv chemo. Pt continues with constipation but chemo may cause some diarrhea.  Active listening and support.  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 He is very supportive. She has one son who is also very supportive and many friends/[employer] members.  Palliative IDT: Rn, MD ANGELICA,   After hours# [phone]

## 2023-12-29 NOTE — PROGRESS NOTES
[facility] Medicine Services  PROGRESS NOTE    Patient Name: Jessica Kraft  : 1960  MRN: [id number]    Date of Admission: 2023  Primary Care Physician: Leonarda Díaz MD    Subjective   Subjective     CC:  Abd pain    HPI:  Patient doing well. Tolerating diet well and NG now out. Chemo to start today    ROS  As above      Objective   Objective     Vital Signs:   Temp:  [96.9 °F (36.1 °C)-98.4 °F (36.9 °C)] 97 °F (36.1 °C)  Heart Rate:  [] 97  Resp:  [16-18] 16  BP: (119-150)/(81-96) 119/81     Physical Exam:  Constitutional: Awake, alert, resting in bed, NG removed   HENT: NCAT, mucous membranes moist  Respiratory: Clear to auscultation bilaterally, respiratory effort normal   Cardiovascular: RRR, palpable radial pulse  Gastrointestinal: Positive bowel sounds, soft, moderately distended, BS present  Musculoskeletal: BL LE edema  Psychiatric: Appropriate affect, cooperative  Neurologic: Speech clear and fluent    Results Reviewed:  LAB RESULTS:      Lab 23  0629 23  0852 23  0544 23  1535 23  1251   WBC 9.73 10.80 14.29* 11.46* 13.80*   HEMOGLOBIN 12.9 12.9 13.2 14.0 13.3   HEMATOCRIT 40.4 41.0 42.1 44.8 41.9   PLATELETS 309 315 578* 589* 476*   NEUTROS ABS 6.88 8.23* 11.91* 8.76* 11.05*   IMMATURE GRANS (ABS) 0.21* 0.19* 0.09* 0.14* 0.15*   LYMPHS ABS 1.27 1.11 1.18 1.47 1.23   MONOS ABS 1.21* 1.13* 0.95* 0.87 0.96*   EOS ABS 0.10 0.09 0.10 0.16 0.34   MCV 86.0 86.7 87.2 87.3 87.7   LACTATE  --   --   --  2.0 1.4   PROTIME  --   --   --   --  13.5   APTT  --   --   --   --  29.1*         Lab 23  0629 23  0852 23  0544 23  1535 23  1251   SODIUM 131* 134* 134* 133* 133*   POTASSIUM 4.6 4.3 4.8 3.7 4.1   CHLORIDE 96* 96* 97* 94* 96*   CO2 26.0 26.0 26.0 28.0 26.0   ANION GAP 9.0 12.0 11.0 11.0 11.0   BUN 17 15 15 17 13   CREATININE 0.67 0.61 0.70 0.71 0.72   EGFR 99.0 101.2 97.9 96.3 94.7   GLUCOSE 114* 108*  125* 117* 122*   CALCIUM 8.2* 8.5* 8.5* 9.1 8.8   MAGNESIUM  --   --  1.8  --   --          Lab 12/29/23  0629 12/28/23  0852 12/26/23  0544 12/25/23  1535 12/22/23  1251   TOTAL PROTEIN 5.4* 5.9* 6.2 6.8 6.8   ALBUMIN 2.6* 2.7* 2.8* 3.2* 3.1*   GLOBULIN 2.8 3.2 3.4 3.6 3.7   ALT (SGPT) 8 7 11 12 18   AST (SGOT) 12 11 22 15 27   BILIRUBIN 0.2 0.2 0.4 0.3 0.3   ALK PHOS 199* 209* 224* 231* 218*   LIPASE  --   --   --  12* 13         Lab 12/22/23  1251   PROTIME 13.5   INR 1.02                 Brief Urine Lab Results  (Last result in the past 365 days)        Color   Clarity   Blood   Leuk Est   Nitrite   Protein   CREAT   Urine HCG        12/25/23 1654 Yellow   Clear   Negative   Negative   Negative   Trace                   Microbiology Results Abnormal       None            XR Abdomen KUB    Result Date: 12/27/2023  XR ABDOMEN KUB Date of Exam: 12/27/2023 6:40 PM EST Indication: abdominal pain Comparison: CT abdomen pelvis 12/25/2023. Findings: Gas and stool filled large bowel in a nonobstructive pattern. Cholecystectomy clips. Left greater right pleural effusions.     Impression: Impression: Gas and stool filled colon in a nonobstructive pattern. Electronically Signed: Mansoor Shelley MD  12/27/2023 7:24 PM EST  Workstation ID: [id number]         Current medications:  Scheduled Meds:dexAMETHasone, 12 mg, Intravenous, Once  dextrose, 20 mL/hr, Intravenous, Once  First Mouthwash (Magic Mouthwash), 5 mL, Swish & Spit, Q6H  fluorouracil (ADRUCIL) 5,000 mg in sodium chloride 0.9 % 230 mL chemo infusion - FOR HOME USE, 5,000 mg, Intravenous, Once  Hydrocortisone (Perianal), , Rectal, BID  irinotecan (CAMPTOSAR) 300 mg in dextrose (D5W) 5 % 515 mL chemo IVPB, 300 mg, Intravenous, Once  lactulose, 300 mL, Rectal, Once  leucovorin 800 mg in dextrose (D5W) 5 % 330 mL IVPB, 800 mg, Intravenous, Once  levothyroxine, 125 mcg, Nasogastric, QAM  Naloxegol Oxalate, 25 mg, Nasogastric, QAM  ondansetron, 16 mg, Intravenous,  Once  OXALIplatin (ELOXATIN) 175 mg in dextrose (D5W) 5 % 285 mL chemo IVPB, 85 mg/m2 (Treatment Plan Recorded), Intravenous, Once  pantoprazole, 40 mg, Intravenous, Q AM  senna-docusate sodium, 2 tablet, Nasogastric, BID   And  polyethylene glycol, 17 g, Nasogastric, BID  rosuvastatin, 5 mg, Nasogastric, Nightly  sodium chloride, 10 mL, Intravenous, Q12H  venlafaxine, 75 mg, Nasogastric, BID      Continuous Infusions:     PRN Meds:.  acetaminophen    ALPRAZolam    atropine    senna-docusate sodium **AND** polyethylene glycol **AND** bisacodyl **AND** bisacodyl    Calcium Replacement - Follow Nurse / BPA Driven Protocol    cyclobenzaprine    diphenhydrAMINE    famotidine    Hydrocortisone Sod Suc (PF)    HYDROmorphone **AND** naloxone    lactulose    magnesium hydroxide    Magnesium Standard Dose Replacement - Follow Nurse / BPA Driven Protocol    melatonin    metoclopramide    ondansetron    oxyCODONE-acetaminophen    phenol    Phosphorus Replacement - Follow Nurse / BPA Driven Protocol    Potassium Replacement - Follow Nurse / BPA Driven Protocol    Sodium Chloride (PF)    sodium chloride    sodium chloride    Assessment & Plan   Assessment & Plan     Active Hospital Problems    Diagnosis  POA    **Pancreatic cancer [C25.9]  Yes    Nausea and vomiting [R11.2]  Yes    Constipation [K59.00]  Yes    Neutrophilic leukocytosis [D72.9]  Yes    Thrombocytosis [D75.839]  Yes    GERD without esophagitis [K21.9]  Yes    Hiatal hernia [K44.9]  Yes    Ascites [R18.8]  Yes    Peritoneal carcinomatosis [C78.6]  Yes    Osteopenia [M85.80]  Yes    Insomnia [G47.00]  Yes    Anxiety [F41.9]  Yes    Hypothyroidism due to Hashimoto's thyroiditis [E03.8, E06.3]  Yes    Depression [F32.A]  Yes      Resolved Hospital Problems   No resolved problems to display.        Brief Hospital Course to date:  Jessica Kraft is a 63 y.o. female w/ Hx anxiety, depression, hypothyroidism, GERD, recently diagnosed pancreatic cancer w/ peritoneal  carcinomatosis and ascites (recent admit 12/7/23-12/15/23), she was planned for outpatient port placement and chemotherapy initiation; she presented early w/ inc abd pain, distention, vomiting, no BM's in several days; CT imaging showed ascites and large stool burden    This patient's problems and plans were partially entered by my partner and updated as appropriate by me 12/29/23.      Assessment/Plan    Acute nausea and vomiting  Acute/chronic constipation  Recently Dx pancreatic cancer w/ omental and peritoneal carcinomatosis w/ ascites  -CT abd/pel w/ known malignancy findings, large stool burden without obstructive pattern. KUB done 12/27 with no findings of obstruction, however NG placed 12/27 afternoon with sig relief and 500 cc out. Will trial clamping NGT, trial of ice chips- if patient does well consider dc NG today  -cont symptomatic care  -palliative consulted  -s/p  IR port placement and paracentesis  -heme/onc consulted on admission, d/w Dr العراقي-chemo initiating today, will go for 48h      Hypothyroidism  -levothyroxine    GERD  Hiatal hernia  -PPI    Anxiety  Depression  -effexor    HLD  -- continue statin       Expected Discharge Location and Transportation: home  Expected Discharge   Expected Discharge Date: 1/2/2024; Expected Discharge Time:      DVT prophylaxis:  Mechanical DVT prophylaxis orders are present.     AM-PAC 6 Clicks Score (PT): 23 (12/28/23 1937)    CODE STATUS:   Code Status and Medical Interventions:   Ordered at: 12/25/23 5417     Level Of Support Discussed With:    Patient     Code Status (Patient has no pulse and is not breathing):    CPR (Attempt to Resuscitate)     Medical Interventions (Patient has pulse or is breathing):    Full Support       Faith Teague MD  12/29/23

## 2023-12-29 NOTE — NURSING NOTE
Chemotherapy teaching reviewed with patient for Eloxatin, camptosar, and Adrucil.  All questions answered.

## 2023-12-29 NOTE — PROGRESS NOTES
"HEMATOLOGY/ONCOLOGY PROGRESS NOTE    S: She is feeling much better.  Eating and drinking small amounts but still with early satiety.  She does not feel the ascites fluid is reaccumulating.  No fever or chills.  No other new concerns.  Chemotherapy starting this afternoon.  She has questions regarding schedule, side effects.  Her son is in the process of scheduling a second opinion at [facility] and she has some questions regarding the logistics of this as it relates to treatment scheduling.    Medications:  The current medication list was reviewed in the EMR    ALLERGIES:  No Known Allergies      Physical Exam    VITAL SIGNS:  /89 (BP Location: Right arm, Patient Position: Sitting)   Pulse 92   Temp 96.6 °F (35.9 °C) (Temporal)   Resp 16   Ht 167.6 cm (66\")   Wt 91.2 kg (201 lb)   LMP  (LMP Unknown) Comment: N/A  SpO2 92%   BMI 32.44 kg/m²   Temp:  [96.6 °F (35.9 °C)-98.4 °F (36.9 °C)] 96.6 °F (35.9 °C)      Performance Status:                Physical Exam    General: well appearing, in no acute distress  HEENT: sclerae anicteric, neck is supple.  NG tube in place.  Lymphatics: no cervical, supraclavicular, or axillary adenopathy  Cardiovascular: regular rate and rhythm, no murmurs, rubs or gallops  Lungs: clear to auscultation bilaterally  Abdomen: Nondistended. +Ascites  Positive bowel sounds.  Extremities: no lower extremity edema  Skin: no rashes, lesions, bruising, or petechiae  Msk:  Shows no weakness of the large muscle groups  Psych: Mood is stable        RECENT LABS:    Lab Results   Component Value Date    HGB 12.9 12/29/2023    HCT 40.4 12/29/2023    MCV 86.0 12/29/2023     12/29/2023    WBC 9.73 12/29/2023    NEUTROABS 6.88 12/29/2023    LYMPHSABS 1.27 12/29/2023    MONOSABS 1.21 (H) 12/29/2023    EOSABS 0.10 12/29/2023    BASOSABS 0.06 12/29/2023       Lab Results   Component Value Date    GLUCOSE 114 (H) 12/29/2023    BUN 17 12/29/2023    CREATININE 0.67 12/29/2023     (L) " 12/29/2023    K 4.6 12/29/2023    CL 96 (L) 12/29/2023    CO2 26.0 12/29/2023    CALCIUM 8.2 (L) 12/29/2023    PROTEINTOT 5.4 (L) 12/29/2023    ALBUMIN 2.6 (L) 12/29/2023    BILITOT 0.2 12/29/2023    ALKPHOS 199 (H) 12/29/2023    AST 12 12/29/2023    ALT 8 12/29/2023         Assessment/Plan  Pancreatic mass  2.  Peritoneal/omental implants  3.  Indeterminate lung nodules  4.  Indeterminate liver hypodensities  5.  Malignant Ascites  6. Chemotherapy monitoring  -We discussed the clinical impression of metastatic malignancy. Cytology confirms malignant ascites with upper GI/pancreatobiliary source favored. EGD with pancreatic EUS/biopsy to with difficult to access pancreatic mass and atypical cells.  No other candidate primary site identified on upper endoscopy.  Planning palliative chemotherapy with FOLFIRINOX first-line.  She was scheduled to initiate this as an outpatient However she returned in the interim with recurrent malignant ascites.  We discussed the option of peritoneal drain placement, but given plan for chemotherapy initiation she would like to defer this to see if her symptoms and paracentesis needs decrease with therapy initiation.  -CBC and CMP reviewed and adequate to proceed with cycle # 1 today.  We reviewed schedule and side effects of therapy.  She will initiate cycle #1 today.  Anticipate she may be able to be discharged on Sunday if her treatment is tolerated well.  Return to clinic in 1 week for follow-up.  Anticipate cycle #2 on 1/16     7. Access  Port     8. Constipation  -Continue bowel regimen.    Sherri العراقي MD  [facility] Hematology and Oncology    12/29/2023

## 2023-12-29 NOTE — PLAN OF CARE
VSS.  Ambulating independently.   Voiding.   Tolerating diet.  No bowel movements this shift.   Anticipate chemo today.

## 2023-12-29 NOTE — CASE MANAGEMENT/SOCIAL WORK
Continued Stay Note   Chon     Patient Name: Jessica Kraft  MRN: [id number]  Today's Date: 12/29/2023    Admit Date: 12/25/2023    Plan: Home   Discharge Plan       Row Name 12/29/23 5291       Plan    Plan Home    Plan Comments SW met with pt and her spouse today.  Pt was about to begin chemo. Pt denies having any discharge needs at this time.  Her plan remains to return home at discharge with her spouse's support.  ANNA/CM team remains available should any needs arise.    Final Discharge Disposition Code 01 - home or self-care                   Discharge Codes    No documentation.                 Expected Discharge Date and Time       Expected Discharge Date Expected Discharge Time    Jan 2, 2024               SITA Gomez

## 2023-12-30 LAB
ALBUMIN SERPL-MCNC: 2.8 G/DL (ref 3.5–5.2)
ALBUMIN/GLOB SERPL: 0.9 G/DL
ALP SERPL-CCNC: 204 U/L (ref 39–117)
ALT SERPL W P-5'-P-CCNC: 7 U/L (ref 1–33)
ANION GAP SERPL CALCULATED.3IONS-SCNC: 12 MMOL/L (ref 5–15)
AST SERPL-CCNC: 11 U/L (ref 1–32)
BASOPHILS # BLD AUTO: 0.06 10*3/MM3 (ref 0–0.2)
BASOPHILS NFR BLD AUTO: 0.5 % (ref 0–1.5)
BILIRUB SERPL-MCNC: 0.2 MG/DL (ref 0–1.2)
BUN SERPL-MCNC: 17 MG/DL (ref 8–23)
BUN/CREAT SERPL: 25 (ref 7–25)
CALCIUM SPEC-SCNC: 8.5 MG/DL (ref 8.6–10.5)
CHLORIDE SERPL-SCNC: 95 MMOL/L (ref 98–107)
CO2 SERPL-SCNC: 24 MMOL/L (ref 22–29)
CREAT SERPL-MCNC: 0.68 MG/DL (ref 0.57–1)
DEPRECATED RDW RBC AUTO: 45.6 FL (ref 37–54)
EGFRCR SERPLBLD CKD-EPI 2021: 98.6 ML/MIN/1.73
EOSINOPHIL # BLD AUTO: 0.05 10*3/MM3 (ref 0–0.4)
EOSINOPHIL NFR BLD AUTO: 0.4 % (ref 0.3–6.2)
ERYTHROCYTE [DISTWIDTH] IN BLOOD BY AUTOMATED COUNT: 15.1 % (ref 12.3–15.4)
GLOBULIN UR ELPH-MCNC: 3 GM/DL
GLUCOSE SERPL-MCNC: 109 MG/DL (ref 65–99)
HCT VFR BLD AUTO: 38.3 % (ref 34–46.6)
HGB BLD-MCNC: 12.4 G/DL (ref 12–15.9)
IMM GRANULOCYTES # BLD AUTO: 0.26 10*3/MM3 (ref 0–0.05)
IMM GRANULOCYTES NFR BLD AUTO: 2.2 % (ref 0–0.5)
LYMPHOCYTES # BLD AUTO: 1.15 10*3/MM3 (ref 0.7–3.1)
LYMPHOCYTES NFR BLD AUTO: 9.9 % (ref 19.6–45.3)
MCH RBC QN AUTO: 26.8 PG (ref 26.6–33)
MCHC RBC AUTO-ENTMCNC: 32.4 G/DL (ref 31.5–35.7)
MCV RBC AUTO: 82.7 FL (ref 79–97)
MONOCYTES # BLD AUTO: 1.36 10*3/MM3 (ref 0.1–0.9)
MONOCYTES NFR BLD AUTO: 11.7 % (ref 5–12)
NEUTROPHILS NFR BLD AUTO: 75.3 % (ref 42.7–76)
NEUTROPHILS NFR BLD AUTO: 8.79 10*3/MM3 (ref 1.7–7)
NRBC BLD AUTO-RTO: 0 /100 WBC (ref 0–0.2)
PLATELET # BLD AUTO: 321 10*3/MM3 (ref 140–450)
PMV BLD AUTO: 9.1 FL (ref 6–12)
POTASSIUM SERPL-SCNC: 4.4 MMOL/L (ref 3.5–5.2)
PROT SERPL-MCNC: 5.8 G/DL (ref 6–8.5)
QT INTERVAL: 346 MS
QTC INTERVAL: 437 MS
RBC # BLD AUTO: 4.63 10*6/MM3 (ref 3.77–5.28)
SODIUM SERPL-SCNC: 131 MMOL/L (ref 136–145)
WBC NRBC COR # BLD AUTO: 11.67 10*3/MM3 (ref 3.4–10.8)

## 2023-12-30 PROCEDURE — 93010 ELECTROCARDIOGRAM REPORT: CPT | Performed by: INTERNAL MEDICINE

## 2023-12-30 PROCEDURE — 25010000002 PROCHLORPERAZINE 10 MG/2ML SOLUTION: Performed by: INTERNAL MEDICINE

## 2023-12-30 PROCEDURE — 25010000002 ONDANSETRON PER 1 MG: Performed by: NURSE PRACTITIONER

## 2023-12-30 PROCEDURE — 99232 SBSQ HOSP IP/OBS MODERATE 35: CPT | Performed by: INTERNAL MEDICINE

## 2023-12-30 PROCEDURE — 80053 COMPREHEN METABOLIC PANEL: CPT | Performed by: INTERNAL MEDICINE

## 2023-12-30 PROCEDURE — 93005 ELECTROCARDIOGRAM TRACING: CPT | Performed by: INTERNAL MEDICINE

## 2023-12-30 PROCEDURE — 85025 COMPLETE CBC W/AUTO DIFF WBC: CPT | Performed by: INTERNAL MEDICINE

## 2023-12-30 PROCEDURE — 25010000002 HYDROMORPHONE PER 4 MG: Performed by: NURSE PRACTITIONER

## 2023-12-30 PROCEDURE — 25010000002 PROMETHAZINE PER 50 MG: Performed by: NURSE PRACTITIONER

## 2023-12-30 RX ADMIN — LEVOTHYROXINE SODIUM 125 MCG: 125 TABLET ORAL at 05:32

## 2023-12-30 RX ADMIN — PROCHLORPERAZINE EDISYLATE 2.5 MG: 5 INJECTION INTRAMUSCULAR; INTRAVENOUS at 15:49

## 2023-12-30 RX ADMIN — HYDROMORPHONE HYDROCHLORIDE 0.5 MG: 1 INJECTION, SOLUTION INTRAMUSCULAR; INTRAVENOUS; SUBCUTANEOUS at 05:39

## 2023-12-30 RX ADMIN — NALOXEGOL OXALATE 25 MG: 25 TABLET, FILM COATED ORAL at 05:32

## 2023-12-30 RX ADMIN — HYDROMORPHONE HYDROCHLORIDE 0.5 MG: 1 INJECTION, SOLUTION INTRAMUSCULAR; INTRAVENOUS; SUBCUTANEOUS at 08:56

## 2023-12-30 RX ADMIN — PROCHLORPERAZINE EDISYLATE 2.5 MG: 5 INJECTION INTRAMUSCULAR; INTRAVENOUS at 08:57

## 2023-12-30 RX ADMIN — HYDROMORPHONE HYDROCHLORIDE 0.5 MG: 1 INJECTION, SOLUTION INTRAMUSCULAR; INTRAVENOUS; SUBCUTANEOUS at 21:05

## 2023-12-30 RX ADMIN — PROMETHAZINE HYDROCHLORIDE 12.5 MG: 25 INJECTION INTRAMUSCULAR; INTRAVENOUS at 16:24

## 2023-12-30 RX ADMIN — VENLAFAXINE HYDROCHLORIDE 75 MG: 75 TABLET ORAL at 20:23

## 2023-12-30 RX ADMIN — PANTOPRAZOLE SODIUM 40 MG: 40 INJECTION, POWDER, FOR SOLUTION INTRAVENOUS at 05:32

## 2023-12-30 RX ADMIN — SENNOSIDES AND DOCUSATE SODIUM 2 TABLET: 8.6; 5 TABLET ORAL at 20:18

## 2023-12-30 RX ADMIN — HYDROMORPHONE HYDROCHLORIDE 0.5 MG: 1 INJECTION, SOLUTION INTRAMUSCULAR; INTRAVENOUS; SUBCUTANEOUS at 12:23

## 2023-12-30 RX ADMIN — Medication 10 ML: at 20:20

## 2023-12-30 RX ADMIN — ONDANSETRON 4 MG: 2 INJECTION INTRAMUSCULAR; INTRAVENOUS at 21:05

## 2023-12-30 RX ADMIN — ONDANSETRON 4 MG: 2 INJECTION INTRAMUSCULAR; INTRAVENOUS at 05:32

## 2023-12-30 RX ADMIN — ROSUVASTATIN 5 MG: 10 TABLET, FILM COATED ORAL at 20:18

## 2023-12-30 RX ADMIN — HYDROMORPHONE HYDROCHLORIDE 0.5 MG: 1 INJECTION, SOLUTION INTRAMUSCULAR; INTRAVENOUS; SUBCUTANEOUS at 18:32

## 2023-12-30 RX ADMIN — ONDANSETRON 4 MG: 2 INJECTION INTRAMUSCULAR; INTRAVENOUS at 12:23

## 2023-12-30 RX ADMIN — Medication 5 MG: at 20:18

## 2023-12-30 NOTE — PLAN OF CARE
PRN medications given:  -Dilaudid   -Zofran- pharm consulted for compatibility with continuous chemotherapy. Ok to give, EKG scheduled for AM to check QT interval.   Patient denied sleep aid/anti-anxiety meds.  Voiding.  APRN notified for sepsis warning. No new orders    Chemo currently infusing; Adrucil.

## 2023-12-30 NOTE — PROGRESS NOTES
[facility] Medicine Services  PROGRESS NOTE    Patient Name: Jessica Kraft  : 1960  MRN: [id number]    Date of Admission: 2023  Primary Care Physician: Leonarda Díaz MD    Subjective   Subjective     CC:  Abd pain    HPI:  Reaction during chemo infusion yesterday requiring PRN emergency meds to be given.  States she is doing ok today but nausea persists and has minimal intake  Does not feel her abdomen is getting more distended and states her back pain is primarily complaint currently      Objective   Objective     Vital Signs:   Temp:  [95.7 °F (35.4 °C)-97 °F (36.1 °C)] 96.8 °F (36 °C)  Heart Rate:  [] 96  Resp:  [16] 16  BP: (135-160)/() 150/93  Flow (L/min):  [2] 2     Physical Exam:  Constitutional: No acute distress, awake, alert,  at bedside  HENT: NCAT, mucous membranes moist  Respiratory: Clear to auscultation bilaterally, respiratory effort normal   Cardiovascular: RRR, no murmurs, rubs, or gallops  Gastrointestinal: Positive bowel sounds, soft, nontender, nondistended  Musculoskeletal: No bilateral ankle edema  Psychiatric: Appropriate affect, cooperative, pleasant  Neurologic: Oriented x 3, RAMSEY, speech clear  Skin: No rashes noted      Results Reviewed:  LAB RESULTS:      Lab 23  0347 23  0629 23  0852 23  0544 23  1535   WBC 11.67* 9.73 10.80 14.29* 11.46*   HEMOGLOBIN 12.4 12.9 12.9 13.2 14.0   HEMATOCRIT 38.3 40.4 41.0 42.1 44.8   PLATELETS 321 309 315 578* 589*   NEUTROS ABS 8.79* 6.88 8.23* 11.91* 8.76*   IMMATURE GRANS (ABS) 0.26* 0.21* 0.19* 0.09* 0.14*   LYMPHS ABS 1.15 1.27 1.11 1.18 1.47   MONOS ABS 1.36* 1.21* 1.13* 0.95* 0.87   EOS ABS 0.05 0.10 0.09 0.10 0.16   MCV 82.7 86.0 86.7 87.2 87.3   LACTATE  --   --   --   --  2.0         Lab 23  0347 23  0629 23  0852 23  0544 23  1535   SODIUM 131* 131* 134* 134* 133*   POTASSIUM 4.4 4.6 4.3 4.8 3.7   CHLORIDE 95* 96* 96* 97*  94*   CO2 24.0 26.0 26.0 26.0 28.0   ANION GAP 12.0 9.0 12.0 11.0 11.0   BUN 17 17 15 15 17   CREATININE 0.68 0.67 0.61 0.70 0.71   EGFR 98.6 99.0 101.2 97.9 96.3   GLUCOSE 109* 114* 108* 125* 117*   CALCIUM 8.5* 8.2* 8.5* 8.5* 9.1   MAGNESIUM  --   --   --  1.8  --          Lab 12/30/23  0347 12/29/23  0629 12/28/23  0852 12/26/23  0544 12/25/23  1535   TOTAL PROTEIN 5.8* 5.4* 5.9* 6.2 6.8   ALBUMIN 2.8* 2.6* 2.7* 2.8* 3.2*   GLOBULIN 3.0 2.8 3.2 3.4 3.6   ALT (SGPT) 7 8 7 11 12   AST (SGOT) 11 12 11 22 15   BILIRUBIN 0.2 0.2 0.2 0.4 0.3   ALK PHOS 204* 199* 209* 224* 231*   LIPASE  --   --   --   --  12*       Brief Urine Lab Results  (Last result in the past 365 days)        Color   Clarity   Blood   Leuk Est   Nitrite   Protein   CREAT   Urine HCG        12/25/23 1654 Yellow   Clear   Negative   Negative   Negative   Trace                   Microbiology Results Abnormal       None            No radiology results from the last 24 hrs      Current medications:  Scheduled Meds:First Mouthwash (Magic Mouthwash), 5 mL, Swish & Spit, Q6H  fluorouracil (ADRUCIL) 5,000 mg in sodium chloride 0.9 % 230 mL chemo infusion - FOR HOME USE, 5,000 mg, Intravenous, Once  Hydrocortisone (Perianal), , Rectal, BID  lactulose, 300 mL, Rectal, Once  levothyroxine, 125 mcg, Nasogastric, QAM  Naloxegol Oxalate, 25 mg, Nasogastric, QAM  pantoprazole, 40 mg, Intravenous, Q AM  senna-docusate sodium, 2 tablet, Nasogastric, BID   And  polyethylene glycol, 17 g, Nasogastric, BID  rosuvastatin, 5 mg, Nasogastric, Nightly  sodium chloride, 10 mL, Intravenous, Q12H  venlafaxine, 75 mg, Nasogastric, BID      Continuous Infusions:     PRN Meds:.  acetaminophen    ALPRAZolam    senna-docusate sodium **AND** polyethylene glycol **AND** bisacodyl **AND** bisacodyl    Calcium Replacement - Follow Nurse / BPA Driven Protocol    cyclobenzaprine    HYDROmorphone **AND** naloxone    lactulose    LORazepam    magnesium hydroxide    Magnesium Standard  Dose Replacement - Follow Nurse / BPA Driven Protocol    melatonin    metoclopramide    ondansetron    oxyCODONE-acetaminophen    phenol    Phosphorus Replacement - Follow Nurse / BPA Driven Protocol    Potassium Replacement - Follow Nurse / BPA Driven Protocol    prochlorperazine    Sodium Chloride (PF)    sodium chloride    sodium chloride    Assessment & Plan   Assessment & Plan     Active Hospital Problems    Diagnosis  POA    **Pancreatic cancer [C25.9]  Yes    Nausea and vomiting [R11.2]  Yes    Constipation [K59.00]  Yes    Neutrophilic leukocytosis [D72.9]  Yes    Thrombocytosis [D75.839]  Yes    GERD without esophagitis [K21.9]  Yes    Hiatal hernia [K44.9]  Yes    Ascites [R18.8]  Yes    Peritoneal carcinomatosis [C78.6]  Yes    Osteopenia [M85.80]  Yes    Insomnia [G47.00]  Yes    Anxiety [F41.9]  Yes    Hypothyroidism due to Hashimoto's thyroiditis [E03.8, E06.3]  Yes    Depression [F32.A]  Yes      Resolved Hospital Problems   No resolved problems to display.        Brief Hospital Course to date:  Jessica Kraft is a 63 y.o. female w/ Hx anxiety, depression, hypothyroidism, GERD, recently diagnosed pancreatic cancer w/ peritoneal carcinomatosis and ascites (recent admit 12/7/23-12/15/23), she was planned for outpatient port placement and chemotherapy initiation; she presented early w/ inc abd pain, distention, vomiting, no BM's in several days; CT imaging showed ascites and large stool burden    This patient's problems and plans were partially entered by my partner and updated as appropriate by me 12/30/23.      Assessment/Plan    Acute nausea and vomiting  Acute/chronic constipation  Recently Dx pancreatic cancer w/ omental and peritoneal carcinomatosis w/ ascites  -CT abd/pel w/ known malignancy findings, large stool burden without obstructive pattern. KUB done 12/27 with no findings of obstruction, however NG placed 12/27 afternoon with sig relief and 500 cc out. NG out now  -cont symptomatic care.  Pt reports the only medicine that gives her relief currently is dilaudid  -palliative following  -s/p IR port placement and paracentesis on admission  -heme/onc consulted on admission, d/w Dr العراقي-chemo initiating today, will go for 48h  -reaction (n/v, diaphoretic, shaking) after initiating chemo 12/29 requiring atropine x2, solucortef, benadryl, and pepcid.  She was able to complete infusion    Hypothyroidism  -levothyroxine    GERD  Hiatal hernia  -PPI    Anxiety  Depression  -effexor    HLD  -continue statin       Expected Discharge Location and Transportation: home  Expected Discharge   Expected Discharge Date: 12/31/2023; Expected Discharge Time:      DVT prophylaxis:  Mechanical DVT prophylaxis orders are present.     AM-PAC 6 Clicks Score (PT): 24 (12/29/23 2045)    CODE STATUS:   Code Status and Medical Interventions:   Ordered at: 12/25/23 1828     Level Of Support Discussed With:    Patient     Code Status (Patient has no pulse and is not breathing):    CPR (Attempt to Resuscitate)     Medical Interventions (Patient has pulse or is breathing):    Full Support       Anjana Duron, APRN  12/30/23

## 2023-12-30 NOTE — PLAN OF CARE
"Goal Outcome Evaluation: Pt had difficulty with irinotecan infusion today.  Pt had bouts of frequent nausea and vomiting.  Pt appeared diaphoretic and complained of being \"shaky.\"  Emergency meds were given and Dr. العراقي notified.  Through supportive medications patient was able to receive the infusion in complete.                           "

## 2023-12-30 NOTE — PROGRESS NOTES
DATE OF VISIT: 12/30/2023    Chief Complaint: Followup for metastatic adenocarcinoma     SUBJECTIVE: The patient is laying comfortable in bed.  She is tolerating chemotherapy multiple side effects.  She is complaining of nausea.  Her abdominal pain has improved.    REVIEW OF SYSTEMS: All the other 9 systems are reviewed by me and negative  except what is mentioned in HPI.     Past History:  Medical History: has a past medical history of Abscess, Anxiety, Bilateral ovarian cysts, Depression, Encounter for routine gynecological examination, Foot pain, H/O bone density study (06/2014), H/O mammogram (07/2016), Hot flashes, menopausal, Hypothyroidism due to Hashimoto's thyroiditis, Insomnia, Miscarriage, Osteopenia, Pap smear for cervical cancer screening (06/2014), Routine general medical examination at a health care facility, Urinary incontinence, Urinary tract infection, and Vitamin D deficiency.   Surgical History: has a past surgical history that includes Cholecystectomy (2008); Hernia repair (2010); Abdominoplasty (2001); Colonoscopy (07/2012); Ectopic pregnancy surgery (Left, 1994); and Esophagogastroduodenoscopy (N/A, 12/13/2023).   Family History: family history includes Breast cancer in her maternal aunt and another family member; Depression in her mother; Hypertension in her father; Mental illness in her mother; Thyroid disease in her mother and sister.   Social History: reports that she has never smoked. She has never used smokeless tobacco. She reports that she does not drink alcohol and does not use drugs.    Medications Prior to Admission   Medication Sig Dispense Refill Last Dose    ALPRAZolam (XANAX) 0.5 MG tablet Take 1 tablet by mouth At Night As Needed for Anxiety.   12/24/2023 at 2200    aspirin 81 MG chewable tablet Chew 1 tablet Daily.       cyclobenzaprine (FLEXERIL) 10 MG tablet Take 1 tablet by mouth Daily.       dicyclomine (BENTYL) 20 MG tablet Take 1 tablet by mouth Every 8 (Eight) Hours As  Needed for Abdominal Cramping. (Patient not taking: Reported on 12/25/2023) 20 tablet 0 Not Taking    fluconazole (Diflucan) 100 MG tablet Take 4 tablets by mouth Daily for 1 day, THEN 2 tablets Daily for 14 days. 32 tablet 0     HYDROcodone-acetaminophen (NORCO) 5-325 MG per tablet Take 1-2 tablet(s) by mouth Every 8 Hours As Needed for Moderate Pain. 30 tablet 0     LEVOXYL 125 MCG tablet Take 1 tablet by mouth Every Morning. On an empty stomach, do not substitute 90 tablet 1     lidocaine-prilocaine (EMLA) 2.5-2.5 % cream Apply 1 application topically to the appropriate area as directed As Needed (45-60 minutes prior to port access.  Cover with saran/plastic wrap.). 30 g 3     metoclopramide (REGLAN) 5 MG tablet Take 1 tablet by mouth 3 (Three) Times a Day As Needed (nausea, abd pain). (Patient not taking: Reported on 12/19/2023) 20 tablet 0 More than a month    ondansetron (ZOFRAN) 8 MG tablet Take 1 tablet by mouth 3 (Three) Times a Day As Needed for Nausea or Vomiting. 30 tablet 5     RABEprazole (ACIPHEX) 20 MG EC tablet Take 1 tablet by mouth Daily.       rosuvastatin (CRESTOR) 5 MG tablet Take 1 tablet by mouth Daily.       venlafaxine XR (EFFEXOR-XR) 150 MG 24 hr capsule Take 1 capsule by mouth Daily.         Allergies: Patient has no known allergies.     Current Facility-Administered Medications:     acetaminophen (TYLENOL) tablet 650 mg, 650 mg, Nasogastric, Q4H PRN, Yesenia Pugh MD    ALPRAZolam (XANAX) tablet 2 mg, 2 mg, Nasogastric, Nightly PRN, Yesenia Pugh MD, 2 mg at 12/27/23 2120    sennosides-docusate (PERICOLACE) 8.6-50 MG per tablet 2 tablet, 2 tablet, Nasogastric, BID, 2 tablet at 12/28/23 2057 **AND** polyethylene glycol (MIRALAX) packet 17 g, 17 g, Nasogastric, BID, 17 g at 12/28/23 2107 **AND** bisacodyl (DULCOLAX) EC tablet 5 mg, 5 mg, Oral, Daily PRN **AND** bisacodyl (DULCOLAX) suppository 10 mg, 10 mg, Rectal, Daily PRN, Yesenia Pugh MD, 10 mg at  12/27/23 2121    Calcium Replacement - Follow Nurse / BPA Driven Protocol, , Does not apply, PRN, Yamilet Ba APRN    cyclobenzaprine (FLEXERIL) tablet 10 mg, 10 mg, Nasogastric, TID PRN, Yesenia Pugh MD    First Mouthwash (Magic Mouthwash) 5 mL, 5 mL, Swish & Spit, Q6H, Faith Teague MD, 5 mL at 12/28/23 1041    fluorouracil (ADRUCIL) 5,000 mg in sodium chloride 0.9 % 230 mL chemo infusion - FOR HOME USE, 5,000 mg, Intravenous, Once, Sherri العراقي MD, 5,000 mg at 12/29/23 1706    Hydrocortisone (Perianal) (ANUSOL-HC) 2.5 % rectal cream, , Rectal, BID, Georgette Walker APRN, Given at 12/28/23 1214    HYDROmorphone (DILAUDID) injection 0.5 mg, 0.5 mg, Intravenous, Q2H PRN, 0.5 mg at 12/30/23 0856 **AND** naloxone (NARCAN) injection 0.4 mg, 0.4 mg, Intravenous, Q5 Min PRN, Yamilet Ba APRN    lactulose (CHRONULAC) 10 GM/15ML solution 10 g, 10 g, Nasogastric, Q1H PRN, Yesenia Pugh MD    lactulose (CHRONULAC) solution for enema 300 mL, 300 mL, Rectal, Once, Blossom Copeland MD    levothyroxine (SYNTHROID, LEVOTHROID) tablet 125 mcg, 125 mcg, Nasogastric, QAM, Yesenia Pugh MD, 125 mcg at 12/30/23 0532    LORazepam (ATIVAN) tablet 0.5 mg, 0.5 mg, Oral, Q8H PRN, Sherri العراقي MD, 0.5 mg at 12/29/23 1426    magnesium hydroxide (MILK OF MAGNESIA) 400 MG/5ML suspension 10 mL, 10 mL, Oral, Nightly PRN, Yamilet Ba APRN    Magnesium Standard Dose Replacement - Follow Nurse / BPA Driven Protocol, , Does not apply, PRN, Ba, Yamilet R, APRN    melatonin tablet 5 mg, 5 mg, Nasogastric, Nightly PRN, Yesenia Pugh MD, 5 mg at 12/27/23 2121    metoclopramide (REGLAN) injection 10 mg, 10 mg, Intravenous, Q6H PRN, Georgette Walker APRN, 10 mg at 12/28/23 1243    Naloxegol Oxalate (MOVANTIK) tablet 25 mg, 25 mg, Nasogastric, QAM, Yesenia Pugh MD, 25 mg at 12/30/23 0532    ondansetron (ZOFRAN) injection 4 mg, 4 mg, Intravenous, Q6H PRN, Mejia  "Yamilet R APRN, 4 mg at 12/30/23 0532    oxyCODONE-acetaminophen (PERCOCET) 5-325 MG per tablet 1 tablet, 1 tablet, Nasogastric, Q4H PRN, Yesenia Pugh MD, 1 tablet at 12/29/23 1414    pantoprazole (PROTONIX) injection 40 mg, 40 mg, Intravenous, Q AM, Yamilet Ba APRN, 40 mg at 12/30/23 0532    phenol (CHLORASEPTIC) 1.4 % liquid 1 spray, 1 spray, Mouth/Throat, Q2H PRN, Georgette Walker APRN    Phosphorus Replacement - Follow Nurse / BPA Driven Protocol, , Does not apply, PRN, Yamilet Ba APRN    Potassium Replacement - Follow Nurse / BPA Driven Protocol, , Does not apply, PRN, Yamilet Ba APRN    prochlorperazine (COMPAZINE) injection 2.5 mg, 2.5 mg, Intravenous, Q6H PRN, Faith Teague MD, 2.5 mg at 12/30/23 0857    rosuvastatin (CRESTOR) tablet 5 mg, 5 mg, Nasogastric, Nightly, Yesenia Pugh MD, 5 mg at 12/28/23 2058    Sodium Chloride (PF) 0.9 % 10 mL, 10 mL, Intravenous, PRN, Feliciano Hancock MD    sodium chloride 0.9 % flush 10 mL, 10 mL, Intravenous, Q12H, Yamilet Ba APRN, 10 mL at 12/29/23 2150    sodium chloride 0.9 % flush 10 mL, 10 mL, Intravenous, PRN, Yamilet Ba APRN    sodium chloride 0.9 % infusion 40 mL, 40 mL, Intravenous, PRN, Yamilet Ba APRIFRAH    venlafaxine (EFFEXOR) tablet 75 mg, 75 mg, Nasogastric, BID, Faith Teague MD, 75 mg at 12/29/23 0918    PHYSICAL EXAMINATION:   /92 (BP Location: Left arm, Patient Position: Sitting)   Pulse 98   Temp 96.9 °F (36.1 °C) (Temporal)   Resp 16   Ht 167.6 cm (66\")   Wt 91.2 kg (201 lb)   LMP  (LMP Unknown) Comment: N/A  SpO2 94%   BMI 32.44 kg/m²                ECOG Performance Status: 1 - Symptomatic but completely ambulatory  GENERAL: Age appropriate. No acute distress.   NEURO/PSYCH: A&O x 3, strength 5/5 in all muscle groups  HEENT: Head atraumatic, normocephalic.   NECK: Supple. No JVD. No lymphadenopathy.   LUNGS: Clear to auscultation bilaterally. No wheezing. No " rhonchi.   HEART: Regular rate and rhythm. S1, S2, no murmurs.   ABDOMEN: Soft, nontender, nondistended. Bowel sounds positive. No  hepatosplenomegaly.   EXTREMITIES: No clubbing, cyanosis, or edema.   SKIN: No rashes. No purpura.       No results displayed because visit has over 200 results.          No results found.    ASSESSMENT: The patient is a very pleasant 63 y.o. female  with metastatic adenocarcinoma      PLAN:  1.  Metastatic adenocarcinoma: Positive cytology from ascitic fluid.  Suggestive of upper GI primary on IHC pattern.  CT scan with pancreatic mass as well as peritoneal/omental metastases.  CA 19-9 normal.   elevated at 325.  Status post first cycle of FOLFIRINOX started December 29, 2023.  So far clinically doing well.  Labs reviewed from today CBC and CMP are stable.  2.  Recurrent ascites: Status post paracentesis during this admission.  Will hold off on peritoneal catheter for now.  3.  Chemotherapy-induced nausea: Will continue IV antiemetics as needed.  4.  Thrombocytosis: Reactive.      Edison Godinez MD  12/30/2023

## 2023-12-31 PROCEDURE — 25010000002 PROCHLORPERAZINE 10 MG/2ML SOLUTION: Performed by: INTERNAL MEDICINE

## 2023-12-31 PROCEDURE — 99232 SBSQ HOSP IP/OBS MODERATE 35: CPT | Performed by: NURSE PRACTITIONER

## 2023-12-31 PROCEDURE — 25010000002 HYDROMORPHONE PER 4 MG: Performed by: NURSE PRACTITIONER

## 2023-12-31 RX ADMIN — Medication 10 ML: at 09:40

## 2023-12-31 RX ADMIN — ALPRAZOLAM 2 MG: 1 TABLET ORAL at 02:23

## 2023-12-31 RX ADMIN — ALPRAZOLAM 2 MG: 1 TABLET ORAL at 23:09

## 2023-12-31 RX ADMIN — VENLAFAXINE HYDROCHLORIDE 75 MG: 75 TABLET ORAL at 20:45

## 2023-12-31 RX ADMIN — LORAZEPAM 0.5 MG: 0.5 TABLET ORAL at 20:42

## 2023-12-31 RX ADMIN — VENLAFAXINE HYDROCHLORIDE 75 MG: 75 TABLET ORAL at 09:38

## 2023-12-31 RX ADMIN — ROSUVASTATIN 5 MG: 10 TABLET, FILM COATED ORAL at 20:41

## 2023-12-31 RX ADMIN — Medication 5 MG: at 20:41

## 2023-12-31 RX ADMIN — Medication 10 ML: at 20:44

## 2023-12-31 RX ADMIN — CYCLOBENZAPRINE 10 MG: 10 TABLET, FILM COATED ORAL at 23:10

## 2023-12-31 RX ADMIN — HYDROMORPHONE HYDROCHLORIDE 0.5 MG: 1 INJECTION, SOLUTION INTRAMUSCULAR; INTRAVENOUS; SUBCUTANEOUS at 11:40

## 2023-12-31 RX ADMIN — LEVOTHYROXINE SODIUM 125 MCG: 125 TABLET ORAL at 06:55

## 2023-12-31 RX ADMIN — PANTOPRAZOLE SODIUM 40 MG: 40 INJECTION, POWDER, FOR SOLUTION INTRAVENOUS at 05:02

## 2023-12-31 RX ADMIN — DIPHENHYDRAMINE HYDROCHLORIDE AND LIDOCAINE HYDROCHLORIDE AND ALUMINUM HYDROXIDE AND MAGNESIUM HYDRO 5 ML: KIT at 20:43

## 2023-12-31 RX ADMIN — SENNOSIDES AND DOCUSATE SODIUM 2 TABLET: 8.6; 5 TABLET ORAL at 20:41

## 2023-12-31 RX ADMIN — NALOXEGOL OXALATE 25 MG: 25 TABLET, FILM COATED ORAL at 06:55

## 2023-12-31 RX ADMIN — HYDROMORPHONE HYDROCHLORIDE 0.5 MG: 1 INJECTION, SOLUTION INTRAMUSCULAR; INTRAVENOUS; SUBCUTANEOUS at 02:24

## 2023-12-31 RX ADMIN — OXYCODONE AND ACETAMINOPHEN 1 TABLET: 5; 325 TABLET ORAL at 14:46

## 2023-12-31 RX ADMIN — HYDROMORPHONE HYDROCHLORIDE 0.5 MG: 1 INJECTION, SOLUTION INTRAMUSCULAR; INTRAVENOUS; SUBCUTANEOUS at 08:14

## 2023-12-31 RX ADMIN — OXYCODONE AND ACETAMINOPHEN 1 TABLET: 5; 325 TABLET ORAL at 20:41

## 2023-12-31 RX ADMIN — PROCHLORPERAZINE EDISYLATE 2.5 MG: 5 INJECTION INTRAMUSCULAR; INTRAVENOUS at 00:23

## 2023-12-31 RX ADMIN — CYCLOBENZAPRINE 10 MG: 10 TABLET, FILM COATED ORAL at 00:28

## 2023-12-31 NOTE — PLAN OF CARE
Problem: Adult Inpatient Plan of Care  Goal: Optimal Comfort and Wellbeing  Intervention: Monitor Pain and Promote Comfort  Recent Flowsheet Documentation  Taken 12/31/2023 0000 by Jessica Lee RN  Pain Management Interventions: see MAR  Intervention: Provide Person-Centered Care  Recent Flowsheet Documentation  Taken 12/31/2023 0000 by Jessica Lee RN  Trust Relationship/Rapport:   care explained   choices provided   reassurance provided   thoughts/feelings acknowledged  Taken 12/30/2023 2105 by Jessica Lee RN  Trust Relationship/Rapport:   care explained   choices provided   reassurance provided   thoughts/feelings acknowledged   Goal Outcome Evaluation:   Pt is A&OX4 with VSS and frequent requests for comfort PRN meds. C/O of ABD pain with nausea. No acute events noted over night. Will CTM and provide care.

## 2023-12-31 NOTE — PROGRESS NOTES
[facility] Medicine Services  PROGRESS NOTE    Patient Name: Jessica Kraft  : 1960  MRN: [id number]    Date of Admission: 2023  Primary Care Physician: Leonarda Díaz MD    Subjective   Subjective     CC:  Abd pain    HPI:  No new issues overnight  States she feels better today than she has in a week  Nausea and appetite improved      Objective   Objective     Vital Signs:   Temp:  [95.7 °F (35.4 °C)-97.3 °F (36.3 °C)] 96.2 °F (35.7 °C)  Heart Rate:  [100-113] 100  Resp:  [18-20] 18  BP: (124-169)/(66-97) 129/66     Physical Exam:  Constitutional: No acute distress, awake, alert,  at bedside  HENT: NCAT, mucous membranes moist  Respiratory: Clear to auscultation bilaterally, respiratory effort normal   Cardiovascular: RRR, no murmurs, rubs, or gallops  Gastrointestinal: Positive bowel sounds, soft, nontender, nondistended  Musculoskeletal: No bilateral ankle edema  Psychiatric: Appropriate affect, cooperative, pleasant  Neurologic: Oriented x 3, RAMSEY, speech clear  Skin: No rashes noted    No change in exam from 23    Results Reviewed:  LAB RESULTS:      Lab 23  0347 23  0629 23  0852 23  0544 23  1535   WBC 11.67* 9.73 10.80 14.29* 11.46*   HEMOGLOBIN 12.4 12.9 12.9 13.2 14.0   HEMATOCRIT 38.3 40.4 41.0 42.1 44.8   PLATELETS 321 309 315 578* 589*   NEUTROS ABS 8.79* 6.88 8.23* 11.91* 8.76*   IMMATURE GRANS (ABS) 0.26* 0.21* 0.19* 0.09* 0.14*   LYMPHS ABS 1.15 1.27 1.11 1.18 1.47   MONOS ABS 1.36* 1.21* 1.13* 0.95* 0.87   EOS ABS 0.05 0.10 0.09 0.10 0.16   MCV 82.7 86.0 86.7 87.2 87.3   LACTATE  --   --   --   --  2.0         Lab 23  0347 23  0629 23  0852 23  0544 23  1535   SODIUM 131* 131* 134* 134* 133*   POTASSIUM 4.4 4.6 4.3 4.8 3.7   CHLORIDE 95* 96* 96* 97* 94*   CO2 24.0 26.0 26.0 26.0 28.0   ANION GAP 12.0 9.0 12.0 11.0 11.0   BUN 17 17 15 15 17   CREATININE 0.68 0.67 0.61 0.70 0.71   EGFR  98.6 99.0 101.2 97.9 96.3   GLUCOSE 109* 114* 108* 125* 117*   CALCIUM 8.5* 8.2* 8.5* 8.5* 9.1   MAGNESIUM  --   --   --  1.8  --          Lab 12/30/23  0347 12/29/23  0629 12/28/23  0852 12/26/23  0544 12/25/23  1535   TOTAL PROTEIN 5.8* 5.4* 5.9* 6.2 6.8   ALBUMIN 2.8* 2.6* 2.7* 2.8* 3.2*   GLOBULIN 3.0 2.8 3.2 3.4 3.6   ALT (SGPT) 7 8 7 11 12   AST (SGOT) 11 12 11 22 15   BILIRUBIN 0.2 0.2 0.2 0.4 0.3   ALK PHOS 204* 199* 209* 224* 231*   LIPASE  --   --   --   --  12*       Brief Urine Lab Results  (Last result in the past 365 days)        Color   Clarity   Blood   Leuk Est   Nitrite   Protein   CREAT   Urine HCG        12/25/23 1654 Yellow   Clear   Negative   Negative   Negative   Trace                   Microbiology Results Abnormal       None            No radiology results from the last 24 hrs      Current medications:  Scheduled Meds:First Mouthwash (Magic Mouthwash), 5 mL, Swish & Spit, Q6H  fluorouracil (ADRUCIL) 5,000 mg in sodium chloride 0.9 % 230 mL chemo infusion - FOR HOME USE, 5,000 mg, Intravenous, Once  Hydrocortisone (Perianal), , Rectal, BID  lactulose, 300 mL, Rectal, Once  levothyroxine, 125 mcg, Nasogastric, QAM  Naloxegol Oxalate, 25 mg, Nasogastric, QAM  pantoprazole, 40 mg, Intravenous, Q AM  senna-docusate sodium, 2 tablet, Nasogastric, BID   And  polyethylene glycol, 17 g, Nasogastric, BID  rosuvastatin, 5 mg, Nasogastric, Nightly  sodium chloride, 10 mL, Intravenous, Q12H  venlafaxine, 75 mg, Nasogastric, BID      Continuous Infusions:     PRN Meds:.  acetaminophen    ALPRAZolam    senna-docusate sodium **AND** polyethylene glycol **AND** bisacodyl **AND** bisacodyl    Calcium Replacement - Follow Nurse / BPA Driven Protocol    cyclobenzaprine    HYDROmorphone **AND** naloxone    lactulose    LORazepam    magnesium hydroxide    Magnesium Standard Dose Replacement - Follow Nurse / BPA Driven Protocol    melatonin    ondansetron    oxyCODONE-acetaminophen    phenol    Phosphorus  Replacement - Follow Nurse / BPA Driven Protocol    Potassium Replacement - Follow Nurse / BPA Driven Protocol    prochlorperazine    promethazine    Sodium Chloride (PF)    sodium chloride    sodium chloride    Assessment & Plan   Assessment & Plan     Active Hospital Problems    Diagnosis  POA    **Pancreatic cancer [C25.9]  Yes    Nausea and vomiting [R11.2]  Yes    Constipation [K59.00]  Yes    Neutrophilic leukocytosis [D72.9]  Yes    Thrombocytosis [D75.839]  Yes    GERD without esophagitis [K21.9]  Yes    Hiatal hernia [K44.9]  Yes    Ascites [R18.8]  Yes    Peritoneal carcinomatosis [C78.6]  Yes    Osteopenia [M85.80]  Yes    Insomnia [G47.00]  Yes    Anxiety [F41.9]  Yes    Hypothyroidism due to Hashimoto's thyroiditis [E03.8, E06.3]  Yes    Depression [F32.A]  Yes      Resolved Hospital Problems   No resolved problems to display.        Brief Hospital Course to date:  Jessica Kraft is a 63 y.o. female w/ Hx anxiety, depression, hypothyroidism, GERD, recently diagnosed pancreatic cancer w/ peritoneal carcinomatosis and ascites (recent admit 12/7/23-12/15/23), she was planned for outpatient port placement and chemotherapy initiation; she presented early w/ inc abd pain, distention, vomiting, no BM's in several days; CT imaging showed ascites and large stool burden      Assessment/Plan    Acute nausea and vomiting  Acute/chronic constipation  Recently Dx pancreatic cancer w/ omental and peritoneal carcinomatosis w/ ascites  -CT abd/pel w/ known malignancy findings, large stool burden without obstructive pattern. KUB done 12/27 with no findings of obstruction, however NG placed 12/27 afternoon with sig relief and 500 cc out. NG out now  -cont symptomatic care. Pt reports the only medicine that gives her relief currently is dilaudid  -palliative following  -s/p IR port placement and paracentesis on admission  -heme/onc consulted on admission, Dr العراقي initiating chemo, will go for 48h  -reaction (n/v,  diaphoretic, shaking) after initiating chemo 12/29 requiring atropine x2, solucortef, benadryl, and pepcid.  She was able to complete infusion  -nausea improved with IV phenergan    Hypothyroidism  -levothyroxine    GERD  Hiatal hernia  -PPI    Anxiety  Depression  -effexor    HLD  -continue statin       Expected Discharge Location and Transportation: home  Expected Discharge   Expected Discharge Date: 1/1/2024; Expected Discharge Time:      DVT prophylaxis:  Mechanical DVT prophylaxis orders are present.     AM-PAC 6 Clicks Score (PT): 24 (12/30/23 2105)    CODE STATUS:   Code Status and Medical Interventions:   Ordered at: 12/25/23 5922     Level Of Support Discussed With:    Patient     Code Status (Patient has no pulse and is not breathing):    CPR (Attempt to Resuscitate)     Medical Interventions (Patient has pulse or is breathing):    Full Support       Anjana Duron, ANGELICA  12/31/23

## 2024-01-01 ENCOUNTER — APPOINTMENT (OUTPATIENT)
Dept: GENERAL RADIOLOGY | Facility: HOSPITAL | Age: 64
DRG: 163 | End: 2024-01-01
Payer: COMMERCIAL

## 2024-01-01 ENCOUNTER — HOSPITAL ENCOUNTER (INPATIENT)
Facility: HOSPITAL | Age: 64
LOS: 16 days | DRG: 163 | End: 2024-01-25
Attending: EMERGENCY MEDICINE | Admitting: INTERNAL MEDICINE
Payer: COMMERCIAL

## 2024-01-01 ENCOUNTER — APPOINTMENT (OUTPATIENT)
Dept: CT IMAGING | Facility: HOSPITAL | Age: 64
DRG: 163 | End: 2024-01-01
Payer: COMMERCIAL

## 2024-01-01 ENCOUNTER — TELEPHONE (OUTPATIENT)
Dept: ONCOLOGY | Facility: CLINIC | Age: 64
End: 2024-01-01
Payer: COMMERCIAL

## 2024-01-01 ENCOUNTER — READMISSION MANAGEMENT (OUTPATIENT)
Dept: CALL CENTER | Facility: HOSPITAL | Age: 64
End: 2024-01-01
Payer: COMMERCIAL

## 2024-01-01 ENCOUNTER — HOSPITAL ENCOUNTER (INPATIENT)
Facility: HOSPITAL | Age: 64
LOS: 1 days | End: 2024-01-25
Attending: INTERNAL MEDICINE | Admitting: INTERNAL MEDICINE
Payer: COMMERCIAL

## 2024-01-01 ENCOUNTER — APPOINTMENT (OUTPATIENT)
Dept: CARDIOLOGY | Facility: HOSPITAL | Age: 64
DRG: 163 | End: 2024-01-01
Payer: COMMERCIAL

## 2024-01-01 VITALS
BODY MASS INDEX: 32.3 KG/M2 | TEMPERATURE: 97.4 F | DIASTOLIC BLOOD PRESSURE: 81 MMHG | HEIGHT: 66 IN | WEIGHT: 201 LBS | SYSTOLIC BLOOD PRESSURE: 127 MMHG | HEART RATE: 104 BPM | RESPIRATION RATE: 16 BRPM | OXYGEN SATURATION: 91 %

## 2024-01-01 VITALS
DIASTOLIC BLOOD PRESSURE: 47 MMHG | OXYGEN SATURATION: 76 % | HEART RATE: 98 BPM | SYSTOLIC BLOOD PRESSURE: 133 MMHG | HEIGHT: 65 IN | BODY MASS INDEX: 38.2 KG/M2 | RESPIRATION RATE: 12 BRPM | WEIGHT: 229.28 LBS | TEMPERATURE: 99.1 F

## 2024-01-01 VITALS — OXYGEN SATURATION: 98 %

## 2024-01-01 DIAGNOSIS — I26.99 BILATERAL PULMONARY EMBOLISM: ICD-10-CM

## 2024-01-01 DIAGNOSIS — R63.30 FEEDING DIFFICULTY: Primary | ICD-10-CM

## 2024-01-01 DIAGNOSIS — R40.0 SOMNOLENCE: ICD-10-CM

## 2024-01-01 DIAGNOSIS — J96.01 ACUTE RESPIRATORY FAILURE WITH HYPOXIA: ICD-10-CM

## 2024-01-01 DIAGNOSIS — T17.908A ASPIRATION INTO AIRWAY, INITIAL ENCOUNTER: ICD-10-CM

## 2024-01-01 LAB
ABO GROUP BLD: NORMAL
ABO GROUP BLD: NORMAL
ACT BLD: 163 SECONDS (ref 82–152)
ACT BLD: 282 SECONDS (ref 82–152)
ACT BLD: <50 SECONDS (ref 82–152)
ALBUMIN SERPL-MCNC: 1.9 G/DL (ref 3.5–5.2)
ALBUMIN SERPL-MCNC: 2 G/DL (ref 3.5–5.2)
ALBUMIN SERPL-MCNC: 2.1 G/DL (ref 3.5–5.2)
ALBUMIN SERPL-MCNC: 2.2 G/DL (ref 3.5–5.2)
ALBUMIN SERPL-MCNC: 2.3 G/DL (ref 3.5–5.2)
ALBUMIN SERPL-MCNC: 3.1 G/DL (ref 3.5–5.2)
ALBUMIN/GLOB SERPL: 0.6 G/DL
ALBUMIN/GLOB SERPL: 0.7 G/DL
ALBUMIN/GLOB SERPL: 0.8 G/DL
ALBUMIN/GLOB SERPL: 1 G/DL
ALP SERPL-CCNC: 131 U/L (ref 39–117)
ALP SERPL-CCNC: 133 U/L (ref 39–117)
ALP SERPL-CCNC: 133 U/L (ref 39–117)
ALP SERPL-CCNC: 134 U/L (ref 39–117)
ALP SERPL-CCNC: 135 U/L (ref 39–117)
ALP SERPL-CCNC: 136 U/L (ref 39–117)
ALP SERPL-CCNC: 136 U/L (ref 39–117)
ALP SERPL-CCNC: 149 U/L (ref 39–117)
ALP SERPL-CCNC: 154 U/L (ref 39–117)
ALP SERPL-CCNC: 155 U/L (ref 39–117)
ALP SERPL-CCNC: 161 U/L (ref 39–117)
ALP SERPL-CCNC: 162 U/L (ref 39–117)
ALP SERPL-CCNC: 165 U/L (ref 39–117)
ALP SERPL-CCNC: 182 U/L (ref 39–117)
ALP SERPL-CCNC: 191 U/L (ref 39–117)
ALP SERPL-CCNC: 202 U/L (ref 39–117)
ALT SERPL W P-5'-P-CCNC: 13 U/L (ref 1–33)
ALT SERPL W P-5'-P-CCNC: 5 U/L (ref 1–33)
ALT SERPL W P-5'-P-CCNC: 6 U/L (ref 1–33)
ALT SERPL W P-5'-P-CCNC: 7 U/L (ref 1–33)
ALT SERPL W P-5'-P-CCNC: <5 U/L (ref 1–33)
ALT SERPL W P-5'-P-CCNC: <5 U/L (ref 1–33)
ANION GAP SERPL CALCULATED.3IONS-SCNC: 10 MMOL/L (ref 5–15)
ANION GAP SERPL CALCULATED.3IONS-SCNC: 11 MMOL/L (ref 5–15)
ANION GAP SERPL CALCULATED.3IONS-SCNC: 12 MMOL/L (ref 5–15)
ANION GAP SERPL CALCULATED.3IONS-SCNC: 15 MMOL/L (ref 5–15)
ANION GAP SERPL CALCULATED.3IONS-SCNC: 9 MMOL/L (ref 5–15)
ANISOCYTOSIS BLD QL: ABNORMAL
ANISOCYTOSIS BLD QL: NORMAL
ANISOCYTOSIS BLD QL: NORMAL
APPEARANCE FLD: ABNORMAL
APTT PPP: 29.2 SECONDS (ref 60–90)
ARTERIAL PATENCY WRIST A: ABNORMAL
ARTERIAL PATENCY WRIST A: POSITIVE
AST SERPL-CCNC: 10 U/L (ref 1–32)
AST SERPL-CCNC: 11 U/L (ref 1–32)
AST SERPL-CCNC: 13 U/L (ref 1–32)
AST SERPL-CCNC: 14 U/L (ref 1–32)
AST SERPL-CCNC: 16 U/L (ref 1–32)
AST SERPL-CCNC: 16 U/L (ref 1–32)
AST SERPL-CCNC: 17 U/L (ref 1–32)
AST SERPL-CCNC: 18 U/L (ref 1–32)
AST SERPL-CCNC: 19 U/L (ref 1–32)
AST SERPL-CCNC: 21 U/L (ref 1–32)
AST SERPL-CCNC: 23 U/L (ref 1–32)
AST SERPL-CCNC: 25 U/L (ref 1–32)
ATMOSPHERIC PRESS: ABNORMAL MM[HG]
B PARAPERT DNA SPEC QL NAA+PROBE: NOT DETECTED
B PERT DNA SPEC QL NAA+PROBE: NOT DETECTED
BACTERIA FLD CULT: NORMAL
BACTERIA SPEC AEROBE CULT: NORMAL
BACTERIA SPEC RESP CULT: ABNORMAL
BACTERIA SPEC RESP CULT: NORMAL
BACTERIA SPEC RESP CULT: NORMAL
BACTERIA UR QL AUTO: ABNORMAL /HPF
BASE EXCESS BLDA CALC-SCNC: -0.5 MMOL/L (ref 0–2)
BASE EXCESS BLDA CALC-SCNC: -0.6 MMOL/L (ref 0–2)
BASE EXCESS BLDA CALC-SCNC: -1.3 MMOL/L (ref 0–2)
BASE EXCESS BLDA CALC-SCNC: -1.5 MMOL/L (ref 0–2)
BASE EXCESS BLDA CALC-SCNC: -1.9 MMOL/L (ref 0–2)
BASE EXCESS BLDA CALC-SCNC: -3.9 MMOL/L (ref 0–2)
BASE EXCESS BLDA CALC-SCNC: 0.7 MMOL/L (ref 0–2)
BASE EXCESS BLDA CALC-SCNC: 2.2 MMOL/L (ref 0–2)
BASE EXCESS BLDA CALC-SCNC: 2.4 MMOL/L (ref 0–2)
BASE EXCESS BLDA CALC-SCNC: 4.2 MMOL/L (ref 0–2)
BASOPHILS # BLD AUTO: 0.01 10*3/MM3 (ref 0–0.2)
BASOPHILS # BLD AUTO: 0.01 10*3/MM3 (ref 0–0.2)
BASOPHILS # BLD AUTO: 0.02 10*3/MM3 (ref 0–0.2)
BASOPHILS # BLD AUTO: 0.19 10*3/MM3 (ref 0–0.2)
BASOPHILS # BLD MANUAL: 0 10*3/MM3 (ref 0–0.2)
BASOPHILS # BLD MANUAL: 0.21 10*3/MM3 (ref 0–0.2)
BASOPHILS # BLD MANUAL: 0.28 10*3/MM3 (ref 0–0.2)
BASOPHILS NFR BLD AUTO: 0.2 % (ref 0–1.5)
BASOPHILS NFR BLD AUTO: 0.3 % (ref 0–1.5)
BASOPHILS NFR BLD AUTO: 0.4 % (ref 0–1.5)
BASOPHILS NFR BLD AUTO: 1.1 % (ref 0–1.5)
BASOPHILS NFR BLD MANUAL: 0 % (ref 0–1.5)
BASOPHILS NFR BLD MANUAL: 1 % (ref 0–1.5)
BASOPHILS NFR BLD MANUAL: 1 % (ref 0–1.5)
BDY SITE: ABNORMAL
BH BB BLOOD EXPIRATION DATE: NORMAL
BH BB BLOOD EXPIRATION DATE: NORMAL
BH BB BLOOD TYPE BARCODE: 5100
BH BB BLOOD TYPE BARCODE: 5100
BH BB DISPENSE STATUS: NORMAL
BH BB DISPENSE STATUS: NORMAL
BH BB PRODUCT CODE: NORMAL
BH BB PRODUCT CODE: NORMAL
BH BB UNIT NUMBER: NORMAL
BH BB UNIT NUMBER: NORMAL
BH CV ECHO MEAS - AI P1/2T: 351.5 MSEC
BH CV ECHO MEAS - AO MAX PG: 12.7 MMHG
BH CV ECHO MEAS - AO MEAN PG: 7 MMHG
BH CV ECHO MEAS - AO ROOT DIAM: 3.4 CM
BH CV ECHO MEAS - AO V2 MAX: 178 CM/SEC
BH CV ECHO MEAS - AO V2 VTI: 27.2 CM
BH CV ECHO MEAS - EDV(CUBED): 39.3 ML
BH CV ECHO MEAS - EDV(MOD-SP2): 50.3 ML
BH CV ECHO MEAS - EDV(MOD-SP4): 47 ML
BH CV ECHO MEAS - EF(MOD-BP): 61.7 %
BH CV ECHO MEAS - EF(MOD-SP2): 65.2 %
BH CV ECHO MEAS - EF(MOD-SP4): 51.7 %
BH CV ECHO MEAS - ESV(CUBED): 15.6 ML
BH CV ECHO MEAS - ESV(MOD-SP2): 17.5 ML
BH CV ECHO MEAS - ESV(MOD-SP4): 22.7 ML
BH CV ECHO MEAS - FS: 26.5 %
BH CV ECHO MEAS - IVS/LVPW: 1.14 CM
BH CV ECHO MEAS - IVSD: 0.8 CM
BH CV ECHO MEAS - LA DIMENSION: 2.2 CM
BH CV ECHO MEAS - LAT PEAK E' VEL: 10.9 CM/SEC
BH CV ECHO MEAS - LV MASS(C)D: 65.8 GRAMS
BH CV ECHO MEAS - LVIDD: 3.4 CM
BH CV ECHO MEAS - LVIDS: 2.5 CM
BH CV ECHO MEAS - LVOT AREA: 3.1 CM2
BH CV ECHO MEAS - LVOT DIAM: 2 CM
BH CV ECHO MEAS - LVPWD: 0.7 CM
BH CV ECHO MEAS - MED PEAK E' VEL: 9 CM/SEC
BH CV ECHO MEAS - MV A MAX VEL: 128 CM/SEC
BH CV ECHO MEAS - MV DEC TIME: 0.18 SEC
BH CV ECHO MEAS - MV E MAX VEL: 92 CM/SEC
BH CV ECHO MEAS - MV E/A: 0.72
BH CV ECHO MEAS - SV(MOD-SP2): 32.8 ML
BH CV ECHO MEAS - SV(MOD-SP4): 24.3 ML
BH CV ECHO MEASUREMENTS AVERAGE E/E' RATIO: 9.25
BH CV XLRA - RV BASE: 2.6 CM
BH CV XLRA - RV LENGTH: 6.8 CM
BH CV XLRA - RV MID: 2.3 CM
BH CV XLRA - TDI S': 17.7 CM/SEC
BILIRUB SERPL-MCNC: 0.2 MG/DL (ref 0–1.2)
BILIRUB SERPL-MCNC: 0.3 MG/DL (ref 0–1.2)
BILIRUB SERPL-MCNC: 0.4 MG/DL (ref 0–1.2)
BILIRUB UR QL STRIP: NEGATIVE
BLD GP AB SCN SERPL QL: NEGATIVE
BODY TEMPERATURE: 37
BUN SERPL-MCNC: 13 MG/DL (ref 8–23)
BUN SERPL-MCNC: 13 MG/DL (ref 8–23)
BUN SERPL-MCNC: 15 MG/DL (ref 8–23)
BUN SERPL-MCNC: 16 MG/DL (ref 8–23)
BUN SERPL-MCNC: 17 MG/DL (ref 8–23)
BUN SERPL-MCNC: 18 MG/DL (ref 8–23)
BUN SERPL-MCNC: 20 MG/DL (ref 8–23)
BUN SERPL-MCNC: 21 MG/DL (ref 8–23)
BUN/CREAT SERPL: 18.4 (ref 7–25)
BUN/CREAT SERPL: 19.4 (ref 7–25)
BUN/CREAT SERPL: 20.2 (ref 7–25)
BUN/CREAT SERPL: 21.4 (ref 7–25)
BUN/CREAT SERPL: 22.2 (ref 7–25)
BUN/CREAT SERPL: 23.1 (ref 7–25)
BUN/CREAT SERPL: 24.1 (ref 7–25)
BUN/CREAT SERPL: 25 (ref 7–25)
BUN/CREAT SERPL: 26.2 (ref 7–25)
BUN/CREAT SERPL: 29.6 (ref 7–25)
BUN/CREAT SERPL: 29.6 (ref 7–25)
BUN/CREAT SERPL: 34.1 (ref 7–25)
BUN/CREAT SERPL: 36.2 (ref 7–25)
BUN/CREAT SERPL: 36.4 (ref 7–25)
BUN/CREAT SERPL: 37.2 (ref 7–25)
BUN/CREAT SERPL: 38.6 (ref 7–25)
BUN/CREAT SERPL: 40.5 (ref 7–25)
C PNEUM DNA NPH QL NAA+NON-PROBE: NOT DETECTED
CA-I SERPL ISE-MCNC: 1.01 MMOL/L (ref 1.12–1.32)
CALCIUM SPEC-SCNC: 7 MG/DL (ref 8.6–10.5)
CALCIUM SPEC-SCNC: 7.3 MG/DL (ref 8.6–10.5)
CALCIUM SPEC-SCNC: 7.4 MG/DL (ref 8.6–10.5)
CALCIUM SPEC-SCNC: 7.5 MG/DL (ref 8.6–10.5)
CALCIUM SPEC-SCNC: 7.5 MG/DL (ref 8.6–10.5)
CALCIUM SPEC-SCNC: 7.6 MG/DL (ref 8.6–10.5)
CALCIUM SPEC-SCNC: 7.7 MG/DL (ref 8.6–10.5)
CALCIUM SPEC-SCNC: 7.8 MG/DL (ref 8.6–10.5)
CALCIUM SPEC-SCNC: 7.9 MG/DL (ref 8.6–10.5)
CALCIUM SPEC-SCNC: 7.9 MG/DL (ref 8.6–10.5)
CALCIUM SPEC-SCNC: 8.3 MG/DL (ref 8.6–10.5)
CHLORIDE SERPL-SCNC: 101 MMOL/L (ref 98–107)
CHLORIDE SERPL-SCNC: 102 MMOL/L (ref 98–107)
CHLORIDE SERPL-SCNC: 102 MMOL/L (ref 98–107)
CHLORIDE SERPL-SCNC: 103 MMOL/L (ref 98–107)
CHLORIDE SERPL-SCNC: 103 MMOL/L (ref 98–107)
CHLORIDE SERPL-SCNC: 104 MMOL/L (ref 98–107)
CHLORIDE SERPL-SCNC: 89 MMOL/L (ref 98–107)
CHLORIDE SERPL-SCNC: 91 MMOL/L (ref 98–107)
CHLORIDE SERPL-SCNC: 93 MMOL/L (ref 98–107)
CHLORIDE SERPL-SCNC: 94 MMOL/L (ref 98–107)
CHLORIDE SERPL-SCNC: 95 MMOL/L (ref 98–107)
CHLORIDE SERPL-SCNC: 95 MMOL/L (ref 98–107)
CHLORIDE SERPL-SCNC: 96 MMOL/L (ref 98–107)
CHLORIDE SERPL-SCNC: 98 MMOL/L (ref 98–107)
CHLORIDE SERPL-SCNC: 98 MMOL/L (ref 98–107)
CHOLEST SERPL-MCNC: 88 MG/DL (ref 0–200)
CLARITY UR: ABNORMAL
CLUMPED PLATELETS: PRESENT
CO2 BLDA-SCNC: 21.8 MMOL/L (ref 22–33)
CO2 BLDA-SCNC: 24 MMOL/L (ref 22–33)
CO2 BLDA-SCNC: 24.4 MMOL/L (ref 22–33)
CO2 BLDA-SCNC: 24.4 MMOL/L (ref 22–33)
CO2 BLDA-SCNC: 24.9 MMOL/L (ref 22–33)
CO2 BLDA-SCNC: 25.3 MMOL/L (ref 22–33)
CO2 BLDA-SCNC: 25.8 MMOL/L (ref 22–33)
CO2 BLDA-SCNC: 25.9 MMOL/L (ref 22–33)
CO2 BLDA-SCNC: 26.5 MMOL/L (ref 22–33)
CO2 BLDA-SCNC: 30.2 MMOL/L (ref 22–33)
CO2 SERPL-SCNC: 22 MMOL/L (ref 22–29)
CO2 SERPL-SCNC: 23 MMOL/L (ref 22–29)
CO2 SERPL-SCNC: 24 MMOL/L (ref 22–29)
CO2 SERPL-SCNC: 27 MMOL/L (ref 22–29)
COHGB MFR BLD: 0.6 % (ref 0–2)
COHGB MFR BLD: 0.7 % (ref 0–2)
COHGB MFR BLD: 0.7 % (ref 0–2)
COHGB MFR BLD: 1 % (ref 0–2)
COHGB MFR BLD: 1 % (ref 0–2)
COHGB MFR BLD: 1.1 % (ref 0–2)
COHGB MFR BLD: 1.3 % (ref 0–2)
COLOR FLD: YELLOW
COLOR UR: ABNORMAL
CPAP: 5 CMH2O
CREAT SERPL-MCNC: 0.42 MG/DL (ref 0.57–1)
CREAT SERPL-MCNC: 0.43 MG/DL (ref 0.57–1)
CREAT SERPL-MCNC: 0.44 MG/DL (ref 0.57–1)
CREAT SERPL-MCNC: 0.47 MG/DL (ref 0.57–1)
CREAT SERPL-MCNC: 0.54 MG/DL (ref 0.57–1)
CREAT SERPL-MCNC: 0.54 MG/DL (ref 0.57–1)
CREAT SERPL-MCNC: 0.64 MG/DL (ref 0.57–1)
CREAT SERPL-MCNC: 0.65 MG/DL (ref 0.57–1)
CREAT SERPL-MCNC: 0.65 MG/DL (ref 0.57–1)
CREAT SERPL-MCNC: 0.67 MG/DL (ref 0.57–1)
CREAT SERPL-MCNC: 0.7 MG/DL (ref 0.57–1)
CREAT SERPL-MCNC: 0.71 MG/DL (ref 0.57–1)
CREAT SERPL-MCNC: 0.87 MG/DL (ref 0.57–1)
CREAT SERPL-MCNC: 0.89 MG/DL (ref 0.57–1)
CREAT SERPL-MCNC: 0.9 MG/DL (ref 0.57–1)
CROSSMATCH INTERPRETATION: NORMAL
CROSSMATCH INTERPRETATION: NORMAL
CRP SERPL-MCNC: 19.8 MG/DL (ref 0–0.5)
CRP SERPL-MCNC: 32.16 MG/DL (ref 0–0.5)
CYTOLOGIST CVX/VAG CYTO: NORMAL
D-LACTATE SERPL-SCNC: 1.4 MMOL/L (ref 0.5–2)
D-LACTATE SERPL-SCNC: 1.7 MMOL/L (ref 0.5–2)
D-LACTATE SERPL-SCNC: 2.3 MMOL/L (ref 0.5–2)
DACRYOCYTES BLD QL SMEAR: ABNORMAL
DEPRECATED RDW RBC AUTO: 46.6 FL (ref 37–54)
DEPRECATED RDW RBC AUTO: 46.6 FL (ref 37–54)
DEPRECATED RDW RBC AUTO: 47 FL (ref 37–54)
DEPRECATED RDW RBC AUTO: 47.8 FL (ref 37–54)
DEPRECATED RDW RBC AUTO: 48.4 FL (ref 37–54)
DEPRECATED RDW RBC AUTO: 48.8 FL (ref 37–54)
DEPRECATED RDW RBC AUTO: 49 FL (ref 37–54)
DEPRECATED RDW RBC AUTO: 49.6 FL (ref 37–54)
DEPRECATED RDW RBC AUTO: 50 FL (ref 37–54)
DEPRECATED RDW RBC AUTO: 50.1 FL (ref 37–54)
DEPRECATED RDW RBC AUTO: 50.2 FL (ref 37–54)
DEPRECATED RDW RBC AUTO: 50.9 FL (ref 37–54)
DEPRECATED RDW RBC AUTO: 51 FL (ref 37–54)
DEPRECATED RDW RBC AUTO: 51 FL (ref 37–54)
DEPRECATED RDW RBC AUTO: 52.1 FL (ref 37–54)
DEPRECATED RDW RBC AUTO: 52.6 FL (ref 37–54)
DEPRECATED RDW RBC AUTO: 52.7 FL (ref 37–54)
DEPRECATED RDW RBC AUTO: 53.7 FL (ref 37–54)
EGFRCR SERPLBLD CKD-EPI 2021: 100.1 ML/MIN/1.73
EGFRCR SERPLBLD CKD-EPI 2021: 104.2 ML/MIN/1.73
EGFRCR SERPLBLD CKD-EPI 2021: 104.2 ML/MIN/1.73
EGFRCR SERPLBLD CKD-EPI 2021: 107.8 ML/MIN/1.73
EGFRCR SERPLBLD CKD-EPI 2021: 109.5 ML/MIN/1.73
EGFRCR SERPLBLD CKD-EPI 2021: 110.1 ML/MIN/1.73
EGFRCR SERPLBLD CKD-EPI 2021: 110.8 ML/MIN/1.73
EGFRCR SERPLBLD CKD-EPI 2021: 72.4 ML/MIN/1.73
EGFRCR SERPLBLD CKD-EPI 2021: 73.4 ML/MIN/1.73
EGFRCR SERPLBLD CKD-EPI 2021: 75.4 ML/MIN/1.73
EGFRCR SERPLBLD CKD-EPI 2021: 96.3 ML/MIN/1.73
EGFRCR SERPLBLD CKD-EPI 2021: 97.9 ML/MIN/1.73
EGFRCR SERPLBLD CKD-EPI 2021: 99 ML/MIN/1.73
EGFRCR SERPLBLD CKD-EPI 2021: 99.7 ML/MIN/1.73
EGFRCR SERPLBLD CKD-EPI 2021: 99.7 ML/MIN/1.73
EOSINOPHIL # BLD AUTO: 0.01 10*3/MM3 (ref 0–0.4)
EOSINOPHIL # BLD AUTO: 0.04 10*3/MM3 (ref 0–0.4)
EOSINOPHIL # BLD AUTO: 0.15 10*3/MM3 (ref 0–0.4)
EOSINOPHIL # BLD AUTO: 0.22 10*3/MM3 (ref 0–0.4)
EOSINOPHIL # BLD MANUAL: 0 10*3/MM3 (ref 0–0.4)
EOSINOPHIL # BLD MANUAL: 0.04 10*3/MM3 (ref 0–0.4)
EOSINOPHIL # BLD MANUAL: 0.15 10*3/MM3 (ref 0–0.4)
EOSINOPHIL # BLD MANUAL: 0.18 10*3/MM3 (ref 0–0.4)
EOSINOPHIL # BLD MANUAL: 0.18 10*3/MM3 (ref 0–0.4)
EOSINOPHIL # BLD MANUAL: 0.21 10*3/MM3 (ref 0–0.4)
EOSINOPHIL # BLD MANUAL: 0.27 10*3/MM3 (ref 0–0.4)
EOSINOPHIL # BLD MANUAL: 0.28 10*3/MM3 (ref 0–0.4)
EOSINOPHIL # BLD MANUAL: 0.33 10*3/MM3 (ref 0–0.4)
EOSINOPHIL # BLD MANUAL: 0.52 10*3/MM3 (ref 0–0.4)
EOSINOPHIL # BLD MANUAL: 0.59 10*3/MM3 (ref 0–0.4)
EOSINOPHIL # BLD MANUAL: 0.69 10*3/MM3 (ref 0–0.4)
EOSINOPHIL NFR BLD AUTO: 0.4 % (ref 0.3–6.2)
EOSINOPHIL NFR BLD AUTO: 1.2 % (ref 0.3–6.2)
EOSINOPHIL NFR BLD AUTO: 1.3 % (ref 0.3–6.2)
EOSINOPHIL NFR BLD AUTO: 1.9 % (ref 0.3–6.2)
EOSINOPHIL NFR BLD MANUAL: 0 % (ref 0.3–6.2)
EOSINOPHIL NFR BLD MANUAL: 1 % (ref 0.3–6.2)
EOSINOPHIL NFR BLD MANUAL: 2 % (ref 0.3–6.2)
EOSINOPHIL NFR BLD MANUAL: 3 % (ref 0.3–6.2)
EOSINOPHIL NFR BLD MANUAL: 3 % (ref 0.3–6.2)
EOSINOPHIL NFR BLD MANUAL: 4 % (ref 0.3–6.2)
EPAP: 0
ERYTHROCYTE [DISTWIDTH] IN BLOOD BY AUTOMATED COUNT: 15.3 % (ref 12.3–15.4)
ERYTHROCYTE [DISTWIDTH] IN BLOOD BY AUTOMATED COUNT: 15.3 % (ref 12.3–15.4)
ERYTHROCYTE [DISTWIDTH] IN BLOOD BY AUTOMATED COUNT: 15.8 % (ref 12.3–15.4)
ERYTHROCYTE [DISTWIDTH] IN BLOOD BY AUTOMATED COUNT: 15.9 % (ref 12.3–15.4)
ERYTHROCYTE [DISTWIDTH] IN BLOOD BY AUTOMATED COUNT: 16 % (ref 12.3–15.4)
ERYTHROCYTE [DISTWIDTH] IN BLOOD BY AUTOMATED COUNT: 16.3 % (ref 12.3–15.4)
ERYTHROCYTE [DISTWIDTH] IN BLOOD BY AUTOMATED COUNT: 16.5 % (ref 12.3–15.4)
ERYTHROCYTE [DISTWIDTH] IN BLOOD BY AUTOMATED COUNT: 16.5 % (ref 12.3–15.4)
ERYTHROCYTE [DISTWIDTH] IN BLOOD BY AUTOMATED COUNT: 16.9 % (ref 12.3–15.4)
ERYTHROCYTE [DISTWIDTH] IN BLOOD BY AUTOMATED COUNT: 17.2 % (ref 12.3–15.4)
ERYTHROCYTE [DISTWIDTH] IN BLOOD BY AUTOMATED COUNT: 17.2 % (ref 12.3–15.4)
ERYTHROCYTE [DISTWIDTH] IN BLOOD BY AUTOMATED COUNT: 17.3 % (ref 12.3–15.4)
ERYTHROCYTE [DISTWIDTH] IN BLOOD BY AUTOMATED COUNT: 17.5 % (ref 12.3–15.4)
ERYTHROCYTE [DISTWIDTH] IN BLOOD BY AUTOMATED COUNT: 17.7 % (ref 12.3–15.4)
ERYTHROCYTE [DISTWIDTH] IN BLOOD BY AUTOMATED COUNT: 17.8 % (ref 12.3–15.4)
ERYTHROCYTE [DISTWIDTH] IN BLOOD BY AUTOMATED COUNT: 17.9 % (ref 12.3–15.4)
ERYTHROCYTE [DISTWIDTH] IN BLOOD BY AUTOMATED COUNT: 17.9 % (ref 12.3–15.4)
ERYTHROCYTE [DISTWIDTH] IN BLOOD BY AUTOMATED COUNT: 18.2 % (ref 12.3–15.4)
FLUAV SUBTYP SPEC NAA+PROBE: NOT DETECTED
FLUBV RNA ISLT QL NAA+PROBE: NOT DETECTED
GEN 5 2HR TROPONIN T REFLEX: 14 NG/L
GLOBULIN UR ELPH-MCNC: 2.8 GM/DL
GLOBULIN UR ELPH-MCNC: 2.9 GM/DL
GLOBULIN UR ELPH-MCNC: 2.9 GM/DL
GLOBULIN UR ELPH-MCNC: 3 GM/DL
GLOBULIN UR ELPH-MCNC: 3.1 GM/DL
GLOBULIN UR ELPH-MCNC: 3.1 GM/DL
GLOBULIN UR ELPH-MCNC: 3.2 GM/DL
GLOBULIN UR ELPH-MCNC: 3.3 GM/DL
GLOBULIN UR ELPH-MCNC: 3.4 GM/DL
GLOBULIN UR ELPH-MCNC: 3.4 GM/DL
GLUCOSE BLDC GLUCOMTR-MCNC: 101 MG/DL (ref 70–130)
GLUCOSE BLDC GLUCOMTR-MCNC: 102 MG/DL (ref 70–130)
GLUCOSE BLDC GLUCOMTR-MCNC: 103 MG/DL (ref 70–130)
GLUCOSE BLDC GLUCOMTR-MCNC: 104 MG/DL (ref 70–130)
GLUCOSE BLDC GLUCOMTR-MCNC: 104 MG/DL (ref 70–130)
GLUCOSE BLDC GLUCOMTR-MCNC: 105 MG/DL (ref 70–130)
GLUCOSE BLDC GLUCOMTR-MCNC: 106 MG/DL (ref 70–130)
GLUCOSE BLDC GLUCOMTR-MCNC: 108 MG/DL (ref 70–130)
GLUCOSE BLDC GLUCOMTR-MCNC: 109 MG/DL (ref 70–130)
GLUCOSE BLDC GLUCOMTR-MCNC: 109 MG/DL (ref 70–130)
GLUCOSE BLDC GLUCOMTR-MCNC: 110 MG/DL (ref 70–130)
GLUCOSE BLDC GLUCOMTR-MCNC: 111 MG/DL (ref 70–130)
GLUCOSE BLDC GLUCOMTR-MCNC: 112 MG/DL (ref 70–130)
GLUCOSE BLDC GLUCOMTR-MCNC: 113 MG/DL (ref 70–130)
GLUCOSE BLDC GLUCOMTR-MCNC: 114 MG/DL (ref 70–130)
GLUCOSE BLDC GLUCOMTR-MCNC: 114 MG/DL (ref 70–130)
GLUCOSE BLDC GLUCOMTR-MCNC: 115 MG/DL (ref 70–130)
GLUCOSE BLDC GLUCOMTR-MCNC: 116 MG/DL (ref 70–130)
GLUCOSE BLDC GLUCOMTR-MCNC: 118 MG/DL (ref 70–130)
GLUCOSE BLDC GLUCOMTR-MCNC: 119 MG/DL (ref 70–130)
GLUCOSE BLDC GLUCOMTR-MCNC: 119 MG/DL (ref 70–130)
GLUCOSE BLDC GLUCOMTR-MCNC: 120 MG/DL (ref 70–130)
GLUCOSE BLDC GLUCOMTR-MCNC: 122 MG/DL (ref 70–130)
GLUCOSE BLDC GLUCOMTR-MCNC: 123 MG/DL (ref 70–130)
GLUCOSE BLDC GLUCOMTR-MCNC: 124 MG/DL (ref 70–130)
GLUCOSE BLDC GLUCOMTR-MCNC: 127 MG/DL (ref 70–130)
GLUCOSE BLDC GLUCOMTR-MCNC: 128 MG/DL (ref 70–130)
GLUCOSE BLDC GLUCOMTR-MCNC: 128 MG/DL (ref 70–130)
GLUCOSE BLDC GLUCOMTR-MCNC: 130 MG/DL (ref 70–130)
GLUCOSE BLDC GLUCOMTR-MCNC: 130 MG/DL (ref 70–130)
GLUCOSE BLDC GLUCOMTR-MCNC: 133 MG/DL (ref 70–130)
GLUCOSE BLDC GLUCOMTR-MCNC: 135 MG/DL (ref 70–130)
GLUCOSE BLDC GLUCOMTR-MCNC: 145 MG/DL (ref 70–130)
GLUCOSE BLDC GLUCOMTR-MCNC: 155 MG/DL (ref 70–130)
GLUCOSE BLDC GLUCOMTR-MCNC: 158 MG/DL (ref 70–130)
GLUCOSE BLDC GLUCOMTR-MCNC: 161 MG/DL (ref 70–130)
GLUCOSE BLDC GLUCOMTR-MCNC: 163 MG/DL (ref 70–130)
GLUCOSE BLDC GLUCOMTR-MCNC: 169 MG/DL (ref 70–130)
GLUCOSE BLDC GLUCOMTR-MCNC: 170 MG/DL (ref 70–130)
GLUCOSE BLDC GLUCOMTR-MCNC: 67 MG/DL (ref 70–130)
GLUCOSE BLDC GLUCOMTR-MCNC: 76 MG/DL (ref 70–130)
GLUCOSE BLDC GLUCOMTR-MCNC: 89 MG/DL (ref 70–130)
GLUCOSE BLDC GLUCOMTR-MCNC: 90 MG/DL (ref 70–130)
GLUCOSE BLDC GLUCOMTR-MCNC: 92 MG/DL (ref 70–130)
GLUCOSE BLDC GLUCOMTR-MCNC: 97 MG/DL (ref 70–130)
GLUCOSE SERPL-MCNC: 102 MG/DL (ref 65–99)
GLUCOSE SERPL-MCNC: 104 MG/DL (ref 65–99)
GLUCOSE SERPL-MCNC: 106 MG/DL (ref 65–99)
GLUCOSE SERPL-MCNC: 110 MG/DL (ref 65–99)
GLUCOSE SERPL-MCNC: 111 MG/DL (ref 65–99)
GLUCOSE SERPL-MCNC: 111 MG/DL (ref 65–99)
GLUCOSE SERPL-MCNC: 115 MG/DL (ref 65–99)
GLUCOSE SERPL-MCNC: 116 MG/DL (ref 65–99)
GLUCOSE SERPL-MCNC: 117 MG/DL (ref 65–99)
GLUCOSE SERPL-MCNC: 122 MG/DL (ref 65–99)
GLUCOSE SERPL-MCNC: 122 MG/DL (ref 65–99)
GLUCOSE SERPL-MCNC: 124 MG/DL (ref 65–99)
GLUCOSE SERPL-MCNC: 126 MG/DL (ref 65–99)
GLUCOSE SERPL-MCNC: 130 MG/DL (ref 65–99)
GLUCOSE SERPL-MCNC: 136 MG/DL (ref 65–99)
GLUCOSE SERPL-MCNC: 157 MG/DL (ref 65–99)
GLUCOSE SERPL-MCNC: 177 MG/DL (ref 65–99)
GLUCOSE UR STRIP-MCNC: NEGATIVE MG/DL
GRAM STN SPEC: ABNORMAL
GRAM STN SPEC: NORMAL
HADV DNA SPEC NAA+PROBE: NOT DETECTED
HCO3 BLDA-SCNC: 20.7 MMOL/L (ref 20–26)
HCO3 BLDA-SCNC: 22.8 MMOL/L (ref 20–26)
HCO3 BLDA-SCNC: 23.3 MMOL/L (ref 20–26)
HCO3 BLDA-SCNC: 23.3 MMOL/L (ref 20–26)
HCO3 BLDA-SCNC: 23.8 MMOL/L (ref 20–26)
HCO3 BLDA-SCNC: 24 MMOL/L (ref 20–26)
HCO3 BLDA-SCNC: 24.8 MMOL/L (ref 20–26)
HCO3 BLDA-SCNC: 24.9 MMOL/L (ref 20–26)
HCO3 BLDA-SCNC: 25.5 MMOL/L (ref 20–26)
HCO3 BLDA-SCNC: 28.9 MMOL/L (ref 20–26)
HCOV 229E RNA SPEC QL NAA+PROBE: NOT DETECTED
HCOV HKU1 RNA SPEC QL NAA+PROBE: NOT DETECTED
HCOV NL63 RNA SPEC QL NAA+PROBE: NOT DETECTED
HCOV OC43 RNA SPEC QL NAA+PROBE: NOT DETECTED
HCT VFR BLD AUTO: 20.6 % (ref 34–46.6)
HCT VFR BLD AUTO: 21.8 % (ref 34–46.6)
HCT VFR BLD AUTO: 22.5 % (ref 34–46.6)
HCT VFR BLD AUTO: 22.6 % (ref 34–46.6)
HCT VFR BLD AUTO: 22.9 % (ref 34–46.6)
HCT VFR BLD AUTO: 23 % (ref 34–46.6)
HCT VFR BLD AUTO: 23 % (ref 34–46.6)
HCT VFR BLD AUTO: 23.5 % (ref 34–46.6)
HCT VFR BLD AUTO: 23.6 % (ref 34–46.6)
HCT VFR BLD AUTO: 23.6 % (ref 34–46.6)
HCT VFR BLD AUTO: 23.7 % (ref 34–46.6)
HCT VFR BLD AUTO: 23.7 % (ref 34–46.6)
HCT VFR BLD AUTO: 24 % (ref 34–46.6)
HCT VFR BLD AUTO: 24.8 % (ref 34–46.6)
HCT VFR BLD AUTO: 25.1 % (ref 34–46.6)
HCT VFR BLD AUTO: 25.1 % (ref 34–46.6)
HCT VFR BLD AUTO: 27.3 % (ref 34–46.6)
HCT VFR BLD AUTO: 27.8 % (ref 34–46.6)
HCT VFR BLD AUTO: 29.2 % (ref 34–46.6)
HCT VFR BLD AUTO: 37.4 % (ref 34–46.6)
HCT VFR BLD CALC: 23.5 % (ref 38–51)
HCT VFR BLD CALC: 23.8 % (ref 38–51)
HCT VFR BLD CALC: 23.8 % (ref 38–51)
HCT VFR BLD CALC: 24.1 % (ref 38–51)
HCT VFR BLD CALC: 24.9 % (ref 38–51)
HCT VFR BLD CALC: 25 % (ref 38–51)
HCT VFR BLD CALC: 25.4 % (ref 38–51)
HCT VFR BLD CALC: 28.8 % (ref 38–51)
HCT VFR BLD CALC: 29.5 % (ref 38–51)
HCT VFR BLD CALC: 30 % (ref 38–51)
HDLC SERPL-MCNC: 29 MG/DL (ref 40–60)
HGB BLD-MCNC: 12.1 G/DL (ref 12–15.9)
HGB BLD-MCNC: 6.7 G/DL (ref 12–15.9)
HGB BLD-MCNC: 7 G/DL (ref 12–15.9)
HGB BLD-MCNC: 7.3 G/DL (ref 12–15.9)
HGB BLD-MCNC: 7.4 G/DL (ref 12–15.9)
HGB BLD-MCNC: 7.5 G/DL (ref 12–15.9)
HGB BLD-MCNC: 7.6 G/DL (ref 12–15.9)
HGB BLD-MCNC: 7.6 G/DL (ref 12–15.9)
HGB BLD-MCNC: 7.8 G/DL (ref 12–15.9)
HGB BLD-MCNC: 7.9 G/DL (ref 12–15.9)
HGB BLD-MCNC: 8 G/DL (ref 12–15.9)
HGB BLD-MCNC: 8.6 G/DL (ref 12–15.9)
HGB BLD-MCNC: 8.8 G/DL (ref 12–15.9)
HGB BLD-MCNC: 9.5 G/DL (ref 12–15.9)
HGB BLDA-MCNC: 7.7 G/DL (ref 14–18)
HGB BLDA-MCNC: 7.8 G/DL (ref 14–18)
HGB BLDA-MCNC: 7.8 G/DL (ref 14–18)
HGB BLDA-MCNC: 7.9 G/DL (ref 14–18)
HGB BLDA-MCNC: 8.1 G/DL (ref 14–18)
HGB BLDA-MCNC: 8.2 G/DL (ref 14–18)
HGB BLDA-MCNC: 8.3 G/DL (ref 14–18)
HGB BLDA-MCNC: 9.4 G/DL (ref 14–18)
HGB BLDA-MCNC: 9.6 G/DL (ref 14–18)
HGB BLDA-MCNC: 9.8 G/DL (ref 14–18)
HGB UR QL STRIP.AUTO: NEGATIVE
HMPV RNA NPH QL NAA+NON-PROBE: NOT DETECTED
HOLD SPECIMEN: NORMAL
HPIV1 RNA ISLT QL NAA+PROBE: NOT DETECTED
HPIV2 RNA SPEC QL NAA+PROBE: NOT DETECTED
HPIV3 RNA NPH QL NAA+PROBE: NOT DETECTED
HPIV4 P GENE NPH QL NAA+PROBE: NOT DETECTED
HYALINE CASTS UR QL AUTO: ABNORMAL /LPF
HYPOCHROMIA BLD QL: ABNORMAL
IMM GRANULOCYTES # BLD AUTO: 0.02 10*3/MM3 (ref 0–0.05)
IMM GRANULOCYTES # BLD AUTO: 0.02 10*3/MM3 (ref 0–0.05)
IMM GRANULOCYTES # BLD AUTO: 0.03 10*3/MM3 (ref 0–0.05)
IMM GRANULOCYTES # BLD AUTO: 1.33 10*3/MM3 (ref 0–0.05)
IMM GRANULOCYTES NFR BLD AUTO: 0.4 % (ref 0–0.5)
IMM GRANULOCYTES NFR BLD AUTO: 0.6 % (ref 0–0.5)
IMM GRANULOCYTES NFR BLD AUTO: 0.7 % (ref 0–0.5)
IMM GRANULOCYTES NFR BLD AUTO: 8 % (ref 0–0.5)
INHALED O2 CONCENTRATION: 40 %
INHALED O2 CONCENTRATION: 45 %
INHALED O2 CONCENTRATION: 50 %
INHALED O2 CONCENTRATION: 60 %
INHALED O2 CONCENTRATION: 60 %
INHALED O2 CONCENTRATION: 65 %
INHALED O2 CONCENTRATION: 90 %
INHALED O2 CONCENTRATION: 95 %
INR PPP: 1.74 (ref 0.89–1.12)
INR PPP: 2.66 (ref 0.89–1.12)
IPAP: 0
KETONES UR QL STRIP: ABNORMAL
LARGE PLATELETS: ABNORMAL
LDLC SERPL CALC-MCNC: 29 MG/DL (ref 0–100)
LDLC/HDLC SERPL: 0.77 {RATIO}
LEFT ATRIUM VOLUME INDEX: 18.4 ML/M2
LEUKOCYTE ESTERASE UR QL STRIP.AUTO: ABNORMAL
LIPASE SERPL-CCNC: 30 U/L (ref 13–60)
LYMPHOCYTES # BLD AUTO: 0.29 10*3/MM3 (ref 0.7–3.1)
LYMPHOCYTES # BLD AUTO: 0.45 10*3/MM3 (ref 0.7–3.1)
LYMPHOCYTES # BLD AUTO: 0.69 10*3/MM3 (ref 0.7–3.1)
LYMPHOCYTES # BLD AUTO: 1.3 10*3/MM3 (ref 0.7–3.1)
LYMPHOCYTES # BLD MANUAL: 0.49 10*3/MM3 (ref 0.7–3.1)
LYMPHOCYTES # BLD MANUAL: 0.72 10*3/MM3 (ref 0.7–3.1)
LYMPHOCYTES # BLD MANUAL: 0.78 10*3/MM3 (ref 0.7–3.1)
LYMPHOCYTES # BLD MANUAL: 0.84 10*3/MM3 (ref 0.7–3.1)
LYMPHOCYTES # BLD MANUAL: 0.91 10*3/MM3 (ref 0.7–3.1)
LYMPHOCYTES # BLD MANUAL: 1.39 10*3/MM3 (ref 0.7–3.1)
LYMPHOCYTES # BLD MANUAL: 1.42 10*3/MM3 (ref 0.7–3.1)
LYMPHOCYTES # BLD MANUAL: 1.55 10*3/MM3 (ref 0.7–3.1)
LYMPHOCYTES # BLD MANUAL: 1.61 10*3/MM3 (ref 0.7–3.1)
LYMPHOCYTES # BLD MANUAL: 1.62 10*3/MM3 (ref 0.7–3.1)
LYMPHOCYTES # BLD MANUAL: 1.7 10*3/MM3 (ref 0.7–3.1)
LYMPHOCYTES # BLD MANUAL: 1.77 10*3/MM3 (ref 0.7–3.1)
LYMPHOCYTES # BLD MANUAL: 1.98 10*3/MM3 (ref 0.7–3.1)
LYMPHOCYTES # BLD MANUAL: 1.99 10*3/MM3 (ref 0.7–3.1)
LYMPHOCYTES NFR BLD AUTO: 16.7 % (ref 19.6–45.3)
LYMPHOCYTES NFR BLD AUTO: 7.8 % (ref 19.6–45.3)
LYMPHOCYTES NFR BLD AUTO: 8.6 % (ref 19.6–45.3)
LYMPHOCYTES NFR BLD AUTO: 8.6 % (ref 19.6–45.3)
LYMPHOCYTES NFR BLD MANUAL: 1 % (ref 5–12)
LYMPHOCYTES NFR BLD MANUAL: 12 % (ref 5–12)
LYMPHOCYTES NFR BLD MANUAL: 14 % (ref 5–12)
LYMPHOCYTES NFR BLD MANUAL: 2 % (ref 5–12)
LYMPHOCYTES NFR BLD MANUAL: 3 % (ref 5–12)
LYMPHOCYTES NFR BLD MANUAL: 3 % (ref 5–12)
LYMPHOCYTES NFR BLD MANUAL: 5 % (ref 5–12)
LYMPHOCYTES NFR BLD MANUAL: 6 % (ref 5–12)
LYMPHOCYTES NFR BLD MANUAL: 6 % (ref 5–12)
LYMPHOCYTES NFR BLD MANUAL: 7 % (ref 5–12)
LYMPHOCYTES NFR FLD MANUAL: 10 %
M PNEUMO IGG SER IA-ACNC: NOT DETECTED
MACROPHAGE FLUID: 41 %
MAGNESIUM SERPL-MCNC: 1.7 MG/DL (ref 1.6–2.4)
MAGNESIUM SERPL-MCNC: 1.8 MG/DL (ref 1.6–2.4)
MAGNESIUM SERPL-MCNC: 1.9 MG/DL (ref 1.6–2.4)
MAGNESIUM SERPL-MCNC: 1.9 MG/DL (ref 1.6–2.4)
MAGNESIUM SERPL-MCNC: 2 MG/DL (ref 1.6–2.4)
MAGNESIUM SERPL-MCNC: 2 MG/DL (ref 1.6–2.4)
MAGNESIUM SERPL-MCNC: 2.2 MG/DL (ref 1.6–2.4)
MAGNESIUM SERPL-MCNC: 2.3 MG/DL (ref 1.6–2.4)
MAGNESIUM SERPL-MCNC: 2.3 MG/DL (ref 1.6–2.4)
MAGNESIUM SERPL-MCNC: 2.4 MG/DL (ref 1.6–2.4)
MAGNESIUM SERPL-MCNC: 2.6 MG/DL (ref 1.6–2.4)
MCH RBC QN AUTO: 26.4 PG (ref 26.6–33)
MCH RBC QN AUTO: 26.5 PG (ref 26.6–33)
MCH RBC QN AUTO: 26.6 PG (ref 26.6–33)
MCH RBC QN AUTO: 26.7 PG (ref 26.6–33)
MCH RBC QN AUTO: 26.8 PG (ref 26.6–33)
MCH RBC QN AUTO: 26.9 PG (ref 26.6–33)
MCH RBC QN AUTO: 27.2 PG (ref 26.6–33)
MCH RBC QN AUTO: 27.2 PG (ref 26.6–33)
MCH RBC QN AUTO: 27.3 PG (ref 26.6–33)
MCH RBC QN AUTO: 27.4 PG (ref 26.6–33)
MCH RBC QN AUTO: 27.5 PG (ref 26.6–33)
MCH RBC QN AUTO: 27.5 PG (ref 26.6–33)
MCH RBC QN AUTO: 27.6 PG (ref 26.6–33)
MCH RBC QN AUTO: 27.7 PG (ref 26.6–33)
MCHC RBC AUTO-ENTMCNC: 31.1 G/DL (ref 31.5–35.7)
MCHC RBC AUTO-ENTMCNC: 31.2 G/DL (ref 31.5–35.7)
MCHC RBC AUTO-ENTMCNC: 31.3 G/DL (ref 31.5–35.7)
MCHC RBC AUTO-ENTMCNC: 31.5 G/DL (ref 31.5–35.7)
MCHC RBC AUTO-ENTMCNC: 31.6 G/DL (ref 31.5–35.7)
MCHC RBC AUTO-ENTMCNC: 31.7 G/DL (ref 31.5–35.7)
MCHC RBC AUTO-ENTMCNC: 31.7 G/DL (ref 31.5–35.7)
MCHC RBC AUTO-ENTMCNC: 31.9 G/DL (ref 31.5–35.7)
MCHC RBC AUTO-ENTMCNC: 31.9 G/DL (ref 31.5–35.7)
MCHC RBC AUTO-ENTMCNC: 32.1 G/DL (ref 31.5–35.7)
MCHC RBC AUTO-ENTMCNC: 32.2 G/DL (ref 31.5–35.7)
MCHC RBC AUTO-ENTMCNC: 32.2 G/DL (ref 31.5–35.7)
MCHC RBC AUTO-ENTMCNC: 32.3 G/DL (ref 31.5–35.7)
MCHC RBC AUTO-ENTMCNC: 32.4 G/DL (ref 31.5–35.7)
MCHC RBC AUTO-ENTMCNC: 32.4 G/DL (ref 31.5–35.7)
MCHC RBC AUTO-ENTMCNC: 32.5 G/DL (ref 31.5–35.7)
MCV RBC AUTO: 83.3 FL (ref 79–97)
MCV RBC AUTO: 84 FL (ref 79–97)
MCV RBC AUTO: 84.2 FL (ref 79–97)
MCV RBC AUTO: 84.2 FL (ref 79–97)
MCV RBC AUTO: 84.3 FL (ref 79–97)
MCV RBC AUTO: 84.4 FL (ref 79–97)
MCV RBC AUTO: 84.5 FL (ref 79–97)
MCV RBC AUTO: 84.6 FL (ref 79–97)
MCV RBC AUTO: 84.8 FL (ref 79–97)
MCV RBC AUTO: 84.8 FL (ref 79–97)
MCV RBC AUTO: 85.1 FL (ref 79–97)
MCV RBC AUTO: 85.3 FL (ref 79–97)
MCV RBC AUTO: 85.5 FL (ref 79–97)
MCV RBC AUTO: 85.5 FL (ref 79–97)
MCV RBC AUTO: 85.6 FL (ref 79–97)
MCV RBC AUTO: 85.7 FL (ref 79–97)
MCV RBC AUTO: 86.9 FL (ref 79–97)
MCV RBC AUTO: 86.9 FL (ref 79–97)
MESOTHL CELL NFR FLD MANUAL: 24 %
METAMYELOCYTES NFR BLD MANUAL: 1 % (ref 0–0)
METAMYELOCYTES NFR BLD MANUAL: 10 % (ref 0–0)
METAMYELOCYTES NFR BLD MANUAL: 2 % (ref 0–0)
METAMYELOCYTES NFR BLD MANUAL: 3 % (ref 0–0)
METAMYELOCYTES NFR BLD MANUAL: 4 % (ref 0–0)
METAMYELOCYTES NFR BLD MANUAL: 5 % (ref 0–0)
METAMYELOCYTES NFR BLD MANUAL: 5 % (ref 0–0)
METAMYELOCYTES NFR BLD MANUAL: 8 % (ref 0–0)
METAMYELOCYTES NFR BLD MANUAL: 9 % (ref 0–0)
METHGB BLD QL: 0.3 % (ref 0–1.5)
METHGB BLD QL: 0.4 % (ref 0–1.5)
METHGB BLD QL: 0.5 % (ref 0–1.5)
METHGB BLD QL: 0.5 % (ref 0–1.5)
METHGB BLD QL: 0.6 % (ref 0–1.5)
METHGB BLD QL: 0.7 % (ref 0–1.5)
METHGB BLD QL: 0.8 % (ref 0–1.5)
METHGB BLD QL: 1.1 % (ref 0–1.5)
MODALITY: ABNORMAL
MONOCYTES # BLD AUTO: 0.17 10*3/MM3 (ref 0.1–0.9)
MONOCYTES # BLD AUTO: 0.27 10*3/MM3 (ref 0.1–0.9)
MONOCYTES # BLD AUTO: 0.54 10*3/MM3 (ref 0.1–0.9)
MONOCYTES # BLD AUTO: 1.39 10*3/MM3 (ref 0.1–0.9)
MONOCYTES # BLD: 0.18 10*3/MM3 (ref 0.1–0.9)
MONOCYTES # BLD: 0.27 10*3/MM3 (ref 0.1–0.9)
MONOCYTES # BLD: 0.28 10*3/MM3 (ref 0.1–0.9)
MONOCYTES # BLD: 0.39 10*3/MM3 (ref 0.1–0.9)
MONOCYTES # BLD: 0.42 10*3/MM3 (ref 0.1–0.9)
MONOCYTES # BLD: 0.49 10*3/MM3 (ref 0.1–0.9)
MONOCYTES # BLD: 0.52 10*3/MM3 (ref 0.1–0.9)
MONOCYTES # BLD: 0.54 10*3/MM3 (ref 0.1–0.9)
MONOCYTES # BLD: 0.79 10*3/MM3 (ref 0.1–0.9)
MONOCYTES # BLD: 0.97 10*3/MM3 (ref 0.1–0.9)
MONOCYTES # BLD: 1.04 10*3/MM3 (ref 0.1–0.9)
MONOCYTES # BLD: 1.39 10*3/MM3 (ref 0.1–0.9)
MONOCYTES # BLD: 1.66 10*3/MM3 (ref 0.1–0.9)
MONOCYTES # BLD: 1.99 10*3/MM3 (ref 0.1–0.9)
MONOCYTES NFR BLD AUTO: 10 % (ref 5–12)
MONOCYTES NFR BLD AUTO: 5 % (ref 5–12)
MONOCYTES NFR BLD AUTO: 6.7 % (ref 5–12)
MONOCYTES NFR BLD AUTO: 8.3 % (ref 5–12)
MRSA DNA SPEC QL NAA+PROBE: NEGATIVE
MYELOCYTES NFR BLD MANUAL: 1 % (ref 0–0)
MYELOCYTES NFR BLD MANUAL: 10 % (ref 0–0)
MYELOCYTES NFR BLD MANUAL: 14 % (ref 0–0)
MYELOCYTES NFR BLD MANUAL: 2 % (ref 0–0)
MYELOCYTES NFR BLD MANUAL: 3 % (ref 0–0)
MYELOCYTES NFR BLD MANUAL: 4 % (ref 0–0)
MYELOCYTES NFR BLD MANUAL: 4 % (ref 0–0)
MYELOCYTES NFR BLD MANUAL: 5 % (ref 0–0)
MYELOCYTES NFR BLD MANUAL: 6 % (ref 0–0)
MYELOCYTES NFR BLD MANUAL: 7 % (ref 0–0)
NEUTROPHILS # BLD AUTO: 13.99 10*3/MM3 (ref 1.7–7)
NEUTROPHILS # BLD AUTO: 15.52 10*3/MM3 (ref 1.7–7)
NEUTROPHILS # BLD AUTO: 16.13 10*3/MM3 (ref 1.7–7)
NEUTROPHILS # BLD AUTO: 16.23 10*3/MM3 (ref 1.7–7)
NEUTROPHILS # BLD AUTO: 18.64 10*3/MM3 (ref 1.7–7)
NEUTROPHILS # BLD AUTO: 2.99 10*3/MM3 (ref 1.7–7)
NEUTROPHILS # BLD AUTO: 21.54 10*3/MM3 (ref 1.7–7)
NEUTROPHILS # BLD AUTO: 21.67 10*3/MM3 (ref 1.7–7)
NEUTROPHILS # BLD AUTO: 22.72 10*3/MM3 (ref 1.7–7)
NEUTROPHILS # BLD AUTO: 24.35 10*3/MM3 (ref 1.7–7)
NEUTROPHILS # BLD AUTO: 27.49 10*3/MM3 (ref 1.7–7)
NEUTROPHILS # BLD AUTO: 27.83 10*3/MM3 (ref 1.7–7)
NEUTROPHILS # BLD AUTO: 3.92 10*3/MM3 (ref 1.7–7)
NEUTROPHILS # BLD AUTO: 7.07 10*3/MM3 (ref 1.7–7)
NEUTROPHILS NFR BLD AUTO: 1.93 10*3/MM3 (ref 1.7–7)
NEUTROPHILS NFR BLD AUTO: 12.26 10*3/MM3 (ref 1.7–7)
NEUTROPHILS NFR BLD AUTO: 2.86 10*3/MM3 (ref 1.7–7)
NEUTROPHILS NFR BLD AUTO: 6.62 10*3/MM3 (ref 1.7–7)
NEUTROPHILS NFR BLD AUTO: 71.8 % (ref 42.7–76)
NEUTROPHILS NFR BLD AUTO: 73.5 % (ref 42.7–76)
NEUTROPHILS NFR BLD AUTO: 82.2 % (ref 42.7–76)
NEUTROPHILS NFR BLD AUTO: 84.3 % (ref 42.7–76)
NEUTROPHILS NFR BLD MANUAL: 20 % (ref 42.7–76)
NEUTROPHILS NFR BLD MANUAL: 29 % (ref 42.7–76)
NEUTROPHILS NFR BLD MANUAL: 41 % (ref 42.7–76)
NEUTROPHILS NFR BLD MANUAL: 42 % (ref 42.7–76)
NEUTROPHILS NFR BLD MANUAL: 49 % (ref 42.7–76)
NEUTROPHILS NFR BLD MANUAL: 54 % (ref 42.7–76)
NEUTROPHILS NFR BLD MANUAL: 56 % (ref 42.7–76)
NEUTROPHILS NFR BLD MANUAL: 58 % (ref 42.7–76)
NEUTROPHILS NFR BLD MANUAL: 59 % (ref 42.7–76)
NEUTROPHILS NFR BLD MANUAL: 65 % (ref 42.7–76)
NEUTROPHILS NFR BLD MANUAL: 76 % (ref 42.7–76)
NEUTROPHILS NFR BLD MANUAL: 77 % (ref 42.7–76)
NEUTROPHILS NFR BLD MANUAL: 78 % (ref 42.7–76)
NEUTROPHILS NFR BLD MANUAL: 80 % (ref 42.7–76)
NEUTROPHILS NFR FLD MANUAL: 25 %
NEUTS BAND NFR BLD MANUAL: 11 % (ref 0–5)
NEUTS BAND NFR BLD MANUAL: 12 % (ref 0–5)
NEUTS BAND NFR BLD MANUAL: 13 % (ref 0–5)
NEUTS BAND NFR BLD MANUAL: 16 % (ref 0–5)
NEUTS BAND NFR BLD MANUAL: 20 % (ref 0–5)
NEUTS BAND NFR BLD MANUAL: 24 % (ref 0–5)
NEUTS BAND NFR BLD MANUAL: 24 % (ref 0–5)
NEUTS BAND NFR BLD MANUAL: 25 % (ref 0–5)
NEUTS BAND NFR BLD MANUAL: 26 % (ref 0–5)
NEUTS BAND NFR BLD MANUAL: 28 % (ref 0–5)
NEUTS BAND NFR BLD MANUAL: 29 % (ref 0–5)
NEUTS BAND NFR BLD MANUAL: 32 % (ref 0–5)
NEUTS BAND NFR BLD MANUAL: 33 % (ref 0–5)
NEUTS BAND NFR BLD MANUAL: 9 % (ref 0–5)
NITRITE UR QL STRIP: NEGATIVE
NRBC BLD AUTO-RTO: 0 /100 WBC (ref 0–0.2)
NRBC BLD AUTO-RTO: 0 /100 WBC (ref 0–0.2)
NRBC BLD AUTO-RTO: 0.1 /100 WBC (ref 0–0.2)
NRBC BLD AUTO-RTO: 0.7 /100 WBC (ref 0–0.2)
NRBC SPEC MANUAL: 0 /100 WBC (ref 0–0.2)
NRBC SPEC MANUAL: 1 /100 WBC (ref 0–0.2)
NRBC SPEC MANUAL: 2 /100 WBC (ref 0–0.2)
NRBC SPEC MANUAL: 3 /100 WBC (ref 0–0.2)
NT-PROBNP SERPL-MCNC: 316.5 PG/ML (ref 0–900)
OVALOCYTES BLD QL SMEAR: ABNORMAL
OXYHGB MFR BLDV: 91.8 % (ref 94–99)
OXYHGB MFR BLDV: 92.5 % (ref 94–99)
OXYHGB MFR BLDV: 92.7 % (ref 94–99)
OXYHGB MFR BLDV: 93.3 % (ref 94–99)
OXYHGB MFR BLDV: 95.3 % (ref 94–99)
OXYHGB MFR BLDV: 95.7 % (ref 94–99)
OXYHGB MFR BLDV: 96 % (ref 94–99)
OXYHGB MFR BLDV: 96.6 % (ref 94–99)
OXYHGB MFR BLDV: 97.4 % (ref 94–99)
OXYHGB MFR BLDV: 97.6 % (ref 94–99)
PATH INTERP BLD-IMP: NORMAL
PAW @ PEAK INSP FLOW SETTING VENT: 0 CMH2O
PCO2 BLDA: 29.5 MM HG (ref 35–45)
PCO2 BLDA: 33.4 MM HG (ref 35–45)
PCO2 BLDA: 34 MM HG (ref 35–45)
PCO2 BLDA: 35 MM HG (ref 35–45)
PCO2 BLDA: 36.2 MM HG (ref 35–45)
PCO2 BLDA: 36.7 MM HG (ref 35–45)
PCO2 BLDA: 37.5 MM HG (ref 35–45)
PCO2 BLDA: 37.6 MM HG (ref 35–45)
PCO2 BLDA: 43 MM HG (ref 35–45)
PCO2 BLDA: 43.1 MM HG (ref 35–45)
PCO2 TEMP ADJ BLD: 29.5 MM HG (ref 35–45)
PCO2 TEMP ADJ BLD: 33.4 MM HG (ref 35–45)
PCO2 TEMP ADJ BLD: 34 MM HG (ref 35–45)
PCO2 TEMP ADJ BLD: 35 MM HG (ref 35–45)
PCO2 TEMP ADJ BLD: 36.2 MM HG (ref 35–45)
PCO2 TEMP ADJ BLD: 36.7 MM HG (ref 35–45)
PCO2 TEMP ADJ BLD: 37.5 MM HG (ref 35–45)
PCO2 TEMP ADJ BLD: 37.6 MM HG (ref 35–45)
PCO2 TEMP ADJ BLD: 43 MM HG (ref 35–45)
PCO2 TEMP ADJ BLD: 43.1 MM HG (ref 35–45)
PEEP RESPIRATORY: 10 CM[H2O]
PEEP RESPIRATORY: 10 CM[H2O]
PEEP RESPIRATORY: 12 CM[H2O]
PEEP RESPIRATORY: 12 CM[H2O]
PEEP RESPIRATORY: 15 CM[H2O]
PEEP RESPIRATORY: 5 CM[H2O]
PEEP RESPIRATORY: 8 CM[H2O]
PEEP RESPIRATORY: 8 CM[H2O]
PH BLDA: 7.35 PH UNITS (ref 7.35–7.45)
PH BLDA: 7.38 PH UNITS (ref 7.35–7.45)
PH BLDA: 7.39 PH UNITS (ref 7.35–7.45)
PH BLDA: 7.4 PH UNITS (ref 7.35–7.45)
PH BLDA: 7.43 PH UNITS (ref 7.35–7.45)
PH BLDA: 7.43 PH UNITS (ref 7.35–7.45)
PH BLDA: 7.44 PH UNITS (ref 7.35–7.45)
PH BLDA: 7.45 PH UNITS (ref 7.35–7.45)
PH BLDA: 7.49 PH UNITS (ref 7.35–7.45)
PH BLDA: 7.54 PH UNITS (ref 7.35–7.45)
PH UR STRIP.AUTO: 5.5 [PH] (ref 5–8)
PH, TEMP CORRECTED: 7.35 PH UNITS
PH, TEMP CORRECTED: 7.38 PH UNITS
PH, TEMP CORRECTED: 7.39 PH UNITS
PH, TEMP CORRECTED: 7.4 PH UNITS
PH, TEMP CORRECTED: 7.43 PH UNITS
PH, TEMP CORRECTED: 7.43 PH UNITS
PH, TEMP CORRECTED: 7.44 PH UNITS
PH, TEMP CORRECTED: 7.45 PH UNITS
PH, TEMP CORRECTED: 7.49 PH UNITS
PH, TEMP CORRECTED: 7.54 PH UNITS
PHOSPHATE SERPL-MCNC: 1.9 MG/DL (ref 2.5–4.5)
PHOSPHATE SERPL-MCNC: 2.1 MG/DL (ref 2.5–4.5)
PHOSPHATE SERPL-MCNC: 2.2 MG/DL (ref 2.5–4.5)
PHOSPHATE SERPL-MCNC: 2.3 MG/DL (ref 2.5–4.5)
PHOSPHATE SERPL-MCNC: 2.3 MG/DL (ref 2.5–4.5)
PHOSPHATE SERPL-MCNC: 2.5 MG/DL (ref 2.5–4.5)
PHOSPHATE SERPL-MCNC: 2.9 MG/DL (ref 2.5–4.5)
PHOSPHATE SERPL-MCNC: 2.9 MG/DL (ref 2.5–4.5)
PHOSPHATE SERPL-MCNC: 3 MG/DL (ref 2.5–4.5)
PHOSPHATE SERPL-MCNC: 3.1 MG/DL (ref 2.5–4.5)
PHOSPHATE SERPL-MCNC: 3.2 MG/DL (ref 2.5–4.5)
PHOSPHATE SERPL-MCNC: 3.3 MG/DL (ref 2.5–4.5)
PHOSPHATE SERPL-MCNC: 3.3 MG/DL (ref 2.5–4.5)
PHOSPHATE SERPL-MCNC: 3.5 MG/DL (ref 2.5–4.5)
PHOSPHATE SERPL-MCNC: 3.6 MG/DL (ref 2.5–4.5)
PHOSPHATE SERPL-MCNC: 3.8 MG/DL (ref 2.5–4.5)
PLAT MORPH BLD: NORMAL
PLATELET # BLD AUTO: 138 10*3/MM3 (ref 140–450)
PLATELET # BLD AUTO: 171 10*3/MM3 (ref 140–450)
PLATELET # BLD AUTO: 181 10*3/MM3 (ref 140–450)
PLATELET # BLD AUTO: 181 10*3/MM3 (ref 140–450)
PLATELET # BLD AUTO: 183 10*3/MM3 (ref 140–450)
PLATELET # BLD AUTO: 188 10*3/MM3 (ref 140–450)
PLATELET # BLD AUTO: 189 10*3/MM3 (ref 140–450)
PLATELET # BLD AUTO: 190 10*3/MM3 (ref 140–450)
PLATELET # BLD AUTO: 192 10*3/MM3 (ref 140–450)
PLATELET # BLD AUTO: 197 10*3/MM3 (ref 140–450)
PLATELET # BLD AUTO: 198 10*3/MM3 (ref 140–450)
PLATELET # BLD AUTO: 199 10*3/MM3 (ref 140–450)
PLATELET # BLD AUTO: 200 10*3/MM3 (ref 140–450)
PLATELET # BLD AUTO: 203 10*3/MM3 (ref 140–450)
PLATELET # BLD AUTO: 205 10*3/MM3 (ref 140–450)
PLATELET # BLD AUTO: 209 10*3/MM3 (ref 140–450)
PLATELET # BLD AUTO: 211 10*3/MM3 (ref 140–450)
PLATELET # BLD AUTO: 213 10*3/MM3 (ref 140–450)
PMV BLD AUTO: 10 FL (ref 6–12)
PMV BLD AUTO: 10 FL (ref 6–12)
PMV BLD AUTO: 10.1 FL (ref 6–12)
PMV BLD AUTO: 10.1 FL (ref 6–12)
PMV BLD AUTO: 10.3 FL (ref 6–12)
PMV BLD AUTO: 9.4 FL (ref 6–12)
PMV BLD AUTO: 9.6 FL (ref 6–12)
PMV BLD AUTO: 9.6 FL (ref 6–12)
PMV BLD AUTO: 9.7 FL (ref 6–12)
PMV BLD AUTO: 9.8 FL (ref 6–12)
PMV BLD AUTO: 9.9 FL (ref 6–12)
PMV BLD AUTO: 9.9 FL (ref 6–12)
PO2 BLDA: 103 MM HG (ref 83–108)
PO2 BLDA: 111 MM HG (ref 83–108)
PO2 BLDA: 120 MM HG (ref 83–108)
PO2 BLDA: 68.1 MM HG (ref 83–108)
PO2 BLDA: 68.6 MM HG (ref 83–108)
PO2 BLDA: 68.8 MM HG (ref 83–108)
PO2 BLDA: 69 MM HG (ref 83–108)
PO2 BLDA: 86.4 MM HG (ref 83–108)
PO2 BLDA: 91.2 MM HG (ref 83–108)
PO2 BLDA: 93.4 MM HG (ref 83–108)
PO2 TEMP ADJ BLD: 103 MM HG (ref 83–108)
PO2 TEMP ADJ BLD: 111 MM HG (ref 83–108)
PO2 TEMP ADJ BLD: 120 MM HG (ref 83–108)
PO2 TEMP ADJ BLD: 68.1 MM HG (ref 83–108)
PO2 TEMP ADJ BLD: 68.6 MM HG (ref 83–108)
PO2 TEMP ADJ BLD: 68.8 MM HG (ref 83–108)
PO2 TEMP ADJ BLD: 69 MM HG (ref 83–108)
PO2 TEMP ADJ BLD: 86.4 MM HG (ref 83–108)
PO2 TEMP ADJ BLD: 91.2 MM HG (ref 83–108)
PO2 TEMP ADJ BLD: 93.4 MM HG (ref 83–108)
POLYCHROMASIA BLD QL SMEAR: ABNORMAL
POTASSIUM SERPL-SCNC: 3 MMOL/L (ref 3.5–5.2)
POTASSIUM SERPL-SCNC: 3.3 MMOL/L (ref 3.5–5.2)
POTASSIUM SERPL-SCNC: 3.3 MMOL/L (ref 3.5–5.2)
POTASSIUM SERPL-SCNC: 3.4 MMOL/L (ref 3.5–5.2)
POTASSIUM SERPL-SCNC: 3.5 MMOL/L (ref 3.5–5.2)
POTASSIUM SERPL-SCNC: 3.6 MMOL/L (ref 3.5–5.2)
POTASSIUM SERPL-SCNC: 3.7 MMOL/L (ref 3.5–5.2)
POTASSIUM SERPL-SCNC: 3.7 MMOL/L (ref 3.5–5.2)
POTASSIUM SERPL-SCNC: 3.8 MMOL/L (ref 3.5–5.2)
POTASSIUM SERPL-SCNC: 3.9 MMOL/L (ref 3.5–5.2)
POTASSIUM SERPL-SCNC: 3.9 MMOL/L (ref 3.5–5.2)
POTASSIUM SERPL-SCNC: 4 MMOL/L (ref 3.5–5.2)
POTASSIUM SERPL-SCNC: 4.1 MMOL/L (ref 3.5–5.2)
POTASSIUM SERPL-SCNC: 4.1 MMOL/L (ref 3.5–5.2)
POTASSIUM SERPL-SCNC: 4.2 MMOL/L (ref 3.5–5.2)
PREALB SERPL-MCNC: 5.4 MG/DL (ref 20–40)
PREALB SERPL-MCNC: 7.8 MG/DL (ref 20–40)
PROCALCITONIN SERPL-MCNC: 0.36 NG/ML (ref 0–0.25)
PROCALCITONIN SERPL-MCNC: 0.65 NG/ML (ref 0–0.25)
PROT SERPL-MCNC: 4.9 G/DL (ref 6–8.5)
PROT SERPL-MCNC: 5 G/DL (ref 6–8.5)
PROT SERPL-MCNC: 5.1 G/DL (ref 6–8.5)
PROT SERPL-MCNC: 5.2 G/DL (ref 6–8.5)
PROT SERPL-MCNC: 5.3 G/DL (ref 6–8.5)
PROT SERPL-MCNC: 5.3 G/DL (ref 6–8.5)
PROT SERPL-MCNC: 5.4 G/DL (ref 6–8.5)
PROT SERPL-MCNC: 5.5 G/DL (ref 6–8.5)
PROT SERPL-MCNC: 5.6 G/DL (ref 6–8.5)
PROT SERPL-MCNC: 6.3 G/DL (ref 6–8.5)
PROT UR QL STRIP: ABNORMAL
PROTHROMBIN TIME: 20.5 SECONDS (ref 12.2–14.5)
PROTHROMBIN TIME: 28.6 SECONDS (ref 12.2–14.5)
PSV: 10 CMH2O
QT INTERVAL: 338 MS
QT INTERVAL: 360 MS
QT INTERVAL: 366 MS
QT INTERVAL: 380 MS
QT INTERVAL: 382 MS
QT INTERVAL: 384 MS
QT INTERVAL: 388 MS
QT INTERVAL: 400 MS
QTC INTERVAL: 430 MS
QTC INTERVAL: 457 MS
QTC INTERVAL: 459 MS
QTC INTERVAL: 467 MS
QTC INTERVAL: 469 MS
QTC INTERVAL: 474 MS
QTC INTERVAL: 482 MS
QTC INTERVAL: 508 MS
RBC # BLD AUTO: 2.42 10*6/MM3 (ref 3.77–5.28)
RBC # BLD AUTO: 2.57 10*6/MM3 (ref 3.77–5.28)
RBC # BLD AUTO: 2.65 10*6/MM3 (ref 3.77–5.28)
RBC # BLD AUTO: 2.68 10*6/MM3 (ref 3.77–5.28)
RBC # BLD AUTO: 2.71 10*6/MM3 (ref 3.77–5.28)
RBC # BLD AUTO: 2.73 10*6/MM3 (ref 3.77–5.28)
RBC # BLD AUTO: 2.75 10*6/MM3 (ref 3.77–5.28)
RBC # BLD AUTO: 2.76 10*6/MM3 (ref 3.77–5.28)
RBC # BLD AUTO: 2.76 10*6/MM3 (ref 3.77–5.28)
RBC # BLD AUTO: 2.8 10*6/MM3 (ref 3.77–5.28)
RBC # BLD AUTO: 2.81 10*6/MM3 (ref 3.77–5.28)
RBC # BLD AUTO: 2.83 10*6/MM3 (ref 3.77–5.28)
RBC # BLD AUTO: 2.89 10*6/MM3 (ref 3.77–5.28)
RBC # BLD AUTO: 2.93 10*6/MM3 (ref 3.77–5.28)
RBC # BLD AUTO: 2.94 10*6/MM3 (ref 3.77–5.28)
RBC # BLD AUTO: 3.14 10*6/MM3 (ref 3.77–5.28)
RBC # BLD AUTO: 3.3 10*6/MM3 (ref 3.77–5.28)
RBC # BLD AUTO: 4.37 10*6/MM3 (ref 3.77–5.28)
RBC # FLD AUTO: 8000 /MM3
RBC # UR STRIP: ABNORMAL /HPF
RBC MORPH BLD: NORMAL
REF LAB TEST METHOD: ABNORMAL
RH BLD: POSITIVE
RH BLD: POSITIVE
RHINOVIRUS RNA SPEC NAA+PROBE: NOT DETECTED
RSV RNA NPH QL NAA+NON-PROBE: NOT DETECTED
SARS-COV-2 RNA NPH QL NAA+NON-PROBE: NOT DETECTED
SCAN SLIDE: NORMAL
SCAN SLIDE: NORMAL
SMALL PLATELETS BLD QL SMEAR: ADEQUATE
SMALL PLATELETS BLD QL SMEAR: ADEQUATE
SODIUM SERPL-SCNC: 125 MMOL/L (ref 136–145)
SODIUM SERPL-SCNC: 125 MMOL/L (ref 136–145)
SODIUM SERPL-SCNC: 128 MMOL/L (ref 136–145)
SODIUM SERPL-SCNC: 130 MMOL/L (ref 136–145)
SODIUM SERPL-SCNC: 130 MMOL/L (ref 136–145)
SODIUM SERPL-SCNC: 131 MMOL/L (ref 136–145)
SODIUM SERPL-SCNC: 131 MMOL/L (ref 136–145)
SODIUM SERPL-SCNC: 132 MMOL/L (ref 136–145)
SODIUM SERPL-SCNC: 134 MMOL/L (ref 136–145)
SODIUM SERPL-SCNC: 134 MMOL/L (ref 136–145)
SODIUM SERPL-SCNC: 135 MMOL/L (ref 136–145)
SODIUM SERPL-SCNC: 136 MMOL/L (ref 136–145)
SP GR UR STRIP: 1.04 (ref 1–1.03)
SQUAMOUS #/AREA URNS HPF: ABNORMAL /HPF
T&S EXPIRATION DATE: NORMAL
TARGETS BLD QL SMEAR: ABNORMAL
TOTAL RATE: 0 BREATHS/MINUTE
TOTAL RATE: 20 BREATHS/MINUTE
TOTAL RATE: 24 BREATHS/MINUTE
TOTAL RATE: 24 BREATHS/MINUTE
TOTAL RATE: 25 BREATHS/MINUTE
TRIGL SERPL-MCNC: 184 MG/DL (ref 0–150)
TROPONIN T DELTA: 0 NG/L
TROPONIN T SERPL HS-MCNC: 14 NG/L
TROPONIN T SERPL HS-MCNC: 14 NG/L
UFH PPP CHRO-ACNC: 0.1 IU/ML (ref 0.3–0.7)
UFH PPP CHRO-ACNC: 0.16 IU/ML (ref 0.3–0.7)
UFH PPP CHRO-ACNC: 0.26 IU/ML (ref 0.3–0.7)
UFH PPP CHRO-ACNC: 0.28 IU/ML (ref 0.3–0.7)
UFH PPP CHRO-ACNC: 0.29 IU/ML (ref 0.3–0.7)
UFH PPP CHRO-ACNC: 0.31 IU/ML (ref 0.3–0.7)
UFH PPP CHRO-ACNC: 0.32 IU/ML (ref 0.3–0.7)
UFH PPP CHRO-ACNC: 0.33 IU/ML (ref 0.3–0.7)
UFH PPP CHRO-ACNC: 0.33 IU/ML (ref 0.3–0.7)
UFH PPP CHRO-ACNC: 0.35 IU/ML (ref 0.3–0.7)
UFH PPP CHRO-ACNC: 0.35 IU/ML (ref 0.3–0.7)
UFH PPP CHRO-ACNC: 0.36 IU/ML (ref 0.3–0.7)
UFH PPP CHRO-ACNC: 0.4 IU/ML (ref 0.3–0.7)
UFH PPP CHRO-ACNC: 0.42 IU/ML (ref 0.3–0.7)
UFH PPP CHRO-ACNC: 0.44 IU/ML (ref 0.3–0.7)
UFH PPP CHRO-ACNC: 0.47 IU/ML (ref 0.3–0.7)
UFH PPP CHRO-ACNC: 0.49 IU/ML (ref 0.3–0.7)
UFH PPP CHRO-ACNC: 0.49 IU/ML (ref 0.3–0.7)
UFH PPP CHRO-ACNC: 0.51 IU/ML (ref 0.3–0.7)
UFH PPP CHRO-ACNC: 0.52 IU/ML (ref 0.3–0.7)
UFH PPP CHRO-ACNC: 0.52 IU/ML (ref 0.3–0.7)
UFH PPP CHRO-ACNC: 0.53 IU/ML (ref 0.3–0.7)
UFH PPP CHRO-ACNC: 0.53 IU/ML (ref 0.3–0.7)
UFH PPP CHRO-ACNC: 0.57 IU/ML (ref 0.3–0.7)
UFH PPP CHRO-ACNC: 0.58 IU/ML (ref 0.3–0.7)
UFH PPP CHRO-ACNC: 0.6 IU/ML (ref 0.3–0.7)
UFH PPP CHRO-ACNC: 0.63 IU/ML (ref 0.3–0.7)
UFH PPP CHRO-ACNC: 0.89 IU/ML (ref 0.3–0.7)
UNIT  ABO: NORMAL
UNIT  ABO: NORMAL
UNIT  RH: NORMAL
UNIT  RH: NORMAL
UROBILINOGEN UR QL STRIP: ABNORMAL
VARIANT LYMPHS NFR BLD MANUAL: 1 % (ref 0–5)
VARIANT LYMPHS NFR BLD MANUAL: 15 % (ref 19.6–45.3)
VARIANT LYMPHS NFR BLD MANUAL: 17 % (ref 19.6–45.3)
VARIANT LYMPHS NFR BLD MANUAL: 2 % (ref 19.6–45.3)
VARIANT LYMPHS NFR BLD MANUAL: 3 % (ref 0–5)
VARIANT LYMPHS NFR BLD MANUAL: 4 % (ref 0–5)
VARIANT LYMPHS NFR BLD MANUAL: 4 % (ref 19.6–45.3)
VARIANT LYMPHS NFR BLD MANUAL: 5 % (ref 19.6–45.3)
VARIANT LYMPHS NFR BLD MANUAL: 5 % (ref 19.6–45.3)
VARIANT LYMPHS NFR BLD MANUAL: 6 % (ref 19.6–45.3)
VARIANT LYMPHS NFR BLD MANUAL: 7 % (ref 0–5)
VARIANT LYMPHS NFR BLD MANUAL: 8 % (ref 19.6–45.3)
VENTILATOR MODE: ABNORMAL
VLDLC SERPL-MCNC: 30 MG/DL (ref 5–40)
VT ON VENT VENT: 0.4 ML
VT ON VENT VENT: 0.42 ML
VT ON VENT VENT: 0.45 ML
WBC # FLD AUTO: 268 /MM3
WBC # UR STRIP: ABNORMAL /HPF
WBC MORPH BLD: NORMAL
WBC NRBC COR # BLD AUTO: 13.09 10*3/MM3 (ref 3.4–10.8)
WBC NRBC COR # BLD AUTO: 16.69 10*3/MM3 (ref 3.4–10.8)
WBC NRBC COR # BLD AUTO: 18.03 10*3/MM3 (ref 3.4–10.8)
WBC NRBC COR # BLD AUTO: 18.12 10*3/MM3 (ref 3.4–10.8)
WBC NRBC COR # BLD AUTO: 19.7 10*3/MM3 (ref 3.4–10.8)
WBC NRBC COR # BLD AUTO: 2.69 10*3/MM3 (ref 3.4–10.8)
WBC NRBC COR # BLD AUTO: 20.94 10*3/MM3 (ref 3.4–10.8)
WBC NRBC COR # BLD AUTO: 23.16 10*3/MM3 (ref 3.4–10.8)
WBC NRBC COR # BLD AUTO: 24.48 10*3/MM3 (ref 3.4–10.8)
WBC NRBC COR # BLD AUTO: 26.75 10*3/MM3 (ref 3.4–10.8)
WBC NRBC COR # BLD AUTO: 28.31 10*3/MM3 (ref 3.4–10.8)
WBC NRBC COR # BLD AUTO: 28.4 10*3/MM3 (ref 3.4–10.8)
WBC NRBC COR # BLD AUTO: 3.39 10*3/MM3 (ref 3.4–10.8)
WBC NRBC COR # BLD AUTO: 32.36 10*3/MM3 (ref 3.4–10.8)
WBC NRBC COR # BLD AUTO: 33.12 10*3/MM3 (ref 3.4–10.8)
WBC NRBC COR # BLD AUTO: 4.33 10*3/MM3 (ref 3.4–10.8)
WBC NRBC COR # BLD AUTO: 7.4 10*3/MM3 (ref 3.4–10.8)
WBC NRBC COR # BLD AUTO: 8.05 10*3/MM3 (ref 3.4–10.8)
WHOLE BLOOD HOLD COAG: NORMAL
WHOLE BLOOD HOLD SPECIMEN: NORMAL

## 2024-01-01 PROCEDURE — 99232 SBSQ HOSP IP/OBS MODERATE 35: CPT | Performed by: INTERNAL MEDICINE

## 2024-01-01 PROCEDURE — 25810000003 SODIUM CHLORIDE 0.9 % SOLUTION: Performed by: INTERNAL MEDICINE

## 2024-01-01 PROCEDURE — 25510000001 IOPAMIDOL PER 1 ML: Performed by: INTERNAL MEDICINE

## 2024-01-01 PROCEDURE — 25010000002 MAGNESIUM SULFATE 4 GM/100ML SOLUTION: Performed by: NURSE PRACTITIONER

## 2024-01-01 PROCEDURE — 99231 SBSQ HOSP IP/OBS SF/LOW 25: CPT | Performed by: INTERNAL MEDICINE

## 2024-01-01 PROCEDURE — 84132 ASSAY OF SERUM POTASSIUM: CPT | Performed by: INTERNAL MEDICINE

## 2024-01-01 PROCEDURE — 94799 UNLISTED PULMONARY SVC/PX: CPT

## 2024-01-01 PROCEDURE — 25010000002 FENTANYL 10 MCG/1 ML NS: Performed by: INTERNAL MEDICINE

## 2024-01-01 PROCEDURE — 87070 CULTURE OTHR SPECIMN AEROBIC: CPT | Performed by: INTERNAL MEDICINE

## 2024-01-01 PROCEDURE — 85007 BL SMEAR W/DIFF WBC COUNT: CPT | Performed by: INTERNAL MEDICINE

## 2024-01-01 PROCEDURE — 25010000002 MAGNESIUM SULFATE 4 GM/100ML SOLUTION: Performed by: INTERNAL MEDICINE

## 2024-01-01 PROCEDURE — 85520 HEPARIN ASSAY: CPT

## 2024-01-01 PROCEDURE — 87205 SMEAR GRAM STAIN: CPT | Performed by: INTERNAL MEDICINE

## 2024-01-01 PROCEDURE — 25010000002 PIPERACILLIN SOD-TAZOBACTAM PER 1 G: Performed by: EMERGENCY MEDICINE

## 2024-01-01 PROCEDURE — 84100 ASSAY OF PHOSPHORUS: CPT | Performed by: INTERNAL MEDICINE

## 2024-01-01 PROCEDURE — 25010000002 ALBUMIN HUMAN 25% PER 50 ML

## 2024-01-01 PROCEDURE — 99291 CRITICAL CARE FIRST HOUR: CPT | Performed by: INTERNAL MEDICINE

## 2024-01-01 PROCEDURE — 94003 VENT MGMT INPAT SUBQ DAY: CPT

## 2024-01-01 PROCEDURE — 84134 ASSAY OF PREALBUMIN: CPT | Performed by: INTERNAL MEDICINE

## 2024-01-01 PROCEDURE — 25010000002 THIAMINE PER 100 MG: Performed by: INTERNAL MEDICINE

## 2024-01-01 PROCEDURE — 36015 PLACE CATHETER IN ARTERY: CPT | Performed by: INTERNAL MEDICINE

## 2024-01-01 PROCEDURE — 94761 N-INVAS EAR/PLS OXIMETRY MLT: CPT

## 2024-01-01 PROCEDURE — 83735 ASSAY OF MAGNESIUM: CPT | Performed by: INTERNAL MEDICINE

## 2024-01-01 PROCEDURE — 25010000002 SUCCINYLCHOLINE PER 20 MG: Performed by: EMERGENCY MEDICINE

## 2024-01-01 PROCEDURE — 25010000002 HYDROMORPHONE 1 MG/ML SOLUTION: Performed by: EMERGENCY MEDICINE

## 2024-01-01 PROCEDURE — 25810000003 SODIUM CHLORIDE 0.9 % SOLUTION: Performed by: NURSE PRACTITIONER

## 2024-01-01 PROCEDURE — 25010000002 PIPERACILLIN SOD-TAZOBACTAM PER 1 G: Performed by: INTERNAL MEDICINE

## 2024-01-01 PROCEDURE — 84132 ASSAY OF SERUM POTASSIUM: CPT | Performed by: NURSE PRACTITIONER

## 2024-01-01 PROCEDURE — 0 MIDAZOLAM 1 MG/ML 100ML NS 100 MG/100ML SOLUTION: Performed by: NURSE PRACTITIONER

## 2024-01-01 PROCEDURE — 25010000002 GLYCOPYRROLATE 0.2 MG/ML SOLUTION: Performed by: NURSE PRACTITIONER

## 2024-01-01 PROCEDURE — 25010000002 HEPARIN (PORCINE) 25000-0.45 UT/250ML-% SOLUTION: Performed by: INTERNAL MEDICINE

## 2024-01-01 PROCEDURE — 85025 COMPLETE CBC W/AUTO DIFF WBC: CPT | Performed by: INTERNAL MEDICINE

## 2024-01-01 PROCEDURE — 25010000002 HEPARIN (PORCINE) 25000-0.45 UT/250ML-% SOLUTION

## 2024-01-01 PROCEDURE — 70450 CT HEAD/BRAIN W/O DYE: CPT

## 2024-01-01 PROCEDURE — 25810000003 SODIUM CHLORIDE 0.9 % SOLUTION: Performed by: EMERGENCY MEDICINE

## 2024-01-01 PROCEDURE — 82805 BLOOD GASES W/O2 SATURATION: CPT

## 2024-01-01 PROCEDURE — P9047 ALBUMIN (HUMAN), 25%, 50ML: HCPCS | Performed by: INTERNAL MEDICINE

## 2024-01-01 PROCEDURE — 83050 HGB METHEMOGLOBIN QUAN: CPT

## 2024-01-01 PROCEDURE — 81001 URINALYSIS AUTO W/SCOPE: CPT | Performed by: EMERGENCY MEDICINE

## 2024-01-01 PROCEDURE — 37185 PRIM ART M-THRMBC SBSQ VSL: CPT | Performed by: INTERNAL MEDICINE

## 2024-01-01 PROCEDURE — 71045 X-RAY EXAM CHEST 1 VIEW: CPT

## 2024-01-01 PROCEDURE — C1769 GUIDE WIRE: HCPCS | Performed by: INTERNAL MEDICINE

## 2024-01-01 PROCEDURE — 36430 TRANSFUSION BLD/BLD COMPNT: CPT

## 2024-01-01 PROCEDURE — C1757 CATH, THROMBECTOMY/EMBOLECT: HCPCS | Performed by: INTERNAL MEDICINE

## 2024-01-01 PROCEDURE — 82375 ASSAY CARBOXYHB QUANT: CPT

## 2024-01-01 PROCEDURE — 82948 REAGENT STRIP/BLOOD GLUCOSE: CPT

## 2024-01-01 PROCEDURE — 85520 HEPARIN ASSAY: CPT | Performed by: INTERNAL MEDICINE

## 2024-01-01 PROCEDURE — 84484 ASSAY OF TROPONIN QUANT: CPT | Performed by: INTERNAL MEDICINE

## 2024-01-01 PROCEDURE — 25010000002 METOCLOPRAMIDE PER 10 MG: Performed by: INTERNAL MEDICINE

## 2024-01-01 PROCEDURE — 85610 PROTHROMBIN TIME: CPT | Performed by: EMERGENCY MEDICINE

## 2024-01-01 PROCEDURE — 99291 CRITICAL CARE FIRST HOUR: CPT

## 2024-01-01 PROCEDURE — 25010000002 POTASSIUM CHLORIDE PER 2 MEQ: Performed by: INTERNAL MEDICINE

## 2024-01-01 PROCEDURE — 85018 HEMOGLOBIN: CPT | Performed by: INTERNAL MEDICINE

## 2024-01-01 PROCEDURE — 25010000002 ONDANSETRON PER 1 MG: Performed by: INTERNAL MEDICINE

## 2024-01-01 PROCEDURE — 75820 VEIN X-RAY ARM/LEG: CPT | Performed by: INTERNAL MEDICINE

## 2024-01-01 PROCEDURE — 74018 RADEX ABDOMEN 1 VIEW: CPT

## 2024-01-01 PROCEDURE — 02CQ3ZZ EXTIRPATION OF MATTER FROM RIGHT PULMONARY ARTERY, PERCUTANEOUS APPROACH: ICD-10-PCS | Performed by: INTERNAL MEDICINE

## 2024-01-01 PROCEDURE — 85025 COMPLETE CBC W/AUTO DIFF WBC: CPT | Performed by: EMERGENCY MEDICINE

## 2024-01-01 PROCEDURE — 25010000002 ONDANSETRON PER 1 MG: Performed by: NURSE PRACTITIONER

## 2024-01-01 PROCEDURE — 25010000002 BUMETANIDE PER 0.5 MG: Performed by: INTERNAL MEDICINE

## 2024-01-01 PROCEDURE — C1894 INTRO/SHEATH, NON-LASER: HCPCS | Performed by: INTERNAL MEDICINE

## 2024-01-01 PROCEDURE — 25010000002 HYDROMORPHONE PER 4 MG: Performed by: NURSE PRACTITIONER

## 2024-01-01 PROCEDURE — 87015 SPECIMEN INFECT AGNT CONCNTJ: CPT | Performed by: INTERNAL MEDICINE

## 2024-01-01 PROCEDURE — 25010000002 VANCOMYCIN 10 G RECONSTITUTED SOLUTION: Performed by: EMERGENCY MEDICINE

## 2024-01-01 PROCEDURE — 86140 C-REACTIVE PROTEIN: CPT | Performed by: INTERNAL MEDICINE

## 2024-01-01 PROCEDURE — 25010000002 HALOPERIDOL LACTATE PER 5 MG

## 2024-01-01 PROCEDURE — 84100 ASSAY OF PHOSPHORUS: CPT | Performed by: NURSE PRACTITIONER

## 2024-01-01 PROCEDURE — 25010000002 MIDAZOLAM PER 1 MG: Performed by: INTERNAL MEDICINE

## 2024-01-01 PROCEDURE — 85520 HEPARIN ASSAY: CPT | Performed by: EMERGENCY MEDICINE

## 2024-01-01 PROCEDURE — 94664 DEMO&/EVAL PT USE INHALER: CPT

## 2024-01-01 PROCEDURE — 94640 AIRWAY INHALATION TREATMENT: CPT

## 2024-01-01 PROCEDURE — 99239 HOSP IP/OBS DSCHRG MGMT >30: CPT | Performed by: INTERNAL MEDICINE

## 2024-01-01 PROCEDURE — B31S1ZZ FLUOROSCOPY OF RIGHT PULMONARY ARTERY USING LOW OSMOLAR CONTRAST: ICD-10-PCS | Performed by: INTERNAL MEDICINE

## 2024-01-01 PROCEDURE — 99233 SBSQ HOSP IP/OBS HIGH 50: CPT | Performed by: INTERNAL MEDICINE

## 2024-01-01 PROCEDURE — 93306 TTE W/DOPPLER COMPLETE: CPT

## 2024-01-01 PROCEDURE — 25010000002 PROPOFOL 10 MG/ML EMULSION: Performed by: EMERGENCY MEDICINE

## 2024-01-01 PROCEDURE — C1760 CLOSURE DEV, VASC: HCPCS | Performed by: INTERNAL MEDICINE

## 2024-01-01 PROCEDURE — 93010 ELECTROCARDIOGRAM REPORT: CPT | Performed by: INTERNAL MEDICINE

## 2024-01-01 PROCEDURE — 80053 COMPREHEN METABOLIC PANEL: CPT | Performed by: INTERNAL MEDICINE

## 2024-01-01 PROCEDURE — 43241 EGD TUBE/CATH INSERTION: CPT | Performed by: INTERNAL MEDICINE

## 2024-01-01 PROCEDURE — 85730 THROMBOPLASTIN TIME PARTIAL: CPT | Performed by: EMERGENCY MEDICINE

## 2024-01-01 PROCEDURE — 93005 ELECTROCARDIOGRAM TRACING: CPT | Performed by: INTERNAL MEDICINE

## 2024-01-01 PROCEDURE — 25010000002 MIDAZOLAM PER 1 MG

## 2024-01-01 PROCEDURE — 93005 ELECTROCARDIOGRAM TRACING: CPT | Performed by: EMERGENCY MEDICINE

## 2024-01-01 PROCEDURE — 86850 RBC ANTIBODY SCREEN: CPT

## 2024-01-01 PROCEDURE — 86900 BLOOD TYPING SEROLOGIC ABO: CPT

## 2024-01-01 PROCEDURE — 82330 ASSAY OF CALCIUM: CPT | Performed by: INTERNAL MEDICINE

## 2024-01-01 PROCEDURE — 25010000002 MAGNESIUM SULFATE 4 GM/100ML SOLUTION

## 2024-01-01 PROCEDURE — 37184 PRIM ART M-THRMBC 1ST VSL: CPT | Performed by: INTERNAL MEDICINE

## 2024-01-01 PROCEDURE — 25010000002 METOCLOPRAMIDE PER 10 MG: Performed by: EMERGENCY MEDICINE

## 2024-01-01 PROCEDURE — 0BH17EZ INSERTION OF ENDOTRACHEAL AIRWAY INTO TRACHEA, VIA NATURAL OR ARTIFICIAL OPENING: ICD-10-PCS

## 2024-01-01 PROCEDURE — 75825 VEIN X-RAY TRUNK: CPT | Performed by: INTERNAL MEDICINE

## 2024-01-01 PROCEDURE — 80053 COMPREHEN METABOLIC PANEL: CPT | Performed by: EMERGENCY MEDICINE

## 2024-01-01 PROCEDURE — 25010000002 HEPARIN LOCK FLUSH PER 10 UNITS: Performed by: INTERNAL MEDICINE

## 2024-01-01 PROCEDURE — P9047 ALBUMIN (HUMAN), 25%, 50ML: HCPCS

## 2024-01-01 PROCEDURE — 83605 ASSAY OF LACTIC ACID: CPT | Performed by: EMERGENCY MEDICINE

## 2024-01-01 PROCEDURE — 25010000002 MIDAZOLAM PER 1 MG: Performed by: NURSE PRACTITIONER

## 2024-01-01 PROCEDURE — 85014 HEMATOCRIT: CPT | Performed by: INTERNAL MEDICINE

## 2024-01-01 PROCEDURE — 02CR3ZZ EXTIRPATION OF MATTER FROM LEFT PULMONARY ARTERY, PERCUTANEOUS APPROACH: ICD-10-PCS | Performed by: INTERNAL MEDICINE

## 2024-01-01 PROCEDURE — 36415 COLL VENOUS BLD VENIPUNCTURE: CPT

## 2024-01-01 PROCEDURE — 0202U NFCT DS 22 TRGT SARS-COV-2: CPT | Performed by: INTERNAL MEDICINE

## 2024-01-01 PROCEDURE — 31500 INSERT EMERGENCY AIRWAY: CPT

## 2024-01-01 PROCEDURE — 99222 1ST HOSP IP/OBS MODERATE 55: CPT | Performed by: INTERNAL MEDICINE

## 2024-01-01 PROCEDURE — 84145 PROCALCITONIN (PCT): CPT | Performed by: INTERNAL MEDICINE

## 2024-01-01 PROCEDURE — 83880 ASSAY OF NATRIURETIC PEPTIDE: CPT | Performed by: INTERNAL MEDICINE

## 2024-01-01 PROCEDURE — 85014 HEMATOCRIT: CPT

## 2024-01-01 PROCEDURE — 76937 US GUIDE VASCULAR ACCESS: CPT | Performed by: INTERNAL MEDICINE

## 2024-01-01 PROCEDURE — 74178 CT ABD&PLV WO CNTR FLWD CNTR: CPT

## 2024-01-01 PROCEDURE — 25010000002 METHYLNALTREXONE 12 MG/0.6ML SOLUTION: Performed by: INTERNAL MEDICINE

## 2024-01-01 PROCEDURE — 86923 COMPATIBILITY TEST ELECTRIC: CPT

## 2024-01-01 PROCEDURE — 25010000002 ONDANSETRON PER 1 MG: Performed by: EMERGENCY MEDICINE

## 2024-01-01 PROCEDURE — 0 DIATRIZOATE MEGLUMINE & SODIUM PER 1 ML

## 2024-01-01 PROCEDURE — 25010000002 HEPARIN (PORCINE) 25000-0.45 UT/250ML-% SOLUTION: Performed by: EMERGENCY MEDICINE

## 2024-01-01 PROCEDURE — 25010000002 POTASSIUM CHLORIDE PER 2 MEQ: Performed by: NURSE PRACTITIONER

## 2024-01-01 PROCEDURE — 86901 BLOOD TYPING SEROLOGIC RH(D): CPT

## 2024-01-01 PROCEDURE — 80061 LIPID PANEL: CPT | Performed by: INTERNAL MEDICINE

## 2024-01-01 PROCEDURE — 85610 PROTHROMBIN TIME: CPT | Performed by: INTERNAL MEDICINE

## 2024-01-01 PROCEDURE — 85018 HEMOGLOBIN: CPT

## 2024-01-01 PROCEDURE — 74177 CT ABD & PELVIS W/CONTRAST: CPT

## 2024-01-01 PROCEDURE — 31500 INSERT EMERGENCY AIRWAY: CPT | Performed by: INTERNAL MEDICINE

## 2024-01-01 PROCEDURE — 03HY32Z INSERTION OF MONITORING DEVICE INTO UPPER ARTERY, PERCUTANEOUS APPROACH: ICD-10-PCS

## 2024-01-01 PROCEDURE — 25010000002 LORAZEPAM PER 2 MG: Performed by: INTERNAL MEDICINE

## 2024-01-01 PROCEDURE — 0DHA8UZ INSERTION OF FEEDING DEVICE INTO JEJUNUM, VIA NATURAL OR ARTIFICIAL OPENING ENDOSCOPIC: ICD-10-PCS | Performed by: INTERNAL MEDICINE

## 2024-01-01 PROCEDURE — 83735 ASSAY OF MAGNESIUM: CPT | Performed by: EMERGENCY MEDICINE

## 2024-01-01 PROCEDURE — 84484 ASSAY OF TROPONIN QUANT: CPT | Performed by: EMERGENCY MEDICINE

## 2024-01-01 PROCEDURE — P9016 RBC LEUKOCYTES REDUCED: HCPCS

## 2024-01-01 PROCEDURE — 99239 HOSP IP/OBS DSCHRG MGMT >30: CPT

## 2024-01-01 PROCEDURE — 25010000002 MORPHINE PER 10 MG: Performed by: EMERGENCY MEDICINE

## 2024-01-01 PROCEDURE — 5A1945Z RESPIRATORY VENTILATION, 24-96 CONSECUTIVE HOURS: ICD-10-PCS | Performed by: EMERGENCY MEDICINE

## 2024-01-01 PROCEDURE — 0W9G3ZZ DRAINAGE OF PERITONEAL CAVITY, PERCUTANEOUS APPROACH: ICD-10-PCS | Performed by: INTERNAL MEDICINE

## 2024-01-01 PROCEDURE — 25510000001 IOPAMIDOL PER 1 ML: Performed by: EMERGENCY MEDICINE

## 2024-01-01 PROCEDURE — 25010000002 HEPARIN (PORCINE) PER 1000 UNITS: Performed by: INTERNAL MEDICINE

## 2024-01-01 PROCEDURE — 36600 WITHDRAWAL OF ARTERIAL BLOOD: CPT

## 2024-01-01 PROCEDURE — 87040 BLOOD CULTURE FOR BACTERIA: CPT | Performed by: NURSE PRACTITIONER

## 2024-01-01 PROCEDURE — 84100 ASSAY OF PHOSPHORUS: CPT | Performed by: EMERGENCY MEDICINE

## 2024-01-01 PROCEDURE — 25010000002 HYDROMORPHONE PER 4 MG: Performed by: PHYSICAL MEDICINE & REHABILITATION

## 2024-01-01 PROCEDURE — 25010000002 PROMETHAZINE PER 50 MG: Performed by: EMERGENCY MEDICINE

## 2024-01-01 PROCEDURE — 99232 SBSQ HOSP IP/OBS MODERATE 35: CPT

## 2024-01-01 PROCEDURE — 4A023N6 MEASUREMENT OF CARDIAC SAMPLING AND PRESSURE, RIGHT HEART, PERCUTANEOUS APPROACH: ICD-10-PCS | Performed by: INTERNAL MEDICINE

## 2024-01-01 PROCEDURE — 83605 ASSAY OF LACTIC ACID: CPT | Performed by: NURSE PRACTITIONER

## 2024-01-01 PROCEDURE — 49082 ABD PARACENTESIS: CPT | Performed by: INTERNAL MEDICINE

## 2024-01-01 PROCEDURE — B31T1ZZ FLUOROSCOPY OF LEFT PULMONARY ARTERY USING LOW OSMOLAR CONTRAST: ICD-10-PCS | Performed by: INTERNAL MEDICINE

## 2024-01-01 PROCEDURE — 87040 BLOOD CULTURE FOR BACTERIA: CPT | Performed by: EMERGENCY MEDICINE

## 2024-01-01 PROCEDURE — 83690 ASSAY OF LIPASE: CPT | Performed by: EMERGENCY MEDICINE

## 2024-01-01 PROCEDURE — 99221 1ST HOSP IP/OBS SF/LOW 40: CPT | Performed by: INTERNAL MEDICINE

## 2024-01-01 PROCEDURE — 94002 VENT MGMT INPAT INIT DAY: CPT

## 2024-01-01 PROCEDURE — 87075 CULTR BACTERIA EXCEPT BLOOD: CPT | Performed by: INTERNAL MEDICINE

## 2024-01-01 PROCEDURE — 25010000002 HALOPERIDOL LACTATE PER 5 MG: Performed by: INTERNAL MEDICINE

## 2024-01-01 PROCEDURE — 87641 MR-STAPH DNA AMP PROBE: CPT

## 2024-01-01 PROCEDURE — 71275 CT ANGIOGRAPHY CHEST: CPT

## 2024-01-01 PROCEDURE — 85347 COAGULATION TIME ACTIVATED: CPT

## 2024-01-01 PROCEDURE — 25810000003 LACTATED RINGERS SOLUTION: Performed by: EMERGENCY MEDICINE

## 2024-01-01 PROCEDURE — 0BH17EZ INSERTION OF ENDOTRACHEAL AIRWAY INTO TRACHEA, VIA NATURAL OR ARTIFICIAL OPENING: ICD-10-PCS | Performed by: EMERGENCY MEDICINE

## 2024-01-01 PROCEDURE — 5A1955Z RESPIRATORY VENTILATION, GREATER THAN 96 CONSECUTIVE HOURS: ICD-10-PCS

## 2024-01-01 PROCEDURE — 89051 BODY FLUID CELL COUNT: CPT | Performed by: INTERNAL MEDICINE

## 2024-01-01 PROCEDURE — 25010000002 ALBUMIN HUMAN 25% PER 50 ML: Performed by: INTERNAL MEDICINE

## 2024-01-01 PROCEDURE — 36620 INSERTION CATHETER ARTERY: CPT

## 2024-01-01 PROCEDURE — 85060 BLOOD SMEAR INTERPRETATION: CPT | Performed by: INTERNAL MEDICINE

## 2024-01-01 PROCEDURE — 02CP3ZZ EXTIRPATION OF MATTER FROM PULMONARY TRUNK, PERCUTANEOUS APPROACH: ICD-10-PCS | Performed by: INTERNAL MEDICINE

## 2024-01-01 RX ORDER — ACETAMINOPHEN 160 MG/5ML
650 SOLUTION ORAL EVERY 6 HOURS PRN
Status: CANCELLED | OUTPATIENT
Start: 2024-01-01

## 2024-01-01 RX ORDER — SODIUM CHLORIDE 0.9 % (FLUSH) 0.9 %
10 SYRINGE (ML) INJECTION EVERY 12 HOURS SCHEDULED
Status: CANCELLED | OUTPATIENT
Start: 2024-01-01

## 2024-01-01 RX ORDER — DEXMEDETOMIDINE HYDROCHLORIDE 4 UG/ML
.2-1.5 INJECTION, SOLUTION INTRAVENOUS
Status: DISCONTINUED | OUTPATIENT
Start: 2024-01-01 | End: 2024-01-01

## 2024-01-01 RX ORDER — HEPARIN SODIUM 10000 [USP'U]/100ML
15.5 INJECTION, SOLUTION INTRAVENOUS
Status: DISCONTINUED | OUTPATIENT
Start: 2024-01-01 | End: 2024-01-01

## 2024-01-01 RX ORDER — ALPRAZOLAM 2 MG/1
2 TABLET ORAL NIGHTLY PRN
Status: ON HOLD | COMMUNITY
End: 2024-01-01 | Stop reason: SDUPTHER

## 2024-01-01 RX ORDER — METOCLOPRAMIDE HYDROCHLORIDE 5 MG/ML
10 INJECTION INTRAMUSCULAR; INTRAVENOUS ONCE
Status: COMPLETED | OUTPATIENT
Start: 2024-01-01 | End: 2024-01-01

## 2024-01-01 RX ORDER — HEPARIN SODIUM 1000 [USP'U]/ML
4000 INJECTION, SOLUTION INTRAVENOUS; SUBCUTANEOUS AS NEEDED
Status: DISCONTINUED | OUTPATIENT
Start: 2024-01-01 | End: 2024-01-01 | Stop reason: SDUPTHER

## 2024-01-01 RX ORDER — POTASSIUM CHLORIDE 1.5 G/1.58G
40 POWDER, FOR SOLUTION ORAL EVERY 4 HOURS
Status: COMPLETED | OUTPATIENT
Start: 2024-01-01 | End: 2024-01-01

## 2024-01-01 RX ORDER — PANTOPRAZOLE SODIUM 40 MG/10ML
40 INJECTION, POWDER, LYOPHILIZED, FOR SOLUTION INTRAVENOUS
Status: DISCONTINUED | OUTPATIENT
Start: 2024-01-26 | End: 2024-01-01

## 2024-01-01 RX ORDER — ACETAMINOPHEN 160 MG/5ML
650 SOLUTION ORAL EVERY 6 HOURS PRN
Status: DISCONTINUED | OUTPATIENT
Start: 2024-01-01 | End: 2024-01-01

## 2024-01-01 RX ORDER — GLYCOPYRROLATE 0.2 MG/ML
0.4 INJECTION INTRAMUSCULAR; INTRAVENOUS EVERY 4 HOURS PRN
Status: DISCONTINUED | OUTPATIENT
Start: 2024-01-01 | End: 2024-01-01 | Stop reason: HOSPADM

## 2024-01-01 RX ORDER — LEVOTHYROXINE SODIUM 40 UG/ML
100 INJECTION, SOLUTION INTRAVENOUS
Status: CANCELLED | OUTPATIENT
Start: 2024-01-26

## 2024-01-01 RX ORDER — ACETAMINOPHEN 325 MG/1
650 TABLET ORAL EVERY 4 HOURS PRN
Status: DISCONTINUED | OUTPATIENT
Start: 2024-01-01 | End: 2024-01-01 | Stop reason: HOSPADM

## 2024-01-01 RX ORDER — LEVOTHYROXINE SODIUM 0.12 MG/1
125 TABLET ORAL EVERY MORNING
Status: DISCONTINUED | OUTPATIENT
Start: 2024-01-01 | End: 2024-01-01

## 2024-01-01 RX ORDER — LIDOCAINE HYDROCHLORIDE 10 MG/ML
INJECTION, SOLUTION EPIDURAL; INFILTRATION; INTRACAUDAL; PERINEURAL
Status: DISCONTINUED | OUTPATIENT
Start: 2024-01-01 | End: 2024-01-01 | Stop reason: HOSPADM

## 2024-01-01 RX ORDER — HALOPERIDOL 5 MG/ML
5 INJECTION INTRAMUSCULAR EVERY 4 HOURS PRN
Status: CANCELLED | OUTPATIENT
Start: 2024-01-01

## 2024-01-01 RX ORDER — MIDAZOLAM HYDROCHLORIDE 1 MG/ML
2 INJECTION INTRAMUSCULAR; INTRAVENOUS ONCE
Status: COMPLETED | OUTPATIENT
Start: 2024-01-01 | End: 2024-01-01

## 2024-01-01 RX ORDER — VENLAFAXINE 25 MG/1
50 TABLET ORAL
Status: DISCONTINUED | OUTPATIENT
Start: 2024-01-01 | End: 2024-01-01

## 2024-01-01 RX ORDER — ALBUMIN (HUMAN) 12.5 G/50ML
12.5 SOLUTION INTRAVENOUS ONCE
Status: COMPLETED | OUTPATIENT
Start: 2024-01-01 | End: 2024-01-01

## 2024-01-01 RX ORDER — KETOROLAC TROMETHAMINE 15 MG/ML
15 INJECTION, SOLUTION INTRAMUSCULAR; INTRAVENOUS EVERY 6 HOURS PRN
Status: DISCONTINUED | OUTPATIENT
Start: 2024-01-01 | End: 2024-01-01 | Stop reason: HOSPADM

## 2024-01-01 RX ORDER — HALOPERIDOL 5 MG/ML
5 INJECTION INTRAMUSCULAR EVERY 4 HOURS PRN
Status: DISCONTINUED | OUTPATIENT
Start: 2024-01-01 | End: 2024-01-01 | Stop reason: HOSPADM

## 2024-01-01 RX ORDER — DICYCLOMINE HCL 20 MG
20 TABLET ORAL
COMMUNITY

## 2024-01-01 RX ORDER — HYDROMORPHONE HYDROCHLORIDE 2 MG/ML
2 INJECTION, SOLUTION INTRAMUSCULAR; INTRAVENOUS; SUBCUTANEOUS
Status: CANCELLED | OUTPATIENT
Start: 2024-01-01 | End: 2024-01-29

## 2024-01-01 RX ORDER — MIDAZOLAM IN NACL,ISO-OSMOT/PF 50 MG/50ML
1-10 INFUSION BOTTLE (ML) INTRAVENOUS
Status: CANCELLED | OUTPATIENT
Start: 2024-01-01

## 2024-01-01 RX ORDER — BISACODYL 10 MG
10 SUPPOSITORY, RECTAL RECTAL DAILY PRN
Status: DISCONTINUED | OUTPATIENT
Start: 2024-01-01 | End: 2024-01-01

## 2024-01-01 RX ORDER — FLUCONAZOLE 40 MG/ML
400 POWDER, FOR SUSPENSION ORAL DAILY
Qty: 10 ML | Refills: 0 | Status: COMPLETED | OUTPATIENT
Start: 2024-01-01 | End: 2024-01-01

## 2024-01-01 RX ORDER — HEPARIN SODIUM (PORCINE) LOCK FLUSH IV SOLN 100 UNIT/ML 100 UNIT/ML
500 SOLUTION INTRAVENOUS ONCE
Status: COMPLETED | OUTPATIENT
Start: 2024-01-01 | End: 2024-01-01

## 2024-01-01 RX ORDER — MIDAZOLAM IN NACL,ISO-OSMOT/PF 50 MG/50ML
1-10 INFUSION BOTTLE (ML) INTRAVENOUS
Status: DISCONTINUED | OUTPATIENT
Start: 2024-01-01 | End: 2024-01-01 | Stop reason: HOSPADM

## 2024-01-01 RX ORDER — ACETAMINOPHEN 160 MG/5ML
650 SOLUTION ORAL EVERY 6 HOURS PRN
Status: DISCONTINUED | OUTPATIENT
Start: 2024-01-01 | End: 2024-01-01 | Stop reason: HOSPADM

## 2024-01-01 RX ORDER — AMOXICILLIN 250 MG
2 CAPSULE ORAL 2 TIMES DAILY
Status: DISCONTINUED | OUTPATIENT
Start: 2024-01-01 | End: 2024-01-01

## 2024-01-01 RX ORDER — HEPARIN SODIUM 10000 [USP'U]/100ML
21 INJECTION, SOLUTION INTRAVENOUS
Status: DISCONTINUED | OUTPATIENT
Start: 2024-01-01 | End: 2024-01-01

## 2024-01-01 RX ORDER — BISACODYL 10 MG
10 SUPPOSITORY, RECTAL RECTAL DAILY PRN
Status: DISCONTINUED | OUTPATIENT
Start: 2024-01-01 | End: 2024-01-01 | Stop reason: HOSPADM

## 2024-01-01 RX ORDER — LIDOCAINE HYDROCHLORIDE 10 MG/ML
INJECTION, SOLUTION EPIDURAL; INFILTRATION; INTRACAUDAL; PERINEURAL
Status: COMPLETED
Start: 2024-01-01 | End: 2024-01-01

## 2024-01-01 RX ORDER — ALUMINA, MAGNESIA, AND SIMETHICONE 2400; 2400; 240 MG/30ML; MG/30ML; MG/30ML
15 SUSPENSION ORAL ONCE
Status: COMPLETED | OUTPATIENT
Start: 2024-01-01 | End: 2024-01-01

## 2024-01-01 RX ORDER — HEPARIN SODIUM 1000 [USP'U]/ML
INJECTION, SOLUTION INTRAVENOUS; SUBCUTANEOUS
Status: DISCONTINUED | OUTPATIENT
Start: 2024-01-01 | End: 2024-01-01 | Stop reason: HOSPADM

## 2024-01-01 RX ORDER — HEPARIN SODIUM 1000 [USP'U]/ML
2000 INJECTION, SOLUTION INTRAVENOUS; SUBCUTANEOUS AS NEEDED
Status: DISCONTINUED | OUTPATIENT
Start: 2024-01-01 | End: 2024-01-01 | Stop reason: SDUPTHER

## 2024-01-01 RX ORDER — IPRATROPIUM BROMIDE AND ALBUTEROL SULFATE 2.5; .5 MG/3ML; MG/3ML
3 SOLUTION RESPIRATORY (INHALATION) ONCE
Status: COMPLETED | OUTPATIENT
Start: 2024-01-01 | End: 2024-01-01

## 2024-01-01 RX ORDER — ALBUTEROL SULFATE 2.5 MG/3ML
2.5 SOLUTION RESPIRATORY (INHALATION)
Status: DISCONTINUED | OUTPATIENT
Start: 2024-01-01 | End: 2024-01-01 | Stop reason: HOSPADM

## 2024-01-01 RX ORDER — MIDAZOLAM HYDROCHLORIDE 1 MG/ML
2 INJECTION INTRAMUSCULAR; INTRAVENOUS
Status: CANCELLED | OUTPATIENT
Start: 2024-01-01

## 2024-01-01 RX ORDER — FLUCONAZOLE 40 MG/ML
200 POWDER, FOR SUSPENSION ORAL DAILY
Status: DISCONTINUED | OUTPATIENT
Start: 2024-01-01 | End: 2024-01-01

## 2024-01-01 RX ORDER — ONDANSETRON 2 MG/ML
4 INJECTION INTRAMUSCULAR; INTRAVENOUS EVERY 6 HOURS
Status: DISCONTINUED | OUTPATIENT
Start: 2024-01-01 | End: 2024-01-01

## 2024-01-01 RX ORDER — ACETAMINOPHEN 160 MG/5ML
650 SOLUTION ORAL EVERY 4 HOURS PRN
Status: DISCONTINUED | OUTPATIENT
Start: 2024-01-01 | End: 2024-01-01 | Stop reason: HOSPADM

## 2024-01-01 RX ORDER — NOREPINEPHRINE BITARTRATE 0.03 MG/ML
.02-.3 INJECTION, SOLUTION INTRAVENOUS
Status: DISCONTINUED | OUTPATIENT
Start: 2024-01-01 | End: 2024-01-01

## 2024-01-01 RX ORDER — BISACODYL 5 MG/1
5 TABLET, DELAYED RELEASE ORAL DAILY PRN
Status: DISCONTINUED | OUTPATIENT
Start: 2024-01-01 | End: 2024-01-01

## 2024-01-01 RX ORDER — MIDAZOLAM HYDROCHLORIDE 1 MG/ML
2 INJECTION INTRAMUSCULAR; INTRAVENOUS
Status: DISCONTINUED | OUTPATIENT
Start: 2024-01-01 | End: 2024-01-01 | Stop reason: HOSPADM

## 2024-01-01 RX ORDER — GLYCOPYRROLATE 0.2 MG/ML
0.4 INJECTION INTRAMUSCULAR; INTRAVENOUS EVERY 4 HOURS PRN
Status: CANCELLED | OUTPATIENT
Start: 2024-01-01

## 2024-01-01 RX ORDER — SODIUM CHLORIDE 0.9 % (FLUSH) 0.9 %
10 SYRINGE (ML) INJECTION AS NEEDED
Status: DISCONTINUED | OUTPATIENT
Start: 2024-01-01 | End: 2024-01-01 | Stop reason: HOSPADM

## 2024-01-01 RX ORDER — DEXTROSE MONOHYDRATE 100 MG/ML
50 INJECTION, SOLUTION INTRAVENOUS CONTINUOUS
Status: DISCONTINUED | OUTPATIENT
Start: 2024-01-01 | End: 2024-01-01

## 2024-01-01 RX ORDER — MAGNESIUM SULFATE HEPTAHYDRATE 40 MG/ML
4 INJECTION, SOLUTION INTRAVENOUS ONCE
Status: COMPLETED | OUTPATIENT
Start: 2024-01-01 | End: 2024-01-01

## 2024-01-01 RX ORDER — WATER 10 ML/10ML
INJECTION INTRAMUSCULAR; INTRAVENOUS; SUBCUTANEOUS
Status: COMPLETED
Start: 2024-01-01 | End: 2024-01-01

## 2024-01-01 RX ORDER — SODIUM CHLORIDE 9 MG/ML
40 INJECTION, SOLUTION INTRAVENOUS AS NEEDED
Status: DISCONTINUED | OUTPATIENT
Start: 2024-01-01 | End: 2024-01-01

## 2024-01-01 RX ORDER — LEVOTHYROXINE SODIUM 40 UG/ML
100 INJECTION, SOLUTION INTRAVENOUS
Status: DISCONTINUED | OUTPATIENT
Start: 2024-01-01 | End: 2024-01-01 | Stop reason: HOSPADM

## 2024-01-01 RX ORDER — HEPARIN SODIUM 10000 [USP'U]/100ML
16.5 INJECTION, SOLUTION INTRAVENOUS
Status: DISCONTINUED | OUTPATIENT
Start: 2024-01-01 | End: 2024-01-01

## 2024-01-01 RX ORDER — LIDOCAINE HYDROCHLORIDE 20 MG/ML
10 SOLUTION OROPHARYNGEAL
Status: DISCONTINUED | OUTPATIENT
Start: 2024-01-01 | End: 2024-01-01

## 2024-01-01 RX ORDER — FENTANYL/ROPIVACAINE/NS/PF 2-625MCG/1
15 PLASTIC BAG, INJECTION (ML) EPIDURAL ONCE
Status: COMPLETED | OUTPATIENT
Start: 2024-01-01 | End: 2024-01-01

## 2024-01-01 RX ORDER — PROMETHAZINE HYDROCHLORIDE 25 MG/1
25 TABLET ORAL EVERY 6 HOURS PRN
Qty: 30 TABLET | Refills: 0 | Status: SHIPPED | OUTPATIENT
Start: 2024-01-01 | End: 2024-01-31

## 2024-01-01 RX ORDER — KETAMINE HYDROCHLORIDE 100 MG/ML
INJECTION, SOLUTION INTRAMUSCULAR; INTRAVENOUS
Status: COMPLETED | OUTPATIENT
Start: 2024-01-01 | End: 2024-01-01

## 2024-01-01 RX ORDER — NITROGLYCERIN 0.4 MG/1
0.4 TABLET SUBLINGUAL
Status: DISCONTINUED | OUTPATIENT
Start: 2024-01-01 | End: 2024-01-01

## 2024-01-01 RX ORDER — ETOMIDATE 2 MG/ML
INJECTION INTRAVENOUS
Status: COMPLETED
Start: 2024-01-01 | End: 2024-01-01

## 2024-01-01 RX ORDER — PANTOPRAZOLE SODIUM 40 MG/10ML
40 INJECTION, POWDER, LYOPHILIZED, FOR SOLUTION INTRAVENOUS
Status: CANCELLED | OUTPATIENT
Start: 2024-01-26

## 2024-01-01 RX ORDER — IBUPROFEN 600 MG/1
1 TABLET ORAL
Status: DISCONTINUED | OUTPATIENT
Start: 2024-01-01 | End: 2024-01-01

## 2024-01-01 RX ORDER — HALOPERIDOL 5 MG/ML
5 INJECTION INTRAMUSCULAR EVERY 4 HOURS PRN
Status: DISCONTINUED | OUTPATIENT
Start: 2024-01-01 | End: 2024-01-01

## 2024-01-01 RX ORDER — NICOTINE POLACRILEX 4 MG
15 LOZENGE BUCCAL
Status: DISCONTINUED | OUTPATIENT
Start: 2024-01-01 | End: 2024-01-01

## 2024-01-01 RX ORDER — BUMETANIDE 0.25 MG/ML
2 INJECTION INTRAMUSCULAR; INTRAVENOUS ONCE
Status: COMPLETED | OUTPATIENT
Start: 2024-01-01 | End: 2024-01-01

## 2024-01-01 RX ORDER — NITROGLYCERIN 0.4 MG/1
0.4 TABLET SUBLINGUAL
Status: CANCELLED | OUTPATIENT
Start: 2024-01-01

## 2024-01-01 RX ORDER — SCOLOPAMINE TRANSDERMAL SYSTEM 1 MG/1
1 PATCH, EXTENDED RELEASE TRANSDERMAL
Status: DISCONTINUED | OUTPATIENT
Start: 2024-01-01 | End: 2024-01-01 | Stop reason: HOSPADM

## 2024-01-01 RX ORDER — SODIUM CHLORIDE 0.9 % (FLUSH) 0.9 %
10 SYRINGE (ML) INJECTION AS NEEDED
Status: CANCELLED | OUTPATIENT
Start: 2024-01-01

## 2024-01-01 RX ORDER — ACETAMINOPHEN 650 MG/1
650 SUPPOSITORY RECTAL EVERY 4 HOURS PRN
Status: DISCONTINUED | OUTPATIENT
Start: 2024-01-01 | End: 2024-01-01 | Stop reason: HOSPADM

## 2024-01-01 RX ORDER — DEXTROSE MONOHYDRATE 25 G/50ML
25 INJECTION, SOLUTION INTRAVENOUS
Status: DISCONTINUED | OUTPATIENT
Start: 2024-01-01 | End: 2024-01-01

## 2024-01-01 RX ORDER — POTASSIUM CHLORIDE 29.8 MG/ML
20 INJECTION INTRAVENOUS
Status: COMPLETED | OUTPATIENT
Start: 2024-01-01 | End: 2024-01-01

## 2024-01-01 RX ORDER — ALPRAZOLAM 2 MG/1
1 TABLET ORAL 2 TIMES DAILY PRN
COMMUNITY

## 2024-01-01 RX ORDER — BUMETANIDE 0.25 MG/ML
1 INJECTION INTRAMUSCULAR; INTRAVENOUS ONCE
Status: COMPLETED | OUTPATIENT
Start: 2024-01-01 | End: 2024-01-01

## 2024-01-01 RX ORDER — SODIUM CHLORIDE 0.9 % (FLUSH) 0.9 %
10 SYRINGE (ML) INJECTION EVERY 12 HOURS SCHEDULED
Status: DISCONTINUED | OUTPATIENT
Start: 2024-01-01 | End: 2024-01-01 | Stop reason: HOSPADM

## 2024-01-01 RX ORDER — FLUCONAZOLE 100 MG/1
200 TABLET ORAL DAILY
Status: ON HOLD | COMMUNITY
End: 2024-01-01

## 2024-01-01 RX ORDER — POLYETHYLENE GLYCOL 3350 17 G/17G
17 POWDER, FOR SOLUTION ORAL DAILY PRN
Status: DISCONTINUED | OUTPATIENT
Start: 2024-01-01 | End: 2024-01-01

## 2024-01-01 RX ORDER — LORAZEPAM 2 MG/ML
INJECTION INTRAMUSCULAR
Status: DISCONTINUED | OUTPATIENT
Start: 2024-01-01 | End: 2024-01-01 | Stop reason: HOSPADM

## 2024-01-01 RX ORDER — POLYVINYL ALCOHOL 14 MG/ML
1 SOLUTION/ DROPS OPHTHALMIC
Status: DISCONTINUED | OUTPATIENT
Start: 2024-01-01 | End: 2024-01-01 | Stop reason: HOSPADM

## 2024-01-01 RX ORDER — HYDROCODONE BITARTRATE AND ACETAMINOPHEN 5; 325 MG/1; MG/1
1-2 TABLET ORAL EVERY 8 HOURS PRN
COMMUNITY

## 2024-01-01 RX ORDER — ONDANSETRON 2 MG/ML
4 INJECTION INTRAMUSCULAR; INTRAVENOUS ONCE
Status: COMPLETED | OUTPATIENT
Start: 2024-01-01 | End: 2024-01-01

## 2024-01-01 RX ORDER — SODIUM CHLORIDE 9 MG/ML
75 INJECTION, SOLUTION INTRAVENOUS CONTINUOUS
Status: DISCONTINUED | OUTPATIENT
Start: 2024-01-01 | End: 2024-01-01

## 2024-01-01 RX ORDER — DOCUSATE SODIUM 50 MG/5 ML
100 LIQUID (ML) ORAL 2 TIMES DAILY
Status: DISCONTINUED | OUTPATIENT
Start: 2024-01-01 | End: 2024-01-01 | Stop reason: HOSPADM

## 2024-01-01 RX ORDER — MESALAMINE 1000 MG/1
1000 SUPPOSITORY RECTAL DAILY PRN
COMMUNITY

## 2024-01-01 RX ORDER — SUCCINYLCHOLINE CHLORIDE 20 MG/ML
INJECTION INTRAMUSCULAR; INTRAVENOUS
Status: COMPLETED | OUTPATIENT
Start: 2024-01-01 | End: 2024-01-01

## 2024-01-01 RX ORDER — METOCLOPRAMIDE HYDROCHLORIDE 5 MG/ML
10 INJECTION INTRAMUSCULAR; INTRAVENOUS EVERY 8 HOURS SCHEDULED
Status: CANCELLED | OUTPATIENT
Start: 2024-01-01

## 2024-01-01 RX ORDER — METOLAZONE 5 MG/1
5 TABLET ORAL ONCE
Status: COMPLETED | OUTPATIENT
Start: 2024-01-01 | End: 2024-01-01

## 2024-01-01 RX ORDER — MIDAZOLAM HYDROCHLORIDE 1 MG/ML
INJECTION INTRAMUSCULAR; INTRAVENOUS
Status: DISCONTINUED | OUTPATIENT
Start: 2024-01-01 | End: 2024-01-01 | Stop reason: HOSPADM

## 2024-01-01 RX ORDER — NITROGLYCERIN 0.4 MG/1
0.4 TABLET SUBLINGUAL
Status: DISCONTINUED | OUTPATIENT
Start: 2024-01-01 | End: 2024-01-01 | Stop reason: SDUPTHER

## 2024-01-01 RX ORDER — PROMETHAZINE HYDROCHLORIDE 25 MG/1
25 SUPPOSITORY RECTAL
COMMUNITY

## 2024-01-01 RX ORDER — CHLORHEXIDINE GLUCONATE ORAL RINSE 1.2 MG/ML
15 SOLUTION DENTAL EVERY 12 HOURS SCHEDULED
Status: DISCONTINUED | OUTPATIENT
Start: 2024-01-01 | End: 2024-01-01

## 2024-01-01 RX ORDER — THIAMINE HYDROCHLORIDE 100 MG/ML
200 INJECTION, SOLUTION INTRAMUSCULAR; INTRAVENOUS DAILY
Status: DISCONTINUED | OUTPATIENT
Start: 2024-01-01 | End: 2024-01-01

## 2024-01-01 RX ORDER — ROCURONIUM BROMIDE 50 MG/5 ML
60 SYRINGE (ML) INTRAVENOUS ONCE
Status: COMPLETED | OUTPATIENT
Start: 2024-01-01 | End: 2024-01-01

## 2024-01-01 RX ORDER — PANTOPRAZOLE SODIUM 40 MG/10ML
40 INJECTION, POWDER, LYOPHILIZED, FOR SOLUTION INTRAVENOUS
Status: DISCONTINUED | OUTPATIENT
Start: 2024-01-01 | End: 2024-01-01 | Stop reason: HOSPADM

## 2024-01-01 RX ORDER — DOCUSATE SODIUM 50 MG/5 ML
100 LIQUID (ML) ORAL 2 TIMES DAILY
Status: CANCELLED | OUTPATIENT
Start: 2024-01-01

## 2024-01-01 RX ORDER — HYDROMORPHONE HYDROCHLORIDE 2 MG/ML
2 INJECTION, SOLUTION INTRAMUSCULAR; INTRAVENOUS; SUBCUTANEOUS
Status: DISCONTINUED | OUTPATIENT
Start: 2024-01-01 | End: 2024-01-01 | Stop reason: HOSPADM

## 2024-01-01 RX ORDER — ALBUTEROL SULFATE 2.5 MG/3ML
2.5 SOLUTION RESPIRATORY (INHALATION)
Status: CANCELLED | OUTPATIENT
Start: 2024-01-01

## 2024-01-01 RX ORDER — MIDAZOLAM HYDROCHLORIDE 1 MG/ML
INJECTION INTRAMUSCULAR; INTRAVENOUS
Status: COMPLETED
Start: 2024-01-01 | End: 2024-01-01

## 2024-01-01 RX ORDER — SODIUM CHLORIDE 0.9 % (FLUSH) 0.9 %
10 SYRINGE (ML) INJECTION AS NEEDED
Status: DISCONTINUED | OUTPATIENT
Start: 2024-01-01 | End: 2024-01-01

## 2024-01-01 RX ORDER — FLUCONAZOLE 100 MG/1
200 TABLET ORAL EVERY 24 HOURS
Status: DISCONTINUED | OUTPATIENT
Start: 2024-01-01 | End: 2024-01-01

## 2024-01-01 RX ORDER — METOCLOPRAMIDE HYDROCHLORIDE 5 MG/ML
10 INJECTION INTRAMUSCULAR; INTRAVENOUS EVERY 8 HOURS SCHEDULED
Status: DISCONTINUED | OUTPATIENT
Start: 2024-01-01 | End: 2024-01-01 | Stop reason: HOSPADM

## 2024-01-01 RX ORDER — METOCLOPRAMIDE HYDROCHLORIDE 5 MG/ML
5 INJECTION INTRAMUSCULAR; INTRAVENOUS EVERY 8 HOURS SCHEDULED
Status: DISCONTINUED | OUTPATIENT
Start: 2024-01-01 | End: 2024-01-01

## 2024-01-01 RX ORDER — VECURONIUM BROMIDE 1 MG/ML
10 INJECTION, POWDER, LYOPHILIZED, FOR SOLUTION INTRAVENOUS ONCE
Status: COMPLETED | OUTPATIENT
Start: 2024-01-01 | End: 2024-01-01

## 2024-01-01 RX ORDER — OXYMETAZOLINE HYDROCHLORIDE 0.05 G/100ML
2 SPRAY NASAL AS NEEDED
Status: DISPENSED | OUTPATIENT
Start: 2024-01-01 | End: 2024-01-01

## 2024-01-01 RX ORDER — HYDROMORPHONE HCL IN 0.9% NACL 50 MG/50ML
3 PLASTIC BAG, INJECTION (ML) INJECTION CONTINUOUS
Status: DISCONTINUED | OUTPATIENT
Start: 2024-01-01 | End: 2024-01-01 | Stop reason: HOSPADM

## 2024-01-01 RX ORDER — SCOLOPAMINE TRANSDERMAL SYSTEM 1 MG/1
1 PATCH, EXTENDED RELEASE TRANSDERMAL
Status: CANCELLED | OUTPATIENT
Start: 2024-01-27

## 2024-01-01 RX ORDER — MORPHINE SULFATE 4 MG/ML
4 INJECTION, SOLUTION INTRAMUSCULAR; INTRAVENOUS ONCE
Status: COMPLETED | OUTPATIENT
Start: 2024-01-01 | End: 2024-01-01

## 2024-01-01 RX ORDER — ETOMIDATE 2 MG/ML
0.3 INJECTION INTRAVENOUS ONCE
Status: COMPLETED | OUTPATIENT
Start: 2024-01-01 | End: 2024-01-01

## 2024-01-01 RX ADMIN — SENNOSIDES 10 ML: 8.8 LIQUID ORAL at 20:06

## 2024-01-01 RX ADMIN — Medication 60 MG: at 17:46

## 2024-01-01 RX ADMIN — ALBUMIN (HUMAN) 12.5 G: 0.25 INJECTION, SOLUTION INTRAVENOUS at 10:44

## 2024-01-01 RX ADMIN — Medication 10 ML: at 20:10

## 2024-01-01 RX ADMIN — METOCLOPRAMIDE 10 MG: 5 INJECTION, SOLUTION INTRAMUSCULAR; INTRAVENOUS at 06:11

## 2024-01-01 RX ADMIN — PIPERACILLIN SODIUM AND TAZOBACTAM SODIUM 4.5 G: 4; .5 INJECTION, SOLUTION INTRAVENOUS at 17:07

## 2024-01-01 RX ADMIN — PIPERACILLIN SODIUM AND TAZOBACTAM SODIUM 4.5 G: 4; .5 INJECTION, SOLUTION INTRAVENOUS at 17:30

## 2024-01-01 RX ADMIN — VENLAFAXINE 50 MG: 25 TABLET ORAL at 11:50

## 2024-01-01 RX ADMIN — Medication 10 ML: at 08:26

## 2024-01-01 RX ADMIN — LEVOTHYROXINE SODIUM 100 MCG: 40 INJECTION, SOLUTION INTRAVENOUS at 12:21

## 2024-01-01 RX ADMIN — OXYCODONE AND ACETAMINOPHEN 1 TABLET: 5; 325 TABLET ORAL at 15:13

## 2024-01-01 RX ADMIN — ALBUTEROL SULFATE 2.5 MG: 2.5 SOLUTION RESPIRATORY (INHALATION) at 01:03

## 2024-01-01 RX ADMIN — METHYLNALTREXONE BROMIDE 12 MG: 12 INJECTION, SOLUTION SUBCUTANEOUS at 15:23

## 2024-01-01 RX ADMIN — Medication 10 ML: at 20:22

## 2024-01-01 RX ADMIN — DOCUSATE SODIUM 100 MG: 50 LIQUID ORAL at 08:30

## 2024-01-01 RX ADMIN — NOREPINEPHRINE BITARTRATE 0.18 MCG/KG/MIN: 0.03 INJECTION, SOLUTION INTRAVENOUS at 19:36

## 2024-01-01 RX ADMIN — HEPARIN SODIUM 21 UNITS/KG/HR: 10000 INJECTION, SOLUTION INTRAVENOUS at 06:00

## 2024-01-01 RX ADMIN — Medication 10 ML: at 10:30

## 2024-01-01 RX ADMIN — PIPERACILLIN SODIUM AND TAZOBACTAM SODIUM 4.5 G: 4; .5 INJECTION, SOLUTION INTRAVENOUS at 02:27

## 2024-01-01 RX ADMIN — POTASSIUM CHLORIDE 20 MEQ: 29.8 INJECTION, SOLUTION INTRAVENOUS at 22:02

## 2024-01-01 RX ADMIN — LEVOTHYROXINE SODIUM 125 MCG: 0.12 TABLET ORAL at 08:30

## 2024-01-01 RX ADMIN — ALBUTEROL SULFATE 2.5 MG: 2.5 SOLUTION RESPIRATORY (INHALATION) at 01:08

## 2024-01-01 RX ADMIN — MIDAZOLAM HYDROCHLORIDE 10 MG/HR: 1 INJECTION, SOLUTION INTRAVENOUS at 01:35

## 2024-01-01 RX ADMIN — SENNOSIDES 10 ML: 8.8 LIQUID ORAL at 20:11

## 2024-01-01 RX ADMIN — Medication 300 MCG/HR: at 12:51

## 2024-01-01 RX ADMIN — Medication 10 ML: at 08:30

## 2024-01-01 RX ADMIN — THIAMINE HYDROCHLORIDE 200 MG: 100 INJECTION, SOLUTION INTRAMUSCULAR; INTRAVENOUS at 08:58

## 2024-01-01 RX ADMIN — 0.12% CHLORHEXIDINE GLUCONATE 15 ML: 1.2 RINSE ORAL at 20:11

## 2024-01-01 RX ADMIN — Medication 300 MCG/HR: at 20:40

## 2024-01-01 RX ADMIN — POTASSIUM CHLORIDE 20 MEQ: 29.8 INJECTION, SOLUTION INTRAVENOUS at 23:12

## 2024-01-01 RX ADMIN — LEVOTHYROXINE SODIUM 100 MCG: 40 INJECTION, SOLUTION INTRAVENOUS at 10:43

## 2024-01-01 RX ADMIN — MIDAZOLAM HYDROCHLORIDE 10 MG/HR: 1 INJECTION, SOLUTION INTRAVENOUS at 21:10

## 2024-01-01 RX ADMIN — BUMETANIDE 1 MG: 0.25 INJECTION, SOLUTION INTRAMUSCULAR; INTRAVENOUS at 10:39

## 2024-01-01 RX ADMIN — BUMETANIDE 1 MG: 0.25 INJECTION, SOLUTION INTRAMUSCULAR; INTRAVENOUS at 17:08

## 2024-01-01 RX ADMIN — VENLAFAXINE 50 MG: 25 TABLET ORAL at 10:28

## 2024-01-01 RX ADMIN — VENLAFAXINE 50 MG: 25 TABLET ORAL at 09:02

## 2024-01-01 RX ADMIN — DEXMEDETOMIDINE HYDROCHLORIDE 1 MCG/KG/HR: 4 INJECTION, SOLUTION INTRAVENOUS at 01:47

## 2024-01-01 RX ADMIN — PIPERACILLIN SODIUM AND TAZOBACTAM SODIUM 4.5 G: 4; .5 INJECTION, SOLUTION INTRAVENOUS at 01:52

## 2024-01-01 RX ADMIN — 0.12% CHLORHEXIDINE GLUCONATE 15 ML: 1.2 RINSE ORAL at 08:23

## 2024-01-01 RX ADMIN — POTASSIUM CHLORIDE 20 MEQ: 29.8 INJECTION, SOLUTION INTRAVENOUS at 07:01

## 2024-01-01 RX ADMIN — 0.12% CHLORHEXIDINE GLUCONATE 15 ML: 1.2 RINSE ORAL at 20:54

## 2024-01-01 RX ADMIN — Medication 10 ML: at 08:38

## 2024-01-01 RX ADMIN — DOCUSATE SODIUM 100 MG: 50 LIQUID ORAL at 20:29

## 2024-01-01 RX ADMIN — SENNOSIDES 10 ML: 8.8 LIQUID ORAL at 21:10

## 2024-01-01 RX ADMIN — MIDAZOLAM HYDROCHLORIDE 10 MG/HR: 1 INJECTION, SOLUTION INTRAVENOUS at 11:36

## 2024-01-01 RX ADMIN — POTASSIUM PHOSPHATE, MONOBASIC POTASSIUM PHOSPHATE, DIBASIC INJECTION, 15 MMOL: 236; 224 SOLUTION, CONCENTRATE INTRAVENOUS at 08:31

## 2024-01-01 RX ADMIN — ALBUTEROL SULFATE 2.5 MG: 2.5 SOLUTION RESPIRATORY (INHALATION) at 07:17

## 2024-01-01 RX ADMIN — PANTOPRAZOLE SODIUM 40 MG: 40 INJECTION, POWDER, FOR SOLUTION INTRAVENOUS at 08:31

## 2024-01-01 RX ADMIN — Medication 300 MCG/HR: at 07:10

## 2024-01-01 RX ADMIN — HEPARIN SODIUM 21 UNITS/KG/HR: 10000 INJECTION, SOLUTION INTRAVENOUS at 09:34

## 2024-01-01 RX ADMIN — DOCUSATE SODIUM 100 MG: 50 LIQUID ORAL at 20:16

## 2024-01-01 RX ADMIN — PROMETHAZINE HYDROCHLORIDE 12.5 MG: 25 INJECTION INTRAMUSCULAR; INTRAVENOUS at 00:14

## 2024-01-01 RX ADMIN — Medication 10 ML: at 02:39

## 2024-01-01 RX ADMIN — DOCUSATE SODIUM 100 MG: 50 LIQUID ORAL at 08:38

## 2024-01-01 RX ADMIN — SENNOSIDES 10 ML: 8.8 LIQUID ORAL at 20:08

## 2024-01-01 RX ADMIN — MIDAZOLAM HYDROCHLORIDE 10 MG/HR: 1 INJECTION, SOLUTION INTRAVENOUS at 08:03

## 2024-01-01 RX ADMIN — Medication 300 MCG/HR: at 05:40

## 2024-01-01 RX ADMIN — SENNOSIDES 10 ML: 8.8 LIQUID ORAL at 20:23

## 2024-01-01 RX ADMIN — DEXMEDETOMIDINE HYDROCHLORIDE 0.7 MCG/KG/HR: 4 INJECTION, SOLUTION INTRAVENOUS at 20:21

## 2024-01-01 RX ADMIN — HALOPERIDOL LACTATE 5 MG: 5 INJECTION, SOLUTION INTRAMUSCULAR at 12:42

## 2024-01-01 RX ADMIN — METOCLOPRAMIDE 10 MG: 5 INJECTION, SOLUTION INTRAMUSCULAR; INTRAVENOUS at 21:13

## 2024-01-01 RX ADMIN — Medication 300 MCG/HR: at 06:38

## 2024-01-01 RX ADMIN — PANTOPRAZOLE SODIUM 40 MG: 40 INJECTION, POWDER, FOR SOLUTION INTRAVENOUS at 08:24

## 2024-01-01 RX ADMIN — 0.12% CHLORHEXIDINE GLUCONATE 15 ML: 1.2 RINSE ORAL at 20:06

## 2024-01-01 RX ADMIN — NOREPINEPHRINE BITARTRATE 0.18 MCG/KG/MIN: 0.03 INJECTION, SOLUTION INTRAVENOUS at 04:43

## 2024-01-01 RX ADMIN — DOCUSATE SODIUM 100 MG: 50 LIQUID ORAL at 20:11

## 2024-01-01 RX ADMIN — METOCLOPRAMIDE 10 MG: 5 INJECTION, SOLUTION INTRAMUSCULAR; INTRAVENOUS at 14:23

## 2024-01-01 RX ADMIN — MIDAZOLAM HYDROCHLORIDE 10 MG/HR: 1 INJECTION, SOLUTION INTRAVENOUS at 08:48

## 2024-01-01 RX ADMIN — HEPARIN SODIUM 21 UNITS/KG/HR: 10000 INJECTION, SOLUTION INTRAVENOUS at 06:57

## 2024-01-01 RX ADMIN — HEPARIN SODIUM 15.5 UNITS/KG/HR: 10000 INJECTION, SOLUTION INTRAVENOUS at 01:31

## 2024-01-01 RX ADMIN — POLYETHYLENE GLYCOL 3350 17 G: 17 POWDER, FOR SOLUTION ORAL at 10:44

## 2024-01-01 RX ADMIN — VENLAFAXINE 50 MG: 25 TABLET ORAL at 08:03

## 2024-01-01 RX ADMIN — ALBUMIN (HUMAN) 12.5 G: 0.25 INJECTION, SOLUTION INTRAVENOUS at 00:30

## 2024-01-01 RX ADMIN — METOCLOPRAMIDE 10 MG: 5 INJECTION, SOLUTION INTRAMUSCULAR; INTRAVENOUS at 22:03

## 2024-01-01 RX ADMIN — METOCLOPRAMIDE 10 MG: 5 INJECTION, SOLUTION INTRAMUSCULAR; INTRAVENOUS at 22:07

## 2024-01-01 RX ADMIN — ALBUTEROL SULFATE 2.5 MG: 2.5 SOLUTION RESPIRATORY (INHALATION) at 00:59

## 2024-01-01 RX ADMIN — DEXMEDETOMIDINE HYDROCHLORIDE 0.5 MCG/KG/HR: 4 INJECTION, SOLUTION INTRAVENOUS at 12:36

## 2024-01-01 RX ADMIN — POTASSIUM CHLORIDE 40 MEQ: 1.5 POWDER, FOR SOLUTION ORAL at 00:21

## 2024-01-01 RX ADMIN — ALBUTEROL SULFATE 2.5 MG: 2.5 SOLUTION RESPIRATORY (INHALATION) at 12:43

## 2024-01-01 RX ADMIN — MIDAZOLAM HYDROCHLORIDE 10 MG/HR: 1 INJECTION, SOLUTION INTRAVENOUS at 02:23

## 2024-01-01 RX ADMIN — NOREPINEPHRINE BITARTRATE 0.07 MCG/KG/MIN: 0.03 INJECTION, SOLUTION INTRAVENOUS at 10:23

## 2024-01-01 RX ADMIN — METOCLOPRAMIDE 10 MG: 5 INJECTION, SOLUTION INTRAMUSCULAR; INTRAVENOUS at 14:58

## 2024-01-01 RX ADMIN — METOCLOPRAMIDE 10 MG: 5 INJECTION, SOLUTION INTRAMUSCULAR; INTRAVENOUS at 15:04

## 2024-01-01 RX ADMIN — PIPERACILLIN SODIUM AND TAZOBACTAM SODIUM 4.5 G: 4; .5 INJECTION, SOLUTION INTRAVENOUS at 15:15

## 2024-01-01 RX ADMIN — Medication 300 MCG/HR: at 10:03

## 2024-01-01 RX ADMIN — POTASSIUM CHLORIDE 40 MEQ: 1.5 POWDER, FOR SOLUTION ORAL at 20:20

## 2024-01-01 RX ADMIN — FLUCONAZOLE 200 MG: 100 TABLET ORAL at 20:21

## 2024-01-01 RX ADMIN — 0.12% CHLORHEXIDINE GLUCONATE 15 ML: 1.2 RINSE ORAL at 09:43

## 2024-01-01 RX ADMIN — DOCUSATE SODIUM 100 MG: 50 LIQUID ORAL at 08:09

## 2024-01-01 RX ADMIN — ALBUTEROL SULFATE 2.5 MG: 2.5 SOLUTION RESPIRATORY (INHALATION) at 14:01

## 2024-01-01 RX ADMIN — NOREPINEPHRINE BITARTRATE 0.08 MCG/KG/MIN: 0.03 INJECTION, SOLUTION INTRAVENOUS at 17:18

## 2024-01-01 RX ADMIN — MIDAZOLAM HYDROCHLORIDE 10 MG/HR: 1 INJECTION, SOLUTION INTRAVENOUS at 20:29

## 2024-01-01 RX ADMIN — 0.12% CHLORHEXIDINE GLUCONATE 15 ML: 1.2 RINSE ORAL at 08:24

## 2024-01-01 RX ADMIN — 0.12% CHLORHEXIDINE GLUCONATE 15 ML: 1.2 RINSE ORAL at 20:28

## 2024-01-01 RX ADMIN — Medication 10 ML: at 12:21

## 2024-01-01 RX ADMIN — Medication 10 ML: at 20:08

## 2024-01-01 RX ADMIN — Medication 300 MCG/HR: at 14:58

## 2024-01-01 RX ADMIN — Medication 10 ML: at 20:23

## 2024-01-01 RX ADMIN — Medication 300 MCG/HR: at 05:02

## 2024-01-01 RX ADMIN — METOCLOPRAMIDE 10 MG: 5 INJECTION, SOLUTION INTRAMUSCULAR; INTRAVENOUS at 23:08

## 2024-01-01 RX ADMIN — HYDROMORPHONE HYDROCHLORIDE 2 MG: 2 INJECTION, SOLUTION INTRAMUSCULAR; INTRAVENOUS; SUBCUTANEOUS at 12:45

## 2024-01-01 RX ADMIN — THIAMINE HYDROCHLORIDE 200 MG: 100 INJECTION, SOLUTION INTRAMUSCULAR; INTRAVENOUS at 08:46

## 2024-01-01 RX ADMIN — HEPARIN SODIUM 21 UNITS/KG/HR: 10000 INJECTION, SOLUTION INTRAVENOUS at 20:22

## 2024-01-01 RX ADMIN — PANTOPRAZOLE SODIUM 40 MG: 40 INJECTION, POWDER, FOR SOLUTION INTRAVENOUS at 10:27

## 2024-01-01 RX ADMIN — VENLAFAXINE 50 MG: 25 TABLET ORAL at 11:06

## 2024-01-01 RX ADMIN — Medication 10 ML: at 09:17

## 2024-01-01 RX ADMIN — ALBUTEROL SULFATE 2.5 MG: 2.5 SOLUTION RESPIRATORY (INHALATION) at 00:34

## 2024-01-01 RX ADMIN — SENNOSIDES 10 ML: 8.8 LIQUID ORAL at 20:28

## 2024-01-01 RX ADMIN — FLUCONAZOLE 200 MG: 100 TABLET ORAL at 20:16

## 2024-01-01 RX ADMIN — BUMETANIDE 2 MG: 0.25 INJECTION, SOLUTION INTRAMUSCULAR; INTRAVENOUS at 10:43

## 2024-01-01 RX ADMIN — ONDANSETRON 4 MG: 2 INJECTION INTRAMUSCULAR; INTRAVENOUS at 09:14

## 2024-01-01 RX ADMIN — VENLAFAXINE 50 MG: 25 TABLET ORAL at 18:01

## 2024-01-01 RX ADMIN — Medication 10 ML: at 20:11

## 2024-01-01 RX ADMIN — METOCLOPRAMIDE 10 MG: 5 INJECTION, SOLUTION INTRAMUSCULAR; INTRAVENOUS at 05:32

## 2024-01-01 RX ADMIN — ALBUTEROL SULFATE 2.5 MG: 2.5 SOLUTION RESPIRATORY (INHALATION) at 12:29

## 2024-01-01 RX ADMIN — ALBUTEROL SULFATE 2.5 MG: 2.5 SOLUTION RESPIRATORY (INHALATION) at 01:31

## 2024-01-01 RX ADMIN — SODIUM CHLORIDE 150 ML/HR: 9 INJECTION, SOLUTION INTRAVENOUS at 05:44

## 2024-01-01 RX ADMIN — DEXMEDETOMIDINE HYDROCHLORIDE 0.7 MCG/KG/HR: 4 INJECTION, SOLUTION INTRAVENOUS at 08:51

## 2024-01-01 RX ADMIN — SODIUM CHLORIDE 75 ML/HR: 9 INJECTION, SOLUTION INTRAVENOUS at 06:23

## 2024-01-01 RX ADMIN — NOREPINEPHRINE BITARTRATE 0.16 MCG/KG/MIN: 0.03 INJECTION, SOLUTION INTRAVENOUS at 20:21

## 2024-01-01 RX ADMIN — VENLAFAXINE 50 MG: 25 TABLET ORAL at 08:25

## 2024-01-01 RX ADMIN — PIPERACILLIN SODIUM AND TAZOBACTAM SODIUM 3.38 G: 3; .375 INJECTION, SOLUTION INTRAVENOUS at 02:12

## 2024-01-01 RX ADMIN — ALBUTEROL SULFATE 2.5 MG: 2.5 SOLUTION RESPIRATORY (INHALATION) at 01:10

## 2024-01-01 RX ADMIN — DEXMEDETOMIDINE HYDROCHLORIDE 0.7 MCG/KG/HR: 4 INJECTION, SOLUTION INTRAVENOUS at 04:14

## 2024-01-01 RX ADMIN — PIPERACILLIN SODIUM AND TAZOBACTAM SODIUM 4.5 G: 4; .5 INJECTION, SOLUTION INTRAVENOUS at 08:26

## 2024-01-01 RX ADMIN — MIDAZOLAM HYDROCHLORIDE 10 MG/HR: 1 INJECTION, SOLUTION INTRAVENOUS at 19:15

## 2024-01-01 RX ADMIN — HYDROMORPHONE HYDROCHLORIDE 2 MG: 2 INJECTION, SOLUTION INTRAMUSCULAR; INTRAVENOUS; SUBCUTANEOUS at 01:57

## 2024-01-01 RX ADMIN — 0.12% CHLORHEXIDINE GLUCONATE 15 ML: 1.2 RINSE ORAL at 08:38

## 2024-01-01 RX ADMIN — HEPARIN SODIUM 21 UNITS/KG/HR: 10000 INJECTION, SOLUTION INTRAVENOUS at 23:14

## 2024-01-01 RX ADMIN — 0.12% CHLORHEXIDINE GLUCONATE 15 ML: 1.2 RINSE ORAL at 08:04

## 2024-01-01 RX ADMIN — ALBUTEROL SULFATE 2.5 MG: 2.5 SOLUTION RESPIRATORY (INHALATION) at 18:57

## 2024-01-01 RX ADMIN — Medication 300 MCG/HR: at 22:55

## 2024-01-01 RX ADMIN — PIPERACILLIN SODIUM AND TAZOBACTAM SODIUM 4.5 G: 4; .5 INJECTION, SOLUTION INTRAVENOUS at 01:34

## 2024-01-01 RX ADMIN — PIPERACILLIN SODIUM AND TAZOBACTAM SODIUM 4.5 G: 4; .5 INJECTION, SOLUTION INTRAVENOUS at 17:53

## 2024-01-01 RX ADMIN — Medication 10 ML: at 08:32

## 2024-01-01 RX ADMIN — MAGNESIUM SULFATE 4 G: 4 INJECTION INTRAVENOUS at 06:00

## 2024-01-01 RX ADMIN — VENLAFAXINE 50 MG: 25 TABLET ORAL at 08:35

## 2024-01-01 RX ADMIN — Medication 300 MCG/HR: at 23:03

## 2024-01-01 RX ADMIN — ALBUTEROL SULFATE 2.5 MG: 2.5 SOLUTION RESPIRATORY (INHALATION) at 13:05

## 2024-01-01 RX ADMIN — HEPARIN SODIUM 15.5 UNITS/KG/HR: 10000 INJECTION, SOLUTION INTRAVENOUS at 07:27

## 2024-01-01 RX ADMIN — MIDAZOLAM HYDROCHLORIDE 10 MG/HR: 1 INJECTION, SOLUTION INTRAVENOUS at 15:22

## 2024-01-01 RX ADMIN — METOCLOPRAMIDE 10 MG: 5 INJECTION, SOLUTION INTRAMUSCULAR; INTRAVENOUS at 05:12

## 2024-01-01 RX ADMIN — MIDAZOLAM HYDROCHLORIDE 10 MG/HR: 1 INJECTION, SOLUTION INTRAVENOUS at 03:54

## 2024-01-01 RX ADMIN — LEVOTHYROXINE SODIUM 100 MCG: 40 INJECTION, SOLUTION INTRAVENOUS at 12:11

## 2024-01-01 RX ADMIN — METOCLOPRAMIDE 10 MG: 5 INJECTION, SOLUTION INTRAMUSCULAR; INTRAVENOUS at 05:50

## 2024-01-01 RX ADMIN — MIDAZOLAM HYDROCHLORIDE 10 MG/HR: 1 INJECTION, SOLUTION INTRAVENOUS at 21:52

## 2024-01-01 RX ADMIN — DEXMEDETOMIDINE HYDROCHLORIDE 0.7 MCG/KG/HR: 4 INJECTION, SOLUTION INTRAVENOUS at 15:03

## 2024-01-01 RX ADMIN — ONDANSETRON HYDROCHLORIDE 4 MG: 2 SOLUTION INTRAMUSCULAR; INTRAVENOUS at 21:39

## 2024-01-01 RX ADMIN — 0.12% CHLORHEXIDINE GLUCONATE 15 ML: 1.2 RINSE ORAL at 08:32

## 2024-01-01 RX ADMIN — Medication 300 MCG/HR: at 15:13

## 2024-01-01 RX ADMIN — DOCUSATE SODIUM 100 MG: 50 LIQUID ORAL at 08:23

## 2024-01-01 RX ADMIN — HEPARIN SODIUM 21 UNITS/KG/HR: 10000 INJECTION, SOLUTION INTRAVENOUS at 18:03

## 2024-01-01 RX ADMIN — 0.12% CHLORHEXIDINE GLUCONATE 15 ML: 1.2 RINSE ORAL at 09:02

## 2024-01-01 RX ADMIN — GLYCOPYRROLATE 0.4 MG: 0.2 INJECTION INTRAMUSCULAR; INTRAVENOUS at 21:13

## 2024-01-01 RX ADMIN — POTASSIUM CHLORIDE 40 MEQ: 1.5 POWDER, FOR SOLUTION ORAL at 08:46

## 2024-01-01 RX ADMIN — Medication 300 MCG/HR: at 04:22

## 2024-01-01 RX ADMIN — POTASSIUM CHLORIDE 40 MEQ: 1.5 POWDER, FOR SOLUTION ORAL at 12:02

## 2024-01-01 RX ADMIN — VENLAFAXINE 50 MG: 25 TABLET ORAL at 12:24

## 2024-01-01 RX ADMIN — SENNOSIDES 10 ML: 8.8 LIQUID ORAL at 22:06

## 2024-01-01 RX ADMIN — THIAMINE HYDROCHLORIDE 200 MG: 100 INJECTION, SOLUTION INTRAMUSCULAR; INTRAVENOUS at 08:38

## 2024-01-01 RX ADMIN — VENLAFAXINE 50 MG: 25 TABLET ORAL at 08:31

## 2024-01-01 RX ADMIN — METOCLOPRAMIDE 10 MG: 5 INJECTION, SOLUTION INTRAMUSCULAR; INTRAVENOUS at 22:20

## 2024-01-01 RX ADMIN — MIDAZOLAM HYDROCHLORIDE 10 MG/HR: 1 INJECTION, SOLUTION INTRAVENOUS at 04:30

## 2024-01-01 RX ADMIN — DOCUSATE SODIUM 100 MG: 50 LIQUID ORAL at 20:08

## 2024-01-01 RX ADMIN — DIATRIZOATE MEGLUMINE AND DIATRIZOATE SODIUM 15 ML: 660; 100 LIQUID ORAL; RECTAL at 16:11

## 2024-01-01 RX ADMIN — MIDAZOLAM HYDROCHLORIDE 2 MG: 1 INJECTION, SOLUTION INTRAMUSCULAR; INTRAVENOUS at 17:41

## 2024-01-01 RX ADMIN — VENLAFAXINE 50 MG: 25 TABLET ORAL at 08:09

## 2024-01-01 RX ADMIN — ALBUTEROL SULFATE 2.5 MG: 2.5 SOLUTION RESPIRATORY (INHALATION) at 01:16

## 2024-01-01 RX ADMIN — HEPARIN SODIUM 16.5 UNITS/KG/HR: 10000 INJECTION, SOLUTION INTRAVENOUS at 23:42

## 2024-01-01 RX ADMIN — SUCCINYLCHOLINE CHLORIDE 100 MG: 20 INJECTION, SOLUTION INTRAMUSCULAR; INTRAVENOUS at 01:11

## 2024-01-01 RX ADMIN — Medication 10 ML: at 08:04

## 2024-01-01 RX ADMIN — HEPARIN SODIUM 21 UNITS/KG/HR: 10000 INJECTION, SOLUTION INTRAVENOUS at 20:42

## 2024-01-01 RX ADMIN — MIDAZOLAM HYDROCHLORIDE 2 MG: 1 INJECTION, SOLUTION INTRAMUSCULAR; INTRAVENOUS at 16:12

## 2024-01-01 RX ADMIN — VENLAFAXINE 50 MG: 25 TABLET ORAL at 17:54

## 2024-01-01 RX ADMIN — Medication 300 MCG/HR: at 12:19

## 2024-01-01 RX ADMIN — SODIUM CHLORIDE 75 ML/HR: 9 INJECTION, SOLUTION INTRAVENOUS at 15:15

## 2024-01-01 RX ADMIN — THIAMINE HYDROCHLORIDE 200 MG: 100 INJECTION, SOLUTION INTRAMUSCULAR; INTRAVENOUS at 08:35

## 2024-01-01 RX ADMIN — VENLAFAXINE 50 MG: 25 TABLET ORAL at 17:46

## 2024-01-01 RX ADMIN — DEXMEDETOMIDINE HYDROCHLORIDE 1.2 MCG/KG/HR: 4 INJECTION, SOLUTION INTRAVENOUS at 10:12

## 2024-01-01 RX ADMIN — VENLAFAXINE 50 MG: 25 TABLET ORAL at 11:30

## 2024-01-01 RX ADMIN — SCOPOLAMINE 1 PATCH: 1.5 PATCH, EXTENDED RELEASE TRANSDERMAL at 18:01

## 2024-01-01 RX ADMIN — Medication 300 MCG/HR: at 22:01

## 2024-01-01 RX ADMIN — ALBUTEROL SULFATE 2.5 MG: 2.5 SOLUTION RESPIRATORY (INHALATION) at 13:07

## 2024-01-01 RX ADMIN — PANTOPRAZOLE SODIUM 40 MG: 40 INJECTION, POWDER, FOR SOLUTION INTRAVENOUS at 08:26

## 2024-01-01 RX ADMIN — THIAMINE HYDROCHLORIDE 200 MG: 100 INJECTION, SOLUTION INTRAMUSCULAR; INTRAVENOUS at 08:09

## 2024-01-01 RX ADMIN — PIPERACILLIN SODIUM AND TAZOBACTAM SODIUM 4.5 G: 4; .5 INJECTION, SOLUTION INTRAVENOUS at 10:45

## 2024-01-01 RX ADMIN — WATER 10 ML: 1 INJECTION INTRAMUSCULAR; INTRAVENOUS; SUBCUTANEOUS at 13:39

## 2024-01-01 RX ADMIN — VECURONIUM BROMIDE 10 MG: 10 INJECTION, POWDER, LYOPHILIZED, FOR SOLUTION INTRAVENOUS at 13:39

## 2024-01-01 RX ADMIN — ALBUTEROL SULFATE 2.5 MG: 2.5 SOLUTION RESPIRATORY (INHALATION) at 08:01

## 2024-01-01 RX ADMIN — MIDAZOLAM HYDROCHLORIDE 10 MG/HR: 1 INJECTION, SOLUTION INTRAVENOUS at 10:22

## 2024-01-01 RX ADMIN — NOREPINEPHRINE BITARTRATE 0.02 MCG/KG/MIN: 0.03 INJECTION, SOLUTION INTRAVENOUS at 05:22

## 2024-01-01 RX ADMIN — Medication 225 MCG/HR: at 04:56

## 2024-01-01 RX ADMIN — DOCUSATE SODIUM 100 MG: 50 LIQUID ORAL at 20:23

## 2024-01-01 RX ADMIN — VENLAFAXINE HYDROCHLORIDE 75 MG: 75 TABLET ORAL at 09:39

## 2024-01-01 RX ADMIN — 0.12% CHLORHEXIDINE GLUCONATE 15 ML: 1.2 RINSE ORAL at 09:16

## 2024-01-01 RX ADMIN — SENNOSIDES 10 ML: 8.8 LIQUID ORAL at 08:31

## 2024-01-01 RX ADMIN — 0.12% CHLORHEXIDINE GLUCONATE 15 ML: 1.2 RINSE ORAL at 20:08

## 2024-01-01 RX ADMIN — VENLAFAXINE 50 MG: 25 TABLET ORAL at 12:21

## 2024-01-01 RX ADMIN — 0.12% CHLORHEXIDINE GLUCONATE 15 ML: 1.2 RINSE ORAL at 08:58

## 2024-01-01 RX ADMIN — VENLAFAXINE 50 MG: 25 TABLET ORAL at 11:37

## 2024-01-01 RX ADMIN — HEPARIN SODIUM 19 UNITS/KG/HR: 10000 INJECTION, SOLUTION INTRAVENOUS at 17:55

## 2024-01-01 RX ADMIN — VENLAFAXINE 50 MG: 25 TABLET ORAL at 12:02

## 2024-01-01 RX ADMIN — SODIUM CHLORIDE 75 ML/HR: 9 INJECTION, SOLUTION INTRAVENOUS at 18:36

## 2024-01-01 RX ADMIN — Medication 10 ML: at 20:29

## 2024-01-01 RX ADMIN — MIDAZOLAM HYDROCHLORIDE 1 MG/HR: 1 INJECTION, SOLUTION INTRAVENOUS at 22:02

## 2024-01-01 RX ADMIN — ALBUTEROL SULFATE 2.5 MG: 2.5 SOLUTION RESPIRATORY (INHALATION) at 19:36

## 2024-01-01 RX ADMIN — PIPERACILLIN SODIUM AND TAZOBACTAM SODIUM 4.5 G: 4; .5 INJECTION, SOLUTION INTRAVENOUS at 10:49

## 2024-01-01 RX ADMIN — DOCUSATE SODIUM 100 MG: 50 LIQUID ORAL at 21:07

## 2024-01-01 RX ADMIN — METOCLOPRAMIDE 10 MG: 5 INJECTION, SOLUTION INTRAMUSCULAR; INTRAVENOUS at 22:01

## 2024-01-01 RX ADMIN — Medication 300 MCG/HR: at 07:50

## 2024-01-01 RX ADMIN — Medication 10 ML: at 08:58

## 2024-01-01 RX ADMIN — 0.12% CHLORHEXIDINE GLUCONATE 15 ML: 1.2 RINSE ORAL at 20:20

## 2024-01-01 RX ADMIN — THIAMINE HYDROCHLORIDE 200 MG: 100 INJECTION, SOLUTION INTRAMUSCULAR; INTRAVENOUS at 08:24

## 2024-01-01 RX ADMIN — METOCLOPRAMIDE 10 MG: 5 INJECTION, SOLUTION INTRAMUSCULAR; INTRAVENOUS at 23:13

## 2024-01-01 RX ADMIN — MIDAZOLAM HYDROCHLORIDE 2 MG: 1 INJECTION, SOLUTION INTRAMUSCULAR; INTRAVENOUS at 17:43

## 2024-01-01 RX ADMIN — PANTOPRAZOLE SODIUM 40 MG: 40 INJECTION, POWDER, FOR SOLUTION INTRAVENOUS at 08:39

## 2024-01-01 RX ADMIN — Medication 300 MCG/HR: at 14:23

## 2024-01-01 RX ADMIN — DEXMEDETOMIDINE HYDROCHLORIDE 0.8 MCG/KG/HR: 4 INJECTION, SOLUTION INTRAVENOUS at 20:27

## 2024-01-01 RX ADMIN — ONDANSETRON 4 MG: 2 INJECTION INTRAMUSCULAR; INTRAVENOUS at 01:23

## 2024-01-01 RX ADMIN — MIDAZOLAM HYDROCHLORIDE 10 MG/HR: 1 INJECTION, SOLUTION INTRAVENOUS at 13:33

## 2024-01-01 RX ADMIN — PANTOPRAZOLE SODIUM 40 MG: 40 INJECTION, POWDER, FOR SOLUTION INTRAVENOUS at 08:46

## 2024-01-01 RX ADMIN — SENNOSIDES AND DOCUSATE SODIUM 2 TABLET: 8.6; 5 TABLET ORAL at 21:40

## 2024-01-01 RX ADMIN — Medication 300 MCG/HR: at 15:50

## 2024-01-01 RX ADMIN — HEPARIN SODIUM 21 UNITS/KG/HR: 10000 INJECTION, SOLUTION INTRAVENOUS at 15:21

## 2024-01-01 RX ADMIN — Medication 250 MCG/HR: at 23:41

## 2024-01-01 RX ADMIN — DEXMEDETOMIDINE HYDROCHLORIDE 0.2 MCG/KG/HR: 4 INJECTION, SOLUTION INTRAVENOUS at 03:48

## 2024-01-01 RX ADMIN — VENLAFAXINE 50 MG: 25 TABLET ORAL at 17:48

## 2024-01-01 RX ADMIN — HYDROMORPHONE HYDROCHLORIDE 2 MG: 2 INJECTION, SOLUTION INTRAMUSCULAR; INTRAVENOUS; SUBCUTANEOUS at 06:02

## 2024-01-01 RX ADMIN — ALBUTEROL SULFATE 2.5 MG: 2.5 SOLUTION RESPIRATORY (INHALATION) at 13:42

## 2024-01-01 RX ADMIN — MIDAZOLAM HYDROCHLORIDE 8 MG/HR: 1 INJECTION, SOLUTION INTRAVENOUS at 08:15

## 2024-01-01 RX ADMIN — FLUCONAZOLE 200 MG: 40 POWDER, FOR SUSPENSION ORAL at 20:08

## 2024-01-01 RX ADMIN — PIPERACILLIN SODIUM AND TAZOBACTAM SODIUM 4.5 G: 4; .5 INJECTION, SOLUTION INTRAVENOUS at 17:54

## 2024-01-01 RX ADMIN — METOCLOPRAMIDE 10 MG: 5 INJECTION, SOLUTION INTRAMUSCULAR; INTRAVENOUS at 05:38

## 2024-01-01 RX ADMIN — Medication 225 MCG/HR: at 16:25

## 2024-01-01 RX ADMIN — ALBUTEROL SULFATE 2.5 MG: 2.5 SOLUTION RESPIRATORY (INHALATION) at 19:44

## 2024-01-01 RX ADMIN — METOCLOPRAMIDE 10 MG: 5 INJECTION, SOLUTION INTRAMUSCULAR; INTRAVENOUS at 21:10

## 2024-01-01 RX ADMIN — POTASSIUM CHLORIDE 40 MEQ: 1.5 POWDER, FOR SOLUTION ORAL at 08:31

## 2024-01-01 RX ADMIN — THIAMINE HYDROCHLORIDE 200 MG: 100 INJECTION, SOLUTION INTRAMUSCULAR; INTRAVENOUS at 12:10

## 2024-01-01 RX ADMIN — ALBUTEROL SULFATE 2.5 MG: 2.5 SOLUTION RESPIRATORY (INHALATION) at 06:59

## 2024-01-01 RX ADMIN — Medication 10 ML: at 21:08

## 2024-01-01 RX ADMIN — MIDAZOLAM HYDROCHLORIDE 10 MG/HR: 1 INJECTION, SOLUTION INTRAVENOUS at 11:48

## 2024-01-01 RX ADMIN — FLUCONAZOLE 200 MG: 40 POWDER, FOR SUSPENSION ORAL at 21:11

## 2024-01-01 RX ADMIN — Medication 300 MCG/HR: at 12:23

## 2024-01-01 RX ADMIN — ETOMIDATE 30.3 MG: 2 INJECTION INTRAVENOUS at 17:46

## 2024-01-01 RX ADMIN — Medication 300 MCG/HR: at 23:36

## 2024-01-01 RX ADMIN — METOCLOPRAMIDE 10 MG: 5 INJECTION, SOLUTION INTRAMUSCULAR; INTRAVENOUS at 21:11

## 2024-01-01 RX ADMIN — MIDAZOLAM HYDROCHLORIDE 10 MG/HR: 1 INJECTION, SOLUTION INTRAVENOUS at 17:19

## 2024-01-01 RX ADMIN — 0.12% CHLORHEXIDINE GLUCONATE 15 ML: 1.2 RINSE ORAL at 21:07

## 2024-01-01 RX ADMIN — MAGNESIUM SULFATE HEPTAHYDRATE 4 G: 4 INJECTION, SOLUTION INTRAVENOUS at 08:04

## 2024-01-01 RX ADMIN — METOCLOPRAMIDE 10 MG: 5 INJECTION, SOLUTION INTRAMUSCULAR; INTRAVENOUS at 14:31

## 2024-01-01 RX ADMIN — METOCLOPRAMIDE 5 MG: 5 INJECTION, SOLUTION INTRAMUSCULAR; INTRAVENOUS at 21:04

## 2024-01-01 RX ADMIN — DOCUSATE SODIUM 100 MG: 50 LIQUID ORAL at 08:25

## 2024-01-01 RX ADMIN — LEVOTHYROXINE SODIUM 100 MCG: 40 INJECTION, SOLUTION INTRAVENOUS at 11:03

## 2024-01-01 RX ADMIN — Medication 300 MCG/HR: at 05:17

## 2024-01-01 RX ADMIN — METOCLOPRAMIDE 5 MG: 5 INJECTION, SOLUTION INTRAMUSCULAR; INTRAVENOUS at 12:10

## 2024-01-01 RX ADMIN — DIPHENHYDRAMINE HYDROCHLORIDE AND LIDOCAINE HYDROCHLORIDE AND ALUMINUM HYDROXIDE AND MAGNESIUM HYDRO 5 ML: KIT at 09:40

## 2024-01-01 RX ADMIN — PIPERACILLIN SODIUM AND TAZOBACTAM SODIUM 4.5 G: 4; .5 INJECTION, SOLUTION INTRAVENOUS at 17:08

## 2024-01-01 RX ADMIN — HYDROMORPHONE HYDROCHLORIDE 1 MG: 1 INJECTION, SOLUTION INTRAMUSCULAR; INTRAVENOUS; SUBCUTANEOUS at 23:10

## 2024-01-01 RX ADMIN — ALBUTEROL SULFATE 2.5 MG: 2.5 SOLUTION RESPIRATORY (INHALATION) at 12:45

## 2024-01-01 RX ADMIN — BUMETANIDE 1 MG: 0.25 INJECTION, SOLUTION INTRAMUSCULAR; INTRAVENOUS at 15:40

## 2024-01-01 RX ADMIN — METOCLOPRAMIDE 10 MG: 5 INJECTION, SOLUTION INTRAMUSCULAR; INTRAVENOUS at 16:15

## 2024-01-01 RX ADMIN — MIDAZOLAM HYDROCHLORIDE 10 MG/HR: 1 INJECTION, SOLUTION INTRAVENOUS at 21:04

## 2024-01-01 RX ADMIN — ALBUTEROL SULFATE 2.5 MG: 2.5 SOLUTION RESPIRATORY (INHALATION) at 01:12

## 2024-01-01 RX ADMIN — PANTOPRAZOLE SODIUM 40 MG: 40 INJECTION, POWDER, FOR SOLUTION INTRAVENOUS at 08:09

## 2024-01-01 RX ADMIN — Medication 10 ML: at 20:06

## 2024-01-01 RX ADMIN — MAGNESIUM SULFATE 4 G: 4 INJECTION INTRAVENOUS at 06:57

## 2024-01-01 RX ADMIN — VENLAFAXINE 50 MG: 25 TABLET ORAL at 08:38

## 2024-01-01 RX ADMIN — SENNOSIDES 10 ML: 8.8 LIQUID ORAL at 08:46

## 2024-01-01 RX ADMIN — PIPERACILLIN SODIUM AND TAZOBACTAM SODIUM 4.5 G: 4; .5 INJECTION, SOLUTION INTRAVENOUS at 09:31

## 2024-01-01 RX ADMIN — ALBUTEROL SULFATE 2.5 MG: 2.5 SOLUTION RESPIRATORY (INHALATION) at 07:37

## 2024-01-01 RX ADMIN — 0.12% CHLORHEXIDINE GLUCONATE 15 ML: 1.2 RINSE ORAL at 08:09

## 2024-01-01 RX ADMIN — HEPARIN SODIUM 14.5 UNITS/KG/HR: 10000 INJECTION, SOLUTION INTRAVENOUS at 17:05

## 2024-01-01 RX ADMIN — FLUCONAZOLE 200 MG: 40 POWDER, FOR SUSPENSION ORAL at 21:15

## 2024-01-01 RX ADMIN — POTASSIUM PHOSPHATE 15 MMOL: 236; 224 INJECTION, SOLUTION INTRAVENOUS at 08:26

## 2024-01-01 RX ADMIN — 0.12% CHLORHEXIDINE GLUCONATE 15 ML: 1.2 RINSE ORAL at 08:35

## 2024-01-01 RX ADMIN — ALBUTEROL SULFATE 2.5 MG: 2.5 SOLUTION RESPIRATORY (INHALATION) at 07:05

## 2024-01-01 RX ADMIN — MIDAZOLAM HYDROCHLORIDE 9 MG/HR: 1 INJECTION, SOLUTION INTRAVENOUS at 06:00

## 2024-01-01 RX ADMIN — LEVOTHYROXINE SODIUM 100 MCG: 40 INJECTION, SOLUTION INTRAVENOUS at 11:00

## 2024-01-01 RX ADMIN — MIDAZOLAM HYDROCHLORIDE 10 MG/HR: 1 INJECTION, SOLUTION INTRAVENOUS at 09:18

## 2024-01-01 RX ADMIN — VENLAFAXINE 50 MG: 25 TABLET ORAL at 17:31

## 2024-01-01 RX ADMIN — Medication 225 MCG/HR: at 08:08

## 2024-01-01 RX ADMIN — VENLAFAXINE 50 MG: 25 TABLET ORAL at 17:19

## 2024-01-01 RX ADMIN — ALBUTEROL SULFATE 2.5 MG: 2.5 SOLUTION RESPIRATORY (INHALATION) at 07:02

## 2024-01-01 RX ADMIN — METOCLOPRAMIDE 10 MG: 5 INJECTION, SOLUTION INTRAMUSCULAR; INTRAVENOUS at 05:11

## 2024-01-01 RX ADMIN — PIPERACILLIN SODIUM AND TAZOBACTAM SODIUM 4.5 G: 4; .5 INJECTION, SOLUTION INTRAVENOUS at 09:14

## 2024-01-01 RX ADMIN — PANTOPRAZOLE SODIUM 40 MG: 40 INJECTION, POWDER, FOR SOLUTION INTRAVENOUS at 09:01

## 2024-01-01 RX ADMIN — PANTOPRAZOLE SODIUM 40 MG: 40 INJECTION, POWDER, FOR SOLUTION INTRAVENOUS at 08:03

## 2024-01-01 RX ADMIN — METOCLOPRAMIDE 10 MG: 5 INJECTION, SOLUTION INTRAMUSCULAR; INTRAVENOUS at 08:45

## 2024-01-01 RX ADMIN — POLYETHYLENE GLYCOL 3350 17 G: 17 POWDER, FOR SOLUTION ORAL at 14:23

## 2024-01-01 RX ADMIN — ALBUTEROL SULFATE 2.5 MG: 2.5 SOLUTION RESPIRATORY (INHALATION) at 01:17

## 2024-01-01 RX ADMIN — Medication 300 MCG/HR: at 05:48

## 2024-01-01 RX ADMIN — ALBUTEROL SULFATE 2.5 MG: 2.5 SOLUTION RESPIRATORY (INHALATION) at 19:54

## 2024-01-01 RX ADMIN — ALBUTEROL SULFATE 2.5 MG: 2.5 SOLUTION RESPIRATORY (INHALATION) at 07:00

## 2024-01-01 RX ADMIN — LEVOTHYROXINE SODIUM 100 MCG: 40 INJECTION, SOLUTION INTRAVENOUS at 10:30

## 2024-01-01 RX ADMIN — MIDAZOLAM HYDROCHLORIDE 2 MG: 1 INJECTION INTRAMUSCULAR; INTRAVENOUS at 17:41

## 2024-01-01 RX ADMIN — METOCLOPRAMIDE 10 MG: 5 INJECTION, SOLUTION INTRAMUSCULAR; INTRAVENOUS at 05:44

## 2024-01-01 RX ADMIN — PIPERACILLIN SODIUM AND TAZOBACTAM SODIUM 4.5 G: 4; .5 INJECTION, SOLUTION INTRAVENOUS at 08:40

## 2024-01-01 RX ADMIN — VENLAFAXINE 50 MG: 25 TABLET ORAL at 17:22

## 2024-01-01 RX ADMIN — ALBUTEROL SULFATE 2.5 MG: 2.5 SOLUTION RESPIRATORY (INHALATION) at 12:51

## 2024-01-01 RX ADMIN — POTASSIUM CHLORIDE 40 MEQ: 1.5 POWDER, FOR SOLUTION ORAL at 11:30

## 2024-01-01 RX ADMIN — SENNOSIDES 10 ML: 8.8 LIQUID ORAL at 08:30

## 2024-01-01 RX ADMIN — METOCLOPRAMIDE 10 MG: 5 INJECTION, SOLUTION INTRAMUSCULAR; INTRAVENOUS at 14:14

## 2024-01-01 RX ADMIN — FLUCONAZOLE 200 MG: 40 POWDER, FOR SUSPENSION ORAL at 22:08

## 2024-01-01 RX ADMIN — LIDOCAINE HYDROCHLORIDE 5 ML: 10 INJECTION, SOLUTION EPIDURAL; INFILTRATION; INTRACAUDAL; PERINEURAL at 15:13

## 2024-01-01 RX ADMIN — POTASSIUM CHLORIDE 20 MEQ: 29.8 INJECTION, SOLUTION INTRAVENOUS at 08:58

## 2024-01-01 RX ADMIN — PIPERACILLIN SODIUM AND TAZOBACTAM SODIUM 4.5 G: 4; .5 INJECTION, SOLUTION INTRAVENOUS at 15:58

## 2024-01-01 RX ADMIN — DEXMEDETOMIDINE HYDROCHLORIDE 1.2 MCG/KG/HR: 4 INJECTION, SOLUTION INTRAVENOUS at 17:18

## 2024-01-01 RX ADMIN — NOREPINEPHRINE BITARTRATE 0.02 MCG/KG/MIN: 1 INJECTION, SOLUTION, CONCENTRATE INTRAVENOUS at 06:37

## 2024-01-01 RX ADMIN — VENLAFAXINE 50 MG: 25 TABLET ORAL at 11:58

## 2024-01-01 RX ADMIN — Medication 10 ML: at 09:15

## 2024-01-01 RX ADMIN — DEXTROSE MONOHYDRATE 25 G: 25 INJECTION, SOLUTION INTRAVENOUS at 23:21

## 2024-01-01 RX ADMIN — Medication 10 ML: at 20:55

## 2024-01-01 RX ADMIN — PIPERACILLIN SODIUM AND TAZOBACTAM SODIUM 4.5 G: 4; .5 INJECTION, SOLUTION INTRAVENOUS at 10:11

## 2024-01-01 RX ADMIN — DOCUSATE SODIUM 100 MG: 50 LIQUID ORAL at 08:46

## 2024-01-01 RX ADMIN — METOCLOPRAMIDE 10 MG: 5 INJECTION, SOLUTION INTRAMUSCULAR; INTRAVENOUS at 22:10

## 2024-01-01 RX ADMIN — ONDANSETRON 4 MG: 2 INJECTION INTRAMUSCULAR; INTRAVENOUS at 18:36

## 2024-01-01 RX ADMIN — Medication 275 MCG/HR: at 13:42

## 2024-01-01 RX ADMIN — BISACODYL 10 MG: 10 SUPPOSITORY RECTAL at 12:17

## 2024-01-01 RX ADMIN — HEPARIN SODIUM 16.5 UNITS/KG/HR: 10000 INJECTION, SOLUTION INTRAVENOUS at 02:47

## 2024-01-01 RX ADMIN — 0.12% CHLORHEXIDINE GLUCONATE 15 ML: 1.2 RINSE ORAL at 20:16

## 2024-01-01 RX ADMIN — LEVOTHYROXINE SODIUM 100 MCG: 40 INJECTION, SOLUTION INTRAVENOUS at 12:02

## 2024-01-01 RX ADMIN — 0.12% CHLORHEXIDINE GLUCONATE 15 ML: 1.2 RINSE ORAL at 08:49

## 2024-01-01 RX ADMIN — PIPERACILLIN SODIUM AND TAZOBACTAM SODIUM 4.5 G: 4; .5 INJECTION, SOLUTION INTRAVENOUS at 15:41

## 2024-01-01 RX ADMIN — MORPHINE SULFATE 4 MG: 4 INJECTION, SOLUTION INTRAMUSCULAR; INTRAVENOUS at 23:04

## 2024-01-01 RX ADMIN — Medication 300 MCG/HR: at 20:15

## 2024-01-01 RX ADMIN — GLYCOPYRROLATE 0.4 MG: 0.2 INJECTION INTRAMUSCULAR; INTRAVENOUS at 12:47

## 2024-01-01 RX ADMIN — NOREPINEPHRINE BITARTRATE 0.02 MCG/KG/MIN: 1 INJECTION, SOLUTION, CONCENTRATE INTRAVENOUS at 06:33

## 2024-01-01 RX ADMIN — DEXMEDETOMIDINE HYDROCHLORIDE 1.2 MCG/KG/HR: 4 INJECTION, SOLUTION INTRAVENOUS at 20:31

## 2024-01-01 RX ADMIN — POTASSIUM CHLORIDE 20 MEQ: 29.8 INJECTION, SOLUTION INTRAVENOUS at 10:40

## 2024-01-01 RX ADMIN — HALOPERIDOL LACTATE 5 MG: 5 INJECTION, SOLUTION INTRAMUSCULAR at 14:12

## 2024-01-01 RX ADMIN — MIDAZOLAM HYDROCHLORIDE 10 MG/HR: 1 INJECTION, SOLUTION INTRAVENOUS at 17:03

## 2024-01-01 RX ADMIN — OXYCODONE AND ACETAMINOPHEN 1 TABLET: 5; 325 TABLET ORAL at 04:03

## 2024-01-01 RX ADMIN — Medication 10 ML: at 08:46

## 2024-01-01 RX ADMIN — THIAMINE HYDROCHLORIDE 200 MG: 100 INJECTION, SOLUTION INTRAMUSCULAR; INTRAVENOUS at 08:03

## 2024-01-01 RX ADMIN — ALBUTEROL SULFATE 2.5 MG: 2.5 SOLUTION RESPIRATORY (INHALATION) at 13:47

## 2024-01-01 RX ADMIN — PIPERACILLIN SODIUM AND TAZOBACTAM SODIUM 4.5 G: 4; .5 INJECTION, SOLUTION INTRAVENOUS at 11:03

## 2024-01-01 RX ADMIN — METOCLOPRAMIDE 10 MG: 5 INJECTION, SOLUTION INTRAMUSCULAR; INTRAVENOUS at 23:44

## 2024-01-01 RX ADMIN — LEVOTHYROXINE SODIUM 125 MCG: 125 TABLET ORAL at 06:58

## 2024-01-01 RX ADMIN — Medication 50 MCG/HR: at 03:37

## 2024-01-01 RX ADMIN — LEVOTHYROXINE SODIUM 125 MCG: 0.12 TABLET ORAL at 06:27

## 2024-01-01 RX ADMIN — POTASSIUM CHLORIDE 20 MEQ: 29.8 INJECTION, SOLUTION INTRAVENOUS at 11:03

## 2024-01-01 RX ADMIN — HEPARIN SODIUM 15.5 UNITS/KG/HR: 10000 INJECTION, SOLUTION INTRAVENOUS at 13:38

## 2024-01-01 RX ADMIN — 0.12% CHLORHEXIDINE GLUCONATE 15 ML: 1.2 RINSE ORAL at 20:21

## 2024-01-01 RX ADMIN — Medication 225 MCG/HR: at 05:56

## 2024-01-01 RX ADMIN — MIDAZOLAM HYDROCHLORIDE 10 MG/HR: 1 INJECTION, SOLUTION INTRAVENOUS at 22:36

## 2024-01-01 RX ADMIN — ALBUTEROL SULFATE 2.5 MG: 2.5 SOLUTION RESPIRATORY (INHALATION) at 18:43

## 2024-01-01 RX ADMIN — Medication 300 MCG/HR: at 05:08

## 2024-01-01 RX ADMIN — SENNOSIDES 10 ML: 8.8 LIQUID ORAL at 08:04

## 2024-01-01 RX ADMIN — MIDAZOLAM HYDROCHLORIDE 10 MG/HR: 1 INJECTION, SOLUTION INTRAVENOUS at 00:36

## 2024-01-01 RX ADMIN — IOPAMIDOL 90 ML: 755 INJECTION, SOLUTION INTRAVENOUS at 18:08

## 2024-01-01 RX ADMIN — THIAMINE HYDROCHLORIDE 200 MG: 100 INJECTION, SOLUTION INTRAMUSCULAR; INTRAVENOUS at 08:26

## 2024-01-01 RX ADMIN — MIDAZOLAM HYDROCHLORIDE 10 MG/HR: 1 INJECTION, SOLUTION INTRAVENOUS at 01:58

## 2024-01-01 RX ADMIN — POTASSIUM CHLORIDE 40 MEQ: 1.5 POWDER, FOR SOLUTION ORAL at 08:13

## 2024-01-01 RX ADMIN — VENLAFAXINE 50 MG: 25 TABLET ORAL at 09:16

## 2024-01-01 RX ADMIN — Medication 300 MCG/HR: at 20:38

## 2024-01-01 RX ADMIN — 0.12% CHLORHEXIDINE GLUCONATE 15 ML: 1.2 RINSE ORAL at 05:44

## 2024-01-01 RX ADMIN — DOCUSATE SODIUM 100 MG: 50 LIQUID ORAL at 20:06

## 2024-01-01 RX ADMIN — Medication 300 MCG/HR: at 04:48

## 2024-01-01 RX ADMIN — METOCLOPRAMIDE 10 MG: 5 INJECTION, SOLUTION INTRAMUSCULAR; INTRAVENOUS at 13:47

## 2024-01-01 RX ADMIN — PIPERACILLIN SODIUM AND TAZOBACTAM SODIUM 4.5 G: 4; .5 INJECTION, SOLUTION INTRAVENOUS at 08:23

## 2024-01-01 RX ADMIN — PANTOPRAZOLE SODIUM 40 MG: 40 INJECTION, POWDER, FOR SOLUTION INTRAVENOUS at 09:14

## 2024-01-01 RX ADMIN — Medication 250 MCG/HR: at 08:50

## 2024-01-01 RX ADMIN — Medication 10 ML: at 09:16

## 2024-01-01 RX ADMIN — SODIUM CHLORIDE 150 ML/HR: 9 INJECTION, SOLUTION INTRAVENOUS at 16:50

## 2024-01-01 RX ADMIN — METOCLOPRAMIDE 10 MG: 5 INJECTION, SOLUTION INTRAMUSCULAR; INTRAVENOUS at 05:40

## 2024-01-01 RX ADMIN — PIPERACILLIN SODIUM AND TAZOBACTAM SODIUM 4.5 G: 4; .5 INJECTION, SOLUTION INTRAVENOUS at 00:09

## 2024-01-01 RX ADMIN — ALBUTEROL SULFATE 2.5 MG: 2.5 SOLUTION RESPIRATORY (INHALATION) at 19:38

## 2024-01-01 RX ADMIN — PIPERACILLIN SODIUM AND TAZOBACTAM SODIUM 4.5 G: 4; .5 INJECTION, SOLUTION INTRAVENOUS at 00:08

## 2024-01-01 RX ADMIN — MIDAZOLAM HYDROCHLORIDE 10 MG/HR: 1 INJECTION, SOLUTION INTRAVENOUS at 07:12

## 2024-01-01 RX ADMIN — HEPARIN SODIUM 21 UNITS/KG/HR: 10000 INJECTION, SOLUTION INTRAVENOUS at 05:06

## 2024-01-01 RX ADMIN — METOCLOPRAMIDE 10 MG: 5 INJECTION, SOLUTION INTRAMUSCULAR; INTRAVENOUS at 22:55

## 2024-01-01 RX ADMIN — VENLAFAXINE 50 MG: 25 TABLET ORAL at 18:03

## 2024-01-01 RX ADMIN — DOCUSATE SODIUM 100 MG: 50 LIQUID ORAL at 08:04

## 2024-01-01 RX ADMIN — PROPOFOL 5 MCG/KG/MIN: 10 INJECTION, EMULSION INTRAVENOUS at 01:23

## 2024-01-01 RX ADMIN — NOREPINEPHRINE BITARTRATE 0.18 MCG/KG/MIN: 0.03 INJECTION, SOLUTION INTRAVENOUS at 12:21

## 2024-01-01 RX ADMIN — DEXMEDETOMIDINE HYDROCHLORIDE 1 MCG/KG/HR: 4 INJECTION, SOLUTION INTRAVENOUS at 06:23

## 2024-01-01 RX ADMIN — SCOPOLAMINE 1 PATCH: 1.5 PATCH, EXTENDED RELEASE TRANSDERMAL at 18:32

## 2024-01-01 RX ADMIN — METOCLOPRAMIDE 10 MG: 5 INJECTION, SOLUTION INTRAMUSCULAR; INTRAVENOUS at 15:40

## 2024-01-01 RX ADMIN — PIPERACILLIN SODIUM AND TAZOBACTAM SODIUM 4.5 G: 4; .5 INJECTION, SOLUTION INTRAVENOUS at 00:13

## 2024-01-01 RX ADMIN — DEXMEDETOMIDINE HYDROCHLORIDE 1 MCG/KG/HR: 4 INJECTION, SOLUTION INTRAVENOUS at 16:28

## 2024-01-01 RX ADMIN — DOCUSATE SODIUM 100 MG: 50 LIQUID ORAL at 08:24

## 2024-01-01 RX ADMIN — HALOPERIDOL LACTATE 5 MG: 5 INJECTION, SOLUTION INTRAMUSCULAR at 21:01

## 2024-01-01 RX ADMIN — VANCOMYCIN HYDROCHLORIDE 1750 MG: 10 INJECTION, POWDER, LYOPHILIZED, FOR SOLUTION INTRAVENOUS at 02:50

## 2024-01-01 RX ADMIN — Medication 10 ML: at 20:46

## 2024-01-01 RX ADMIN — HEPARIN 500 UNITS: 100 SYRINGE at 12:24

## 2024-01-01 RX ADMIN — HEPARIN SODIUM 21 UNITS/KG/HR: 10000 INJECTION, SOLUTION INTRAVENOUS at 17:48

## 2024-01-01 RX ADMIN — Medication 10 ML: at 09:40

## 2024-01-01 RX ADMIN — ALBUTEROL SULFATE 2.5 MG: 2.5 SOLUTION RESPIRATORY (INHALATION) at 07:18

## 2024-01-01 RX ADMIN — METOLAZONE 5 MG: 5 TABLET ORAL at 10:44

## 2024-01-01 RX ADMIN — ALBUTEROL SULFATE 2.5 MG: 2.5 SOLUTION RESPIRATORY (INHALATION) at 08:07

## 2024-01-01 RX ADMIN — ALBUTEROL SULFATE 2.5 MG: 2.5 SOLUTION RESPIRATORY (INHALATION) at 19:01

## 2024-01-01 RX ADMIN — POTASSIUM CHLORIDE 40 MEQ: 1.5 POWDER, FOR SOLUTION ORAL at 11:18

## 2024-01-01 RX ADMIN — SENNOSIDES 10 ML: 8.8 LIQUID ORAL at 08:23

## 2024-01-01 RX ADMIN — ETOMIDATE 30.3 MG: 2 INJECTION, SOLUTION INTRAVENOUS at 17:46

## 2024-01-01 RX ADMIN — SODIUM CHLORIDE 150 ML/HR: 9 INJECTION, SOLUTION INTRAVENOUS at 06:42

## 2024-01-01 RX ADMIN — Medication 300 MCG/HR: at 15:08

## 2024-01-01 RX ADMIN — ALUMINUM HYDROXIDE, MAGNESIUM HYDROXIDE, AND DIMETHICONE 15 ML: 400; 400; 40 SUSPENSION ORAL at 23:08

## 2024-01-01 RX ADMIN — FLUCONAZOLE 200 MG: 100 TABLET ORAL at 20:23

## 2024-01-01 RX ADMIN — FLUCONAZOLE 200 MG: 40 POWDER, FOR SUSPENSION ORAL at 20:23

## 2024-01-01 RX ADMIN — VENLAFAXINE 50 MG: 25 TABLET ORAL at 17:32

## 2024-01-01 RX ADMIN — THIAMINE HYDROCHLORIDE 200 MG: 100 INJECTION, SOLUTION INTRAMUSCULAR; INTRAVENOUS at 08:30

## 2024-01-01 RX ADMIN — ALBUTEROL SULFATE 2.5 MG: 2.5 SOLUTION RESPIRATORY (INHALATION) at 19:41

## 2024-01-01 RX ADMIN — Medication 10 ML: at 08:10

## 2024-01-01 RX ADMIN — METOCLOPRAMIDE 10 MG: 5 INJECTION, SOLUTION INTRAMUSCULAR; INTRAVENOUS at 22:08

## 2024-01-01 RX ADMIN — METOCLOPRAMIDE 10 MG: 5 INJECTION, SOLUTION INTRAMUSCULAR; INTRAVENOUS at 15:16

## 2024-01-01 RX ADMIN — POTASSIUM PHOSPHATE, MONOBASIC POTASSIUM PHOSPHATE, DIBASIC INJECTION, 15 MMOL: 236; 224 SOLUTION, CONCENTRATE INTRAVENOUS at 08:49

## 2024-01-01 RX ADMIN — METOCLOPRAMIDE 10 MG: 5 INJECTION, SOLUTION INTRAMUSCULAR; INTRAVENOUS at 21:56

## 2024-01-01 RX ADMIN — PANTOPRAZOLE SODIUM 40 MG: 40 INJECTION, POWDER, FOR SOLUTION INTRAVENOUS at 08:30

## 2024-01-01 RX ADMIN — METOCLOPRAMIDE 10 MG: 5 INJECTION, SOLUTION INTRAMUSCULAR; INTRAVENOUS at 06:00

## 2024-01-01 RX ADMIN — SENNOSIDES 10 ML: 8.8 LIQUID ORAL at 08:25

## 2024-01-01 RX ADMIN — FLUCONAZOLE 400 MG: 40 POWDER, FOR SUSPENSION ORAL at 00:32

## 2024-01-01 RX ADMIN — VENLAFAXINE 50 MG: 25 TABLET ORAL at 17:53

## 2024-01-01 RX ADMIN — HALOPERIDOL LACTATE 5 MG: 5 INJECTION, SOLUTION INTRAMUSCULAR at 08:30

## 2024-01-01 RX ADMIN — SODIUM CHLORIDE, POTASSIUM CHLORIDE, SODIUM LACTATE AND CALCIUM CHLORIDE 1000 ML: 600; 310; 30; 20 INJECTION, SOLUTION INTRAVENOUS at 02:53

## 2024-01-01 RX ADMIN — SODIUM CHLORIDE 1000 ML: 9 INJECTION, SOLUTION INTRAVENOUS at 23:02

## 2024-01-01 RX ADMIN — SODIUM CHLORIDE 75 ML/HR: 9 INJECTION, SOLUTION INTRAVENOUS at 04:13

## 2024-01-01 RX ADMIN — PIPERACILLIN SODIUM AND TAZOBACTAM SODIUM 4.5 G: 4; .5 INJECTION, SOLUTION INTRAVENOUS at 00:21

## 2024-01-01 RX ADMIN — 0.12% CHLORHEXIDINE GLUCONATE 15 ML: 1.2 RINSE ORAL at 21:41

## 2024-01-01 RX ADMIN — ALBUTEROL SULFATE 2.5 MG: 2.5 SOLUTION RESPIRATORY (INHALATION) at 00:43

## 2024-01-01 RX ADMIN — HEPARIN SODIUM 21 UNITS/KG/HR: 10000 INJECTION, SOLUTION INTRAVENOUS at 21:52

## 2024-01-01 RX ADMIN — MIDAZOLAM HYDROCHLORIDE 10 MG/HR: 1 INJECTION, SOLUTION INTRAVENOUS at 13:14

## 2024-01-01 RX ADMIN — POTASSIUM PHOSPHATE 15 MMOL: 236; 224 INJECTION, SOLUTION INTRAVENOUS at 00:13

## 2024-01-01 RX ADMIN — Medication 225 MCG/HR: at 19:13

## 2024-01-01 RX ADMIN — GLYCOPYRROLATE 0.4 MG: 0.2 INJECTION INTRAMUSCULAR; INTRAVENOUS at 06:02

## 2024-01-01 RX ADMIN — PIPERACILLIN SODIUM AND TAZOBACTAM SODIUM 4.5 G: 4; .5 INJECTION, SOLUTION INTRAVENOUS at 08:30

## 2024-01-01 RX ADMIN — THIAMINE HYDROCHLORIDE 200 MG: 100 INJECTION, SOLUTION INTRAMUSCULAR; INTRAVENOUS at 09:16

## 2024-01-01 RX ADMIN — POTASSIUM CHLORIDE 20 MEQ: 29.8 INJECTION, SOLUTION INTRAVENOUS at 08:32

## 2024-01-01 RX ADMIN — POLYETHYLENE GLYCOL 3350 17 G: 17 POWDER, FOR SOLUTION ORAL at 12:21

## 2024-01-01 RX ADMIN — METOCLOPRAMIDE 10 MG: 5 INJECTION, SOLUTION INTRAMUSCULAR; INTRAVENOUS at 05:56

## 2024-01-01 RX ADMIN — 0.12% CHLORHEXIDINE GLUCONATE 15 ML: 1.2 RINSE ORAL at 20:29

## 2024-01-01 RX ADMIN — PANTOPRAZOLE SODIUM 40 MG: 40 INJECTION, POWDER, FOR SOLUTION INTRAVENOUS at 08:38

## 2024-01-01 RX ADMIN — Medication 300 MCG/HR: at 23:11

## 2024-01-01 RX ADMIN — VENLAFAXINE 50 MG: 25 TABLET ORAL at 08:46

## 2024-01-01 RX ADMIN — MIDAZOLAM HYDROCHLORIDE 10 MG/HR: 1 INJECTION, SOLUTION INTRAVENOUS at 15:55

## 2024-01-01 RX ADMIN — LEVOTHYROXINE SODIUM 100 MCG: 40 INJECTION, SOLUTION INTRAVENOUS at 12:00

## 2024-01-01 RX ADMIN — MAGNESIUM SULFATE 4 G: 4 INJECTION INTRAVENOUS at 08:35

## 2024-01-01 RX ADMIN — BISACODYL 10 MG: 10 SUPPOSITORY RECTAL at 22:13

## 2024-01-01 RX ADMIN — Medication 10 ML: at 08:25

## 2024-01-01 RX ADMIN — LEVOTHYROXINE SODIUM 100 MCG: 40 INJECTION, SOLUTION INTRAVENOUS at 11:30

## 2024-01-01 RX ADMIN — Medication 225 MCG/HR: at 02:58

## 2024-01-01 RX ADMIN — DOCUSATE SODIUM 100 MG: 50 LIQUID ORAL at 08:31

## 2024-01-01 RX ADMIN — VENLAFAXINE 50 MG: 25 TABLET ORAL at 08:58

## 2024-01-01 RX ADMIN — 0.12% CHLORHEXIDINE GLUCONATE 15 ML: 1.2 RINSE ORAL at 20:23

## 2024-01-01 RX ADMIN — METOCLOPRAMIDE 10 MG: 5 INJECTION, SOLUTION INTRAMUSCULAR; INTRAVENOUS at 06:12

## 2024-01-01 RX ADMIN — ALBUTEROL SULFATE 2.5 MG: 2.5 SOLUTION RESPIRATORY (INHALATION) at 19:00

## 2024-01-01 RX ADMIN — ALBUTEROL SULFATE 2.5 MG: 2.5 SOLUTION RESPIRATORY (INHALATION) at 12:56

## 2024-01-01 RX ADMIN — NOREPINEPHRINE BITARTRATE 0.06 MCG/KG/MIN: 0.03 INJECTION, SOLUTION INTRAVENOUS at 20:06

## 2024-01-01 RX ADMIN — Medication 175 MCG/HR: at 13:47

## 2024-01-01 RX ADMIN — Medication 10 ML: at 09:00

## 2024-01-01 RX ADMIN — PANTOPRAZOLE SODIUM 40 MG: 40 INJECTION, POWDER, FOR SOLUTION INTRAVENOUS at 08:23

## 2024-01-01 RX ADMIN — MIDAZOLAM HYDROCHLORIDE 10 MG/HR: 1 INJECTION, SOLUTION INTRAVENOUS at 08:18

## 2024-01-01 RX ADMIN — MIDAZOLAM HYDROCHLORIDE 8 MG/HR: 1 INJECTION, SOLUTION INTRAVENOUS at 23:11

## 2024-01-01 RX ADMIN — SENNOSIDES 10 ML: 8.8 LIQUID ORAL at 08:38

## 2024-01-01 RX ADMIN — OXYCODONE AND ACETAMINOPHEN 1 TABLET: 5; 325 TABLET ORAL at 09:46

## 2024-01-01 RX ADMIN — ALBUTEROL SULFATE 2.5 MG: 2.5 SOLUTION RESPIRATORY (INHALATION) at 12:26

## 2024-01-01 RX ADMIN — BUMETANIDE 1 MG: 0.25 INJECTION, SOLUTION INTRAMUSCULAR; INTRAVENOUS at 12:42

## 2024-01-01 RX ADMIN — PIPERACILLIN SODIUM AND TAZOBACTAM SODIUM 4.5 G: 4; .5 INJECTION, SOLUTION INTRAVENOUS at 17:22

## 2024-01-01 RX ADMIN — HALOPERIDOL LACTATE 5 MG: 5 INJECTION, SOLUTION INTRAMUSCULAR at 07:45

## 2024-01-01 RX ADMIN — MAGNESIUM SULFATE 4 G: 4 INJECTION INTRAVENOUS at 05:58

## 2024-01-01 RX ADMIN — Medication 300 MCG/HR: at 22:37

## 2024-01-01 RX ADMIN — METOCLOPRAMIDE 10 MG: 5 INJECTION, SOLUTION INTRAMUSCULAR; INTRAVENOUS at 13:45

## 2024-01-01 RX ADMIN — 0.12% CHLORHEXIDINE GLUCONATE 15 ML: 1.2 RINSE ORAL at 08:30

## 2024-01-01 RX ADMIN — PANTOPRAZOLE SODIUM 40 MG: 40 INJECTION, POWDER, FOR SOLUTION INTRAVENOUS at 08:58

## 2024-01-01 RX ADMIN — ALBUTEROL SULFATE 2.5 MG: 2.5 SOLUTION RESPIRATORY (INHALATION) at 00:55

## 2024-01-01 RX ADMIN — NALOXEGOL OXALATE 25 MG: 25 TABLET, FILM COATED ORAL at 06:58

## 2024-01-01 RX ADMIN — LORAZEPAM 0.5 MG: 0.5 TABLET ORAL at 04:03

## 2024-01-01 RX ADMIN — KETAMINE HYDROCHLORIDE 125 MG: 100 INJECTION INTRAMUSCULAR; INTRAVENOUS at 01:11

## 2024-01-01 RX ADMIN — Medication 10 ML: at 21:41

## 2024-01-01 RX ADMIN — HEPARIN SODIUM 21 UNITS/KG/HR: 10000 INJECTION, SOLUTION INTRAVENOUS at 11:04

## 2024-01-01 RX ADMIN — HEPARIN SODIUM 21 UNITS/KG/HR: 10000 INJECTION, SOLUTION INTRAVENOUS at 16:25

## 2024-01-01 RX ADMIN — SENNOSIDES AND DOCUSATE SODIUM 2 TABLET: 8.6; 5 TABLET ORAL at 09:14

## 2024-01-01 RX ADMIN — NOREPINEPHRINE BITARTRATE 0.14 MCG/KG/MIN: 0.03 INJECTION, SOLUTION INTRAVENOUS at 04:14

## 2024-01-01 RX ADMIN — ALBUTEROL SULFATE 2.5 MG: 2.5 SOLUTION RESPIRATORY (INHALATION) at 19:35

## 2024-01-01 RX ADMIN — Medication 300 MCG/HR: at 07:01

## 2024-01-01 RX ADMIN — 0.12% CHLORHEXIDINE GLUCONATE 15 ML: 1.2 RINSE ORAL at 08:26

## 2024-01-01 RX ADMIN — MAGNESIUM SULFATE HEPTAHYDRATE 4 G: 4 INJECTION, SOLUTION INTRAVENOUS at 09:16

## 2024-01-01 RX ADMIN — SODIUM CHLORIDE 150 ML/HR: 9 INJECTION, SOLUTION INTRAVENOUS at 23:40

## 2024-01-01 RX ADMIN — LEVOTHYROXINE SODIUM 100 MCG: 40 INJECTION, SOLUTION INTRAVENOUS at 10:50

## 2024-01-01 RX ADMIN — ALBUTEROL SULFATE 2.5 MG: 2.5 SOLUTION RESPIRATORY (INHALATION) at 07:09

## 2024-01-01 RX ADMIN — ALBUTEROL SULFATE 2.5 MG: 2.5 SOLUTION RESPIRATORY (INHALATION) at 13:09

## 2024-01-01 RX ADMIN — THIAMINE HYDROCHLORIDE 200 MG: 100 INJECTION, SOLUTION INTRAMUSCULAR; INTRAVENOUS at 09:02

## 2024-01-01 RX ADMIN — POTASSIUM CHLORIDE 20 MEQ: 29.8 INJECTION, SOLUTION INTRAVENOUS at 09:16

## 2024-01-01 RX ADMIN — HEPARIN SODIUM 21 UNITS/KG/HR: 10000 INJECTION, SOLUTION INTRAVENOUS at 12:09

## 2024-01-01 RX ADMIN — PANTOPRAZOLE SODIUM 40 MG: 40 INJECTION, POWDER, FOR SOLUTION INTRAVENOUS at 08:35

## 2024-01-01 RX ADMIN — METOCLOPRAMIDE 5 MG: 5 INJECTION, SOLUTION INTRAMUSCULAR; INTRAVENOUS at 05:48

## 2024-01-01 RX ADMIN — PIPERACILLIN SODIUM AND TAZOBACTAM SODIUM 4.5 G: 4; .5 INJECTION, SOLUTION INTRAVENOUS at 01:58

## 2024-01-01 RX ADMIN — MIDAZOLAM HYDROCHLORIDE 10 MG/HR: 1 INJECTION, SOLUTION INTRAVENOUS at 16:26

## 2024-01-01 RX ADMIN — HEPARIN SODIUM 21 UNITS/KG/HR: 10000 INJECTION, SOLUTION INTRAVENOUS at 05:49

## 2024-01-01 RX ADMIN — PANTOPRAZOLE SODIUM 40 MG: 40 INJECTION, POWDER, FOR SOLUTION INTRAVENOUS at 09:16

## 2024-01-01 RX ADMIN — ALBUTEROL SULFATE 2.5 MG: 2.5 SOLUTION RESPIRATORY (INHALATION) at 13:54

## 2024-01-01 RX ADMIN — VENLAFAXINE 50 MG: 25 TABLET ORAL at 14:16

## 2024-01-01 RX ADMIN — SENNOSIDES 10 ML: 8.8 LIQUID ORAL at 20:29

## 2024-01-01 RX ADMIN — MIDAZOLAM HYDROCHLORIDE 10 MG/HR: 1 INJECTION, SOLUTION INTRAVENOUS at 12:18

## 2024-01-01 RX ADMIN — ALBUTEROL SULFATE 2.5 MG: 2.5 SOLUTION RESPIRATORY (INHALATION) at 19:34

## 2024-01-01 RX ADMIN — PANTOPRAZOLE SODIUM 40 MG: 40 INJECTION, POWDER, FOR SOLUTION INTRAVENOUS at 12:59

## 2024-01-01 RX ADMIN — METOCLOPRAMIDE 10 MG: 5 INJECTION, SOLUTION INTRAMUSCULAR; INTRAVENOUS at 14:00

## 2024-01-01 RX ADMIN — PANTOPRAZOLE SODIUM 40 MG: 40 INJECTION, POWDER, FOR SOLUTION INTRAVENOUS at 06:58

## 2024-01-01 RX ADMIN — VENLAFAXINE 50 MG: 25 TABLET ORAL at 17:58

## 2024-01-01 RX ADMIN — HEPARIN SODIUM 21 UNITS/KG/HR: 10000 INJECTION, SOLUTION INTRAVENOUS at 03:48

## 2024-01-01 RX ADMIN — 0.12% CHLORHEXIDINE GLUCONATE 15 ML: 1.2 RINSE ORAL at 08:46

## 2024-01-01 RX ADMIN — 0.12% CHLORHEXIDINE GLUCONATE 15 ML: 1.2 RINSE ORAL at 10:28

## 2024-01-01 RX ADMIN — Medication 200 MCG/HR: at 20:12

## 2024-01-01 RX ADMIN — DOCUSATE SODIUM 100 MG: 50 LIQUID ORAL at 08:35

## 2024-01-01 RX ADMIN — ALBUTEROL SULFATE 2.5 MG: 2.5 SOLUTION RESPIRATORY (INHALATION) at 01:07

## 2024-01-01 RX ADMIN — DEXTROSE MONOHYDRATE 50 ML/HR: 100 INJECTION, SOLUTION INTRAVENOUS at 00:21

## 2024-01-01 RX ADMIN — DEXMEDETOMIDINE HYDROCHLORIDE 0.7 MCG/KG/HR: 4 INJECTION, SOLUTION INTRAVENOUS at 02:44

## 2024-01-01 RX ADMIN — DEXMEDETOMIDINE HYDROCHLORIDE 0.7 MCG/KG/HR: 4 INJECTION, SOLUTION INTRAVENOUS at 10:40

## 2024-01-01 RX ADMIN — SENNOSIDES 10 ML: 8.8 LIQUID ORAL at 08:24

## 2024-01-01 RX ADMIN — HALOPERIDOL LACTATE 5 MG: 5 INJECTION, SOLUTION INTRAMUSCULAR at 04:32

## 2024-01-01 RX ADMIN — ALBUTEROL SULFATE 2.5 MG: 2.5 SOLUTION RESPIRATORY (INHALATION) at 08:31

## 2024-01-01 RX ADMIN — ALBUTEROL SULFATE 2.5 MG: 2.5 SOLUTION RESPIRATORY (INHALATION) at 07:08

## 2024-01-01 RX ADMIN — ALBUTEROL SULFATE 2.5 MG: 2.5 SOLUTION RESPIRATORY (INHALATION) at 18:47

## 2024-01-01 RX ADMIN — HEPARIN SODIUM 21 UNITS/KG/HR: 10000 INJECTION, SOLUTION INTRAVENOUS at 04:50

## 2024-01-01 RX ADMIN — LEVOTHYROXINE SODIUM 100 MCG: 40 INJECTION, SOLUTION INTRAVENOUS at 11:50

## 2024-01-01 RX ADMIN — DOCUSATE SODIUM 100 MG: 50 LIQUID ORAL at 20:28

## 2024-01-01 RX ADMIN — POTASSIUM CHLORIDE 40 MEQ: 1.5 POWDER, FOR SOLUTION ORAL at 16:08

## 2024-01-01 RX ADMIN — Medication 300 MCG/HR: at 21:32

## 2024-01-01 RX ADMIN — Medication 300 MCG/HR: at 21:12

## 2024-01-01 RX ADMIN — SCOPOLAMINE 1 PATCH: 1.5 PATCH, EXTENDED RELEASE TRANSDERMAL at 20:09

## 2024-01-01 RX ADMIN — HEPARIN SODIUM 21 UNITS/KG/HR: 10000 INJECTION, SOLUTION INTRAVENOUS at 09:02

## 2024-01-01 RX ADMIN — DEXMEDETOMIDINE HYDROCHLORIDE 1.2 MCG/KG/HR: 4 INJECTION, SOLUTION INTRAVENOUS at 14:17

## 2024-01-01 RX ADMIN — DEXMEDETOMIDINE HYDROCHLORIDE 0.7 MCG/KG/HR: 4 INJECTION, SOLUTION INTRAVENOUS at 21:39

## 2024-01-01 RX ADMIN — METOCLOPRAMIDE 10 MG: 5 INJECTION, SOLUTION INTRAMUSCULAR; INTRAVENOUS at 13:51

## 2024-01-01 RX ADMIN — PIPERACILLIN SODIUM AND TAZOBACTAM SODIUM 4.5 G: 4; .5 INJECTION, SOLUTION INTRAVENOUS at 00:45

## 2024-01-01 RX ADMIN — HALOPERIDOL LACTATE 5 MG: 5 INJECTION, SOLUTION INTRAMUSCULAR at 18:58

## 2024-01-01 RX ADMIN — THIAMINE HYDROCHLORIDE 200 MG: 100 INJECTION, SOLUTION INTRAMUSCULAR; INTRAVENOUS at 10:28

## 2024-01-01 RX ADMIN — VENLAFAXINE 50 MG: 25 TABLET ORAL at 17:03

## 2024-01-01 RX ADMIN — ALBUTEROL SULFATE 2.5 MG: 2.5 SOLUTION RESPIRATORY (INHALATION) at 06:52

## 2024-01-01 RX ADMIN — GLYCOPYRROLATE 0.4 MG: 0.2 INJECTION INTRAMUSCULAR; INTRAVENOUS at 01:57

## 2024-01-01 RX ADMIN — IPRATROPIUM BROMIDE AND ALBUTEROL SULFATE 3 ML: 2.5; .5 SOLUTION RESPIRATORY (INHALATION) at 18:02

## 2024-01-01 RX ADMIN — Medication 300 MCG/HR: at 13:30

## 2024-01-01 RX ADMIN — ALBUTEROL SULFATE 2.5 MG: 2.5 SOLUTION RESPIRATORY (INHALATION) at 13:14

## 2024-01-01 RX ADMIN — Medication 225 MCG/HR: at 17:30

## 2024-01-01 RX ADMIN — ALBUTEROL SULFATE 2.5 MG: 2.5 SOLUTION RESPIRATORY (INHALATION) at 18:51

## 2024-01-01 RX ADMIN — VENLAFAXINE 50 MG: 25 TABLET ORAL at 12:00

## 2024-01-01 RX ADMIN — IOPAMIDOL 80 ML: 755 INJECTION, SOLUTION INTRAVENOUS at 01:58

## 2024-01-01 NOTE — PLAN OF CARE
Problem: Adult Inpatient Plan of Care  Goal: Absence of Hospital-Acquired Illness or Injury  Intervention: Prevent Infection  Recent Flowsheet Documentation  Taken 12/31/2023 2200 by Jessica Lee RN  Infection Prevention: rest/sleep promoted  Taken 12/31/2023 2000 by Jessica Lee RN  Infection Prevention: rest/sleep promoted     Problem: Adult Inpatient Plan of Care  Goal: Optimal Comfort and Wellbeing  Intervention: Monitor Pain and Promote Comfort  Recent Flowsheet Documentation  Taken 12/31/2023 2041 by Jessica Lee RN  Pain Management Interventions: see MAR  Intervention: Provide Person-Centered Care  Recent Flowsheet Documentation  Taken 12/31/2023 2200 by Jessica Lee RN  Trust Relationship/Rapport:   care explained   thoughts/feelings acknowledged  Taken 12/31/2023 2000 by Jessica Lee RN  Trust Relationship/Rapport:   care explained   choices provided   reassurance provided   thoughts/feelings acknowledged   Goal Outcome Evaluation:  Pt is on her 7th day of admission with VSS and improvement with pain & nausea. Pt requested IV Dilaudid as sleep aid but was educated that med is utilized for severe pain and she would not be able to go home on it. Pt has a hx of insomnia and a better sleep regimen was discussed and agreed upon. PRN Ativan, melatonin, PERCOCET given with HS meds and encouraged to take Xanax and Flexeril prior to her natural sleep schedule. Pt states she slept well from 0000 to 0400 but woke in the worst back pain since admission. Pt agreeable to take another PERCOCET with Ativan and up in chair. Will CTM and provide care.

## 2024-01-01 NOTE — DISCHARGE SUMMARY
[facility] Medicine Services  DISCHARGE SUMMARY    Patient Name: Jessica Kraft  : 1960  MRN: [id number]    Date of Admission: 2023  3:27 PM  Date of Discharge:  24  Primary Care Physician: Leonarda Díaz MD    Consults       Date and Time Order Name Status Description    2023  7:06 PM Inpatient Palliative Care MD Consult Completed     2023  7:06 PM Inpatient Hematology & Oncology Consult Completed     2023  2:54 PM Inpatient Gynecologic Oncology Consult Completed     2023  8:11 AM Inpatient Hematology & Oncology Consult Completed     2023  2:25 AM Inpatient Gastroenterology Consult Completed             Hospital Course     Presenting Problem:     Active Hospital Problems    Diagnosis  POA    **Pancreatic cancer [C25.9]  Yes    Nausea and vomiting [R11.2]  Yes    Constipation [K59.00]  Yes    Neutrophilic leukocytosis [D72.9]  Yes    Thrombocytosis [D75.839]  Yes    GERD without esophagitis [K21.9]  Yes    Hiatal hernia [K44.9]  Yes    Ascites [R18.8]  Yes    Peritoneal carcinomatosis [C78.6]  Yes    Osteopenia [M85.80]  Yes    Insomnia [G47.00]  Yes    Anxiety [F41.9]  Yes    Hypothyroidism due to Hashimoto's thyroiditis [E03.8, E06.3]  Yes    Depression [F32.A]  Yes      Resolved Hospital Problems   No resolved problems to display.          Hospital Course:  Jessica Kraft is a 63 y.o. female w/ Hx anxiety, depression, hypothyroidism, GERD, recently diagnosed pancreatic cancer w/ peritoneal carcinomatosis and ascites (recent admit 23-12/15/23), she was planned for outpatient port placement and chemotherapy initiation; she presented early w/ inc abd pain, distention, vomiting, no BM's in several days; CT imaging showed ascites and large stool burde     Acute nausea and vomiting  Acute/chronic constipation  Recently Dx pancreatic cancer w/ omental and peritoneal carcinomatosis w/ ascites  -CT abd/pel w/ known malignancy findings, large  stool burden without obstructive pattern. KUB done 12/27 with no findings of obstruction, however NG placed 12/27 afternoon with sig relief and 500 cc out. NG out now  -s/p IR port placement and paracentesis on admission  -heme/onc consulted on admission, Dr العراقي initiating chemo, will go for 48h. She will follow-up with Dr. العراقي in 2 weeks for additional chemotherapy.   -reaction (n/v, diaphoretic, shaking) after initiating chemo 12/29 requiring atropine x2, solucortef, benadryl, and pepcid.  She was able to complete infusion  -nausea improved with IV phenergan      Discharge Follow Up Recommendations for outpatient labs/diagnostics:  Follow-up with Dr. العراقي in 2 weeks for chemo    Day of Discharge     HPI:   Patient is feeling better today. Nausea improved. Hopeful to get up and take a shower. Wants to go home.       Vital Signs:   Temp:  [96.2 °F (35.7 °C)-98.7 °F (37.1 °C)] 98.1 °F (36.7 °C)  Heart Rate:  [] 98  Resp:  [16-18] 18  BP: (116-142)/(66-86) 132/85      Physical Exam:  Constitutional: No acute distress, awake, alert  Respiratory: Clear to auscultation bilaterally, respiratory effort normal   Cardiovascular: RRR, no murmurs, rubs, or gallops  Gastrointestinal: Positive bowel sounds, soft, nontender, nondistended  Musculoskeletal: No bilateral ankle edema  Psychiatric: Appropriate affect, cooperative  Neurologic: Oriented x 3, no focal deficits      Pertinent  and/or Most Recent Results     LAB RESULTS:      Lab 12/30/23  0347 12/29/23  0629 12/28/23  0852 12/26/23  0544 12/25/23  1535   WBC 11.67* 9.73 10.80 14.29* 11.46*   HEMOGLOBIN 12.4 12.9 12.9 13.2 14.0   HEMATOCRIT 38.3 40.4 41.0 42.1 44.8   PLATELETS 321 309 315 578* 589*   NEUTROS ABS 8.79* 6.88 8.23* 11.91* 8.76*   IMMATURE GRANS (ABS) 0.26* 0.21* 0.19* 0.09* 0.14*   LYMPHS ABS 1.15 1.27 1.11 1.18 1.47   MONOS ABS 1.36* 1.21* 1.13* 0.95* 0.87   EOS ABS 0.05 0.10 0.09 0.10 0.16   MCV 82.7 86.0 86.7 87.2 87.3   LACTATE  --   --    --   --  2.0         Lab 12/30/23  0347 12/29/23  0629 12/28/23  0852 12/26/23  0544 12/25/23  1535   SODIUM 131* 131* 134* 134* 133*   POTASSIUM 4.4 4.6 4.3 4.8 3.7   CHLORIDE 95* 96* 96* 97* 94*   CO2 24.0 26.0 26.0 26.0 28.0   ANION GAP 12.0 9.0 12.0 11.0 11.0   BUN 17 17 15 15 17   CREATININE 0.68 0.67 0.61 0.70 0.71   EGFR 98.6 99.0 101.2 97.9 96.3   GLUCOSE 109* 114* 108* 125* 117*   CALCIUM 8.5* 8.2* 8.5* 8.5* 9.1   MAGNESIUM  --   --   --  1.8  --          Lab 12/30/23  0347 12/29/23  0629 12/28/23  0852 12/26/23  0544 12/25/23  1535   TOTAL PROTEIN 5.8* 5.4* 5.9* 6.2 6.8   ALBUMIN 2.8* 2.6* 2.7* 2.8* 3.2*   GLOBULIN 3.0 2.8 3.2 3.4 3.6   ALT (SGPT) 7 8 7 11 12   AST (SGOT) 11 12 11 22 15   BILIRUBIN 0.2 0.2 0.2 0.4 0.3   ALK PHOS 204* 199* 209* 224* 231*   LIPASE  --   --   --   --  12*                     Brief Urine Lab Results  (Last result in the past 365 days)        Color   Clarity   Blood   Leuk Est   Nitrite   Protein   CREAT   Urine HCG        12/25/23 1654 Yellow   Clear   Negative   Negative   Negative   Trace                 Microbiology Results (last 10 days)       ** No results found for the last 240 hours. **            XR Abdomen KUB    Result Date: 12/27/2023  XR ABDOMEN KUB Date of Exam: 12/27/2023 6:40 PM EST Indication: abdominal pain Comparison: CT abdomen pelvis 12/25/2023. Findings: Gas and stool filled large bowel in a nonobstructive pattern. Cholecystectomy clips. Left greater right pleural effusions.     Impression: Gas and stool filled colon in a nonobstructive pattern. Electronically Signed: Mansoor Shelley MD  12/27/2023 7:24 PM EST  Workstation ID: [id number]    IR Port Placement    Result Date: 12/26/2023  IR PORT PLACEMENT  History: starting chemo 12/27  : Rajat Bhandari MD.  Modality: Sonography and fluoroscopy  DOSE REDUCTION: The examination was performed according to departmental dose-optimization program which includes automated exposure control, adjustment  of the mA and/or kV. Fluoro time: 0.0 Radiation dose: 0.1 mGy air Kerma.  SEDATION: Moderate sedation was administered. 2 milligram of Versed and 100 micrograms of fentanyl IV was used for moderate sedation. Total intra service time of sedation was 20 minutes. The sedation was administered and the patient's vital signs monitored throughout the procedure and recorded in the patient's medical record by the nurse under my direct supervision.  Anesthesia: Lidocaine 1% with epinephrine, local infiltration Medicines: None      Estimated blood loss:  < 5 cc.        Technique: A thorough discussion of the risks, benefits, and alternatives of the procedure, and if applicable, moderate sedation, was carried out with the patient. They were encouraged to ask any questions. Any questions were answered. They verbalized understanding. A written informed consent was then signed.  A multi-component timeout was performed prior to starting the procedure using the departmental protocol. The procedure room personnel used personal protective equipment. The operators used sterile gloves and if indicated, sterile gowns. The surgical site was prepped with chlorhexidine gluconate  and draped in the maximal applicable sterile fashion. A preliminary ultrasonogram was performed of the neck that revealed a patent and compressible internal jugular vein. Pertinent ultrasound images were stored in the PACS for documentation. Using aseptic precautions, real-time ultrasound guidance, the internal jugular vein was accessed with a micropuncture needle after local anesthetic infiltration. A 018 guidewire was advanced into the central venous system under fluoroscopic guidance. Over the wire a micropuncture sheath was placed. Through the micropuncture sheath, a 035 wire was advanced into the venous system under fluoroscopic guidance. Over the wire a peel-away sheath was placed. After local anesthesia, using sharp and blunt dissection, a reservoir pocket  of appropriate size was created in the infra clavicular chest wall. The port catheter was connected to the port reservoir. The catheter was tunneled to the venotomy site using a  tunneling device. The the reservoir was placed in the reservoir pocket. The catheter was trimmed to an appropriate length. The catheter was advanced into the venous system through the peel-away sheath which was removed. The port aspirated and flushed well and was terminally packed with heparin 100 units per cc, total 500 units. The port reservoir was irrigated with saline. The incision was closed in layers using absorbable suture and Dermabond. The venotomy site was closed using Dermabond. An aseptic dressing was applied using the protocol for Dermabond. The patient was transferred to the recovery area and was discharged from the department in stable condition.  Complications: None immediate.    Findings: Patent and compressible internal jugular vein. Final image shows the ale catheter to be in good position with the catheter tip at the cavoatrial junction/right atrium, an excellent position for use. There is no immediate complication.      Impression:    Successful ultrasound and fluoroscopic guided right internal jugular vein route single lumen Port-A-Cath placement as described above. Thank you for the opportunity to assist in the care of your patient. Electronically Signed: Rajat Bhandari MD  12/26/2023 3:38 PM EST  Workstation ID: [id number]    US Paracentesis    Result Date: 12/26/2023  US PARACENTESIS  History: ascites/ metastatic pancreatic cancer  : Rajat Bhandari MD.  Modality: Ultrasonography                   Sedation: None. Anesthesia: Lidocaine 1% local infiltration. Estimated blood loss:  0 cc.        Technique: A thorough discussion of the risks, benefits, and alternatives of the procedure, and if applicable, moderate sedation, was carried out with the patient. They were encouraged to ask any questions. Any  questions were answered. They verbalized understanding. A written informed consent was then signed.  A multi-component timeout was performed prior to starting the procedure using the departmental protocol. The procedure room personnel used personal protective equipment. The operators used sterile gloves and if indicated, sterile gowns. The surgical site was prepped with chlorhexidine gluconate  and draped in the maximal applicable sterile fashion. A preliminary ultrasonography was performed to assess the target and determine a safe access site. It showed ascites. Pertinent ultrasound images were stored to the PACS for documentation. The access site was sterilely prepped and draped. Local anesthesia was administered. A dermatotomy was performed if needed. A catheter over the needle system was advanced into the peritoneal cavity. Straw colored fluid was aspirated and the plastic catheter advanced into the peritoneum. The catheter was then connected to a fluid recovery system. At the end of the procedure, the catheter was withdrawn and an aseptic dressing applied. The patient tolerated the procedure well. After uneventful recovery recovery, the patient was discharged from the department in stable condition. The total amount of fluid recovered is given below. Albumin was infused intravenously if ordered by the referring doctor. Complications: None immediate. Specimen: The specimen was labeled and sent to the lab in appropriate carriers & containers if such was requested by the ordering provider.     Impression:                                                              Successful ultrasound-guided paracentesis using a Right upper quadrant access with recovery of 3 liters of fluid as described above. Please note that the patient is developing septated loculated ascites. Thank you for the opportunity to assist in the care of your patient. Electronically Signed: Rajat Bhandari MD  12/26/2023 3:37 PM EST  Workstation ID:  [id number]    CT Abdomen Pelvis With Contrast    Result Date: 12/25/2023  CT ABDOMEN PELVIS W CONTRAST Date of Exam: 12/25/2023 4:31 PM EST Indication: history pancreatic cancer with constipation, N/V. Comparison: 12/7/2023 and prior Technique: Axial CT images were obtained of the abdomen and pelvis following the uneventful intravenous administration of 85 mL Isovue-300. Reconstructed coronal and sagittal images were also obtained. Automated exposure control and iterative construction methods were used. FINDINGS: Abdomen/Pelvis: Lower Chest: Small bilateral pleural effusions are noted with associated dependent opacity at the lung bases. No free air noted below the diaphragm. Organs: Patient is status post cholecystectomy. Low-attenuation foci within the liver appear grossly stable given limitations of current exam secondary to motion and streak artifact from patient's upper extremities. The kidneys, spleen and adrenal glands  are unremarkable. The pancreas demonstrates atrophy and prominence of the main duct which is poorly visualized on current study. Mass seen previous study is not as well visualized on current study. Omental and peritoneal carcinomatosis again noted. Narrowing of the celiac axis and portal vein are better seen on previous study. GI/Bowel: There is a moderate-sized hiatal hernia. The stomach demonstrates no acute abnormality. Small bowel demonstrates no obstruction. There is a heavy stool burden. No focal inflammatory changes of the GI tract noted. Moderate to large degree of ascites noted. Findings are similar to the prior study. Scattered mesenteric nodularity densities compatible adenopathy. This is similar to the previous study. Underlying mesenteric implants in the right lower quadrant again suspected Pelvis: The uterus is unremarkable. Ovaries are not well visualized. Urinary bladder is incompletely distended and grossly unremarkable Peritoneum/Retroperitoneum: The aorta is normal in caliber.  There is no suspicious retroperitoneal adenopathy Bones/Soft Tissues: No destructive bone lesion     1. Known pancreatic mass with atrophy of the distal body and tail of the pancreas and dilation of the pancreatic duct is not as well visualized due to technical limitations and artifact on the current study. 2. Peritoneal and omental carcinomatosis and ascites again noted. 3. Heavy stool burden with a nonobstructing pattern 4. Bilateral pleural effusions and dependent consolidation likely atelectasis 5. Moderate size hiatal hernia. 6. Other findings as above Electronically Signed: Jayy Lewis MD  12/25/2023 4:59 PM EST  Workstation ID: [id number]    US Paracentesis    Result Date: 12/22/2023  US PARACENTESIS Date of Exam: 12/22/2023 4:32 PM EST Indication: ascites. Peritoneal carcinomatosis Comparison: None available. Technique: The procedure, risks and options were discussed with the patient and informed written consent was obtained. Ultrasound was performed of the abdomen and a site was chosen over the right side of the abdomen. The skin was marked prepped draped and anesthetized 1% Xylocaine. A 6 Telugu catheter was inserted by trocar technique under local anesthesia. A total of 4.7 L of fluid was removed and discarded. Sterile dressings were applied and the patient was returned to the emergency department following the procedure.     Impression: Successful ultrasound-guided paracentesis with removal of 4.7 L of fluid Electronically Signed: Flakito Andino MD  12/22/2023 4:46 PM EST  Workstation ID: [id number]                 Plan for Follow-up of Pending Labs/Results:     Discharge Details        Discharge Medications        New Medications        Instructions Start Date   promethazine 25 MG tablet  Commonly known as: PHENERGAN   25 mg, Oral, Every 6 Hours PRN             Continue These Medications        Instructions Start Date   ALPRAZolam 0.5 MG tablet  Commonly known as: XANAX   0.5 mg, Oral, Nightly PRN       aspirin 81 MG chewable tablet   81 mg, Oral, Daily      cyclobenzaprine 10 MG tablet  Commonly known as: FLEXERIL   10 mg, Oral, Daily      HYDROcodone-acetaminophen 5-325 MG per tablet  Commonly known as: NORCO   Take 1-2 tablet(s) by mouth Every 8 Hours As Needed for Moderate Pain.      Levoxyl 125 MCG tablet  Generic drug: levothyroxine   125 mcg, Oral, Every Morning, On an empty stomach, do not substitute      lidocaine-prilocaine 2.5-2.5 % cream  Commonly known as: EMLA   Apply 1 application topically to the appropriate area as directed As Needed (45-60 minutes prior to port access.  Cover with saran/plastic wrap.).      ondansetron 8 MG tablet  Commonly known as: ZOFRAN   8 mg, Oral, 3 Times Daily PRN      RABEprazole 20 MG EC tablet  Commonly known as: ACIPHEX   20 mg, Oral, Daily      rosuvastatin 5 MG tablet  Commonly known as: CRESTOR   5 mg, Oral, Daily      venlafaxine  MG 24 hr capsule  Commonly known as: EFFEXOR-XR   150 mg, Oral, Daily             Stop These Medications      dicyclomine 20 MG tablet  Commonly known as: BENTYL     fluconazole 100 MG tablet  Commonly known as: Diflucan     metoclopramide 5 MG tablet  Commonly known as: REGLAN              No Known Allergies      Discharge Disposition:  Home or Self Care    Diet:  Hospital:  Diet Order   Procedures    Diet: Gastrointestinal Diets; Low Irritant; Fluid Consistency: Thin (IDDSI 0)       Diet Instructions       Diet: Regular/House Diet; Regular Texture (IDDSI 7); Thin (IDDSI 0)      Discharge Diet: Regular/House Diet    Texture: Regular Texture (IDDSI 7)    Fluid Consistency: Thin (IDDSI 0)             Activity:  Activity Instructions       Activity as Tolerated              Restrictions or Other Recommendations:         CODE STATUS:    Code Status and Medical Interventions:   Ordered at: 12/25/23 2174     Level Of Support Discussed With:    Patient     Code Status (Patient has no pulse and is not breathing):    CPR (Attempt to  Resuscitate)     Medical Interventions (Patient has pulse or is breathing):    Full Support       Future Appointments   Date Time Provider Department Center   1/2/2024 10:30 AM ACCESS AND LAB DRAW CHAIR 2  [facility]   1/2/2024 11:00 AM Sherri العراقي MD [facility]   1/16/2024  8:45 AM ACCESS AND LAB DRAW CHAIR 2  [facility]   1/16/2024  9:15 AM Sherri العراقي MD [facility]   1/16/2024 10:30 AM CHAIR 16  [facility]                 Betsey Moon MD  01/01/24      Time Spent on Discharge:  I spent  50  minutes on this discharge activity which included: face-to-face encounter with the patient, reviewing the data in the system, coordination of the care with the nursing staff as well as consultants, documentation, and entering orders.

## 2024-01-01 NOTE — CASE MANAGEMENT/SOCIAL WORK
Continued Stay Note   [place]     Patient Name: Jessica Kraft  MRN: [id number]  Today's Date: 1/1/2024    Admit Date: 12/25/2023    Plan: home   Discharge Plan       Row Name 01/01/24 0837       Plan    Plan home    Patient/Family in Agreement with Plan yes    Final Discharge Disposition Code 01 - home or self-care                   Discharge Codes    No documentation.                 Expected Discharge Date and Time       Expected Discharge Date Expected Discharge Time    Jan 1, 2024               Yesenia Lozano RN

## 2024-01-01 NOTE — PLAN OF CARE
Goal Outcome Evaluation:    Discharge education complete: medication education, activity restrictions, after care and and discharge packet sent home.

## 2024-01-02 ENCOUNTER — HOSPITAL ENCOUNTER (OUTPATIENT)
Dept: ONCOLOGY | Facility: HOSPITAL | Age: 64
End: 2024-01-02
Payer: COMMERCIAL

## 2024-01-03 ENCOUNTER — OFFICE VISIT (OUTPATIENT)
Dept: ONCOLOGY | Facility: CLINIC | Age: 64
End: 2024-01-03
Payer: COMMERCIAL

## 2024-01-03 ENCOUNTER — HOSPITAL ENCOUNTER (OUTPATIENT)
Dept: ONCOLOGY | Facility: HOSPITAL | Age: 64
End: 2024-01-03
Payer: COMMERCIAL

## 2024-01-03 ENCOUNTER — HOSPITAL ENCOUNTER (OUTPATIENT)
Dept: GENERAL RADIOLOGY | Facility: HOSPITAL | Age: 64
Discharge: HOME | End: 2024-01-03
Payer: COMMERCIAL

## 2024-01-03 ENCOUNTER — LAB (OUTPATIENT)
Dept: LAB | Facility: HOSPITAL | Age: 64
End: 2024-01-03
Payer: COMMERCIAL

## 2024-01-03 VITALS
WEIGHT: 202 LBS | HEART RATE: 113 BPM | DIASTOLIC BLOOD PRESSURE: 88 MMHG | BODY MASS INDEX: 32.47 KG/M2 | SYSTOLIC BLOOD PRESSURE: 124 MMHG | HEIGHT: 66 IN | OXYGEN SATURATION: 97 % | TEMPERATURE: 98.4 F

## 2024-01-03 DIAGNOSIS — C25.1 MALIGNANT NEOPLASM OF BODY OF PANCREAS: ICD-10-CM

## 2024-01-03 DIAGNOSIS — C25.1 MALIGNANT NEOPLASM OF BODY OF PANCREAS: Primary | ICD-10-CM

## 2024-01-03 DIAGNOSIS — C76.2 ABDOMINAL CARCINOMATOSIS: ICD-10-CM

## 2024-01-03 LAB
ANION GAP SERPL CALCULATED.3IONS-SCNC: 12 MMOL/L (ref 5–15)
BASOPHILS # BLD AUTO: 0.03 10*3/MM3 (ref 0–0.2)
BASOPHILS NFR BLD AUTO: 0.2 % (ref 0–1.5)
BUN SERPL-MCNC: 17 MG/DL (ref 8–23)
BUN/CREAT SERPL: 20.7 (ref 7–25)
CALCIUM SPEC-SCNC: 8.7 MG/DL (ref 8.6–10.5)
CHLORIDE SERPL-SCNC: 89 MMOL/L (ref 98–107)
CO2 SERPL-SCNC: 27 MMOL/L (ref 22–29)
CREAT SERPL-MCNC: 0.82 MG/DL (ref 0.57–1)
DEPRECATED RDW RBC AUTO: 47.4 FL (ref 37–54)
EGFRCR SERPLBLD CKD-EPI 2021: 81 ML/MIN/1.73
EOSINOPHIL # BLD AUTO: 0.25 10*3/MM3 (ref 0–0.4)
EOSINOPHIL NFR BLD AUTO: 2 % (ref 0.3–6.2)
ERYTHROCYTE [DISTWIDTH] IN BLOOD BY AUTOMATED COUNT: 15.1 % (ref 12.3–15.4)
GLUCOSE SERPL-MCNC: 139 MG/DL (ref 65–99)
HCT VFR BLD AUTO: 41.3 % (ref 34–46.6)
HGB BLD-MCNC: 13.2 G/DL (ref 12–15.9)
IMM GRANULOCYTES # BLD AUTO: 0.07 10*3/MM3 (ref 0–0.05)
IMM GRANULOCYTES NFR BLD AUTO: 0.6 % (ref 0–0.5)
LYMPHOCYTES # BLD AUTO: 0.96 10*3/MM3 (ref 0.7–3.1)
LYMPHOCYTES NFR BLD AUTO: 7.9 % (ref 19.6–45.3)
MCH RBC QN AUTO: 27.2 PG (ref 26.6–33)
MCHC RBC AUTO-ENTMCNC: 32 G/DL (ref 31.5–35.7)
MCV RBC AUTO: 85.2 FL (ref 79–97)
MONOCYTES # BLD AUTO: 0.17 10*3/MM3 (ref 0.1–0.9)
MONOCYTES NFR BLD AUTO: 1.4 % (ref 5–12)
NEUTROPHILS NFR BLD AUTO: 10.74 10*3/MM3 (ref 1.7–7)
NEUTROPHILS NFR BLD AUTO: 87.9 % (ref 42.7–76)
PLATELET # BLD AUTO: 191 10*3/MM3 (ref 140–450)
PMV BLD AUTO: 9.1 FL (ref 6–12)
POTASSIUM SERPL-SCNC: 4.8 MMOL/L (ref 3.5–5.2)
RBC # BLD AUTO: 4.85 10*6/MM3 (ref 3.77–5.28)
SODIUM SERPL-SCNC: 128 MMOL/L (ref 136–145)
WBC NRBC COR # BLD AUTO: 12.22 10*3/MM3 (ref 3.4–10.8)

## 2024-01-03 PROCEDURE — 80048 BASIC METABOLIC PNL TOTAL CA: CPT

## 2024-01-03 PROCEDURE — 74022 RADEX COMPL AQT ABD SERIES: CPT

## 2024-01-03 PROCEDURE — 85025 COMPLETE CBC W/AUTO DIFF WBC: CPT

## 2024-01-03 PROCEDURE — 36415 COLL VENOUS BLD VENIPUNCTURE: CPT

## 2024-01-03 RX ORDER — ONDANSETRON HYDROCHLORIDE 8 MG/1
8 TABLET, FILM COATED ORAL 3 TIMES DAILY PRN
Qty: 30 TABLET | Refills: 5 | Status: SHIPPED | OUTPATIENT
Start: 2024-01-03

## 2024-01-03 NOTE — PATIENT INSTRUCTIONS
Nausea:  Schedule zofran three times per day. Take phenergan up to 3 x per day in between as needed for constipation.     Constipation:  -Enema  -Sennakot-S (senna+docusate combo): 2 tablets twice daily   -Miralax:  17 grams in 8 ounces of fluid once a day.

## 2024-01-03 NOTE — PROGRESS NOTES
[facility]  Office number [phone]    Fax number [phone]     Follow up     Date: 2024     Patient Name: Jessica Kraft  MRN: [id number]  : 1960      Chief Complaint: Abdominal carcinomatosis    Cancer Staging: IV    History of Present Illness: Jessica Kraft is a pleasant 63 y.o. female who presents today for follow up.     Her oncologic history is as follows:  She was admitted to [facility] 2023 with progressive abdominal pain and distention and with findings concerning for metastatic malignancy.      She endorses a several month history of gradually worsening abdominal pain and bloating associated with early satiety and weight loss. She endorses back pain worse when lying flat. No fatigue. No cough.  She ultimately developed painful constipation prompting ER evaluation.  She was seen in the ER on  and then returned on 2023.     CT chest/abdomen/pelvis showed a mass involving the mid body of the pancreas spanning 2.4 cm concerning for pancreatic adenocarcinoma.  There was narrowing of the portal vein with encasement of the celiac axis.  Peritoneal and omental carcinomatosis and large volume ascites.  Small indeterminate liver lesions.  Nodularity adjacent to the ovary.  Small pulmonary nodules up to 3 mm.  Bilateral pleural effusions.     She underwent a diagnostic and therapeutic paracentesis. Cytology results showed malignant ascites that was CK7, CK20, and CDX2 positive and TTF-1, PAX8 and GATA3 negative, with upper GI/pancreatobiliary source favored.  She underwent EGD and pancreatic EUS with biopsy 2023.  This noted reflux esophagitis and a large hiatal hernia with retained food in the stomach.  EUS was notable for an irregular, hypoechoic mass in the pancreatic body with extension to the pancreatic neck which spanned 2.6 cm with ill-defined borders.  There was ductal dilation up to 8 mm.  FNA was performed but was limited  due to large hiatal hernia and intervening blood vessels limiting windows.  Pathology was nondiagnostic.    Component      Latest Ref Rng 12/8/2023   CEA      ng/mL 5.75    CA 15-3      <=25.0 U/mL 21.5    CA 27.29      0.0 - 38.6 U/mL 28.5    CA 19-9      <=35.0 U/mL 6.7          0.0 - 38.1 U/mL 323.0 (H)        She was assessed by GYN oncology. Pelvic ultrasound did not confirm an adnexal mass.  Case was discussed with GI, GYN oncology, and pathology.  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  Her pancreatic biopsy was felt to be nondiagnostic because of size of a large hiatal hernia which precluded deeper tissue sampling.      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      Treatment History:   FOLFIRINOX cycle 1 12/29/23    Interval history:   Jessica is here today for follow-up in the company of her supportive .  She reports that her last bowel movement was Wednesday.  She took MiraLAX 2 days ago, but otherwise has not taken any laxatives or stool softeners since hospital discharge.  She had inadvertently discontinued her PPI but reports that she does have this available to  at the pharmacy today.  She has had nausea with a small amount of emesis multiple times on Friday during her infusion and reports persistent nausea since but no further emesis.  She endorses increased abdominal pain.  Cannot find zofran tablets so she has not been taking anything for nausea. She has been using Xanax to treat insomnia.  Drinking plenty of fluids. Low appetite, early satiety  She is using hydrocodone with partial control of bandlike abdominal pain.     Past Medical History:   Past Medical History:   Diagnosis Date    Abscess     Anxiety     Bilateral ovarian cysts     Depression     Encounter for routine gynecological examination     Foot pain     H/O bone density study 06/2014    H/O mammogram 07/2016    [facility]    Hot flashes,  menopausal     Hypothyroidism due to Hashimoto's thyroiditis     Insomnia     Miscarriage     x3    Osteopenia     Pap smear for cervical cancer screening 06/2014    Routine general medical examination at a health care facility     Urinary incontinence     Urinary tract infection     Vitamin D deficiency        Past Surgical History:   Past Surgical History:   Procedure Laterality Date    ABDOMINOPLASTY  2001    CHOLECYSTECTOMY  2008    COLONOSCOPY  07/2012    [facility] Gaines in [facility] normal    ECTOPIC PREGNANCY SURGERY Left 1994    ENDOSCOPY N/A 12/13/2023    Procedure: ESOPHAGOGASTRODUODENOSCOPY;  Surgeon: Flakito Abrams MD;  Location: [facility]COPY;  Service: Gastroenterology;  Laterality: N/A;    HERNIA REPAIR  2010       Family History:   Family History   Problem Relation Age of Onset    Mental illness Mother     Depression Mother     Thyroid disease Mother     Hypertension Father     Thyroid disease Sister     Breast cancer Other     Breast cancer Maternal Aunt        Social History:   Social History     Socioeconomic History    Marital status:    Tobacco Use    Smoking status: Never    Smokeless tobacco: Never   Vaping Use    Vaping Use: Never used   Substance and Sexual Activity    Alcohol use: No    Drug use: No    Sexual activity: Yes     Partners: Male     Birth control/protection: None, Post-menopausal     Comment:        Medications:     Current Outpatient Medications:     ALPRAZolam (XANAX) 0.5 MG tablet, Take 1 tablet by mouth At Night As Needed for Anxiety., Disp: , Rfl:     aspirin 81 MG chewable tablet, Chew 1 tablet Daily., Disp: , Rfl:     cyclobenzaprine (FLEXERIL) 10 MG tablet, Take 1 tablet by mouth Daily., Disp: , Rfl:     HYDROcodone-acetaminophen (NORCO) 5-325 MG per tablet, Take 1-2 tablet(s) by mouth Every 8 Hours As Needed for Moderate Pain., Disp: 30 tablet, Rfl: 0    LEVOXYL 125 MCG tablet, Take 1 tablet by mouth Every Morning. On an empty stomach, do not substitute,  "Disp: 90 tablet, Rfl: 1    lidocaine-prilocaine (EMLA) 2.5-2.5 % cream, Apply 1 application topically to the appropriate area as directed As Needed (45-60 minutes prior to port access.  Cover with saran/plastic wrap.)., Disp: 30 g, Rfl: 3    ondansetron (ZOFRAN) 8 MG tablet, Take 1 tablet by mouth 3 (Three) Times a Day As Needed for Nausea or Vomiting., Disp: 30 tablet, Rfl: 5    promethazine (PHENERGAN) 25 MG tablet, Take 1 tablet by mouth Every 6 (Six) Hours As Needed for Nausea or Vomiting for up to 30 days., Disp: 30 tablet, Rfl: 0    RABEprazole (ACIPHEX) 20 MG EC tablet, Take 1 tablet by mouth Daily., Disp: , Rfl:     rosuvastatin (CRESTOR) 5 MG tablet, Take 1 tablet by mouth Daily., Disp: , Rfl:     venlafaxine XR (EFFEXOR-XR) 150 MG 24 hr capsule, Take 1 capsule by mouth Daily., Disp: , Rfl:     Allergies:   No Known Allergies    Objective     Vital Signs:   Vitals:    01/03/24 1418   BP: 124/88   Pulse: 113   Temp: 98.4 °F (36.9 °C)   TempSrc: Temporal   SpO2: 97%   Weight: 91.6 kg (202 lb)   Height: 167.6 cm (65.98\")   PainSc: 0-No pain    Body mass index is 32.62 kg/m².   Pain Score    01/03/24 1418   PainSc: 0-No pain       ECOG Performance Status: 2    Physical Exam:   General: Uncomfortable appearing but in no acute respiratory distress  HEENT: Normocephalic, atraumatic. Sclera anicteric.   Neck: supple, no adenopathy.   Cardiovascular: regular rate and rhythm. No murmurs.   Respiratory: Normal rate. Clear to auscultation bilaterally  Abdomen: Distended with hypoactive bowel sounds  Lymph: no cervical, supraclavicular or axillary adenopathy  Neuro: Alert and oriented x 3. No focal deficits.   Ext: Symmetric, trace edema  Psych: Euthymic      Laboratory/Imaging Reviewed:   Lab on 01/03/2024   Component Date Value Ref Range Status    Glucose 01/03/2024 139 (H)  65 - 99 mg/dL Final    BUN 01/03/2024 17  8 - 23 mg/dL Final    Creatinine 01/03/2024 0.82  0.57 - 1.00 mg/dL Final    Sodium 01/03/2024 128 (L)  " 136 - 145 mmol/L Final    Potassium 01/03/2024 4.8  3.5 - 5.2 mmol/L Final    Chloride 01/03/2024 89 (L)  98 - 107 mmol/L Final    CO2 01/03/2024 27.0  22.0 - 29.0 mmol/L Final    Calcium 01/03/2024 8.7  8.6 - 10.5 mg/dL Final    BUN/Creatinine Ratio 01/03/2024 20.7  7.0 - 25.0 Final    Anion Gap 01/03/2024 12.0  5.0 - 15.0 mmol/L Final    eGFR 01/03/2024 81.0  >60.0 mL/min/1.73 Final    WBC 01/03/2024 12.22 (H)  3.40 - 10.80 10*3/mm3 Final    RBC 01/03/2024 4.85  3.77 - 5.28 10*6/mm3 Final    Hemoglobin 01/03/2024 13.2  12.0 - 15.9 g/dL Final    Hematocrit 01/03/2024 41.3  34.0 - 46.6 % Final    MCV 01/03/2024 85.2  79.0 - 97.0 fL Final    MCH 01/03/2024 27.2  26.6 - 33.0 pg Final    MCHC 01/03/2024 32.0  31.5 - 35.7 g/dL Final    RDW 01/03/2024 15.1  12.3 - 15.4 % Final    RDW-SD 01/03/2024 47.4  37.0 - 54.0 fl Final    MPV 01/03/2024 9.1  6.0 - 12.0 fL Final    Platelets 01/03/2024 191  140 - 450 10*3/mm3 Final    Neutrophil % 01/03/2024 87.9 (H)  42.7 - 76.0 % Final    Lymphocyte % 01/03/2024 7.9 (L)  19.6 - 45.3 % Final    Monocyte % 01/03/2024 1.4 (L)  5.0 - 12.0 % Final    Eosinophil % 01/03/2024 2.0  0.3 - 6.2 % Final    Basophil % 01/03/2024 0.2  0.0 - 1.5 % Final    Immature Grans % 01/03/2024 0.6 (H)  0.0 - 0.5 % Final    Neutrophils, Absolute 01/03/2024 10.74 (H)  1.70 - 7.00 10*3/mm3 Final    Lymphocytes, Absolute 01/03/2024 0.96  0.70 - 3.10 10*3/mm3 Final    Monocytes, Absolute 01/03/2024 0.17  0.10 - 0.90 10*3/mm3 Final    Eosinophils, Absolute 01/03/2024 0.25  0.00 - 0.40 10*3/mm3 Final    Basophils, Absolute 01/03/2024 0.03  0.00 - 0.20 10*3/mm3 Final    Immature Grans, Absolute 01/03/2024 0.07 (H)  0.00 - 0.05 10*3/mm3 Final   No results displayed because visit has over 200 results.      Admission on 12/22/2023, Discharged on 12/22/2023   Component Date Value Ref Range Status    Glucose 12/22/2023 122 (H)  65 - 99 mg/dL Final    BUN 12/22/2023 13  8 - 23 mg/dL Final    Creatinine 12/22/2023 0.72   0.57 - 1.00 mg/dL Final    Sodium 12/22/2023 133 (L)  136 - 145 mmol/L Final    Potassium 12/22/2023 4.1  3.5 - 5.2 mmol/L Final    Chloride 12/22/2023 96 (L)  98 - 107 mmol/L Final    CO2 12/22/2023 26.0  22.0 - 29.0 mmol/L Final    Calcium 12/22/2023 8.8  8.6 - 10.5 mg/dL Final    Total Protein 12/22/2023 6.8  6.0 - 8.5 g/dL Final    Albumin 12/22/2023 3.1 (L)  3.5 - 5.2 g/dL Final    ALT (SGPT) 12/22/2023 18  1 - 33 U/L Final    AST (SGOT) 12/22/2023 27  1 - 32 U/L Final    Alkaline Phosphatase 12/22/2023 218 (H)  39 - 117 U/L Final    Total Bilirubin 12/22/2023 0.3  0.0 - 1.2 mg/dL Final    Globulin 12/22/2023 3.7  gm/dL Final    Calculated Result    A/G Ratio 12/22/2023 0.8  g/dL Final    BUN/Creatinine Ratio 12/22/2023 18.1  7.0 - 25.0 Final    Anion Gap 12/22/2023 11.0  5.0 - 15.0 mmol/L Final    eGFR 12/22/2023 94.7  >60.0 mL/min/1.73 Final    Lipase 12/22/2023 13  13 - 60 U/L Final    Color, UA 12/22/2023 Dark Yellow (A)  Yellow, Straw Final    Appearance, UA 12/22/2023 Cloudy (A)  Clear Final    pH, UA 12/22/2023 5.5  5.0 - 8.0 Final    Specific Gravity, UA 12/22/2023 1.036 (H)  1.001 - 1.030 Final    Glucose, UA 12/22/2023 Negative  Negative Final    Ketones, UA 12/22/2023 Trace (A)  Negative Final    Bilirubin, UA 12/22/2023 Negative  Negative Final    Blood, UA 12/22/2023 Negative  Negative Final    Protein, UA 12/22/2023 Trace (A)  Negative Final    Leuk Esterase, UA 12/22/2023 Negative  Negative Final    Nitrite, UA 12/22/2023 Negative  Negative Final    Urobilinogen, UA 12/22/2023 1.0 E.U./dL  0.2 - 1.0 E.U./dL Final    Lactate 12/22/2023 1.4  0.5 - 2.0 mmol/L Final    Falsely depressed results may occur on samples drawn from patients receiving N-Acetylcysteine (NAC) or Metamizole.    Extra Tube 12/22/2023 Hold for add-ons.   Final    Auto resulted.    Extra Tube 12/22/2023 hold for add-on   Final    Auto resulted    Extra Tube 12/22/2023 Hold for add-ons.   Final    Auto resulted.    Extra Tube  12/22/2023 Hold for add-ons.   Final    Auto resulted.    Extra Tube 12/22/2023 Hold for add-ons.   Final    Auto resulted    WBC 12/22/2023 13.80 (H)  3.40 - 10.80 10*3/mm3 Final    RBC 12/22/2023 4.78  3.77 - 5.28 10*6/mm3 Final    Hemoglobin 12/22/2023 13.3  12.0 - 15.9 g/dL Final    Hematocrit 12/22/2023 41.9  34.0 - 46.6 % Final    MCV 12/22/2023 87.7  79.0 - 97.0 fL Final    MCH 12/22/2023 27.8  26.6 - 33.0 pg Final    MCHC 12/22/2023 31.7  31.5 - 35.7 g/dL Final    RDW 12/22/2023 15.3  12.3 - 15.4 % Final    RDW-SD 12/22/2023 48.3  37.0 - 54.0 fl Final    MPV 12/22/2023 9.5  6.0 - 12.0 fL Final    Platelets 12/22/2023 476 (H)  140 - 450 10*3/mm3 Final    Neutrophil % 12/22/2023 80.0 (H)  42.7 - 76.0 % Final    Lymphocyte % 12/22/2023 8.9 (L)  19.6 - 45.3 % Final    Monocyte % 12/22/2023 7.0  5.0 - 12.0 % Final    Eosinophil % 12/22/2023 2.5  0.3 - 6.2 % Final    Basophil % 12/22/2023 0.5  0.0 - 1.5 % Final    Immature Grans % 12/22/2023 1.1 (H)  0.0 - 0.5 % Final    Neutrophils, Absolute 12/22/2023 11.05 (H)  1.70 - 7.00 10*3/mm3 Final    Lymphocytes, Absolute 12/22/2023 1.23  0.70 - 3.10 10*3/mm3 Final    Monocytes, Absolute 12/22/2023 0.96 (H)  0.10 - 0.90 10*3/mm3 Final    Eosinophils, Absolute 12/22/2023 0.34  0.00 - 0.40 10*3/mm3 Final    Basophils, Absolute 12/22/2023 0.07  0.00 - 0.20 10*3/mm3 Final    Immature Grans, Absolute 12/22/2023 0.15 (H)  0.00 - 0.05 10*3/mm3 Final    nRBC 12/22/2023 0.0  0.0 - 0.2 /100 WBC Final    Protime 12/22/2023 13.5  12.2 - 14.5 Seconds Final    INR 12/22/2023 1.02  0.89 - 1.12 Final    PTT 12/22/2023 29.1 (L)  60.0 - 90.0 seconds Final    Reference Lab Report 12/22/2023    Final                    Value:[facility]  [address] [place] [address]  Phone: [phone] or [phone]  Fax: [phone]  John Wallace M.D., Medical Director    PATIENT NAME                                 LABORATORY NO.  [id number]   MICHAEL BUSH                               [id number]  [id number]                                     AGE                SEX     SSN            CLIENT REF #  [facility]ERRA [facility]                       63       1960   F       [id number]    [id number]  [address]                          REQUESTING M.D.       ATTENDING M.D..        [place] [address]                            ODALYS JAIN  DATE COLLECTED        DATE RECEIVED          DATE REPORTED  2023    DIAGNOSIS:  PERITONEAL FLUID:  Negative for malignant cells.    MICROSCOPIC DESCRIPTION:  Scattered benign mesothelial cells and chronic inflammatory cells are present  within a serosanguineous                           background.    Professional interpretation rendered by Guicho Serna M.D., F.C.A.P. at [facility], [address] [place] [address], [place] [address].    CLINICAL HISTORY:  Malignant neoplasm of body of pancreas  Abdominal carcinomatosis  Abdominal distention  Malignant ascites    SPECIMENS SUBMITTED:  PERITONEAL FLUID    GROSS SPECIMEN DESCRIPTION:  30cc of yellow, cloudy fluid with sediment in fixative  Cell block has been examined.    CYTOTECHNOLOGIST:         REBECCA NAGY (ASCP)                       REVIEWED, DIAGNOSED AND ELECTRONICALLY  SIGNED BY:    Guicho Serna M.D., F.C.A.P.  CPT CODES:  60426, 10149      RBC, UA 2023 3-5 (A)  None Seen, 0-2 /HPF Final    WBC, UA 2023 6-10 (A)  None Seen, 0-2 /HPF Final    Bacteria, UA 2023 None Seen  None Seen, Trace /HPF Final    Squamous Epithelial Cells, UA 2023 13-20 (A)  None Seen, 0-2 /HPF Final    Hyaline Casts, UA 2023 0-6  0 - 6 /LPF Final    Calcium Oxalate Crystals, UA 2023 Large/3+  None Seen /HPF Final    Mucus, UA 2023 Moderate/2+ (A)  None Seen, Trace /HPF Final    Methodology 2023 Manual Light Microscopy   Final       XR Abdomen KUB    Result Date: 2023  Narrative: XR  ABDOMEN KUB Date of Exam: 12/27/2023 6:40 PM EST Indication: abdominal pain Comparison: CT abdomen pelvis 12/25/2023. Findings: Gas and stool filled large bowel in a nonobstructive pattern. Cholecystectomy clips. Left greater right pleural effusions.     Impression: Impression: Gas and stool filled colon in a nonobstructive pattern. Electronically Signed: Mansoor Shelley MD  12/27/2023 7:24 PM EST  Workstation ID: [id number]    IR Port Placement    Result Date: 12/26/2023  Narrative: IR PORT PLACEMENT  History: starting chemo 12/27  : Rajat Bhandari MD.  Modality: Sonography and fluoroscopy  DOSE REDUCTION: The examination was performed according to departmental dose-optimization program which includes automated exposure control, adjustment of the mA and/or kV. Fluoro time: 0.0 Radiation dose: 0.1 mGy air Kerma.  SEDATION: Moderate sedation was administered. 2 milligram of Versed and 100 micrograms of fentanyl IV was used for moderate sedation. Total intra service time of sedation was 20 minutes. The sedation was administered and the patient's vital signs monitored throughout the procedure and recorded in the patient's medical record by the nurse under my direct supervision.  Anesthesia: Lidocaine 1% with epinephrine, local infiltration Medicines: None      Estimated blood loss:  < 5 cc.        Technique: A thorough discussion of the risks, benefits, and alternatives of the procedure, and if applicable, moderate sedation, was carried out with the patient. They were encouraged to ask any questions. Any questions were answered. They verbalized understanding. A written informed consent was then signed.  A multi-component timeout was performed prior to starting the procedure using the departmental protocol. The procedure room personnel used personal protective equipment. The operators used sterile gloves and if indicated, sterile gowns. The surgical site was prepped with chlorhexidine gluconate  and draped  in the maximal applicable sterile fashion. A preliminary ultrasonogram was performed of the neck that revealed a patent and compressible internal jugular vein. Pertinent ultrasound images were stored in the PACS for documentation. Using aseptic precautions, real-time ultrasound guidance, the internal jugular vein was accessed with a micropuncture needle after local anesthetic infiltration. A 018 guidewire was advanced into the central venous system under fluoroscopic guidance. Over the wire a micropuncture sheath was placed. Through the micropuncture sheath, a 035 wire was advanced into the venous system under fluoroscopic guidance. Over the wire a peel-away sheath was placed. After local anesthesia, using sharp and blunt dissection, a reservoir pocket of appropriate size was created in the infra clavicular chest wall. The port catheter was connected to the port reservoir. The catheter was tunneled to the venotomy site using a  tunneling device. The the reservoir was placed in the reservoir pocket. The catheter was trimmed to an appropriate length. The catheter was advanced into the venous system through the peel-away sheath which was removed. The port aspirated and flushed well and was terminally packed with heparin 100 units per cc, total 500 units. The port reservoir was irrigated with saline. The incision was closed in layers using absorbable suture and Dermabond. The venotomy site was closed using Dermabond. An aseptic dressing was applied using the protocol for Dermabond. The patient was transferred to the recovery area and was discharged from the department in stable condition.  Complications: None immediate.    Findings: Patent and compressible internal jugular vein. Final image shows the ale catheter to be in good position with the catheter tip at the cavoatrial junction/right atrium, an excellent position for use. There is no immediate complication.      Impression: Impression:    Successful ultrasound  and fluoroscopic guided right internal jugular vein route single lumen Port-A-Cath placement as described above. Thank you for the opportunity to assist in the care of your patient. Electronically Signed: Rajat Bhandari MD  12/26/2023 3:38 PM EST  Workstation ID: [id number]    US Paracentesis    Result Date: 12/26/2023  Narrative: US PARACENTESIS  History: ascites/ metastatic pancreatic cancer  : Rajat Bhandari MD.  Modality: Ultrasonography                   Sedation: None. Anesthesia: Lidocaine 1% local infiltration. Estimated blood loss:  0 cc.        Technique: A thorough discussion of the risks, benefits, and alternatives of the procedure, and if applicable, moderate sedation, was carried out with the patient. They were encouraged to ask any questions. Any questions were answered. They verbalized understanding. A written informed consent was then signed.  A multi-component timeout was performed prior to starting the procedure using the departmental protocol. The procedure room personnel used personal protective equipment. The operators used sterile gloves and if indicated, sterile gowns. The surgical site was prepped with chlorhexidine gluconate  and draped in the maximal applicable sterile fashion. A preliminary ultrasonography was performed to assess the target and determine a safe access site. It showed ascites. Pertinent ultrasound images were stored to the PACS for documentation. The access site was sterilely prepped and draped. Local anesthesia was administered. A dermatotomy was performed if needed. A catheter over the needle system was advanced into the peritoneal cavity. Straw colored fluid was aspirated and the plastic catheter advanced into the peritoneum. The catheter was then connected to a fluid recovery system. At the end of the procedure, the catheter was withdrawn and an aseptic dressing applied. The patient tolerated the procedure well. After uneventful recovery recovery, the  patient was discharged from the department in stable condition. The total amount of fluid recovered is given below. Albumin was infused intravenously if ordered by the referring doctor. Complications: None immediate. Specimen: The specimen was labeled and sent to the lab in appropriate carriers & containers if such was requested by the ordering provider.     Impression: Impression:                                                              Successful ultrasound-guided paracentesis using a Right upper quadrant access with recovery of 3 liters of fluid as described above. Please note that the patient is developing septated loculated ascites. Thank you for the opportunity to assist in the care of your patient. Electronically Signed: Rajat Bhandari MD  12/26/2023 3:37 PM EST  Workstation ID: [id number]    CT Abdomen Pelvis With Contrast    Result Date: 12/25/2023  Narrative: CT ABDOMEN PELVIS W CONTRAST Date of Exam: 12/25/2023 4:31 PM EST Indication: history pancreatic cancer with constipation, N/V. Comparison: 12/7/2023 and prior Technique: Axial CT images were obtained of the abdomen and pelvis following the uneventful intravenous administration of 85 mL Isovue-300. Reconstructed coronal and sagittal images were also obtained. Automated exposure control and iterative construction methods were used. FINDINGS: Abdomen/Pelvis: Lower Chest: Small bilateral pleural effusions are noted with associated dependent opacity at the lung bases. No free air noted below the diaphragm. Organs: Patient is status post cholecystectomy. Low-attenuation foci within the liver appear grossly stable given limitations of current exam secondary to motion and streak artifact from patient's upper extremities. The kidneys, spleen and adrenal glands  are unremarkable. The pancreas demonstrates atrophy and prominence of the main duct which is poorly visualized on current study. Mass seen previous study is not as well visualized on current study.  Omental and peritoneal carcinomatosis again noted. Narrowing of the celiac axis and portal vein are better seen on previous study. GI/Bowel: There is a moderate-sized hiatal hernia. The stomach demonstrates no acute abnormality. Small bowel demonstrates no obstruction. There is a heavy stool burden. No focal inflammatory changes of the GI tract noted. Moderate to large degree of ascites noted. Findings are similar to the prior study. Scattered mesenteric nodularity densities compatible adenopathy. This is similar to the previous study. Underlying mesenteric implants in the right lower quadrant again suspected Pelvis: The uterus is unremarkable. Ovaries are not well visualized. Urinary bladder is incompletely distended and grossly unremarkable Peritoneum/Retroperitoneum: The aorta is normal in caliber. There is no suspicious retroperitoneal adenopathy Bones/Soft Tissues: No destructive bone lesion     Impression: 1. Known pancreatic mass with atrophy of the distal body and tail of the pancreas and dilation of the pancreatic duct is not as well visualized due to technical limitations and artifact on the current study. 2. Peritoneal and omental carcinomatosis and ascites again noted. 3. Heavy stool burden with a nonobstructing pattern 4. Bilateral pleural effusions and dependent consolidation likely atelectasis 5. Moderate size hiatal hernia. 6. Other findings as above Electronically Signed: Jayy Lewis MD  12/25/2023 4:59 PM EST  Workstation ID: [id number]    US Paracentesis    Result Date: 12/22/2023  Narrative: US PARACENTESIS Date of Exam: 12/22/2023 4:32 PM EST Indication: ascites. Peritoneal carcinomatosis Comparison: None available. Technique: The procedure, risks and options were discussed with the patient and informed written consent was obtained. Ultrasound was performed of the abdomen and a site was chosen over the right side of the abdomen. The skin was marked prepped draped and anesthetized 1%  Xylocaine. A 6 Chinese catheter was inserted by trocar technique under local anesthesia. A total of 4.7 L of fluid was removed and discarded. Sterile dressings were applied and the patient was returned to the emergency department following the procedure.     Impression: Impression: Successful ultrasound-guided paracentesis with removal of 4.7 L of fluid Electronically Signed: Flakito Andino MD  12/22/2023 4:46 PM EST  Workstation ID: [id number]    CT Abdomen Pelvis With Contrast    Addendum Date: 12/19/2023 Addendum:   ADDENDUM #1 The exam was compared to a CT abdomen pelvis dated 12/7/2023 which has improved contrast opacification. At the body of the pancreas at the level of the abrupt termination of the main pancreatic duct dilatation there is evidence of a pancreatic mass measuring approximately 2.4 x 2.3 cm with soft tissue extending to the level of the celiac axis bifurcation encasing the splenic artery and common hepatic artery. There is also a short segment of narrowing of the portal vein . Additional characterization with MRI of the abdomen with and without contrast recommended Electronically Signed: Leighton Spivey MD  12/19/2023 10:25 AM EST  Workstation ID: [id number] ORIGINAL REPORT: CT ABDOMEN PELVIS W CONTRAST Date of Exam: 12/6/2023 12:36 PM EST Indication: abd pain and distension. Comparison: CT abdomen pelvis dated 11/10/2023 Technique: Axial CT images were obtained of the abdomen and pelvis following the uneventful intravenous administration of 83 mL Isovue-300. Reconstructed coronal and sagittal images were also obtained. Automated exposure control and iterative construction methods were used. Findings: Lung Bases:  There are basilar atelectasis or pneumonia and small bilateral pleural effusions Moderate to large hiatal hernia Liver: The liver shows mild diffuse nodular surface suspicious for cirrhosis few small round hypodense nonenhancing liver lesions compatible with cysts. There is moderate ascites  Biliary/Gallbladder: Cholecystectomy changes with postcholecystectomy dilatation of the common bile duct Spleen: Punctate calcification in the spleen. The spleen does not appear enlarged Pancreas: Diffuse pancreatic atrophy. There is dilatation of the pancreatic duct in particular along the body and tail of the pancreas. No definite pancreatic lesions or peripancreatic inflammatory changes Kidneys: Kidneys are normal in size. There are no stones or hydronephrosis. Adrenals: Adrenal glands are unremarkable. Retroperitoneal/Lymph Nodes/Vasculature: There are multiple predominately subcentimeter mesenteric and retroperitoneal lymph nodes Gastrointestinal/Mesentery: The small bowel loops are normal in caliber. The large bowel loops are normal in caliber. There is similar reticulosis Bladder: The bladder is unremarkable   Bony Structures:  Visualized bony structures are consistent with the patient's age. Impression: 1. Bibasilar atelectasis or pneumonia and small bilateral pleural effusions 2. Moderate hiatal hernia 3. Cirrhotic appearing liver. There are few small hypodense liver lesions appear to represent cysts but few of them too small to characterize. 4. Moderate ascites increased as compared to the previous exam 5. Diffuse pancreatic atrophy. Diffusely nonspecific dilated pancreatic duct along the body and tail of the pancreas. Clinical correlation and follow-up recommended Electronically Signed: Leighton Spivey MD  12/6/2023 1:16 PM EST  Workstation ID: [id number]    Result Date: 12/19/2023  Narrative: CT ABDOMEN PELVIS W CONTRAST Date of Exam: 12/6/2023 12:36 PM EST Indication: abd pain and distension. Comparison: CT abdomen pelvis dated 11/10/2023 Technique: Axial CT images were obtained of the abdomen and pelvis following the uneventful intravenous administration of 83 mL Isovue-300. Reconstructed coronal and sagittal images were also obtained. Automated exposure control and iterative construction methods were  used. Findings: Lung Bases:  There are basilar atelectasis or pneumonia and small bilateral pleural effusions Moderate to large hiatal hernia Liver: The liver shows mild diffuse nodular surface suspicious for cirrhosis few small round hypodense nonenhancing liver lesions compatible with cysts. There is moderate ascites Biliary/Gallbladder: Cholecystectomy changes with postcholecystectomy dilatation of the common bile duct Spleen: Punctate calcification in the spleen. The spleen does not appear enlarged Pancreas: Diffuse pancreatic atrophy. There is dilatation of the pancreatic duct in particular along the body and tail of the pancreas. No definite pancreatic lesions or peripancreatic inflammatory changes Kidneys: Kidneys are normal in size. There are no stones or hydronephrosis. Adrenals: Adrenal glands are unremarkable. Retroperitoneal/Lymph Nodes/Vasculature: There are multiple predominately subcentimeter mesenteric and retroperitoneal lymph nodes Gastrointestinal/Mesentery: The small bowel loops are normal in caliber. The large bowel loops are normal in caliber. There is similar reticulosis Bladder: The bladder is unremarkable   Bony Structures:  Visualized bony structures are consistent with the patient's age.     Impression: Impression: 1. Bibasilar atelectasis or pneumonia and small bilateral pleural effusions 2. Moderate hiatal hernia 3. Cirrhotic appearing liver. There are few small hypodense liver lesions appear to represent cysts but few of them too small to characterize. 4. Moderate ascites increased as compared to the previous exam 5. Diffuse pancreatic atrophy. Diffusely nonspecific dilated pancreatic duct along the body and tail of the pancreas. Clinical correlation and follow-up recommended Electronically Signed: Leighton Spivey MD  12/6/2023 1:16 PM EST  Workstation ID: [id number]    XR Abdomen KUB    Result Date: 12/15/2023  Narrative: XR ABDOMEN KUB Date of Exam: 12/15/2023 5:20 AM EST Indication:  abd pain; constipation Comparison: CT abdomen and pelvis 12/7/2023. Findings: The patient is status post cholecystectomy. There are phleboliths in the pelvis. There is no distended bowel. There is normal colonic stool and gas. No definite pathologic abdominal calcification. No pneumoperitoneum. There is left basilar atelectasis. No acute osseous abnormality.     Impression: Impression: No acute abdominal abnormality. Electronically Signed: Ildefonso Newman MD  12/15/2023 5:38 AM EST  Workstation ID: [id number]    XR Chest 1 View    Result Date: 12/14/2023  Narrative: XR CHEST 1 VW Date of Exam: 12/14/2023 10:02 PM EST Indication: leukocytosis Comparison: Chest CT [address] 23. Chest radiograph 12/7/2023. Findings: Improved inspiratory effort compared to prior. Hazy appearance of the lung bases which could reflect persistent small layering effusions. Hiatal hernia noted. Cardiomediastinal silhouette unchanged. No new lobar infiltrate, worsening edema or pneumothorax. Degenerative osseous changes noted.     Impression: Impression: Improved inspiration. Questionable residual layering effusions, otherwise no new acute finding. Electronically Signed: Marco A Jane MD  12/14/2023 10:26 PM EST  Workstation ID: [id number]    US Paracentesis    Result Date: 12/14/2023  Narrative: US PARACENTESIS  History: malignant ascites  : Rajat Bhandari MD.  Modality: Ultrasonography                   Sedation: None. Anesthesia: Lidocaine 1% local infiltration. Estimated blood loss:  0 cc.        Technique: A thorough discussion of the risks, benefits, and alternatives of the procedure, and if applicable, moderate sedation, was carried out with the patient. They were encouraged to ask any questions. Any questions were answered. They verbalized understanding. A written informed consent was then signed.  A multi-component timeout was performed prior to starting the procedure using the departmental protocol. The procedure room  personnel used personal protective equipment. The operators used sterile gloves and if indicated, sterile gowns. The surgical site was prepped with chlorhexidine gluconate  and draped in the maximal applicable sterile fashion. A preliminary ultrasonography was performed to assess the target and determine a safe access site. It showed ascites. Pertinent ultrasound images were stored to the PACS for documentation. The access site was sterilely prepped and draped. Local anesthesia was administered. A dermatotomy was performed if needed. A catheter over the needle system was advanced into the peritoneal cavity. Straw colored fluid was aspirated and the plastic catheter advanced into the peritoneum. The catheter was then connected to a fluid recovery system. At the end of the procedure, the catheter was withdrawn and an aseptic dressing applied. The patient tolerated the procedure well. After uneventful recovery recovery, the patient was discharged from the department in stable condition. The total amount of fluid recovered is given below. Albumin was infused intravenously if ordered by the referring doctor. Complications: None immediate. Specimen: The specimen was labeled and sent to the lab in appropriate carriers & containers if such was requested by the ordering provider.     Impression: Impression:                                                              Successful ultrasound-guided paracentesis using a left lower quadrant access with recovery of 1.6 liters of fluid as described above. Thank you for the opportunity to assist in the care of your patient. Electronically Signed: Rajat Bhandari MD  12/14/2023 12:38 PM EST  Workstation ID: [id number]    MRI Brain With & Without Contrast    Result Date: 12/9/2023  Narrative: MRI BRAIN W WO CONTRAST Date of Exam: 12/9/2023 3:51 AM EST Indication: metastatic cancer staging.  Comparison: None available. Technique:  Routine multiplanar/multisequence sequence images of the  brain were obtained before and after the uneventful administration of 20 mL Multihance. Findings: There is no diffusion restriction to suggest acute infarct. There is no evidence of acute or chronic intracranial hemorrhage.  No mass effect or midline shift. No abnormal extra-axial collections. There are numerous scattered small rounded and punctate foci of FLAIR hyperintense signal within the cerebral white matter. The major vascular flow voids appear intact.  The basal ganglia, brainstem and cerebellum appear within normal limits.  Calvarial and superficial soft tissue signal is within normal limits. There is asymmetric thinning of the right orbital lens suggesting prior cataract surgery. The paranasal sinuses and the mastoid air cells appear well aerated.  Midline structures are intact.  There is no abnormal intracranial enhancement.  There is normal enhancement within the intracranial vasculature including the dural venous sinuses.     Impression: Impression: 1.No acute intracranial abnormality. No evidence of intracranial metastatic disease. 2.Mild chronic small vessel ischemic change. Electronically Signed: Ildefonso Newman MD  12/9/2023 5:05 AM EST  Workstation ID: [id number]    XR Orbits 4+ View    Result Date: 12/9/2023  Narrative: XR ORBITS 4+ VW Date of Exam: 12/9/2023 3:27 AM EST Indication: mri clearance Comparison: None available. Findings: Orbits appear within normal limits. There is metallic dental restoration hardware. No unexpected metal artifact. Facial bones appear grossly intact.     Impression: Impression: No contraindication to MRI. Electronically Signed: Ildefonso Newman MD  12/9/2023 3:45 AM EST  Workstation ID: [id number]    US Paracentesis    Result Date: 12/8/2023  Narrative: DATE OF EXAM: 12/8/2023 8:59 AM EST PROCEDURE: US PARACENTESIS INDICATIONS: large volume ascites COMPARISON: No Comparisons Available FLUOROSCOPIC TIME: None PHYSICIAN MONITORED CONSCIOUS SEDATION TIME: None minutes  TECHNIQUE: A detailed explanation of the procedure, including the risks, benefits, and alternatives was provided. A preprocedure timeout was performed. The interventional radiology nurse monitored the patient at all times. Limited ultrasound of the abdomen demonstrated a moderate amount of ascites. An appropriate access site was selected and the area was prepped and draped in the usual sterile fashion. The skin was anesthetized with 1% lidocaine and a small skin incision was made. Next, a catheter was advanced into the ascitic fluid. A total of 4.2 L of clear fluid was removed and the catheter was removed. The patient tolerated the procedure well without immediate complication. FINDINGS: See above     Impression: 1. Successful right lower quadrant paracentesis yielding 4.2 L of clear fluid Electronically Signed: Hesham Eastman MD  12/8/2023 12:41 PM EST  Workstation ID: [id number]    US Pelvis Complete    Result Date: 12/8/2023  Narrative: US PELVIS COMPLETE Date of Exam: 12/8/2023 9:50 AM CST Indication: r/o right adnexal mass. Comparison: CT abdomen and pelvis 12/7/2023 Technique: Transabdominal ultrasound evaluation of the pelvis was performed utilizing grayscale and color Doppler technique.  Doppler spectral analysis was performed. Transvaginal exam unable to be performed. Findings: The uterus measures 6.0 x 2.7 x 4.6 cm. The uterus is mildly heterogeneous without focal parenchymal abnormality. The endometrial stripe measures 0.3 mm. The right ovary measures 3.2 x 3.1 x 2.1 cm and the left ovary measures 2.5 x 1.8 x 1.9 cm. Ovarian vascularity appears intact.  There is no evidence of a solid ovarian mass. There is no significant free fluid identified within the pelvis. No definite adnexal abnormality noted however exam is limited due to bowel gas.     Impression: Impression: Limited exam without identifiable adnexal mass on this study. Electronically Signed: Vy Gomez MD  12/8/2023 10:37 AM CST  Workstation  ID: [id number]    CT Abdomen Pelvis With Contrast    Result Date: 12/7/2023  Narrative: CT ABDOMEN PELVIS W CONTRAST Date of Exam: 12/7/2023 9:26 PM EST Indication: worsening abdominal pain and swelling. Comparison: CT abdomen pelvis 12/6/2023 Technique: Axial CT images were obtained of the abdomen and pelvis following the uneventful intravenous administration of 90 mL Isovue 370. Reconstructed coronal and sagittal images were also obtained. Automated exposure control and iterative construction methods were used. Findings: Please see separate dictated chest CT for findings above the diaphragm. Indeterminate right hepatic lobe lesion measuring 9 mm (5/41). Left hepatic lobe lesion too small to characterize measuring 8 mm (5/32). The spleen is normal in size. Gallbladder absent. The adrenal glands are normal. There is atrophy of the pancreatic tail with dilatation of the main pancreatic duct which is dilated up to 8 mm (2/27). There is abrupt termination of the main pancreatic duct at the mid pancreatic body with hypoenhancing pancreatic mass measuring approximately 2.4 x 2.3 cm (2/47). Abnormal soft tissue extends from this mass proximally to the level of the celiac axis bifurcation encasing splenic artery and common hepatic artery (2/46). There is severe short segment narrowing of the portal vein (2/50) with portal vein encasement. Splenic vein is poorly visualized but suspected narrowed in the region of the mass. The SMV is patent. No abutment of the superior mesenteric artery. Kidneys are symmetrically enhancing. Negative for hydronephrosis or hydroureter. Urinary bladder is collapsed. Uterus and left adnexa are without acute abnormality. Nodularity adjacent to the right ovary which appears secondary to collapsed adjacent bowel loops (2/124). Large hiatal hernia. Large volume ascites. There is diffuse infiltrative appearance of the omentum most consistent with peritoneal and omental carcinomatosis. Multiple areas of  peritoneal nodularity noted for example in the right lower quadrant peritoneal nodule measuring 8 mm (2/136). Peritoneal nodularity within the left lower pelvis (2/149).. Nodularity involving the anterior abdominal wall which measures 1.8 x 1.8 cm (2/105) concerning for metastatic peritoneal involvement extending to the rectus muscle on the right. Scattered irregular areas of nodularity are noted in the mesentery concerning for metastasis for example nodule measuring 1.3 x 1.3 cm likely carcinomatosis and/or metastatic adenopathy (2/112). Gastrohepatic ligament node measures up to 10 mm (2/27). There is fluid-filled distention of the colon. Colonic diverticulosis without diverticulitis. The appendix is nonvisualized. Abdominal aortic branch vasculature patent. No aggressive osseous lesion or acute fracture. Delayed images demonstrate normal contrast excretion into the nondilated kidneys. Normal filling of the bladder. No acute fracture or aggressive osseous lesion.     Impression: Impression: 1. Mass involving the mid body of pancreas measuring 2.4 cm highly concerning for pancreatic adenocarcinoma. This results in severe narrowing of the portal vein with encasement of the celiac axis bifurcation as above. 2. Peritoneal and omental carcinomatosis with large volume ascites. 3. Several small indeterminate low-attenuation liver lesions which could relate to metastasis, nonspecific, small size limits assessment. These could be better assessed with MRI follow-up if clinically indicated. 4. Nodularity adjacent to right ovary appears secondary to adjacent bowel loops which are underdistended, follow-up pelvic ultrasound may be helpful to exclude right adnexal mass as large ascites limits assessment. 5. See separately dictated chest CT for findings above the diaphragm. Electronically Signed: Farnsico Ramírez MD  12/7/2023 10:52 PM EST  Workstation ID: [id number]    CT Angiogram Chest Pulmonary Embolism    Result Date:  12/7/2023  Narrative: CT ANGIOGRAM CHEST PULMONARY EMBOLISM Date of Exam: 12/7/2023 9:26 PM EST Indication: soa. Comparison: Chest radiograph 12/7/2023, CT abdomen pelvis 12/6/2023 Technique: Axial CT images were obtained of the chest after the uneventful intravenous administration of 90 mL Isovue 370 utilizing pulmonary embolism protocol.  Reconstructed coronal and sagittal images were also obtained. Automated exposure control and  iterative construction methods were used. Findings: Soft tissue of the lower neck are without acute abnormality. The aortic arch branch vasculature is patent. There is fluid-filled distention of the esophagus. There is a large hiatal hernia again noted. Heart size normal. Pulmonary arteries are well-opacified with contrast. Negative for pulmonary embolus. No mediastinal or hilar adenopathy. No axillary adenopathy. Small bilateral pleural effusions with bibasilar atelectasis. Trachea and mainstem bronchi are patent. No pneumothorax. Several small bilateral pulmonary nodules are nonspecific for example in the right upper lobe measuring 5mm (6/25). Within the right upper lobe measuring 3 mm (6/22). Within the left upper lobe measuring 3 mm (6/46). Several other small to 3 mm nodules are noted bilaterally. Linear atelectasis at the lingula. Thoracic vertebral bodies are maintained in height. No aggressive osseous lesion or fracture. Please see separately dictated abdominal CT for findings below the diaphragm.     Impression: Impression: 1. Negative for pulmonary embolus. 2. Increase in size of small bilateral pleural effusions with lower lobe airspace disease likely atelectasis. 3. Multiple small nonspecific pulmonary nodules most pronounced in the right upper lobe measuring up to 5 mm, recommend attention on follow-up imaging, these could be reassessed in 6 months. 4. Large hiatal hernia with fluid-filled distention of esophagus which may relate to delayed transit or reflux, the amount of  fluid in the esophagus places patient at risk for aspiration. 5. Please see separately dictated abdominal CT for findings below the diaphragm. Electronically Signed: Fransico Ramírez MD  12/7/2023 10:17 PM EST  Workstation ID: [id number]    XR Chest 1 View    Result Date: 12/7/2023  Narrative: XR CHEST 1 VW Date of Exam: 12/7/2023 7:56 PM EST Indication: Chest Pain Triage Protocol Comparison: None available. Findings: The lungs are poorly inflated with bibasilar atelectasis particularly on the left. Small bilateral pleural effusions are not excluded. The remainder of the lungs are clear cardiac, hilar, and mediastinal silhouettes are within normal limits. No pneumothorax. The trachea is midline. Pulmonary vascularity is normal. Visualized bony structures are intact.     Impression: Impression: Poor inspiration with bibasilar atelectasis and possible small bilateral pleural effusions. Electronically Signed: Danilo Yip DO  12/7/2023 8:20 PM EST  Workstation ID: [id number]     Procedures    Assessment / Plan      Assessment/Plan:   Malignant ascites  Pancreatic body mass with nondiagnostic biopsy  Lung nodules  Liver nodules  Chemotherapy monitoring  -Cytology confirmed malignant ascites with upper GI pancreaticobiliary source favored.  EGD with no primary upper GI source.  Pancreatic EUS with biopsy was nondiagnostic due to large hiatal hernia.  Notably her CA 19-9 was not elevated and she did have mild  elevation.  She was assessed by GYN oncology and no primary GYN site identified on pelvic ultrasound.  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  -Given uncertainty regarding primary site, we discussed obtaining a PET/CT.  However she is symptomatic with recurrent malignant ascites so we elected to proceed with first-line therapy with FOLFIRINOX given primary pancreatic source is favored.  -She is having substantial treatment related side effects, partially due to undermedication.  I sent  her for a stat obstructive series which showed no evidence of obstruction.  I offered her hospital admission today for symptom control which she declines.  She knows to return to the ER if her symptoms worsen or fail to improve.    6. Hyponatremia  -Acute  -She reports good water intake but is not taking much else in by mouth due to severity of constipation.  -We discussed adding electrolytes/salt to her diet and we will monitor this with repeat levels.    7. Constipation  -Use an enema tonight  -Schedule sennakot-S (senna+docusate combo): 2 tablets twice daily   -Miralax:  17 grams in 8 ounces of fluid once a day.  -Target 1-2 soft BM daily.      8. Nausea:  Schedule zofran three times per day. Take phenergan up to 3 x per day in between as needed for nausea.     9. Cancer related pain  -Continue hydrocodone PRN    1. Malignant neoplasm of body of pancreas (Primary)  -     XR Abdomen 2+ VW with Chest 1 VW  -     NM PET/CT Skull Base to Mid Thigh; Future  -     ondansetron (ZOFRAN) 8 MG tablet; Take 1 tablet by mouth 3 (Three) Times a Day As Needed for Nausea or Vomiting.  Dispense: 30 tablet; Refill: 5    2. Abdominal carcinomatosis  -     ondansetron (ZOFRAN) 8 MG tablet; Take 1 tablet by mouth 3 (Three) Times a Day As Needed for Nausea or Vomiting.  Dispense: 30 tablet; Refill: 5     Follow Up:   1 week  Call in 24-48 hours if symptoms not improved.     Sherri العراقي MD  Hematology and Oncology     I have spent a total of 48 min on reviewing test results/preparing to see patient, counseling patient, performing medically appropriate exam, placing orders, reassessing pt after xray and documenting clinical information in the electronic or other health record

## 2024-01-04 ENCOUNTER — TELEPHONE (OUTPATIENT)
Dept: ONCOLOGY | Facility: CLINIC | Age: 64
End: 2024-01-04
Payer: COMMERCIAL

## 2024-01-04 NOTE — TELEPHONE ENCOUNTER
Patient's son called patient is having nausea, and vomited 5 times last night it looked like dark liquid, also patient patient has been constipated for about 3 weeks. Please call

## 2024-01-04 NOTE — TELEPHONE ENCOUNTER
"Patient's son stated since appointment yesterday, patient began vomiting at 21:30 and has every 2 hours since.  He stated no real BM since December 14 or 15.  Patient has not tried the enema recommended yesterday during appointment with Dr. العراقي.  He stated last night patient was weak, fatigued and has been slurring words like she was \"out of it.\"  He stated today she is \"a little bit better.\"  He stated she ate some chicken noodle soup this morning.  He stated after she has an episode of vomiting, she feels better. He stated her emesis is dark brown - black in color.  He denied coffee ground emesis.  Dr. العراقي notified of the above and per MD, Mr. Kraft was advised that if patient has been vomiting, they can try the recommended enema but it sounds like she needs to come to the emergency room for evaluation.  Advised Mr. Kraft that if they feel it is that emergent, such as SOA, etc., they should go to the nearest emergency room.  Mr. Kraft verbalized understanding and stated they will likely bring patient to [facility].  Report called to charge RN in  ER.  "

## 2024-01-05 ENCOUNTER — TELEPHONE (OUTPATIENT)
Dept: ONCOLOGY | Facility: CLINIC | Age: 64
End: 2024-01-05
Payer: COMMERCIAL

## 2024-01-05 LAB — FUNGUS WND CULT: NORMAL

## 2024-01-05 NOTE — TELEPHONE ENCOUNTER
"Spoke with patient.  She stated she has had only one episode of emesis today and \"it was not near as much as yesterday.\"  Patient stated the nausea is very mild and she is taking antiemetics which are helping.  She stated \"I feel 1000% better than I did the day before yesterday.\"  She stated she did not go to the ER as recommended yesterday.  She stated she feels \"a lot better\" today than yesterday.  She stated she has a better appetite and is drinking approximately 6 (8 fl oz) of water per day.  She stated she had one normal BM last night before any enemas were taken and one BM today.  She stated she does not feel constipated any longer.  Patient denied any abdominal pain/discomfort.  Advised her to call the on call over the weekend if needed.  Patient verbalized understanding.  "

## 2024-01-05 NOTE — TELEPHONE ENCOUNTER
Caller: Dominick rKaft    Relationship: Emergency Contact    Best call back number: [phone]    What was the call regarding: SPOUSE CALLED TO LET DR GARCIA KNOW THAT PATIENT HAD A BOWEL MOVEMENT YESTERDAY AND TODAY

## 2024-01-08 ENCOUNTER — TELEPHONE (OUTPATIENT)
Dept: ONCOLOGY | Facility: CLINIC | Age: 64
End: 2024-01-08
Payer: COMMERCIAL

## 2024-01-08 ENCOUNTER — PATIENT MESSAGE (OUTPATIENT)
Dept: ONCOLOGY | Facility: CLINIC | Age: 64
End: 2024-01-08
Payer: COMMERCIAL

## 2024-01-08 DIAGNOSIS — C25.9 MALIGNANT NEOPLASM OF PANCREAS, UNSPECIFIED LOCATION OF MALIGNANCY: ICD-10-CM

## 2024-01-08 RX ORDER — HYDROCODONE BITARTRATE AND ACETAMINOPHEN 5; 325 MG/1; MG/1
1-2 TABLET ORAL EVERY 8 HOURS PRN
Qty: 30 TABLET | Refills: 0 | Status: ON HOLD | OUTPATIENT
Start: 2024-01-08 | End: 2024-01-10

## 2024-01-08 NOTE — TELEPHONE ENCOUNTER
Caller: Dominick Kraft    Relationship: Emergency Contact    Best call back number: [phone]    Requested Prescriptions:   Requested Prescriptions     Pending Prescriptions Disp Refills    HYDROcodone-acetaminophen (NORCO) 5-325 MG per tablet 30 tablet 0     Sig: Take 1-2 tablet(s) by mouth Every 8 Hours As Needed for Moderate Pain.        Pharmacy where request should be sent: [facility]     Last office visit with prescribing clinician: 1/3/2024   Last telemedicine visit with prescribing clinician: Visit date not found   Next office visit with prescribing clinician: 1/11/2024     Additional details provided by patient: NA    Does the patient have less than a 3 day supply:  [x] Yes  [] No    Would you like a call back once the refill request has been completed: [x] Yes [] No    If the office needs to give you a call back, can they leave a voicemail: [x] Yes [] No    Isabell Woods Rep   01/08/24 08:59 EST

## 2024-01-08 NOTE — LETTER
[facility]  [address] [facility] [id number]  [phone]        January 25, 2024      Patient: Jessica Kraft  YOB: 1960  Date of Visit: 1/8/2024      For inpatient hospice admission  Referring Dr Yesenia Pugh  Attending Ruth Copeland  Certifying Dr Jenniffer Norton

## 2024-01-08 NOTE — LETTER
EMS Transport Request  For use at [facility], [place], [place], [place], and [place] only   Patient Name: Jessica Kraft : 1960   Weight:105 kg (231 lb 4.2 oz) Pick-up Location: [facility] BLS/ALS: BLS/ALS: ALS   Insurance: [employer] BLUE CROSS Auth End Date:    Pre-Cert #: D/C Summary complete:    Destination: Other [facility]    Contact Precautions: None   Equipment (O2, Fluids, etc.): Ventilator, settings peep 8, Resp-24, FIO2 40%, Tidal 450   Arrive By Date/Time: Stretcher/WC: Stretcher   CM Requesting: Jayde Zambrano RN Ext: [phone]   Notes/Medical Necessity: Will Call      ______________________________________________________________________    *Only 2 patient bags OR 1 carry-on size bag are permitted.  Wheelchairs and walkers CANNOT transported with the patient. Acknowledge: Yes

## 2024-01-08 NOTE — Clinical Note
Hemostasis started on the right femoral vein. Perclose and figure 8 suturing was used in achieving hemostasis. Closure device deployed in the vessel. Hemostasis achieved successfully. Closure device additional comment: Both closure devices utilized

## 2024-01-08 NOTE — TELEPHONE ENCOUNTER
"Called at 13:57 and spoke with patient and her spouse.  Patient c/o sore throat due to gerd along with \"spitting up\" some due to gerd.  Patient stated she has a headache, stated she has had one like this before, ranked at a 5-6 on a 0/10 scale.  Patient denied vision changes.  Patient described it as pressure.  Patient c/o intermittent dizziness (with sitting).  Patient c/o pain in teeth.  Patient stated she is taking Aciphex daily as prescribed and occasional Rolaids at night.  Patient stated she needs a refill of Norco.  Advised her that refill request sent to  today for her signature.  Patient tested negative for COVID today.  Dr. العراقي notified of the above.  Per MD, patient's spouse contacted back and advised that she can increase Aciphex to 1 tablet BID with meals and she may take OTC Pepcid QHS.  Also, offered appointment today and he stated he cannot get patient to office today.  Offered appointment tomorrow with Dr. العراقي and he agreed.  Appointment made and Mr. Kraft notified.  Also, advised for worsening symptoms she should go to the nearest emergency room.  Mr. Kraft verbalized understanding.  "

## 2024-01-08 NOTE — TELEPHONE ENCOUNTER
Caller: Dominick Kraft    Relationship: Emergency Contact    Best call back number: [phone]    What was the call regarding: PT SPOUSE CALLED TO SPEAK TO NATHANIEL

## 2024-01-09 ENCOUNTER — READMISSION MANAGEMENT (OUTPATIENT)
Dept: CALL CENTER | Facility: HOSPITAL | Age: 64
End: 2024-01-09
Payer: COMMERCIAL

## 2024-01-09 PROBLEM — N39.0 ACUTE UTI (URINARY TRACT INFECTION): Status: RESOLVED | Noted: 2023-01-01 | Resolved: 2024-01-01

## 2024-01-09 PROBLEM — J69.0 ASPIRATION PNEUMONIA DUE TO VOMITUS: Status: ACTIVE | Noted: 2024-01-09

## 2024-01-09 PROBLEM — J96.01 ACUTE RESPIRATORY FAILURE WITH HYPOXIA: Status: ACTIVE | Noted: 2024-01-01

## 2024-01-09 PROBLEM — I26.99 ACUTE PULMONARY EMBOLISM: Status: ACTIVE | Noted: 2024-01-01

## 2024-01-09 NOTE — CONSULTS
HEMATOLOGY/ONCOLOGY INPATIENT CONSULTATION      REFERRING PHYSICIAN: Kvng Nieves MD    PRIMARY CARE PROVIDER: Leonarda Díaz MD    REASON FOR CONSULTATION: Massive pulmonary embolism, malignant ascites recently initiated on palliative chemotherapy      HISTORY OF PRESENT ILLNESS: 63-year-old female who is well-known to me.  She was recently diagnosed with metastatic malignancy of presumed pancreatic origin.  She was initiated on first-line systemic therapy with FOLFIRINOX on 12/29/2023.  She was seen in the office on 1/7/2024 at which point she was complaining of severe constipation.  An x-ray showed no obstruction.  She was initiated on enemas and a more aggressive bowel regimen.  She ultimately had relief of her symptoms and had several bowel movements.  Her family says that over the weekend she had one of the best days that she has had since her cancer diagnosis.  Yesterday she developed worsening emesis associated with pain in her mouth.  Her symptoms gradually worsened and so her family elected to bring her to the ER.  Her son says that she was not complaining of any chest pain or shortness of breath, but he did think that she appeared to be more short of breath when he saw her yesterday.  She came into the ER and was initiated on IV fluids, Zofran, and viscous lidocaine.  She continued to have nausea and had an acute episode while in the ER with hypoxia and altered mental status.  She required emergent intubation.  A CT of the chest showed bilateral proximal pulmonary emboli and multifocal pneumonia.  She is currently requiring high levels of oxygen support.  Thrombectomy is planned this afternoon.    No Known Allergies    Past Medical History:   Diagnosis Date    Abscess     Anxiety     Bilateral ovarian cysts     Depression     Encounter for routine gynecological examination     Foot pain     H/O bone density study 06/2014    H/O mammogram 07/2016    [facility] clinic    Hot flashes, menopausal      Hypothyroidism due to Hashimoto's thyroiditis     Insomnia     Miscarriage     x3    Osteopenia     Pap smear for cervical cancer screening 06/2014    Routine general medical examination at a health care facility     Urinary incontinence     Urinary tract infection     Vitamin D deficiency          Current Facility-Administered Medications:     [START ON 1/10/2024] !vanc trough 1/10@1730, do not hang dose until lab drawn, , Does not apply, Once, Lukas Acosta, PharmD    sennosides-docusate (PERICOLACE) 8.6-50 MG per tablet 2 tablet, 2 tablet, Oral, BID, 2 tablet at 01/09/24 0914 **AND** polyethylene glycol (MIRALAX) packet 17 g, 17 g, Oral, Daily PRN **AND** bisacodyl (DULCOLAX) EC tablet 5 mg, 5 mg, Oral, Daily PRN **AND** bisacodyl (DULCOLAX) suppository 10 mg, 10 mg, Rectal, Daily PRN, Kvng Nieves MD    chlorhexidine (PERIDEX) 0.12 % solution 15 mL, 15 mL, Mouth/Throat, Q12H, Kvng Nieves MD, 15 mL at 01/09/24 0943    dexmedetomidine (PRECEDEX) 400 mcg in 100 mL NS infusion, 0.2-1.5 mcg/kg/hr (Order-Specific), Intravenous, Titrated, Onel Guaman PharmD, Last Rate: 11.4 mL/hr at 01/09/24 1236, 0.5 mcg/kg/hr at 01/09/24 1236    fentaNYL 2500 mcg/250 mL NS infusion,  mcg/hr, Intravenous, Titrated, Kvng Nieves MD, Last Rate: 22.5 mL/hr at 01/09/24 0455, 225 mcg/hr at 01/09/24 0455    heparin 21302 units/250 mL (100 units/mL) in 0.45 % NaCl infusion, 16.5 Units/kg/hr (Order-Specific), Intravenous, Titrated, Onel Guaman PharmD, Last Rate: 15.01 mL/hr at 01/09/24 1105, 16.5 Units/kg/hr at 01/09/24 1105    Lidocaine Viscous HCl (XYLOCAINE) 2 % solution 10 mL, 10 mL, Mouth/Throat, Q3H PRN, Daniel Marcus MD    nitroglycerin (NITROSTAT) SL tablet 0.4 mg, 0.4 mg, Sublingual, Q5 Min PRN, Kvng Nieves MD    norepinephrine (LEVOPHED) 8 mg in 250 mL NS infusion (premix), 0.02-0.3 mcg/kg/min (Order-Specific), Intravenous, Titrated, Onel Guaman, PharmD,  Last Rate: 13.65 mL/hr at 01/09/24 1105, 0.08 mcg/kg/min at 01/09/24 1105    pantoprazole (PROTONIX) injection 40 mg, 40 mg, Intravenous, Q24H, Kvng Nieves MD, 40 mg at 01/09/24 0914    Pharmacy to Dose Heparin, , Does not apply, Continuous PRN, Daniel Marcus MD    Pharmacy to dose vancomycin, , Does not apply, Continuous PRN, Kvng Nieves MD    piperacillin-tazobactam (ZOSYN) 4.5 g in iso-osmotic dextrose 100 mL IVPB (premix), 4.5 g, Intravenous, Q8H, Kvng Nieves MD, 4.5 g at 01/09/24 0914    propofol (DIPRIVAN) infusion 10 mg/mL 100 mL, 5-50 mcg/kg/min (Order-Specific), Intravenous, Titrated, Onel Guaman PharmJAMEEL    sodium chloride 0.9 % flush 10 mL, 10 mL, Intravenous, PRN, Daniel Marcus MD    sodium chloride 0.9 % flush 10 mL, 10 mL, Intravenous, Q12H, Kvng Nieves MD, 10 mL at 01/09/24 0915    sodium chloride 0.9 % flush 10 mL, 10 mL, Intravenous, PRN, Kvng Nieves MD    sodium chloride 0.9 % infusion 40 mL, 40 mL, Intravenous, PRN, Kvng Nieves MD    sodium chloride 0.9 % infusion, 150 mL/hr, Intravenous, Continuous, Kvng Nieves MD, Last Rate: 150 mL/hr at 01/09/24 0544, 150 mL/hr at 01/09/24 0544    [START ON 1/10/2024] vancomycin 1250 mg/250 mL 0.9% NS IVPB ([facility]), 15 mg/kg, Intravenous, Q18H, Lukas Acosta, PharmD    Past Surgical History:   Procedure Laterality Date    ABDOMINOPLASTY  2001    CHOLECYSTECTOMY  2008    COLONOSCOPY  07/2012    [facility]ang Gaines in [facility] normal    ECTOPIC PREGNANCY SURGERY Left 1994    ENDOSCOPY N/A 12/13/2023    Procedure: ESOPHAGOGASTRODUODENOSCOPY;  Surgeon: Flakito Abrams MD;  Location: [facility] ENDOSCOPY;  Service: Gastroenterology;  Laterality: N/A;    HERNIA REPAIR  2010       Social History     Socioeconomic History    Marital status:    Tobacco Use    Smoking status: Never    Smokeless tobacco: Never   Vaping Use    Vaping Use: Never used   Substance and Sexual Activity     Alcohol use: No    Drug use: No    Sexual activity: Yes     Partners: Male     Birth control/protection: None, Post-menopausal     Comment:        Family History   Problem Relation Age of Onset    Mental illness Mother     Depression Mother     Thyroid disease Mother     Hypertension Father     Thyroid disease Sister     Breast cancer Other     Breast cancer Maternal Aunt        Oncology/Hematology History   Abdominal carcinomatosis - omental   12/8/2023 Initial Diagnosis    Abdominal carcinomatosis - omental     12/29/2023 -  Chemotherapy    OP PANCREATIC mFOLFIRINOX (OXALIplatin / Leucovorin / Irinotecan / Fluorouracil CIV)     Pancreatic cancer   12/12/2023 Initial Diagnosis    Pancreatic cancer     12/29/2023 -  Chemotherapy    OP PANCREATIC mFOLFIRINOX (OXALIplatin / Leucovorin / Irinotecan / Fluorouracil CIV)           REVIEW OF SYSTEMS:  A 14 point review of systems was performed and is negative except as noted below.    Review of Systems - Oncology      Objective     Vitals:    01/09/24 1145 01/09/24 1200 01/09/24 1215 01/09/24 1230   BP: 93/62 95/56 92/60 92/61   BP Location:  Right arm     Patient Position:  Lying     Pulse: 94 94 92 94   Resp:  22     Temp:  100.2 °F (37.9 °C)     TempSrc:  Bladder     SpO2: (!) 89% 90% 90% 90%   Weight:       Height:                       Temp:  [97.4 °F (36.3 °C)-100.2 °F (37.9 °C)] 100.2 °F (37.9 °C)     Performance Status: 4    Physical Exam    General: intubated sedated  HEENT: sclerae anicteric, oropharynx clear  Cardiovascular: regular rate and rhythm, no murmurs  Lungs: clear to auscultation anteriorly  Abdomen: soft, nontender, nondistended.   Skin: no rashes, lesions, bruising, or petechiae      LABS:    Lab Results   Component Value Date    HGB 8.6 (L) 01/09/2024    HCT 27.3 (L) 01/09/2024    MCV 86.9 01/09/2024     (L) 01/09/2024    WBC 3.39 (L) 01/09/2024    NEUTROABS 2.86 01/09/2024    LYMPHSABS 0.29 (L) 01/09/2024    MONOSABS 0.17  01/09/2024    EOSABS 0.04 01/09/2024    BASOSABS 0.01 01/09/2024     Lab Results   Component Value Date    GLUCOSE 130 (H) 01/08/2024    BUN 18 01/08/2024    CREATININE 0.89 01/08/2024     (L) 01/08/2024    K 3.9 01/08/2024    CL 89 (L) 01/08/2024    CO2 27.0 01/08/2024    CALCIUM 8.3 (L) 01/08/2024    PROTEINTOT 6.3 01/08/2024    ALBUMIN 3.1 (L) 01/08/2024    BILITOT 0.3 01/08/2024    ALKPHOS 202 (H) 01/08/2024    AST 21 01/08/2024    ALT 13 01/08/2024         IMAGING    Adult Transthoracic Echo Complete w/ Color, Spectral and Contrast if Necessary Per Protocol    Result Date: 1/9/2024    Left ventricular systolic function is normal. Calculated left ventricular EF = 61.7%   Mild to moderate AI   No pericardial effusion\     XR Abdomen 1 View    Result Date: 1/9/2024  XR ABDOMEN 1 VW Date of Exam: 1/9/2024 5:49 AM EST Indication: OG tube placement Comparison: 1/9/2024. Findings: Enteric tube within the stomach.     Impression: Enteric tube within the stomach. Electronically Signed: Paloma Maldonado MD  1/9/2024 6:09 AM EST  Workstation ID: [id number]    CT Angiogram Chest    Result Date: 1/9/2024  CT ANGIOGRAM CHEST, CT ABDOMEN PELVIS W CONTRAST Date of Exam: 1/9/2024 1:53 AM EST Indication: Pulmonary embolism (PE) suspected, unknown D-dimer. Vomiting, altered mental status Comparison: 1/9/2024, 12/7/2023, 12/25/2023 Technique: CTA of the chest was performed after the uneventful intravenous administration of intravenous contrast. Reconstructed coronal and sagittal images were also obtained. In addition, a 3-D volume rendered image was created for interpretation. Automated exposure control and iterative reconstruction methods were used. CT abdomen of the pelvis was also obtained after administration of intravenous contrast. Findings: Chest: Pulmonary arteries: Adequate opacification of the pulmonary arteries. A pulmonary embolism is seen within the right main pulmonary artery extending to the proximal segmental  branches of the right lower lobe pulmonary artery as well as the proximal  segmental branches of the right upper lobe pulmonary artery. A pulmonary embolism is present within the left main pulmonary artery extending to the proximal segmental branches of the upper and lower lobe pulmonary arterial branches. No definite ventricular bowing identified to suggest right heart strain. Moderate to large clot burden present. Lungs and Pleura: Small bilateral pleural effusions are present. Patchy airspace disease is present within the inferior bilateral upper lobes as well as the bilateral lower lobes. Air bronchograms are present. No focal pulmonary nodule identified. No evidence of pneumothorax. Mediastinum/Nat: No mediastinal or hilar lymphadenopathy. Fluid-filled distended esophagus present with a moderate hiatal hernia. Right-sided Mediport present. Endotracheal tube tip in the distal trachea.. Lymph nodes: No axillary or supraclavicular adenopathy. Cardiovascular: The cardiac chambers are within normal limits. The pericardium is normal. The aorta and its arch branch vessels are unremarkable.   Abdomen:: Liver: Liver is normal in size and CT density. Questionable cirrhotic changes are present which appear unchanged. Mild surface nodularity is present. Small hypodense lesions are present within the right and left hepatic lobes, likely representing cysts, stable as compared to the previous study.. Moderate amount of ascites is present throughout the abdomen and pelvis, similar as compared to the previous study. Biliary/Gallbladder:  The gallbladder has been resected. The biliary tree is nondilated. Spleen: Spleen is normal in size and CT density. Pancreas:  The pancreas appears atrophic. Pancreatic ductal dilatation is present which appear stable. Mass present within the pancreatic body (series 2 image 44), similar as compared to the previous study... Kidneys:  Kidneys are normal in size. There are no stones or  hydronephrosis. Adrenals:  Adrenal glands are unremarkable. Retroperitoneal/Lymph Nodes/Vasculature:  No retroperitoneal adenopathy is identified. Gastrointestinal/Mesentery:  The bowel loops are non-dilated without wall thickening or mass. The appendix appears within normal limits. No evidence of obstruction. No free air. No mesenteric fluid collections identified. Redemonstration of carcinomatosis of the peritoneum, similar as compared to the previous study. No significant stool burden identified. No evidence of hernia. Bladder:  The bladder is normal. Genital:   Unremarkable       Bones and Soft Tissue: No suspicious osseous lesion.     Impression: 1.Pulmonary emboli present within the right and left main pulmonary arteries extending to the proximal segmental branches of the right upper lobe, right lower lobe and left upper and lower lobe pulmonary arterial branches. Moderate to large clot burden present. No definite evidence of right heart strain. 2.Patchy airspace disease present within the inferior bilateral upper lobes and the bilateral lower lobes consistent with multifocal pneumonia. Sequela of pulmonary infarct is also in the differential. Small bilateral pleural effusions are present. 3.Moderate amount of ascites present throughout the abdomen and pelvis, similar as compared to the previous study. Redemonstration of peritoneal carcinomatosis, similar as compared to the previous study. No acute intra-abdominal or intrapelvic process. 4.Ancillary findings as described above. The above findings were discussed with Dr. Daniel Wyatt at 2:13 a.m. on 1/9/2024 Electronically Signed: Paloma Maldonado MD  1/9/2024 2:17 AM EST  Workstation ID: [id number]    CT Abdomen Pelvis With Contrast    Result Date: 1/9/2024  CT ANGIOGRAM CHEST, CT ABDOMEN PELVIS W CONTRAST Date of Exam: 1/9/2024 1:53 AM EST Indication: Pulmonary embolism (PE) suspected, unknown D-dimer. Vomiting, altered mental status Comparison: 1/9/2024,  12/7/2023, 12/25/2023 Technique: CTA of the chest was performed after the uneventful intravenous administration of intravenous contrast. Reconstructed coronal and sagittal images were also obtained. In addition, a 3-D volume rendered image was created for interpretation. Automated exposure control and iterative reconstruction methods were used. CT abdomen of the pelvis was also obtained after administration of intravenous contrast. Findings: Chest: Pulmonary arteries: Adequate opacification of the pulmonary arteries. A pulmonary embolism is seen within the right main pulmonary artery extending to the proximal segmental branches of the right lower lobe pulmonary artery as well as the proximal  segmental branches of the right upper lobe pulmonary artery. A pulmonary embolism is present within the left main pulmonary artery extending to the proximal segmental branches of the upper and lower lobe pulmonary arterial branches. No definite ventricular bowing identified to suggest right heart strain. Moderate to large clot burden present. Lungs and Pleura: Small bilateral pleural effusions are present. Patchy airspace disease is present within the inferior bilateral upper lobes as well as the bilateral lower lobes. Air bronchograms are present. No focal pulmonary nodule identified. No evidence of pneumothorax. Mediastinum/Nat: No mediastinal or hilar lymphadenopathy. Fluid-filled distended esophagus present with a moderate hiatal hernia. Right-sided Mediport present. Endotracheal tube tip in the distal trachea.. Lymph nodes: No axillary or supraclavicular adenopathy. Cardiovascular: The cardiac chambers are within normal limits. The pericardium is normal. The aorta and its arch branch vessels are unremarkable.   Abdomen:: Liver: Liver is normal in size and CT density. Questionable cirrhotic changes are present which appear unchanged. Mild surface nodularity is present. Small hypodense lesions are present within the right  and left hepatic lobes, likely representing cysts, stable as compared to the previous study.. Moderate amount of ascites is present throughout the abdomen and pelvis, similar as compared to the previous study. Biliary/Gallbladder:  The gallbladder has been resected. The biliary tree is nondilated. Spleen: Spleen is normal in size and CT density. Pancreas:  The pancreas appears atrophic. Pancreatic ductal dilatation is present which appear stable. Mass present within the pancreatic body (series 2 image 44), similar as compared to the previous study... Kidneys:  Kidneys are normal in size. There are no stones or hydronephrosis. Adrenals:  Adrenal glands are unremarkable. Retroperitoneal/Lymph Nodes/Vasculature:  No retroperitoneal adenopathy is identified. Gastrointestinal/Mesentery:  The bowel loops are non-dilated without wall thickening or mass. The appendix appears within normal limits. No evidence of obstruction. No free air. No mesenteric fluid collections identified. Redemonstration of carcinomatosis of the peritoneum, similar as compared to the previous study. No significant stool burden identified. No evidence of hernia. Bladder:  The bladder is normal. Genital:   Unremarkable       Bones and Soft Tissue: No suspicious osseous lesion.     Impression: 1.Pulmonary emboli present within the right and left main pulmonary arteries extending to the proximal segmental branches of the right upper lobe, right lower lobe and left upper and lower lobe pulmonary arterial branches. Moderate to large clot burden present. No definite evidence of right heart strain. 2.Patchy airspace disease present within the inferior bilateral upper lobes and the bilateral lower lobes consistent with multifocal pneumonia. Sequela of pulmonary infarct is also in the differential. Small bilateral pleural effusions are present. 3.Moderate amount of ascites present throughout the abdomen and pelvis, similar as compared to the previous study.  Redemonstration of peritoneal carcinomatosis, similar as compared to the previous study. No acute intra-abdominal or intrapelvic process. 4.Ancillary findings as described above. The above findings were discussed with Dr. Daniel Wyatt at 2:13 a.m. on 1/9/2024 Electronically Signed: Paloma Maldonado MD  1/9/2024 2:17 AM EST  Workstation ID: [id number]    CT Head Without Contrast    Result Date: 1/9/2024  CT HEAD WO CONTRAST Date of Exam: 1/9/2024 1:53 AM EST Indication: Mental status change, unknown cause. Comparison: 12/9/2023. Technique: Axial CT images were obtained of the head without contrast administration.  Automated exposure control and iterative construction methods were used. Findings: There is no evidence of hemorrhage. There is no mass effect or midline shift. There is no extracerebral collection. Ventricles are normal in size and configuration for patient's stated age.  Posterior fossa is within normal limits. Calvarium and skull base appear intact.   Visualized sinuses show no air fluid levels. Visualized orbits are unremarkable.     Impression: No acute intracranial process identified. Electronically Signed: Paloma Maldonado MD  1/9/2024 2:06 AM EST  Workstation ID: [id number]    XR Chest 1 View    Result Date: 1/9/2024  XR CHEST 1 VW Date of Exam: 1/9/2024 1:18 AM EST Indication: intubation Comparison: 12/14/2023. Findings: Endotracheal tube tip in the mid trachea. The heart appears mildly enlarged, stable as compared to the previous study. There is a right internal jugular Mediport with the tip in the distal SVC. No evidence of pneumothorax. There is mild indistinctness of  the pulmonary vasculature. Patchy airspace disease is seen within the lung bases bilaterally. Small left-sided pleural effusion present.     Impression: Endotracheal tube tip in the mid trachea. Patchy airspace disease present within the lung bases bilaterally, likely related to pneumonia. Small left-sided pleural effusion present. There may  be a mild underlying pulmonary edema pattern. Electronically Signed: Paloma Maldonado MD  1/9/2024 1:33 AM EST  Workstation ID: [id number]    XR Abdomen 2+ VW with Chest 1 VW    Result Date: 1/3/2024  XR ABDOMEN 2+ VIEWS W CHEST 1 VW Date of Exam: 1/3/2024 3:38 PM EST Indication: rule out sbo Comparison: 12/27/2023 Findings: Lines: None Lungs: The lungs are clear. Pleura: Trace bilateral pleural effusions. Cardiomediastinum: The cardiomediastinal silhouette is normal GI tract: Mild to moderate burden throughout the colon. Nonspecific but nonobstructive bowel gas pattern. No pneumoperitoneum. Soft Tissues: Surgical clips in the right upper quadrant. Bones: No acute osseous abnormality.     Impression: No acute abnormality. No evidence of high-grade small bowel obstruction. Mild to moderate stool burden in the colon may represent constipation. Electronically Signed: Mert Briggs DO  1/3/2024 4:09 PM EST  Workstation ID: [id number]    XR Abdomen KUB    Result Date: 12/27/2023  XR ABDOMEN KUB Date of Exam: 12/27/2023 6:40 PM EST Indication: abdominal pain Comparison: CT abdomen pelvis 12/25/2023. Findings: Gas and stool filled large bowel in a nonobstructive pattern. Cholecystectomy clips. Left greater right pleural effusions.     Impression: Gas and stool filled colon in a nonobstructive pattern. Electronically Signed: Mansoor Shelley MD  12/27/2023 7:24 PM EST  Workstation ID: [id number]    IR Port Placement    Result Date: 12/26/2023  IR PORT PLACEMENT  History: starting chemo 12/27  : Rajat Bhandari MD.  Modality: Sonography and fluoroscopy  DOSE REDUCTION: The examination was performed according to departmental dose-optimization program which includes automated exposure control, adjustment of the mA and/or kV. Fluoro time: 0.0 Radiation dose: 0.1 mGy air Kerma.  SEDATION: Moderate sedation was administered. 2 milligram of Versed and 100 micrograms of fentanyl IV was used for moderate sedation. Total intra  service time of sedation was 20 minutes. The sedation was administered and the patient's vital signs monitored throughout the procedure and recorded in the patient's medical record by the nurse under my direct supervision.  Anesthesia: Lidocaine 1% with epinephrine, local infiltration Medicines: None      Estimated blood loss:  < 5 cc.        Technique: A thorough discussion of the risks, benefits, and alternatives of the procedure, and if applicable, moderate sedation, was carried out with the patient. They were encouraged to ask any questions. Any questions were answered. They verbalized understanding. A written informed consent was then signed.  A multi-component timeout was performed prior to starting the procedure using the departmental protocol. The procedure room personnel used personal protective equipment. The operators used sterile gloves and if indicated, sterile gowns. The surgical site was prepped with chlorhexidine gluconate  and draped in the maximal applicable sterile fashion. A preliminary ultrasonogram was performed of the neck that revealed a patent and compressible internal jugular vein. Pertinent ultrasound images were stored in the PACS for documentation. Using aseptic precautions, real-time ultrasound guidance, the internal jugular vein was accessed with a micropuncture needle after local anesthetic infiltration. A 018 guidewire was advanced into the central venous system under fluoroscopic guidance. Over the wire a micropuncture sheath was placed. Through the micropuncture sheath, a 035 wire was advanced into the venous system under fluoroscopic guidance. Over the wire a peel-away sheath was placed. After local anesthesia, using sharp and blunt dissection, a reservoir pocket of appropriate size was created in the infra clavicular chest wall. The port catheter was connected to the port reservoir. The catheter was tunneled to the venotomy site using a  tunneling device. The the reservoir was  placed in the reservoir pocket. The catheter was trimmed to an appropriate length. The catheter was advanced into the venous system through the peel-away sheath which was removed. The port aspirated and flushed well and was terminally packed with heparin 100 units per cc, total 500 units. The port reservoir was irrigated with saline. The incision was closed in layers using absorbable suture and Dermabond. The venotomy site was closed using Dermabond. An aseptic dressing was applied using the protocol for Dermabond. The patient was transferred to the recovery area and was discharged from the department in stable condition.  Complications: None immediate.    Findings: Patent and compressible internal jugular vein. Final image shows the ale catheter to be in good position with the catheter tip at the cavoatrial junction/right atrium, an excellent position for use. There is no immediate complication.      Impression:    Successful ultrasound and fluoroscopic guided right internal jugular vein route single lumen Port-A-Cath placement as described above. Thank you for the opportunity to assist in the care of your patient. Electronically Signed: Rajat Bhandari MD  12/26/2023 3:38 PM EST  Workstation ID: [id number]    US Paracentesis    Result Date: 12/26/2023  US PARACENTESIS  History: ascites/ metastatic pancreatic cancer  : Rajat Bhandari MD.  Modality: Ultrasonography                   Sedation: None. Anesthesia: Lidocaine 1% local infiltration. Estimated blood loss:  0 cc.        Technique: A thorough discussion of the risks, benefits, and alternatives of the procedure, and if applicable, moderate sedation, was carried out with the patient. They were encouraged to ask any questions. Any questions were answered. They verbalized understanding. A written informed consent was then signed.  A multi-component timeout was performed prior to starting the procedure using the departmental protocol. The procedure  room personnel used personal protective equipment. The operators used sterile gloves and if indicated, sterile gowns. The surgical site was prepped with chlorhexidine gluconate  and draped in the maximal applicable sterile fashion. A preliminary ultrasonography was performed to assess the target and determine a safe access site. It showed ascites. Pertinent ultrasound images were stored to the PACS for documentation. The access site was sterilely prepped and draped. Local anesthesia was administered. A dermatotomy was performed if needed. A catheter over the needle system was advanced into the peritoneal cavity. Straw colored fluid was aspirated and the plastic catheter advanced into the peritoneum. The catheter was then connected to a fluid recovery system. At the end of the procedure, the catheter was withdrawn and an aseptic dressing applied. The patient tolerated the procedure well. After uneventful recovery recovery, the patient was discharged from the department in stable condition. The total amount of fluid recovered is given below. Albumin was infused intravenously if ordered by the referring doctor. Complications: None immediate. Specimen: The specimen was labeled and sent to the lab in appropriate carriers & containers if such was requested by the ordering provider.     Impression:                                                              Successful ultrasound-guided paracentesis using a Right upper quadrant access with recovery of 3 liters of fluid as described above. Please note that the patient is developing septated loculated ascites. Thank you for the opportunity to assist in the care of your patient. Electronically Signed: Rajat Bhandari MD  12/26/2023 3:37 PM EST  Workstation ID: [id number]    CT Abdomen Pelvis With Contrast    Result Date: 12/25/2023  CT ABDOMEN PELVIS W CONTRAST Date of Exam: 12/25/2023 4:31 PM EST Indication: history pancreatic cancer with constipation, N/V. Comparison:  12/7/2023 and prior Technique: Axial CT images were obtained of the abdomen and pelvis following the uneventful intravenous administration of 85 mL Isovue-300. Reconstructed coronal and sagittal images were also obtained. Automated exposure control and iterative construction methods were used. FINDINGS: Abdomen/Pelvis: Lower Chest: Small bilateral pleural effusions are noted with associated dependent opacity at the lung bases. No free air noted below the diaphragm. Organs: Patient is status post cholecystectomy. Low-attenuation foci within the liver appear grossly stable given limitations of current exam secondary to motion and streak artifact from patient's upper extremities. The kidneys, spleen and adrenal glands  are unremarkable. The pancreas demonstrates atrophy and prominence of the main duct which is poorly visualized on current study. Mass seen previous study is not as well visualized on current study. Omental and peritoneal carcinomatosis again noted. Narrowing of the celiac axis and portal vein are better seen on previous study. GI/Bowel: There is a moderate-sized hiatal hernia. The stomach demonstrates no acute abnormality. Small bowel demonstrates no obstruction. There is a heavy stool burden. No focal inflammatory changes of the GI tract noted. Moderate to large degree of ascites noted. Findings are similar to the prior study. Scattered mesenteric nodularity densities compatible adenopathy. This is similar to the previous study. Underlying mesenteric implants in the right lower quadrant again suspected Pelvis: The uterus is unremarkable. Ovaries are not well visualized. Urinary bladder is incompletely distended and grossly unremarkable Peritoneum/Retroperitoneum: The aorta is normal in caliber. There is no suspicious retroperitoneal adenopathy Bones/Soft Tissues: No destructive bone lesion     1. Known pancreatic mass with atrophy of the distal body and tail of the pancreas and dilation of the  pancreatic duct is not as well visualized due to technical limitations and artifact on the current study. 2. Peritoneal and omental carcinomatosis and ascites again noted. 3. Heavy stool burden with a nonobstructing pattern 4. Bilateral pleural effusions and dependent consolidation likely atelectasis 5. Moderate size hiatal hernia. 6. Other findings as above Electronically Signed: Jayy Lewis MD  12/25/2023 4:59 PM EST  Workstation ID: [id number]    US Paracentesis    Result Date: 12/22/2023  US PARACENTESIS Date of Exam: 12/22/2023 4:32 PM EST Indication: ascites. Peritoneal carcinomatosis Comparison: None available. Technique: The procedure, risks and options were discussed with the patient and informed written consent was obtained. Ultrasound was performed of the abdomen and a site was chosen over the right side of the abdomen. The skin was marked prepped draped and anesthetized 1% Xylocaine. A 6 [place] catheter was inserted by trocar technique under local anesthesia. A total of 4.7 L of fluid was removed and discarded. Sterile dressings were applied and the patient was returned to the emergency department following the procedure.     Impression: Successful ultrasound-guided paracentesis with removal of 4.7 L of fluid Electronically Signed: Flakito Andino MD  12/22/2023 4:46 PM EST  Workstation ID: [id number]    XR Abdomen KUB    Result Date: 12/15/2023  XR ABDOMEN KUB Date of Exam: 12/15/2023 5:20 AM EST Indication: abd pain; constipation Comparison: CT abdomen and pelvis 12/7/2023. Findings: The patient is status post cholecystectomy. There are phleboliths in the pelvis. There is no distended bowel. There is normal colonic stool and gas. No definite pathologic abdominal calcification. No pneumoperitoneum. There is left basilar atelectasis. No acute osseous abnormality.     Impression: No acute abdominal abnormality. Electronically Signed: Ildefonso Newman MD  12/15/2023 5:38 AM EST  Workstation ID: [id number]    XR  Chest 1 View    Result Date: 12/14/2023  XR CHEST 1 VW Date of Exam: 12/14/2023 10:02 PM EST Indication: leukocytosis Comparison: Chest CT [address] 23. Chest radiograph 12/7/2023. Findings: Improved inspiratory effort compared to prior. Hazy appearance of the lung bases which could reflect persistent small layering effusions. Hiatal hernia noted. Cardiomediastinal silhouette unchanged. No new lobar infiltrate, worsening edema or pneumothorax. Degenerative osseous changes noted.     Impression: Improved inspiration. Questionable residual layering effusions, otherwise no new acute finding. Electronically Signed: Marco A Jane MD  12/14/2023 10:26 PM EST  Workstation ID: [id number]    US Paracentesis    Result Date: 12/14/2023  US PARACENTESIS  History: malignant ascites  : Rajat Bhandari MD.  Modality: Ultrasonography                   Sedation: None. Anesthesia: Lidocaine 1% local infiltration. Estimated blood loss:  0 cc.        Technique: A thorough discussion of the risks, benefits, and alternatives of the procedure, and if applicable, moderate sedation, was carried out with the patient. They were encouraged to ask any questions. Any questions were answered. They verbalized understanding. A written informed consent was then signed.  A multi-component timeout was performed prior to starting the procedure using the departmental protocol. The procedure room personnel used personal protective equipment. The operators used sterile gloves and if indicated, sterile gowns. The surgical site was prepped with chlorhexidine gluconate  and draped in the maximal applicable sterile fashion. A preliminary ultrasonography was performed to assess the target and determine a safe access site. It showed ascites. Pertinent ultrasound images were stored to the PACS for documentation. The access site was sterilely prepped and draped. Local anesthesia was administered. A dermatotomy was performed if needed. A catheter over  the needle system was advanced into the peritoneal cavity. Straw colored fluid was aspirated and the plastic catheter advanced into the peritoneum. The catheter was then connected to a fluid recovery system. At the end of the procedure, the catheter was withdrawn and an aseptic dressing applied. The patient tolerated the procedure well. After uneventful recovery recovery, the patient was discharged from the department in stable condition. The total amount of fluid recovered is given below. Albumin was infused intravenously if ordered by the referring doctor. Complications: None immediate. Specimen: The specimen was labeled and sent to the lab in appropriate carriers & containers if such was requested by the ordering provider.     Impression:                                                              Successful ultrasound-guided paracentesis using a left lower quadrant access with recovery of 1.6 liters of fluid as described above. Thank you for the opportunity to assist in the care of your patient. Electronically Signed: Rajat Bhandari MD  12/14/2023 12:38 PM EST  Workstation ID: [id number]      ASSESSMENT/PLAN:  Metastatic malignancy with malignant ascites, presumed pancreatic origin  -She is status post cycle 1 of palliative chemotherapy, today is day 12.  Her blood counts are declining consistent with recent chemotherapy but her platelet count is currently adequate for anticoagulation and thrombectomy.  -Outpatient PET was planned and she was scheduled for follow-up with repeat chemotherapy next week.  Discussed prognosis extensively with  and son.  Further cancer directed therapy on hold pending clinical improvement.    2.  Massive pulmonary embolism  3.  Hypoxic respiratory failure secondary #2  -Provoked in the context of underlying malignancy  CT of the chest and head CT reviewed.  She had a prior MRI showing no CNS malignancy. Agree with anticoagulation and thrombectomy as are currently  planned.  -Discussed guarded prognosis with patient and his son.  -Case discussed with ICU attending .      Sherri العراقي MD    1/9/2024

## 2024-01-09 NOTE — OUTREACH NOTE
Medical Week 2 Survey      Flowsheet Row Responses   [facility] patient discharged from? [place]   Does the patient have one of the following disease processes/diagnoses(primary or secondary)? Other   Week 2 attempt successful? No   Unsuccessful attempts Attempt 1   Revoke Readmitted            Doreen GRACIA - Registered Nurse

## 2024-01-09 NOTE — PROGRESS NOTES
Brief procedure note:  Status post percutaneous pulmonary mechanical thrombectomy for submassive PE    Initial RVSP 41 mmHg, final RVSP 31 mmHg.    Significant clot retrieved from the right pulmonary artery.    Plan:  2 Perclose sutures deployed for the right femoral vein closure, a figure-of-eight stitch was also placed which can be removed tomorrow 1/10/2024 if no bleeding complications are present.    Resume heparin drip in 2 hours around 1800 on 1/9/24

## 2024-01-09 NOTE — PROGRESS NOTES
Intensive Care Follow-up     Hospital:  LOS: 0 days   Ms. Jessica Kraft, 63 y.o. female is followed for:   Acute respiratory failure with hypoxia            History of present illness:   63-year-old female with recent diagnosis of metastatic malignancy of possible pancreatic region.  Definitive biopsy with EUS was done but was limited sampling.  Patient is followed closely by medical oncology and undergoing chemotherapy with first dose given on 12/29.  Patient recently was diagnosed with severe constipation and was treated with bowel regimen and enema and improved.  However yesterday patient started having nausea and vomiting and was brought to emergency room.  Patient did not complain of any chest pain or shortness of breath.  Patient continued to have nausea and while in the ER she cutely decompensated where she became severely hypoxic with altered mental status.  Patient underwent emergent intubation in the emergency room and subsequently CT scan of the head, chest and abdomen and pelvis was done.  Head CT scan was negative.  Chest CT scan showed extensive clot in both right and left main pulmonary arteries extending to proximal segment branches of right upper lobe, right lower lobe and both left lower lobe and upper lobe branches as well.  No significant right heart strain noted.  Patchy airspace disease noted bilateral lower lobes which was concerning for pneumonia versus pulmonary infarct.  Ascites was present and peritoneal carcinomatosis noted again.  Patient was also hypotensive requiring low-dose norepinephrine at this point.  Patient was started on antibiotics, IV heparin and admitted to ICU for ongoing cares      Subjective   Interval History:  Today morning when I arrived patient was severely hypoxic on 100% FiO2 and 10 of PEEP.  Plateau pressures are acceptable on the vent at 16. Peak pressures 18.  Given patient's respiratory and hemodynamic instability we got stat echocardiogram which showed normal  ejection fraction.  No right heart strain noted.  Troponin is flat.  proBNP is acceptable.  Case discussed with medical oncology as well as interventional cardiology.  We discussed options of thrombolysis which could be problematic in this patient given extensive metastatic disease and risk of bleeding.  Patient is already somewhat coagulopathy.  We discussed mechanical thrombectomy.  Patient does not Christiane meet all the strict criteria's for mechanical thrombectomy but given the situation where she likely has developing ARDS from aspiration and needs better perfusion to that part of the lung to hopefully improve oxygenation.  Given the scenario we think mechanical thrombectomy may be an option to try to give patient a chance to recover.  I have had extensive discussion with patient's son and  and explained to them and they are amenable to proceeding.                 The patient's past medical, surgical and social history were reviewed and updated in Epic as appropriate.       Objective     Infusions:  dexmedetomidine, 0.2-1.5 mcg/kg/hr (Order-Specific), Last Rate: 0.5 mcg/kg/hr (01/09/24 1236)  fentanyl 10 mcg/mL,  mcg/hr, Last Rate: 175 mcg/hr (01/09/24 1331)  heparin, 16.5 Units/kg/hr (Order-Specific), Last Rate: 16.5 Units/kg/hr (01/09/24 1105)  norepinephrine, 0.02-0.3 mcg/kg/min (Order-Specific), Last Rate: 0.08 mcg/kg/min (01/09/24 1105)  Pharmacy to Dose Heparin,   Pharmacy to dose vancomycin,   propofol, 5-50 mcg/kg/min (Order-Specific)  sodium chloride, 150 mL/hr, Last Rate: 150 mL/hr (01/09/24 0544)      Medications:  [START ON 1/10/2024] Pharmacy Consult, , Does not apply, Once  chlorhexidine, 15 mL, Mouth/Throat, Q12H  pantoprazole, 40 mg, Intravenous, Q24H  piperacillin-tazobactam, 4.5 g, Intravenous, Q8H  senna-docusate sodium, 2 tablet, Oral, BID  sodium chloride, 10 mL, Intravenous, Q12H  [START ON 1/10/2024] vancomycin, 15 mg/kg, Intravenous, Q18H        Vital Sign Min/Max for last 24  "hours  Temp  Min: 97.4 °F (36.3 °C)  Max: 100.2 °F (37.9 °C)   BP  Min: 73/48  Max: 152/87   Pulse  Min: 84  Max: 109   Resp  Min: 18  Max: 22   SpO2  Min: 87 %  Max: 100 %   No data recorded       Input/Output for last 24 hour shift  01/08 0701 - 01/09 0700  In: 803 [I.V.:803]  Out: 500 [Urine:150]   Mode: VC+/AC  FiO2 (%):  [100 %] 100 %  S RR:  [14-16] 16  S VT:  [420 mL] 420 mL  PEEP/CPAP (cm H2O):  [5 cm H20-10 cm H20] 10 cm H20  MAP (cm H2O):  [7.8-12] 12  Objective:  Vital signs: (most recent): Blood pressure 92/61, pulse 94, temperature 100.2 °F (37.9 °C), temperature source Bladder, resp. rate 22, height 165.1 cm (65\"), weight 91.2 kg (201 lb), SpO2 90%.            General Appearance:  Not in distress, no asynchrony with mechanical ventilator.  Head: Atraumatic, Normocephalic, Pupils reactive & symmetrical B/L.  Neck:  Endotracheal tube clean.   Lungs:   B/L Breath sounds present with decreased breath sounds on bases, no wheezing heard, occ crackles.   Heart: S1 and S2 present, no murmur  Abdomen: Distended, no guarding or rigidity, bowel sounds positive.  Extremities: + non pitting edema, warm to touch.  Pulses: Positive and symmetric.  Neurologic:  Pt sedated on the vent but follows simple commands    Ventilator settings: A/C: FiO2 100%/PEEP 10                                 Peak pressure 18/plateau pressure 16      Results from last 7 days   Lab Units 01/09/24  0224 01/08/24 2137 01/03/24  1304   WBC 10*3/mm3 3.39* 8.05 12.22*   HEMOGLOBIN g/dL 8.6* 12.1 13.2   PLATELETS 10*3/mm3 138* 211 191     Results from last 7 days   Lab Units 01/08/24 2137 01/03/24  1304   SODIUM mmol/L 131* 128*   POTASSIUM mmol/L 3.9 4.8   CO2 mmol/L 27.0 27.0   BUN mg/dL 18 17   CREATININE mg/dL 0.89 0.82   MAGNESIUM mg/dL 1.8  --    PHOSPHORUS mg/dL 2.5  --    GLUCOSE mg/dL 130* 139*     Estimated Creatinine Clearance: 73.1 mL/min (by C-G formula based on SCr of 0.89 mg/dL).    Results from last 7 days   Lab Units " 01/09/24  0223   PH, ARTERIAL pH units 7.434   PCO2, ARTERIAL mm Hg 43.1   PO2 ART mm Hg 69.0*       Images:   Multiple imaging studies reviewed.       I reviewed the patient's results and images.     Echo reviewed.  Interpretation Summary         Left ventricular systolic function is normal. Calculated left ventricular EF = 61.7%    Mild to moderate AI    No pericardial effusion\    Assessment & Plan   Impression        Acute respiratory failure with hypoxia    Hypothyroidism due to Hashimoto's thyroiditis    Abdominal carcinomatosis - omental    Peritoneal carcinomatosis    Pancreatic cancer    Acute pulmonary embolism    Aspiration pneumonia due to vomitus       Plan        1.  Patient presenting with GI complaints and decompensated in the emergency room and found to have bilateral extensive pulmonary emboli.  Subsequently developed significant hypoxemic respiratory failure and hemodynamic instability.  I am not sure about the timing of PE but could be possible that she threw a clot while in the emergency room which led to the sudden decline.  Due to the extensive nature of blood clot I do think that is more plausible thing which happen.  She also may have component of aspiration pneumonia from frequent vomiting.  Likely we are dealing with dual insult to the lung 1 could be inflammation/ARDS physiology from aspiration on top of that pretty extensive clot burden in the lung not helping the situation.  Given this scenario had extensive discussions with cardiology team as well as patient's family and I think mechanical thrombectomy would be the way to go to try to improve pulmonary perfusion to help with oxygenation to give patient some time to recover from underlying inflammation.  Discussed with family that she does not meet all the strict criteria for mechanical thrombectomy but given the unique nature of this case this may be her best option.  Discussed thrombolytic therapy.  they also agreed that approach is  also fraught with significant risks.  2.  Continue empiric antibiotic therapy with Zosyn.  Cultures have been sent and pending.  MRSA swab is negative.  Will go ahead and stop vancomycin for now.  3.  Resume patient's home dose of thyroid supplementation.  4.  GI prophylaxis.  5.  If start having diarrhea will check GI PCR.  6.  I will check respiratory viral PCR to rule out any viral infection playing a role.  7.  Continue current sedation for comfort and ventilatory synchrony.  8.  Patient's abdomen is distended with ascites but that is a chronic finding.  I do not think abdominal distention is playing a role in her respiratory symptoms as peak and plateau pressures on the ventilator are acceptable.  Will monitor closely.    Appreciate all consultants help in managing this difficult case.  Extensive discussion held with family.  Patient remains full code.      Plan of care and goals reviewed with multidisciplinary/antibiotic stewardship team during rounds.   I discussed the patient's findings and my recommendations with family, nursing staff, and consulting provider     Time spent Critical care 50 min (exclusive of procedure time)  including high complexity decision making to assess, manipulate, and support vital organ system failure in this individual who has impairment of one or more vital organ systems such that there is a high probability of imminent or life threatening deterioration in the patient’s condition.      Maico Rosales MD, [facility]  Pulmonary, Critical care and Sleep Medicine

## 2024-01-09 NOTE — PLAN OF CARE
Goal Outcome Evaluation:              Outcome Evaluation: Patient admitted from ED at 0525, intubated on 100%FiO2. OGT in place and placed to LWS; placement confirmed by XR. Romero catheter placed with 150 mL urine returned. Sputum culture sent to lab. Levophed started for SBP in the 70's after right port-a-cath accessed using aseptic technique. Fentanyl and Precedex infusing upon arrival; Precedex turned off.

## 2024-01-09 NOTE — PROGRESS NOTES
"Pharmacy Consult-Vancomycin Dosing  Jessica Kratf is a  63 y.o. female receiving vancomycin therapy.     Indication: PNA  Consulting Provider: intensivist   ID Consult:     Goal AUC: 400 - 600 mg/L*hr    Current Antimicrobial Therapy  Anti-Infectives (From admission, onward)      Ordered     Dose/Rate Route Frequency Start Stop    01/09/24 0238  piperacillin-tazobactam (ZOSYN) 4.5 g in iso-osmotic dextrose 100 mL IVPB (premix)        Ordering Provider: Kvng Nieves MD    4.5 g  over 4 Hours Intravenous Every 8 Hours 01/09/24 0800 01/14/24 0759    01/09/24 0238  Pharmacy to dose vancomycin        Ordering Provider: Kvng Nieves MD     Does not apply Continuous PRN 01/09/24 0238 01/14/24 0237    01/09/24 0118  vancomycin 1750 mg/500 mL 0.9% NS IVPB ([facility])        Ordering Provider: Daniel Marcus MD    20 mg/kg × 91.2 kg  over 105 Minutes Intravenous Once 01/09/24 0134 01/09/24 0510    01/09/24 0118  piperacillin-tazobactam (ZOSYN) 3.375 g in iso-osmotic dextrose 50 ml (premix)        Ordering Provider: Daniel Marcus MD    3.375 g Intravenous Once 01/09/24 0134 01/09/24 0249            Allergies  Allergies as of 01/08/2024    (No Known Allergies)       Labs    Results from last 7 days   Lab Units 01/08/24  2137 01/03/24  1304   BUN mg/dL 18 17   CREATININE mg/dL 0.89 0.82       Results from last 7 days   Lab Units 01/09/24  0224 01/08/24  2137 01/03/24  1304   WBC 10*3/mm3 3.39* 8.05 12.22*       Evaluation of Dosing     Last Dose Received in the ED/Outside Facility: 1750mg  Is Patient on Dialysis or Renal Replacement: no    Ht - 167.6 cm (65.98\")  Wt - 91.2 kg (201 lb)    Estimated Creatinine Clearance: 74.6 mL/min (by C-G formula based on SCr of 0.89 mg/dL).    Intake & Output (last 3 days)         01/06 0701  01/07 0700 01/07 0701 01/08 0700 01/08 0701 01/09 0700    I.V. (mL/kg)   803 (8.8)    Total Intake(mL/kg)   803 (8.8)    Emesis/NG output   350    Total Output   350    Net   +453     "               Microbiology and Radiology  Microbiology Results (last 10 days)       ** No results found for the last 240 hours. **            Reported Vancomycin Levels                         [facility] AUC Calculation:    Current AUC:   0 mg/L*hr    Predicted Steady State AUC :   442 mg/L*hr    Assessment/Plan: The patient will be started on a bolus of 20mg/kg for a dose of 1750mg . Will initiate maintenance dose at 1250 mg IV every 18 hours.  Plan for trough as patient approaches steady state, prior to the 3rd dose.  Due to infection severity, will target an AUC of 400-600.      Pharmacy will continue to follow the patient’s culture results and clinical progress daily.    Lukas Acosta, CarolineD

## 2024-01-09 NOTE — H&P
Chief complaint: Vomiting    Subjective     63-year-old female who developed progressive abdominal and back pain as well as distention over several months resulting in her presentation in early December.  She developed significant constipation.  CT imaging at that time revealed a mass in the mid body of the pancreas measuring 2.4 cm there was peritoneal and omental carcinomatosis and large volume ascites.  There was some small indeterminate liver lesions and very tiny pulmonary nodules up to 3 mm and bilateral pleural fluid.  She underwent paracentesis.  She underwent EGD and pancreatic EUS with biopsy on 12/13.  A large hiatal hernia was incidentally noted.  The biopsy was nondiagnostic felt possibly due to the large hiatal hernia which inhibited sampling.  She was diagnosed with a pancreaticobiliary primary.    She has been seen by Dr. العراقي in oncology.  A PET scan is planned but it was decided to proceed with first-line therapy with FOLFIRINOX.  She has had ongoing problems with poor p.o. intake and constipation but there has been no evidence of bowel obstruction by imaging.  She has had significant hyponatremia.    She has had increasing nausea and vomiting over the last 24 hours.  What she has been vomiting looks like bile.  She has been very weak.  She was brought to the ER this evening.  Soon after arrival she developed intractable vomiting and then had respiratory deterioration likely due to aspiration requiring intubation.  I was asked to admit her to ICU for further management.      History  Past Medical History:   Diagnosis Date    Abscess     Anxiety     Bilateral ovarian cysts     Depression     Encounter for routine gynecological examination     Foot pain     H/O bone density study 06/2014    H/O mammogram 07/2016    [facility]    Hot flashes, menopausal     Hypothyroidism due to Hashimoto's thyroiditis     Insomnia     Miscarriage     x3    Osteopenia     Pap smear for cervical cancer  "screening 06/2014    Routine general medical examination at a health care facility     Urinary incontinence     Urinary tract infection     Vitamin D deficiency      Past Surgical History:   Procedure Laterality Date    ABDOMINOPLASTY  2001    CHOLECYSTECTOMY  2008    COLONOSCOPY  07/2012    [facility] Gaines in [facility] normal    ECTOPIC PREGNANCY SURGERY Left 1994    ENDOSCOPY N/A 12/13/2023    Procedure: ESOPHAGOGASTRODUODENOSCOPY;  Surgeon: Flakito Abrams MD;  [facility];  Service: Gastroenterology;  Laterality: N/A;    HERNIA REPAIR  2010     Family History   Problem Relation Age of Onset    Mental illness Mother     Depression Mother     Thyroid disease Mother     Hypertension Father     Thyroid disease Sister     Breast cancer Other     Breast cancer Maternal Aunt      Social History     Tobacco Use    Smoking status: Never    Smokeless tobacco: Never   Vaping Use    Vaping Use: Never used   Substance Use Topics    Alcohol use: No    Drug use: No       Review of Systems   Review of systems could not be obtained due to   patient intubated.      Objective     Vital Signs  Temp:  [97.4 °F (36.3 °C)] 97.4 °F (36.3 °C)  Heart Rate:  [] 97  Resp:  [18] 18  BP: (115-152)/(79-89) 115/79  FiO2 (%):  [100 %] 100 %    Physical Exam:    Objective:  General Appearance:  Ill-appearing.    Vital signs: (most recent): Blood pressure 115/79, pulse 97, temperature 97.4 °F (36.3 °C), temperature source Oral, resp. rate 18, height 167.6 cm (65.98\"), weight 91.2 kg (201 lb), SpO2 100%.    HEENT: (Oral ET tube)    Lungs:  There are rhonchi.  No rales or wheezes.    Heart: Normal rate.  Regular rhythm.  S1 normal and S2 normal.  No murmur, gallop or friction rub.   Chest: Symmetric chest wall expansion.   Abdomen: Abdomen is soft and non-distended.  Bowel sounds are normal.   There is no abdominal tenderness.   There is no mass. There is no splenomegaly. There is no hepatomegaly.   Extremities: There is no " deformity or dependent edema.    Neurological: (Sedated).    Pupils:  Pupils are equal, round, and reactive to light.    Skin:  Warm and dry.                Results Review:    I reviewed the patient's new clinical results.  I reviewed the patient's new imaging results and agree with the interpretation.  I reviewed the patient's other test results and agree with the interpretation    Assessment & Plan     Assessment:    Active Hospital Problems    Diagnosis     **Acute respiratory failure with hypoxia     Acute pulmonary embolism     Aspiration pneumonia due to vomitus     Pancreatic cancer     Abdominal carcinomatosis - omental     Peritoneal carcinomatosis     Hypothyroidism due to Hashimoto's thyroiditis        63-year-old female with a recent diagnosis of metastatic pancreatic cancer in December and ongoing issues with poor p.o. intake and diminished bowel function now presents with nausea and vomiting with witnessed aspiration and also has evidence of bilateral pulmonary emboli on CT scan.  She appears to have respiratory failure primarily on the basis of her aspiration.  She is normotensive and there is no right heart strain on CT scan so plans are for standard anticoagulation and no thrombolytics.  Will also proceed with broad-spectrum antibiotics due to her pneumonia.    Plan:    ICU admission  Mechanical ventilation  Heparin bolus and drip  Broad-spectrum antibiotics with vancomycin and Zosyn  IV fluid resuscitation  Guarded prognosis has been discussed with the patient's  and at this point family wants all aggressive measures.    I discussed the patients findings and my recommendations with family, nursing staff, and Dr. Marcus .     Critical Care time spent in direct patient care: 50 minutes (excluding procedure time, if applicable) including high complexity decision making to assess, manipulate, and support vital organ system failure in this individual who has impairment of one or more vital organ  systems such that there is a high probability of imminent or life threatening deterioration in the patient’s condition.      Kvng Nieves MD  Pulmonary and Critical Care Medicine  01/09/24  02:29 EST

## 2024-01-09 NOTE — PROCEDURES
Insert Arterial Line    Date/Time: 1/9/2024 6:39 PM    Performed by: Perla Acosta APRN  Authorized by: Perla Acosta APRN    Universal Protocol:     Emergent situation      Patient identity confirmed:  Arm band and hospital-assigned identification number    Time out: Immediately prior to the procedure a time out was called    A time out verifies correct patient, procedure, equipment, support staff and site/side marked as required:   Preparation:     Preparation: Patient was prepped and draped in usual sterile fashion    Indications:     Indications: multiple ABGs, respiratory failure and hemodynamic monitoring    Location:     Location:  Right radial  Procedure Details:     Ultrasound Guidance: yes  The ultrasound was used for evaluation of possible access sites.  Vessel patency was confirmed with the ultrasound.  Needle entry into vessel was visualized in realtime with the ultrasound.       Heri's test normal?: Yes      Needle gauge:  20    Seldinger technique: Seldinger technique used      Number of attempts:  1  Post-procedure:     Post-procedure:  Line sutured and dressing applied    Post-procedure CMS:  Normal and unchanged     patient tolerated the procedure well with no immediate complications     Inserted with ultrasound guidance with bright red pulsatile blood detected. Connected to pressure tubing with arterial waveform present. Suture placed and sterile dressing applied.     Perla Acosta, MSN, APRN, [facility]  Pulmonary and Critical Care Medicine

## 2024-01-09 NOTE — PROGRESS NOTES
HEPARIN INFUSION  Jessica Kraft is a  63 y.o. female receiving heparin infusion.     Therapy for (VTE/Cardiac):   VTE  Patient Weight: 91 kg  Initial Bolus (Y/N):   n  Any Bolus (Y/N):   y        Signs or Symptoms of Bleeding: none noted    Recommend Xa every 6 hours.   VTE (PE/DVT)   Initial Bolus: 80 units/kg (Max 10,000 units)  Initial rate: 18 units/kg/hr (Max 1,500 units/hr)    Anti Xa Rebolus Infusion Hold time Change infusion Dose (Units/kg/hr) Next Anti Xa Level Due   < 0.11 50 Units/kg  (4000 Units Max) None Increase by  4 Units/kg/hr 6 hours   0.11 - 0.19 25 Units/kg  (2000 Units Max) None Increase by  3 Units/kg/hr 6 hours   0.2 - 0.29 0 None Increase by  2 Units/kg/hr 6 hours   0.3 - 0.7 0 None No Change 6 hours (after 2 consecutive levels in range check qAM)   0.71 - 0.8 0 None Decrease by  1 Units/kg/hr 6 hours   0.81 - 0.9 0 None Decrease by  2 Units/kg/hr 6 hours   0.91 - 1 0 60 Minutes Decrease by  3 Units/kg/hr 6 hours   >1 0 Hold  After Anti Xa less than 0.7 decrease previous rate by  4 Units/kg/hr  Every 2 hours until Anti Xa is less than 0.7 then when infusion restarts in 6 hours     Results from last 7 days   Lab Units 01/08/24  2137 01/03/24  1304   HEMOGLOBIN g/dL 12.1 13.2   HEMATOCRIT % 37.4 41.3   PLATELETS 10*3/mm3 211 191          Date   Time   Anti-Xa Current Rate (Unit/kg/hr) Bolus   (Units) Rate Change   (Unit/kg/hr) New Rate (Unit/kg/hr) Next   Anti-Xa Comments  Pump Check Daily   1/9 0245 STAT new -- 16.5 16.5 0900 D/w RN   1/9 0912 0.32 16.5 -- -- 16.5 1600 Pump Reviewed - D/w JESS Addison, Pharmacy Intern  01/09/24  10:09 EST

## 2024-01-09 NOTE — PROGRESS NOTES
Called to cath lab as pt desaturated while undergoing mechanical thrombectomy.  Patient was bagged ventilated with oxygen saturations improving to 86% with 15 of PEEP.  Procedure was carried out.  Clot appears more chronic.  Cardiology team was not able to fully remove the clot but corticoid chunks of blood clot removed from the right circulation and pre and post angiogram shows improvement in pulmonary circulation.  Patient was brought back to ICU room.  She was desaturating.  We bagged ventilated her for 15 minutes.  Patient was placed back on 100% FiO2 and 15 of PEEP and she seems to be saturating 92%.  Arterial line placed.  Will follow blood gases.  Cardiology team want to hold anticoagulation for 2 hours and if she is still hypoxic then will consider prone positioning to see if that will buy some more time.    I have had extensive discussion with patient's  and son.  They are aware of patient's critical illness and overall poor and guarded prognosis.  Discussed CODE STATUS.  Family wants to think about it.    Another 45 minutes spent with the patient stabilizing from respiratory standpoint.

## 2024-01-09 NOTE — ED PROVIDER NOTES
Subjective   History of Present Illness  Patient presents for evaluation of symptoms including intractable nausea and vomiting, sore throat and sore mouth, abdominal distention and abdominal pain.  The symptoms have been persistent for days and even weeks in the setting of a diagnosed metastatic pancreatic cancer and December of last year.  She has been able to tolerate small amounts of water but then will vomit not long after and feels as if she is getting dehydrated.    History provided by:  Patient and significant other      Review of Systems    Past Medical History:   Diagnosis Date    Abscess     Anxiety     Bilateral ovarian cysts     Depression     Encounter for routine gynecological examination     Foot pain     H/O bone density study 06/2014    H/O mammogram 07/2016    [facility]    Hot flashes, menopausal     Hypothyroidism due to Hashimoto's thyroiditis     Insomnia     Miscarriage     x3    Osteopenia     Pap smear for cervical cancer screening 06/2014    Routine general medical examination at a health care facility     Urinary incontinence     Urinary tract infection     Vitamin D deficiency        No Known Allergies    Past Surgical History:   Procedure Laterality Date    ABDOMINOPLASTY  2001    CHOLECYSTECTOMY  2008    COLONOSCOPY  07/2012    [facility] in [facility] normal    ECTOPIC PREGNANCY SURGERY Left 1994    ENDOSCOPY N/A 12/13/2023    Procedure: ESOPHAGOGASTRODUODENOSCOPY;  Surgeon: Flakito Abrams MD;  Location: [facility] ENDOSCOPY;  Service: Gastroenterology;  Laterality: N/A;    HERNIA REPAIR  2010       Family History   Problem Relation Age of Onset    Mental illness Mother     Depression Mother     Thyroid disease Mother     Hypertension Father     Thyroid disease Sister     Breast cancer Other     Breast cancer Maternal Aunt        Social History     Socioeconomic History    Marital status:    Tobacco Use    Smoking status: Never    Smokeless tobacco: Never   Vaping Use    Vaping Use:  Never used   Substance and Sexual Activity    Alcohol use: No    Drug use: No    Sexual activity: Yes     Partners: Male     Birth control/protection: None, Post-menopausal     Comment:            Objective   Physical Exam  Constitutional:       General: She is not in acute distress.     Appearance: She is ill-appearing.   HENT:      Head: Normocephalic and atraumatic.      Mouth/Throat:      Mouth: Mucous membranes are dry.   Eyes:      Conjunctiva/sclera: Conjunctivae normal.      Pupils: Pupils are equal, round, and reactive to light.   Cardiovascular:      Rate and Rhythm: Normal rate and regular rhythm.      Pulses: Normal pulses.      Heart sounds: No murmur heard.     No gallop.   Pulmonary:      Effort: Pulmonary effort is normal. No respiratory distress.   Abdominal:      General: Abdomen is flat. There is distension.      Tenderness: There is abdominal tenderness.   Musculoskeletal:         General: No swelling or deformity. Normal range of motion.   Skin:     General: Skin is warm and dry.      Capillary Refill: Capillary refill takes 2 to 3 seconds.   Neurological:      General: No focal deficit present.      Mental Status: She is alert and oriented to person, place, and time.   Psychiatric:         Mood and Affect: Mood normal.         Behavior: Behavior normal.         Critical Care    Performed by: Daniel Marcus MD  Authorized by: Daniel Marcus MD    Critical care provider statement:     Critical care time (minutes):  60    Critical care time was exclusive of:  Separately billable procedures and treating other patients    Critical care was necessary to treat or prevent imminent or life-threatening deterioration of the following conditions:  Respiratory failure and sepsis    Critical care was time spent personally by me on the following activities:  Development of treatment plan with patient or surrogate, discussions with consultants, evaluation of patient's response to treatment, examination  of patient, obtaining history from patient or surrogate, ordering and performing treatments and interventions, ordering and review of laboratory studies, ordering and review of radiographic studies, pulse oximetry, re-evaluation of patient's condition and review of old charts    I assumed direction of critical care for this patient from another provider in my specialty: no      Care discussed with: admitting provider               ED Course                                             Medical Decision Making  Differential diagnosis includes sequela of known metastatic pancreatic cancer, malignant ascites, adverse effects to chemotherapy, bowel obstruction, constipation, gastritis or GERD.  Lab studies were conducted.  Supportive care with IV fluids, 4 mg IV Zofran, GI cocktail and viscous lidocaine.  Despite multiple rounds of antiemetics patient continues to have intractable nausea and vomiting.  She then has an abrupt decline with hypoxia, somnolence and is no longer protecting her airway.  Emergent endotracheal intubation was performed.  CT scans were conducted demonstrating pulmonary embolism and heparin drip was initiated.  Patient was started on broad-spectrum antibiotics for pneumonia.      Problems Addressed:  Acute respiratory failure with hypoxia: complicated acute illness or injury  Aspiration into airway, initial encounter: complicated acute illness or injury  Somnolence: complicated acute illness or injury    Amount and/or Complexity of Data Reviewed  Labs: ordered.     Details: Laboratory workup independently interpreted by myself is notable for anemia, leukopenia, elevated lactic acid level  Radiology: ordered and independent interpretation performed.     Details: CT scans of the chest abdomen pelvis and head were independently interpreted by myself and demonstrate pulmonary emboli, bilateral pneumonia, ascites without other acute intra-abdominal process.  ECG/medicine tests: ordered and independent  interpretation performed.     Details: Twelve-lead ECG independently interpreted by myself demonstrates sinus tachycardia with a rate of 111, no ST segment elevation or depression.  Discussion of management or test interpretation with external provider(s): Intensive care team was consulted for admission    Risk  OTC drugs.  Prescription drug management.  Drug therapy requiring intensive monitoring for toxicity.  Decision regarding hospitalization.    Critical Care  Total time providing critical care: 60 minutes        Final diagnoses:   Acute respiratory failure with hypoxia   Somnolence   Aspiration into airway, initial encounter   Bilateral pulmonary embolism       ED Disposition  ED Disposition       ED Disposition   Decision to Admit    Condition   --    Comment   Level of Care: Critical Care [6]   Diagnosis: Acute respiratory failure with hypoxia [865620]   Admitting Physician: KINSEY RIVERA [[id number]]   Certification: I Certify That Inpatient Hospital Services Are Medically Necessary For Greater Than 2 Midnights               Recent Results (from the past 24 hour(s))   Comprehensive Metabolic Panel    Collection Time: 01/08/24  9:37 PM    Specimen: Blood   Result Value Ref Range    Glucose 130 (H) 65 - 99 mg/dL    BUN 18 8 - 23 mg/dL    Creatinine 0.89 0.57 - 1.00 mg/dL    Sodium 131 (L) 136 - 145 mmol/L    Potassium 3.9 3.5 - 5.2 mmol/L    Chloride 89 (L) 98 - 107 mmol/L    CO2 27.0 22.0 - 29.0 mmol/L    Calcium 8.3 (L) 8.6 - 10.5 mg/dL    Total Protein 6.3 6.0 - 8.5 g/dL    Albumin 3.1 (L) 3.5 - 5.2 g/dL    ALT (SGPT) 13 1 - 33 U/L    AST (SGOT) 21 1 - 32 U/L    Alkaline Phosphatase 202 (H) 39 - 117 U/L    Total Bilirubin 0.3 0.0 - 1.2 mg/dL    Globulin 3.2 gm/dL    A/G Ratio 1.0 g/dL    BUN/Creatinine Ratio 20.2 7.0 - 25.0    Anion Gap 15.0 5.0 - 15.0 mmol/L    eGFR 73.4 >60.0 mL/min/1.73   Lipase    Collection Time: 01/08/24  9:37 PM    Specimen: Blood   Result Value Ref Range    Lipase 30 13 - 60  U/L   Green Top (Gel)    Collection Time: 01/08/24  9:37 PM   Result Value Ref Range    Extra Tube Hold for add-ons.    Lavender Top    Collection Time: 01/08/24  9:37 PM   Result Value Ref Range    Extra Tube hold for add-on    Gold Top - SST    Collection Time: 01/08/24  9:37 PM   Result Value Ref Range    Extra Tube Hold for add-ons.    Gray Top    Collection Time: 01/08/24  9:37 PM   Result Value Ref Range    Extra Tube Hold for add-ons.    Light Blue Top    Collection Time: 01/08/24  9:37 PM   Result Value Ref Range    Extra Tube Hold for add-ons.    CBC Auto Differential    Collection Time: 01/08/24  9:37 PM    Specimen: Blood   Result Value Ref Range    WBC 8.05 3.40 - 10.80 10*3/mm3    RBC 4.37 3.77 - 5.28 10*6/mm3    Hemoglobin 12.1 12.0 - 15.9 g/dL    Hematocrit 37.4 34.0 - 46.6 %    MCV 85.6 79.0 - 97.0 fL    MCH 27.7 26.6 - 33.0 pg    MCHC 32.4 31.5 - 35.7 g/dL    RDW 15.3 12.3 - 15.4 %    RDW-SD 46.6 37.0 - 54.0 fl    MPV 9.9 6.0 - 12.0 fL    Platelets 211 140 - 450 10*3/mm3    Neutrophil % 82.2 (H) 42.7 - 76.0 %    Lymphocyte % 8.6 (L) 19.6 - 45.3 %    Monocyte % 6.7 5.0 - 12.0 %    Eosinophil % 1.9 0.3 - 6.2 %    Basophil % 0.2 0.0 - 1.5 %    Immature Grans % 0.4 0.0 - 0.5 %    Neutrophils, Absolute 6.62 1.70 - 7.00 10*3/mm3    Lymphocytes, Absolute 0.69 (L) 0.70 - 3.10 10*3/mm3    Monocytes, Absolute 0.54 0.10 - 0.90 10*3/mm3    Eosinophils, Absolute 0.15 0.00 - 0.40 10*3/mm3    Basophils, Absolute 0.02 0.00 - 0.20 10*3/mm3    Immature Grans, Absolute 0.03 0.00 - 0.05 10*3/mm3    nRBC 0.0 0.0 - 0.2 /100 WBC   Phosphorus    Collection Time: 01/08/24  9:37 PM    Specimen: Blood   Result Value Ref Range    Phosphorus 2.5 2.5 - 4.5 mg/dL   Magnesium    Collection Time: 01/08/24  9:37 PM    Specimen: Blood   Result Value Ref Range    Magnesium 1.8 1.6 - 2.4 mg/dL   Lactic Acid, Plasma    Collection Time: 01/08/24  9:37 PM    Specimen: Blood   Result Value Ref Range    Lactate 2.3 (C) 0.5 - 2.0 mmol/L    Urinalysis With Microscopic If Indicated (No Culture) - Urine, Clean Catch    Collection Time: 01/08/24  9:38 PM    Specimen: Urine, Clean Catch   Result Value Ref Range    Color, UA Dark Yellow (A) Yellow, Straw    Appearance, UA Cloudy (A) Clear    pH, UA 5.5 5.0 - 8.0    Specific Gravity, UA 1.045 (H) 1.001 - 1.030    Glucose, UA Negative Negative    Ketones, UA Trace (A) Negative    Bilirubin, UA Negative Negative    Blood, UA Negative Negative    Protein, UA 30 mg/dL (1+) (A) Negative    Leuk Esterase, UA Trace (A) Negative    Nitrite, UA Negative Negative    Urobilinogen, UA 1.0 E.U./dL 0.2 - 1.0 E.U./dL   Urinalysis, Microscopic Only - Urine, Clean Catch    Collection Time: 01/08/24  9:38 PM    Specimen: Urine, Clean Catch   Result Value Ref Range    RBC, UA 0-2 None Seen, 0-2 /HPF    WBC, UA 6-10 (A) None Seen, 0-2 /HPF    Bacteria, UA None Seen None Seen, Trace /HPF    Squamous Epithelial Cells, UA 7-12 (A) None Seen, 0-2 /HPF    Hyaline Casts, UA 21-30 0 - 6 /LPF    Methodology Manual Light Microscopy    ECG 12 Lead ED Triage Standing Order; Abdominal Pain (Upper)    Collection Time: 01/08/24  9:43 PM   Result Value Ref Range    QT Interval 338 ms    QTC Interval 459 ms   Single High Sensitivity Troponin T    Collection Time: 01/09/24  1:06 AM    Specimen: Blood   Result Value Ref Range    HS Troponin T 14 (H) <14 ng/L   STAT Lactic Acid, Reflex    Collection Time: 01/09/24  1:51 AM    Specimen: Arm, Left; Blood   Result Value Ref Range    Lactate 1.7 0.5 - 2.0 mmol/L   Blood Gas, Arterial With Co-Ox    Collection Time: 01/09/24  2:23 AM    Specimen: Arterial Blood   Result Value Ref Range    Site Right Radial     Heri's Test Positive     pH, Arterial 7.434 7.350 - 7.450 pH units    pCO2, Arterial 43.1 35.0 - 45.0 mm Hg    pO2, Arterial 69.0 (L) 83.0 - 108.0 mm Hg    HCO3, Arterial 28.9 (H) 20.0 - 26.0 mmol/L    Base Excess, Arterial 4.2 (H) 0.0 - 2.0 mmol/L    Hemoglobin, Blood Gas 9.8 (L) 14 - 18 g/dL     Hematocrit, Blood Gas 30.0 (L) 38.0 - 51.0 %    Oxyhemoglobin 93.3 (L) 94 - 99 %    Methemoglobin 0.40 0.00 - 1.50 %    Carboxyhemoglobin 0.7 0 - 2 %    CO2 Content 30.2 22 - 33 mmol/L    Temperature 37.0     Barometric Pressure for Blood Gas      Modality Ventilator     FIO2 90 %    Ventilator Mode VC+/AC     Set Tidal Volume 0.42     Rate 20 Breaths/minute    PEEP 5.0     PIP 0 cmH2O    IPAP 0     EPAP 0     pH, Temp Corrected 7.434 pH Units    pCO2, Temperature Corrected 43.1 35 - 45 mm Hg    pO2, Temperature Corrected 69.0 (L) 83 - 108 mm Hg   Heparin Anti-Xa    Collection Time: 01/09/24  2:24 AM    Specimen: Arm, Left; Blood   Result Value Ref Range    Heparin Anti-Xa (UFH) 0.10 (L) 0.30 - 0.70 IU/ml   Protime-INR    Collection Time: 01/09/24  2:24 AM    Specimen: Arm, Left; Blood   Result Value Ref Range    Protime 28.6 (H) 12.2 - 14.5 Seconds    INR 2.66 (H) 0.89 - 1.12   aPTT    Collection Time: 01/09/24  2:24 AM    Specimen: Arm, Left; Blood   Result Value Ref Range    PTT 29.2 (L) 60.0 - 90.0 seconds   CBC Auto Differential    Collection Time: 01/09/24  2:24 AM    Specimen: Arm, Left; Blood   Result Value Ref Range    WBC 3.39 (L) 3.40 - 10.80 10*3/mm3    RBC 3.14 (L) 3.77 - 5.28 10*6/mm3    Hemoglobin 8.6 (L) 12.0 - 15.9 g/dL    Hematocrit 27.3 (L) 34.0 - 46.6 %    MCV 86.9 79.0 - 97.0 fL    MCH 27.4 26.6 - 33.0 pg    MCHC 31.5 31.5 - 35.7 g/dL    RDW 15.3 12.3 - 15.4 %    RDW-SD 47.0 37.0 - 54.0 fl    MPV 9.7 6.0 - 12.0 fL    Platelets 138 (L) 140 - 450 10*3/mm3    Neutrophil % 84.3 (H) 42.7 - 76.0 %    Lymphocyte % 8.6 (L) 19.6 - 45.3 %    Monocyte % 5.0 5.0 - 12.0 %    Eosinophil % 1.2 0.3 - 6.2 %    Basophil % 0.3 0.0 - 1.5 %    Immature Grans % 0.6 (H) 0.0 - 0.5 %    Neutrophils, Absolute 2.86 1.70 - 7.00 10*3/mm3    Lymphocytes, Absolute 0.29 (L) 0.70 - 3.10 10*3/mm3    Monocytes, Absolute 0.17 0.10 - 0.90 10*3/mm3    Eosinophils, Absolute 0.04 0.00 - 0.40 10*3/mm3    Basophils, Absolute 0.01  0.00 - 0.20 10*3/mm3    Immature Grans, Absolute 0.02 0.00 - 0.05 10*3/mm3    nRBC 0.0 0.0 - 0.2 /100 WBC     Note: In addition to lab results from this visit, the labs listed above may include labs taken at another facility or during a different encounter within the last 24 hours. Please correlate lab times with ED admission and discharge times for further clarification of the services performed during this visit.    CT Angiogram Chest   Final Result   Impression:   1.Pulmonary emboli present within the right and left main pulmonary arteries extending to the proximal segmental branches of the right upper lobe, right lower lobe and left upper and lower lobe pulmonary arterial branches. Moderate to large clot burden    present. No definite evidence of right heart strain.   2.Patchy airspace disease present within the inferior bilateral upper lobes and the bilateral lower lobes consistent with multifocal pneumonia. Sequela of pulmonary infarct is also in the differential. Small bilateral pleural effusions are present.   3.Moderate amount of ascites present throughout the abdomen and pelvis, similar as compared to the previous study. Redemonstration of peritoneal carcinomatosis, similar as compared to the previous study. No acute intra-abdominal or intrapelvic process.   4.Ancillary findings as described above.      The above findings were discussed with Dr. Daniel Wyatt at 2:13 a.m. on 1/9/2024      Electronically Signed: Paloma Maldonado MD     1/9/2024 2:17 AM EST     Workstation ID: [id number]      CT Head Without Contrast   Final Result   Impression:   No acute intracranial process identified.            Electronically Signed: Paloma Maldonado MD     1/9/2024 2:06 AM EST     Workstation ID: [id number]      CT Abdomen Pelvis With Contrast   Final Result   Impression:   1.Pulmonary emboli present within the right and left main pulmonary arteries extending to the proximal segmental branches of the right upper lobe, right lower  "lobe and left upper and lower lobe pulmonary arterial branches. Moderate to large clot burden    present. No definite evidence of right heart strain.   2.Patchy airspace disease present within the inferior bilateral upper lobes and the bilateral lower lobes consistent with multifocal pneumonia. Sequela of pulmonary infarct is also in the differential. Small bilateral pleural effusions are present.   3.Moderate amount of ascites present throughout the abdomen and pelvis, similar as compared to the previous study. Redemonstration of peritoneal carcinomatosis, similar as compared to the previous study. No acute intra-abdominal or intrapelvic process.   4.Ancillary findings as described above.      The above findings were discussed with Dr. Daniel Wyatt at 2:13 a.m. on 1/9/2024      Electronically Signed: Paloma Maldonado MD     1/9/2024 2:17 AM EST     Workstation ID: [id number]      XR Chest 1 View   Final Result   Impression:   Endotracheal tube tip in the mid trachea. Patchy airspace disease present within the lung bases bilaterally, likely related to pneumonia. Small left-sided pleural effusion present. There may be a mild underlying pulmonary edema pattern.            Electronically Signed: Paloma Maldonado MD     1/9/2024 1:33 AM EST     Workstation ID: [id number]      XR Chest 1 View    (Results Pending)     Vitals:    01/09/24 0000 01/09/24 0130 01/09/24 0158 01/09/24 0213   BP: 152/87  115/79    BP Location:       Patient Position:       Pulse: 107  98 97   Resp:       Temp:       TempSrc:       SpO2: 91%  97% 100%   Weight:       Height:  167.6 cm (65.98\")       Medications   sodium chloride 0.9 % flush 10 mL (has no administration in time range)   Lidocaine Viscous HCl (XYLOCAINE) 2 % solution 10 mL (has no administration in time range)   ketamine (KETALAR) injection (125 mg Intravenous Given 1/9/24 0111)   succinylcholine (ANECTINE) injection (100 mg Intravenous Given 1/9/24 0111)   propofol (DIPRIVAN) infusion 10 " mg/mL 100 mL (10 mcg/kg/min × 91.2 kg Intravenous Rate/Dose Change 1/9/24 0151)   vancomycin 1750 mg/500 mL 0.9% NS IVPB ([facility]) (1,750 mg Intravenous New Bag 1/9/24 0250)   heparin 05010 units/250 mL (100 units/mL) in 0.45 % NaCl infusion (16.5 Units/kg/hr × 91.2 kg Intravenous Currently Infusing 1/9/24 0305)   Pharmacy to Dose Heparin (has no administration in time range)   nitroglycerin (NITROSTAT) SL tablet 0.4 mg (has no administration in time range)   sodium chloride 0.9 % flush 10 mL (10 mL Intravenous Given 1/9/24 0239)   sodium chloride 0.9 % flush 10 mL (has no administration in time range)   sodium chloride 0.9 % infusion 40 mL (has no administration in time range)   sennosides-docusate (PERICOLACE) 8.6-50 MG per tablet 2 tablet (has no administration in time range)     And   polyethylene glycol (MIRALAX) packet 17 g (has no administration in time range)     And   bisacodyl (DULCOLAX) EC tablet 5 mg (has no administration in time range)     And   bisacodyl (DULCOLAX) suppository 10 mg (has no administration in time range)   chlorhexidine (PERIDEX) 0.12 % solution 15 mL (has no administration in time range)   pantoprazole (PROTONIX) injection 40 mg (has no administration in time range)   fentaNYL 2500 mcg/250 mL NS infusion (has no administration in time range)   dexmedetomidine (PRECEDEX) 400 mcg in 100 mL NS infusion (has no administration in time range)   sodium chloride 0.9 % infusion (has no administration in time range)   Pharmacy to dose vancomycin (has no administration in time range)   piperacillin-tazobactam (ZOSYN) 4.5 g in iso-osmotic dextrose 100 mL IVPB (premix) (has no administration in time range)   sodium chloride 0.9 % bolus 1,000 mL (0 mL Intravenous Stopped 1/9/24 0050)   ondansetron (ZOFRAN) injection 4 mg (4 mg Intravenous Given 1/8/24 2139)   aluminum-magnesium hydroxide-simethicone (MAALOX MAX) 400-400-40 MG/5ML suspension 15 mL (15 mL Oral Given 1/8/24 4947)   Morphine sulfate (PF)  injection 4 mg (4 mg Intravenous Given 1/8/24 2304)   HYDROmorphone (DILAUDID) injection 1 mg (1 mg Intravenous Given 1/8/24 2310)   metoclopramide (REGLAN) injection 10 mg (10 mg Intravenous Given 1/8/24 2344)   lactated ringers bolus 1,000 mL (1,000 mL Intravenous New Bag 1/9/24 0253)   promethazine (PHENERGAN) 12.5 mg in sodium chloride 0.9 % 50 mL (0 mg Intravenous Stopped 1/9/24 0035)   piperacillin-tazobactam (ZOSYN) 3.375 g in iso-osmotic dextrose 50 ml (premix) (0 g Intravenous Stopped 1/9/24 0249)   iopamidol (ISOVUE-370) 76 % injection 80 mL (80 mL Intravenous Given 1/9/24 0158)     ECG/EMG Results (last 24 hours)       Procedure Component Value Units Date/Time    ECG 12 Lead ED Triage Standing Order; Abdominal Pain (Upper) [[id number]] Collected: 01/08/24 2143     Updated: 01/08/24 2143     QT Interval 338 ms      QTC Interval 459 ms     Narrative:      Test Reason : ED Triage Standing Order~  Blood Pressure :   */*   mmHG  Vent. Rate : 111 BPM     Atrial Rate : 111 BPM     P-R Int : 112 ms          QRS Dur :  90 ms      QT Int : 338 ms       P-R-T Axes :   9 -18 149 degrees     QTc Int : 459 ms    Sinus tachycardia  Low voltage QRS  Possible Anterolateral infarct , age undetermined  Abnormal ECG  When compared with ECG of 30-DEC-2023 05:51,  Nonspecific T wave abnormality now evident in Inferior leads  T wave inversion now evident in Lateral leads    Referred By: ED MD           Confirmed By:           ECG 12 Lead ED Triage Standing Order; Abdominal Pain (Upper)   Preliminary Result   Test Reason : ED Triage Standing Order~   Blood Pressure :   */*   mmHG   Vent. Rate : 111 BPM     Atrial Rate : 111 BPM      P-R Int : 112 ms          QRS Dur :  90 ms       QT Int : 338 ms       P-R-T Axes :   9 -18 149 degrees      QTc Int : 459 ms      Sinus tachycardia   Low voltage QRS   Possible Anterolateral infarct , age undetermined   Abnormal ECG   When compared with ECG of 30-DEC-2023 05:51,   Nonspecific T wave  abnormality now evident in Inferior leads   T wave inversion now evident in Lateral leads      Referred By: ED MD           Confirmed By:               No follow-up provider specified.       Medication List      No changes were made to your prescriptions during this visit.            Daniel Marcus MD  01/09/24 032

## 2024-01-09 NOTE — PROGRESS NOTES
Clinical Nutrition     Nutrition Support Assessment  Reason for Visit: Identified at risk by screening criteria, EN      Patient Name: Jessica Kraft  YOB: 1960  MRN: [id number]  Date of Encounter: 01/09/24 10:38 EST  Admission date: 1/8/2024      Nutrition Assessment   Admission Diagnosis:  Acute respiratory failure with hypoxia [J96.01]      Problem List:    Acute respiratory failure with hypoxia    Hypothyroidism due to Hashimoto's thyroiditis    Abdominal carcinomatosis - omental    Peritoneal carcinomatosis    Pancreatic cancer    Acute pulmonary embolism    Aspiration pneumonia due to vomitus        PMH:   She  has a past medical history of Abscess, Anxiety, Bilateral ovarian cysts, Depression, Encounter for routine gynecological examination, Foot pain, H/O bone density study (06/2014), H/O mammogram (07/2016), Hot flashes, menopausal, Hypothyroidism due to Hashimoto's thyroiditis, Insomnia, Miscarriage, Osteopenia, Pap smear for cervical cancer screening (06/2014), Routine general medical examination at a health care facility, Urinary incontinence, Urinary tract infection, and Vitamin D deficiency.    PSH:  She  has a past surgical history that includes Cholecystectomy (2008); Hernia repair (2010); Abdominoplasty (2001); Colonoscopy (07/2012); Ectopic pregnancy surgery (Left, 1994); and Esophagogastroduodenoscopy (N/A, 12/13/2023).      Applicable Nutrition Concerns:   Skin:bruised  GI:nausea, vomiting, ogt to LWS= 350ml      Applicable Interval History:     ARF/VENT 1-8-24      Reported/Observed/Food/Nutrition Related History:     Pt intubated and sedated,  at bedside  + precedex, fentanyl, levophed 0.08mcg, heparin     reports pt has had nausea and vomiting for the past day and a half    Per RN: plan for possible EKOS catheter today    Labs    Labs Reviewed: Yes     Results from last 7 days   Lab Units 01/09/24  0151 01/08/24  2137 01/03/24  1304   GLUCOSE  "mg/dL  --  130* 139*   BUN mg/dL  --  18 17   CREATININE mg/dL  --  0.89 0.82   SODIUM mmol/L  --  131* 128*   CHLORIDE mmol/L  --  89* 89*   POTASSIUM mmol/L  --  3.9 4.8   PHOSPHORUS mg/dL  --  2.5  --    MAGNESIUM mg/dL  --  1.8  --    ALT (SGPT) U/L  --  13  --    LACTATE mmol/L 1.7 2.3*  --        Results from last 7 days   Lab Units 01/08/24  2137   ALBUMIN g/dL 3.1*           Lab Results   Lab Value Date/Time    HGBA1C 5.70 (H) 12/07/2023 1959                 Medications    Medications Reviewed: Yes  Pertinent: abx, protonix, bowel regimen  Infusion:precedex, fentanyl, heparin, levophed        Intake/Ouptut 24 hrs (0701 - 0700)   I&O's Reviewed: Yes     Intake & Output (last day)         01/08 0701  01/09 0700 01/09 0701  01/10 0700    I.V. (mL/kg) 803 (8.8) 730.2 (8)    IV Piggyback  0.9    Total Intake(mL/kg) 803 (8.8) 731.1 (8)    Urine (mL/kg/hr) 150 50 (0.2)    Emesis/NG output 350     Total Output 500 50    Net +303 +681.1                    Anthropometrics     Flowsheet Rows      Flowsheet Row First Filed Value   Admission Height 167.6 cm (66\") Documented at 01/08/2024 2036   Admission Weight 91.2 kg (201 lb) Documented at 01/08/2024 2036          Height: Height: 165.1 cm (65\")  Last Filed Weight: Weight: 91.2 kg (201 lb) (01/09/24 0941)  Method: Weight Method: Stated  BMI: BMI (Calculated): 33.4  BMI classification: Obese Class I: 30-34.9kg/m2  IBW:  130lb    UBW: ?  Weight change: per EMR ~ 17lb wt loss over the past month     Weight       Weight (kg) Weight (lbs) Weight Method Visit Report   5/16/2016 96.979 kg  213 lb 12.8 oz   --    8/18/2016 98.703 kg  217 lb 9.6 oz   --    11/10/2016 97.705 kg  215 lb 6.4 oz   --    2/3/2017 98.068 kg  216 lb 3.2 oz   --    3/6/2017 96.253 kg  212 lb 3.2 oz   --    5/18/2017 97.342 kg  214 lb 9.6 oz   --    8/2/2017 101.696 kg  224 lb 3.2 oz   --    12/6/2023 97.07 kg  214 lb  Stated     12/7/2023 97.523 kg  215 lb  Stated      95.255 kg  210 lb  Stated   " "  12/8/2023 98.975 kg  218 lb 3.2 oz  Standing scale     12/9/2023 97.569 kg  215 lb 1.6 oz  Bed scale     12/10/2023 97.523 kg  215 lb  Bed scale     12/11/2023 92.987 kg  205 lb  Standing scale     12/12/2023 93.804 kg  206 lb 12.8 oz  Standing scale     12/13/2023 104.826 kg  231 lb 1.6 oz  Bed scale      105 kg  231 lb 7.7 oz      12/14/2023 94.076 kg  207 lb 6.4 oz  Standing scale     12/15/2023 92.08 kg  203 lb  Standing scale     12/19/2023 91.173 kg  201 lb   --    12/22/2023 91.173 kg  201 lb  Stated     12/25/2023 91.173 kg  201 lb  Stated     1/3/2024 91.627 kg  202 lb   --    1/8/2024 91.173 kg  201 lb  Stated     1/9/2024 91.173 kg  201 lb            Nutrition Focused Physical Exam     Date:     Unable to perform exam due to: Pt unable to participate at time of visit      Needs Assessment   Date: 1-9-24    Height used:Height: 165.1 cm (65\")  Weights used/ ABW: 201lb/ 91.4kg  IBW\" 130lb/ 59.1kg      Estimated Calorie needs: ~ 1300kcal initial  Method:  14Kcals/KG ABW:1280kcal  Method:  MSJ ABW: 1491kcal  Method:  PSU ABW: 1839kcal    Estimated Protein needs: ~118g protein  Method: 2g protein per kg IBW: 118g protein  Method: 1.2-1.5g protein per kg ABW: 110-137g protein    Current Nutrition Prescription     PO: NPO Diet NPO Type: Strict NPO  Oral Nutrition Supplement:   Intake: N/A      Nutrition Diagnosis   Date: 1-9-24 Updated:   Problem Inadequate energy intake   Etiology ARF/VENT   Signs/Symptoms  NPO       Goal:   General: Nutrition support treatment  PO: N/A  EN/PN: Initiate EN when stable    Nutrition Intervention      Follow treatment progress, Care plan reviewed    Consider EN once pt stable and no further N/V    Rec start trophic feed: Peptamen VHP @20ml, gradually advance as tolerated to goal rate @65ml/hr, free water @10ml/hr    TF at goal volume will provide 1300ml, 1300kcal,100% kcal needs, 120g protein, 102% protein needs, 5g fiber, 1292ml free water     Suggest add MVI as TF volume too " low to meets RDI's    Monitoring/Evaluation:   Per protocol, I&O, Pertinent labs, Weight, Skin status, GI status, Symptoms, Hemodynamic stability      Garui Ayala, JANETH  Time Spent: 60min

## 2024-01-09 NOTE — TELEPHONE ENCOUNTER
Called and spoke with patient's spouse.  Advised him that Dr. العراقي has been notified that patient is in ICU, room and unit number provided to her. Advised him that if needed after hours, he can call the on call physician instead of sending a my chart message.  He verbalized understanding.  Asked him to call this office if he has any questions/concerns.  He verbalized understanding.

## 2024-01-09 NOTE — CONSULTS
[facility]  Cardiology Consult Note  [facility] 2A ICU          Patient Identification:  Jessica Kraft      [id number]  63 y.o.        female  1960       Date of Consultation:  01/09/24    Reason for Consultation: Submassive pulmonary emboli    PCP: Leonarda Díaz MD  Primary cardiologist: None    History of Present Illness:     63-year-old female with recent diagnosis of metastatic pancreatic cancer undergoing chemotherapy, presenting to the ER with intractable nausea and vomiting, developed acute severe hypoxic respiratory failure in the ER after vomiting, suspected aspiration event.  CT of the chest revealed large bilateral pulmonary emboli.  She is now hypotensive and severely hypoxic requiring mechanical ventilation with FiO2 of 100% and PEEP of 10, also on vasopressor with norepinephrine.    Past History:  Past Medical History:   Diagnosis Date    Abscess     Anxiety     Bilateral ovarian cysts     Depression     Encounter for routine gynecological examination     Foot pain     H/O bone density study 06/2014    H/O mammogram 07/2016    [facility]    Hot flashes, menopausal     Hypothyroidism due to Hashimoto's thyroiditis     Insomnia     Miscarriage     x3    Osteopenia     Pap smear for cervical cancer screening 06/2014    Routine general medical examination at a health care facility     Urinary incontinence     Urinary tract infection     Vitamin D deficiency      Past Surgical History:   Procedure Laterality Date    ABDOMINOPLASTY  2001    CHOLECYSTECTOMY  2008    COLONOSCOPY  07/2012    [facility] in [facility] normal    ECTOPIC PREGNANCY SURGERY Left 1994    ENDOSCOPY N/A 12/13/2023    Procedure: ESOPHAGOGASTRODUODENOSCOPY;  Surgeon: Flakito Abrams MD;  Location: [facility] ENDOSCOPY;  Service: Gastroenterology;  Laterality: N/A;    HERNIA REPAIR  2010     No Known Allergies  Social History     Socioeconomic History    Marital status:     Tobacco Use    Smoking status: Never    Smokeless tobacco: Never   Vaping Use    Vaping Use: Never used   Substance and Sexual Activity    Alcohol use: No    Drug use: No    Sexual activity: Yes     Partners: Male     Birth control/protection: None, Post-menopausal     Comment:      Family History   Problem Relation Age of Onset    Mental illness Mother     Depression Mother     Thyroid disease Mother     Hypertension Father     Thyroid disease Sister     Breast cancer Other     Breast cancer Maternal Aunt      Medications:  Medications Prior to Admission   Medication Sig Dispense Refill Last Dose    ALPRAZolam (XANAX) 0.5 MG tablet Take 1 tablet by mouth At Night As Needed for Anxiety.       aspirin 81 MG chewable tablet Chew 1 tablet Daily.       cyclobenzaprine (FLEXERIL) 10 MG tablet Take 1 tablet by mouth Daily.       HYDROcodone-acetaminophen (NORCO) 5-325 MG per tablet Take 1-2 tablet(s) by mouth Every 8 Hours As Needed for Moderate Pain. 30 tablet 0     LEVOXYL 125 MCG tablet Take 1 tablet by mouth Every Morning. On an empty stomach, do not substitute 90 tablet 1     lidocaine-prilocaine (EMLA) 2.5-2.5 % cream Apply 1 application topically to the appropriate area as directed As Needed (45-60 minutes prior to port access.  Cover with saran/plastic wrap.). 30 g 3     ondansetron (ZOFRAN) 8 MG tablet Take 1 tablet by mouth 3 (Three) Times a Day As Needed for Nausea or Vomiting. 30 tablet 5     promethazine (PHENERGAN) 25 MG tablet Take 1 tablet by mouth Every 6 (Six) Hours As Needed for Nausea or Vomiting for up to 30 days. 30 tablet 0     RABEprazole (ACIPHEX) 20 MG EC tablet Take 1 tablet by mouth Daily.       rosuvastatin (CRESTOR) 5 MG tablet Take 1 tablet by mouth Daily.       venlafaxine XR (EFFEXOR-XR) 150 MG 24 hr capsule Take 1 capsule by mouth Daily.        Current medications:  [START ON 1/10/2024] Pharmacy Consult, , Does not apply, Once  chlorhexidine, 15 mL, Mouth/Throat,  "Q12H  pantoprazole, 40 mg, Intravenous, Q24H  piperacillin-tazobactam, 4.5 g, Intravenous, Q8H  senna-docusate sodium, 2 tablet, Oral, BID  sodium chloride, 10 mL, Intravenous, Q12H  [START ON 1/10/2024] vancomycin, 15 mg/kg, Intravenous, Q18H      Current IV drips:  dexmedetomidine, 0.2-1.5 mcg/kg/hr (Order-Specific), Last Rate: 0.5 mcg/kg/hr (01/09/24 1105)  fentanyl 10 mcg/mL,  mcg/hr, Last Rate: 225 mcg/hr (01/09/24 0455)  heparin, 16.5 Units/kg/hr (Order-Specific), Last Rate: 16.5 Units/kg/hr (01/09/24 1105)  norepinephrine, 0.02-0.3 mcg/kg/min (Order-Specific), Last Rate: 0.08 mcg/kg/min (01/09/24 1105)  Pharmacy to Dose Heparin,   Pharmacy to dose vancomycin,   propofol, 5-50 mcg/kg/min (Order-Specific)  sodium chloride, 150 mL/hr, Last Rate: 150 mL/hr (01/09/24 0544)        Review of Systems:  Per family  Constitutional no fever,  no weight loss   Skin no rash   Otolaryngeal no difficulty swallowing   Cardiovascular See HPI   Pulmonary no cough, no sputum production   Gastrointestinal no constipation, yes diarrhea, and nausea vomiting   Genitourinary no dysuria, no hematuria   Hematologic no easy bruisability, no abnormal bleeding   Musculoskeletal no muscle pain   Neurologic no dizziness, no falls     Physical exam:    BP 94/57   Pulse 92   Temp 100 °F (37.8 °C) (Bladder)   Resp 20   Ht 165.1 cm (65\")   Wt 91.2 kg (201 lb)   LMP  (LMP Unknown) Comment: N/A  SpO2 (!) 88%   BMI 33.45 kg/m²  Body mass index is 33.45 kg/m².   Oxygen saturation   SpO2  Min: 87 %  Max: 100 %    General Appearance:   Currently intubated and sedated  HENT:   oropharynx moist  lips not cyanotic  Neck:  thyroid not enlarged  supple  Respiratory:  no respiratory distress  normal breath sounds  no rales  Cardiovascular:  no jugular venous distention  regular rhythm  apical impulse normal  S1 normal, S2 normal  no S3, no S4   no murmur  no rub, no thrill  carotid pulses normal; no bruit  pedal pulses normal  lower " "extremity edema: none    Gastrointestinal:   bowel sounds normal  non-tender  no hepatomegaly, no splenomegaly  Musculoskeletal:  no clubbing of fingers.   normocephalic, head atraumatic  Skin:   warm, dry  Psychiatric:  judgement and insight appropriate  normal mood and affect    Cardiographics:     ECG  (personally reviewed) normal sinus rhythm   Telemetry:  (personally reviewed) normal sinus rhythm no arrhythmia   ECHO:   Results for orders placed during the hospital encounter of 01/08/24    Adult Transthoracic Echo Complete w/ Color, Spectral and Contrast if Necessary Per Protocol    Interpretation Summary    Left ventricular systolic function is normal. Calculated left ventricular EF = 61.7%    Mild to moderate AI    No pericardial effusion\       CATH:     CARDIOLITE:      Lab Review:       Results from last 7 days   Lab Units 01/09/24 0224 01/08/24 2137 01/03/24  1304   WBC 10*3/mm3 3.39* 8.05 12.22*   HEMOGLOBIN g/dL 8.6* 12.1 13.2   HEMATOCRIT % 27.3* 37.4 41.3            Results from last 7 days   Lab Units 01/08/24 2137 01/03/24  1304   SODIUM mmol/L 131* 128*   BUN mg/dL 18 17   CREATININE mg/dL 0.89 0.82   GLUCOSE mg/dL 130* 139*      Estimated Creatinine Clearance: 73.1 mL/min (by C-G formula based on SCr of 0.89 mg/dL).     Lab Results   Component Value Date    TRIG 102 08/22/2016    HDL 79 08/22/2016     (H) 08/22/2016    AST 21 01/08/2024    ALT 13 01/08/2024        Results from last 7 days   Lab Units 01/09/24 0224   INR  2.66*        Lab Results   Component Value Date    TSH 1.730 12/07/2023        Lab Results   Component Value Date    HGBA1C 5.70 (H) 12/07/2023           No results found for: \"DIGOXIN\"   No results found for: \"DDIMERQUANT\"     Imaging:  All pertinent imaging studies were personally reviewed.    Assessment:      Acute respiratory failure with hypoxia    Hypothyroidism due to Hashimoto's thyroiditis    Abdominal carcinomatosis - omental    Peritoneal carcinomatosis    " Pancreatic cancer    Acute pulmonary embolism    Aspiration pneumonia due to vomitus      Initial cardiac assessment: 63-year-old female with newly diagnosed metastatic pancreatic cancer presenting with intractable nausea and vomiting likely aspiration pneumonia with acute hypoxic respiratory failure requiring intubation and shock requiring vasopressor, found to have submassive bilateral pulmonary emboli.      Recommendations:  1.  Submassive pulmonary emboli:  Clinically unstable with shock requiring norepinephrine, and severe hypoxia requiring 100% FiO2 and mechanical ventilation with 10 of PEEP.  I personally reviewed her CT angiogram which shows large bilateral pulmonary emboli in the main pulmonary arteries.  I personally reviewed her echocardiographic images, shows normal LVEF and RV function.  She has mildly elevated troponin high-sensitivity at 14.  I had a long discussion with Dr. Rosales her pulmonologist as well as the patient's , while there are no significant signs of RV strain by imaging studies there is slightly elevated troponin and she is clinically very unstable with severe hypoxia and hypotension requiring vasopressor and mechanical ventilation with high FiO2.    Due to her severe life-threatening acute illness and high potential for acute decompensation without invasive intervention, I offered mechanical pulmonary thrombectomy.  Dr. Rosales does not feel she is a thrombolytic candidate which I agree with she is anemic with active cancer and coagulopathic.    I discussed all risk benefits and alternatives of  percutaneous mechanical pulmonary thrombectomy with the patient's  in person, he is understanding of the risk but wishes to proceed in order to give his wife best chance of survival.    We also discussed the case with the patient's oncologist Dr. Jen العراقي, who stated her prognosis based on cancer alone is greater than 6 months with treatment.    2.  Bilateral aspiration  pneumonia:  Antibiotics and ventilation per pulmonology    3.  Metastatic pancreatic cancer:  Treatment per oncology, anemia noted preprocedure      Critical Care time spent in direct patient care:    42 minutes (excluding procedure time, if applicable) including high complexity decision making to assess and treat vital organ system failure in this individual who has impairment of one or more vital organ systems such that there is a high probability of imminent or life threatening deterioration in the patient’s condition. Failure to initiate the above interventions on an urgent basis would likely result in sudden, clinically significant or life threatening deterioration in the patient's condition.      Thank you for allowing us to share in her care.    Discussed with patient's nurse, chana Ron, and patient's family        Felipe Forde MD  1/9/2024  11:53 EST

## 2024-01-10 NOTE — PLAN OF CARE
Goal Outcome Evaluation:  Plan of Care Reviewed With: spouse, son        Progress: no change  -pt remains on vent, FiO2 90%, PEEP of 15  -pt underwent a manual thrombectomy with Dr. Forde; able to evacuate R pulmonary PE, however L had to remain; complications with oxygenation occurred during the procedure; see Dr Rosales's note  -right radial artline placed  -remains on levo at 0.16; fent at 250, precedex at 0.7  -heparin gtt restarted 2hrs after thrombectomy procedure  -right groin sight has remained soft, no bleeding, good RLE pulses   -inadequate UOP; APRN and MD notified at bedside; no BM  -100ml output from OG; remains on LWS  -pt's family updated multiple times throughout shift by myself and providers

## 2024-01-10 NOTE — PROGRESS NOTES
HEPARIN INFUSION  Jessica Kraft is a  63 y.o. female receiving heparin infusion.     Therapy for (VTE/Cardiac):   VTE  Patient Weight: 91 kg  Initial Bolus (Y/N):   n  Any Bolus (Y/N):   y        Signs or Symptoms of Bleeding: none noted    Recommend Xa every 6 hours.   VTE (PE/DVT)   Initial Bolus: 80 units/kg (Max 10,000 units)  Initial rate: 18 units/kg/hr (Max 1,500 units/hr)    Anti Xa Rebolus Infusion Hold time Change infusion Dose (Units/kg/hr) Next Anti Xa Level Due   < 0.11 50 Units/kg  (4000 Units Max) None Increase by  4 Units/kg/hr 6 hours   0.11 - 0.19 25 Units/kg  (2000 Units Max) None Increase by  3 Units/kg/hr 6 hours   0.2 - 0.29 0 None Increase by  2 Units/kg/hr 6 hours   0.3 - 0.7 0 None No Change 6 hours (after 2 consecutive levels in range check qAM)   0.71 - 0.8 0 None Decrease by  1 Units/kg/hr 6 hours   0.81 - 0.9 0 None Decrease by  2 Units/kg/hr 6 hours   0.91 - 1 0 60 Minutes Decrease by  3 Units/kg/hr 6 hours   >1 0 Hold  After Anti Xa less than 0.7 decrease previous rate by  4 Units/kg/hr  Every 2 hours until Anti Xa is less than 0.7 then when infusion restarts in 6 hours     Results from last 7 days   Lab Units 01/10/24  0447 01/09/24  1352 01/09/24  0224 01/08/24  2137   INR   --   --  2.66*  --    HEMOGLOBIN g/dL 8.8* 9.5* 8.6* 12.1   HEMATOCRIT % 27.8* 29.2* 27.3* 37.4   PLATELETS 10*3/mm3 181  --  138* 211          Date   Time   Anti-Xa Current Rate (Unit/kg/hr) Bolus   (Units) Rate Change   (Unit/kg/hr) New Rate (Unit/kg/hr) Next   Anti-Xa Comments  Pump Check Daily   01/09 0245 STAT new -- 16.5 16.5 0900 D/w JESS   01/09 0912 0.32 16.5 -- -- 16.5 1600 Pump reviewed - D/w JESS Addison    01/10 0130 0.89 16.5 -- -2 14.5 0800 D/w JESS   01/10 0819 0.63 14.5 -- -- 14.5 1400 D/W RN, checked pump                                                                                                                                                                                                  Yoselyn Combs, Pharmacy Intern  01/10/24  10:07 EST

## 2024-01-10 NOTE — PROGRESS NOTES
"HEMATOLOGY/ONCOLOGY PROGRESS NOTE    S: Patient seen in her ICU room this morning.  No family available at bedside or in the lobby at time of my exam.  Status post right mechanical thrombectomy yesterday.  Remains intubated and sedated.        Past medical history, social history and family history was reviewed and unchanged from prior visit.    Medications:  The current medication list was reviewed in the EMR    ALLERGIES:  No Known Allergies      Physical Exam    VITAL SIGNS:  /71   Pulse 80   Temp 99.9 °F (37.7 °C) (Bladder)   Resp 28   Ht 165.1 cm (65\")   Wt 101 kg (222 lb 10.6 oz)   LMP  (LMP Unknown) Comment: N/A  SpO2 96%   BMI 37.05 kg/m²   Temp:  [98.4 °F (36.9 °C)-99.9 °F (37.7 °C)] 99.9 °F (37.7 °C)      Performance Status:4                 Physical Exam    General: Intubated and sedated  HEENT: sclerae anicteric, oropharynx clear, neck is supple  Lymphatics: no cervical, supraclavicular, or axillary adenopathy  Cardiovascular: regular rate and rhythm, no murmurs, rubs or gallops  Lungs: Coarse breath sounds bilaterally  Abdomen: +ascites +BS  Extremities: no lower extremity edema  Skin: no rashes, lesions, bruising, or petechiae        RECENT LABS:    Lab Results   Component Value Date    HGB 8.8 (L) 01/10/2024    HCT 27.8 (L) 01/10/2024    MCV 84.2 01/10/2024     01/10/2024    WBC 2.69 (L) 01/10/2024    NEUTROABS 1.93 01/10/2024    LYMPHSABS 0.45 (L) 01/10/2024    MONOSABS 0.27 01/10/2024    EOSABS 0.01 01/10/2024    BASOSABS 0.01 01/10/2024       Lab Results   Component Value Date    GLUCOSE 157 (H) 01/10/2024    BUN 20 01/10/2024    CREATININE 0.90 01/10/2024     (L) 01/10/2024    K 4.2 01/10/2024    CL 96 (L) 01/10/2024    CO2 22.0 01/10/2024    CALCIUM 7.0 (L) 01/10/2024    PROTEINTOT 5.0 (L) 01/10/2024    ALBUMIN 2.2 (L) 01/10/2024    BILITOT 0.4 01/10/2024    ALKPHOS 149 (H) 01/10/2024    AST 18 01/10/2024    ALT 7 01/10/2024         Assessment/Plan  Metastatic " malignancy with malignant ascites, presumed pancreatic origin  -She is status post cycle 1 of palliative chemotherapy, today is day 13.  Her blood counts are declining consistent with recent chemotherapy but she is not neutropenic and her platelet count is improving  -Outpatient PET was planned and she was scheduled for follow-up with repeat chemotherapy next week.    -Further cancer directed therapy on hold pending clinical improvement.  -Continue to trend blood counts.     2.  Massive pulmonary embolism  3.  Aspiration pneumonia/ARDS  4.  Hypoxic respiratory failure secondary to above  5. Anticoagulation  -Provoked in the context of underlying malignancy  -She continues anticoagulation and is status post mechanical thrombectomy to the right pulmonary artery yesterday.  -ICU attending notes and cardiology notes reviewed.  -Thrombectomy report reviewed.  -Continues heparin drip, pharmacy notes and Xa levels reviewed  Monitor daily CBC in light of recent chemotherapy    Sherri العراقي MD  [facility] Hematology and Oncology    1/10/2024

## 2024-01-10 NOTE — CASE MANAGEMENT/SOCIAL WORK
Discharge Planning Assessment  [facility]     Patient Name: Jessica Kraft  MRN: [id number]  Today's Date: 1/10/2024    Admit Date: 1/8/2024    Plan: Home   Discharge Needs Assessment       Row Name 01/10/24 1204       Living Environment    People in Home spouse    Current Living Arrangements home    Primary Care Provided by self    Provides Primary Care For no one    Family Caregiver if Needed spouse;child(rachel), adult    Able to Return to Prior Arrangements yes       Transition Planning    Patient/Family Anticipates Transition to home    Transportation Anticipated family or friend will provide       Discharge Needs Assessment    Readmission Within the Last 30 Days current reason for admission unrelated to previous admission    Equipment Currently Used at Home none    Current Discharge Risk chronically ill                   Discharge Plan       Row Name 01/10/24 1204       Plan    Plan Home    Patient/Family in Agreement with Plan yes    Plan Comments Mrs. Kraft is not medically ready for an IDP at this time. She is sedated, ventillated, and on pressors at this time. Per chart review, Mrs. Kraft was admitted to [facility] 12/25/23 through 1/1/24. The following information is from that admission. She lives with her  in [facility]. She is independent with mobility and activities of daily living. She drives herself when leaving the home and is not current with any home or outpatient services. She has no medical equipment at home and denies any difficulty affording her medications. She will go home at the time of discharge and her  will transport. CM continues to follow.    Final Discharge Disposition Code 01 - home or self-care                  Continued Care and Services - Admitted Since 1/8/2024    Coordination has not been started for this encounter.       Expected Discharge Date and Time       Expected Discharge Date Expected Discharge Time    Jan 12, 2024            Demographic  Summary       Row Name 01/10/24 1203       General Information    General Information Comments Confirmed PCP to be Leonarda Díaz and Estrella to be insurer.                   Functional Status       Row Name 01/10/24 1204       Functional Status, IADL    Medications independent    Meal Preparation independent    Housekeeping independent    Laundry independent    Shopping independent       Employment/    Employment Status employed full-time    Shift Worked first shift    Current or Previous Occupation desk job;professional                   Psychosocial    No documentation.                  Abuse/Neglect    No documentation.                  Legal    No documentation.                  Substance Abuse    No documentation.                  Patient Forms    No documentation.                     Stephen Corcoran RN

## 2024-01-10 NOTE — PROGRESS NOTES
[facility]  Progress       Reason for visit:    Follow-up submassive PE      Active Hospital Problems    Diagnosis  POA    **Acute respiratory failure with hypoxia [J96.01]  Yes    Acute pulmonary embolism [I26.99]  Yes     2024, status post percutaneous mechanical thrombectomy (Inari FlowTriever)      Aspiration pneumonia due to vomitus [J69.0]  Yes    Pancreatic cancer [C25.9]  Yes    Abdominal carcinomatosis - omental [C76.2]  Yes    Peritoneal carcinomatosis [C78.6]  Yes    Hypothyroidism due to Hashimoto's thyroiditis [E03.8, E06.3]  Yes            Interval history:   Remains intubated/sedated with fentanyl and Precedex.  Status post mechanical thrombectomy  with significant clot removal from right pulmonary artery.  Issues with oxygenation during procedure, requiring bagged ventilation, with PEEP of 15.  FiO2 has been weaned to 60%, PEEP of 12.  Patient will arouse and follow commands.  Right groin site CDI.  Figure-of-eight stitch removed.           Vital Sign Min/Max for last 24 hours  Temp  Min: 98.1 °F (36.7 °C)  Max: 100.2 °F (37.9 °C)   BP  Min: 74/47  Max: 126/75   Pulse  Min: 77  Max: 97   Resp  Min: 18  Max: 29   SpO2  Min: 85 %  Max: 100 %   No data recorded      Intake/Output Summary (Last 24 hours) at 1/10/2024 1028  Last data filed at 1/10/2024 0600  Gross per 24 hour   Intake 4559.98 ml   Output 540 ml   Net 4019.98 ml           Physical Exam  Vitals and nursing note reviewed.   Constitutional:       Interventions: She is sedated and intubated.   Cardiovascular:      Rate and Rhythm: Normal rate and regular rhythm.      Pulses: Normal pulses.      Heart sounds: Normal heart sounds.   Pulmonary:      Effort: Pulmonary effort is normal. She is intubated.      Breath sounds: Wheezing present.   Musculoskeletal:      Right lower le+ Pitting Edema present.      Left lower le+ Pitting Edema present.   Skin:     General: Skin is warm and dry.       Capillary Refill: Capillary refill takes less than 2 seconds.         Tele: Normal sinus rhythm    Results Review (reviewed the patient's recent labs in the electronic medical record):     CXR (1/10/2024):    1. Esophagogastric tube tip below the diaphragm.  2. Stable ET tube and right-sided port catheter.  3. Persistent bilateral perihilar and bibasilar airspace disease which may relate to combination of multifocal pneumonia, atelectasis, and/or pulmonary infarcts secondary to pulmonary emboli.       Results from last 7 days   Lab Units 01/10/24  0447 01/08/24  2137 01/03/24  1304   SODIUM mmol/L 130* 131* 128*   POTASSIUM mmol/L 4.2 3.9 4.8   CHLORIDE mmol/L 96* 89* 89*   BUN mg/dL 20 18 17   CREATININE mg/dL 0.90 0.89 0.82   MAGNESIUM mg/dL 1.7 1.8  --      Results from last 7 days   Lab Units 01/09/24  1229 01/09/24  1026 01/09/24  0106   HSTROP T ng/L 14* 14* 14*     Results from last 7 days   Lab Units 01/10/24  0447 01/09/24  1352 01/09/24  0224 01/08/24 2137   WBC 10*3/mm3 2.69*  --  3.39* 8.05   HEMOGLOBIN g/dL 8.8* 9.5* 8.6* 12.1   HEMATOCRIT % 27.8* 29.2* 27.3* 37.4   PLATELETS 10*3/mm3 181  --  138* 211       Lab Results   Component Value Date    HGBA1C 5.70 (H) 12/07/2023       Lab Results   Component Value Date    CHLPL 220 (H) 08/22/2016    TRIG 102 08/22/2016    HDL 79 08/22/2016     (H) 08/22/2016              Submassive PE  Clinically unstable with shock requiring norepinephrine and severe hypoxia requiring 100% FiO2 on mechanical ventilation with PEEP of 10.  CTA with large bilateral pulmonary emboli and main pulmonary arteries.  Normal LV and RV function.  Mildly elevated troponin  Status post mechanical thrombectomy, with significant clot removal from right pulmonary artery.  Initial RVSP 41 mmHg, final RVSP 31 mmHg.  Heparin infusion  Bilateral aspiration pneumonia/ARDS  Antibiotics and mechanical ventilation per intensivist  Metastatic pancreatic cancer  Per oncology.  Cancer prognosis  alone greater than 6 months with treatment.               Continue heparin infusion.  No bleeding complications noted.  Continue to wean Levophed to maintain MAP greater than 65.      Electronically signed by ANGELICA Sahu, 01/10/24, 10:28 AM GENNA.    ANGELICA Sahu

## 2024-01-10 NOTE — PLAN OF CARE
Goal Outcome Evaluation:           Progress: improving  Outcome Evaluation: VSS, pt remains afebrile. FiO2 weaned to 60% Peep weaned to 12. Oral secretions green/bile in appearance, ETT secretions tan to bright red small amounts. OGT to LWS total output was 150 dark green bile. Total uop for the shift was 225. Medicated drips are as follows: Levophed .18mcg/kg/min, Fentanyl 250mcg/hr, Precedex 0.7mcg/kg/hr, Heparin 14/.5u/kg/hr, NS 150ml/hr. Pt will arouse to pain and follow commands.

## 2024-01-10 NOTE — PROGRESS NOTES
Intensive Care Follow-up     Hospital:  LOS: 1 day   Ms. Jessica Kraft, 63 y.o. female is followed for:   Acute respiratory failure with hypoxia            History of present illness:   63-year-old female with recent diagnosis of metastatic malignancy of possible pancreatic region.  Definitive biopsy with EUS was done but was limited sampling.  Patient is followed closely by medical oncology and undergoing chemotherapy with first dose given on 12/29.  Patient recently was diagnosed with severe constipation and was treated with bowel regimen and enema and improved.  However yesterday patient started having nausea and vomiting and was brought to emergency room.  Patient did not complain of any chest pain or shortness of breath.  Patient continued to have nausea and while in the ER she cutely decompensated where she became severely hypoxic with altered mental status.  Patient underwent emergent intubation in the emergency room and subsequently CT scan of the head, chest and abdomen and pelvis was done.  Head CT scan was negative.  Chest CT scan showed extensive clot in both right and left main pulmonary arteries extending to proximal segment branches of right upper lobe, right lower lobe and both left lower lobe and upper lobe branches as well.  No significant right heart strain noted.  Patchy airspace disease noted bilateral lower lobes which was concerning for pneumonia versus pulmonary infarct.  Ascites was present and peritoneal carcinomatosis noted again.  Patient was also hypotensive requiring low-dose norepinephrine at this point.  Patient was started on antibiotics, IV heparin and admitted to ICU for ongoing care.  Patient was significantly hypoxemic 100% FiO2 and 10 of PEEP.  Significant clot burden noted on the CT scan.  Discussed with cardiology and patient underwent mechanical thrombectomy.  Patient had a lot of old clots but some clots were removed from the right pulmonary artery with improvement in  pulmonary circulation.        Subjective   Interval History:  Overnight patient did fair.  Down to 55% FiO2 and PEEP of 12.  He gets agitated at times risk remains sedated.  Norepinephrine 0.18 mics.  Significant rhonchi noted.  Will add albuterol nebulizer to pulmonary toilet.  Decreased IV fluids to 75 mill per hour for now.                 The patient's past medical, surgical and social history were reviewed and updated in Epic as appropriate.       Objective     Infusions:  dexmedetomidine, 0.2-1.5 mcg/kg/hr (Order-Specific), Last Rate: 0.7 mcg/kg/hr (01/10/24 0851)  fentanyl 10 mcg/mL,  mcg/hr, Last Rate: 250 mcg/hr (01/10/24 0850)  heparin, 14.5 Units/kg/hr (Order-Specific), Last Rate: 14.5 Units/kg/hr (01/10/24 0137)  norepinephrine, 0.02-0.3 mcg/kg/min (Order-Specific), Last Rate: 0.18 mcg/kg/min (01/10/24 0443)  Pharmacy to Dose Heparin,   propofol, 5-50 mcg/kg/min (Order-Specific)  sodium chloride, 75 mL/hr, Last Rate: 75 mL/hr (01/10/24 1100)      Medications:  chlorhexidine, 15 mL, Mouth/Throat, Q12H  docusate sodium, 100 mg, Nasogastric, BID   And  sennosides, 10 mL, Nasogastric, BID  [START ON 1/11/2024] levothyroxine, 125 mcg, Nasogastric, QAM  pantoprazole, 40 mg, Intravenous, Q24H  piperacillin-tazobactam, 4.5 g, Intravenous, Q8H  sodium chloride, 10 mL, Intravenous, Q12H        Vital Sign Min/Max for last 24 hours  Temp  Min: 98.1 °F (36.7 °C)  Max: 100.2 °F (37.9 °C)   BP  Min: 74/47  Max: 126/75   Pulse  Min: 77  Max: 97   Resp  Min: 18  Max: 29   SpO2  Min: 85 %  Max: 100 %   No data recorded       Input/Output for last 24 hour shift  01/09 0701 - 01/10 0700  In: 5411.1 [I.V.:4906.5]  Out: 590 [Urine:390]   Mode: VC+/AC  FiO2 (%):  [55 %-100 %] 55 %  S RR:  [16-25] 24  S VT:  [400 mL-420 mL] 400 mL  PEEP/CPAP (cm H2O):  [10 cm H20-15 cm H20] 12 cm H20  MAP (cm H2O):  [13-24] 21  Objective:  Vital signs: (most recent): Blood pressure 110/71, pulse 80, temperature 99 °F (37.2 °C),  "temperature source Bladder, resp. rate 27, height 165.1 cm (65\"), weight 101 kg (222 lb 10.6 oz), SpO2 93%.            General Appearance:  Not in distress, no asynchrony with mechanical ventilator.  Head: Atraumatic, Normocephalic, Pupils reactive & symmetrical B/L.  Neck:  Endotracheal tube clean.   Lungs:   B/L Breath sounds present with decreased breath sounds on bases, no wheezing heard, occ crackles.   Heart: S1 and S2 present, no murmur  Abdomen: Distended, no guarding or rigidity, bowel sounds positive.  Extremities: + non pitting edema, warm to touch.  Pulses: Positive and symmetric.  Neurologic:  Pt sedated on the vent    Ventilator settings: A/C: FiO2 55%/PEEP 12                                 plateau pressure 25      Results from last 7 days   Lab Units 01/10/24  0447 01/09/24  1352 01/09/24  0224 01/08/24  2137   WBC 10*3/mm3 2.69*  --  3.39* 8.05   HEMOGLOBIN g/dL 8.8* 9.5* 8.6* 12.1   PLATELETS 10*3/mm3 181  --  138* 211     Results from last 7 days   Lab Units 01/10/24  0447 01/08/24  2137 01/03/24  1304   SODIUM mmol/L 130* 131* 128*   POTASSIUM mmol/L 4.2 3.9 4.8   CO2 mmol/L 22.0 27.0 27.0   BUN mg/dL 20 18 17   CREATININE mg/dL 0.90 0.89 0.82   MAGNESIUM mg/dL 1.7 1.8  --    PHOSPHORUS mg/dL 3.8 2.5  --    GLUCOSE mg/dL 157* 130* 139*     Estimated Creatinine Clearance: 76.3 mL/min (by C-G formula based on SCr of 0.9 mg/dL).    Results from last 7 days   Lab Units 01/10/24  0335   PH, ARTERIAL pH units 7.400   PCO2, ARTERIAL mm Hg 37.6   PO2 ART mm Hg 68.8*       Images:   Chest x-ray reviewed personally and showed endotracheal tube of anabella.  NG tube coursing below the diaphragm.  PICC line port is in good position.  Bilateral diffuse infiltrative process persisting and slightly worse compared to yesterday.       I reviewed the patient's results and images.     Echo reviewed.  Interpretation Summary         Left ventricular systolic function is normal. Calculated left ventricular EF = 61.7%    " Mild to moderate AI    No pericardial effusion\    Assessment & Plan   Impression        Acute respiratory failure with hypoxia    Hypothyroidism due to Hashimoto's thyroiditis    Abdominal carcinomatosis - omental    Peritoneal carcinomatosis    Pancreatic cancer    Acute pulmonary embolism    Aspiration pneumonia due to vomitus       Plan        1.  Patient presenting with GI complaints and decompensated in the emergency room and found to have bilateral extensive pulmonary emboli.  Subsequently developed significant hypoxemic respiratory failure and hemodynamic instability.  Patient underwent mechanical thrombectomy and had slight improvement in oxygenation.  Likely has aspiration pneumonia with ARDS picture as well.  Plateau pressures are elevated today.  Will continue low tidal volume ventilation with high PEEP.  Wean FiO2 as tolerated.  Continue IV heparin and currently therapeutic.  2.  Continue empiric antibiotic therapy with Zosyn.  Cultures have been sent and pending.  MRSA swab is negative.    3.  Resume patient's home dose of thyroid supplementation.  4.  GI prophylaxis.  5.  If start having diarrhea will check GI PCR.  6.  Continue current sedation for comfort and ventilatory synchrony.  7.  Patient's abdomen is distended with ascites but that is a chronic finding.  I do not think abdominal distention is playing a role in her respiratory symptoms.  Will monitor closely.  If peak pressures start to become an issue then will consider therapeutic paracentesis.  8.  WBC count drop overnight.  Had chemotherapy about 10 days ago.  Will monitor for now.  Hematology oncology team following.  May need GM-CSF support.  9.  Monitor blood glucose and insulin as needed for hyperglycemia management.  10.  No acute bleed noted.  Hemoglobin is being monitored.   11.  Urine output is improving.  Will decrease IV fluid to 75 mill per hour.  Hold off on diuresis for now.  Wean pressors as tolerated to keep mean artery  pressure 65 or better.  12.  Hold start of enteral nutrition today as patient had significant nausea and vomiting yesterday.  Will reassess tomorrow.    Appreciate all consultants help in managing this difficult case.  Extensive discussion held with family.  Patient remains full code.      Plan of care and goals reviewed with multidisciplinary/antibiotic stewardship team during rounds.   I discussed the patient's findings and my recommendations with family, nursing staff, and consulting provider     Time spent Critical care 42 min (exclusive of procedure time)  including high complexity decision making to assess, manipulate, and support vital organ system failure in this individual who has impairment of one or more vital organ systems such that there is a high probability of imminent or life threatening deterioration in the patient’s condition.      Maico Rosales MD, FCCP  Pulmonary, Critical care and Sleep Medicine

## 2024-01-11 NOTE — PROGRESS NOTES
[facility]  INPATIENT PROGRESS NOTE         [facility] 2A ICU    1/11/2024      PATIENT IDENTIFICATION:   Name:  Jessica Kraft      MRN:  [id number]     63 y.o.  female             Reason for visit: Submassive PE status post thrombectomy 1/9/2024      SUBJECTIVE:   Oxygenation continues to improve, following commands per nursing, groin site clean dry and intact     OBJECTIVE:  Vitals:    01/11/24 0535 01/11/24 0545 01/11/24 0600 01/11/24 0801   BP:  133/82 130/78 134/85   BP Location:   Right arm    Patient Position:   Lying    Pulse:  70 70 68   Resp:   26 24   Temp:       TempSrc:       SpO2:  96% 96% 96%   Weight: 101 kg (221 lb 9 oz)      Height:         FiO2 (%): 45 %     Body mass index is 36.87 kg/m².    Intake/Output Summary (Last 24 hours) at 1/11/2024 0834  Last data filed at 1/11/2024 0830  Gross per 24 hour   Intake 3959.14 ml   Output 1175 ml   Net 2784.14 ml       Telemetry: Personally reviewed, normal sinus rhythm, no arrhythmia     Exam:  General Appearance:   well developed  well nourished  Neck:  thyroid not enlarged  supple  Respiratory:  no respiratory distress  normal breath sounds  no rales  Cardiovascular:  no jugular venous distention  regular rhythm  apical impulse normal  S1 normal, S2 normal  no S3, no S4   no murmur  no rub, no thrill  carotid pulses normal; no bruit  pedal pulses normal  lower extremity edema: none    Skin:   warm, dry      No Known Allergies  Scheduled meds:       Albuterol Sulfate NEB Orderable, 2.5 mg, Nebulization, Q6H - RT  chlorhexidine, 15 mL, Mouth/Throat, Q12H  docusate sodium, 100 mg, Nasogastric, BID   And  sennosides, 10 mL, Nasogastric, BID  levothyroxine, 125 mcg, Nasogastric, QAM  pantoprazole, 40 mg, Intravenous, Q24H  piperacillin-tazobactam, 4.5 g, Intravenous, Q8H  potassium chloride, 20 mEq, Intravenous, Q1H  potassium phosphate, 15 mmol, Intravenous, Once  sodium chloride, 10 mL, Intravenous,  "Q12H      IV meds:                      dexmedetomidine, 0.2-1.5 mcg/kg/hr (Order-Specific), Last Rate: 0.7 mcg/kg/hr (01/11/24 0414)  fentanyl 10 mcg/mL,  mcg/hr, Last Rate: 225 mcg/hr (01/11/24 0456)  heparin, 15.5 Units/kg/hr (Order-Specific), Last Rate: 15.5 Units/kg/hr (01/11/24 0653)  norepinephrine, 0.02-0.3 mcg/kg/min (Order-Specific), Last Rate: 0.14 mcg/kg/min (01/11/24 0414)  Pharmacy to Dose Heparin,   propofol, 5-50 mcg/kg/min (Order-Specific)  sodium chloride, 75 mL/hr, Last Rate: 75 mL/hr (01/11/24 0413)      Data Review:  Results from last 7 days   Lab Units 01/11/24  0533 01/10/24  0447 01/08/24  2137   SODIUM mmol/L 131* 130* 131*   BUN mg/dL 21 20 18   CREATININE mg/dL 0.71 0.90 0.89   GLUCOSE mg/dL 177* 157* 130*     Results from last 7 days   Lab Units 01/11/24  0533 01/10/24  0447 01/09/24  1352 01/09/24  0224 01/08/24  2137   WBC 10*3/mm3 4.33 2.69*  --  3.39* 8.05   HEMOGLOBIN g/dL 7.9* 8.8* 9.5* 8.6* 12.1     Results from last 7 days   Lab Units 01/09/24  0224   INR  2.66*     Results from last 7 days   Lab Units 01/11/24  0533 01/10/24  0447 01/08/24  2137   ALT (SGPT) U/L 6 7 13   AST (SGOT) U/L 13 18 21     No results found for: \"DIGOXIN\"   Lab Results   Component Value Date    TSH 1.730 12/07/2023           Invalid input(s): \"LDLCALC\"    Estimated Creatinine Clearance: 96.8 mL/min (by C-G formula based on SCr of 0.71 mg/dL).        Imaging (last 24 hr):   I personally reviewed the most recent chest x-ray and other pertinent imaging studies.  Results for orders placed during the hospital encounter of 01/08/24    XR Chest 1 View    Narrative  XR CHEST 1 VW    Date of Exam: 1/10/2024 1:19 AM EST    Indication: resp failure    Comparison: CT chest and chest radiograph 1/9/2024    Findings:  ET tube tip 4 cm above the anabella. Interval placement of an esophagogastric tube with the tip below the diaphragm. Right-sided port catheter tip is at the cavoatrial junction. Bilateral perihilar " and bibasilar airspace disease which again may relate to  multifocal pneumonia. Negative for pneumothorax. Small bilateral pleural effusions. Osseous structures grossly intact.    Impression  Impression:  1. Esophagogastric tube tip below the diaphragm.  2. Stable ET tube and right-sided port catheter.  3. Persistent bilateral perihilar and bibasilar airspace disease which may relate to combination of multifocal pneumonia, atelectasis, and/or pulmonary infarcts secondary to pulmonary emboli.      Electronically Signed: Fransico Ramírez MD  1/10/2024 7:25 AM EST  Workstation ID: [id number]        Last ECHO:  Results for orders placed during the hospital encounter of 01/08/24    Adult Transthoracic Echo Complete w/ Color, Spectral and Contrast if Necessary Per Protocol    Interpretation Summary    Left ventricular systolic function is normal. Calculated left ventricular EF = 61.7%    Mild to moderate AI    No pericardial effusion\        PROBLEM LIST:     Acute respiratory failure with hypoxia    Hypothyroidism due to Hashimoto's thyroiditis    Abdominal carcinomatosis - omental    Peritoneal carcinomatosis    Pancreatic cancer    Acute pulmonary embolism    Aspiration pneumonia due to vomitus        Initial cardiac assessment:  63-year-old female with newly diagnosed metastatic pancreatic cancer presenting with intractable nausea and vomiting likely aspiration pneumonia with acute hypoxic respiratory failure requiring intubation and shock requiring vasopressor, found to have submassive bilateral pulmonary emboli.     ASSESSMENT/PLAN:  1.  Submassive pulmonary emboli:  Status post mechanical thrombectomy with Inari catheter 1/9/2024 with good results  Improved oxygenation  Chronic appearing thrombi removed.  Continue heparin, transition to treatment dose oral anticoagulation when taking pills.  No evidence of RV strain by echo or CT    2.  Bilateral aspiration pneumonia:  Antibiotics per primary team, following commands,  weaning oxygen hopeful for extubation soon    3.  Metastatic pancreatic cancer:  Oncology team following    4.  Hypotension:  Wean Levophed for goal MAP greater than 65    The patient remains critically ill in the ICU.      Felipe Forde MD  1/11/2024    08:34 EST

## 2024-01-11 NOTE — PROGRESS NOTES
Intensive Care Follow-up     Hospital:  LOS: 2 days   Ms. Jessica Kraft, 63 y.o. female is followed for:   Acute respiratory failure with hypoxia            History of present illness:   63-year-old female with recent diagnosis of metastatic malignancy of possible pancreatic region.  Definitive biopsy with EUS was done but was limited sampling.  Patient is followed closely by medical oncology and undergoing chemotherapy with first dose given on 12/29.  Patient recently was diagnosed with severe constipation and was treated with bowel regimen and enema and improved.  However yesterday patient started having nausea and vomiting and was brought to emergency room.  Patient did not complain of any chest pain or shortness of breath.  Patient continued to have nausea and while in the ER she cutely decompensated where she became severely hypoxic with altered mental status.  Patient underwent emergent intubation in the emergency room and subsequently CT scan of the head, chest and abdomen and pelvis was done.  Head CT scan was negative.  Chest CT scan showed extensive clot in both right and left main pulmonary arteries extending to proximal segment branches of right upper lobe, right lower lobe and both left lower lobe and upper lobe branches as well.  No significant right heart strain noted.  Patchy airspace disease noted bilateral lower lobes which was concerning for pneumonia versus pulmonary infarct.  Ascites was present and peritoneal carcinomatosis noted again.  Patient was also hypotensive requiring low-dose norepinephrine at this point.  Patient was started on antibiotics, IV heparin and admitted to ICU for ongoing care.  Patient was significantly hypoxemic 100% FiO2 and 10 of PEEP.  Significant clot burden noted on the CT scan.  Discussed with cardiology and patient underwent mechanical thrombectomy.  Patient had a lot of old clots but some clots were removed from the right pulmonary artery with improvement in  pulmonary circulation.        Subjective   Interval History:  Overnight patient did fair.  Today patient's FiO2 was 45% but PEEP was still 12.  I was able to bring down PEEP to 6 while in the room and FiO2 to 40% and she seems to be tolerating that okay.  Patient is waking up and following commands.  Still having significant output from NG tube about 850 mL in the last 24 hours mostly bilious.  There was no evidence of bowel obstruction on the CT scan.  Will go ahead and start patient on Reglan.  Switch OG-tube to NG tube.  Will wean sedation and wake patient up and try to perform spontaneous breathing trial.  Urine output is marginal and hemodynamically getting more stable we will go ahead and give dose of diuretic.                 The patient's past medical, surgical and social history were reviewed and updated in Epic as appropriate.       Objective     Infusions:  dexmedetomidine, 0.2-1.5 mcg/kg/hr (Order-Specific), Last Rate: 0.7 mcg/kg/hr (01/11/24 1040)  fentanyl 10 mcg/mL,  mcg/hr, Last Rate: 200 mcg/hr (01/11/24 0847)  heparin, 15.5 Units/kg/hr (Order-Specific), Last Rate: 15.5 Units/kg/hr (01/11/24 0653)  norepinephrine, 0.02-0.3 mcg/kg/min (Order-Specific), Last Rate: 0.08 mcg/kg/min (01/11/24 0846)  Pharmacy to Dose Heparin,   sodium chloride, 75 mL/hr, Last Rate: 75 mL/hr (01/11/24 9891)      Medications:  Albuterol Sulfate NEB Orderable, 2.5 mg, Nebulization, Q6H - RT  chlorhexidine, 15 mL, Mouth/Throat, Q12H  docusate sodium, 100 mg, Nasogastric, BID   And  sennosides, 10 mL, Nasogastric, BID  levothyroxine, 125 mcg, Nasogastric, QAM  metoclopramide, 5 mg, Intravenous, Q8H  pantoprazole, 40 mg, Intravenous, Q24H  piperacillin-tazobactam, 4.5 g, Intravenous, Q8H  potassium chloride, 20 mEq, Intravenous, Q1H  potassium phosphate, 15 mmol, Intravenous, Once  sodium chloride, 10 mL, Intravenous, Q12H        Vital Sign Min/Max for last 24 hours  Temp  Min: 98.6 °F (37 °C)  Max: 100 °F (37.8 °C)   BP   "Min: 94/61  Max: 139/87   Pulse  Min: 68  Max: 90   Resp  Min: 24  Max: 28   SpO2  Min: 92 %  Max: 98 %   No data recorded       Input/Output for last 24 hour shift  01/10 0701 - 01/11 0700  In: 4553.3 [I.V.:4183.3]  Out: 1420 [Urine:570]   Mode: VC+/AC  FiO2 (%):  [45 %-50 %] 45 %  S RR:  [24] 24  S VT:  [400 mL] 400 mL  PEEP/CPAP (cm H2O):  [12 cm H20] 12 cm H20  MAP (cm H2O):  [18-20] 19  Objective:  Vital signs: (most recent): Blood pressure 117/75, pulse 71, temperature 98.6 °F (37 °C), temperature source Bladder, resp. rate 26, height 165.1 cm (65\"), weight 101 kg (221 lb 9 oz), SpO2 94%.            General Appearance:  Not in distress, no asynchrony with mechanical ventilator.  Head: Pupils reactive & symmetrical B/L.  Neck:  Endotracheal tube clean.   Lungs:   B/L Breath sounds present with decreased breath sounds on bases, no wheezing heard, occ crackles.   Heart: S1 and S2 present, no murmur  Abdomen: Distended, no guarding or rigidity, bowel sounds positive.  Extremities: + non pitting edema, warm to touch.  Pulses: Positive and symmetric.  Neurologic:  Pt sedated on the vent    Ventilator settings: A/C: FiO2 40%/PEEP 6                                 plateau pressure 22      Results from last 7 days   Lab Units 01/11/24  0533 01/10/24  0447 01/09/24  1352 01/09/24  0224   WBC 10*3/mm3 4.33 2.69*  --  3.39*   HEMOGLOBIN g/dL 7.9* 8.8* 9.5* 8.6*   PLATELETS 10*3/mm3 203 181  --  138*     Results from last 7 days   Lab Units 01/11/24  0533 01/10/24  0447 01/08/24  2137   SODIUM mmol/L 131* 130* 131*   POTASSIUM mmol/L 3.3* 4.2 3.9   CO2 mmol/L 22.0 22.0 27.0   BUN mg/dL 21 20 18   CREATININE mg/dL 0.71 0.90 0.89   MAGNESIUM mg/dL 2.6* 1.7 1.8   PHOSPHORUS mg/dL 2.1* 3.8 2.5   GLUCOSE mg/dL 177* 157* 130*     Estimated Creatinine Clearance: 96.8 mL/min (by C-G formula based on SCr of 0.71 mg/dL).    Results from last 7 days   Lab Units 01/11/24  0324   PH, ARTERIAL pH units 7.393   PCO2, ARTERIAL mm Hg 37.5 "   PO2 ART mm Hg 103.0       Images:   Chest x-ray reviewed personally and showed endotracheal tube above anabella.  NG tube coursing below the diaphragm.  PICC line port is in good position.  Bilateral patchy opacities persisting seems to have slightly improved on the right side.      I reviewed the patient's results and images.     Echo reviewed.  Interpretation Summary         Left ventricular systolic function is normal. Calculated left ventricular EF = 61.7%    Mild to moderate AI    No pericardial effusion      Cultures negative thus far    Assessment & Plan   Impression        Acute respiratory failure with hypoxia    Hypothyroidism due to Hashimoto's thyroiditis    Abdominal carcinomatosis - omental    Peritoneal carcinomatosis    Pancreatic cancer    Acute pulmonary embolism    Aspiration pneumonia due to vomitus       Plan        1.  Patient presenting with GI complaints and decompensated in the emergency room and found to have bilateral extensive pulmonary emboli.  Subsequently developed significant hypoxemic respiratory failure and hemodynamic instability.  Patient underwent mechanical thrombectomy and over the last 48 hours has significant improvement in oxygenation.  Likely has aspiration pneumonia as well.  Plateau pressures are acceptable today.  Patient was treated with low tidal volume high PEEP.  Since oxygenation improving I will go ahead and start weaning PEEP down.  Wean FiO2 as tolerated to keep saturations 90% or better.  Continue IV heparin and currently therapeutic.  Will need indefinite anticoagulation.  Since patient is showing significant improvement I will go ahead and start weaning sedation and have patient perform spontaneous breathing trial.  If does well on spontaneous breathing trials will work towards extubation.  2.  Continue empiric antibiotic therapy with Zosyn.  Cultures negative thus far.  Bandemia noted today.  Not much secretions noted through ET tube.  Will continue current  antibiotics and complete 7 days of therapy.  3.  Continue home dose of thyroid supplementation for hypothyroidism.  4.  GI prophylaxis.  5.  Wean sedation.  6.  Patient's abdomen is distended with ascites but that is a chronic finding.  I do not think abdominal distention is playing a role in her respiratory symptoms.  Will monitor closely.  If peak pressures start to become an issue then will consider therapeutic paracentesis.  7.  Hematology numbers are somewhat stable.  Hemoglobin is up and down without any acute bleed.  Will continue to trend for now.  8.  Monitor blood glucose and insulin as needed for hyperglycemia management.  9.  Urine output is marginal and will go ahead and give dose of diuretic.  Continue to monitor renal function.  Replace potassium and phosphorus today.  10.  Still having significant output from orogastric tube.  Will switch to NG tube as  we will need to continue suction as patient has had multiple episodes of vomiting before.  Abdominal CT did not show any evidence of bowel obstruction.  Abdominal exam is benign.  Will monitor for now.  Start patient on low-dose Reglan for possible ileus.    Met with patient's  and son and updated him about current plan of care.  Their questions answered.      Plan of care and goals reviewed with multidisciplinary/antibiotic stewardship team during rounds.   I discussed the patient's findings and my recommendations with family, nursing staff, and consulting provider     Time spent Critical care 40 min (exclusive of procedure time)  including high complexity decision making to assess, manipulate, and support vital organ system failure in this individual who has impairment of one or more vital organ systems such that there is a high probability of imminent or life threatening deterioration in the patient’s condition.      Maico Rosales MD, [facility]  Pulmonary, Critical care and Sleep Medicine

## 2024-01-11 NOTE — PROCEDURES
Intubation    Date/Time: 1/11/2024 6:20 PM    Performed by: Martha Riojas APRN  Authorized by: Martha Riojas APRN  Consent: The procedure was performed in an emergent situation.  Patient identity confirmed: arm band and provided demographic data  Indications: airway protection and respiratory distress  Intubation method: video-assisted  Patient status: paralyzed (RSI)  Preoxygenation: BVM  Pretreatment medications: midazolam  Sedatives: etomidate  Paralytic: rocuronium  Laryngoscope size: Mac 4  Tube size: 7.5 mm  Tube type: cuffed  Number of attempts: 1  Cricoid pressure: no  Cords visualized: yes  Post-procedure assessment: chest rise, ETCO2 monitor and CO2 detector  Breath sounds: equal  Cuff inflated: yes  ETT to lip: 23 cm  Tube secured with: ETT worthy  Patient tolerance: patient tolerated the procedure well with no immediate complications  Comments: This procedure was performed by ANGELICA Zaidi, under the direct supervision of Dr. Delfin Martinez MD.     Tanisha Riojas, MSN, APRN, [facility]-  Pulmonary and Critical Care Medicine  Electronically signed by ANGELICA Jones, 01/11/24, 6:22 PM EST.

## 2024-01-11 NOTE — PROGRESS NOTES
Clinical Nutrition     Nutrition Support Assessment  Reason for Visit: Identified at risk by screening criteria, EN      Patient Name: Jessica Kraft  YOB: 1960  MRN: [id number]  Date of Encounter: 01/11/24 11:53 EST  Admission date: 1/8/2024      Nutrition Assessment   Admission Diagnosis:  Acute respiratory failure with hypoxia [J96.01]      Problem List:    Acute respiratory failure with hypoxia    Hypothyroidism due to Hashimoto's thyroiditis    Abdominal carcinomatosis - omental    Peritoneal carcinomatosis    Pancreatic cancer    Acute pulmonary embolism    Aspiration pneumonia due to vomitus        PMH:   She  has a past medical history of Abscess, Anxiety, Bilateral ovarian cysts, Depression, Encounter for routine gynecological examination, Foot pain, H/O bone density study (06/2014), H/O mammogram (07/2016), Hot flashes, menopausal, Hypothyroidism due to Hashimoto's thyroiditis, Insomnia, Miscarriage, Osteopenia, Pap smear for cervical cancer screening (06/2014), Routine general medical examination at a health care facility, Urinary incontinence, Urinary tract infection, and Vitamin D deficiency.    PSH:  She  has a past surgical history that includes Cholecystectomy (2008); Hernia repair (2010); Abdominoplasty (2001); Colonoscopy (07/2012); Ectopic pregnancy surgery (Left, 1994); Esophagogastroduodenoscopy (N/A, 12/13/2023); and Cardiac catheterization (N/A, 1/9/2024).      Applicable Nutrition Concerns:   Skin:bruised  GI:nausea, vomiting, ogt to LWS= 350ml      Applicable Interval History:     ARF/VENT 1-8-24    s/p mechanical thrombectomy 1-9-24    Reported/Observed/Food/Nutrition Related History:     1-11-24:pt intubated, sedated + precedex, fentanyl, heparin, levophed 0.08mcg. NS@75ml    Per RN: ogt just replaced with ngt, pt still having significant output, had had ~300ml this am so far, (noted total of 850ml past 24 hours), no bm yet, have started reglan  "today      1-9-24: Pt intubated and sedated,  at bedside  + precedex, fentanyl, levophed 0.08mcg, heparin     reports pt has had nausea and vomiting for the past day and a half    Per RN: plan for possible EKOS catheter today    Labs    Labs Reviewed: Yes     Results from last 7 days   Lab Units 01/11/24  0533 01/10/24  0447 01/09/24  0151 01/08/24  2137   GLUCOSE mg/dL 177* 157*  --  130*   BUN mg/dL 21 20  --  18   CREATININE mg/dL 0.71 0.90  --  0.89   SODIUM mmol/L 131* 130*  --  131*   CHLORIDE mmol/L 98 96*  --  89*   POTASSIUM mmol/L 3.3* 4.2  --  3.9   PHOSPHORUS mg/dL 2.1* 3.8  --  2.5   MAGNESIUM mg/dL 2.6* 1.7  --  1.8   ALT (SGPT) U/L 6 7  --  13   LACTATE mmol/L  --   --  1.7 2.3*       Results from last 7 days   Lab Units 01/11/24  0533 01/10/24  0447 01/08/24  2137   ALBUMIN g/dL 2.1* 2.2* 3.1*   IONIZED CALCIUM mmol/L  --  1.01*  --        Results from last 7 days   Lab Units 01/11/24  0552 01/10/24  2339 01/10/24  1716 01/10/24  1157 01/10/24  0545 01/10/24  0330   GLUCOSE mg/dL 163* 169* 170* 158* 155* 161*     Lab Results   Lab Value Date/Time    HGBA1C 5.70 (H) 12/07/2023 1959         Results from last 7 days   Lab Units 01/09/24  1026   PROBNP pg/mL 316.5         Medications    Medications Reviewed: Yes  Pertinent: abx, protonix, bowel regimen, reglan  Infusion:precedex, fentanyl, heparin, levophed, NS@75ml      Intake/Ouptut 24 hrs (0701 - 0700)   I&O's Reviewed: Yes     Intake & Output (last day)         01/10 0701  01/11 0700 01/11 0701 01/12 0700    I.V. (mL/kg) 4183.3 (41.8) 503.3 (5)    NG/ 120    IV Piggyback 200 150    Total Intake(mL/kg) 4553.3 (45.5) 773.3 (7.7)    Urine (mL/kg/hr) 570 (0.2) 60 (0.1)    Emesis/NG output 850 100    Total Output 1420 160    Net +3133.3 +613.3                    Anthropometrics     Flowsheet Rows      Flowsheet Row First Filed Value   Admission Height 167.6 cm (66\") Documented at 01/08/2024 2036   Admission Weight 91.2 kg (201 lb) " "Documented at 01/08/2024 2036          Height: Height: 165.1 cm (65\")  Last Filed Weight: Weight: 101 kg (221 lb 9 oz) (01/11/24 0535)  Method: Weight Method: Bed scale  BMI: BMI (Calculated): 36.9  BMI classification: Obese Class I: 30-34.9kg/m2  IBW:  130lb    UBW: ?  Weight change: per EMR ~ 17lb wt loss over the past month     Weight       Weight (kg) Weight (lbs) Weight Method Visit Report   5/16/2016 96.979 kg  213 lb 12.8 oz   --    8/18/2016 98.703 kg  217 lb 9.6 oz   --    11/10/2016 97.705 kg  215 lb 6.4 oz   --    2/3/2017 98.068 kg  216 lb 3.2 oz   --    3/6/2017 96.253 kg  212 lb 3.2 oz   --    5/18/2017 97.342 kg  214 lb 9.6 oz   --    8/2/2017 101.696 kg  224 lb 3.2 oz   --    12/6/2023 97.07 kg  214 lb  Stated     12/7/2023 97.523 kg  215 lb  Stated      95.255 kg  210 lb  Stated     12/8/2023 98.975 kg  218 lb 3.2 oz  Standing scale     12/9/2023 97.569 kg  215 lb 1.6 oz  Bed scale     12/10/2023 97.523 kg  215 lb  Bed scale     12/11/2023 92.987 kg  205 lb  Standing scale     12/12/2023 93.804 kg  206 lb 12.8 oz  Standing scale     12/13/2023 104.826 kg  231 lb 1.6 oz  Bed scale      105 kg  231 lb 7.7 oz      12/14/2023 94.076 kg  207 lb 6.4 oz  Standing scale     12/15/2023 92.08 kg  203 lb  Standing scale     12/19/2023 91.173 kg  201 lb   --    12/22/2023 91.173 kg  201 lb  Stated     12/25/2023 91.173 kg  201 lb  Stated     1/3/2024 91.627 kg  202 lb   --    1/8/2024 91.173 kg  201 lb  Stated     1/9/2024 91.173 kg  201 lb            Nutrition Focused Physical Exam     Date:     Unable to perform exam due to: Pt unable to participate at time of visit      Needs Assessment   Date: 1-9-24    Height used:Height: 165.1 cm (65\")  Weights used/ ABW: 201lb/ 91.4kg  IBW\" 130lb/ 59.1kg      Estimated Calorie needs: ~ 1300kcal initial  Method:  14Kcals/KG ABW:1280kcal  Method:  MSJ ABW: 1491kcal  Method:  PSU ABW: 1839kcal    Estimated Protein needs: ~118g protein  Method: 2g protein per kg IBW: 118g " protein  Method: 1.2-1.5g protein per kg ABW: 110-137g protein    Current Nutrition Prescription     PO: NPO Diet NPO Type: Strict NPO  Oral Nutrition Supplement:   Intake: N/A      Nutrition Diagnosis   Date: 1-9-24 Updated: 1-11  Problem Inadequate energy intake   Etiology ARF/VENT   Signs/Symptoms  NPO       Goal:   General: Nutrition support treatment  PO: N/A  EN/PN: Initiate EN when feasible    Nutrition Intervention      Follow treatment progress, Care plan reviewed    Consider EN once ngt output decreases    Consider post pyloric feeding if pt continues to have high ngt ouptut, pt has ascites which may be contributing to this    When feasible start trophic feed: Peptamen VHP @20ml, gradually advance as tolerated to goal rate @65ml/hr, free water @10ml/hr    TF at goal volume will provide 1300ml, 1300kcal,100% kcal needs, 120g protein, 102% protein needs, 5g fiber, 1292ml free water     Suggest add MVI as TF volume too low to meet RDI's    Monitoring/Evaluation:   Per protocol, I&O, Pertinent labs, Weight, Skin status, GI status, Symptoms, Hemodynamic stability      Gauri Ayala, JANETH  Time Spent: 30min

## 2024-01-11 NOTE — PROGRESS NOTES
HEPARIN INFUSION  Jessica Kraft is a  63 y.o. female receiving heparin infusion.     Therapy for (VTE/Cardiac):   VTE  Patient Weight: 91 kg  Initial Bolus (Y/N):   n  Any Bolus (Y/N):   y        Signs or Symptoms of Bleeding: none noted    Recommend Xa every 6 hours.   VTE (PE/DVT)   Initial Bolus: 80 units/kg (Max 10,000 units)  Initial rate: 18 units/kg/hr (Max 1,500 units/hr)    Anti Xa Rebolus Infusion Hold time Change infusion Dose (Units/kg/hr) Next Anti Xa Level Due   < 0.11 50 Units/kg  (4000 Units Max) None Increase by  4 Units/kg/hr 6 hours   0.11 - 0.19 25 Units/kg  (2000 Units Max) None Increase by  3 Units/kg/hr 6 hours   0.2 - 0.29 0 None Increase by  2 Units/kg/hr 6 hours   0.3 - 0.7 0 None No Change 6 hours (after 2 consecutive levels in range check qAM)   0.71 - 0.8 0 None Decrease by  1 Units/kg/hr 6 hours   0.81 - 0.9 0 None Decrease by  2 Units/kg/hr 6 hours   0.91 - 1 0 60 Minutes Decrease by  3 Units/kg/hr 6 hours   >1 0 Hold  After Anti Xa less than 0.7 decrease previous rate by  4 Units/kg/hr  Every 2 hours until Anti Xa is less than 0.7 then when infusion restarts in 6 hours     Results from last 7 days   Lab Units 01/10/24  0447 01/09/24  1352 01/09/24  0224 01/08/24  2137   INR   --   --  2.66*  --    HEMOGLOBIN g/dL 8.8* 9.5* 8.6* 12.1   HEMATOCRIT % 27.8* 29.2* 27.3* 37.4   PLATELETS 10*3/mm3 181  --  138* 211          Date   Time   Anti-Xa Current Rate (Unit/kg/hr) Bolus   (Units) Rate Change   (Unit/kg/hr) New Rate (Unit/kg/hr) Next   Anti-Xa Comments  Pump Check Daily   01/09 0245 STAT new -- 16.5 16.5 0900 D/w JESS   01/09 0912 0.32 16.5 -- -- 16.5 1600 Pump reviewed - D/w JESS Addison    01/10 0130 0.89 16.5 -- -2 14.5 0800 D/w RN   01/10 0819 0.63 14.5 -- -- 14.5 1400 D/W RN, checked pump    1/10 1538 0.47 14.5 -- -- 14.5 2200 D/w RN, drip has not been stopped, running continuously, no problems, will check Xa again @ 2200 due to drop   1/10 2250 0.35 14.5 -- -- 14.5 0600 D/w RN    1/11 0645 0.28 14.5 -- +1 15.5 1200 D/w RN   1/11 1238 0.35 15.5 -- -- 15.5 2000 D/W Angela MERCADO pump checked                                                                                                                                                    Yoselyn Combs, Pharmacy Intern  01/11/24  13:50 EST

## 2024-01-11 NOTE — SIGNIFICANT NOTE
Patient with encephalopathy, hypoxia, and intractable vomiting. Oxygen saturation 83% on NRB. Patient underwent RSI for airway protection. Family to be updated.    Tanisha Riojas, MSN, APRN, [facility]-  Pulmonary and Critical Care Medicine  Electronically signed by ANGELICA Jones, 01/11/24, 6:03 PM EST.

## 2024-01-11 NOTE — PLAN OF CARE
Goal Outcome Evaluation:   - VSS  - Tmax: 100  - Following commands  - Continued on fentanyl and precedex for sedation  - Levophed decreased to 0.14mcg/kg/hr  - No change to heparin gtt. Levels therapeutic   - OG output: 525ml  - UOP: 295ml  - Restraints continued

## 2024-01-12 LAB — FUNGUS WND CULT: NORMAL

## 2024-01-12 NOTE — PROGRESS NOTES
[facility]  INPATIENT PROGRESS NOTE         [facility] 2A ICU    1/12/2024      PATIENT IDENTIFICATION:   Name:  Jessica Kraft      MRN:  [id number]     63 y.o.  female             Reason for visit: Submassive PE status post thrombectomy 1/9/2024      SUBJECTIVE:   Extubated 1/1/2024 but reintubated later that day, hemodynamically stable today  OBJECTIVE:  Vitals:    01/12/24 1245 01/12/24 1300 01/12/24 1344 01/12/24 1500   BP: 94/63 115/72 116/74 120/76   BP Location:       Patient Position:       Pulse: 92 70 70 69   Resp:       Temp:       TempSrc:       SpO2: 100% 98% 96% 98%   Weight:       Height:         FiO2 (%): 45 %     Body mass index is 37.9 kg/m².    Intake/Output Summary (Last 24 hours) at 1/12/2024 1610  Last data filed at 1/12/2024 1214  Gross per 24 hour   Intake 4001.98 ml   Output 1565 ml   Net 2436.98 ml       Telemetry: Personally reviewed, normal sinus rhythm, no arrhythmia     Exam:  General Appearance:   well developed  well nourished  Neck:  thyroid not enlarged  supple  Respiratory:  no respiratory distress  normal breath sounds  no rales  Cardiovascular:  no jugular venous distention  regular rhythm  apical impulse normal  S1 normal, S2 normal  no S3, no S4   no murmur  no rub, no thrill  carotid pulses normal; no bruit  pedal pulses normal  lower extremity edema: none    Skin:   warm, dry      No Known Allergies  Scheduled meds:       Albuterol Sulfate NEB Orderable, 2.5 mg, Nebulization, Q6H - RT  chlorhexidine, 15 mL, Mouth/Throat, Q12H  docusate sodium, 100 mg, Nasogastric, BID   And  sennosides, 10 mL, Nasogastric, BID  levothyroxine sodium, 100 mcg, Intravenous, Daily  metoclopramide, 10 mg, Intravenous, Q8H  midazolam, 2 mg, Intravenous, Once  pantoprazole, 40 mg, Intravenous, Q24H  piperacillin-tazobactam, 4.5 g, Intravenous, Q8H  sodium chloride, 10 mL, Intravenous, Q12H  thiamine (B-1) IV, 200 mg, Intravenous,  "Daily  venlafaxine, 50 mg, Nasogastric, TID With Meals      IV meds:                      dexmedetomidine, 0.2-1.5 mcg/kg/hr (Order-Specific), Last Rate: 1.2 mcg/kg/hr (01/12/24 1417)  fentanyl 10 mcg/mL,  mcg/hr, Last Rate: 300 mcg/hr (01/12/24 1550)  heparin, 15.5 Units/kg/hr (Order-Specific), Last Rate: 15.5 Units/kg/hr (01/12/24 0727)  norepinephrine, 0.02-0.3 mcg/kg/min (Order-Specific), Last Rate: 0.08 mcg/kg/min (01/12/24 0847)  Pharmacy to Dose Heparin,       Data Review:  Results from last 7 days   Lab Units 01/12/24  0547 01/11/24  0533 01/10/24  0447 01/08/24  2137   SODIUM mmol/L 134* 131* 130* 131*   BUN mg/dL 17 21 20 18   CREATININE mg/dL 0.65 0.71 0.90 0.89   GLUCOSE mg/dL 136* 177* 157* 130*     Results from last 7 days   Lab Units 01/12/24  0547 01/11/24  0533 01/10/24  0447 01/09/24  1352 01/09/24  0224 01/08/24  2137   WBC 10*3/mm3 7.40 4.33 2.69*  --  3.39* 8.05   HEMOGLOBIN g/dL 7.6* 7.9* 8.8* 9.5* 8.6* 12.1     Results from last 7 days   Lab Units 01/12/24  1427 01/09/24  0224   INR  1.74* 2.66*     Results from last 7 days   Lab Units 01/12/24  0547 01/11/24  0533 01/10/24  0447 01/08/24  2137   ALT (SGPT) U/L 6 6 7 13   AST (SGOT) U/L 18 13 18 21     No results found for: \"DIGOXIN\"   Lab Results   Component Value Date    TSH 1.730 12/07/2023           Invalid input(s): \"LDLCALC\"    Estimated Creatinine Clearance: 106.8 mL/min (by C-G formula based on SCr of 0.65 mg/dL).        Imaging (last 24 hr):   I personally reviewed the most recent chest x-ray and other pertinent imaging studies.  Results for orders placed during the hospital encounter of 01/08/24    XR Chest 1 View    Narrative  XR CHEST 1 VW    Date of Exam: 1/11/2024 5:58 PM EST    Indication: post intubation    Comparison: None available.    Findings: No focal consolidation. Vascular congestion. Perihilar pulmonary edema. Endotracheal tube tip 3.7 cm above the level of the anabella. Right internal jugular central venous " catheter with the tip in the superior vena cava. Feeding tube tip in the  stomach no pneumothorax or pleural effusion. Cardiac size is normal. The visualized clavicles appear intact. No displaced rib fractures. The visualized upper abdomen is normal.    Impression  Impression: Appropriate line and tube placements. Vascular congestion. Perihilar pulmonary edema..        Electronically Signed: Lukas Jacinto MD  1/11/2024 6:22 PM EST  Workstation ID: [id number]        Last ECHO:  Results for orders placed during the hospital encounter of 01/08/24    Adult Transthoracic Echo Complete w/ Color, Spectral and Contrast if Necessary Per Protocol    Interpretation Summary    Left ventricular systolic function is normal. Calculated left ventricular EF = 61.7%    Mild to moderate AI    No pericardial effusion\        PROBLEM LIST:     Acute respiratory failure with hypoxia    Hypothyroidism due to Hashimoto's thyroiditis    Abdominal carcinomatosis - omental    Peritoneal carcinomatosis    Pancreatic cancer    Acute pulmonary embolism    Aspiration pneumonia due to vomitus        Initial cardiac assessment:  63-year-old female with newly diagnosed metastatic pancreatic cancer presenting with intractable nausea and vomiting likely aspiration pneumonia with acute hypoxic respiratory failure requiring intubation and shock requiring vasopressor, found to have submassive bilateral pulmonary emboli.     ASSESSMENT/PLAN:  1.  Submassive pulmonary emboli:  Status post mechanical thrombectomy with Inari catheter 1/9/2024 with good results  Improved oxygenation  Chronic appearing thrombi removed.  Continue heparin, transition to treatment dose oral anticoagulation when taking pills.  No evidence of RV strain by echo or CT    Continue diuresis    2.  Bilateral aspiration pneumonia:  Antibiotics per primary team, following commands, weaning oxygen hopeful for extubation soon    3.  Metastatic pancreatic cancer:  Oncology team following    4.   Hypotension:  Wean pressors for goal MAP greater than 65    Cardiology will follow along as needed please call with questions      Felipe Forde MD  1/12/2024    16:10 EST

## 2024-01-12 NOTE — CASE MANAGEMENT/SOCIAL WORK
Continued Stay Note   [facility]     Patient Name: Jessica Kraft  MRN: [id number]  Today's Date: 1/12/2024    Admit Date: 1/8/2024    Plan: TBD   Discharge Plan       Row Name 01/12/24 1036       Plan    Plan TBD    Patient/Family in Agreement with Plan other (see comments)    Plan Comments Continues to be sedated and intubated. D/C plan pending. CM will continue to follow.    Final Discharge Disposition Code 30 - still a patient                   Discharge Codes    No documentation.                 Expected Discharge Date and Time       Expected Discharge Date Expected Discharge Time    Jan 19, 2024               Danilo Turk RN

## 2024-01-12 NOTE — CONSULTS
[facility]    Referring Provider: No ref. provider found    PCP: Leonarda Díaz MD    Reason for Consultation: Nasoenteral feeding tube placement    Chief complaint:     History of present illness:    Jessica Kraft is a 63 y.o. female with history of metastatic malignancy of presumed pancreatic origin with carcinomatosis and malignant ascites, pancreatic mass s/p non-diagnostic EUS-FNA due to technically challenging anatomy given large hiatal hernia who presented with nausea and emesis as well as shortness of air and was subsequently noted to have pulmonary emboli within the right and left main pulmonary arteries extending to proximal segmental branches of RUL, RLL and YANIV, LLL with moderate to large clot burden and no definite evidence of right heart strain, patchy airspace disease bilaterally consistent with multifocal PNA or sequela of pulmonary infarct, small bilateral pleural effusions.  CT abd/pelvis with mild surface nodularity of liver, moderate ascites, peritoneal carcinomatosis, pancreatic mass with PD dilatation similar to previous, non-dilated bowel loops with no evidence of obstruction.  Patient subsequently had mechanical thrombectomy performed on 1/9 with good results per cardiology with plan to continue anticoagulation.  She is on antibiotics for bilateral, presumed aspiration PNA.  She is on levophed currently for shock and remains intubated and sedated in the ICU.  She has had significant output from NG tube.  GI consulted for NJ placement as attempts at bedside unsuccessful likely related to her known large hiatal hernia.    Allergies:  Patient has no known allergies.    Scheduled Meds:  Albuterol Sulfate NEB Orderable, 2.5 mg, Nebulization, Q6H - RT  chlorhexidine, 15 mL, Mouth/Throat, Q12H  docusate sodium, 100 mg, Nasogastric, BID   And  sennosides, 10 mL, Nasogastric, BID  levothyroxine sodium, 100 mcg, Intravenous, Daily  metoclopramide, 10 mg, Intravenous,  Q8H  pantoprazole, 40 mg, Intravenous, Q24H  piperacillin-tazobactam, 4.5 g, Intravenous, Q8H  potassium phosphate, 15 mmol, Intravenous, Once  sodium chloride, 10 mL, Intravenous, Q12H  thiamine (B-1) IV, 200 mg, Intravenous, Daily  venlafaxine, 50 mg, Nasogastric, TID With Meals         Infusions:  dexmedetomidine, 0.2-1.5 mcg/kg/hr (Order-Specific), Last Rate: 1.2 mcg/kg/hr (01/12/24 1012)  fentanyl 10 mcg/mL,  mcg/hr, Last Rate: 300 mcg/hr (01/12/24 1003)  heparin, 15.5 Units/kg/hr (Order-Specific), Last Rate: 15.5 Units/kg/hr (01/12/24 0727)  norepinephrine, 0.02-0.3 mcg/kg/min (Order-Specific), Last Rate: 0.08 mcg/kg/min (01/12/24 0847)  Pharmacy to Dose Heparin,   sodium chloride, 75 mL/hr, Last Rate: 75 mL/hr (01/12/24 0623)        PRN Meds:    [DISCONTINUED] senna-docusate sodium **AND** polyethylene glycol **AND** [DISCONTINUED] bisacodyl **AND** bisacodyl    Calcium Replacement - Follow Nurse / BPA Driven Protocol    fentaNYL    haloperidol lactate    Lidocaine Viscous HCl    Magnesium Cardiology Dose Replacement - Follow Nurse / BPA Driven Protocol    nitroglycerin    oxymetazoline    Pharmacy to Dose Heparin    Phosphorus Replacement - Follow Nurse / BPA Driven Protocol    Potassium Replacement - Follow Nurse / BPA Driven Protocol    sodium chloride    sodium chloride    Home Meds:  Medications Prior to Admission   Medication Sig Dispense Refill Last Dose    ALPRAZolam (XANAX) 2 MG tablet Take 0.5 tablets by mouth 2 (Two) Times a Day As Needed for Anxiety.       aspirin 81 MG chewable tablet Chew 1 tablet Daily.       cyclobenzaprine (FLEXERIL) 10 MG tablet Take 1 tablet by mouth Daily As Needed.       dicyclomine (BENTYL) 20 MG tablet Take 1 tablet by mouth Every 4 (Four) to 6 (Six) Hours As Needed for Abdominal Cramping.       HYDROcodone-acetaminophen (NORCO) 5-325 MG per tablet Take 1-2 tablets by mouth Every 8 (Eight) Hours As Needed (moderate pain).       LEVOXYL 125 MCG tablet Take 1 tablet  by mouth Every Morning. On an empty stomach, do not substitute 90 tablet 1     mesalamine (CANASA) 1000 MG suppository Insert 1 suppository into the rectum Daily As Needed.       ondansetron (ZOFRAN) 8 MG tablet Take 1 tablet by mouth 3 (Three) Times a Day As Needed for Nausea or Vomiting. 30 tablet 5     promethazine (PHENERGAN) 25 MG suppository Insert 1 suppository into the rectum Every 4 (Four) to 6 (Six) Hours As Needed for Nausea or Vomiting.       RABEprazole (ACIPHEX) 20 MG EC tablet Take 1 tablet by mouth Daily.       rosuvastatin (CRESTOR) 5 MG tablet Take 1 tablet by mouth Daily.       venlafaxine XR (EFFEXOR-XR) 150 MG 24 hr capsule Take 1 capsule by mouth Daily.          ROS: Review of Systems   Unable to perform ROS: Intubated       PAST MED HX:  Past Medical History:   Diagnosis Date    Abscess     Anxiety     Bilateral ovarian cysts     Depression     Encounter for routine gynecological examination     Foot pain     H/O bone density study 06/2014    H/O mammogram 07/2016    [facility]    Hot flashes, menopausal     Hypothyroidism due to Hashimoto's thyroiditis     Insomnia     Miscarriage     x3    Osteopenia     Pap smear for cervical cancer screening 06/2014    Routine general medical examination at a health care facility     Urinary incontinence     Urinary tract infection     Vitamin D deficiency        PAST SURG HX:  Past Surgical History:   Procedure Laterality Date    ABDOMINOPLASTY  2001    CARDIAC CATHETERIZATION N/A 1/9/2024    Procedure: Percutaneous Manual Thrombectomy - Inari Bilateral Pulmonary;  Surgeon: Felipe Forde MD;  Location: [facility] CATH INVASIVE LOCATION;  Service: Cardiovascular;  Laterality: N/A;    CHOLECYSTECTOMY  2008    COLONOSCOPY  07/2012    [facility] in [facility] normal    ECTOPIC PREGNANCY SURGERY Left 1994    ENDOSCOPY N/A 12/13/2023    Procedure: ESOPHAGOGASTRODUODENOSCOPY;  Surgeon: Flakito Abrams MD;  Location: [facility] ENDOSCOPY;  Service: Gastroenterology;   "Laterality: N/A;    HERNIA REPAIR  2010       FAM HX:  Family History   Problem Relation Age of Onset    Mental illness Mother     Depression Mother     Thyroid disease Mother     Hypertension Father     Thyroid disease Sister     Breast cancer Other     Breast cancer Maternal Aunt        SOC HX:  Social History     Socioeconomic History    Marital status:    Tobacco Use    Smoking status: Never    Smokeless tobacco: Never   Vaping Use    Vaping Use: Never used   Substance and Sexual Activity    Alcohol use: No    Drug use: No    Sexual activity: Yes     Partners: Male     Birth control/protection: None, Post-menopausal     Comment:        PHYSICAL EXAM  /68   Pulse 73   Temp 99.7 °F (37.6 °C) (Bladder)   Resp 24   Ht 165.1 cm (65\")   Wt 103 kg (227 lb 11.8 oz)   LMP  (LMP Unknown) Comment: N/A  SpO2 92%   BMI 37.90 kg/m²   Wt Readings from Last 3 Encounters:   01/12/24 103 kg (227 lb 11.8 oz)   01/03/24 91.6 kg (202 lb)   12/25/23 91.2 kg (201 lb)   ,body mass index is 37.9 kg/m².  Physical Exam   General: Patient intubated and sedated   Eyes: Normal lids and lashes   Neck: Supple   Skin: Warm and dry, not jaundiced   Cardiovascular: Regular rate, rhythm   Pulm: Equal expansion bilaterally, intubated   Abdomen: Soft, nondistended;    Extremities: No rash or edema              Neuro: intubated, sedated   Psychiatric: intubated, sedated    Results Review:   I reviewed the patient's new clinical results.    Lab Results   Component Value Date    WBC 7.40 01/12/2024    HGB 7.6 (L) 01/12/2024    HGB 7.9 (L) 01/11/2024    HGB 8.8 (L) 01/10/2024    HCT 23.6 (L) 01/12/2024    MCV 85.5 01/12/2024     01/12/2024       Lab Results   Component Value Date    INR 2.66 (H) 01/09/2024    INR 1.02 12/22/2023    INR 1.12 12/07/2023       Lab Results   Component Value Date    GLUCOSE 136 (H) 01/12/2024    BUN 17 01/12/2024    CREATININE 0.65 01/12/2024    EGFRIFNONA 85 08/22/2016    EGFRIFAFRI 98 " 08/22/2016    BCR 26.2 (H) 01/12/2024     (L) 01/12/2024    K 3.7 01/12/2024    CO2 22.0 01/12/2024    CALCIUM 7.3 (L) 01/12/2024    PROTENTOTREF 6.6 08/22/2016    ALBUMIN 2.1 (L) 01/12/2024    ALKPHOS 133 (H) 01/12/2024    BILITOT 0.2 01/12/2024    ALT 6 01/12/2024    AST 18 01/12/2024       ASSESSMENTS/PLANS  Impression:  Acute Hypoxic Respiratory Failure  Bilateral PE s/p Thrombectomy on Heparin  Shock  PNA, presumed aspiration  Nausea and Emesis  Feeding Difficulty with Need for post-pyloric enteral access  Metastatic Malignancy with Carcinomatosis and Malignant Ascites of Presumed Pancreatic Origin (EUS-FNA non-diagnostic due to technically challenging aspects given large hiatal hernia)    Plan:  - Remains intubated, sedated and on pressors  - No evidence of bowel obstruction on imaging  - Likely has some motility issues related to carcinomatosis  - Agree that paracentesis for therapeutic purposes may be beneficial this stay  - Continues on heparin  - Continues on scheduled Reglan  - NG tube in place with continued output   - GI asked for NJ tube placement as placement not successful at bedside likely due to large hiatal hernia.  I discussed with primary team this afternoon and given her continued pressor requirements and NG tube output, patient will likely not tolerate much tube feeding at this time but plan is for trophic feeds and enteric access.  Will tentatively plan for this later this afternoon    I discussed the patient's findings and my recommendations with nursing staff and consulting provider    Flakito Abrams MD  01/12/24  11:42 EST

## 2024-01-12 NOTE — PROGRESS NOTES
Clinical Nutrition     Nutrition Support Assessment  Reason for Visit: Identified at risk by screening criteria, EN      Patient Name: Jessica Kraft  YOB: 1960  MRN: [id number]  Date of Encounter: 01/12/24 12:22 EST  Admission date: 1/8/2024    Pt NPO > 3 days 2nd nausea, vomiting, high gastric output, malignant ascites    Plan for njt placement per GI    Once pt had postpyloric access, rec start trophic feed: Peptamen VHP @ 15ml/hr, free water @10ml/hr    If pt able to tolerate trophic feed, suggest gradual advancement tomorrow, 15ml Q 12 hours to goal rate @65ml/hr, free water @10ml/hr    Rec add MVI as TF volume too low to meet RDI's    Nutrition Assessment   Admission Diagnosis:  Acute respiratory failure with hypoxia [J96.01]      Problem List:    Acute respiratory failure with hypoxia    Hypothyroidism due to Hashimoto's thyroiditis    Abdominal carcinomatosis - omental    Peritoneal carcinomatosis    Pancreatic cancer    Acute pulmonary embolism    Aspiration pneumonia due to vomitus        PMH:   She  has a past medical history of Abscess, Anxiety, Bilateral ovarian cysts, Depression, Encounter for routine gynecological examination, Foot pain, H/O bone density study (06/2014), H/O mammogram (07/2016), Hot flashes, menopausal, Hypothyroidism due to Hashimoto's thyroiditis, Insomnia, Miscarriage, Osteopenia, Pap smear for cervical cancer screening (06/2014), Routine general medical examination at a health care facility, Urinary incontinence, Urinary tract infection, and Vitamin D deficiency.    PSH:  She  has a past surgical history that includes Cholecystectomy (2008); Hernia repair (2010); Abdominoplasty (2001); Colonoscopy (07/2012); Ectopic pregnancy surgery (Left, 1994); Esophagogastroduodenoscopy (N/A, 12/13/2023); and Cardiac catheterization (N/A, 1/9/2024).      Applicable Nutrition Concerns:   Skin:bruised  GI:nausea, vomiting, ngt to LWS= 600ml      Applicable  Interval History:     ARF/VENT 1-8-24    s/p mechanical thrombectomy 1-9-24    Extubated, and re-intubated 1-11-24    Reported/Observed/Food/Nutrition Related History:     1-12-24: pt intubated, sedated  + precedex, fentanyl, heparin, levophed 0.08mcg, NS@75ml    Per RN: pt will follow commands, but intermittently wild, thrashing around, still having a lot of ngt output, abdomen taut, ? need for paracentesis    Plan for njt placement per GI today, and initiation of trophic feed      1-11-24:pt intubated, sedated + precedex, fentanyl, heparin, levophed 0.08mcg. NS@75ml    Per RN: ogt just replaced with ngt, pt still having significant output, had had ~300ml this am so far, (noted total of 850ml past 24 hours), no bm yet, have started reglan today      1-9-24: Pt intubated and sedated,  at bedside  + precedex, fentanyl, levophed 0.08mcg, heparin     reports pt has had nausea and vomiting for the past day and a half    Per RN: plan for possible EKOS catheter today    Labs    Labs Reviewed: Yes     Results from last 7 days   Lab Units 01/12/24  0547 01/11/24  1713 01/11/24  0533 01/10/24  0447 01/09/24  0151 01/08/24  2137   GLUCOSE mg/dL 136*  --  177* 157*  --  130*   BUN mg/dL 17  --  21 20  --  18   CREATININE mg/dL 0.65  --  0.71 0.90  --  0.89   SODIUM mmol/L 134*  --  131* 130*  --  131*   CHLORIDE mmol/L 102  --  98 96*  --  89*   POTASSIUM mmol/L 3.7 3.5 3.3* 4.2  --  3.9   PHOSPHORUS mg/dL 1.9* 2.2* 2.1* 3.8  --  2.5   MAGNESIUM mg/dL 2.4  --  2.6* 1.7  --  1.8   ALT (SGPT) U/L 6  --  6 7  --  13   LACTATE mmol/L  --   --   --   --  1.7 2.3*       Results from last 7 days   Lab Units 01/12/24  0547 01/11/24  0533 01/10/24  0447   ALBUMIN g/dL 2.1* 2.1* 2.2*   IONIZED CALCIUM mmol/L  --   --  1.01*       Results from last 7 days   Lab Units 01/12/24  1129 01/12/24  0520 01/11/24  2330 01/11/24  1224 01/11/24  0552 01/10/24  2339   GLUCOSE mg/dL 128 133* 123 145* 163* 169*     Lab Results   Lab  "Value Date/Time    HGBA1C 5.70 (H) 12/07/2023 1959         Results from last 7 days   Lab Units 01/09/24  1026   PROBNP pg/mL 316.5         Medications    Medications Reviewed: Yes  Pertinent: abx, protonix, bowel regimen, reglan  Infusion:precedex, fentanyl, heparin, levophed, NS@75ml      Intake/Ouptut 24 hrs (0701 - 0700)   I&O's Reviewed: Yes     Intake & Output (last day)         01/11 0701 01/12 0700 01/12 0701 01/13 0700    I.V. (mL/kg) 3725.6 (36.2) 1322.8 (12.8)    NG/     IV Piggyback 550     Total Intake(mL/kg) 4395.6 (42.7) 1322.8 (12.8)    Urine (mL/kg/hr) 1590 (0.6) 50 (0.1)    Emesis/NG output 600     Total Output 2190 50    Net +2205.6 +1272.8                    Anthropometrics     Flowsheet Rows      Flowsheet Row First Filed Value   Admission Height 167.6 cm (66\") Documented at 01/08/2024 2036   Admission Weight 91.2 kg (201 lb) Documented at 01/08/2024 2036          Height: Height: 165.1 cm (65\")  Last Filed Weight: Weight: 103 kg (227 lb 11.8 oz) (01/12/24 0554)  Method: Weight Method: Bed scale  BMI: BMI (Calculated): 37.9  BMI classification: Obese Class I: 30-34.9kg/m2  IBW:  130lb    UBW: ?  Weight change: per EMR ~ 17lb wt loss over the past month     Weight       Weight (kg) Weight (lbs) Weight Method Visit Report   5/16/2016 96.979 kg  213 lb 12.8 oz   --    8/18/2016 98.703 kg  217 lb 9.6 oz   --    11/10/2016 97.705 kg  215 lb 6.4 oz   --    2/3/2017 98.068 kg  216 lb 3.2 oz   --    3/6/2017 96.253 kg  212 lb 3.2 oz   --    5/18/2017 97.342 kg  214 lb 9.6 oz   --    8/2/2017 101.696 kg  224 lb 3.2 oz   --    12/6/2023 97.07 kg  214 lb  Stated     12/7/2023 97.523 kg  215 lb  Stated      95.255 kg  210 lb  Stated     12/8/2023 98.975 kg  218 lb 3.2 oz  Standing scale     12/9/2023 97.569 kg  215 lb 1.6 oz  Bed scale     12/10/2023 97.523 kg  215 lb  Bed scale     12/11/2023 92.987 kg  205 lb  Standing scale     12/12/2023 93.804 kg  206 lb 12.8 oz  Standing scale     12/13/2023 " "104.826 kg  231 lb 1.6 oz  Bed scale      105 kg  231 lb 7.7 oz      12/14/2023 94.076 kg  207 lb 6.4 oz  Standing scale     12/15/2023 92.08 kg  203 lb  Standing scale     12/19/2023 91.173 kg  201 lb   --    12/22/2023 91.173 kg  201 lb  Stated     12/25/2023 91.173 kg  201 lb  Stated     1/3/2024 91.627 kg  202 lb   --    1/8/2024 91.173 kg  201 lb  Stated     1/9/2024 91.173 kg  201 lb            Nutrition Focused Physical Exam     Date:     Unable to perform exam due to: Pt unable to participate at time of visit      Needs Assessment   Date: 1-9-24    Height used:Height: 165.1 cm (65\")  Weights used/ ABW: 201lb/ 91.4kg  IBW\" 130lb/ 59.1kg      Estimated Calorie needs: ~ 1300kcal initial  Method:  14Kcals/KG ABW:1280kcal  Method:  MSJ ABW: 1491kcal  Method:  PSU ABW: 1839kcal    Estimated Protein needs: ~118g protein  Method: 2g protein per kg IBW: 118g protein  Method: 1.2-1.5g protein per kg ABW: 110-137g protein    Current Nutrition Prescription     PO: NPO Diet NPO Type: Strict NPO  Oral Nutrition Supplement:   Intake: N/A      Nutrition Diagnosis   Date: 1-9-24 Updated: 1-12  Problem Inadequate energy intake   Etiology ARF/VENT   Signs/Symptoms  NPO > 3 days       Goal:   General: Nutrition support treatment  PO: N/A  EN/PN: Initiate EN     Nutrition Intervention      Follow treatment progress, Care plan reviewed      Pt NPO > 3 days 2nd nausea, vomiting, high gastric output, malignant ascites    Plan for njt placement per GI    Once pt had postpyloric access, rec start trophic feed: Peptamen VHP @ 15ml/hr, free water @10ml/hr    If pt able to tolerate trophic feed, suggest gradual advancement tomorrow, 15ml Q 12 hours to goal rate @65ml/hr, free water @10ml/hr    TF at goal volume will provide 1300ml, 1300kcal,100% kcal needs, 120g protein, 102% protein needs, 5g fiber, 1292ml free water     Suggest add MVI as TF volume too low to meet RDI's    Monitoring/Evaluation:   Per protocol, I&O, Pertinent labs, " Weight, Skin status, GI status, Symptoms, Hemodynamic stability      Gauri Ayala RD  Time Spent: 30min

## 2024-01-12 NOTE — PLAN OF CARE
Goal Outcome Evaluation:                                            Pt had been tolerating weaning of the vent and sedation. Was extubated. Within a few hours the patient started to vomit bile-colored emesis several times.  Pt appeared to aspirate and was unable to maintain her sats despite NT suctioning  Providers were called and pt was emergently intubated.  Fentanyl, Levo, precedex and Heparin gtt  Bumex was given twice, UOP ~950mL  NG to suction ~ 350out

## 2024-01-12 NOTE — PROGRESS NOTES
HEPARIN INFUSION  Jessica Kraft is a  63 y.o. female receiving heparin infusion.     Therapy for (VTE/Cardiac):   VTE  Patient Weight: 91 kg  Initial Bolus (Y/N):   n  Any Bolus (Y/N):   y        Signs or Symptoms of Bleeding: none noted    Recommend Xa every 6 hours.   VTE (PE/DVT)   Initial Bolus: 80 units/kg (Max 10,000 units)  Initial rate: 18 units/kg/hr (Max 1,500 units/hr)    Anti Xa Rebolus Infusion Hold time Change infusion Dose (Units/kg/hr) Next Anti Xa Level Due   < 0.11 50 Units/kg  (4000 Units Max) None Increase by  4 Units/kg/hr 6 hours   0.11 - 0.19 25 Units/kg  (2000 Units Max) None Increase by  3 Units/kg/hr 6 hours   0.2 - 0.29 0 None Increase by  2 Units/kg/hr 6 hours   0.3 - 0.7 0 None No Change 6 hours (after 2 consecutive levels in range check qAM)   0.71 - 0.8 0 None Decrease by  1 Units/kg/hr 6 hours   0.81 - 0.9 0 None Decrease by  2 Units/kg/hr 6 hours   0.91 - 1 0 60 Minutes Decrease by  3 Units/kg/hr 6 hours   >1 0 Hold  After Anti Xa less than 0.7 decrease previous rate by  4 Units/kg/hr  Every 2 hours until Anti Xa is less than 0.7 then when infusion restarts in 6 hours     Results from last 7 days   Lab Units 01/12/24  0547 01/11/24  0533 01/10/24  0447 01/09/24  1352 01/09/24  0224   INR   --   --   --   --  2.66*   HEMOGLOBIN g/dL 7.6* 7.9* 8.8*   < > 8.6*   HEMATOCRIT % 23.6* 24.8* 27.8*   < > 27.3*   PLATELETS 10*3/mm3 171 203 181  --  138*    < > = values in this interval not displayed.          Date   Time   Anti-Xa Current Rate (Unit/kg/hr) Bolus   (Units) Rate Change   (Unit/kg/hr) New Rate (Unit/kg/hr) Next   Anti-Xa Comments  Pump Check Daily   01/09 0245 STAT new -- 16.5 16.5 0900 D/w RN   01/09 0912 0.32 16.5 -- -- 16.5 1600 Pump reviewed - D/w Cyn, JESS    01/10 0130 0.89 16.5 -- -2 14.5 0800 D/w RN   01/10 0819 0.63 14.5 -- -- 14.5 1400 D/W RN, checked pump    1/10 1538 0.47 14.5 -- -- 14.5 2200 D/w RN, drip has not been stopped, running continuously, no problems,  will check Xa again @ 2200 due to drop   1/10 2250 0.35 14.5 -- -- 14.5 0600 D/w RN   1/11 0645 0.28 14.5 -- +1 15.5 1200 D/w RN   1/11 1238 0.35 15.5 -- -- 15.5 2000 D/W Angela RN pump checked   1/11 2007 0.33 15.5 -- -- 15.5 0600 D/W RN   1/12 0547 0.31 15.5 -- -- 15.5 AM [facility] [address] [id number] -acb                                                                                                                            John Hyman, PharmD  01/12/24  06:38 EST

## 2024-01-12 NOTE — PROGRESS NOTES
Intensive Care Follow-up     Hospital:  LOS: 3 days   Ms. Jessica Kraft, 63 y.o. female is followed for:   Acute respiratory failure with hypoxia            History of present illness:   63-year-old female with recent diagnosis of metastatic malignancy of possible pancreatic region.  Definitive biopsy with EUS was done but was limited sampling.  Patient is followed closely by medical oncology and undergoing chemotherapy with first dose given on 12/29.  Patient recently was diagnosed with severe constipation and was treated with bowel regimen and enema and improved.  However yesterday patient started having nausea and vomiting and was brought to emergency room.  Patient did not complain of any chest pain or shortness of breath.  Patient continued to have nausea and while in the ER she cutely decompensated where she became severely hypoxic with altered mental status.  Patient underwent emergent intubation in the emergency room and subsequently CT scan of the head, chest and abdomen and pelvis was done.  Head CT scan was negative.  Chest CT scan showed extensive clot in both right and left main pulmonary arteries extending to proximal segment branches of right upper lobe, right lower lobe and both left lower lobe and upper lobe branches as well.  No significant right heart strain noted.  Patchy airspace disease noted bilateral lower lobes which was concerning for pneumonia versus pulmonary infarct.  Ascites was present and peritoneal carcinomatosis noted again.  Patient was also hypotensive requiring low-dose norepinephrine at this point.  Patient was started on antibiotics, IV heparin and admitted to ICU for ongoing care.  Patient was significantly hypoxemic 100% FiO2 and 10 of PEEP.  Significant clot burden noted on the CT scan.  Discussed with cardiology and patient underwent mechanical thrombectomy.  Patient had a lot of old clots but some clots were removed from the right pulmonary artery with improvement in  pulmonary circulation.    Patient was extubated on 1/11 after careful weaning trial.  Postextubation patient was severely encephalopathic, agitated.  Was able to be reoriented at times but did not do well and started to have more nausea and vomiting. Pt received multiple sedatives and held and then started to get more hypoxic.  Decision was made to reintubate the patient    Subjective   Interval History:  Overnight did fair back on the ventilator.  On Precedex infusion.  Still having intermittent episodes of agitation.  Still having significant output from NG tube.  Unable to put Keofeed.  Will have GI team help us.  Increase Reglan.  KUB yesterday did not show any abnormal bowel gas pattern.  Previous CT scan did not show any evidence of bowel obstruction.               The patient's past medical, surgical and social history were reviewed and updated in Epic as appropriate.       Objective     Infusions:  dexmedetomidine, 0.2-1.5 mcg/kg/hr (Order-Specific), Last Rate: 1.2 mcg/kg/hr (01/12/24 1012)  fentanyl 10 mcg/mL,  mcg/hr, Last Rate: 300 mcg/hr (01/12/24 1003)  heparin, 15.5 Units/kg/hr (Order-Specific), Last Rate: 15.5 Units/kg/hr (01/12/24 0727)  norepinephrine, 0.02-0.3 mcg/kg/min (Order-Specific), Last Rate: 0.08 mcg/kg/min (01/12/24 0847)  Pharmacy to Dose Heparin,   sodium chloride, 75 mL/hr, Last Rate: 75 mL/hr (01/12/24 0623)      Medications:  Albuterol Sulfate NEB Orderable, 2.5 mg, Nebulization, Q6H - RT  chlorhexidine, 15 mL, Mouth/Throat, Q12H  docusate sodium, 100 mg, Nasogastric, BID   And  sennosides, 10 mL, Nasogastric, BID  levothyroxine sodium, 100 mcg, Intravenous, Daily  metoclopramide, 10 mg, Intravenous, Q8H  pantoprazole, 40 mg, Intravenous, Q24H  piperacillin-tazobactam, 4.5 g, Intravenous, Q8H  sodium chloride, 10 mL, Intravenous, Q12H  thiamine (B-1) IV, 200 mg, Intravenous, Daily  venlafaxine, 50 mg, Nasogastric, TID With Meals        Vital Sign Min/Max for last 24 hours  Temp   "Min: 96.8 °F (36 °C)  Max: 99.7 °F (37.6 °C)   BP  Min: 83/65  Max: 136/87   Pulse  Min: 61  Max: 130   Resp  Min: 24  Max: 24   SpO2  Min: 82 %  Max: 100 %   Flow (L/min)  Min: 4  Max: 15       Input/Output for last 24 hour shift  01/11 0701 - 01/12 0700  In: 4395.6 [I.V.:3725.6]  Out: 2190 [Urine:1590]   Mode: VC+/AC  FiO2 (%):  [40 %-100 %] 45 %  S RR:  [24] 24  S VT:  [400 mL-450 mL] 450 mL  PEEP/CPAP (cm H2O):  [5 cm H20-10 cm H20] 8 cm H20  IA SUP:  [10 cm H20] 10 cm H20  MAP (cm H2O):  [10-17] 15  Objective:  Vital signs: (most recent): Blood pressure 94/63, pulse 92, temperature 98.1 °F (36.7 °C), temperature source Bladder, resp. rate 24, height 165.1 cm (65\"), weight 103 kg (227 lb 11.8 oz), SpO2 100%.            General Appearance:  Not in distress, no asynchrony with mechanical ventilator.  Head: Pupils reactive & symmetrical B/L.  Neck:  Endotracheal tube clean.   Lungs:   B/L Breath sounds present with decreased breath sounds on bases, no wheezing heard, bilateral rhonchi  Heart: S1 and S2 present, no murmur  Abdomen: Distended but soft, no guarding or rigidity, bowel sounds positive.  Extremities: + non pitting edema, warm to touch.  Pulses: Positive and symmetric.  Neurologic:  Pt sedated on the vent but intermittently gets agitated.    Ventilator settings: A/C: FiO2 45%/PEEP 8                                 plateau pressure 22      Results from last 7 days   Lab Units 01/12/24  0547 01/11/24  0533 01/10/24  0447   WBC 10*3/mm3 7.40 4.33 2.69*   HEMOGLOBIN g/dL 7.6* 7.9* 8.8*   PLATELETS 10*3/mm3 171 203 181     Results from last 7 days   Lab Units 01/12/24  0547 01/11/24  1713 01/11/24  0533 01/10/24  0447   SODIUM mmol/L 134*  --  131* 130*   POTASSIUM mmol/L 3.7 3.5 3.3* 4.2   CO2 mmol/L 22.0  --  22.0 22.0   BUN mg/dL 17  --  21 20   CREATININE mg/dL 0.65  --  0.71 0.90   MAGNESIUM mg/dL 2.4  --  2.6* 1.7   PHOSPHORUS mg/dL 1.9* 2.2* 2.1* 3.8   GLUCOSE mg/dL 136*  --  177* 157*     Estimated " Creatinine Clearance: 106.8 mL/min (by C-G formula based on SCr of 0.65 mg/dL).    Results from last 7 days   Lab Units 01/12/24  0334   PH, ARTERIAL pH units 7.445   PCO2, ARTERIAL mm Hg 34.0*   PO2 ART mm Hg 86.4       Images:   Chest x-ray reviewed personally and showed endotracheal tube close to anabella.  NG tube coursing below the diaphragm.  port is in good position.  Bilateral patchy opacities persisting and no significant change compared to yesterday.      I reviewed the patient's results and images.     Echo reviewed.  Interpretation Summary         Left ventricular systolic function is normal. Calculated left ventricular EF = 61.7%    Mild to moderate AI    No pericardial effusion      Cultures negative thus far    Assessment & Plan   Impression        Acute respiratory failure with hypoxia    Hypothyroidism due to Hashimoto's thyroiditis    Abdominal carcinomatosis - omental    Peritoneal carcinomatosis    Pancreatic cancer    Acute pulmonary embolism    Aspiration pneumonia due to vomitus       Plan        1.  Patient presenting with GI complaints and decompensated in the emergency room and found to have bilateral extensive pulmonary emboli.  Subsequently developed significant hypoxemic respiratory failure and hemodynamic instability.  Patient underwent mechanical thrombectomy and over the last 48 hours has significant improvement in oxygenation.  Likely has aspiration pneumonia as well.  Plateau pressures are acceptable today.  Patient was treated with low tidal volume high PEEP.  Patient was extubated on 1/11 but failed due to ongoing agitation, significant nausea and vomiting and concern for aspiration and was re intubated.  Overnight did fair from ventilator standpoint.  Will continue to monitor closely. CXR appears stable.   2.  Continue empiric antibiotic therapy with Zosyn.  Cultures negative thus far. Significant bandemia noted .  Will send resp cultures.  Will continue current antibiotics and  complete 7 days of therapy.  3.  Continue home dose of thyroid supplementation for hypothyroidism.  4.  GI prophylaxis.  5.  Patient's has malignant ascites but that is a chronic finding.  I do not think abdominal distention is playing a role in her respiratory symptoms.  Will monitor closely.  If peak pressures start to become an issue then will consider therapeutic paracentesis. My concern would be that fluid would re accumulate.   7.  Monitor H/H.   8.  Monitor blood glucose and insulin as needed for hyperglycemia management.  9.  Replace electrolytes per ICU protocol.  Will give dose of Bumex to keep in even to negative fluid balance.  Stop IV fluids.  10.  Still having significant output from orogastric tube.  Postpyloric tube could not be placed as patient has a hiatal hernia.  Have asked GI to evaluate the patient for nasal jejunal tube placement.  Continues to have significant output from NG tube likely ileus pattern.  Will continue drainage.  Increase Reglan to 10 mg every 8 hours.  QTc checked and acceptable.  Will attempt trophic feeds.  There will be concern for refeeding syndrome in this patient who has not had anything to eat for at least a week.  11.  Course has been complicated by delirium state requiring reintubation.  Patient has been on Effexor at home and we will go ahead and get that started.  If unable to be able to control agitation episodes may need to consider Seroquel or Risperdal.    Met with patient's  and son and updated him about current plan of care.  Their questions answered.  Patient aga with guarded prognosis.  Will continue aggressive support at this point.  Once able to get postpyloric tube      Plan of care and goals reviewed with multidisciplinary/antibiotic stewardship team during rounds.   I discussed the patient's findings and my recommendations with family, nursing staff, and consulting provider     Time spent Critical care 40 min (exclusive of procedure time)   including high complexity decision making to assess, manipulate, and support vital organ system failure in this individual who has impairment of one or more vital organ systems such that there is a high probability of imminent or life threatening deterioration in the patient’s condition.      Maico Rosales MD, [facility]  Pulmonary, Critical care and Sleep Medicine

## 2024-01-12 NOTE — PLAN OF CARE
Goal Outcome Evaluation:   - VSS  - Afebrile  - Following commands  - Easily agitated. Fentanyl increased to 300mcg/hr. Precedex infusing at 1mcg/kg/hr  - Heparin gtt therapeutic with the latest lab draw. No changes in rate   - Potassium and phosphorus replaced with pending results  - UOP: 640ml  - OG output: 250ml  - No BM  - Restraints continued

## 2024-01-12 NOTE — PLAN OF CARE
Goal Outcome Evaluation:.  -VSS, intubated on fentanyl, precedex and levophed  -Keofeed placed by JHONNY Galvez started  -restraints cont due to pt reaching for lines and ETT          Problem: Restraint, Nonviolent  Goal: Absence of Harm or Injury  Outcome: Ongoing, Progressing  Intervention: Implement Least Restrictive Safety Strategies  Recent Flowsheet Documentation  Taken 1/12/2024 1600 by Manfred Israel RN  Medical Device Protection: IV pole/bag removed from visual field  Less Restrictive Alternative: calming techniques promoted  De-Escalation Techniques:   increased round frequency   reoriented   stimulation decreased  Diversional Activities: music  Taken 1/12/2024 1400 by Manfred Israel RN  Medical Device Protection: IV pole/bag removed from visual field  Less Restrictive Alternative: calming techniques promoted  De-Escalation Techniques:   increased round frequency   stimulation decreased   verbally redirected   reoriented  Diversional Activities: music  Intervention: Protect Dignity, Rights, and Personal Wellbeing  Recent Flowsheet Documentation  Taken 1/12/2024 1800 by Manfred Israel RN  Trust Relationship/Rapport:   care explained   choices provided  Taken 1/12/2024 1400 by Manfred Israel RN  Trust Relationship/Rapport:   care explained   choices provided  Intervention: Protect Skin and Joint Integrity  Recent Flowsheet Documentation  Taken 1/12/2024 1800 by Manfred Israel RN  Body Position:   turned   upper extremity elevated   lower extremity elevated  Taken 1/12/2024 1600 by Manfred Israel RN  Body Position:   turned   upper extremity elevated   lower extremity elevated  Taken 1/12/2024 1400 by Manfred Israel RN  Body Position:   upper extremity elevated   lower extremity elevated   turned     Problem: Skin Injury Risk Increased  Goal: Skin Health and Integrity  Outcome: Ongoing, Progressing  Intervention: Optimize Skin Protection  Recent Flowsheet  Documentation  Taken 1/12/2024 1800 by Manfred Israel RN  Head of Bed (HOB) Positioning: HOB elevated  Taken 1/12/2024 1600 by Manfred Israel RN  Pressure Reduction Techniques:   frequent weight shift encouraged   positioned off wounds   heels elevated off bed   pressure points protected   weight shift assistance provided  Head of Bed (HOB) Positioning: HOB elevated  Pressure Reduction Devices:   alternating pressure pump (ADD)   pressure-redistributing mattress utilized   specialty bed utilized   positioning supports utilized   heel offloading device utilized  Skin Protection:   adhesive use limited   incontinence pads utilized   protective footwear used   skin sealant/moisture barrier applied   skin-to-device areas padded   skin-to-skin areas padded   transparent dressing maintained   tubing/devices free from skin contact  Taken 1/12/2024 1400 by Manfred Israel RN  Pressure Reduction Techniques:   frequent weight shift encouraged   heels elevated off bed   positioned off wounds   pressure points protected   weight shift assistance provided  Head of Bed (HOB) Positioning: HOB elevated  Pressure Reduction Devices:   specialty bed utilized   pressure-redistributing mattress utilized   positioning supports utilized   heel offloading device utilized   alternating pressure pump (ADD)  Skin Protection:   adhesive use limited   incontinence pads utilized   protective footwear used   skin sealant/moisture barrier applied   skin-to-device areas padded   skin-to-skin areas padded   transparent dressing maintained   tubing/devices free from skin contact

## 2024-01-12 NOTE — PROGRESS NOTES
"HEMATOLOGY/ONCOLOGY PROGRESS NOTE    S: Extubated followed by encephalopathy, hypoxia, uncontrolled vomiting, followed by reintubation yesterday. Xray with no bowel obstruction.  Currently sedated and on Reglan.      Medications:  The current medication list was reviewed in the EMR    ALLERGIES:  No Known Allergies      Physical Exam    VITAL SIGNS:  /69 (BP Location: Left arm, Patient Position: Lying)   Pulse 65   Temp 97.2 °F (36.2 °C) (Bladder)   Resp 24   Ht 165.1 cm (65\")   Wt 103 kg (227 lb 11.8 oz)   LMP  (LMP Unknown) Comment: N/A  SpO2 95%   BMI 37.90 kg/m²   Temp:  [96.8 °F (36 °C)-98.6 °F (37 °C)] 97.2 °F (36.2 °C)      Performance Status:4                 Physical Exam    General: Intubated and sedated  HEENT: sclerae anicteric, oropharynx clear, neck is supple NG tube with bilous output  Lymphatics: no cervical, supraclavicular, or axillary adenopathy  Cardiovascular: regular rate and rhythm, no murmurs, rubs or gallops  Lungs: Coarse breath sounds bilaterally  Abdomen: distended +ascites +BS  Extremities: no lower extremity edema  Skin: no rashes, lesions, bruising, or petechiae        RECENT LABS:    Lab Results   Component Value Date    HGB 7.6 (L) 01/12/2024    HCT 23.6 (L) 01/12/2024    MCV 85.5 01/12/2024     01/12/2024    WBC 7.40 01/12/2024    NEUTROABS 2.99 01/11/2024    LYMPHSABS 0.45 (L) 01/10/2024    MONOSABS 0.27 01/10/2024    EOSABS 0.04 01/11/2024    BASOSABS 0.00 01/11/2024       Lab Results   Component Value Date    GLUCOSE 136 (H) 01/12/2024    BUN 17 01/12/2024    CREATININE 0.65 01/12/2024     (L) 01/12/2024    K 3.7 01/12/2024     01/12/2024    CO2 22.0 01/12/2024    CALCIUM 7.3 (L) 01/12/2024    PROTEINTOT 5.0 (L) 01/12/2024    ALBUMIN 2.1 (L) 01/12/2024    BILITOT 0.2 01/12/2024    ALKPHOS 133 (H) 01/12/2024    AST 18 01/12/2024    ALT 6 01/12/2024         Assessment/Plan  Metastatic malignancy with malignant ascites, presumed pancreatic " origin  -She is status post cycle 1 of palliative chemotherapy, today is day 13.  Her blood counts are declining consistent with recent chemotherapy but she is not neutropenic and her platelet count is improving  -Outpatient PET was planned and she was scheduled for follow-up with repeat chemotherapy next week.    -Further cancer directed therapy on hold pending clinical improvement.  -Continue to trend blood counts. Hemoglobin downtrending today, Plt and WBC in acceptable range  -I spoke with her  at length. He understands prognosis guarded and dependent on clinical recovery from acute issues. Will continue to follow.     2.  Massive pulmonary embolism  3.  Aspiration pneumonia/ARDS  4.  Hypoxic respiratory failure secondary to above  5. Anticoagulation  -Provoked in the context of underlying malignancy  -She continues anticoagulation and is status post mechanical thrombectomy to the right pulmonary artery yesterday.  -Continues heparin drip,Monitor daily CBC in light of recent chemotherapy    6. Nausea and vomiting  -She is on scheduled reglan  -Consider large volume paracentesis prior to extubation attempt if possible. She has malignant ascites and in the past symptoms have responded to paracentesis.    Sherri العراقي MD  [facility]emat[facility] On[facility]    1/12/2024

## 2024-01-13 NOTE — PROGRESS NOTES
Intensive Care Follow-up     Hospital:  LOS: 4 days   Ms. Jessica Kraft, 63 y.o. female is followed for:   Acute respiratory failure with hypoxia        Subjective     63-year-old female with recent diagnosis of metastatic malignancy of possible pancreatic region.  Definitive biopsy with EUS was done but was limited sampling.  Patient is followed closely by medical oncology and undergoing chemotherapy with first dose given on 12/29.  Patient recently was diagnosed with severe constipation and was treated with bowel regimen and enema and improved.  However yesterday patient started having nausea and vomiting and was brought to emergency room.  Patient did not complain of any chest pain or shortness of breath.  Patient continued to have nausea and while in the ER she cutely decompensated where she became severely hypoxic with altered mental status.  Patient underwent emergent intubation in the emergency room and subsequently CT scan of the head, chest and abdomen and pelvis was done.  Head CT scan was negative.  Chest CT scan showed extensive clot in both right and left main pulmonary arteries extending to proximal segment branches of right upper lobe, right lower lobe and both left lower lobe and upper lobe branches as well.  No significant right heart strain noted.  Patchy airspace disease noted bilateral lower lobes which was concerning for pneumonia versus pulmonary infarct.  Ascites was present and peritoneal carcinomatosis noted again.  Patient was also hypotensive requiring low-dose norepinephrine at this point.  Patient was started on antibiotics, IV heparin and admitted to ICU for ongoing care.  Patient was significantly hypoxemic 100% FiO2 and 10 of PEEP.  Significant clot burden noted on the CT scan.  Discussed with cardiology and patient underwent mechanical thrombectomy.  Patient had a lot of old clots but some clots were removed from the right pulmonary artery with improvement in pulmonary  circulation.     Patient was extubated on 1/11 after careful weaning trial.  Postextubation patient was severely encephalopathic, agitated.  Was able to be reoriented at times but did not do well and started to have more nausea and vomiting. Pt received multiple sedatives and held and then started to get more hypoxic.  Decision was made to reintubate the patient  Interval History:  The chart has been reviewed.  The patient remains on the ventilator.  Heparin drip infusing.  Currently sedated.  It has been reported to me that she continues to have a lot of GI output and does continue to vomit.  Cultures have remained negative.    The patient's past medical, surgical and social history were reviewed and updated in Epic as appropriate.        Objective     Infusions:  dexmedetomidine, 0.2-1.5 mcg/kg/hr (Order-Specific), Last Rate: Stopped (01/13/24 0136)  fentanyl 10 mcg/mL,  mcg/hr, Last Rate: 300 mcg/hr (01/13/24 1219)  heparin, 17 Units/kg/hr (Order-Specific), Last Rate: 17 Units/kg/hr (01/13/24 0750)  midazolam, 1-10 mg/hr, Last Rate: 10 mg/hr (01/13/24 1022)  norepinephrine, 0.02-0.3 mcg/kg/min (Order-Specific), Last Rate: 0.07 mcg/kg/min (01/13/24 1023)  Pharmacy to Dose Heparin,       Medications:  Albuterol Sulfate NEB Orderable, 2.5 mg, Nebulization, Q6H - RT  chlorhexidine, 15 mL, Mouth/Throat, Q12H  docusate sodium, 100 mg, Nasogastric, BID   And  sennosides, 10 mL, Nasogastric, BID  levothyroxine sodium, 100 mcg, Intravenous, Daily  metoclopramide, 10 mg, Intravenous, Q8H  pantoprazole, 40 mg, Intravenous, Q24H  piperacillin-tazobactam, 4.5 g, Intravenous, Q8H  sodium chloride, 10 mL, Intravenous, Q12H  thiamine (B-1) IV, 200 mg, Intravenous, Daily  venlafaxine, 50 mg, Nasogastric, TID With Meals        Vital Sign Min/Max for last 24 hours  Temp  Min: 98.6 °F (37 °C)  Max: 99.3 °F (37.4 °C)   BP  Min: 86/59  Max: 156/76   Pulse  Min: 69  Max: 120   Resp  Min: 24  Max: 26   SpO2  Min: 90 %  Max: 100 %  "  No data recorded       Input/Output for last 24 hour shift  01/12 0701 - 01/13 0700  In: 3583.4 [I.V.:3067.4]  Out: 1160 [Urine:860]   Mode: VC+/AC  FiO2 (%):  [45 %-55 %] 50 %  S RR:  [24] 24  S VT:  [450 mL] 450 mL  PEEP/CPAP (cm H2O):  [8 cm H20] 8 cm H20  MAP (cm H2O):  [15-19] 16  Objective:  General Appearance:  Uncomfortable, ill-appearing and in no acute distress (Sedated and intubated).    Vital signs: (most recent): Blood pressure 125/77, pulse 120, temperature 98.6 °F (37 °C), temperature source Bladder, resp. rate 24, height 165.1 cm (65\"), weight 105 kg (231 lb 4.2 oz), SpO2 94%.    HEENT: (Endotracheal tube in place.  Right internal jugular CVC)    Lungs:  Normal effort and normal respiratory rate.  There are decreased breath sounds and rhonchi.    Heart: Tachycardia.  Regular rhythm.  S1 normal and S2 normal.  No murmur.   Chest: Symmetric chest wall expansion.   Abdomen: Abdomen is soft and distended.  Hyperactive bowel sounds.     Extremities: There is dependent edema.    Neurological: (Sedated).    Pupils:  Pupils are equal, round, and reactive to light.  Pupils are equal.               Results from last 7 days   Lab Units 01/13/24  0536 01/12/24  0547 01/11/24  0533   WBC 10*3/mm3 13.09* 7.40 4.33   HEMOGLOBIN g/dL 8.0* 7.6* 7.9*   PLATELETS 10*3/mm3 199 171 203     Results from last 7 days   Lab Units 01/13/24  0536 01/12/24  1740 01/12/24  0547 01/11/24  1713 01/11/24  0533   SODIUM mmol/L 135*  --  134*  --  131*   POTASSIUM mmol/L 3.7  --  3.7 3.5 3.3*   CO2 mmol/L 22.0  --  22.0  --  22.0   BUN mg/dL 15  --  17  --  21   CREATININE mg/dL 0.65  --  0.65  --  0.71   MAGNESIUM mg/dL 2.3  --  2.4  --  2.6*   PHOSPHORUS mg/dL 2.1* 2.3* 1.9* 2.2* 2.1*   GLUCOSE mg/dL 104*  --  136*  --  177*     Estimated Creatinine Clearance: 108 mL/min (by C-G formula based on SCr of 0.65 mg/dL).    Results from last 7 days   Lab Units 01/13/24  0333   PH, ARTERIAL pH units 7.354   PCO2, ARTERIAL mm Hg 43.0 "   PO2 ART mm Hg 68.1*         I reviewed the patient's results and images.     Assessment & Plan   Impression        Acute respiratory failure with hypoxia    Hypothyroidism due to Hashimoto's thyroiditis    Abdominal carcinomatosis - omental    Peritoneal carcinomatosis    Pancreatic cancer    Acute pulmonary embolism    Aspiration pneumonia due to vomitus       Plan        Continue with current antimicrobial therapy.  Continue heparin infusion.  She does not appear to be adequate for weaning trials currently.  Continue nutritional support.  We will try antiemesis medications as needed.  Attempt diuresis today.  Overall, the patient has extremely poor prognosis with underlying advanced cancer and recurrent respiratory failure likely secondary to both aspiration pneumonia as well as pulmonary emboli.    Plan of care and goals reviewed with mulitdisciplinary/antibiotic stewardship team during rounds.   I discussed the patient's findings and my recommendations with nursing staff     High level of risk due to:  drug(s) requiring intensive monitoring for toxicity and parenteral controlled substances.      Roderick Alfredo MD, [facility]  Pulmonology and Critical Care Medicine

## 2024-01-13 NOTE — PROGRESS NOTES
HEPARIN INFUSION  Jessica Kraft is a  63 y.o. female receiving heparin infusion.     Therapy for (VTE/Cardiac):   VTE  Patient Weight: 91 kg  Initial Bolus (Y/N):   n  Any Bolus (Y/N):   y        Signs or Symptoms of Bleeding: none noted    Recommend Xa every 6 hours.   VTE (PE/DVT)   Initial Bolus: 80 units/kg (Max 10,000 units)  Initial rate: 18 units/kg/hr (Max 1,500 units/hr)    Anti Xa Rebolus Infusion Hold time Change infusion Dose (Units/kg/hr) Next Anti Xa Level Due   < 0.11 50 Units/kg  (4000 Units Max) None Increase by  4 Units/kg/hr 6 hours   0.11 - 0.19 25 Units/kg  (2000 Units Max) None Increase by  3 Units/kg/hr 6 hours   0.2 - 0.29 0 None Increase by  2 Units/kg/hr 6 hours   0.3 - 0.7 0 None No Change 6 hours (after 2 consecutive levels in range check qAM)   0.71 - 0.8 0 None Decrease by  1 Units/kg/hr 6 hours   0.81 - 0.9 0 None Decrease by  2 Units/kg/hr 6 hours   0.91 - 1 0 60 Minutes Decrease by  3 Units/kg/hr 6 hours   >1 0 Hold  After Anti Xa less than 0.7 decrease previous rate by  4 Units/kg/hr  Every 2 hours until Anti Xa is less than 0.7 then when infusion restarts in 6 hours     Results from last 7 days   Lab Units 01/13/24  0536 01/12/24  1427 01/12/24  0547 01/11/24  0533 01/09/24  1352 01/09/24  0224   INR   --  1.74*  --   --   --  2.66*   HEMOGLOBIN g/dL 8.0*  --  7.6* 7.9*   < > 8.6*   HEMATOCRIT % 25.1*  --  23.6* 24.8*   < > 27.3*   PLATELETS 10*3/mm3 199  --  171 203   < > 138*    < > = values in this interval not displayed.          Date   Time   Anti-Xa Current Rate (Unit/kg/hr) Bolus   (Units) Rate Change   (Unit/kg/hr) New Rate (Unit/kg/hr) Next   Anti-Xa Comments  Pump Check Daily   01/09 0245 STAT new -- 16.5 16.5 0900 D/w RN   01/09 0912 0.32 16.5 -- -- 16.5 1600 Pump reviewed - D/w JESS Addison    01/10 0130 0.89 16.5 -- -2 14.5 0800 D/w RN   01/10 0819 0.63 14.5 -- -- 14.5 1400 D/W RN, checked pump    1/10 1538 0.47 14.5 -- -- 14.5 2200 D/w RN, drip has not been stopped,  running continuously, no problems, will check Xa again @ 2200 due to drop   1/10 2250 0.35 14.5 -- -- 14.5 0600 D/w RN   1/11 0645 0.28 14.5 -- +1 15.5 1200 D/w RN   1/11 1238 0.35 15.5 -- -- 15.5 2000 D/W Angela RN pump checked   1/11 2007 0.33 15.5 -- -- 15.5 0600 D/W RN   1/12 0547 0.31 15.5 -- -- 15.5 AM labs Pump Checked Kylah [id number]   1/12 0536 0.16 15.5 -- +2.5 17 1400 Dw Rn                                                                                                                 Sarah Pompa, PharmD  01/13/24  07:15 EST

## 2024-01-13 NOTE — PLAN OF CARE
Goal Outcome Evaluation:     -Afebrile  -Weaning levophed as tolerated   -Bumex given x1   -Fentanyl and versed continued; Haldol given x1 for agitation;desaturation;biting ETT   -Emesis x1 small   -No bowel movement this shift   -Trophic feeds continued at 15 ml/hr   -Family updated periodically throughout the day   -FiO2 55%   -Heparin gtt continued   -Phos replaced per protocol; recheck 2.3   -Patient will intermittently follow commands by squeezing hands and wiggling toes

## 2024-01-13 NOTE — PROGRESS NOTES
"HEMATOLOGY/ONCOLOGY PROGRESS NOTE    S: She underwent EGD with Jejunal feeding tube placed yesterday.  She remains intubated and sedated.  No family is at bedside on the lobby at the time of my interview.      Medications:  The current medication list was reviewed in the EMR    ALLERGIES:  No Known Allergies      Physical Exam    VITAL SIGNS:  BP (!) 88/59   Pulse 87   Temp 99 °F (37.2 °C) (Bladder)   Resp 24   Ht 165.1 cm (65\")   Wt 105 kg (231 lb 4.2 oz)   LMP  (LMP Unknown) Comment: N/A  SpO2 93%   BMI 38.48 kg/m²   Temp:  [98.1 °F (36.7 °C)-99.3 °F (37.4 °C)] 99 °F (37.2 °C)      Performance Status:4                 Physical Exam    General: Intubated and sedated  HEENT: sclerae anicteric, oropharynx clear, neck is supple NG tube with bilous output  Lymphatics: no cervical, supraclavicular, or axillary adenopathy  Cardiovascular: regular rate and rhythm, no murmurs, rubs or gallops  Lungs: Coarse breath sounds bilaterally  Abdomen: distended +ascites +BS  Extremities: mild lower extremity edema  Skin: no rashes, lesions, bruising, or petechiae        RECENT LABS:    Lab Results   Component Value Date    HGB 8.0 (L) 01/13/2024    HCT 25.1 (L) 01/13/2024    MCV 86.9 01/13/2024     01/13/2024    WBC 13.09 (H) 01/13/2024    NEUTROABS 7.07 (H) 01/13/2024    LYMPHSABS 0.45 (L) 01/10/2024    MONOSABS 0.27 01/10/2024    EOSABS 0.52 (H) 01/13/2024    BASOSABS 0.00 01/13/2024       Lab Results   Component Value Date    GLUCOSE 104 (H) 01/13/2024    BUN 15 01/13/2024    CREATININE 0.65 01/13/2024     (L) 01/13/2024    K 3.7 01/13/2024     01/13/2024    CO2 22.0 01/13/2024    CALCIUM 7.4 (L) 01/13/2024    PROTEINTOT 5.1 (L) 01/13/2024    ALBUMIN 2.0 (L) 01/13/2024    BILITOT 0.3 01/13/2024    ALKPHOS 165 (H) 01/13/2024    AST 18 01/13/2024    ALT 7 01/13/2024         Assessment/Plan  Metastatic malignancy with malignant ascites, presumed pancreatic origin  -She is status post cycle 1 of palliative " chemotherapy, today is day 13.  Her blood counts are declining consistent with recent chemotherapy but she is not neutropenic and her platelet count is improving  -Outpatient PET was planned and she was scheduled for follow-up with repeat chemotherapy next week.    -Further cancer directed therapy on hold pending clinical improvement.  -Continue to trend blood counts. Hemoglobin downtrending today, Plt and WBC in acceptable range  -Will continue to follow.     2.  Massive pulmonary embolism  3.  Aspiration pneumonia/ARDS  4.  Hypoxic respiratory failure secondary to above  5. Anticoagulation  -Provoked in the context of underlying malignancy  -She continues anticoagulation and is status post mechanical thrombectomy to the right pulmonary artery yesterday.  -Continues heparin drip. Monitor daily CBC in light of recent chemotherapy. Stable today  -cardiology, ICU notes reviewed, discussed with bedside RN    6. Nausea and vomiting  -This was her presenting symptom prior to hypoxic decompensation  -She is on scheduled reglan  -GI is following, reviewed EGD report  - She has malignant ascites and has required repeat paracentesis to control symptoms in the past.     Sherri العراقي MD  [facility] Hematology and Oncology    1/13/2024

## 2024-01-13 NOTE — PLAN OF CARE
Goal Outcome Evaluation:   - VSS  - Tmax: 99.3  - Very easily agitated with the sedation ordered. Started on versed and currently infusing at 5mg/hr. Fentanyl continued at 300mcg/hr. Precedex weaned off. Prn haldol given x 2 for agitation as well  - Levophed continued  - No BM  - No NG output overnight   - UOP: 410ml  - Restraints renewed and continued

## 2024-01-14 NOTE — PROGRESS NOTES
HEPARIN INFUSION  Jessica Kraft is a  63 y.o. female receiving heparin infusion.     Therapy for (VTE/Cardiac):   VTE  Patient Weight: 91 kg  Initial Bolus (Y/N):   n  Any Bolus (Y/N):   y        Signs or Symptoms of Bleeding: none noted    Recommend Xa every 6 hours.   VTE (PE/DVT)   Initial Bolus: 80 units/kg (Max 10,000 units)  Initial rate: 18 units/kg/hr (Max 1,500 units/hr)    Anti Xa Rebolus Infusion Hold time Change infusion Dose (Units/kg/hr) Next Anti Xa Level Due   < 0.11 50 Units/kg  (4000 Units Max) None Increase by  4 Units/kg/hr 6 hours   0.11 - 0.19 25 Units/kg  (2000 Units Max) None Increase by  3 Units/kg/hr 6 hours   0.2 - 0.29 0 None Increase by  2 Units/kg/hr 6 hours   0.3 - 0.7 0 None No Change 6 hours (after 2 consecutive levels in range check qAM)   0.71 - 0.8 0 None Decrease by  1 Units/kg/hr 6 hours   0.81 - 0.9 0 None Decrease by  2 Units/kg/hr 6 hours   0.91 - 1 0 60 Minutes Decrease by  3 Units/kg/hr 6 hours   >1 0 Hold  After Anti Xa less than 0.7 decrease previous rate by  4 Units/kg/hr  Every 2 hours until Anti Xa is less than 0.7 then when infusion restarts in 6 hours     Results from last 7 days   Lab Units 01/14/24  1324 01/14/24  0531 01/13/24  0536 01/12/24  1427 01/09/24  1352 01/09/24  0224   INR   --   --   --  1.74*  --  2.66*   HEMOGLOBIN g/dL 7.5* 7.4* 8.0*  --    < > 8.6*   HEMATOCRIT % 23.7* 23.7* 25.1*  --    < > 27.3*   PLATELETS 10*3/mm3 197 198 199  --    < > 138*    < > = values in this interval not displayed.          Date   Time   Anti-Xa Current Rate (Unit/kg/hr) Bolus   (Units) Rate Change   (Unit/kg/hr) New Rate (Unit/kg/hr) Next   Anti-Xa Comments  Pump Check Daily   01/09 0245 STAT new -- 16.5 16.5 0900 D/w RN   01/09 0912 0.32 16.5 -- -- 16.5 1600 Pump reviewed - D/w JESS Addison    01/10 0130 0.89 16.5 -- -2 14.5 0800 D/w RN   01/10 0819 0.63 14.5 -- -- 14.5 1400 D/W RN, checked pump    1/10 1538 0.47 14.5 -- -- 14.5 2200 D/w RN, drip has not been stopped,  running continuously, no problems, will check Xa again @ 2200 due to drop   1/10 2250 0.35 14.5 -- -- 14.5 0600 D/w RN   1/11 0645 0.28 14.5 -- +1 15.5 1200 D/w RN   1/11 1238 0.35 15.5 -- -- 15.5 2000 D/W Angela RN pump checked   1/11 2007 0.33 15.5 -- -- 15.5 0600 D/W RN   1/12 0547 0.31 15.5 -- -- 15.5 AM labs Pump Checked Kylah [id number] -[facility]   1/13 0536 0.16 15.5 -- +2.5 17 1400 Dw Rn   1/13 1428 0.26 17 -- +2 19 2200 D/w JESS Sena- pump rate and weight verified   1/13 2216 0.29 19 -- +2 21 0500 DW RN   1/14 0531 0.40 21 -- -- 21 1200 DW RN   1/14 1111 0.42 21 -- -- 21 1800 Dw RN. Pump verified                                                                     Sarah Pompa, PharmD  1/14/2024  15:38 EST

## 2024-01-14 NOTE — PLAN OF CARE
Problem: Adult Inpatient Plan of Care  Goal: Plan of Care Review  Outcome: Ongoing, Progressing  Flowsheets (Taken 1/14/2024 0628)  Progress: no change  Plan of Care Reviewed With: patient  Outcome Evaluation: Patient remains intubated and sedated tonight. Patient's oxygen remains at 50% FiO2, patient continues to follow commands, patient's NG remains to LWS and trophic feeds continue. Patient had three episodes of emesis that were small, APRN aware. Levo weaned to 0.02 tonight and patient tolerating well. Patient did not have a bowel movement tonight, suppository administered and all scheduled bowel regimen medications administered. Patient's bowel sounds are slightly more active this morning. Continue to monitor closely, continue with current plan of care.

## 2024-01-14 NOTE — PROGRESS NOTES
"HEMATOLOGY/ONCOLOGY PROGRESS NOTE    S: Patient remains intubated and sedated.  I met with the patient's  outside of her room.  He has questions regarding candidacy for further systemic therapy if she recovers from her current acute presentation.      Medications:  The current medication list was reviewed in the EMR    ALLERGIES:  No Known Allergies      Physical Exam    VITAL SIGNS:  /70   Pulse 101   Temp 99 °F (37.2 °C) (Bladder)   Resp 24   Ht 165.1 cm (65\")   Wt 105 kg (231 lb 4.2 oz)   LMP  (LMP Unknown) Comment: N/A  SpO2 94%   BMI 38.48 kg/m²   Temp:  [98.8 °F (37.1 °C)-99.3 °F (37.4 °C)] 99 °F (37.2 °C)      Performance Status:4                 Physical Exam    General: Intubated and sedated  HEENT: sclerae anicteric  Lymphatics: no cervical, supraclavicular, or axillary adenopathy  Cardiovascular: regular rate and rhythm, no murmurs, rubs or gallops  Lungs: Coarse breath sounds bilaterally  Abdomen: distended +ascites +BS  Extremities: mild diffuse body wall edema  Skin: no rashes, lesions, bruising, or petechiae    RECENT LABS:    Lab Results   Component Value Date    HGB 7.5 (L) 01/14/2024    HCT 23.7 (L) 01/14/2024    MCV 84.3 01/14/2024     01/14/2024    WBC 23.16 (H) 01/14/2024    NEUTROABS 15.52 (H) 01/14/2024    LYMPHSABS 0.45 (L) 01/10/2024    MONOSABS 0.27 01/10/2024    EOSABS 0.69 (H) 01/14/2024    BASOSABS 0.00 01/14/2024       Lab Results   Component Value Date    GLUCOSE 111 (H) 01/14/2024    BUN 13 01/14/2024    CREATININE 0.54 (L) 01/14/2024     01/14/2024    K 3.0 (L) 01/14/2024     01/14/2024    CO2 23.0 01/14/2024    CALCIUM 7.3 (L) 01/14/2024    PROTEINTOT 4.9 (L) 01/14/2024    ALBUMIN 1.9 (L) 01/14/2024    BILITOT 0.3 01/14/2024    ALKPHOS 134 (H) 01/14/2024    AST 16 01/14/2024    ALT 5 01/14/2024     Assessment/Plan  Metastatic malignancy with malignant ascites, presumed pancreatic origin  -She is status post cycle 1 of palliative chemotherapy. "   -Outpatient PET was planned and she was scheduled for follow-up with repeat chemotherapy this week   -Further cancer directed therapy on hold pending clinical improvement.  -Continue to trend blood counts.  Transfuse if hemoglobin less than 7.  -Discussed prognosis of her underlying malignancy at length with the patient's  this evening.  We discussed that candidacy for future therapy would be determined by her recovery from this acute episode.  We discussed that given her prolonged ICU stay, I would anticipate her to be debilitated even after recovery from current presentation and that she would need to build up her strength prior to being considered for any further cancer directed therapy.  We discussed that based on the degree of her symptomatology at presentation, she could have worsening cancer related symptoms in the interim, and this could also impact her future treatment candidacy.  We will continue to follow along with her ICU course.  -Will continue to follow.     2.  Massive pulmonary embolism  3.  Aspiration pneumonia/ARDS  4.  Hypoxic respiratory failure secondary to above  5. Anticoagulation  -Provoked in the context of underlying malignancy  -She continues anticoagulation and is status post mechanical thrombectomy to the right pulmonary artery yesterday.  -Continues heparin drip and antibiotics and IV diuresis  -Appreciate ICU team care    6. Nausea and vomiting  -This was her presenting symptom prior to hypoxic decompensation  -She is on scheduled reglan  -GI is following s/p EGD this admission  - She has malignant ascites and has required repeat paracentesis to control symptoms in the past.     Sherri العراقي MD  [facility] Hematology and Oncology    1/14/2024

## 2024-01-14 NOTE — PLAN OF CARE
Goal Outcome Evaluation:  -Family updated intermittently at bedside   -Levophed weaned as tolerated   -Haldol given x2  -Emesis x3; 350 mls of NG output   -Trophic feeds continued via jejunal tube placed by GI   -KUB and CXR done this am  -EKG done this am  -Attempted to wean FiO2 to 40% patient was unable to tolerate and SpO2 dropped to 87% after 21 mins; increased Fi02 back to 50% in order to keep O2Sats >90%  -Bumex 2mg given x1; albumin given x1   -Relistor initiated; Miralax given; Reglan continued; no bowel movement   -ABX continued; sputum culture sent   -Fentanyl and versed continued   -Heparin gtt continued

## 2024-01-14 NOTE — PROGRESS NOTES
Intensive Care Follow-up     Hospital:  LOS: 5 days   Ms. Jessica Kraft, 63 y.o. female is followed for:   Acute respiratory failure with hypoxia        Subjective     63-year-old female with recent diagnosis of metastatic malignancy of possible pancreatic region.  Definitive biopsy with EUS was done but was limited sampling.  Patient is followed closely by medical oncology and undergoing chemotherapy with first dose given on 12/29.  Patient recently was diagnosed with severe constipation and was treated with bowel regimen and enema and improved.  However yesterday patient started having nausea and vomiting and was brought to emergency room.  Patient did not complain of any chest pain or shortness of breath.  Patient continued to have nausea and while in the ER she cutely decompensated where she became severely hypoxic with altered mental status.  Patient underwent emergent intubation in the emergency room and subsequently CT scan of the head, chest and abdomen and pelvis was done.  Head CT scan was negative.  Chest CT scan showed extensive clot in both right and left main pulmonary arteries extending to proximal segment branches of right upper lobe, right lower lobe and both left lower lobe and upper lobe branches as well.  No significant right heart strain noted.  Patchy airspace disease noted bilateral lower lobes which was concerning for pneumonia versus pulmonary infarct.  Ascites was present and peritoneal carcinomatosis noted again.  Patient was also hypotensive requiring low-dose norepinephrine at this point.  Patient was started on antibiotics, IV heparin and admitted to ICU for ongoing care.  Patient was significantly hypoxemic 100% FiO2 and 10 of PEEP.  Significant clot burden noted on the CT scan.  Discussed with cardiology and patient underwent mechanical thrombectomy.  Patient had a lot of old clots but some clots were removed from the right pulmonary artery with improvement in pulmonary  circulation.     Patient was extubated on 1/11 after careful weaning trial.  Postextubation patient was severely encephalopathic, agitated.  Was able to be reoriented at times but did not do well and started to have more nausea and vomiting. Pt received multiple sedatives and held and then started to get more hypoxic.  Decision was made to reintubate the patient  Interval History:  The chart has been reviewed.  The patient has remained afebrile.  She is currently on 50% FiO2 with a PEEP of 8.  She did continue to have some vomiting which was vigorous through the night.  Minimal response to diuretic.  It has been reported to me that she does follow some commands.  During my evaluation she does open her eyes to voice but otherwise is not following commands.  She is on sedation.  Cultures remain negative at this point.    The patient's past medical, surgical and social history were reviewed and updated in Epic as appropriate.        Objective     Infusions:  dexmedetomidine, 0.2-1.5 mcg/kg/hr (Order-Specific), Last Rate: Stopped (01/13/24 0136)  fentanyl 10 mcg/mL,  mcg/hr, Last Rate: 300 mcg/hr (01/14/24 0502)  heparin, 21 Units/kg/hr (Order-Specific), Last Rate: 21 Units/kg/hr (01/14/24 0934)  midazolam, 1-10 mg/hr, Last Rate: 10 mg/hr (01/14/24 0712)  norepinephrine, 0.02-0.3 mcg/kg/min (Order-Specific), Last Rate: 0.02 mcg/kg/min (01/14/24 0522)  Pharmacy to Dose Heparin,       Medications:  Albuterol Sulfate NEB Orderable, 2.5 mg, Nebulization, Q6H - RT  chlorhexidine, 15 mL, Mouth/Throat, Q12H  docusate sodium, 100 mg, Nasogastric, BID   And  sennosides, 10 mL, Nasogastric, BID  levothyroxine sodium, 100 mcg, Intravenous, Daily  metoclopramide, 10 mg, Intravenous, Q8H  pantoprazole, 40 mg, Intravenous, Q24H  piperacillin-tazobactam, 4.5 g, Intravenous, Q8H  potassium chloride, 40 mEq, Oral, Q4H  potassium phosphate, 15 mmol, Intravenous, Once  sodium chloride, 10 mL, Intravenous, Q12H  thiamine (B-1) IV,  "200 mg, Intravenous, Daily  venlafaxine, 50 mg, Nasogastric, TID With Meals        Vital Sign Min/Max for last 24 hours  Temp  Min: 98.6 °F (37 °C)  Max: 99.3 °F (37.4 °C)   BP  Min: 84/58  Max: 125/77   Pulse  Min: 90  Max: 122   Resp  Min: 24  Max: 24   SpO2  Min: 77 %  Max: 100 %   No data recorded       Input/Output for last 24 hour shift  01/13 0701 - 01/14 0700  In: 2863.8 [I.V.:1767.5]  Out: 1040 [Urine:790]   Mode: VC+/AC  FiO2 (%):  [45 %-55 %] 50 %  S RR:  [24] 24  S VT:  [450 mL] 450 mL  PEEP/CPAP (cm H2O):  [8 cm H20] 8 cm H20  MAP (cm H2O):  [16-21] 16  Objective:  General Appearance:  Ill-appearing and in no acute distress (Sedated and intubated).    Vital signs: (most recent): Blood pressure (!) 89/64, pulse 102, temperature 99.3 °F (37.4 °C), temperature source Bladder, resp. rate 24, height 165.1 cm (65\"), weight 105 kg (231 lb 4.2 oz), SpO2 94%.    HEENT: (Endotracheal tube in place.  Right internal jugular CVC)    Lungs:  Normal effort and normal respiratory rate.  There are decreased breath sounds and rhonchi.    Heart: Tachycardia.  Regular rhythm.  S1 normal and S2 normal.  No murmur.   Chest: Symmetric chest wall expansion.   Abdomen: Abdomen is soft and distended.  Bowel sounds are normal.   (No apparent tenderness to palpation.).     Extremities: There is dependent edema.    Neurological: (Sedated, opens eyes to voice.  Not currently following commands.).    Pupils:  Pupils are equal, round, and reactive to light.  Pupils are equal.               Results from last 7 days   Lab Units 01/14/24  0531 01/13/24  0536 01/12/24  0547   WBC 10*3/mm3 19.70* 13.09* 7.40   HEMOGLOBIN g/dL 7.4* 8.0* 7.6*   PLATELETS 10*3/mm3 198 199 171     Results from last 7 days   Lab Units 01/14/24  0531 01/13/24  1428 01/13/24  0536 01/12/24  1740 01/12/24  0547   SODIUM mmol/L 136  --  135*  --  134*   POTASSIUM mmol/L 3.0*  --  3.7  --  3.7   CO2 mmol/L 23.0  --  22.0  --  22.0   BUN mg/dL 13  --  15  --  17 "   CREATININE mg/dL 0.54*  --  0.65  --  0.65   MAGNESIUM mg/dL 2.0  --  2.3  --  2.4   PHOSPHORUS mg/dL 2.1* 2.3* 2.1*   < > 1.9*   GLUCOSE mg/dL 111*  --  104*  --  136*    < > = values in this interval not displayed.     Estimated Creatinine Clearance: 129.9 mL/min (A) (by C-G formula based on SCr of 0.54 mg/dL (L)).    Results from last 7 days   Lab Units 01/13/24  0333   PH, ARTERIAL pH units 7.354   PCO2, ARTERIAL mm Hg 43.0   PO2 ART mm Hg 68.1*       Images:   Chest imaging today shows well-placed support lines and tubes.  She continues to have extensive prominence of the bilateral pulmonary arteries.  There is cephalization.    I reviewed the patient's results and images.     Assessment & Plan   Impression        Acute respiratory failure with hypoxia    Hypothyroidism due to Hashimoto's thyroiditis    Abdominal carcinomatosis - omental    Peritoneal carcinomatosis    Pancreatic cancer    Acute pulmonary embolism    Aspiration pneumonia due to vomitus       Plan        I would like to go ahead and try to diurese further today.  We will give a dose of albumin prior to loop diuretic to see if we can help mobilize the third space.  Continue with current antimicrobial therapy.  To that end, I am going to extend the Zosyn for 2 more days.  Reculture sputum for now.  Continue with heparin infusion.  Nutritional support as before.  I am going to recheck her labs including hemoglobin later this afternoon.  Hold on transfusions for now.  This patient remains critically ill with respiratory failure on top of metastatic cancer and pulmonary emboli and I believe that she is at imminent risk of death.    Plan of care and goals reviewed with mulitdisciplinary/antibiotic stewardship team during rounds.   I discussed the patient's findings and my recommendations with nursing staff     High level of risk due to:  drug(s) requiring intensive monitoring for toxicity and parenteral controlled substances.    Time spent Critical  care 32 min (exclusive of procedure time)  including high complexity decision making to assess, manipulate, and support vital organ system failure in this individual who has impairment of one or more vital organ systems such that there is a high probability of imminent or life threatening deterioration in the patient’s condition.      Roderick Alfredo MD, [facility]  Pulmonology and Critical Care Medicine

## 2024-01-15 ENCOUNTER — PATIENT MESSAGE (OUTPATIENT)
Dept: ONCOLOGY | Facility: CLINIC | Age: 64
End: 2024-01-15
Payer: COMMERCIAL

## 2024-01-15 NOTE — PROGRESS NOTES
Pulmonary/Critical Care Follow-up     LOS: 6 days   Patient Care Team:  Leonarda Díaz MD as PCP - General (Internal Medicine)        Chief Complaint   Patient presents with    Vomiting    Abdominal Pain     Subjective     History reviewed and updated in EMR as indicated.    Interval History:     Patient continues to have significant bilious drainage from subglottic suctioning.  Patient is off of norepinephrine.  She had a large bowel movement overnight.  She has developed an endotracheal tube cuff leak.  I changed out her endotracheal tube over a bougie.  Spoke to patient's spouse at the bedside.    History taken from: Chart, staff    PMH/FH/Social History were reviewed and updated appropriately in the electronic medical record.     Review of Systems:    Review of 14 systems was completed with positives and pertinent negatives noted in the subjective section.  All other systems reviewed and are negative.   Exceptions are noted below:    Unable to obtain secondary to endotracheal intubation.      Objective     Vital Signs  Temp:  [98.4 °F (36.9 °C)-99.1 °F (37.3 °C)] 98.6 °F (37 °C)  Heart Rate:  [] 93  Resp:  [23-24] 24  BP: ()/(47-85) 90/53  FiO2 (%):  [45 %-100 %] 100 %  01/14 0701 - 01/15 0700  In: 2646.9 [I.V.:1545.9]  Out: 2175 [Urine:1575]  Body mass index is 38.48 kg/m².  Mode: VC+/AC  FiO2 (%):  [45 %-100 %] 100 %  S RR:  [24] 24  S VT:  [450 mL] 450 mL  PEEP/CPAP (cm H2O):  [8 cm H20] 8 cm H20  MAP (cm H2O):  [16-19] 18  IV drips:  dexmedetomidine, Last Rate: Stopped (01/13/24 0136)  fentanyl 10 mcg/mL, Last Rate: 300 mcg/hr (01/15/24 1219)  heparin, Last Rate: 21 Units/kg/hr (01/15/24 1104)  midazolam, Last Rate: 15 mg/hr (01/15/24 1338)  norepinephrine, Last Rate: Stopped (01/15/24 0048)  Pharmacy to Dose Heparin       Physical Exam:     Constitutional:   Sedated on ventilator, in no acute distress   Head:   Normocephalic, atraumatic   Eyes:           Lids and lashes normal, conjunctivae  and sclerae normal.  PER   ENMT:  Ears appear intact with no abnormalities noted.  Some rash on lip/perioral area.  Endotracheal tube in place.         Neck:  Trachea midline, no JVD   Lungs/Resp:    On ventilator, symmetric chest rise, no crepitus, moderate rhonchi bilaterally.               Heart/CV:   Regular rhythm and normal rate, no murmur   Abdomen/GI:    Soft, moderately distended, nontender.   :    Deferred   Extremities/MSK:  No clubbing or cyanosis.  1+ bilateral lower extremity edema.     Pulses:  Pulses palpable and equal bilaterally   Skin:  No bleeding, bruising or rash   Heme/Lymph:  No cervical or supraclavicular adenopathy.   Neurologic:    Psychiatric:    Moves all extremities with no obvious focal motor deficit.  Cranial nerves 2 - 12 grossly intact  Non-agitated, normal affect.    The above physical exam findings were reviewed and reflect my exam findings as of today's exam.   Electronically signed by:  Marco A Pugh MD  01/15/24  14:24 EST      Results Review:     I reviewed the patient's new clinical results.   Results from last 7 days   Lab Units 01/15/24  0536 01/14/24  2027 01/14/24  1324 01/14/24  0531 01/13/24  0536   SODIUM mmol/L 136  --  136 136 135*   POTASSIUM mmol/L 3.6 4.0 3.8 3.0* 3.7   CHLORIDE mmol/L 102  --  103 103 104   CO2 mmol/L 22.0  --  22.0 23.0 22.0   BUN mg/dL 16  --  13 13 15   CREATININE mg/dL 0.87  --  0.67 0.54* 0.65   CALCIUM mg/dL 7.6*  --  7.6* 7.3* 7.4*   BILIRUBIN mg/dL 0.2  --   --  0.3 0.3   ALK PHOS U/L 136*  --   --  134* 165*   ALT (SGPT) U/L 6  --   --  5 7   AST (SGOT) U/L 16  --   --  16 18   GLUCOSE mg/dL 110*  --  106* 111* 104*     Results from last 7 days   Lab Units 01/15/24  0536 01/14/24  1324 01/14/24  0531   WBC 10*3/mm3 28.40* 23.16* 19.70*   HEMOGLOBIN g/dL 7.4* 7.5* 7.4*   HEMATOCRIT % 23.0* 23.7* 23.7*   PLATELETS 10*3/mm3 188 197 198   MONOCYTES % % 7.0 6.0 2.0*   EOSINOPHIL % % 0.0* 3.0 3.0     Results from last 7 days   Lab Units  01/13/24  0333   PH, ARTERIAL pH units 7.354   PO2 ART mm Hg 68.1*   PCO2, ARTERIAL mm Hg 43.0   HCO3 ART mmol/L 24.0     Results from last 7 days   Lab Units 01/15/24  0536 01/14/24  1324 01/14/24  0531 01/13/24  1428 01/13/24  0536   MAGNESIUM mg/dL 1.9  --  2.0  --  2.3   PHOSPHORUS mg/dL 2.9 3.0 2.1*   < > 2.1*    < > = values in this interval not displayed.       I reviewed the patient's new imaging including images and reports.    Chest x-ray shows endotracheal tube in place with patchy bilateral pulmonary infiltrates and more confluent left perihilar density.  Radiologist impression follows:    Impression:  Mild interval worsening in aeration of the right lung compared yesterday's exam. No evidence of pneumothorax.     Post reintubation chest x-ray this afternoon shows endotracheal tube in place with no pneumothorax and patchy bilateral infiltrates with some clearing on the right.    Medication Review:   Albuterol Sulfate NEB Orderable, 2.5 mg, Nebulization, Q6H - RT  chlorhexidine, 15 mL, Mouth/Throat, Q12H  docusate sodium, 100 mg, Nasogastric, BID   And  sennosides, 10 mL, Nasogastric, BID  levothyroxine sodium, 100 mcg, Intravenous, Daily  metoclopramide, 10 mg, Intravenous, Q8H  pantoprazole, 40 mg, Intravenous, Q24H  piperacillin-tazobactam, 4.5 g, Intravenous, Q8H  potassium phosphate, 15 mmol, Intravenous, Once  sodium chloride, 10 mL, Intravenous, Q12H  thiamine (B-1) IV, 200 mg, Intravenous, Daily  venlafaxine, 50 mg, Nasogastric, TID With Meals      dexmedetomidine, 0.2-1.5 mcg/kg/hr (Order-Specific), Last Rate: Stopped (01/13/24 0136)  fentanyl 10 mcg/mL,  mcg/hr, Last Rate: 300 mcg/hr (01/15/24 1219)  heparin, 21 Units/kg/hr (Order-Specific), Last Rate: 21 Units/kg/hr (01/15/24 1104)  midazolam, 1-10 mg/hr, Last Rate: 15 mg/hr (01/15/24 1338)  norepinephrine, 0.02-0.3 mcg/kg/min (Order-Specific), Last Rate: Stopped (01/15/24 0048)  Pharmacy to Dose Heparin,         Assessment & Plan          Acute respiratory failure with hypoxia    Hypothyroidism due to Hashimoto's thyroiditis    Abdominal carcinomatosis - omental    Peritoneal carcinomatosis    Pancreatic cancer    Acute pulmonary embolism    Aspiration pneumonia due to vomitus    63 y.o. female recently found to have metastatic malignancy of possible pancreatic origin (EUS was done however sample was limited and this was nondiagnostic), history of hypothyroidism, anxiety/depression, followed by medical oncology for the metastatic malignancy and underwent chemotherapy initiated on 12/29/2023.  Patient also was diagnosed with severe constipation recently.  She developed worsening nausea/vomiting and was brought to the emergency department with decompensated/hypoxemic respiratory failure and altered mental status on 1/8/2023.  She underwent emergent endotracheal intubation.  CT of the head, CTA of the chest/abdomen/pelvis were done and the patient was found to have extensive bilateral pulmonary emboli without significant right heart strain.  She also had patchy airspace disease in the lower lobes and ascites with peritoneal carcinomatosis.  She was hypotensive requiring pressors and was started on antibiotics, heparin drip and admitted to the intensive care unit for ongoing management.    Patient ultimately was extubated on 1/11/2024 however was extremely encephalopathic and agitated postextubation and developed progressive nausea/vomiting.  She ultimately required reintubation.    Patient remains intubated.  She continues to have significant vomiting of bilious material.  She had an EGD with J-tube placement on 1/12/2024.  A nasogastric tube was attempted during that procedure but likely terminates in the hiatal hernia sac.    Endotracheal tube cuff developed a leak overnight and I replaced it today.    Biggest problem right now is her ongoing respiratory failure in the setting of recalcitrant vomiting.  It is an ongoing bilious  vomiting.    Plan:    For acute hypoxemic respiratory failure: Extubated 1/11/2024 requiring reintubation secondary to progressive hypoxemia and recalcitrant nausea/vomiting.  Continue mechanical ventilation.  Endotracheal tube changed 1/15/2024 secondary to cuff leak.  For peritoneal carcinomatosis/metastatic malignancy likely pancreatic origin: Status post initiation of palliative chemotherapy 12/29/2023.  Oncology following.  For vomiting: Unfortunately this has been persistent.  No definite obstruction on prior CT abdomen 1/9/2024.  Continuing Reglan.  For hypothyroidism: Continue levothyroxine.  For anemia: Stable.  Monitor with transfusion as needed.  For leukocytosis/pulmonary infiltrates: Continue Zosyn empirically.  Follow-up sputum culture from 1/14/2024.  For acute pulmonary embolism: Continue heparin drip.  GI prophylaxis: Protonix.  Nutrition: Increased tube feeding rate to 30 mL/h as tolerated.        Patient is critically ill secondary to respiratory failure with tenuous respiratory status and has high risk of life-threatening decompensation in condition.   As such, the patient requires continuous monitoring and frequent reassessment for consideration of adjustment in management to minimize this risk.  I personally reassessed the patient multiple times today.    Critical care time : 50 minutes spent by me personally and independently.(This excludes time spent performing separately reportable procedures and services).  Critical care time includes high complexity decision making to assess, manipulate, and support vital organ system failure in this individual who has impairment of one or more vital organ systems such that there is a high probability of imminent or life threatening deterioration in the patient’s condition.    Electronically signed by:    Marco A Pugh MD  01/15/24  14:24 EST      *. Please note that portions of this note were completed with [employer] - a voice recognition  program.

## 2024-01-15 NOTE — NURSING NOTE
Patient had sudden episode where she stopped pulling volumes on ventilator (TV 90), large air leak heard, Patient's O2 sat dropped to 75% respiration increased to 38. RN bagged patient, respiratory called to bedside, Patient's oxygen saturation increased back to 95%, Cuff remained inflated. Pravin Lozada APRN called to bedside. Patient now back on ventilator and oxygenating well. CXR reviewed with APRN. No new orders at this time.

## 2024-01-15 NOTE — PLAN OF CARE
Problem: Adult Inpatient Plan of Care  Goal: Plan of Care Review  Outcome: Ongoing, Progressing  Flowsheets (Taken 1/15/2024 0401)  Progress: no change  Plan of Care Reviewed With: patient  Outcome Evaluation:   -Patient continues to rest well tonight. Patient follows commands at times but is primarily only withdrawing to pain tonight. RN weaned versed to 8 and RN will wean as tolerated, fentanyl continues at 300.   -Heparin gtt continues.   -Levo off at this time.   -Patient had large BM tonight. Patient had large emesis as well. Hi Lo placed to continuous suction per Neville APRN.   -NG remains to LWS, trophic feeds, patient's abdomen remains firm. Family updated tonight. Continue to monitor closely, continue with current plan of care.

## 2024-01-15 NOTE — CASE MANAGEMENT/SOCIAL WORK
Continued Stay Note  [facility]     Patient Name: Jessica Kraft  MRN: [id number]  Today's Date: 1/15/2024    Admit Date: 1/8/2024    Plan: TBD   Discharge Plan       Row Name 01/15/24 1246       Plan    Plan TBD    Patient/Family in Agreement with Plan other (see comments)    Plan Comments Continues to be sedated and intubated. D/C plan pending. Hem/Onc following. CM will continue to follow.    Final Discharge Disposition Code 30 - still a patient                   Discharge Codes    No documentation.                 Expected Discharge Date and Time       Expected Discharge Date Expected Discharge Time    Jan 19, 2024               Danilo Turk RN

## 2024-01-15 NOTE — PROCEDURES
"Intubation    Date/Time: 1/15/2024 3:07 PM    Performed by: Marco A Pugh MD  Authorized by: Marco A Pugh MD  Consent: The procedure was performed in an emergent situation. Verbal consent obtained.  Patient identity confirmed: arm band  Time out: Immediately prior to procedure a \"time out\" was called to verify the correct patient, procedure, equipment, support staff and site/side marked as required.  Indications: respiratory failure (Endotracheal tube cuff leak)  Intubation method: Tube exchanged over bougie.  Patient status: sedated  Pretreatment medications: midazolam (2 mg)  Paralytic: vecuronium  Tube size: 7.5 mm  Tube type: cuffed  Number of attempts: 1  Post-procedure assessment: ETCO2 monitor  Breath sounds: equal  Cuff inflated: yes  ETT to lip: 26 cm  Tube secured with: bite block  Chest x-ray interpreted by me.  Chest x-ray findings: endotracheal tube in appropriate position  Patient tolerance: patient tolerated the procedure well with no immediate complications  Comments: Tube exchanged over bougie secondary to cuff leak.  Patient had ongoing vomiting which is essentially continuous requiring ongoing oral suctioning immediately prior to/after procedure.  [place] scope was prepared in case needed during procedure.        "

## 2024-01-15 NOTE — PROGRESS NOTES
Clinical Nutrition     Nutrition Support Assessment  Reason for Visit: MDR, Follow-up protocol, EN      Patient Name: Jessica Kraft  YOB: 1960  MRN: [id number]  Date of Encounter: 01/15/24 15:55 EST  Admission date: 1/8/2024  Nutrition Assessment   Admission Diagnosis:  Acute respiratory failure with hypoxia [J96.01]      Problem List:    Acute respiratory failure with hypoxia    Hypothyroidism due to Hashimoto's thyroiditis    Abdominal carcinomatosis - omental    Peritoneal carcinomatosis    Pancreatic cancer    Acute pulmonary embolism    Aspiration pneumonia due to vomitus        PMH:   She  has a past medical history of Abscess, Anxiety, Bilateral ovarian cysts, Depression, Encounter for routine gynecological examination, Foot pain, H/O bone density study (06/2014), H/O mammogram (07/2016), Hot flashes, menopausal, Hypothyroidism due to Hashimoto's thyroiditis, Insomnia, Miscarriage, Osteopenia, Pap smear for cervical cancer screening (06/2014), Routine general medical examination at a health care facility, Urinary incontinence, Urinary tract infection, and Vitamin D deficiency.    PSH:  She  has a past surgical history that includes Cholecystectomy (2008); Hernia repair (2010); Abdominoplasty (2001); Colonoscopy (07/2012); Ectopic pregnancy surgery (Left, 1994); Esophagogastroduodenoscopy (N/A, 12/13/2023); Cardiac catheterization (N/A, 1/9/2024); and Esophagogastroduodenoscopy (N/A, 1/12/2024).      Applicable Nutrition Concerns:   Skin:bruised  GI: abdomen distended, ascites, vomiting, ngt to LWS= 600ml, + bm x2      Applicable Interval History:     ARF/VENT 1-8-24    s/p mechanical thrombectomy 1-9-24    Extubated, and re-intubated 1-11-24    ET changed out 1-15-24    Reported/Observed/Food/Nutrition Related History:     1-15-24: pt intubated, sedated+ fentanyl, versed, heparin    Per RN: pt vomited last night, had massive bm, has had another bm this shift, did vomit again  when turned, ngt to LWS, output appears to be bile, no TF seen, rate increased to 30ml/hr      1-12-24: pt intubated, sedated  + precedex, fentanyl, heparin, levophed 0.08mcg, NS@75ml    Per RN: pt will follow commands, but intermittently wild, thrashing around, still having a lot of ngt output, abdomen taut, ? need for paracentesis    Plan for njt placement per GI today, and initiation of trophic feed      1-11-24:pt intubated, sedated + precedex, fentanyl, heparin, levophed 0.08mcg. NS@75ml    Per RN: ogt just replaced with ngt, pt still having significant output, had had ~300ml this am so far, (noted total of 850ml past 24 hours), no bm yet, have started reglan today      1-9-24: Pt intubated and sedated,  at bedside  + precedex, fentanyl, levophed 0.08mcg, heparin     reports pt has had nausea and vomiting for the past day and a half    Per RN: plan for possible EKOS catheter today    Labs    Labs Reviewed: Yes     Results from last 7 days   Lab Units 01/15/24  0536 01/14/24  2027 01/14/24  1324 01/14/24  0531 01/13/24  1428 01/13/24  0536 01/10/24  0447 01/09/24  0151 01/08/24  2137   GLUCOSE mg/dL 110*  --  106* 111*  --  104*   < >  --  130*   BUN mg/dL 16  --  13 13  --  15   < >  --  18   CREATININE mg/dL 0.87  --  0.67 0.54*  --  0.65   < >  --  0.89   SODIUM mmol/L 136  --  136 136  --  135*   < >  --  131*   CHLORIDE mmol/L 102  --  103 103  --  104   < >  --  89*   POTASSIUM mmol/L 3.6 4.0 3.8 3.0*  --  3.7   < >  --  3.9   PHOSPHORUS mg/dL 2.9  --  3.0 2.1*   < > 2.1*   < >  --  2.5   MAGNESIUM mg/dL 1.9  --   --  2.0  --  2.3   < >  --  1.8   ALT (SGPT) U/L 6  --   --  5  --  7   < >  --  13   LACTATE mmol/L  --   --   --   --   --   --   --  1.7 2.3*    < > = values in this interval not displayed.       Results from last 7 days   Lab Units 01/15/24  0536 01/14/24  0531 01/13/24  0536 01/12/24  1740 01/12/24  0547 01/11/24  0533 01/10/24  0447   ALBUMIN g/dL 2.1* 1.9* 2.0*  --  2.1*   <  "> 2.2*   PREALBUMIN mg/dL  --   --   --  5.4*  --   --   --    CRP mg/dL  --   --   --   --  32.16*  --   --    IONIZED CALCIUM mmol/L  --   --   --   --   --   --  1.01*   CHOLESTEROL mg/dL  --   --   --   --  88  --   --    TRIGLYCERIDES mg/dL  --   --   --   --  184*  --   --     < > = values in this interval not displayed.       Results from last 7 days   Lab Units 01/15/24  1142 01/15/24  0544 01/14/24  2341 01/14/24  1722 01/14/24  1120 01/14/24  0631   GLUCOSE mg/dL 102 109 118 109 101 112     Lab Results   Lab Value Date/Time    HGBA1C 5.70 (H) 12/07/2023 1959         Results from last 7 days   Lab Units 01/09/24  1026   PROBNP pg/mL 316.5         Medications    Medications Reviewed: Yes  Pertinent: abx, protonix, bowel regimen, reglan, thiamine, s/p relistor Sunday  Infusion:fentanyl, versed, heparin      Intake/Ouptut 24 hrs (0701 - 0700)   I&O's Reviewed: Yes     Intake & Output (last day)         01/14 0701  01/15 0700 01/15 0701  01/16 0700    I.V. (mL/kg) 1545.9 (14.7) 503 (4.8)    Other 292 62    NG/ 141    IV Piggyback 450 100    Total Intake(mL/kg) 2646.9 (25.2) 806 (7.7)    Urine (mL/kg/hr) 1575 (0.6) 175 (0.2)    Emesis/NG output 600 100    Stool 0     Total Output 2175 275    Net +471.9 +531          Stool Unmeasured Occurrence 2 x     Emesis Unmeasured Occurrence 2 x               Anthropometrics     Flowsheet Rows      Flowsheet Row First Filed Value   Admission Height 167.6 cm (66\") Documented at 01/08/2024 2036   Admission Weight 91.2 kg (201 lb) Documented at 01/08/2024 2036          Height: Height: 165.1 cm (65\")  Last Filed Weight: Weight: 105 kg (231 lb 4.2 oz) (01/13/24 0543)  Method: Weight Method: Bed scale  BMI: BMI (Calculated): 38.5  BMI classification: Obese Class I: 30-34.9kg/m2  IBW:  130lb    UBW: ?  Weight change: per EMR ~ 17lb wt loss over the past month     Weight       Weight (kg) Weight (lbs) Weight Method Visit Report   5/16/2016 96.979 kg  213 lb 12.8 oz   --  " "  8/18/2016 98.703 kg  217 lb 9.6 oz   --    11/10/2016 97.705 kg  215 lb 6.4 oz   --    2/3/2017 98.068 kg  216 lb 3.2 oz   --    3/6/2017 96.253 kg  212 lb 3.2 oz   --    5/18/2017 97.342 kg  214 lb 9.6 oz   --    8/2/2017 101.696 kg  224 lb 3.2 oz   --    12/6/2023 97.07 kg  214 lb  Stated     12/7/2023 97.523 kg  215 lb  Stated      95.255 kg  210 lb  Stated     12/8/2023 98.975 kg  218 lb 3.2 oz  Standing scale     12/9/2023 97.569 kg  215 lb 1.6 oz  Bed scale     12/10/2023 97.523 kg  215 lb  Bed scale     12/11/2023 92.987 kg  205 lb  Standing scale     12/12/2023 93.804 kg  206 lb 12.8 oz  Standing scale     12/13/2023 104.826 kg  231 lb 1.6 oz  Bed scale      105 kg  231 lb 7.7 oz      12/14/2023 94.076 kg  207 lb 6.4 oz  Standing scale     12/15/2023 92.08 kg  203 lb  Standing scale     12/19/2023 91.173 kg  201 lb   --    12/22/2023 91.173 kg  201 lb  Stated     12/25/2023 91.173 kg  201 lb  Stated     1/3/2024 91.627 kg  202 lb   --    1/8/2024 91.173 kg  201 lb  Stated     1/9/2024 91.173 kg  201 lb            Nutrition Focused Physical Exam     Date:     Unable to perform exam due to: Pt unable to participate at time of visit      Needs Assessment   Date: 1-9-24    Height used:Height: 165.1 cm (65\")  Weights used/ ABW: 201lb/ 91.4kg  IBW\" 130lb/ 59.1kg      Estimated Calorie needs: ~ 1300kcal initial  Method:  14Kcals/KG ABW:1280kcal  Method:  MSJ ABW: 1491kcal  Method:  PSU ABW: 1839kcal    Estimated Protein needs: ~118g protein  Method: 2g protein per kg IBW: 118g protein  Method: 1.2-1.5g protein per kg ABW: 110-137g protein    Current Nutrition Prescription     PO: NPO Diet NPO Type: Strict NPO  Oral Nutrition Supplement:   Intake: N/A    EN: Peptamen Intense VHP  Goal Rate:30  Water Flushes: 10  Modular: None  Route: NJ  Tube: Small bore    At goal over: 20Hrs/day    Rx will supply:   Goal Volume 600  mL/day     Flush Volume 200 mL/day     Energy 600 Kcal/day 46 % Est Need   Protein 55 g/day 47 % " Est Need   Fiber 2 g/day     Water in   mL     Total Water 704 mL     Meet DRI No        --------------------------------------------------------------------------  Product/Rate verified at bedside: Yes  Infusing Rate at time of visit: 30ml    Average Delivery from [facility] Day:  Volume 299 mL/day 44  % Goal Vol.   Flush Volume 352 mL/day     Energy  Kcal/day  % Est Need   Protein  g/day  % Est Need   Fiber  g/day     Water in  EN  mL     Total Water  mL     Meet DRI No            Nutrition Diagnosis   Date: 24 Updated: 1-15  Problem Inadequate energy intake   Etiology ARF/VENT/ Vomiting   Signs/Symptoms  NPO > 3 days, trophic feed started 1-15       Goal:   General: Nutrition support treatment  PO: N/A  EN/PN: Establish EN tolerance     Nutrition Intervention      Follow treatment progress, Care plan reviewed    EN increased to 30ml/hr today per MD    If pt able to tolerate enteral feed, suggest gradual advancement 15ml Q 24 hours to goal rate @65ml/hr, free water @10ml/hr    TF at goal volume will provide 1300ml, 1300kcal,100% kcal needs, 120g protein, 102% protein needs, 5g fiber, 1292ml free water     Suggest add MVI as TF volume too low to meet RDI's    Minimal nutrition past 7 days 2nd vomiting, if unable to tolerate post pyloric feeds, consider PN depending on GOC    Monitoring/Evaluation:   Per protocol, I&O, Pertinent labs, Weight, Skin status, GI status, Symptoms, Hemodynamic stability      Gauri Ayala, RD  Time Spent: 30min

## 2024-01-15 NOTE — PLAN OF CARE
Goal Outcome Evaluation:  Plan of Care Reviewed With: patient  Progress: no change     -VSS. Arousable to pain and care. Will bite on ET tube at times and pulls from restraints during care.  -Fentanyl infusing @ 300, versed @ 10. Requires PRN fentanyl boluses at times for vent compliance and biting ET tube.  -ET tube exchanged. Versed up to 15 per Dr. Pugh for exchange and vecuronium push given as well. Versed remained @ 15 for 2 hours per Dr. Pugh and adjusted back to 10. FiO2 @ 45% and PEEP 8.  -levo remains off  -HiLo port remains to suction, NG to LWS. There continues to be copious amount of bile coming up patient's mouth requiring suctioning.   -x1 BM this shift, patient had large emesis during rolling for incontinence care. Required 100% oxygenation during bed change as sats would drop to 85%.  -SR to ST on the monitor

## 2024-01-15 NOTE — PROGRESS NOTES
"GI Daily Progress Note  Subjective:    Chief Complaint: Emesis    Patient remains intubated.  Has continued to have bilious output from NG tube and around NG tube.    Objective:    /66   Pulse 104   Temp 98.1 °F (36.7 °C) (Bladder)   Resp 24   Ht 165.1 cm (65\")   Wt 105 kg (231 lb 4.2 oz)   LMP  (LMP Unknown) Comment: N/A  SpO2 95%   BMI 38.48 kg/m²     Physical Exam              General: Patient intubated              Eyes: Normal lids and lashes              Skin: Warm and dry, not jaundiced              Cardiovascular: tachycardia              Pulm: Equal expansion bilaterally, intubated              Abdomen: Soft, somewhat distended              Neuro: intubated    Lab  Lab Results   Component Value Date    WBC 28.40 (H) 01/15/2024    HGB 7.4 (L) 01/15/2024    HGB 7.5 (L) 01/14/2024    HGB 7.4 (L) 01/14/2024    MCV 83.3 01/15/2024     01/15/2024    INR 1.74 (H) 01/12/2024    INR 2.66 (H) 01/09/2024    INR 1.02 12/22/2023    INR 1.12 12/07/2023       Lab Results   Component Value Date    GLUCOSE 110 (H) 01/15/2024    BUN 16 01/15/2024    CREATININE 0.87 01/15/2024    EGFRIFNONA 85 08/22/2016    EGFRIFAFRI 98 08/22/2016    BCR 18.4 01/15/2024     01/15/2024    K 3.8 01/15/2024    CO2 22.0 01/15/2024    CALCIUM 7.6 (L) 01/15/2024    PROTENTOTREF 6.6 08/22/2016    ALBUMIN 2.1 (L) 01/15/2024    ALKPHOS 136 (H) 01/15/2024    BILITOT 0.2 01/15/2024    ALT 6 01/15/2024    AST 16 01/15/2024       Assessment:  Acute Hypoxic Respiratory Failure  Bilateral PE s/p Thrombectomy on Heparin  PNA, presumed aspiration  Nausea and Emesis  Feeding Difficulty with Need for post-pyloric enteral access  Metastatic Malignancy with Carcinomatosis and Malignant Ascites of Presumed Pancreatic Origin (EUS-FNA non-diagnostic due to technically challenging aspects given large hiatal hernia)  Esophagitis     Plan:  - Remains intubated  - No evidence of bowel obstruction on imaging  -Had intermittent episodes of " nausea vomiting on presentation in December along with constipation.  These were treated supportively with improvement.  She had first round of chemotherapy and then reports having 1 good day and then subsequently developed nausea and emesis again despite bowel movements.  -EGD on 1/12 with findings of esophagitis, hiatal hernia.  NG tube was placed over wire.  NG tube was then replaced however advanced only to the hernia sac.  Continues to have some bilious output per NG tube and is some degree around the tube  -I had a conversation with patient's  tonight at bedside as he had multiple questions regarding her clinical course.  He was wondering if there was anything that could be tested in the bilious output or if there was something that we could stent to help with her emesis.  He also questioned the diagnosis of malignancy as she has had cytology sent from peritoneal fluid drawn on 2 paracenteses in December, one of which showed malignant cells with further testing favoring pancreaticobiliary origin, the second sample a couple weeks later without malignant cells seen.  As noted above I attempted EUS FNA of pancreatic mass however this was non-diagnostic due to technically challenging aspects given large hiatal hernia, discussed with family following procedure that if further tissue sampling was needed, peritoneal or omental sampling could be considered as per Oncology recommendations.  However I explained that imaging as well as malignant cells on her initial paracentesis support diagnosis of malignancy.  I explained that CT scan this admission shows no evidence of obstruction and there has been no suggestion of obstruction on most recent KUB on 1/14 and there is no target to stent.  I suspect that her nausea/emesis is likely multifactorial with contribution from her peritoneal carcinomatosis, suspected dysmotility (as noted by food in stomach during EGD in December) further c/b hiatal hernia (more so when  not intubated and eating and I discussed repair of hiatal hernia is not feasible given her malignant ascites and peritoneal carcinomatosis), ascites, overall critical illness with extensive PE burden on admission, recent chemotherapy, etc.  Of note, patient's esophageal biopsies in December showed some necrosis as well as fungal organisms.  I had discussed course of fluconazole with patient in December however she was not having any symptoms at that time.  Her  does not think she started taking this.  - Continues on heparin  - Continues on scheduled Reglan  - Bowel regimen  - Will add scopolamine patch  - Will start scheduled Zofran  -Given persistent esophagitis on most recent upper endoscopy for placement of enteral access and biopsies from December showing fungal organisms, will start on fluconazole  -QTc in the 469 on 1/14, will periodically monitor.  Recommend monitoring and replacing electrolytes as indicated.  - Will repeat KUB in a.m.  - Would consider paracentesis tomorrow if possible as this may be beneficial in helping with her symptoms given her response to drainage in December      Flakito Abrams MD  01/15/24  18:02 EST

## 2024-01-16 ENCOUNTER — HOSPITAL ENCOUNTER (OUTPATIENT)
Dept: ONCOLOGY | Facility: HOSPITAL | Age: 64
Discharge: HOME | End: 2024-01-16
Payer: COMMERCIAL

## 2024-01-16 NOTE — PROGRESS NOTES
[facility]  INPATIENT PROGRESS NOTE         [facility] 2A ICU    1/16/2024      PATIENT IDENTIFICATION:   Name:  Jessica Kraft      MRN:  [id number]     63 y.o.  female             Reason for visit: Submassive PE status post thrombectomy 1/9/2024      SUBJECTIVE:   Still with copious bilious drainage.  Discussed care with bedside nurse, off Levophed since 1/15/2024.  Rhythm stable, on scheduled antiemetics    OBJECTIVE:  Vitals:    01/16/24 0900 01/16/24 0915 01/16/24 0930 01/16/24 0945   BP: 112/79  129/84    BP Location:       Patient Position:       Pulse: 94 100 99 98   Resp:       Temp:       TempSrc:       SpO2: 97% 93% 100% 94%   Weight:       Height:         FiO2 (%): 45 %     Body mass index is 38.48 kg/m².    Intake/Output Summary (Last 24 hours) at 1/16/2024 0959  Last data filed at 1/16/2024 0800  Gross per 24 hour   Intake 2611.5 ml   Output 2470 ml   Net 141.5 ml       Telemetry: Personally reviewed, normal sinus rhythm, no arrhythmia     Exam:  General Appearance:   well developed  well nourished  Neck:  thyroid not enlarged  supple  Respiratory:  no respiratory distress  normal breath sounds  no rales  Cardiovascular:  no jugular venous distention  regular rhythm  apical impulse normal  S1 normal, S2 normal  no S3, no S4   no murmur  no rub, no thrill  carotid pulses normal; no bruit  pedal pulses normal  lower extremity edema: none    Skin:   warm, dry      No Known Allergies  Scheduled meds:       Albuterol Sulfate NEB Orderable, 2.5 mg, Nebulization, Q6H - RT  chlorhexidine, 15 mL, Mouth/Throat, Q12H  docusate sodium, 100 mg, Nasogastric, BID   And  sennosides, 10 mL, Nasogastric, BID  fluconazole, 200 mg, Per J Tube, Daily  levothyroxine sodium, 100 mcg, Intravenous, Daily  metoclopramide, 10 mg, Intravenous, Q8H  ondansetron, 4 mg, Intravenous, Q6H  pantoprazole, 40 mg, Intravenous, Q24H  piperacillin-tazobactam, 4.5 g, Intravenous,  "Q8H  potassium phosphate, 15 mmol, Intravenous, Once  Scopolamine, 1 patch, Transdermal, Q72H  sodium chloride, 10 mL, Intravenous, Q12H  thiamine (B-1) IV, 200 mg, Intravenous, Daily  venlafaxine, 50 mg, Nasogastric, TID With Meals      IV meds:                      dexmedetomidine, 0.2-1.5 mcg/kg/hr (Order-Specific), Last Rate: Stopped (01/13/24 0136)  fentanyl 10 mcg/mL,  mcg/hr, Last Rate: 300 mcg/hr (01/16/24 0448)  heparin, 21 Units/kg/hr (Order-Specific), Last Rate: 21 Units/kg/hr (01/16/24 0348)  midazolam, 1-10 mg/hr, Last Rate: 10 mg/hr (01/16/24 0818)  norepinephrine, 0.02-0.3 mcg/kg/min (Order-Specific), Last Rate: Stopped (01/15/24 0048)  Pharmacy to Dose Heparin,       Data Review:  Results from last 7 days   Lab Units 01/16/24  0544 01/15/24  0536 01/14/24  1324 01/14/24  0531   SODIUM mmol/L 134* 136 136 136   BUN mg/dL 15 16 13 13   CREATININE mg/dL 0.70 0.87 0.67 0.54*   GLUCOSE mg/dL 122* 110* 106* 111*     Results from last 7 days   Lab Units 01/16/24  0544 01/15/24  0536 01/14/24  1324 01/14/24  0531 01/13/24  0536   WBC 10*3/mm3 33.12* 28.40* 23.16* 19.70* 13.09*   HEMOGLOBIN g/dL 7.3* 7.4* 7.5* 7.4* 8.0*     Results from last 7 days   Lab Units 01/12/24  1427   INR  1.74*     Results from last 7 days   Lab Units 01/16/24  0544 01/15/24  0536 01/14/24  0531 01/13/24  0536 01/12/24  0547   ALT (SGPT) U/L 5 6 5 7 6   AST (SGOT) U/L 17 16 16 18 18     No results found for: \"DIGOXIN\"   Lab Results   Component Value Date    TSH 1.730 12/07/2023     Results from last 7 days   Lab Units 01/12/24  0547   CHOLESTEROL mg/dL 88   HDL CHOL mg/dL 29*       Estimated Creatinine Clearance: 100.2 mL/min (by C-G formula based on SCr of 0.7 mg/dL).        Imaging (last 24 hr):   I personally reviewed the most recent chest x-ray and other pertinent imaging studies.  Results for orders placed during the hospital encounter of 01/08/24    XR Abdomen KUB    Narrative  XR ABDOMEN KUB    Date of Exam: 1/16/2024 " 4:01 AM EST    Indication: emesis    Comparison: 1/14/2024    Findings:  Lung bases are grossly clear. Weighted feeding tube terminates near the ligament of Treitz. Nasogastric tube terminates in the gastric body with sidehole at the gastroesophageal junction. Nonobstructive bowel gas pattern. No evidence of displaced osseous  abnormality.    Impression  Impression:  Nonobstructive bowel gas pattern.    Nasogastric tube sidehole is near the gastroesophageal junction. Consider slight advancement.      Electronically Signed: Scar Dela Cruz MD  1/16/2024 8:24 AM EST  Workstation ID: [id number]        Last ECHO:  Results for orders placed during the hospital encounter of 01/08/24    Adult Transthoracic Echo Complete w/ Color, Spectral and Contrast if Necessary Per Protocol    Interpretation Summary    Left ventricular systolic function is normal. Calculated left ventricular EF = 61.7%    Mild to moderate AI    No pericardial effusion\        PROBLEM LIST:     Acute respiratory failure with hypoxia    Hypothyroidism due to Hashimoto's thyroiditis    Abdominal carcinomatosis - omental    Peritoneal carcinomatosis    Pancreatic cancer    Acute pulmonary embolism    Aspiration pneumonia due to vomitus        Initial cardiac assessment:  63-year-old female with newly diagnosed metastatic pancreatic cancer presenting with intractable nausea and vomiting likely aspiration pneumonia with acute hypoxic respiratory failure requiring intubation and shock requiring vasopressor, found to have submassive bilateral pulmonary emboli.     ASSESSMENT/PLAN:  1.  Submassive pulmonary emboli:  Status post mechanical thrombectomy with Inari catheter 1/9/2024 with good results  Improved oxygenation  Chronic appearing thrombi removed.  Continue heparin, transition to treatment dose oral anticoagulation when taking pills.  No evidence of RV strain by echo or CT    Appears volume overloaded, consider IV diuretics    2.  Bilateral aspiration  pneumonia:  Antibiotics per primary team, following commands, weaning oxygen hopeful for extubation soon  Still with copious bilious drainage from NG  She is on scheduled antiemetics including Zofran, scopolamine, and Reglan    Daily EKGs ordered to monitor QTc    3.  Metastatic pancreatic cancer:  Oncology team following    4.  Hypotension:  Off norepinephrine since 1/15/2024  Wean pressors for goal MAP greater than 65      Felipe Forde MD  1/16/2024    09:59 EST

## 2024-01-16 NOTE — PLAN OF CARE
Problem: Adult Inpatient Plan of Care  Goal: Plan of Care Review  Outcome: Ongoing, Progressing  Flowsheets (Taken 1/16/2024 0516)  Progress: no change  Plan of Care Reviewed With: patient  Outcome Evaluation: Patient remains on ventilator tonight with 50% FiO2 and RN unable to wean. Vital signs remain stable. Levo remains off. Patient continues on heparin gtt, versed and fentanyl continue for sedation. Patient follows commands. Patient had a large BM tonight. Patient continues to have emesis and HiLo remains to low continuous suction. Trophic feeds continue.  Possible paracentesis today. Continue to monitor closely, continue with current plan of care.

## 2024-01-16 NOTE — PROGRESS NOTES
"GI Daily Progress Note  Subjective:    Chief Complaint:  F/u nausea and emesis    Patient remains on mechanical ventilation.  Has had approximately 600 mL of bilious fluid from the NG tube today.  Tolerating tube feeds, and has had a bowel movement.    Objective:    /72   Pulse 89   Temp 98.2 °F (36.8 °C) (Bladder)   Resp 24   Ht 165.1 cm (65\")   Wt 105 kg (231 lb 4.2 oz)   LMP  (LMP Unknown) Comment: N/A  SpO2 94%   BMI 38.48 kg/m²     Physical Exam  Constitutional:       General: She is not in acute distress.     Appearance: She is ill-appearing. She is not toxic-appearing or diaphoretic.      Comments: Sedated on ventilator.   HENT:      Head: Normocephalic.      Nose:      Comments: NG and NJ tubes in place.     Mouth/Throat:      Mouth: Mucous membranes are moist.   Cardiovascular:      Rate and Rhythm: Normal rate.      Pulses: Normal pulses.   Pulmonary:      Effort: Pulmonary effort is normal. No respiratory distress.      Comments: On mechanical ventilation.  Abdominal:      General: Bowel sounds are normal. There is distension.      Palpations: Abdomen is soft.      Tenderness: There is no abdominal tenderness. There is no guarding.   Skin:     Coloration: Skin is not jaundiced.   Neurological:      Comments: Sedated     Lab  Lab Results   Component Value Date    WBC 33.12 (C) 01/16/2024    HGB 7.3 (L) 01/16/2024    HGB 7.4 (L) 01/15/2024    HGB 7.5 (L) 01/14/2024    MCV 84.2 01/16/2024     01/16/2024    INR 1.74 (H) 01/12/2024    INR 2.66 (H) 01/09/2024    INR 1.02 12/22/2023    INR 1.12 12/07/2023     Lab Results   Component Value Date    GLUCOSE 122 (H) 01/16/2024    BUN 15 01/16/2024    CREATININE 0.70 01/16/2024    EGFRIFNONA 85 08/22/2016    EGFRIFAFRI 98 08/22/2016    BCR 21.4 01/16/2024     (L) 01/16/2024    K 3.6 01/16/2024    CO2 23.0 01/16/2024    CALCIUM 7.8 (L) 01/16/2024    PROTENTOTREF 6.6 08/22/2016    ALBUMIN 2.1 (L) 01/16/2024    ALKPHOS 135 (H) 01/16/2024    " BILITOT 0.2 01/16/2024    ALT 5 01/16/2024    AST 17 01/16/2024       Assessment:    1.  Nausea with emesis.  Emesis is effortless, when she turns and associated with large hiatal hernia.  There is likely severe gastroparesis secondary to malignancy.  2.  Respiratory failure secondary to aspiration pneumonia and submassive pulmonary embolism.  3.  Anticoagulation with heparin for submassive pulmonary embolism, status post partial mechanical thrombectomy.  PE precipitated by underlying malignancy.  4.  Metastatic malignancy with CK7 CK20 and CDX2 positivity, suggesting pancreaticobiliary or gastrointestinal origin.  Presumed pancreas origin, with mass noted in the pancreas body on CT and EUS. There is pancreas atrophy and ductal dilation proximal to the mass.  5.  Peritoneal carcinomatosis and malignant ascites.  6.  Fungal esophagitis.    Plan:    >> Continue metoclopramide and scopolamine.  Zofran was discontinued secondary to prolonged QT.  There are plans for repeat EKG tomorrow.  >> Continue Diflucan.  >> Continue NJ tube feeds, and NG tube to suction.  >> Will consider bedside paracentesis tomorrow, after discussion with intensivist and family.  Would prefer to hold heparin for 2 hours prior to procedure.    Mark I. Brunner, MD  01/16/24  18:37 EST

## 2024-01-16 NOTE — PLAN OF CARE
Goal Outcome Evaluation:  Plan of Care Reviewed With: patient  Progress: no change     -VSS. Arousable to pain and care.  -fentanyl infusing @ 300 and versed @ 10. Levo remains off. Heparin gtt continues  -600 mL from NG and oral/hi lo suctioning despite NG to LWS. X2 emesis episodes immeasurable however not projectile  -family requesting paracentesis, awaiting recommendation from GI  -vented with FiO2 50% and peep 8; will desat at times without movement and recovers with 100% and suctioning  -SR on the monitor  -TF remains at 30 mL/hr

## 2024-01-16 NOTE — PROGRESS NOTES
"HEMATOLOGY/ONCOLOGY PROGRESS NOTE    S: Pt remains intubated and sedated. Chart notes reviewed. She continues with intermittent emesis. GI is considering paracentesis.    I received a my chart message from her  and called him to discuss.    Medications:  The current medication list was reviewed in the EMR    ALLERGIES:  No Known Allergies      Physical Exam    VITAL SIGNS:  /71   Pulse 97   Temp 99 °F (37.2 °C) (Bladder)   Resp 24   Ht 165.1 cm (65\")   Wt 105 kg (231 lb 4.2 oz)   LMP  (LMP Unknown) Comment: N/A  SpO2 94%   BMI 38.48 kg/m²   Temp:  [98.1 °F (36.7 °C)-99 °F (37.2 °C)] 99 °F (37.2 °C)      Performance Status:4                 Physical Exam    General: Intubated and sedated GT with bilous output  HEENT: sclerae anicteric  Lymphatics: no cervical, supraclavicular, or axillary adenopathy  Cardiovascular: regular rate and rhythm, no murmurs, rubs or gallops  Lungs: Coarse breath sounds bilaterally  Abdomen: distended +ascites +BS  Extremities: mild diffuse body wall edema  Skin: no rashes, lesions, bruising, or petechiae    RECENT LABS:    Lab Results   Component Value Date    HGB 7.3 (L) 01/16/2024    HCT 23.0 (L) 01/16/2024    MCV 84.2 01/16/2024     01/16/2024    WBC 33.12 (C) 01/16/2024    NEUTROABS 27.49 (H) 01/16/2024    LYMPHSABS 0.45 (L) 01/10/2024    MONOSABS 0.27 01/10/2024    EOSABS 0.33 01/16/2024    BASOSABS 0.00 01/16/2024       Lab Results   Component Value Date    GLUCOSE 122 (H) 01/16/2024    BUN 15 01/16/2024    CREATININE 0.70 01/16/2024     (L) 01/16/2024    K 3.4 (L) 01/16/2024     01/16/2024    CO2 23.0 01/16/2024    CALCIUM 7.8 (L) 01/16/2024    PROTEINTOT 5.2 (L) 01/16/2024    ALBUMIN 2.1 (L) 01/16/2024    BILITOT 0.2 01/16/2024    ALKPHOS 135 (H) 01/16/2024    AST 17 01/16/2024    ALT 5 01/16/2024     Assessment/Plan  Metastatic malignancy with malignant ascites, presumed pancreatic origin  -She is status post cycle 1 of palliative " chemotherapy.   -Outpatient PET was planned and she was scheduled for follow-up with repeat chemotherapy this week   -Further cancer directed therapy on hold pending clinical improvement.  -Continue to trend blood counts.  Transfuse if hemoglobin less than 7.  -Discussed prognosis of her underlying malignancy at length with the patient's  this evening.  We discussed that candidacy for future therapy would be determined by her recovery from this acute episode.  We discussed that given her prolonged ICU stay, I would anticipate her to be debilitated even after recovery from current presentation and that she would need to build up her strength prior to being considered for any further cancer directed therapy.  We discussed that based on the degree of her symptomatology at presentation, she could have worsening cancer related symptoms in the interim, and this could also impact her future treatment candidacy.  We will continue to follow along with her ICU course.  -Will continue to follow.     2.  Massive pulmonary embolism  3.  Aspiration pneumonia/ARDS  4.  Hypoxic respiratory failure secondary to above  5. Anticoagulation  -Provoked in the context of underlying malignancy  -She continues anticoagulation and is status post mechanical thrombectomy to the right pulmonary artery yesterday.  -Continues heparin drip and antibiotics and IV diuresis  -Appreciate ICU team care    6. Nausea and vomiting  -This was her presenting symptom prior to hypoxic decompensation  -She is on scheduled zofran, reglan and scopolamine.  -GI notes reviewed. S/p EGD this admission. On empiric fluconazole.  - She has malignant ascites and has required repeat paracentesis to control symptoms in the past.   -Discussed with Mr. Kraft my concern for peritoneal carcinomatosis/cancer progression contributing to the ongoing symptoms if she does not respond to additional measures.    Sherri العراقي MD  [facility] Hematology and  Oncology    1/16/2024

## 2024-01-17 NOTE — PROCEDURES
BEDSIDE PARACENTESIS    Indication: Symptomatic malignant ascites    The patient's right upper quadrant was prepped in aseptic fashion, over the region of a large pocket of ascites seen on CT scan films.  The skin and peritoneal surface were anesthetized with 1% lidocaine.  An Arrow thoracentesis catheter was inserted into the peritoneal cavity. Gravity drainage yielded 5.75 L dark federico-colored ascites fluid.  Fluid was sent for white blood cell count/differential, and culture.  There were no immediate complications and no blood loss.    >> Resume heparin w/o bolus.

## 2024-01-17 NOTE — PLAN OF CARE
Problem: Palliative Care  Goal: Enhanced Quality of Life  Intervention: Promote Advance Care Planning  Flowsheets (Taken 1/17/2024 1444)  Life Transition/Adjustment:   palliative care initiated   palliative care discussed     Problem: Palliative Care  Goal: Enhanced Quality of Life  Intervention: Optimize Psychosocial Wellbeing  Flowsheets (Taken 1/17/2024 1444)  Supportive Measures:   active listening utilized   verbalization of feelings encouraged   decision-making supported  Family/Support System Care:   support provided   involvement promoted   presence promoted   Goal Outcome Evaluation:  Plan of Care Reviewed With: spouse, family        Progress: no change  Outcome Evaluation: new palliative consult from Dr Pugh for [facility]. Pt is currently intubated 60% fi02. Pt has had 1 attempt with extubation and had to be reintubated.   states no one had talked with him about length pt can stay on the vent.  Discussed max of 2 weeks.  Discussion about difficulty protecting her airway with extubation.   NG to suction. Plans for paracentesis today.  Heparin gtt for PE. Family processing her diagnosis.  Pt was recently diagnosed and had palliative chemo on 12/29. Pt was supposed to have second opinion at [place] at the end of the month, but will not be able to make this appt.  Discussed palliative services and discussion of goals. Plans for follow up conversation and continue support. Pt is currently sedated on fentanyl at 225 mcg. Pt was a  and has been  to her  for 42 years.  Pt has 1 son.      Palliative IDT: RN, SW, APRMD IFRAH,   AfterMemorial Hospital of Rhode Islandrs# [phone]

## 2024-01-17 NOTE — PROGRESS NOTES
Clinical Nutrition     Nutrition Support Assessment  Reason for Visit: MDR, Follow-up protocol, EN      Patient Name: Jessica Kraft  YOB: 1960  MRN: [id number]  Date of Encounter: 01/17/24 18:24 EST  Admission date: 1/8/2024  Nutrition Assessment   Admission Diagnosis:  Acute respiratory failure with hypoxia [J96.01]      Problem List:    Acute respiratory failure with hypoxia    Hypothyroidism due to Hashimoto's thyroiditis    Abdominal carcinomatosis - omental    Peritoneal carcinomatosis    Pancreatic cancer    Acute pulmonary embolism    Aspiration pneumonia due to vomitus        PMH:   She  has a past medical history of Abscess, Anxiety, Bilateral ovarian cysts, Depression, Encounter for routine gynecological examination, Foot pain, H/O bone density study (06/2014), H/O mammogram (07/2016), Hot flashes, menopausal, Hypothyroidism due to Hashimoto's thyroiditis, Insomnia, Miscarriage, Osteopenia, Pap smear for cervical cancer screening (06/2014), Routine general medical examination at a health care facility, Urinary incontinence, Urinary tract infection, and Vitamin D deficiency.    PSH:  She  has a past surgical history that includes Cholecystectomy (2008); Hernia repair (2010); Abdominoplasty (2001); Colonoscopy (07/2012); Ectopic pregnancy surgery (Left, 1994); Esophagogastroduodenoscopy (N/A, 12/13/2023); Cardiac catheterization (N/A, 1/9/2024); and Esophagogastroduodenoscopy (N/A, 1/12/2024).      Applicable Nutrition Concerns:   Skin:bruised  GI: abdomen distended, ascites, s/p paracentesis,ngt to LWS= 885ml, last bm 1-16      Applicable Interval History:     ARF/VENT 1-8-24    s/p mechanical thrombectomy 1-9-24    Extubated, and re-intubated 1-11-24    ET changed out 1-15-24    s/p Paracentesis 1-17-24: 5.75L drained off    Reported/Observed/Food/Nutrition Related History:     1-17-24: pt intubated, sedated + fentanyl ,versed, heparin    Per RN: pt tolerating TF, has not  had any vomiting this shift, had ~ 6L taken off during paracentesis    1-15-24: pt intubated, sedated+ fentanyl, versed, heparin    Per RN: pt vomited last night, had massive bm, has had another bm this shift, did vomit again when turned, ngt to LWS, output appears to be bile, no TF seen, rate increased to 30ml/hr      1-12-24: pt intubated, sedated  + precedex, fentanyl, heparin, levophed 0.08mcg, NS@75ml    Per RN: pt will follow commands, but intermittently wild, thrashing around, still having a lot of ngt output, abdomen taut, ? need for paracentesis    Plan for njt placement per GI today, and initiation of trophic feed      1-11-24:pt intubated, sedated + precedex, fentanyl, heparin, levophed 0.08mcg. NS@75ml    Per RN: ogt just replaced with ngt, pt still having significant output, had had ~300ml this am so far, (noted total of 850ml past 24 hours), no bm yet, have started reglan today      1-9-24: Pt intubated and sedated,  at bedside  + precedex, fentanyl, levophed 0.08mcg, heparin     reports pt has had nausea and vomiting for the past day and a half    Per RN: plan for possible EKOS catheter today    Labs    Labs Reviewed: Yes     Results from last 7 days   Lab Units 01/17/24  0536 01/16/24  2222 01/16/24  1501 01/16/24  0544 01/15/24  1635 01/15/24  0536   GLUCOSE mg/dL 122*  --   --  122*  --  110*   BUN mg/dL 16  --   --  15  --  16   CREATININE mg/dL 0.64  --   --  0.70  --  0.87   SODIUM mmol/L 132*  --   --  134*  --  136   CHLORIDE mmol/L 98  --   --  101  --  102   POTASSIUM mmol/L 4.0  --  3.6 3.4*   < > 3.6   PHOSPHORUS mg/dL 3.1  --   --  3.1  --  2.9   MAGNESIUM mg/dL 2.3  --   --  2.2  --  1.9   ALT (SGPT) U/L <5  --   --  5  --  6   LACTATE mmol/L  --  1.4  --   --   --   --     < > = values in this interval not displayed.       Results from last 7 days   Lab Units 01/17/24  0536 01/16/24  0544 01/15/24  0536 01/13/24  0536 01/12/24  1740 01/12/24  0547   ALBUMIN g/dL 2.1* 2.1*  "2.1*   < >  --  2.1*   PREALBUMIN mg/dL  --   --   --   --  5.4*  --    CRP mg/dL  --   --   --   --   --  32.16*   CHOLESTEROL mg/dL  --   --   --   --   --  88   TRIGLYCERIDES mg/dL  --   --   --   --   --  184*    < > = values in this interval not displayed.       Results from last 7 days   Lab Units 01/17/24  1748 01/17/24  1133 01/17/24  0518 01/16/24  2315 01/16/24  1719 01/16/24  1129   GLUCOSE mg/dL 92 114 120 76 90 135*     Lab Results   Lab Value Date/Time    HGBA1C 5.70 (H) 12/07/2023 1959                   Medications    Medications Reviewed: Yes  Pertinent: abx, protonix, bowel regimen, reglan, thiamine, scopolamine, diflucan  Infusion:fentanyl, versed, heparin      Intake/Ouptut 24 hrs (0701 - 0700)   I&O's Reviewed: Yes     Intake & Output (last day)         01/16 0701 01/17 0700 01/17 0701 01/18 0700    I.V. (mL/kg) 1735.7 (16.5) 599.2 (5.7)    Other 202 160    NG/ 457    IV Piggyback 324.8 200    Total Intake(mL/kg) 3147.5 (30) 1416.2 (13.5)    Urine (mL/kg/hr) 740 (0.3) 444 (0.4)    Emesis/NG output 1150 350    Total Output 1890 794    Net +1257.5 +622.2          Emesis Unmeasured Occurrence 1 x               Anthropometrics     Flowsheet Rows      Flowsheet Row First Filed Value   Admission Height 167.6 cm (66\") Documented at 01/08/2024 2036   Admission Weight 91.2 kg (201 lb) Documented at 01/08/2024 2036          Height: Height: 165.1 cm (65\")  Last Filed Weight: Weight: 105 kg (231 lb 4.2 oz) (01/13/24 0543)  Method: Weight Method: Bed scale  BMI: BMI (Calculated): 38.5  BMI classification: Obese Class I: 30-34.9kg/m2  IBW:  130lb    UBW: ?  Weight change: per EMR ~ 17lb wt loss over the past month     Weight       Weight (kg) Weight (lbs) Weight Method Visit Report   5/16/2016 96.979 kg  213 lb 12.8 oz   --    8/18/2016 98.703 kg  217 lb 9.6 oz   --    11/10/2016 97.705 kg  215 lb 6.4 oz   --    2/3/2017 98.068 kg  216 lb 3.2 oz   --    3/6/2017 96.253 kg  212 lb 3.2 oz   --  " "  5/18/2017 97.342 kg  214 lb 9.6 oz   --    8/2/2017 101.696 kg  224 lb 3.2 oz   --    12/6/2023 97.07 kg  214 lb  Stated     12/7/2023 97.523 kg  215 lb  Stated      95.255 kg  210 lb  Stated     12/8/2023 98.975 kg  218 lb 3.2 oz  Standing scale     12/9/2023 97.569 kg  215 lb 1.6 oz  Bed scale     12/10/2023 97.523 kg  215 lb  Bed scale     12/11/2023 92.987 kg  205 lb  Standing scale     12/12/2023 93.804 kg  206 lb 12.8 oz  Standing scale     12/13/2023 104.826 kg  231 lb 1.6 oz  Bed scale      105 kg  231 lb 7.7 oz      12/14/2023 94.076 kg  207 lb 6.4 oz  Standing scale     12/15/2023 92.08 kg  203 lb  Standing scale     12/19/2023 91.173 kg  201 lb   --    12/22/2023 91.173 kg  201 lb  Stated     12/25/2023 91.173 kg  201 lb  Stated     1/3/2024 91.627 kg  202 lb   --    1/8/2024 91.173 kg  201 lb  Stated     1/9/2024 91.173 kg  201 lb            Nutrition Focused Physical Exam     Date:     Unable to perform exam due to: Pt unable to participate at time of visit      Needs Assessment   Date: 1-9-24    Height used:Height: 165.1 cm (65\")  Weights used/ ABW: 201lb/ 91.4kg  IBW\" 130lb/ 59.1kg      Estimated Calorie needs: ~ 1300kcal initial  Method:  14Kcals/KG ABW:1280kcal  Method:  MSJ ABW: 1491kcal  Method:  PSU ABW: 1839kcal    Estimated Protein needs: ~118g protein  Method: 2g protein per kg IBW: 118g protein  Method: 1.2-1.5g protein per kg ABW: 110-137g protein    Current Nutrition Prescription     PO: NPO Diet NPO Type: Strict NPO  Oral Nutrition Supplement:   Intake: N/A    EN: Peptamen Intense VHP  Goal Rate:30  Water Flushes: 10  Modular: None  Route: NJ  Tube: Small bore    At goal over: 20Hrs/day    Rx will supply:   Goal Volume 600  mL/day     Flush Volume 200 mL/day     Energy 600 Kcal/day 46 % Est Need   Protein 55 g/day 47 % Est Need   Fiber 2 g/day     Water in   mL     Total Water 704 mL     Meet DRI No    "     --------------------------------------------------------------------------  Product/Rate verified at bedside: Yes  Infusing Rate at time of visit: 30ml    Average Delivery from [facility]:  Volume 625 mL/day 104  % Goal Vol.   Flush Volume 462 mL/day     Energy  Kcal/day  % Est Need   Protein  g/day  % Est Need   Fiber  g/day     Water in  EN  mL     Total Water  mL     Meet DRI No            Nutrition Diagnosis   Date: 24 Updated:   Problem Inadequate energy intake   Etiology ARF/VENT/ Vomiting   Signs/Symptoms  NPO > 3 days, trophic feed started 1-15   Status: stable    Goal:   General: Nutrition support treatment  PO: N/A  EN/PN: Adjust EN     Nutrition Intervention      Follow treatment progress, Care plan reviewed    Pt seems to be tolerating TF at 30ml/hr, hopefully will be able to tolerate higher volume now s/p paracentesis    Rec gradual advancement of TF, 15ml Q 12 hours to goal rate @65ml/hr, free water @10ml/hr    TF at goal volume will provide 1300ml, 1300kcal,100% kcal needs, 120g protein, 102% protein needs, 5g fiber, 1292ml free water     Suggest add MVI as TF volume too low to meet RDI's      Monitoring/Evaluation:   Per protocol, I&O, Pertinent labs, Weight, Skin status, GI status, Symptoms, Hemodynamic stability      Gauri Ayala RD  Time Spent: 30min

## 2024-01-17 NOTE — CONSULTS
Palliative Care Initial Consult   Attending Physician: Roderick Alfredo MD  Referring Provider: Marco A Pugh    Reason for Referral:  assistance with clarification of goals of care    Code Status:   Code Status and Medical Interventions:   Ordered at: 01/09/24 0236     Code Status (Patient has no pulse and is not breathing):    CPR (Attempt to Resuscitate)     Medical Interventions (Patient has pulse or is breathing):    Full Support      Advanced Directives:   none  Family/Support: souse and son, also sister   Goals of Care: TBD.    HPI:   63 you female with likely pancreatic CA with peritoneal and omental carcinomatosis and large volume ascites.  Chemo initiaated prior to PET.  Presented to ED with increasing emesis.  After arrival developed intractable bilious emesis with resp deterioration requiring intubation and admission to ICU.  Dx with aspiration related pna as well.  Imaging demonstrated massive PE and underwent thrombectomy.  Failed extubation x 1 on 1/11.  Continues with large volume NG output and thus has had NJ tube for nutrition.  Unfortunately not progressing at this time. To have repeat thoracentesis today.    ROS:   Pt unable    Past Medical History:   Diagnosis Date    Abscess     Anxiety     Bilateral ovarian cysts     Depression     Encounter for routine gynecological examination     Foot pain     H/O bone density study 06/2014    H/O mammogram 07/2016    [facility]    Hot flashes, menopausal     Hypothyroidism due to Hashimoto's thyroiditis     Insomnia     Miscarriage     x3    Osteopenia     Pap smear for cervical cancer screening 06/2014    Routine general medical examination at a health care facility     Urinary incontinence     Urinary tract infection     Vitamin D deficiency      Past Surgical History:   Procedure Laterality Date    ABDOMINOPLASTY  2001    CARDIAC CATHETERIZATION N/A 1/9/2024    Procedure: Percutaneous Manual Thrombectomy - Inari Bilateral Pulmonary;  Surgeon:  "Felipe Forde MD;  Location:  [facility];  Service: Cardiovascular;  Laterality: N/A;    CHOLECYSTECTOMY  2008    COLONOSCOPY  07/2012    [facility] in [place] normal    ECTOPIC PREGNANCY SURGERY Left 1994    ENDOSCOPY N/A 12/13/2023    Procedure: ESOPHAGOGASTRODUODENOSCOPY;  Surgeon: Flakito Abrams MD;  Location:  [facility];  Service: Gastroenterology;  Laterality: N/A;    ENDOSCOPY N/A 1/12/2024    Procedure: ESOPHAGOGASTRODUODENOSCOPY AT BEDSIDE WITH NASOJEJUNAL TUBE INSERTION;  Surgeon: Flakito Abrams MD;  Location:  [facility];  Service: Gastroenterology;  Laterality: N/A;    HERNIA REPAIR  2010     Social History     Socioeconomic History    Marital status:    Tobacco Use    Smoking status: Never    Smokeless tobacco: Never   Vaping Use    Vaping Use: Never used   Substance and Sexual Activity    Alcohol use: No    Drug use: No    Sexual activity: Yes     Partners: Male     Birth control/protection: None, Post-menopausal     Comment:      Family History   Problem Relation Age of Onset    Mental illness Mother     Depression Mother     Thyroid disease Mother     Hypertension Father     Thyroid disease Sister     Breast cancer Other     Breast cancer Maternal Aunt        No Known Allergies    Current medication reviewed for route, type, dose and frequency and are current per MAR at time of dictation.    Palliative Performance Scale Score:      BP 94/66   Pulse 91   Temp 98.6 °F (37 °C) (Bladder)   Resp 24   Ht 165.1 cm (65\")   Wt 105 kg (231 lb 4.2 oz)   LMP  (LMP Unknown) Comment: N/A  SpO2 95%   BMI 38.48 kg/m²     Intake/Output Summary (Last 24 hours) at 1/17/2024 1240  Last data filed at 1/17/2024 1049  Gross per 24 hour   Intake 2865.2 ml   Output 1445 ml   Net 1420.2 ml       Physical Exam:    General Appearance:    Unresponsive to exam   HEENT:    NC/AT, EOMI, anicteric, MMM, face relaxed   Neck:   supple, trachea midline, no JVD   Lungs:     CTA " bilat, diminished in bases; respirations regular, even and unlabored    Heart:    RRR, normal S1 and S2, no M/R/G   Abdomen:     Normal bowel sounds, soft, nontender, nondistended   G/U:   Deferred   MSK/Extremities:   Wasting, + edema   Pulses:   Pulses palpable and equal bilaterally   Skin:   Warm, dry   Neurologic:   No withdrawal elicited             Labs:   Results from last 7 days   Lab Units 01/17/24  0536   WBC 10*3/mm3 32.36*   HEMOGLOBIN g/dL 7.3*   HEMATOCRIT % 22.5*   PLATELETS 10*3/mm3 200     Results from last 7 days   Lab Units 01/17/24  0536   SODIUM mmol/L 132*   POTASSIUM mmol/L 4.0   CHLORIDE mmol/L 98   CO2 mmol/L 23.0   BUN mg/dL 16   CREATININE mg/dL 0.64   GLUCOSE mg/dL 122*   CALCIUM mg/dL 7.7*     Results from last 7 days   Lab Units 01/17/24  0536   SODIUM mmol/L 132*   POTASSIUM mmol/L 4.0   CHLORIDE mmol/L 98   CO2 mmol/L 23.0   BUN mg/dL 16   CREATININE mg/dL 0.64   CALCIUM mg/dL 7.7*   BILIRUBIN mg/dL 0.2   ALK PHOS U/L 131*   ALT (SGPT) U/L <5   AST (SGOT) U/L 17   GLUCOSE mg/dL 122*     Imaging Results (Last 72 Hours)       Procedure Component Value Units Date/Time    XR Chest 1 View [[id number]] Collected: 01/17/24 0814     Updated: 01/17/24 0819    Narrative:      XR CHEST 1 VW    Date of Exam: 1/17/2024 3:28 AM EST    Indication: f/u respiratory illness    Comparison: 1/16/2024.    Findings:  Support lines are unchanged in position. There are continued extensive lung infiltrates, greatest on the left. Small left effusion is again seen. Heart size is normal.      Impression:      Impression:  No appreciable change in position of support lines or extensive bilateral lung infiltrates.      Electronically Signed: Nneka Servin MD    1/17/2024 8:15 AM EST    Workstation ID: [id number]    XR Abdomen KUB [[id number]] Collected: 01/16/24 0819     Updated: 01/16/24 0827    Narrative:      XR ABDOMEN KUB    Date of Exam: 1/16/2024 4:01 AM EST    Indication: emesis    Comparison:  1/14/2024    Findings:  Lung bases are grossly clear. Weighted feeding tube terminates near the ligament of Treitz. Nasogastric tube terminates in the gastric body with sidehole at the gastroesophageal junction. Nonobstructive bowel gas pattern. No evidence of displaced osseous   abnormality.      Impression:      Impression:  Nonobstructive bowel gas pattern.    Nasogastric tube sidehole is near the gastroesophageal junction. Consider slight advancement.      Electronically Signed: Scar Dela Cruz MD    1/16/2024 8:24 AM EST    Workstation ID: [id number]    XR Chest 1 View [[id number]] Collected: 01/16/24 0759     Updated: 01/16/24 0803    Narrative:      XR CHEST 1 VW    Date of Exam: 1/16/2024 12:54 AM EST    Indication: f/u respiratory illness    Comparison: 1/15/2024.    Findings:  Support lines are unchanged in position. There are extensive bilateral lung infiltrates, greatest in the left perihilar region. There may be an associated left pleural effusion. The heart size is normal.      Impression:      Impression:  No appreciable change since 1 day prior.      Electronically Signed: Nneka Servin MD    1/16/2024 8:00 AM EST    Workstation ID: [id number]    XR Chest 1 View [[id number]] Collected: 01/15/24 1352     Updated: 01/15/24 1357    Narrative:      XR CHEST 1 VW    Date of Exam: 1/15/2024 1:43 PM EST    Indication: et tube replaced    Comparison: Chest radiograph same date.    Findings:  Endotracheal tube tip overlies the midthoracic trachea, 2.2 cm above the anabella. Unchanged position of right chest port, nasogastric tube, and visualized portions of the feeding catheter. Cardiomediastinal silhouette is unchanged. Multifocal interstitial   and airspace opacities, most confluent in the left perihilar region, unchanged. Potential trace bilateral pleural effusions. No pneumothorax. Osseous structures are unchanged.      Impression:      Impression:  Endotracheal tube tip overlies the midthoracic trachea. No  significant change otherwise.      Electronically Signed: Mansoor Shelley MD    1/15/2024 1:54 PM EST    Workstation ID: [id number]    XR Chest 1 View [[id number]] Collected: 01/15/24 0746     Updated: 01/15/24 0752    Narrative:      XR CHEST 1 VW    Date of Exam: 1/15/2024 5:40 AM EST    Indication: resp failure    Comparison: 1/14/2024    Findings:  ET tube feeding tube and right-sided Port-A-Cath are again noted. NG tube remains just below the left mid diaphragm. Dense and extensive bilateral pulmonary disease, with focal areas of atelectasis, is similar to yesterday's exam, perhaps with slight   interval worsening diffuse interstitial disease pattern of the right lung. No pneumothorax or effusion is seen.      Impression:      Impression:  Mild interval worsening in aeration of the right lung compared yesterday's exam. No evidence of pneumothorax.      Electronically Signed: Levon Su MD    1/15/2024 7:49 AM EST    Workstation ID: [id number]                Diagnostics: Reviewed    A:     Acute respiratory failure with hypoxia    Hypothyroidism due to Hashimoto's thyroiditis    Abdominal carcinomatosis - omental    Peritoneal carcinomatosis    Pancreatic cancer    Acute pulmonary embolism    Aspiration pneumonia due to vomitus         Impression:  Metsastatic CA  - presumed pancreatic - with carcinomatosis  Acute hypoxic resp failure  Hypothyroidism due to Hashimoto's  Acute PE  Aspiration PNA    Symptoms:  Dyspnea  Debility  CA pain    P:    Reviewed with team. Family reported as overwhelmed and will try to give them a little more time to process current status.  Team to continue to support and monitor for needs.  Thank you for this consult and allowing us to participate in patient's plan of care. Palliative Care Team will continue to follow patient.     Time spent:47minutes spent reviewing medical and medication records, assessing and examining patient, discussing with family, answering questions, providing some  guidance about a plan and documentation of care, and coordinating care with other healthcare members, with > 50% time spent face to face.     Yesenia Pugh MD  1/17/2024

## 2024-01-17 NOTE — PLAN OF CARE
Goal Outcome Evaluation:           Progress: no change  Outcome Evaluation: Unable to wean FIO2. Paracentesis done at bedside, 5.75L off. Tf increased. NG and HiLo still to suction. D10 gtt d/c'd d/t hyponatremia. Family updated by Dr. Pugh. Palliative consulted.

## 2024-01-17 NOTE — PLAN OF CARE
Goal Outcome Evaluation:      Progress: no change  -VSS. Arousable to pain and care.  -fentanyl infusing @ 300 and versed @ 10. Levo remains off. Heparin gtt continues  -400 from NG and oral/hi lo suctioning despite NG to LWS. No emesis from mouth this shift   -vented with FiO2 50% and peep 8   -NSR on the monitor  -TF remains at 30 mL/hr  -Restraints remain in place

## 2024-01-17 NOTE — CASE MANAGEMENT/SOCIAL WORK
Continued Stay Note  [facility]     Patient Name: Jessica Kraft  MRN: [id number]  Today's Date: 1/17/2024    Admit Date: 1/8/2024    Plan: ongoing   Discharge Plan       Row Name 01/17/24 1550       Plan    Plan ongoing    Patient/Family in Agreement with Plan other (see comments)    Plan Comments Continue to be intubated and sedated, discussed in MDR, plan for thoracentesis today. Palliative consult today. CM will continue to follow.    Final Discharge Disposition Code 30 - still a patient                   Discharge Codes    No documentation.                 Expected Discharge Date and Time       Expected Discharge Date Expected Discharge Time    Jan 23, 2024               Danilo Turk RN

## 2024-01-17 NOTE — PROGRESS NOTES
"HEMATOLOGY/ONCOLOGY PROGRESS NOTE    S:  Remains intubated and sedated.  No changes    Medications:  The current medication list was reviewed in the EMR    ALLERGIES:  No Known Allergies      Physical Exam    VITAL SIGNS:  BP 99/62   Pulse 87   Temp 98.4 °F (36.9 °C) (Bladder)   Resp 24   Ht 165.1 cm (65\")   Wt 105 kg (231 lb 4.2 oz)   LMP  (LMP Unknown) Comment: N/A  SpO2 93%   BMI 38.48 kg/m²   Temp:  [98.4 °F (36.9 °C)-99.1 °F (37.3 °C)] 98.4 °F (36.9 °C)      Performance Status:4                 Physical Exam    General: Intubated and sedated OGT with bilous output  HEENT: sclerae anicteric  Lymphatics: no cervical, supraclavicular, or axillary adenopathy  Cardiovascular: regular rate and rhythm, no murmurs, rubs or gallops  Lungs: Coarse breath sounds bilaterally  Abdomen:less distended post paracentesis  Extremities: mild diffuse body wall edema  Skin: no rashes, lesions, bruising, or petechiae    RECENT LABS:    Lab Results   Component Value Date    HGB 7.3 (L) 01/17/2024    HCT 22.5 (L) 01/17/2024    MCV 84.0 01/17/2024     01/17/2024    WBC 32.36 (C) 01/17/2024    NEUTROABS 27.83 (H) 01/17/2024    LYMPHSABS 0.45 (L) 01/10/2024    MONOSABS 0.27 01/10/2024    EOSABS 0.00 01/17/2024    BASOSABS 0.00 01/17/2024       Lab Results   Component Value Date    GLUCOSE 122 (H) 01/17/2024    BUN 16 01/17/2024    CREATININE 0.64 01/17/2024     (L) 01/17/2024    K 4.0 01/17/2024    CL 98 01/17/2024    CO2 23.0 01/17/2024    CALCIUM 7.7 (L) 01/17/2024    PROTEINTOT 5.4 (L) 01/17/2024    ALBUMIN 2.1 (L) 01/17/2024    BILITOT 0.2 01/17/2024    ALKPHOS 131 (H) 01/17/2024    AST 17 01/17/2024    ALT <5 01/17/2024     Assessment/Plan  Metastatic malignancy with malignant ascites, presumed pancreatic origin  2.  Massive pulmonary embolism  3.  Aspiration pneumonia/ARDS  4.  Hypoxic respiratory failure secondary to above  5. Nausea and vomiting  -She is status post cycle 1 of palliative chemotherapy " 12/27/23.   -Outpatient PET was planned and she was scheduled for follow-up with repeat chemotherapy 1/11/24.  She presented with intractable nausea and vomiting and was found to have a witnessed apneic event in the ER.  Workup revealed aspiration pneumonia/ARDS and proximal/submassive bilateral PE.  She is status post thrombectomy.  She has been re- intubated for several days.  Status post large-volume paracentesis today. No family available at time of my assessment    Sherri العراقي MD  [facility]    1/17/2024

## 2024-01-17 NOTE — PROGRESS NOTES
Pulmonary/Critical Care Follow-up     LOS: 8 days   Patient Care Team:  Leonarda Díaz MD as PCP - General (Internal Medicine)        Chief Complaint   Patient presents with    Vomiting    Abdominal Pain     Subjective     History reviewed and updated in EMR as indicated.    Interval History:     Patient continues to have significant bilious drainage from subglottic suctioning.  Patient remains off of norepinephrine.  Tolerating jejunal feeding at 30 mL.  Patient underwent paracentesis by gastroenterology with 5.75 L drained today.    History taken from: Chart, staff    PMH/FH/Social History were reviewed and updated appropriately in the electronic medical record.     Review of Systems:    Review of 14 systems was completed with positives and pertinent negatives noted in the subjective section.  All other systems reviewed and are negative.   Exceptions are noted below:    Unable to obtain secondary to endotracheal intubation.      Objective     Vital Signs  Temp:  [98.4 °F (36.9 °C)-99.1 °F (37.3 °C)] 98.4 °F (36.9 °C)  Heart Rate:  [63-98] 87  Resp:  [24] 24  BP: ()/(57-81) 96/61  FiO2 (%):  [50 %-60 %] 60 %  01/16 0701 - 01/17 0700  In: 3147.5 [I.V.:1735.7]  Out: 1890 [Urine:740]  Body mass index is 38.48 kg/m².  Mode: VC+/AC  FiO2 (%):  [50 %-60 %] 60 %  S RR:  [24] 24  S VT:  [450 mL] 450 mL  PEEP/CPAP (cm H2O):  [8 cm H20] 8 cm H20  MAP (cm H2O):  [16-19] 16  IV drips:  fentanyl 10 mcg/mL, Last Rate: 225 mcg/hr (01/17/24 0808)  heparin, Last Rate: 21 Units/kg/hr (01/17/24 1634)  midazolam, Last Rate: 10 mg/hr (01/17/24 1136)  Pharmacy to Dose Heparin       Physical Exam:     Constitutional:   Sedated on ventilator, in no acute distress   Head:   Normocephalic, atraumatic   Eyes:           Lids and lashes normal, conjunctivae and sclerae normal.  PER   ENMT:  Ears appear intact with no abnormalities noted.  Some rash on lip/perioral area.  Endotracheal tube in place.         Neck:  Trachea midline, no  JVD   Lungs/Resp:    On ventilator, symmetric chest rise, no crepitus, moderate rhonchi bilaterally.               Heart/CV:   Regular rhythm and normal rate, no murmur   Abdomen/GI:    Soft, moderately distended, nontender.   :    Deferred   Extremities/MSK:  No clubbing or cyanosis.  1+ bilateral lower extremity edema.     Pulses:  Pulses palpable and equal bilaterally   Skin:  No bleeding, bruising or rash   Heme/Lymph:  No cervical or supraclavicular adenopathy.   Neurologic:    Psychiatric:    Moves all extremities with no obvious focal motor deficit.  Cranial nerves 2 - 12 grossly intact  Non-agitated, normal affect.    The above physical exam findings were reviewed and reflect my exam findings as of today's exam.   Electronically signed by:  Marco A Pugh MD  01/17/24  17:10 EST      Results Review:     I reviewed the patient's new clinical results.   Results from last 7 days   Lab Units 01/17/24 0536 01/16/24  1501 01/16/24  0544 01/15/24  1635 01/15/24  0536   SODIUM mmol/L 132*  --  134*  --  136   POTASSIUM mmol/L 4.0 3.6 3.4*   < > 3.6   CHLORIDE mmol/L 98  --  101  --  102   CO2 mmol/L 23.0  --  23.0  --  22.0   BUN mg/dL 16  --  15  --  16   CREATININE mg/dL 0.64  --  0.70  --  0.87   CALCIUM mg/dL 7.7*  --  7.8*  --  7.6*   BILIRUBIN mg/dL 0.2  --  0.2  --  0.2   ALK PHOS U/L 131*  --  135*  --  136*   ALT (SGPT) U/L <5  --  5  --  6   AST (SGOT) U/L 17  --  17  --  16   GLUCOSE mg/dL 122*  --  122*  --  110*    < > = values in this interval not displayed.     Results from last 7 days   Lab Units 01/17/24 0536 01/16/24  0544 01/15/24  0536   WBC 10*3/mm3 32.36* 33.12* 28.40*   HEMOGLOBIN g/dL 7.3* 7.3* 7.4*   HEMATOCRIT % 22.5* 23.0* 23.0*   PLATELETS 10*3/mm3 200 190 188   MONOCYTES % % 3.0* 5.0 7.0   EOSINOPHIL % % 0.0* 1.0 0.0*     Results from last 7 days   Lab Units 01/13/24  0333   PH, ARTERIAL pH units 7.354   PO2 ART mm Hg 68.1*   PCO2, ARTERIAL mm Hg 43.0   HCO3 ART mmol/L 24.0      Results from last 7 days   Lab Units 01/17/24  0536 01/16/24  0544 01/15/24  0536   MAGNESIUM mg/dL 2.3 2.2 1.9   PHOSPHORUS mg/dL 3.1 3.1 2.9       I reviewed the patient's new imaging including images and reports.    Chest x-ray 1/17/2024 shows persistent bilateral airspace opacities.    Chest x-ray shows endotracheal tube in place with patchy bilateral pulmonary infiltrates and more confluent left perihilar density.  Radiologist impression follows:    Impression:  Mild interval worsening in aeration of the right lung compared yesterday's exam. No evidence of pneumothorax.     Post reintubation chest x-ray this afternoon shows endotracheal tube in place with no pneumothorax and patchy bilateral infiltrates with some clearing on the right.    Medication Review:   Albuterol Sulfate NEB Orderable, 2.5 mg, Nebulization, Q6H - RT  chlorhexidine, 15 mL, Mouth/Throat, Q12H  docusate sodium, 100 mg, Nasogastric, BID   And  sennosides, 10 mL, Nasogastric, BID  fluconazole, 200 mg, Per J Tube, Daily  levothyroxine sodium, 100 mcg, Intravenous, Daily  metoclopramide, 10 mg, Intravenous, Q8H  pantoprazole, 40 mg, Intravenous, Q24H  piperacillin-tazobactam, 4.5 g, Intravenous, Q8H  potassium phosphate, 15 mmol, Intravenous, Once  Scopolamine, 1 patch, Transdermal, Q72H  sodium chloride, 10 mL, Intravenous, Q12H  thiamine (B-1) IV, 200 mg, Intravenous, Daily  venlafaxine, 50 mg, Nasogastric, TID With Meals      fentanyl 10 mcg/mL,  mcg/hr, Last Rate: 225 mcg/hr (01/17/24 0808)  heparin, 21 Units/kg/hr (Order-Specific), Last Rate: 21 Units/kg/hr (01/17/24 1634)  midazolam, 1-10 mg/hr, Last Rate: 10 mg/hr (01/17/24 1136)  Pharmacy to Dose Heparin,         Assessment & Plan         Acute respiratory failure with hypoxia    Hypothyroidism due to Hashimoto's thyroiditis    Abdominal carcinomatosis - omental    Peritoneal carcinomatosis    Pancreatic cancer    Acute pulmonary embolism    Aspiration pneumonia due to  vomitus    63 y.o. female recently found to have metastatic malignancy / peritoneal carcinomatosis of possible pancreatic origin (EUS was done however sample was limited and this was nondiagnostic), peritoneal cytology was positive for malignancy with pancreaticobiliary source favored, history of hypothyroidism, anxiety/depression, followed by medical oncology for the metastatic malignancy and underwent chemotherapy initiated on 12/29/2023.  Patient also was diagnosed with severe constipation recently.  She developed worsening nausea/vomiting and was brought to the emergency department with decompensated/hypoxemic respiratory failure and altered mental status on 1/8/2023.  She underwent emergent endotracheal intubation.  CT of the head, CTA of the chest/abdomen/pelvis were done and the patient was found to have extensive bilateral pulmonary emboli without significant right heart strain.  She also had patchy airspace disease in the lower lobes and ascites with peritoneal carcinomatosis.  She was hypotensive requiring pressors and was started on antibiotics, heparin drip and admitted to the intensive care unit for ongoing management.    Patient ultimately was extubated on 1/11/2024 however was extremely encephalopathic and agitated postextubation and developed progressive nausea/vomiting.  She ultimately required reintubation.    Patient remains intubated.  She continues to have significant vomiting of bilious material.  She had an EGD with J-tube placement on 1/12/2024.  A nasogastric tube was attempted during that procedure but likely terminates in the hiatal hernia sac.    Endotracheal tube cuff developed 1/15/2024 and I replaced it.    Biggest problem right now is her ongoing respiratory failure in the setting of recalcitrant vomiting.  I spoke with gastroenterology who suspects that the patient has gastroparesis in addition to the hiatal hernia.  She has underwent large-volume paracentesis again on 1/17/2024.   Ongoing bilious vomiting.    Patient has persistent leukocytosis.  Follow-up cultures.    Plan:    For acute hypoxemic respiratory failure: Extubated 1/11/2024 requiring reintubation secondary to progressive hypoxemia and recalcitrant nausea/vomiting.  Continue mechanical ventilation.  Endotracheal tube changed 1/15/2024 secondary to cuff leak.  For peritoneal carcinomatosis/metastatic malignancy likely pancreatic origin malignant ascites: Status post initiation of palliative chemotherapy 12/29/2023.  Oncology following.  Repeat large-volume paracentesis 1/17/2023.  Cytology was positive on 12/8/2023 for malignancy of likely pancreaticobiliary origin.  For vomiting: Unfortunately this has been persistent.  No definite obstruction on prior CT abdomen 1/9/2024.  Continuing Reglan.  For hypothyroidism: Continue levothyroxine.  For anemia: Stable.  Monitor with transfusion as needed.  For leukocytosis/pulmonary infiltrates: Continue Zosyn empirically.  Follow-up sputum culture from 1/14/2024.  For acute pulmonary embolism: Continue heparin drip.  Okay to hold for paracentesis.  Restart after paracentesis.  GI prophylaxis: Protonix.  Nutrition: Okay to increase tube feeding.  Discussed with nutrition.  Tolerating 30 mL an hour.     Case discussed with Dr. Farfan from gastroenterology.  Discussed with family in the conference room and relayed my concern about the patient's prognosis in the setting of recurrent malignant ascites requiring multiple paracenteses, persistent nausea vomiting in the setting of peritoneal carcinomatosis and suspected gastroparesis with large hiatal hernia, and failure of previous extubation attempt secondary to the persistent nausea vomiting and difficulty maintaining an airway.  Despite aggressive measures of prior chemotherapy around the end of the year, repeat large-volume paracenteses, peristalsis stimulators, the patient has had no improvement in her condition and continues to require  moderately high ventilator settings (currently 50% FiO2), and has persistent nausea vomiting/reflux requiring persistent gastric suctioning.  I did explain to them that I did not think that it was likely that the patient would reach a point where she could be safely extubated with a reasonable expectation of going home.  I recommended a palliative care consultation and they are in agreement with this.  They did mention that they have been in contact with a provider at [facility].  I am happy to discuss her general pulmonary/critical care issues with any provider at an outpatient facility should they wish to accept the patient in transfer however, given her ongoing critical illness, I think that transfer would be difficult to arrange.  I would be happy to coordinate care in any way I can.  Specific questions related to her cancer care I would recommend discussing with her primary oncologist.    Patient is critically ill secondary to respiratory failure with tenuous respiratory status and has high risk of life-threatening decompensation in condition.   As such, the patient requires continuous monitoring and frequent reassessment for consideration of adjustment in management to minimize this risk.  I personally reassessed the patient multiple times today.    Critical care time : 65 minutes spent by me personally and independently.(This excludes time spent performing separately reportable procedures and services).  Critical care time includes high complexity decision making to assess, manipulate, and support vital organ system failure in this individual who has impairment of one or more vital organ systems such that there is a high probability of imminent or life threatening deterioration in the patient’s condition.    Electronically signed by:    Marco A Pugh MD  01/17/24  17:10 EST      *. Please note that portions of this note were completed with [employer] - a voice recognition  program.

## 2024-01-17 NOTE — PROGRESS NOTES
Pulmonary/Critical Care Follow-up     LOS: 7 days   Patient Care Team:  Leonarda Díaz MD as PCP - General (Internal Medicine)        Chief Complaint   Patient presents with    Vomiting    Abdominal Pain     Subjective     History reviewed and updated in EMR as indicated.    Interval History:     Patient continues to have significant bilious drainage from subglottic suctioning.  Patient is currently off of norepinephrine.  She is tolerating increase in jejunal feeding to 30 mL.  Spoke to patient's spouse at the bedside.    History taken from: Chart, staff    PMH/FH/Social History were reviewed and updated appropriately in the electronic medical record.     Review of Systems:    Review of 14 systems was completed with positives and pertinent negatives noted in the subjective section.  All other systems reviewed and are negative.   Exceptions are noted below:    Unable to obtain secondary to endotracheal intubation.      Objective     Vital Signs  Temp:  [98.1 °F (36.7 °C)-99 °F (37.2 °C)] 98.6 °F (37 °C)  Heart Rate:  [] 91  Resp:  [24] 24  BP: ()/(58-88) 114/74  FiO2 (%):  [45 %-50 %] 50 %  01/15 0701 - 01/16 0700  In: 2817.6 [I.V.:1595.7]  Out: 2645 [Urine:1245]  Body mass index is 38.48 kg/m².  Mode: VC+/AC  FiO2 (%):  [45 %-50 %] 50 %  S RR:  [24] 24  S VT:  [450 mL] 450 mL  PEEP/CPAP (cm H2O):  [8 cm H20] 8 cm H20  MAP (cm H2O):  [16-18] 18  IV drips:  dexmedetomidine, Last Rate: Stopped (01/13/24 0136)  fentanyl 10 mcg/mL, Last Rate: 300 mcg/hr (01/16/24 1251)  heparin, Last Rate: 21 Units/kg/hr (01/16/24 1521)  midazolam, Last Rate: 10 mg/hr (01/16/24 1555)  norepinephrine, Last Rate: Stopped (01/15/24 0048)  Pharmacy to Dose Heparin       Physical Exam:     Constitutional:   Sedated on ventilator, in no acute distress   Head:   Normocephalic, atraumatic   Eyes:           Lids and lashes normal, conjunctivae and sclerae normal.  PER   ENMT:  Ears appear intact with no abnormalities noted.  Some  rash on lip/perioral area.  Endotracheal tube in place.         Neck:  Trachea midline, no JVD   Lungs/Resp:    On ventilator, symmetric chest rise, no crepitus, moderate rhonchi bilaterally.               Heart/CV:   Regular rhythm and normal rate, no murmur   Abdomen/GI:    Soft, moderately distended, nontender.   :    Deferred   Extremities/MSK:  No clubbing or cyanosis.  1+ bilateral lower extremity edema.     Pulses:  Pulses palpable and equal bilaterally   Skin:  No bleeding, bruising or rash   Heme/Lymph:  No cervical or supraclavicular adenopathy.   Neurologic:    Psychiatric:    Moves all extremities with no obvious focal motor deficit.  Cranial nerves 2 - 12 grossly intact  Non-agitated, normal affect.    The above physical exam findings were reviewed and reflect my exam findings as of today's exam.   Electronically signed by:  Marco A Pugh MD  01/16/24  20:28 EST      Results Review:     I reviewed the patient's new clinical results.   Results from last 7 days   Lab Units 01/16/24  1501 01/16/24  0544 01/15/24  1635 01/15/24  0536 01/14/24  2027 01/14/24  1324 01/14/24  0531   SODIUM mmol/L  --  134*  --  136  --  136 136   POTASSIUM mmol/L 3.6 3.4* 3.8 3.6   < > 3.8 3.0*   CHLORIDE mmol/L  --  101  --  102  --  103 103   CO2 mmol/L  --  23.0  --  22.0  --  22.0 23.0   BUN mg/dL  --  15  --  16  --  13 13   CREATININE mg/dL  --  0.70  --  0.87  --  0.67 0.54*   CALCIUM mg/dL  --  7.8*  --  7.6*  --  7.6* 7.3*   BILIRUBIN mg/dL  --  0.2  --  0.2  --   --  0.3   ALK PHOS U/L  --  135*  --  136*  --   --  134*   ALT (SGPT) U/L  --  5  --  6  --   --  5   AST (SGOT) U/L  --  17  --  16  --   --  16   GLUCOSE mg/dL  --  122*  --  110*  --  106* 111*    < > = values in this interval not displayed.     Results from last 7 days   Lab Units 01/16/24  0544 01/15/24  0536 01/14/24  1324   WBC 10*3/mm3 33.12* 28.40* 23.16*   HEMOGLOBIN g/dL 7.3* 7.4* 7.5*   HEMATOCRIT % 23.0* 23.0* 23.7*   PLATELETS 10*3/mm3  190 188 197   MONOCYTES % % 5.0 7.0 6.0   EOSINOPHIL % % 1.0 0.0* 3.0     Results from last 7 days   Lab Units 01/13/24  0333   PH, ARTERIAL pH units 7.354   PO2 ART mm Hg 68.1*   PCO2, ARTERIAL mm Hg 43.0   HCO3 ART mmol/L 24.0     Results from last 7 days   Lab Units 01/16/24  0544 01/15/24  0536 01/14/24  1324 01/14/24  0531   MAGNESIUM mg/dL 2.2 1.9  --  2.0   PHOSPHORUS mg/dL 3.1 2.9 3.0 2.1*       I reviewed the patient's new imaging including images and reports.    Chest x-ray shows endotracheal tube in place with patchy bilateral pulmonary infiltrates and more confluent left perihilar density.  Radiologist impression follows:    Impression:  Mild interval worsening in aeration of the right lung compared yesterday's exam. No evidence of pneumothorax.     Post reintubation chest x-ray this afternoon shows endotracheal tube in place with no pneumothorax and patchy bilateral infiltrates with some clearing on the right.    Medication Review:   Albuterol Sulfate NEB Orderable, 2.5 mg, Nebulization, Q6H - RT  chlorhexidine, 15 mL, Mouth/Throat, Q12H  docusate sodium, 100 mg, Nasogastric, BID   And  sennosides, 10 mL, Nasogastric, BID  fluconazole, 200 mg, Per J Tube, Daily  levothyroxine sodium, 100 mcg, Intravenous, Daily  metoclopramide, 10 mg, Intravenous, Q8H  pantoprazole, 40 mg, Intravenous, Q24H  piperacillin-tazobactam, 4.5 g, Intravenous, Q8H  potassium chloride, 40 mEq, Oral, Q4H  potassium phosphate, 15 mmol, Intravenous, Once  Scopolamine, 1 patch, Transdermal, Q72H  sodium chloride, 10 mL, Intravenous, Q12H  thiamine (B-1) IV, 200 mg, Intravenous, Daily  venlafaxine, 50 mg, Nasogastric, TID With Meals      dexmedetomidine, 0.2-1.5 mcg/kg/hr (Order-Specific), Last Rate: Stopped (01/13/24 0136)  fentanyl 10 mcg/mL,  mcg/hr, Last Rate: 300 mcg/hr (01/16/24 1251)  heparin, 21 Units/kg/hr (Order-Specific), Last Rate: 21 Units/kg/hr (01/16/24 1521)  midazolam, 1-10 mg/hr, Last Rate: 10 mg/hr (01/16/24  1555)  norepinephrine, 0.02-0.3 mcg/kg/min (Order-Specific), Last Rate: Stopped (01/15/24 0048)  Pharmacy to Dose Heparin,         Assessment & Plan         Acute respiratory failure with hypoxia    Hypothyroidism due to Hashimoto's thyroiditis    Abdominal carcinomatosis - omental    Peritoneal carcinomatosis    Pancreatic cancer    Acute pulmonary embolism    Aspiration pneumonia due to vomitus    63 y.o. female recently found to have metastatic malignancy / peritoneal carcinomatosis of possible pancreatic origin (EUS was done however sample was limited and this was nondiagnostic), peritoneal cytology was positive for malignancy with pancreaticobiliary source favored, history of hypothyroidism, anxiety/depression, followed by medical oncology for the metastatic malignancy and underwent chemotherapy initiated on 12/29/2023.  Patient also was diagnosed with severe constipation recently.  She developed worsening nausea/vomiting and was brought to the emergency department with decompensated/hypoxemic respiratory failure and altered mental status on 1/8/2023.  She underwent emergent endotracheal intubation.  CT of the head, CTA of the chest/abdomen/pelvis were done and the patient was found to have extensive bilateral pulmonary emboli without significant right heart strain.  She also had patchy airspace disease in the lower lobes and ascites with peritoneal carcinomatosis.  She was hypotensive requiring pressors and was started on antibiotics, heparin drip and admitted to the intensive care unit for ongoing management.    Patient ultimately was extubated on 1/11/2024 however was extremely encephalopathic and agitated postextubation and developed progressive nausea/vomiting.  She ultimately required reintubation.    Patient remains intubated.  She continues to have significant vomiting of bilious material.  She had an EGD with J-tube placement on 1/12/2024.  A nasogastric tube was attempted during that procedure but  likely terminates in the hiatal hernia sac.    Endotracheal tube cuff developed a leak overnight and I replaced it today.    Biggest problem right now is her ongoing respiratory failure in the setting of recalcitrant vomiting.  It is an ongoing bilious vomiting.    Patient has worsening leukocytosis.  Will repeat cultures.    Will be okay to hold heparin for 2 hours for paracentesis tomorrow.    Plan:    For acute hypoxemic respiratory failure: Extubated 1/11/2024 requiring reintubation secondary to progressive hypoxemia and recalcitrant nausea/vomiting.  Continue mechanical ventilation.  Endotracheal tube changed 1/15/2024 secondary to cuff leak.  For peritoneal carcinomatosis/metastatic malignancy likely pancreatic origin malignant ascites: Status post initiation of palliative chemotherapy 12/29/2023.  Oncology following.  Possible repeat paracentesis per gastroenterology tomorrow.  Okay to hold heparin for 2 hours from my standpoint.  For vomiting: Unfortunately this has been persistent.  No definite obstruction on prior CT abdomen 1/9/2024.  Continuing Reglan.  For hypothyroidism: Continue levothyroxine.  For anemia: Stable.  Monitor with transfusion as needed.  For leukocytosis/pulmonary infiltrates: Continue Zosyn empirically.  Follow-up sputum culture from 1/14/2024.  For acute pulmonary embolism: Continue heparin drip.  GI prophylaxis: Protonix.  Nutrition: Increased tube feeding rate to 30 mL/h as tolerated.        Patient is critically ill secondary to respiratory failure with tenuous respiratory status and has high risk of life-threatening decompensation in condition.   As such, the patient requires continuous monitoring and frequent reassessment for consideration of adjustment in management to minimize this risk.  I personally reassessed the patient multiple times today.    Critical care time : 42 minutes spent by me personally and independently.(This excludes time spent performing separately reportable  procedures and services).  Critical care time includes high complexity decision making to assess, manipulate, and support vital organ system failure in this individual who has impairment of one or more vital organ systems such that there is a high probability of imminent or life threatening deterioration in the patient’s condition.    Electronically signed by:    Marco A Pugh MD  01/16/24  20:28 EST      *. Please note that portions of this note were completed with [employer] - a voice recognition program.

## 2024-01-18 NOTE — PROGRESS NOTES
Pulmonary/Critical Care Follow-up     LOS: 9 days   Patient Care Team:  Leonarda Díaz MD as PCP - General (Internal Medicine)        Chief Complaint   Patient presents with    Vomiting    Abdominal Pain     Subjective     History reviewed and updated in EMR as indicated.    Interval History:     No major changes.  Still having some significant output from her NG tube.  Tolerated increase of her tube feeding.  No new issues currently.  Remains on 50% FiO2.    History taken from: Chart, staff    PMH/FH/Social History were reviewed and updated appropriately in the electronic medical record.     Review of Systems:    Review of 14 systems was completed with positives and pertinent negatives noted in the subjective section.  All other systems reviewed and are negative.   Exceptions are noted below:    Unable to obtain secondary to endotracheal intubation.      Objective     Vital Signs  Temp:  [98.8 °F (37.1 °C)-99.9 °F (37.7 °C)] 98.8 °F (37.1 °C)  Heart Rate:  [] 93  Resp:  [24] 24  BP: ()/(53-67) 97/61  FiO2 (%):  [50 %] 50 %  01/17 0701 - 01/18 0700  In: 2906.4 [I.V.:1258.4]  Out: 2009 [Urine:1409]  Body mass index is 38.48 kg/m².  Mode: VC+/AC  FiO2 (%):  [50 %] 50 %  S RR:  [24] 24  S VT:  [450 mL] 450 mL  PEEP/CPAP (cm H2O):  [8 cm H20] 8 cm H20  MAP (cm H2O):  [16-23] 23  IV drips:  fentanyl 10 mcg/mL, Last Rate: 225 mcg/hr (01/18/24 1625)  heparin, Last Rate: 21 Units/kg/hr (01/18/24 1625)  midazolam, Last Rate: 10 mg/hr (01/18/24 1626)  Pharmacy to Dose Heparin       Physical Exam:     Constitutional:   Sedated on ventilator, in no acute distress   Head:   Normocephalic, atraumatic   Eyes:           Lids and lashes normal, conjunctivae and sclerae normal.  PER   ENMT:  Ears appear intact with no abnormalities noted.  Some rash on lip/perioral area.  Endotracheal tube in place.         Neck:  Trachea midline, no JVD   Lungs/Resp:    On ventilator, symmetric chest rise, no crepitus, moderate rhonchi  bilaterally.               Heart/CV:   Regular rhythm and normal rate, no murmur   Abdomen/GI:    Soft, moderately distended, nontender.   :    Deferred   Extremities/MSK:  No clubbing or cyanosis.  1+ bilateral lower extremity edema.     Pulses:  Pulses palpable and equal bilaterally   Skin:  No bleeding, bruising or rash   Heme/Lymph:  No cervical or supraclavicular adenopathy.   Neurologic:    Psychiatric:    Moves all extremities with no obvious focal motor deficit.      Non-agitated, sedated.    The above physical exam findings were reviewed and reflect my exam findings as of today's exam.   Electronically signed by:  Marco A Pugh MD  01/18/24  17:41 EST      Results Review:     I reviewed the patient's new clinical results.   Results from last 7 days   Lab Units 01/18/24  1601 01/18/24  0511 01/17/24  0536 01/16/24  1501 01/16/24  0544   SODIUM mmol/L  --  128* 132*  --  134*   POTASSIUM mmol/L 3.8 3.3* 4.0   < > 3.4*   CHLORIDE mmol/L  --  96* 98  --  101   CO2 mmol/L  --  23.0 23.0  --  23.0   BUN mg/dL  --  16 16  --  15   CREATININE mg/dL  --  0.54* 0.64  --  0.70   CALCIUM mg/dL  --  7.7* 7.7*  --  7.8*   BILIRUBIN mg/dL  --  0.2 0.2  --  0.2   ALK PHOS U/L  --  154* 131*  --  135*   ALT (SGPT) U/L  --  <5 <5  --  5   AST (SGOT) U/L  --  23 17  --  17   GLUCOSE mg/dL  --  124* 122*  --  122*    < > = values in this interval not displayed.     Results from last 7 days   Lab Units 01/18/24  0511 01/17/24  0536 01/16/24  0544   WBC 10*3/mm3 28.31* 32.36* 33.12*   HEMOGLOBIN g/dL 7.4* 7.3* 7.3*   HEMATOCRIT % 22.9* 22.5* 23.0*   PLATELETS 10*3/mm3 183 200 190   MONOCYTES % % 1.0* 3.0* 5.0   EOSINOPHIL % % 1.0 0.0* 1.0     Results from last 7 days   Lab Units 01/13/24  0333   PH, ARTERIAL pH units 7.354   PO2 ART mm Hg 68.1*   PCO2, ARTERIAL mm Hg 43.0   HCO3 ART mmol/L 24.0     Results from last 7 days   Lab Units 01/18/24  0511 01/17/24  0536 01/16/24  0544   MAGNESIUM mg/dL 1.8 2.3 2.2   PHOSPHORUS  mg/dL 3.2 3.1 3.1       I reviewed the patient's new imaging including images and reports.    Chest x-ray 1/17/2024 shows persistent bilateral airspace opacities.    Chest x-ray shows endotracheal tube in place with patchy bilateral pulmonary infiltrates and more confluent left perihilar density.  Radiologist impression follows:    Impression:  Mild interval worsening in aeration of the right lung compared yesterday's exam. No evidence of pneumothorax.     Post reintubation chest x-ray this afternoon shows endotracheal tube in place with no pneumothorax and patchy bilateral infiltrates with some clearing on the right.    Medication Review:   Albuterol Sulfate NEB Orderable, 2.5 mg, Nebulization, Q6H - RT  chlorhexidine, 15 mL, Mouth/Throat, Q12H  docusate sodium, 100 mg, Nasogastric, BID   And  sennosides, 10 mL, Nasogastric, BID  fluconazole, 200 mg, Per J Tube, Daily  levothyroxine sodium, 100 mcg, Intravenous, Daily  metoclopramide, 10 mg, Intravenous, Q8H  pantoprazole, 40 mg, Intravenous, Q24H  potassium phosphate, 15 mmol, Intravenous, Once  Scopolamine, 1 patch, Transdermal, Q72H  sodium chloride, 10 mL, Intravenous, Q12H  thiamine (B-1) IV, 200 mg, Intravenous, Daily  venlafaxine, 50 mg, Nasogastric, TID With Meals      fentanyl 10 mcg/mL,  mcg/hr, Last Rate: 225 mcg/hr (01/18/24 1625)  heparin, 21 Units/kg/hr (Order-Specific), Last Rate: 21 Units/kg/hr (01/18/24 1625)  midazolam, 1-10 mg/hr, Last Rate: 10 mg/hr (01/18/24 1626)  Pharmacy to Dose Heparin,         Assessment & Plan         Acute respiratory failure with hypoxia    Hypothyroidism due to Hashimoto's thyroiditis    Abdominal carcinomatosis - omental    Peritoneal carcinomatosis    Pancreatic cancer    Acute pulmonary embolism    Aspiration pneumonia due to vomitus    63 y.o. female recently found to have metastatic malignancy / peritoneal carcinomatosis of possible pancreatic origin (EUS was done however sample was limited and this was  nondiagnostic), peritoneal cytology was positive for malignancy with pancreaticobiliary source favored, history of hypothyroidism, anxiety/depression, followed by medical oncology for the metastatic malignancy and underwent chemotherapy initiated on 12/29/2023.  Patient also was diagnosed with severe constipation recently.  She developed worsening nausea/vomiting and was brought to the emergency department with decompensated/hypoxemic respiratory failure and altered mental status on 1/8/2023.  She underwent emergent endotracheal intubation.  CT of the head, CTA of the chest/abdomen/pelvis were done and the patient was found to have extensive bilateral pulmonary emboli without significant right heart strain.  She also had patchy airspace disease in the lower lobes and ascites with peritoneal carcinomatosis.  She was hypotensive requiring pressors and was started on antibiotics, heparin drip and admitted to the intensive care unit for ongoing management.    Patient ultimately was extubated on 1/11/2024 however was extremely encephalopathic and agitated postextubation and developed progressive nausea/vomiting.  She ultimately required reintubation.    Patient remains intubated.  She continues to have significant vomiting of bilious material.  She had an EGD with J-tube placement on 1/12/2024.  A nasogastric tube was attempted during that procedure but likely terminates in the hiatal hernia sac.    Endotracheal tube cuff developed 1/15/2024 and I replaced it.    Biggest problem right now is her ongoing respiratory failure in the setting of recalcitrant vomiting.  I spoke with gastroenterology who suspects that the patient has gastroparesis in addition to the hiatal hernia.  She has underwent large-volume paracentesis again on 1/17/2024.  Ongoing bilious vomiting.    Patient has persistent leukocytosis.  Follow-up cultures.    Plan:    For acute hypoxemic respiratory failure: Extubated 1/11/2024 requiring reintubation  secondary to progressive hypoxemia and recalcitrant nausea/vomiting.  Continue mechanical ventilation.  Endotracheal tube changed 1/15/2024 secondary to cuff leak.  Continuing to require 50% FiO2  For peritoneal carcinomatosis/metastatic malignancy likely pancreatic origin malignant ascites: Status post initiation of palliative chemotherapy 12/29/2023.  Oncology following.  Repeat large-volume paracentesis 1/17/2023.  Cytology was positive on 12/8/2023 for malignancy of likely pancreaticobiliary origin.  For vomiting: Unfortunately this has been persistent.  No definite obstruction on prior CT abdomen 1/9/2024.  Continuing Reglan.  For hypothyroidism: Continue levothyroxine.  For anemia: Stable.  Monitor with transfusion as needed.  For leukocytosis/pulmonary infiltrates: Continue Zosyn empirically.  Respiratory culture from 1/17/2024 without growth.  For acute pulmonary embolism: Continue heparin drip.    GI prophylaxis: Protonix.  Nutrition/hyponatremia: Tolerating distal tube feeds.  Discussed with nutrition.  Stop free water given hyponatremia.       Case discussed with Dr. Farfan from gastroenterology.  On 1/17/2024, I discussed with family in the conference room and relayed my concern about the patient's prognosis in the setting of recurrent malignant ascites requiring multiple paracenteses, persistent nausea vomiting in the setting of peritoneal carcinomatosis and suspected gastroparesis with large hiatal hernia, and failure of previous extubation attempt secondary to the persistent nausea vomiting and difficulty maintaining an airway.  Despite aggressive measures of prior chemotherapy around the end of the year, repeat large-volume paracenteses, peristalsis stimulators, the patient has had no improvement in her condition and continues to require moderately high ventilator settings (currently 50% FiO2), and has persistent nausea vomiting/reflux requiring persistent gastric suctioning.  I did explain to them  that I did not think that it was likely that the patient would reach a point where she could be safely extubated with a reasonable expectation of going home.  I recommended a palliative care consultation and they are in agreement with this.  They did mention that they have been in contact with a provider at [facility].  I am happy to discuss her general pulmonary/critical care issues with any provider at an outpatient facility should they wish to accept the patient in transfer however, given her ongoing critical illness, I think that transfer would be difficult to arrange.  I would be happy to coordinate care in any way I can.  Specific questions related to her cancer care I would recommend discussing with her primary oncologist.    Patient is critically ill secondary to respiratory failure with tenuous respiratory status and has high risk of life-threatening decompensation in condition.   As such, the patient requires continuous monitoring and frequent reassessment for consideration of adjustment in management to minimize this risk.  I personally reassessed the patient multiple times today.    Critical care time : 35 minutes spent by me personally and independently.(This excludes time spent performing separately reportable procedures and services).  Critical care time includes high complexity decision making to assess, manipulate, and support vital organ system failure in this individual who has impairment of one or more vital organ systems such that there is a high probability of imminent or life threatening deterioration in the patient’s condition.    Electronically signed by:    Marco A Pugh MD  01/18/24  17:41 EST      *. Please note that portions of this note were completed with [employer] - a voice recognition program.

## 2024-01-18 NOTE — PLAN OF CARE
Goal Outcome Evaluation:  Plan of Care Reviewed With: patient        *VSS on MV 50% 8 peep, NSR  *Sedated with versed and fentanyl  *Hypoglycemia 67, d50 given  *Heparin gtt continued  *NGT with bile output  *Restraints in place     Progress: no change

## 2024-01-18 NOTE — PROGRESS NOTES
"Palliative Care Daily Progress Note     C/C: Unable to verbalize due to mental status / sedation    S: Medical record reviewed. Follow up visit for goals of care and support. No family present at bedside for visit and could not located bedside team. Patient is unresponsive and on MV with sedation. Noted she is on fentanyl, midazolam, and heparin infusions. Also noted she is s/p paracentesis on 1/17 with 5.75L fluid removed.     ROS: Unable to obtain due to patient's mental status. She remains on sedation.    O: Code Status:   Code Status and Medical Interventions:   Ordered at: 01/09/24 0236     Code Status (Patient has no pulse and is not breathing):    CPR (Attempt to Resuscitate)     Medical Interventions (Patient has pulse or is breathing):    Full Support      Advanced Directives: Advance Directive Status: Patient does not have advance directive   Goals of Care: Ongoing.   Palliative Performance Scale Score: 10%     BP (!) 86/56   Pulse 95   Temp 99 °F (37.2 °C) (Bladder)   Resp 24   Ht 165.1 cm (65\")   Wt 105 kg (231 lb 4.2 oz)   LMP  (LMP Unknown) Comment: N/A  SpO2 94%   BMI 38.48 kg/m²     Intake/Output Summary (Last 24 hours) at 1/18/2024 1602  Last data filed at 1/18/2024 1200  Gross per 24 hour   Intake 2707.88 ml   Output 2165 ml   Net 542.88 ml       PE:  General Appearance:    Acute on chronically ill appearing female lying in ICU bed   HEENT:    NC/AT, ETT in place, oral mucosa dry, face relaxed and without non-verbal signs of pain present   Neck:   supple, trachea midline   Lungs:     CTA bilaterally, no wheezing or rhonchi, on mechanical ventilation, respirations even and unlabored     Heart:    Mildly tachycardic, regular rhythm   Abdomen:     Normal bowel sounds, soft, non-tender, non-distended   G/U:   Romero present and draining federico urine   MSK/Extremities:   Edema to bilateral hands and LEs   Pulses:   Pulses palpable and equal bilaterally   Skin:   WDI, very pale   Neurologic:   " Unresponsive to my visit/exam, no myoclonus    Psych:   Calm, no agitation observed     Meds: Reviewed and changes noted    Labs:   Results from last 7 days   Lab Units 01/18/24  0511   WBC 10*3/mm3 28.31*   HEMOGLOBIN g/dL 7.4*   HEMATOCRIT % 22.9*   PLATELETS 10*3/mm3 183     Results from last 7 days   Lab Units 01/18/24  0511   SODIUM mmol/L 128*   POTASSIUM mmol/L 3.3*   CHLORIDE mmol/L 96*   CO2 mmol/L 23.0   BUN mg/dL 16   CREATININE mg/dL 0.54*   GLUCOSE mg/dL 124*   CALCIUM mg/dL 7.7*     Results from last 7 days   Lab Units 01/18/24  0511   SODIUM mmol/L 128*   POTASSIUM mmol/L 3.3*   CHLORIDE mmol/L 96*   CO2 mmol/L 23.0   BUN mg/dL 16   CREATININE mg/dL 0.54*   CALCIUM mg/dL 7.7*   BILIRUBIN mg/dL 0.2   ALK PHOS U/L 154*   ALT (SGPT) U/L <5   AST (SGOT) U/L 23   GLUCOSE mg/dL 124*     Imaging Results (Last 72 Hours)       Procedure Component Value Units Date/Time    XR Chest 1 View [[id number]] Collected: 01/18/24 0826     Updated: 01/18/24 0832    Narrative:      XR CHEST 1 VW    Date of Exam: 1/18/2024 2:49 AM EST    Indication: f/u respiratory illness    Comparison: Chest x-ray 1/17/2024    Findings:  Redemonstration of right chest port. Redemonstration of endotracheal tube terminating in the mid trachea 2.4 cm above the anabella. Redemonstration of enteric feeding tube which courses into the stomach with the tip beyond the field-of-view.   Redemonstration of NG/OG tube which courses into the stomach with the tip in the region of the gastric body and the sideport near the GE junction. Stable cardiomediastinal silhouette within normal limits. Redemonstration of diffuse interstitial opacities   with prominent linear and bandlike perihilar parenchymal opacities, overall not significantly changed. Stable small bilateral pleural effusions. No evidence of pneumothorax.      Impression:      Impression:  No significant interval change. The side port of the NG/OG tube is at or near the GE junction; consider  advancing.      Electronically Signed: Feliciano Vanessa MD    1/18/2024 8:29 AM EST    Workstation ID: [id number]    XR Chest 1 View [[id number]] Collected: 01/17/24 0814     Updated: 01/17/24 0819    Narrative:      XR CHEST 1 VW    Date of Exam: 1/17/2024 3:28 AM EST    Indication: f/u respiratory illness    Comparison: 1/16/2024.    Findings:  Support lines are unchanged in position. There are continued extensive lung infiltrates, greatest on the left. Small left effusion is again seen. Heart size is normal.      Impression:      Impression:  No appreciable change in position of support lines or extensive bilateral lung infiltrates.      Electronically Signed: Nneka Servin MD    1/17/2024 8:15 AM EST    Workstation ID: [id number]    XR Abdomen KUB [[id number]] Collected: 01/16/24 0819     Updated: 01/16/24 0827    Narrative:      XR ABDOMEN KUB    Date of Exam: 1/16/2024 4:01 AM EST    Indication: emesis    Comparison: 1/14/2024    Findings:  Lung bases are grossly clear. Weighted feeding tube terminates near the ligament of Treitz. Nasogastric tube terminates in the gastric body with sidehole at the gastroesophageal junction. Nonobstructive bowel gas pattern. No evidence of displaced osseous   abnormality.      Impression:      Impression:  Nonobstructive bowel gas pattern.    Nasogastric tube sidehole is near the gastroesophageal junction. Consider slight advancement.      Electronically Signed: Scar Dela Cruz MD    1/16/2024 8:24 AM EST    Workstation ID: [id number]    XR Chest 1 View [[id number]] Collected: 01/16/24 0759     Updated: 01/16/24 0803    Narrative:      XR CHEST 1 VW    Date of Exam: 1/16/2024 12:54 AM EST    Indication: f/u respiratory illness    Comparison: 1/15/2024.    Findings:  Support lines are unchanged in position. There are extensive bilateral lung infiltrates, greatest in the left perihilar region. There may be an associated left pleural effusion. The heart size is normal.      Impression:       Impression:  No appreciable change since 1 day prior.      Electronically Signed: Nneka Servin MD    1/16/2024 8:00 AM EST    Workstation ID: [id number]                  Diagnostics: Reviewed    A:   Acute respiratory failure with hypoxia    Hypothyroidism due to Hashimoto's thyroiditis    Abdominal carcinomatosis - omental    Peritoneal carcinomatosis    Pancreatic cancer    Acute pulmonary embolism    Aspiration pneumonia due to vomitus       S/Sx:  1. Dyspnea  2. Debility  3. Cancer Pain     P:   Family was unfortunately not present during visit. We will continue to follow, provide support, and address GOC. Care was coordinated with palliative care IDT.    For now, patient remains on fentanyl infusion at 225mcg/hr and midazolam infusion at 10mg/hr.     Palliative Care Team will continue to follow patient. Please do not hesitate to contact us regarding further sx mgmt or GOC needs.    Jenniffer Montoya DO  1/18/2024  Time spent: 20 minutes

## 2024-01-18 NOTE — PLAN OF CARE
Goal Outcome Evaluation:  Plan of Care Reviewed With: spouse, son        Progress: no change  -pt continues on vent, FiO2 50%, PEEP 8  -versed gtt @ 10; fent gtt @ 225  -no episodes of emesis this shift  -heparin gtt continues; therapeuic  -potassium 3.3; replaced; recheck 3.8  -Mg replaced  -650 output from NG; more yellow/green color today  -UOP ~700  -QTc monitored  -spoke with  once at bedside; wanted to speak with palliative when son was present; from their note, palliative found no family at the bedside; I called their office and let the nurse there know that the family usually waits in the waiting room if the palliative provider could look there for them.   -spiritual care spoke with patient's family

## 2024-01-19 LAB — FUNGUS WND CULT: NORMAL

## 2024-01-19 NOTE — PROGRESS NOTES
"GI Daily Progress Note  Subjective:    Chief Complaint: Nausea and emesis    Patient remains intubated.  Still with some output that is bilious from her NG tube without evidence of tube feeds and visualized output at bedside.    Objective:    /62   Pulse 96   Temp 98.8 °F (37.1 °C) (Bladder)   Resp 24   Ht 165.1 cm (65\")   Wt 105 kg (231 lb 4.2 oz)   LMP  (LMP Unknown) Comment: N/A  SpO2 95%   BMI 38.48 kg/m²     Physical Exam   General: Patient critically ill appearing, intubated   Cardiovascular: Regular rate, well-perfused extremities   Pulm: Equal expansion bilaterally, on mechanical ventilation   Abdomen: Soft, somewhat distended              Neuro: Intubated    Lab  Lab Results   Component Value Date    WBC 26.75 (H) 01/19/2024    HGB 7.3 (L) 01/19/2024    HGB 7.4 (L) 01/18/2024    HGB 7.3 (L) 01/17/2024    MCV 85.3 01/19/2024     01/19/2024    INR 1.74 (H) 01/12/2024    INR 2.66 (H) 01/09/2024    INR 1.02 12/22/2023    INR 1.12 12/07/2023       Lab Results   Component Value Date    GLUCOSE 126 (H) 01/19/2024    BUN 16 01/19/2024    CREATININE 0.43 (L) 01/19/2024    EGFRIFNONA 85 08/22/2016    EGFRIFAFRI 98 08/22/2016    BCR 37.2 (H) 01/19/2024     (L) 01/19/2024    K 3.6 01/19/2024    CO2 24.0 01/19/2024    CALCIUM 7.5 (L) 01/19/2024    PROTENTOTREF 6.6 08/22/2016    ALBUMIN 2.0 (L) 01/19/2024    ALKPHOS 191 (H) 01/19/2024    BILITOT 0.2 01/19/2024    ALT 5 01/19/2024    AST 14 01/19/2024       Assessment:  1.  Nausea with emesis.  Emesis is effortless, when she turns and associated with large hiatal hernia.  There is likely severe gastroparesis secondary to malignancy.  2.  Respiratory failure secondary to aspiration pneumonia and submassive pulmonary embolism.  3.  Anticoagulation with heparin for submassive pulmonary embolism, status post partial mechanical thrombectomy.  PE precipitated by underlying malignancy.  4.  Metastatic malignancy with CK7 CK20 and CDX2 positivity, " suggesting pancreaticobiliary or gastrointestinal origin.  Presumed pancreas origin, with mass noted in the pancreas body on CT and EUS. There is pancreas atrophy and ductal dilation proximal to the mass.  5.  Peritoneal carcinomatosis and malignant ascites.  6.  Fungal esophagitis.       Plan:  - Continue metoclopramide and scopolamine.  Zofran was discontinued secondary to prolonged QT which has since improved  - Continue Diflucan.  - Continue NJ tube feeds, and NG tube to intermittent low wall suction.  - Paracentesis on 1/17 with removal of 5.75 dark federico-colored ascites fluid.  Fluid studies with RBCs, not c/w infection, high total protein in setting of known carcinomatosis and malignant ascites.  - Family to meet with palliative for ongoing goals of care discussions today given lack of improvement    Flakito Abrams MD  01/19/24  15:11 EST

## 2024-01-19 NOTE — PLAN OF CARE
"  Problem: Palliative Care  Goal: Enhanced Quality of Life  Intervention: Optimize Psychosocial Wellbeing  Recent Flowsheet Documentation  Taken 1/19/2024 1300 by Gauri Montelongo \"Taina\" LAVELLE, ALLYSON  Supportive Measures: (Palliative IDT: TURNER Pugh MD, TUYET DOMINGUEZ, WINSOME DOMINGUEZ, WINSOME Montelongo LCSW, ISADORA Ac RN, SERGIO Jaquez RN) --  Taken 1/19/2024 1126 by Gauri Montelongo \"Taina\" LAVELLE, ALLYSON  Supportive Measures: (symptom assessment) other (see comments)  Palliative MD met with patient's son and spouse, continuing current plan of care until all family can visit - then plan for extubation.  Palliative Physician on call contact # [phone].     "

## 2024-01-19 NOTE — PROGRESS NOTES
Pulmonary/Critical Care Follow-up     LOS: 10 days   Patient Care Team:  Leonarda Díaz MD as PCP - General (Internal Medicine)        Chief Complaint   Patient presents with    Vomiting    Abdominal Pain     Subjective     History reviewed and updated in EMR as indicated.    Interval History:     Had an episode of vomiting this morning.  Still having a fair amount out from NG tube suction.  I decreased her tube feeding rate.  Family spoke with palliative and there are plans for palliative extubation.  Patient is unable to relay any history.      History taken from: Chart, staff    PMH/FH/Social History were reviewed and updated appropriately in the electronic medical record.     Review of Systems:    Review of 14 systems was completed with positives and pertinent negatives noted in the subjective section.  All other systems reviewed and are negative.   Exceptions are noted below:    Unable to obtain secondary to endotracheal intubation.      Objective     Vital Signs  Temp:  [98.8 °F (37.1 °C)-99 °F (37.2 °C)] 98.8 °F (37.1 °C)  Heart Rate:  [89-98] 95  Resp:  [24-35] 24  BP: ()/(57-72) 95/64  FiO2 (%):  [50 %] 50 %  01/18 0701 - 01/19 0700  In: 3115.7 [I.V.:1423]  Out: 2815 [Urine:1615]  Body mass index is 38.48 kg/m².  Mode: VC+/AC  FiO2 (%):  [50 %] 50 %  S RR:  [24] 24  S VT:  [450 mL] 450 mL  PEEP/CPAP (cm H2O):  [8 cm H20] 8 cm H20  MAP (cm H2O):  [16-22] 17  IV drips:  fentanyl 10 mcg/mL, Last Rate: 275 mcg/hr (01/19/24 1342)  heparin, Last Rate: 21 Units/kg/hr (01/19/24 1803)  midazolam, Last Rate: 10 mg/hr (01/19/24 1148)  Pharmacy to Dose Heparin       Physical Exam:     Constitutional:   Sedated on ventilator, in no acute distress   Head:   Normocephalic, atraumatic   Eyes:           Lids and lashes normal, conjunctivae and sclerae normal.  PER   ENMT:  Ears appear intact with no abnormalities noted.  Some rash on lip/perioral area.  Endotracheal tube in place.         Neck:  Trachea midline, no  JVD   Lungs/Resp:    On ventilator, symmetric chest rise, no crepitus, moderate rhonchi bilaterally.               Heart/CV:   Regular rhythm and normal rate, no murmur   Abdomen/GI:    Soft, moderately distended, nontender.   :    Deferred   Extremities/MSK:  No clubbing or cyanosis.  1+ bilateral lower extremity edema.     Pulses:  Pulses palpable and equal bilaterally   Skin:  No bleeding, bruising or rash   Heme/Lymph:  No cervical or supraclavicular adenopathy.   Neurologic:    Psychiatric:    Moves all extremities with no obvious focal motor deficit.      Non-agitated, sedated.    The above physical exam findings were reviewed and reflect my exam findings as of today's exam.   Electronically signed by:  Marco A Pugh MD  01/19/24  18:11 EST      Results Review:     I reviewed the patient's new clinical results.   Results from last 7 days   Lab Units 01/19/24  1605 01/19/24  0439 01/18/24  1601 01/18/24  0511 01/17/24  0536   SODIUM mmol/L  --  128*  --  128* 132*   POTASSIUM mmol/L 4.1 3.6 3.8 3.3* 4.0   CHLORIDE mmol/L  --  95*  --  96* 98   CO2 mmol/L  --  24.0  --  23.0 23.0   BUN mg/dL  --  16  --  16 16   CREATININE mg/dL  --  0.43*  --  0.54* 0.64   CALCIUM mg/dL  --  7.5*  --  7.7* 7.7*   BILIRUBIN mg/dL  --  0.2  --  0.2 0.2   ALK PHOS U/L  --  191*  --  154* 131*   ALT (SGPT) U/L  --  5  --  <5 <5   AST (SGOT) U/L  --  14  --  23 17   GLUCOSE mg/dL  --  126*  --  124* 122*     Results from last 7 days   Lab Units 01/19/24  0439 01/18/24  0511 01/17/24  0536   WBC 10*3/mm3 26.75* 28.31* 32.36*   HEMOGLOBIN g/dL 7.3* 7.4* 7.3*   HEMATOCRIT % 22.6* 22.9* 22.5*   PLATELETS 10*3/mm3 181 183 200   MONOCYTES % % 1.0* 1.0* 3.0*   EOSINOPHIL % % 1.0 1.0 0.0*     Results from last 7 days   Lab Units 01/13/24  0333   PH, ARTERIAL pH units 7.354   PO2 ART mm Hg 68.1*   PCO2, ARTERIAL mm Hg 43.0   HCO3 ART mmol/L 24.0     Results from last 7 days   Lab Units 01/19/24  0439 01/18/24  0511 01/17/24  0536    MAGNESIUM mg/dL 2.0 1.8 2.3   PHOSPHORUS mg/dL 2.9 3.2 3.1       I reviewed the patient's new imaging including images and reports.    Chest x-ray 1/19/2024 shows diffuse bilateral infiltrates not significantly changed.      Chest x-ray 1/17/2024 shows persistent bilateral airspace opacities.    Chest x-ray shows endotracheal tube in place with patchy bilateral pulmonary infiltrates and more confluent left perihilar density.  Radiologist impression follows:    Impression:  Mild interval worsening in aeration of the right lung compared yesterday's exam. No evidence of pneumothorax.     Post reintubation chest x-ray this afternoon shows endotracheal tube in place with no pneumothorax and patchy bilateral infiltrates with some clearing on the right.    Medication Review:   Albuterol Sulfate NEB Orderable, 2.5 mg, Nebulization, Q6H - RT  chlorhexidine, 15 mL, Mouth/Throat, Q12H  docusate sodium, 100 mg, Nasogastric, BID   And  sennosides, 10 mL, Nasogastric, BID  fluconazole, 200 mg, Per J Tube, Daily  levothyroxine sodium, 100 mcg, Intravenous, Daily  metoclopramide, 10 mg, Intravenous, Q8H  pantoprazole, 40 mg, Intravenous, Q24H  potassium phosphate, 15 mmol, Intravenous, Once  Scopolamine, 1 patch, Transdermal, Q72H  sodium chloride, 10 mL, Intravenous, Q12H  thiamine (B-1) IV, 200 mg, Intravenous, Daily  venlafaxine, 50 mg, Nasogastric, TID With Meals      fentanyl 10 mcg/mL,  mcg/hr, Last Rate: 275 mcg/hr (01/19/24 1342)  heparin, 21 Units/kg/hr (Order-Specific), Last Rate: 21 Units/kg/hr (01/19/24 1803)  midazolam, 1-10 mg/hr, Last Rate: 10 mg/hr (01/19/24 1148)  Pharmacy to Dose Heparin,         Assessment & Plan         Acute respiratory failure with hypoxia    Hypothyroidism due to Hashimoto's thyroiditis    Abdominal carcinomatosis - omental    Peritoneal carcinomatosis    Pancreatic cancer    Acute pulmonary embolism    Aspiration pneumonia due to vomitus    63 y.o. female recently found to have  metastatic malignancy / peritoneal carcinomatosis of possible pancreatic origin (EUS was done however sample was limited and this was nondiagnostic), peritoneal cytology was positive for malignancy with pancreaticobiliary source favored, history of hypothyroidism, anxiety/depression, followed by medical oncology for the metastatic malignancy and underwent chemotherapy initiated on 12/29/2023.  Patient also was diagnosed with severe constipation recently.  She developed worsening nausea/vomiting and was brought to the emergency department with decompensated/hypoxemic respiratory failure and altered mental status on 1/8/2023.  She underwent emergent endotracheal intubation.  CT of the head, CTA of the chest/abdomen/pelvis were done and the patient was found to have extensive bilateral pulmonary emboli without significant right heart strain.  She also had patchy airspace disease in the lower lobes and ascites with peritoneal carcinomatosis.  She was hypotensive requiring pressors and was started on antibiotics, heparin drip and admitted to the intensive care unit for ongoing management.    Patient ultimately was extubated on 1/11/2024 however was extremely encephalopathic and agitated postextubation and developed progressive nausea/vomiting.  She ultimately required reintubation.    Patient remains intubated.  She continues to have significant vomiting of bilious material.  She had an EGD with J-tube placement on 1/12/2024.  A nasogastric tube was attempted during that procedure but likely terminates in the hiatal hernia sac.    Endotracheal tube cuff developed 1/15/2024 and I replaced it.    Biggest problem right now is her ongoing respiratory failure in the setting of recalcitrant vomiting.  I spoke with gastroenterology who suspects that the patient has gastroparesis in addition to the hiatal hernia.  She has underwent large-volume paracentesis again on 1/17/2024.  Ongoing bilious vomiting.    Patient has persistent  leukocytosis.  Cultures nonrevealing.    Plan:    For acute hypoxemic respiratory failure: Extubated 1/11/2024 requiring reintubation secondary to progressive hypoxemia and recalcitrant nausea/vomiting.  Continue mechanical ventilation.  Endotracheal tube changed 1/15/2024 secondary to cuff leak.  Continuing to require 50% FiO2  For peritoneal carcinomatosis/metastatic malignancy likely pancreatic origin malignant ascites: Status post initiation of palliative chemotherapy 12/29/2023.  Oncology following.  Repeat large-volume paracentesis 1/17/2023.  Cytology was positive on 12/8/2023 for malignancy of likely pancreaticobiliary origin.  Family has now decided on eventual palliative extubation.  They are awaiting further family member visits.  For vomiting: Unfortunately this has been persistent.  No definite obstruction on prior CT abdomen 1/9/2024.  Continuing Reglan.  For hypothyroidism: Continue levothyroxine.  For anemia: Stable.  Monitor with transfusion as needed.  For leukocytosis/pulmonary infiltrates: Continue Zosyn empirically.  Respiratory culture from 1/17/2024 without growth.  For acute pulmonary embolism: Continue heparin drip.    GI prophylaxis: Protonix.  Nutrition/hyponatremia: Tolerating distal tube feeds.  Discussed with nutrition.  Stop free water given hyponatremia.       Case discussed with Dr. Farfan from gastroenterology.  On 1/17/2024, I discussed with family in the conference room and relayed my concern about the patient's prognosis in the setting of recurrent malignant ascites requiring multiple paracenteses, persistent nausea vomiting in the setting of peritoneal carcinomatosis and suspected gastroparesis with large hiatal hernia, and failure of previous extubation attempt secondary to the persistent nausea vomiting and difficulty maintaining an airway.  Despite aggressive measures of prior chemotherapy around the end of the year, repeat large-volume paracenteses, peristalsis stimulators,  the patient has had no improvement in her condition and continues to require moderately high ventilator settings (currently 50% FiO2), and has persistent nausea vomiting/reflux requiring persistent gastric suctioning.  I did explain to them that I did not think that it was likely that the patient would reach a point where she could be safely extubated with a reasonable expectation of going home.  I recommended a palliative care consultation and they are in agreement with this.  They did mention that they have been in contact with a provider at [facility].  I am happy to discuss her general pulmonary/critical care issues with any provider at an outpatient facility should they wish to accept the patient in transfer however, given her ongoing critical illness, I think that transfer would be difficult to arrange.  I would be happy to coordinate care in any way I can.  Specific questions related to her cancer care I would recommend discussing with her primary oncologist.    Patient is critically ill secondary to respiratory failure with tenuous respiratory status and has high risk of life-threatening decompensation in condition.   As such, the patient requires continuous monitoring and frequent reassessment for consideration of adjustment in management to minimize this risk.  I personally reassessed the patient multiple times today.    Critical care time : 20 minutes spent by me personally and independently.(This excludes time spent performing separately reportable procedures and services).  Critical care time includes high complexity decision making to assess, manipulate, and support vital organ system failure in this individual who has impairment of one or more vital organ systems such that there is a high probability of imminent or life threatening deterioration in the patient’s condition.    Electronically signed by:    Marco A Pugh MD  01/19/24  18:11 EST      *. Please note that portions of  this note were completed with [employer] - a voice recognition program.

## 2024-01-19 NOTE — PROGRESS NOTES
Clinical Nutrition     Nutrition Support Assessment  Reason for Visit: MDR, Follow-up protocol, EN      Patient Name: Jessica Kraft  YOB: 1960  MRN: [id number]  Date of Encounter: 01/19/24 12:05 EST  Admission date: 1/8/2024  Nutrition Assessment   Admission Diagnosis:  Acute respiratory failure with hypoxia [J96.01]      Problem List:    Acute respiratory failure with hypoxia    Hypothyroidism due to Hashimoto's thyroiditis    Abdominal carcinomatosis - omental    Peritoneal carcinomatosis    Pancreatic cancer    Acute pulmonary embolism    Aspiration pneumonia due to vomitus        PMH:   She  has a past medical history of Abscess, Anxiety, Bilateral ovarian cysts, Depression, Encounter for routine gynecological examination, Foot pain, H/O bone density study (06/2014), H/O mammogram (07/2016), Hot flashes, menopausal, Hypothyroidism due to Hashimoto's thyroiditis, Insomnia, Miscarriage, Osteopenia, Pap smear for cervical cancer screening (06/2014), Routine general medical examination at a health care facility, Urinary incontinence, Urinary tract infection, and Vitamin D deficiency.    PSH:  She  has a past surgical history that includes Cholecystectomy (2008); Hernia repair (2010); Abdominoplasty (2001); Colonoscopy (07/2012); Ectopic pregnancy surgery (Left, 1994); Esophagogastroduodenoscopy (N/A, 12/13/2023); Cardiac catheterization (N/A, 1/9/2024); and Esophagogastroduodenoscopy (N/A, 1/12/2024).      Applicable Nutrition Concerns:   Skin:bruised,skin tear  GI: abdomen firm, ascites, ngt to [id number], vomited this am, last bm 1-16      Applicable Interval History:     ARF/VENT 1-8-24    s/p mechanical thrombectomy 1-9-24    Extubated, and re-intubated 1-11-24    ET changed out 1-15-24    s/p Paracentesis 1-17-24: 5.75L drained off    Reported/Observed/Food/Nutrition Related History:     1-19-24: pt intubated, sedated, fentanyl, versed, heparin    Per RN: pt follow some  commands, has been tolerating TF, did vomit once this am, TF paused, ngt output is clear yellow bile      1-17-24: pt intubated, sedated + fentanyl ,versed, heparin    Per RN: pt tolerating TF, has not had any vomiting this shift, had ~ 6L taken off during paracentesis    1-15-24: pt intubated, sedated+ fentanyl, versed, heparin    Per RN: pt vomited last night, had massive bm, has had another bm this shift, did vomit again when turned, ngt to LWS, output appears to be bile, no TF seen, rate increased to 30ml/hr      1-12-24: pt intubated, sedated  + precedex, fentanyl, heparin, levophed 0.08mcg, NS@75ml    Per RN: pt will follow commands, but intermittently wild, thrashing around, still having a lot of ngt output, abdomen taut, ? need for paracentesis    Plan for njt placement per GI today, and initiation of trophic feed      1-11-24:pt intubated, sedated + precedex, fentanyl, heparin, levophed 0.08mcg. NS@75ml    Per RN: ogt just replaced with ngt, pt still having significant output, had had ~300ml this am so far, (noted total of 850ml past 24 hours), no bm yet, have started reglan today      1-9-24: Pt intubated and sedated,  at bedside  + precedex, fentanyl, levophed 0.08mcg, heparin     reports pt has had nausea and vomiting for the past day and a half    Per RN: plan for possible EKOS catheter today    Labs    Labs Reviewed: Yes     Results from last 7 days   Lab Units 01/19/24  0439 01/18/24  1601 01/18/24  0511 01/17/24  0536 01/16/24  2222   GLUCOSE mg/dL 126*  --  124* 122*  --    BUN mg/dL 16  --  16 16  --    CREATININE mg/dL 0.43*  --  0.54* 0.64  --    SODIUM mmol/L 128*  --  128* 132*  --    CHLORIDE mmol/L 95*  --  96* 98  --    POTASSIUM mmol/L 3.6 3.8 3.3* 4.0  --    PHOSPHORUS mg/dL 2.9  --  3.2 3.1  --    MAGNESIUM mg/dL 2.0  --  1.8 2.3  --    ALT (SGPT) U/L 5  --  <5 <5  --    LACTATE mmol/L  --   --   --   --  1.4       Results from last 7 days   Lab Units 01/19/24  0435  "01/18/24  0511 01/17/24  0536 01/13/24  0536 01/12/24  1740   ALBUMIN g/dL 2.0* 2.0* 2.1*   < >  --    PREALBUMIN mg/dL  --   --   --   --  5.4*    < > = values in this interval not displayed.       Results from last 7 days   Lab Units 01/19/24  1142 01/19/24  0510 01/18/24  2328 01/18/24  1743 01/18/24  1137 01/18/24  0516   GLUCOSE mg/dL 114 124 128 112 130 102     Lab Results   Lab Value Date/Time    HGBA1C 5.70 (H) 12/07/2023 1959                   Medications    Medications Reviewed: Yes  Pertinent: abx, protonix, bowel regimen, reglan, thiamine, scopolamine, diflucan  Infusion:fentanyl, versed, heparin      Intake/Ouptut 24 hrs (0701 - 0700)   I&O's Reviewed: Yes     Intake & Output (last day)         01/18 0701 01/19 0700 01/19 0701 01/20 0700    I.V. (mL/kg) 1423 (13.6) 294 (2.8)    Other 40     NG/GT 1604 513    IV Piggyback 48.7     Total Intake(mL/kg) 3115.7 (29.7) 807 (7.7)    Urine (mL/kg/hr) 1615 (0.6) 225 (0.4)    Emesis/NG output 1200 175    Total Output 2815 400    Net +300.7 +407                    Anthropometrics     Flowsheet Rows      Flowsheet Row First Filed Value   Admission Height 167.6 cm (66\") Documented at 01/08/2024 2036   Admission Weight 91.2 kg (201 lb) Documented at 01/08/2024 2036          Height: Height: 165.1 cm (65\")  Last Filed Weight: Weight: 105 kg (231 lb 4.2 oz) (01/13/24 0543)  Method: Weight Method: Bed scale  BMI: BMI (Calculated): 38.5  BMI classification: Obese Class I: 30-34.9kg/m2  IBW:  130lb    UBW: ?  Weight change: per EMR ~ 17lb wt loss over the past month     Weight       Weight (kg) Weight (lbs) Weight Method Visit Report   5/16/2016 96.979 kg  213 lb 12.8 oz   --    8/18/2016 98.703 kg  217 lb 9.6 oz   --    11/10/2016 97.705 kg  215 lb 6.4 oz   --    2/3/2017 98.068 kg  216 lb 3.2 oz   --    3/6/2017 96.253 kg  212 lb 3.2 oz   --    5/18/2017 97.342 kg  214 lb 9.6 oz   --    8/2/2017 101.696 kg  224 lb 3.2 oz   --    12/6/2023 97.07 kg  214 lb  Stated   " "  2023 97.523 kg  215 lb  Stated      95.255 kg  210 lb  Stated     2023 98.975 kg  218 lb 3.2 oz  Standing scale     2023 97.569 kg  215 lb 1.6 oz  Bed scale     12/10/2023 97.523 kg  215 lb  Bed scale     2023 92.987 kg  205 lb  Standing scale     2023 93.804 kg  206 lb 12.8 oz  Standing scale     2023 104.826 kg  231 lb 1.6 oz  Bed scale      105 kg  231 lb 7.7 oz      2023 94.076 kg  207 lb 6.4 oz  Standing scale     12/15/2023 92.08 kg  203 lb  Standing scale     2023 91.173 kg  201 lb   --    2023 91.173 kg  201 lb  Stated     2023 91.173 kg  201 lb  Stated     1/3/2024 91.627 kg  202 lb   --    2024 91.173 kg  201 lb  Stated     2024 91.173 kg  201 lb            Nutrition Focused Physical Exam     Date:     Unable to perform exam due to: Pt unable to participate at time of visit      Needs Assessment   Date: 24    Height used:Height: 165.1 cm (65\")  Weights used/ ABW: 201lb/ 91.4kg  IBW\" 130lb/ 59.1kg      Estimated Calorie needs: ~ 1300kcal   Method:  14Kcals/KG ABW:1280kcal  Method:  MSJ ABW: 1491kcal  Method:  PSU ABW: 1839kcal    Estimated Protein needs: ~118g protein  Method: 2g protein per kg IBW: 118g protein  Method: 1.2-1.5g protein per kg ABW: 110-137g protein    Current Nutrition Prescription     PO: NPO Diet NPO Type: Strict NPO  Oral Nutrition Supplement:   Intake: N/A    EN: Peptamen Intense VHP  Goal Rate:65ml  Water Flushes: 0ml  Modular: None  Route: NJ  Tube: Small bore    At goal over: 20Hrs/day    Rx will supply:   Goal Volume 1300 mL/day     Flush Volume 0 mL/day     Energy 1300 Kcal/day 100 % Est Need   Protein 120 g/day 102 % Est Need   Fiber 5 g/day     Water in  EN 1092 mL     Total Water 1092 mL     Meet DRI No        --------------------------------------------------------------------------  Product/Rate verified at bedside: Yes  Infusing Rate at time of visit: off    Average Delivery from [facility] Day:  Volume " 1409 mL/day 108  % Goal Vol.   Flush Volume 235 mL/day     Energy  Kcal/day  % Est Need   Protein  g/day  % Est Need   Fiber  g/day     Water in  EN  mL     Total Water  mL     Meet DRI No            Nutrition Diagnosis   Date: 1-9-24 Updated: 1-19  Problem Inadequate energy intake   Etiology ARF/VENT/ Vomiting   Signs/Symptoms 108% goal volume EN   Status: TF held at present    Goal:   General: Nutrition support treatment  PO: N/A  EN/PN: Tolerate EN at goal     Pall[facility] consulted for [facility]    Nutrition Intervention      Follow treatment progress, Care plan reviewed    Suggest add MVI as TF volume too low to meet RDI's    Monitoring/Evaluation:   Per protocol, I&O, Pertinent labs, Weight, Skin status, GI status, Symptoms, Hemodynamic stability      Gauri Ayala, JANETH  Time Spent: 30min

## 2024-01-19 NOTE — CASE MANAGEMENT/SOCIAL WORK
Continued Stay Note   [facility]     Patient Name: Jessica Kraft  MRN: [id number]  Today's Date: 1/19/2024    Admit Date: 1/8/2024    Plan: ongoing   Discharge Plan       Row Name 01/19/24 1417       Plan    Plan ongoing    Patient/Family in Agreement with Plan other (see comments)    Plan Comments Per MDR, remains intubated and sedated. Palliative following, seen today and spoke w/spouse and son. Per Palliative note, planning to extubate once family can be here, timing TBD. CM continues to follow.    Final Discharge Disposition Code 30 - still a patient                   Discharge Codes    No documentation.                 Expected Discharge Date and Time       Expected Discharge Date Expected Discharge Time    Jan 23, 2024               Danilo Turk RN

## 2024-01-19 NOTE — PROGRESS NOTES
"Palliative Care Daily Progress Note     Referring: Marco A Pugh    C/C: pt unable    S: Medical record reviewed. Events noted. Pt has failed to make significant progress.   Still with significant NG output.Spouse and son present.      ROS: pt unable    O: Code Status:   Code Status and Medical Interventions:   Ordered at: 01/09/24 0236     Code Status (Patient has no pulse and is not breathing):    CPR (Attempt to Resuscitate)     Medical Interventions (Patient has pulse or is breathing):    Full Support      Advanced Directives: Advance Directive Status: Patient does not have advance directive   Goals of Care: Ongoing.   Palliative Performance Scale Score: 10%     BP 91/63   Pulse 92   Temp 98.8 °F (37.1 °C) (Bladder)   Resp 24   Ht 165.1 cm (65\")   Wt 105 kg (231 lb 4.2 oz)   LMP  (LMP Unknown) Comment: N/A  SpO2 90%   BMI 38.48 kg/m²     Intake/Output Summary (Last 24 hours) at 1/19/2024 1136  Last data filed at 1/19/2024 1135  Gross per 24 hour   Intake 3391.72 ml   Output 2715 ml   Net 676.72 ml       PE:  General Appearance:    Occ moves R hand   HEENT:    anicteric, MMM, face relaxed   Neck:   supple, trachea midline, no JVD   Lungs:     Coarse BS bilaterally, diminished in bases; respirations regular, even and unlabored, able to overbreathe vent    Heart:    RRR, normal S1 and S2, no M/R/G   Abdomen:     Normal bowel sounds, soft, nontender, nondistended   G/U:   Deferred   MSK/Extremities:   Wasting, + edema, R foot cool and mottled   Pulses:   Pulses palpable and equal bilaterally   Skin:   dry   Neurologic:   No commands,  some spontaneous movement R hand noted         Meds: Reviewed and changes noted    Labs:   Results from last 7 days   Lab Units 01/19/24  0439   WBC 10*3/mm3 26.75*   HEMOGLOBIN g/dL 7.3*   HEMATOCRIT % 22.6*   PLATELETS 10*3/mm3 181     Results from last 7 days   Lab Units 01/19/24  0439   SODIUM mmol/L 128*   POTASSIUM mmol/L 3.6   CHLORIDE mmol/L 95*   CO2 mmol/L 24.0   BUN " mg/dL 16   CREATININE mg/dL 0.43*   GLUCOSE mg/dL 126*   CALCIUM mg/dL 7.5*     Results from last 7 days   Lab Units 01/19/24  0439   SODIUM mmol/L 128*   POTASSIUM mmol/L 3.6   CHLORIDE mmol/L 95*   CO2 mmol/L 24.0   BUN mg/dL 16   CREATININE mg/dL 0.43*   CALCIUM mg/dL 7.5*   BILIRUBIN mg/dL 0.2   ALK PHOS U/L 191*   ALT (SGPT) U/L 5   AST (SGOT) U/L 14   GLUCOSE mg/dL 126*     Imaging Results (Last 72 Hours)       Procedure Component Value Units Date/Time    XR Chest 1 View [[id number]] Collected: 01/19/24 0812     Updated: 01/19/24 0816    Narrative:      XR CHEST 1 VW    Date of Exam: 1/19/2024 1:20 AM CST    Indication: f/u respiratory illness    Comparison: 1/18/2024    Findings:  Cardiomediastinal silhouette and support lines and tubes are unchanged.  Central airspace disease and small effusions are similar. No superimposed pneumothorax identified. No acute osseous abnormality identified.      Impression:      Impression:  No significant change      Electronically Signed: Jagdish Gomez MD    1/19/2024 7:13 AM CST    Workstation ID: [id number]    XR Chest 1 View [[id number]] Collected: 01/18/24 0826     Updated: 01/18/24 0832    Narrative:      XR CHEST 1 VW    Date of Exam: 1/18/2024 2:49 AM EST    Indication: f/u respiratory illness    Comparison: Chest x-ray 1/17/2024    Findings:  Redemonstration of right chest port. Redemonstration of endotracheal tube terminating in the mid trachea 2.4 cm above the anabella. Redemonstration of enteric feeding tube which courses into the stomach with the tip beyond the field-of-view.   Redemonstration of NG/OG tube which courses into the stomach with the tip in the region of the gastric body and the sideport near the GE junction. Stable cardiomediastinal silhouette within normal limits. Redemonstration of diffuse interstitial opacities   with prominent linear and bandlike perihilar parenchymal opacities, overall not significantly changed. Stable small bilateral pleural  effusions. No evidence of pneumothorax.      Impression:      Impression:  No significant interval change. The side port of the NG/OG tube is at or near the GE junction; consider advancing.      Electronically Signed: Feliciano Vanessa MD    1/18/2024 8:29 AM EST    Workstation ID: [id number]    XR Chest 1 View [[id number]] Collected: 01/17/24 0814     Updated: 01/17/24 0819    Narrative:      XR CHEST 1 VW    Date of Exam: 1/17/2024 3:28 AM EST    Indication: f/u respiratory illness    Comparison: 1/16/2024.    Findings:  Support lines are unchanged in position. There are continued extensive lung infiltrates, greatest on the left. Small left effusion is again seen. Heart size is normal.      Impression:      Impression:  No appreciable change in position of support lines or extensive bilateral lung infiltrates.      Electronically Signed: Nneka Servin MD    1/17/2024 8:15 AM EST    Workstation ID: [id number]                  Diagnostics: Reviewed    A:     Acute respiratory failure with hypoxia    Hypothyroidism due to Hashimoto's thyroiditis    Abdominal carcinomatosis - omental    Peritoneal carcinomatosis    Pancreatic cancer    Acute pulmonary embolism    Aspiration pneumonia due to vomitus       Impression:  Metsastatic CA  - presumed pancreatic - with carcinomatosis  Acute hypoxic resp failure  Hypothyroidism due to Hashimoto's  Acute PE  Aspiration PNA     Symptoms:  Dyspnea  Debility  CA pain    P:   Met with spouse and son.  Express understanding that pt not improving.  Planning for palliative extubation after able to get all family in.  Will have RN page with update as to when they would like to plan for this.  Palliative Care Team will continue to follow patient.     Yesenia Pugh MD  1/19/2024  Time spent:38

## 2024-01-19 NOTE — PLAN OF CARE
Goal Outcome Evaluation:  Plan of Care Reviewed With: patient     *Vent continued, 50% 8P  *Sedated on fentanyl and versed  *Heparin gtt continued   *Adequate UOP, small smear of BM  *VSS, NSR, tmax 99  *Restraints continued           Progress: no change              Problem: Restraint, Nonviolent  Goal: Absence of Harm or Injury  Outcome: Ongoing, Progressing

## 2024-01-19 NOTE — CONSULTS
"HEMATOLOGY/ONCOLOGY PROGRESS NOTE    SPer bedside nurse 500 mL of bilious output this shift.  Emesis overnight when sedation lightened.     Patient supportive  is present in the lobby      Medications:  The current medication list was reviewed in the EMR    ALLERGIES:  No Known Allergies      Physical Exam    VITAL SIGNS:  BP 90/59   Pulse 93   Temp 98.8 °F (37.1 °C)   Resp 24   Ht 165.1 cm (65\")   Wt 105 kg (231 lb 4.2 oz)   LMP  (LMP Unknown) Comment: N/A  SpO2 96%   BMI 38.48 kg/m²   Temp:  [98.8 °F (37.1 °C)-99.9 °F (37.7 °C)] 98.8 °F (37.1 °C)      Performance Status:                Physical Exam    General: Intubated and sedated OGT with bilous output  HEENT: sclerae anicteric  Lymphatics: no cervical, supraclavicular, or axillary adenopathy  Cardiovascular: regular rate and rhythm, no murmurs, rubs or gallops  Lungs: Coarse breath sounds bilaterally  Abdomen: abdomen with increased distention compared to post paracentesis.  Extremities: mild diffuse body wall edema  Skin: no rashes, lesions, bruising, or petechiae        RECENT LABS:    Lab Results   Component Value Date    HGB 7.4 (L) 01/18/2024    HCT 22.9 (L) 01/18/2024    MCV 84.5 01/18/2024     01/18/2024    WBC 28.31 (H) 01/18/2024    NEUTROABS 24.35 (H) 01/18/2024    LYMPHSABS 0.45 (L) 01/10/2024    MONOSABS 0.27 01/10/2024    EOSABS 0.28 01/18/2024    BASOSABS 0.28 (H) 01/18/2024       Lab Results   Component Value Date    GLUCOSE 124 (H) 01/18/2024    BUN 16 01/18/2024    CREATININE 0.54 (L) 01/18/2024     (L) 01/18/2024    K 3.8 01/18/2024    CL 96 (L) 01/18/2024    CO2 23.0 01/18/2024    CALCIUM 7.7 (L) 01/18/2024    PROTEINTOT 5.3 (L) 01/18/2024    ALBUMIN 2.0 (L) 01/18/2024    BILITOT 0.2 01/18/2024    ALKPHOS 154 (H) 01/18/2024    AST 23 01/18/2024    ALT <5 01/18/2024         Assessment/Plan  Metastatic malignancy with malignant ascites, presumed pancreatic origin  2.  Massive pulmonary embolism  3.  Aspiration " pneumonia/ARDS  4.  Hypoxic respiratory failure secondary to above  5. Nausea and vomiting  -She is status post cycle 1 of palliative chemotherapy 12/27/23.   -Outpatient PET was planned and she was scheduled for follow-up with repeat chemotherapy 1/11/24.  She presented with intractable nausea and vomiting and was found to have a witnessed apneic event in the ER.  Workup revealed aspiration pneumonia/ARDS and proximal/submassive bilateral PE.  She is status post thrombectomy.  She has been re- intubated for several days.  Status post large-volume paracentesis yesterday, but her abdomen is already distended again and she continues with high output from her OG tube.  Has not been tolerating attempts to wean sedation. Discussed prognosis of her underlying malignancy at length with the patient's  today.  I am concerned that her ongoing bowel dysfunction is secondary to peritoneal involvement of her tumor and that her worsening symptoms leading up to presentation as well as continued symptoms are secondary to ongoing disease progression/resistance to chemotherapy.  Given her continued critical illness, and in the context of underlying metastatic malignancy, her prognosis is very guarded.  I agree with ongoing discussions with palliative care regarding goals of care.  I would be supportive of the plan for transition to a comfort care approach if the ICU team does not feel she is advancing appropriately.  Her  is planning palliative care family meeting this afternoon when his son is available.  He does indicate that he would not want her to undergo a tracheostomy and she is unable to be weaned to from the ventilator.     Sherri العراقي MD  [facility]    1/18/2024

## 2024-01-19 NOTE — PLAN OF CARE
Goal Outcome Evaluation:  Plan of Care Reviewed With: patient, spouse, son        Progress: no change  -remains on vent, FiO2 50%, P8  -palliative care GOC with spouse and son; awaiting word from family about when they would like to plan palliative extubation  -NG output 300; ; no BM  -versed @ 10; fentanyl @ 275; increased for agitation and vomiting episode today  -TF decreased to 30ml/hr for emesis that looked concerning for TF; NG output still has green/yellow color of bile  -potassium 3.6, replaced; recheck 4.1  -pt's spouse and son updated at bedside    -just before 1900, pt's sister-in-law informed me of the pt's son's desire for a second opinion before withdrawal of care. I educated them that the family would have to facilitate that.

## 2024-01-20 NOTE — PLAN OF CARE
Goal Outcome Evaluation:  Plan of Care Reviewed With: patient    *Vented 50% 8 Peep  *VSS  *Versed, fentanyl, heparin gtts  *Follows commands at times  *Adequate UOP, no BM   *NGT with 150ml bile  *Restraints remain in place    Problem: Restraint, Nonviolent  Goal: Absence of Harm or Injury  Outcome: Ongoing, Progressing  Intervention: Implement Least Restrictive Safety Strategies  Intervention: Protect Dignity, Rights, and Personal Wellbeing  Intervention: Protect Skin and Joint Integrity            Progress: no change

## 2024-01-20 NOTE — PLAN OF CARE
Goal Outcome Evaluation:           Progress: no change  Outcome Evaluation: Another conference done with Dr. Pugh, spouse, and son. Family wants a second opinion from [facility]. MD contacted [facility] waiting to hear back. Mag replaced. No vomiting episodes. No change in vent settings.

## 2024-01-20 NOTE — PROGRESS NOTES
"Pulmonary/Critical Care Follow-up     LOS: 11 days   Patient Care Team:  Leonarda Díaz MD as PCP - General (Internal Medicine)        Chief Complaint   Patient presents with    Vomiting    Abdominal Pain     Subjective     History reviewed and updated in EMR as indicated.    Interval History:     Patient remains on mechanical ventilation.  Had to go up to 60% FiO2 this morning.  Long discussion with patient's  and son.  They wish for a \"second opinion\" and want patient to be transferred to [facility].  I have contacted [facility] to request transfer and am currently awaiting a decision on this.  Tolerating tube feeds at 30 mL an hour.  Continues to have significant output from NG tube.    History taken from: Chart, staff    PMH/FH/Social History were reviewed and updated appropriately in the electronic medical record.     Review of Systems:    Review of 14 systems was completed with positives and pertinent negatives noted in the subjective section.  All other systems reviewed and are negative.   Exceptions are noted below:    Unable to obtain secondary to endotracheal intubation.      Objective     Vital Signs  Temp:  [98.6 °F (37 °C)-99.1 °F (37.3 °C)] 98.6 °F (37 °C)  Heart Rate:  [89-99] 92  Resp:  [24-25] 24  BP: ()/(56-75) 92/61  FiO2 (%):  [50 %-60 %] 60 %  01/19 0701 - 01/20 0700  In: 2100.3 [I.V.:1154.3]  Out: 1750 [Urine:1200]  Body mass index is 38.48 kg/m².  Mode: VC+/AC  FiO2 (%):  [50 %-60 %] 60 %  S RR:  [24] 24  S VT:  [450 mL] 450 mL  PEEP/CPAP (cm H2O):  [8 cm H20] 8 cm H20  MAP (cm H2O):  [17-18] 17  IV drips:  fentanyl 10 mcg/mL, Last Rate: 300 mcg/hr (01/20/24 1330)  heparin, Last Rate: 21 Units/kg/hr (01/20/24 0657)  midazolam, Last Rate: 10 mg/hr (01/20/24 1333)  Pharmacy to Dose Heparin       Physical Exam:     Constitutional:   Sedated on ventilator, in no acute distress   Head:   Normocephalic, atraumatic   Eyes:           Lids and lashes normal, conjunctivae " and sclerae normal.  PER   ENMT:  Ears appear intact with no abnormalities noted.  Some rash on lip/perioral area.  Endotracheal tube in place.         Neck:  Trachea midline, no JVD   Lungs/Resp:    On ventilator, symmetric chest rise, no crepitus, moderate rhonchi bilaterally.               Heart/CV:   Regular rhythm and normal rate, no murmur   Abdomen/GI:    Soft, moderately distended, nontender.   :    Deferred   Extremities/MSK:  No clubbing or cyanosis.  1+ bilateral lower extremity edema.     Pulses:  Pulses palpable and equal bilaterally   Skin:  No bleeding, bruising or rash   Heme/Lymph:  No cervical or supraclavicular adenopathy.   Neurologic:    Psychiatric:    Moves all extremities with no obvious focal motor deficit.      Non-agitated, sedated.    The above physical exam findings were reviewed and reflect my exam findings as of today's exam.   Electronically signed by:  Marco A Pugh MD  01/20/24  16:44 EST      Results Review:     I reviewed the patient's new clinical results.   Results from last 7 days   Lab Units 01/20/24  0500 01/19/24  1605 01/19/24  0439 01/18/24  1601 01/18/24  0511   SODIUM mmol/L 130*  --  128*  --  128*   POTASSIUM mmol/L 4.1 4.1 3.6   < > 3.3*   CHLORIDE mmol/L 96*  --  95*  --  96*   CO2 mmol/L 22.0  --  24.0  --  23.0   BUN mg/dL 16  --  16  --  16   CREATININE mg/dL 0.44*  --  0.43*  --  0.54*   CALCIUM mg/dL 7.6*  --  7.5*  --  7.7*   BILIRUBIN mg/dL 0.2  --  0.2  --  0.2   ALK PHOS U/L 133*  --  191*  --  154*   ALT (SGPT) U/L 5  --  5  --  <5   AST (SGOT) U/L 10  --  14  --  23   GLUCOSE mg/dL 117*  --  126*  --  124*    < > = values in this interval not displayed.     Results from last 7 days   Lab Units 01/20/24  0445 01/19/24  0439 01/18/24  0511   WBC 10*3/mm3 24.48* 26.75* 28.31*   HEMOGLOBIN g/dL 7.0* 7.3* 7.4*   HEMATOCRIT % 21.8* 22.6* 22.9*   PLATELETS 10*3/mm3 192 181 183   MONOCYTES % % 2.0* 1.0* 1.0*   EOSINOPHIL % % 0.0* 1.0 1.0           Results  from last 7 days   Lab Units 01/20/24  0500 01/19/24  0439 01/18/24  0511   MAGNESIUM mg/dL 1.8 2.0 1.8   PHOSPHORUS mg/dL 3.1 2.9 3.2       I reviewed the patient's new imaging including images and reports.    Chest x-ray 1/19/2024 shows diffuse bilateral infiltrates not significantly changed.      Chest x-ray 1/17/2024 shows persistent bilateral airspace opacities.    Chest x-ray shows endotracheal tube in place with patchy bilateral pulmonary infiltrates and more confluent left perihilar density.  Radiologist impression follows:    Impression:  Mild interval worsening in aeration of the right lung compared yesterday's exam. No evidence of pneumothorax.     Post reintubation chest x-ray this afternoon shows endotracheal tube in place with no pneumothorax and patchy bilateral infiltrates with some clearing on the right.    Medication Review:   Albuterol Sulfate NEB Orderable, 2.5 mg, Nebulization, Q6H - RT  chlorhexidine, 15 mL, Mouth/Throat, Q12H  docusate sodium, 100 mg, Nasogastric, BID   And  sennosides, 10 mL, Nasogastric, BID  fluconazole, 200 mg, Per J Tube, Daily  levothyroxine sodium, 100 mcg, Intravenous, Daily  metoclopramide, 10 mg, Intravenous, Q8H  pantoprazole, 40 mg, Intravenous, Q24H  potassium phosphate, 15 mmol, Intravenous, Once  Scopolamine, 1 patch, Transdermal, Q72H  sodium chloride, 10 mL, Intravenous, Q12H  thiamine (B-1) IV, 200 mg, Intravenous, Daily  venlafaxine, 50 mg, Nasogastric, TID With Meals      fentanyl 10 mcg/mL,  mcg/hr, Last Rate: 300 mcg/hr (01/20/24 1330)  heparin, 21 Units/kg/hr (Order-Specific), Last Rate: 21 Units/kg/hr (01/20/24 0657)  midazolam, 1-10 mg/hr, Last Rate: 10 mg/hr (01/20/24 1333)  Pharmacy to Dose Heparin,         Assessment & Plan         Acute respiratory failure with hypoxia    Hypothyroidism due to Hashimoto's thyroiditis    Abdominal carcinomatosis - omental    Peritoneal carcinomatosis    Pancreatic cancer    Acute pulmonary embolism     Aspiration pneumonia due to vomitus    63 y.o. female recently found to have metastatic malignancy / peritoneal carcinomatosis of possible pancreatic origin (EUS was done however sample was limited and this was nondiagnostic), peritoneal cytology was positive for malignancy with pancreaticobiliary source favored, history of hypothyroidism, anxiety/depression, followed by medical oncology for the metastatic malignancy and underwent chemotherapy initiated on 12/29/2023.  Patient also was diagnosed with severe constipation recently.  She developed worsening nausea/vomiting and was brought to the emergency department with decompensated/hypoxemic respiratory failure and altered mental status on 1/8/2023.  She underwent emergent endotracheal intubation.  CT of the head, CTA of the chest/abdomen/pelvis were done and the patient was found to have extensive bilateral pulmonary emboli without significant right heart strain.  She also had patchy airspace disease in the lower lobes and ascites with peritoneal carcinomatosis.  She was hypotensive requiring pressors and was started on antibiotics, heparin drip and admitted to the intensive care unit for ongoing management.    Patient ultimately was extubated on 1/11/2024 however was extremely encephalopathic and agitated postextubation and developed progressive nausea/vomiting.  She ultimately required reintubation.    Patient remains intubated.  She continues to have significant vomiting of bilious material.  She had an EGD with J-tube placement on 1/12/2024.  A nasogastric tube was attempted during that procedure but likely terminates in the hiatal hernia sac.    Endotracheal tube cuff developed 1/15/2024 and I replaced it.    Biggest acute problem right now is her ongoing respiratory failure in the setting of recalcitrant vomiting.  I spoke with gastroenterology who suspects that the patient has gastroparesis in addition to the hiatal hernia.  She has underwent large-volume  paracentesis again on 1/17/2024.  Ongoing bilious vomiting.    Patient has persistent leukocytosis.  Cultures nonrevealing.  Antibiotics were stopped and white blood cell count is slowly trending down.    Family wishes for a second opinion at [facility] regarding her diagnosis and treatment options.  I have contacted [facility] and I am currently awaiting additional information in terms of whether or not they will accept the patient to the intensive care unit.  They do not want to proceed with palliative extubation prior to getting a second opinion.  I did review the entire case with the patient's  and son today explaining the current diagnosis, how it was obtained, sensitivity of peritoneal fluid for malignancy, and likely absence of significant treatment options at this point.    Plan:    For acute hypoxemic respiratory failure: Extubated 1/11/2024 requiring reintubation secondary to progressive hypoxemia and recalcitrant nausea/vomiting.  Continue mechanical ventilation.  Endotracheal tube changed 1/15/2024 secondary to cuff leak.  Continuing to require 50% FiO2  For peritoneal carcinomatosis/metastatic malignancy likely pancreatic origin malignant ascites: Status post initiation of palliative chemotherapy 12/29/2023.  Oncology following.  Repeat large-volume paracentesis 1/17/2023.  Cytology was positive on 12/8/2023 for malignancy of likely pancreaticobiliary origin.  Family has now decided on eventual palliative extubation.  They are awaiting further family member visits.  For vomiting: Unfortunately this has been persistent.  No definite obstruction on prior CT abdomen 1/9/2024.  Continuing Reglan.  For hypothyroidism: Continue levothyroxine.  For anemia: Stable.  Monitor with transfusion as needed.  For leukocytosis/pulmonary infiltrates: Continue Zosyn empirically.  Respiratory culture from 1/17/2024 without growth.  For acute pulmonary embolism: Continue heparin drip.    GI  prophylaxis: Protonix.  Nutrition/hyponatremia: Tolerating distal tube feeds.  Discussed with nutrition.  Stop free water given hyponatremia.       Case discussed with Dr. Farfan from gastroenterology.  On 1/17/2024, I discussed with family in the conference room and relayed my concern about the patient's prognosis in the setting of recurrent malignant ascites requiring multiple paracenteses, persistent nausea vomiting in the setting of peritoneal carcinomatosis and suspected gastroparesis with large hiatal hernia, and failure of previous extubation attempt secondary to the persistent nausea vomiting and difficulty maintaining an airway.  Despite aggressive measures of prior chemotherapy around the end of the year, repeat large-volume paracenteses, peristalsis stimulators, the patient has had no improvement in her condition and continues to require moderately high ventilator settings (currently 50% FiO2), and has persistent nausea vomiting/reflux requiring persistent gastric suctioning.  I did explain to them that I did not think that it was likely that the patient would reach a point where she could be safely extubated with a reasonable expectation of going home.  I recommended a palliative care consultation and they are in agreement with this.  They did mention that they have been in contact with a provider at [facility].  I am happy to discuss her general pulmonary/critical care issues with any provider at an outpatient facility should they wish to accept the patient in transfer however, given her ongoing critical illness, I think that transfer would be difficult to arrange.  I would be happy to coordinate care in any way I can.  Specific questions related to her cancer care I would recommend discussing with her primary oncologist.    Patient is critically ill secondary to respiratory failure with tenuous respiratory status and has high risk of life-threatening decompensation in condition.   As  such, the patient requires continuous monitoring and frequent reassessment for consideration of adjustment in management to minimize this risk.  I personally reassessed the patient multiple times today.    Critical care time : 40 minutes spent by me personally and independently.(This excludes time spent performing separately reportable procedures and services).  Critical care time includes high complexity decision making to assess, manipulate, and support vital organ system failure in this individual who has impairment of one or more vital organ systems such that there is a high probability of imminent or life threatening deterioration in the patient’s condition.    Electronically signed by:    Marco A Pugh MD  01/20/24  16:44 EST      *. Please note that portions of this note were completed with [employer] - a voice recognition program.

## 2024-01-21 NOTE — CASE MANAGEMENT/SOCIAL WORK
Continued Stay Note  [facility]     Patient Name: Jessica Kraft  MRN: [id number]  Today's Date: 1/21/2024    Admit Date: 1/8/2024    Plan: Ogoing   Discharge Plan       Row Name 01/21/24 4359       Plan    Plan Ogoing    Patient/Family in Agreement with Plan yes    Plan Comments Spoke with  by phone. We discussed Ambulance transfer and insurance coverage.  states she is awaiting transfer to . CM sent EMS will call for EMS ALS to [place] with [facility].  ambulance service number is [phone]. CM will follow for any discharge needs.      Row Name 01/21/24 5155       Plan    Plan ongoing                   Discharge Codes    No documentation.                 Expected Discharge Date and Time       Expected Discharge Date Expected Discharge Time    Jan 23, 2024               Jayde Zambrano RN

## 2024-01-21 NOTE — NURSING NOTE
[facility] called floor for update. [facility] awaiting bed to be available to accept patient and initiate transfer.

## 2024-01-21 NOTE — PLAN OF CARE
Problem: Adult Inpatient Plan of Care  Goal: Plan of Care Review  Outcome: Ongoing, Progressing  Flowsheets (Taken 1/21/2024 0421)  Progress: no change  Plan of Care Reviewed With: patient  Outcome Evaluation: Patient continues to rest well, FiO2 50% Peep 8 oxygen >90%. Patient follows commands and opens eyes with stimulation. Fentanyl continues at 300, versed at 10 and heparin gtt continues to infuse. [facility] called tonight for update, they will accept patient and will call with transfer update once there is a bed available. Patient's H&H dropped this morning, type and screen ordered. Patient received one dose of albumin tonight for hypotension. Patient's BP now stable. Patient remains off pressors. Continue to monitor closely, continue with current plan of care.

## 2024-01-21 NOTE — PLAN OF CARE
Goal Outcome Evaluation:  Plan of Care Reviewed With: patient  Progress: no change     -VSS. Patient is arousable and follows commands  -Fent @ 300, versed @ 10  -1 unit PRBC given without complication  -ordered to increase TF rate to 70 mL/hr. Patient continues to have greenish yellow output from mouth around ET tube and HiLo suction. NG continues to LWS with green output.  -blood noted in urine this shift varying from pink to red, MD aware.   -PRN miralax given to promote BM  -SR on the monitor.

## 2024-01-21 NOTE — PROGRESS NOTES
Pulmonary/Critical Care Follow-up     LOS: 12 days   Patient Care Team:  Leonarda Díaz MD as PCP - General (Internal Medicine)        Chief Complaint   Patient presents with    Vomiting    Abdominal Pain     Subjective     History reviewed and updated in EMR as indicated.    Interval History:     Patient remains on mechanical ventilation.  Currently on 45% FiO2.  She is waking up a little more today.  No fevers or chills.  She was accepted for transfer at [facility] ICU.  No current bed available.     History taken from: Chart, staff    PMH/FH/Social History were reviewed and updated appropriately in the electronic medical record.     Review of Systems:    Review of 14 systems was completed with positives and pertinent negatives noted in the subjective section.  All other systems reviewed and are negative.   Exceptions are noted below:    Unable to obtain secondary to endotracheal intubation.      Objective     Vital Signs  Temp:  [98.4 °F (36.9 °C)-99 °F (37.2 °C)] 98.4 °F (36.9 °C)  Heart Rate:  [86-98] 89  Resp:  [24] 24  BP: ()/(56-76) 94/62  FiO2 (%):  [45 %-60 %] 45 %  01/20 0701 - 01/21 0700  In: 2949.6 [I.V.:1838.6]  Out: 2435 [Urine:1085]  Body mass index is 38.48 kg/m².  Mode: VC+/AC  FiO2 (%):  [45 %-60 %] 45 %  S RR:  [24] 24  S VT:  [450 mL] 450 mL  PEEP/CPAP (cm H2O):  [8 cm H20] 8 cm H20  MAP (cm H2O):  [16-19] 17  IV drips:  fentanyl 10 mcg/mL, Last Rate: 300 mcg/hr (01/21/24 0548)  heparin, Last Rate: 21 Units/kg/hr (01/21/24 0549)  midazolam, Last Rate: 10 mg/hr (01/21/24 0036)  Pharmacy to Dose Heparin       Physical Exam:     Constitutional:   Sedated on ventilator, in no acute distress   Head:   Normocephalic, atraumatic   Eyes:           Lids and lashes normal, conjunctivae and sclerae normal.  PER   ENMT:  Ears appear intact with no abnormalities noted.  Some rash on lip/perioral area.  Endotracheal tube in place.         Neck:  Trachea midline, no JVD   Lungs/Resp:    On  ventilator, symmetric chest rise, no crepitus, moderate rhonchi bilaterally.               Heart/CV:   Regular rhythm and normal rate, no murmur   Abdomen/GI:    Soft, moderately distended, nontender.   :    Deferred   Extremities/MSK:  No clubbing or cyanosis.  1+ bilateral lower extremity edema.     Pulses:  Pulses palpable and equal bilaterally   Skin:  No bleeding, bruising or rash   Heme/Lymph:  No cervical or supraclavicular adenopathy.   Neurologic:    Psychiatric:    Moves all extremities with no obvious focal motor deficit.  Opens eyes a bit to voice.  Non-agitated, sedated.    The above physical exam findings were reviewed and reflect my exam findings as of today's exam.   Electronically signed by:  Marco A Pugh MD  01/21/24  08:44 EST      Results Review:     I reviewed the patient's new clinical results.   Results from last 7 days   Lab Units 01/21/24  0344 01/20/24  0500 01/19/24  1605 01/19/24  0439   SODIUM mmol/L 125* 130*  --  128*   POTASSIUM mmol/L 3.8 4.1 4.1 3.6   CHLORIDE mmol/L 91* 96*  --  95*   CO2 mmol/L 23.0 22.0  --  24.0   BUN mg/dL 17 16  --  16   CREATININE mg/dL 0.42* 0.44*  --  0.43*   CALCIUM mg/dL 7.5* 7.6*  --  7.5*   BILIRUBIN mg/dL 0.2 0.2  --  0.2   ALK PHOS U/L 161* 133*  --  191*   ALT (SGPT) U/L 7 5  --  5   AST (SGOT) U/L 19 10  --  14   GLUCOSE mg/dL 116* 117*  --  126*     Results from last 7 days   Lab Units 01/21/24  0344 01/20/24  0445 01/19/24  0439   WBC 10*3/mm3 20.94* 24.48* 26.75*   HEMOGLOBIN g/dL 6.7* 7.0* 7.3*   HEMATOCRIT % 20.6* 21.8* 22.6*   PLATELETS 10*3/mm3 205 192 181   MONOCYTES % % 2.0* 2.0* 1.0*   EOSINOPHIL % % 1.0 0.0* 1.0           Results from last 7 days   Lab Units 01/21/24  0344 01/20/24  0500 01/19/24  0439   MAGNESIUM mg/dL 2.4 1.8 2.0   PHOSPHORUS mg/dL 3.6 3.1 2.9       I reviewed the patient's new imaging including images and reports.    Chest x-ray 1/19/2024 shows diffuse bilateral infiltrates not significantly changed.      Chest  x-ray 1/17/2024 shows persistent bilateral airspace opacities.    Chest x-ray shows endotracheal tube in place with patchy bilateral pulmonary infiltrates and more confluent left perihilar density.  Radiologist impression follows:    Impression:  Mild interval worsening in aeration of the right lung compared yesterday's exam. No evidence of pneumothorax.     Post reintubation chest x-ray this afternoon shows endotracheal tube in place with no pneumothorax and patchy bilateral infiltrates with some clearing on the right.    Medication Review:   Albuterol Sulfate NEB Orderable, 2.5 mg, Nebulization, Q6H - RT  chlorhexidine, 15 mL, Mouth/Throat, Q12H  docusate sodium, 100 mg, Nasogastric, BID   And  sennosides, 10 mL, Nasogastric, BID  fluconazole, 200 mg, Per J Tube, Daily  levothyroxine sodium, 100 mcg, Intravenous, Daily  metoclopramide, 10 mg, Intravenous, Q8H  pantoprazole, 40 mg, Intravenous, Q24H  potassium phosphate, 15 mmol, Intravenous, Once  Scopolamine, 1 patch, Transdermal, Q72H  sodium chloride, 10 mL, Intravenous, Q12H  thiamine (B-1) IV, 200 mg, Intravenous, Daily  venlafaxine, 50 mg, Nasogastric, TID With Meals      fentanyl 10 mcg/mL,  mcg/hr, Last Rate: 300 mcg/hr (01/21/24 0548)  heparin, 21 Units/kg/hr (Order-Specific), Last Rate: 21 Units/kg/hr (01/21/24 0549)  midazolam, 1-10 mg/hr, Last Rate: 10 mg/hr (01/21/24 0036)  Pharmacy to Dose Heparin,         Assessment & Plan         Acute respiratory failure with hypoxia    Hypothyroidism due to Hashimoto's thyroiditis    Abdominal carcinomatosis - omental    Peritoneal carcinomatosis    Pancreatic cancer    Acute pulmonary embolism    Aspiration pneumonia due to vomitus    63 y.o. female recently found to have metastatic malignancy / peritoneal carcinomatosis of possible pancreatic origin (EUS was done however sample was limited and this was nondiagnostic), peritoneal cytology was positive for malignancy with pancreaticobiliary source favored,  history of hypothyroidism, anxiety/depression, followed by medical oncology for the metastatic malignancy and underwent chemotherapy initiated on 12/29/2023.  Patient also was diagnosed with severe constipation recently.  She developed worsening nausea/vomiting and was brought to the emergency department with decompensated/hypoxemic respiratory failure and altered mental status on 1/8/2023.  She underwent emergent endotracheal intubation.  CT of the head, CTA of the chest/abdomen/pelvis were done and the patient was found to have extensive bilateral pulmonary emboli without significant right heart strain.  She also had patchy airspace disease in the lower lobes and ascites with peritoneal carcinomatosis.  She was hypotensive requiring pressors and was started on antibiotics, heparin drip and admitted to the intensive care unit for ongoing management.    Patient ultimately was extubated on 1/11/2024 however was extremely encephalopathic and agitated postextubation and developed progressive nausea/vomiting.  She ultimately required reintubation.    Patient remains intubated.  She continues to have significant vomiting of bilious material.  She had an EGD with J-tube placement on 1/12/2024.  A nasogastric tube was attempted during that procedure but likely terminates in the hiatal hernia sac.    Endotracheal tube cuff developed 1/15/2024 and I replaced it.    Biggest acute problem right now is her ongoing respiratory failure in the setting of recalcitrant vomiting.  I spoke with gastroenterology who suspects that the patient has gastroparesis in addition to the hiatal hernia.  She has underwent large-volume paracentesis again on 1/17/2024.  Ongoing bilious vomiting.    Patient has persistent leukocytosis.  Cultures nonrevealing.  Antibiotics were stopped and white blood cell count is slowly trending down.    Family wishes for a second opinion at [facility] regarding her diagnosis and treatment options.  I have  contacted [facility] who has accepted her to the intensive care unit, no current bed available.  They do not want to proceed with palliative extubation prior to getting a second opinion.     Plan:    For acute hypoxemic respiratory failure: Extubated 1/11/2024 requiring reintubation secondary to progressive hypoxemia and recalcitrant nausea/vomiting.  Continue mechanical ventilation.  Endotracheal tube changed 1/15/2024 secondary to cuff leak.  Continuing to require 50% FiO2  For peritoneal carcinomatosis/metastatic malignancy likely pancreatic origin malignant ascites: Status post initiation of palliative chemotherapy 12/29/2023.  Oncology following.  Repeat large-volume paracentesis 1/17/2023.  Cytology was positive on 12/8/2023 for malignancy of likely pancreaticobiliary origin.  Family has now decided on eventual palliative extubation.  They are awaiting further family member visits.  For vomiting: Unfortunately this has been persistent.  No definite obstruction on prior CT abdomen 1/9/2024.  Continuing Reglan.  For hypothyroidism: Continue levothyroxine.  For anemia: Stable.  Monitor with transfusion as needed.  For leukocytosis/pulmonary infiltrates: Continue Zosyn empirically.  Respiratory culture from 1/17/2024 without growth.  For acute pulmonary embolism: Continue heparin drip.    GI prophylaxis: Protonix.  Nutrition/hyponatremia: Tolerating distal tube feeds.  I am going to increase back to goal.  Holding free water given hyponatremia.       Case discussed with Dr. Farfan from gastroenterology.  On 1/17/2024, I discussed with family in the conference room and relayed my concern about the patient's prognosis in the setting of recurrent malignant ascites requiring multiple paracenteses, persistent nausea vomiting in the setting of peritoneal carcinomatosis and suspected gastroparesis with large hiatal hernia, and failure of previous extubation attempt secondary to the persistent nausea vomiting and  difficulty maintaining an airway.  Despite aggressive measures of prior chemotherapy around the end of the year, repeat large-volume paracenteses, peristalsis stimulators, the patient has had no improvement in her condition and continues to require moderately high ventilator settings (currently 50% FiO2), and has persistent nausea vomiting/reflux requiring persistent gastric suctioning.  I did explain to them that I did not think that it was likely that the patient would reach a point where she could be safely extubated with a reasonable expectation of going home.  I recommended a palliative care consultation and they are in agreement with this.  They did mention that they have been in contact with a provider at [facility].  I am happy to discuss her general pulmonary/critical care issues with any provider at an outpatient facility should they wish to accept the patient in transfer however, given her ongoing critical illness, I think that transfer would be difficult to arrange.  I would be happy to coordinate care in any way I can.  Specific questions related to her cancer care I would recommend discussing with her primary oncologist.    Patient is critically ill secondary to respiratory failure with tenuous respiratory status and has high risk of life-threatening decompensation in condition.   As such, the patient requires continuous monitoring and frequent reassessment for consideration of adjustment in management to minimize this risk.  I personally reassessed the patient multiple times today.    Critical care time : 35 minutes spent by me personally and independently.(This excludes time spent performing separately reportable procedures and services).  Critical care time includes high complexity decision making to assess, manipulate, and support vital organ system failure in this individual who has impairment of one or more vital organ systems such that there is a high probability of imminent  or life threatening deterioration in the patient’s condition.    Electronically signed by:    Marco A Pugh MD  01/21/24  08:44 EST      *. Please note that portions of this note were completed with [email or web address] - a voice recognition program.

## 2024-01-21 NOTE — PROGRESS NOTES
HEPARIN INFUSION  Jessica Kraft is a  63 y.o. female receiving heparin infusion.     Therapy for (VTE/Cardiac): VTE  Patient Weight: 91 kg  Initial Bolus (Y/N): No  Any Bolus (Y/N):  Yes   Signs or Symptoms of Bleeding: none noted per RN     Recommend Xa every 6 hours.   VTE (PE/DVT)   Initial Bolus: 80 units/kg (Max 10,000 units)  Initial rate: 18 units/kg/hr (Max 1,500 units/hr)    Anti Xa Rebolus Infusion Hold time Change infusion Dose (Units/kg/hr) Next Anti Xa Level Due   < 0.11 50 Units/kg  (4000 Units Max) None Increase by  4 Units/kg/hr 6 hours   0.11 - 0.19 25 Units/kg  (2000 Units Max) None Increase by  3 Units/kg/hr 6 hours   0.2 - 0.29 0 None Increase by  2 Units/kg/hr 6 hours   0.3 - 0.7 0 None No Change 6 hours (after 2 consecutive levels in range check qAM)   0.71 - 0.8 0 None Decrease by  1 Units/kg/hr 6 hours   0.81 - 0.9 0 None Decrease by  2 Units/kg/hr 6 hours   0.91 - 1 0 60 Minutes Decrease by  3 Units/kg/hr 6 hours   >1 0 Hold  After Anti Xa less than 0.7 decrease previous rate by  4 Units/kg/hr  Every 2 hours until Anti Xa is less than 0.7 then when infusion restarts in 6 hours     Results from last 7 days   Lab Units 01/18/24  0511 01/17/24  0536 01/16/24  0544 01/13/24  0536 01/12/24  1427   INR   --   --   --   --  1.74*   HEMOGLOBIN g/dL 7.4* 7.3* 7.3*   < >  --    HEMATOCRIT % 22.9* 22.5* 23.0*   < >  --    PLATELETS 10*3/mm3 183 200 190   < >  --     < > = values in this interval not displayed.        Date   Time   Anti-Xa Current Rate (Unit/kg/hr) Bolus   (Units) Rate Change   (Unit/kg/hr) New Rate (Unit/kg/hr) Next   Anti-Xa Comments  Pump Check Daily   01/09 0245 STAT new -- 16.5 16.5 0900 D/w RN   01/09 0912 0.32 16.5 -- -- 16.5 1600 Pump reviewed - D/w JESS Addison    01/10 0130 0.89 16.5 -- -2 14.5 0800 D/w RN   01/10 0819 0.63 14.5 -- -- 14.5 1400 D/W RN, checked pump    1/10 1538 0.47 14.5 -- -- 14.5 2200 D/w RN, drip has not been stopped, running continuously, no problems, will  check Xa again @ 2200 due to drop   1/10 2250 0.35 14.5 -- -- 14.5 0600 D/w RN   1/11 0645 0.28 14.5 -- +1 15.5 1200 D/w RN   1/11 1238 0.35 15.5 -- -- 15.5 2000 D/W Angela RN pump checked   1/11 2007 0.33 15.5 -- -- 15.5 0600 D/W RN   1/12 0547 0.31 15.5 -- -- 15.5 AM labs Pump Checked Kylah 6614 -acb   1/13 0536 0.16 15.5 -- +2.5 17 1400 Dw Rn   1/13 1428 0.26 17 -- +2 19 2200 D/w RN Nathen- pump rate and weight verified   1/13 2216 0.29 19 -- +2 21 0500 DW RN   1/14 0531 0.40 21 -- -- 21 1200 DW RN   1/14 1111 0.42 21 -- -- 21 1800 Dw RN   1/14 1754 0.44 21 -- -- 21 0600 D/w RN   1/15 0536 0.49 21 -- -- 21 AM labs [facility] -b   1/16 0544 0.49 21 -- -- 21 AM labs [id number]   1/17 0536 0.58 21 -- -- 21 AM labs D/w RN   1/17 1130 N/a 21 -- -21 HELD TBD Holding for paracentesis   1/17 1608 N/a HELD for procedure -- +21 21 2200 D/w RN, restart at previous rate   1/17 2156 0.33 21 -- -- 21 0600 Sherley 2469 -acb   1/18 0511 0.52 21 -- -- 21 AM labs Sherley 2469 -acb   1/19 0600 0.60 21 -- -- 21 1/20 0600 D/w RN   1/20 0600 0.51 21 -- -- 21 1/21 0600 D/w RN   1/21 0600 0.57 21 -- -- 21 1/22 0600 D/w RN              Thank you,  John Hyman, PharmD   1/18/2024  06:25 EST

## 2024-01-22 NOTE — TELEPHONE ENCOUNTER
Called and spoke with Dominick and he states that they are working on getting patient transferred to [facility] for the second opinion. Dr. العراقي made aware.

## 2024-01-22 NOTE — PLAN OF CARE
Goal Outcome Evaluation:  Plan of Care Reviewed With: patient        Progress: no change  Outcome Evaluation: .               - pt arousable to voice and following commands. Attempted to slightly wean versed gtt and pt became agitated with stimulation; currently infusing at 9. Fentanyl remains at 300  - NSR per monitor   - BP stable off pressors   - Heparin gtt remains  - On vent 50% FiO2/PEEP 8. Lung sounds coarse. Moderate amount of secretions with ETT suctioning   - Tube feed remains at goal rate. No BM. Hypoactive bowel sounds.   - NG remains to int. LWS. 550mL output this shift. Output has increased towards end of shift and looks suspicious for tube feed contents. Will continue to monitor output for remainder of shift.   - 535mL UOP

## 2024-01-22 NOTE — PLAN OF CARE
Goal Outcome Evaluation:  Plan of Care Reviewed With: patient  Progress: no change     -VSS. Patient arousable to voice and following commands  -remains vented; FiO2 55% and peep 8  -Fentanyl and versed continue to infuse, titrating to patient tolerance  -Heparin gtt continuing to infuse, scant vaginal bleeding noted this AM however no further bleeding noted  -NG to LWS  -Scant output from HiLo suction, x1 large emesis that was green in appearance  -TF infusing @ 70 mL/hr. NG output continues to be green with yellowish tint  -SR on the monitor  -code status changed to No CPR, no cardioversion  -CT abd/pelvis ordered. To be obtained.

## 2024-01-22 NOTE — TELEPHONE ENCOUNTER
----- Message from Jessica Kraft sent at 1/20/2024  9:40 AM EST -----  Regarding: Jessica Kraft cancer question  Contact: [phone]  Dr. العراقي,    We wanted to discuss the possibility of getting a second opinion on everything before they begin the withdrawal of care process. Dr. Figueroa at  was recommended by a friend who underwent surgery for pancreatic cancer last year so we would like to have him to take a look if possible. We just want to feel like we’ve exhausted all options before we go forward with the palliative care process. My dad and I will be at the hospital until probably 6:00 PM today and we’ll have our phones with us at all times if you have a chance to talk. We’d like to get the second opinion as soon as possible

## 2024-01-22 NOTE — CASE MANAGEMENT/SOCIAL WORK
Continued Stay Note   Chon     Patient Name: Jessica Kraft  MRN: [id number]  Today's Date: 1/22/2024    Admit Date: 1/8/2024    Plan: Ongoing   Discharge Plan       Row Name 01/22/24 1645       Plan    Plan Ongoing    Patient/Family in Agreement with Plan other (see comments)    Plan Comments Remains intubated and sedated. Palliative following, family requested second opinion @ .  following.    Final Discharge Disposition Code 30 - still a patient                   Discharge Codes    No documentation.                 Expected Discharge Date and Time       Expected Discharge Date Expected Discharge Time    Jan 23, 2024               Danilo Turk RN

## 2024-01-22 NOTE — PLAN OF CARE
"  Problem: Palliative Care  Goal: Enhanced Quality of Life  Intervention: Optimize Psychosocial Wellbeing  Recent Flowsheet Documentation  Taken 1/22/2024 1300 by Gauri Montelongo \"Taina\" ALLYSON VALDERRAMA  Supportive Measures: (Palliative IDT: TURNER Pugh MD, TUYET DOMINGUEZ, WINSOME Montelongo LCSW, ISADORA Ac RN, WINSOME Liriano MDiv) --  Taken 1/22/2024 1124 by Gauri Montelongo \"Taina\" ALLYSON VALDERRAMA  Supportive Measures: (symptom assessment) other (see comments)  Palliative Care continues to follow for support and assist with plan of care.  Family has requested transfer to [facility] for second opinion.     "

## 2024-01-22 NOTE — PROGRESS NOTES
"INTENSIVIST   PROGRESS NOTE     Hospital:  LOS: 13 days     Chief Complaint: Respiratory failure     Subjective   S     Interval History: Patient remains critically ill.  On mechanical ventilation.  Afebrile.    The patient's relevant past medical, surgical and social history were reviewed and updated in Epic as appropriate.      ROS: Unable to obtain as patient intubated and sedated    Objective   O     Intake/Ouptut 24 hrs (7:00AM - 6:59 AM)  Intake & Output (last 3 days)         01/19 0701 01/20 0700 01/20 0701 01/21 0700 01/21 0701 01/22 0700 01/22 0701 01/23 0700    I.V. (mL/kg) 1154.3 (11) 1838.6 (17.5) 1434.3 (13.7) 170.7 (1.6)    Blood   360     Other 30 90 40     NG/ 680 3391 183    IV Piggyback  50      Total Intake(mL/kg) 2100.3 (20) 2949.6 (28.1) 3505.3 (33.4) 353.7 (3.4)    Urine (mL/kg/hr) 1200 (0.5) 1085 (0.4) 1315 (0.5) 125 (0.4)    Emesis/NG output 550 1350 1200 150    Total Output 1750 2435 2515 275    Net +350.3 +514.6 +990.3 +78.7            Emesis Unmeasured Occurrence   1 x      Medications (drips):  fentanyl 10 mcg/mL, Last Rate: 300 mcg/hr (01/22/24 0638)  heparin, Last Rate: 21 Units/kg/hr (01/22/24 0600)  midazolam, Last Rate: 9 mg/hr (01/22/24 0600)  Pharmacy to Dose Heparin    Respiratory Support: MV    Physical Examination:  Vital Signs: Blood pressure 94/67, pulse 87, temperature 98.4 °F (36.9 °C), temperature source Bladder, resp. rate 24, height 165.1 cm (65\"), weight 105 kg (231 lb 4.2 oz), SpO2 92%.    General: Critically ill appearing. On MV.   Chest: Clear to auscultation bilaterally, No wheezing, rhonchi, or rales. Normal work of breathing. Equal chest rise.  Cardiac: Regular rhythm, normal rate, S1S2 auscultated. No murmurs, rubs or gallops.   Abdomen: Soft but distended.  No appreciable tenderness with palpation the patient intubated on mechanical ventilation.  Positive fluid wave and body wall edema.  Extremities: 2+ lower extremity edema. No clubbing or " cyanosis.  Neuro: Intubated, sedated.    Lines, Drains & Airways       Active LDAs       Name Placement date Placement time Site Days    Peripheral IV 01/08/24 2138 Left Antecubital 01/08/24  2138  Antecubital  13    NG/OG Tube Nasojejunal 12 Fr Right nostril 01/12/24  1711  Right nostril  9    NG/OG Tube Nasogastric 18 Fr Left nostril 01/12/24  1711  Left nostril  9    Urethral Catheter Temperature probe 16 Fr. 01/09/24  0545  -- 13    Hi-Lo Evac ETT 7.5 01/15/24  1334  Oral  6    Arterial Line 01/09/24 Right Radial 01/09/24  1630  Radial  12    Single Lumen Implantable Port 12/26/23 Right Chest 12/26/23  1515  Chest  26             Results from last 7 days   Lab Units 01/22/24  0441 01/21/24  1328 01/21/24  0344 01/20/24  0445   WBC 10*3/mm3 18.03*  --  20.94* 24.48*   HEMOGLOBIN g/dL 7.6* 7.5* 6.7* 7.0*   MCV fL 85.5  --  85.1 84.8   PLATELETS 10*3/mm3 189  --  205 192     Results from last 7 days   Lab Units 01/22/24  0441 01/21/24  0344 01/20/24  0500   SODIUM mmol/L 128* 125* 130*   POTASSIUM mmol/L 3.8 3.8 4.1   CO2 mmol/L 23.0 23.0 22.0   CREATININE mg/dL 0.44* 0.42* 0.44*   GLUCOSE mg/dL 115* 116* 117*   MAGNESIUM mg/dL 1.8 2.4 1.8   PHOSPHORUS mg/dL 3.3 3.6 3.1     Estimated Creatinine Clearance: 159.5 mL/min (A) (by C-G formula based on SCr of 0.44 mg/dL (L)).  Results from last 7 days   Lab Units 01/22/24  0441 01/21/24  0344 01/20/24  0500   ALK PHOS U/L 136* 161* 133*   BILIRUBIN mg/dL 0.2 0.2 0.2   ALT (SGPT) U/L 7 7 5   AST (SGOT) U/L 11 19 10     Images:  CT Abdomen Pelvis With & Without Contrast    Result Date: 1/22/2024  Impression: No definite evidence of bowel obstruction. Mild to moderate stool burden throughout the colon and rectum may represent constipation. Peritoneal carcinomatosis is once again noted with superimposed moderate to large size ascites. Indeterminant subtle hypodensities within the liver, too small to be characterized. Possible narrowing at the confluence of the portal venous  system, although distally the vessels are patent. Subtle thrombosis or stenosis/mass effect of this region is not excluded. Increase in size of bilateral pleural effusion and anasarca. Patchy opacities in the visualized right lung is consistent with ongoing infectious/inflammatory process. Subtle nodular contour of the liver is nonspecific but early cirrhosis is not excluded Electronically Signed: Mert Briggs DO  1/22/2024 6:28 PM EST  Workstation ID: [id number]    XR Chest 1 View    Result Date: 1/22/2024  Impression: No significant interval change. Electronically Signed: Mansoor Shelley MD  1/22/2024 8:05 AM EST  Workstation ID: [id number]    XR Chest 1 View    Result Date: 1/21/2024  Impression: 1.Endotracheal tube in good position. 2.Patchy bilateral airspace opacities appear unchanged, which could reflect pneumonia or pulmonary edema. 3.Small left pleural effusion. Electronically Signed: Sam Ahuja MD  1/21/2024 8:46 AM EST  Workstation ID: [id number]     Results: Reviewed.  I reviewed the patient's new laboratory and imaging results.  I independently reviewed the patient's new images.    Medications: Reviewed.    Assessment & Plan    A / P     Active Hospital Problems    Diagnosis     **Acute respiratory failure with hypoxia     Acute pulmonary embolism     Aspiration pneumonia due to vomitus     Pancreatic cancer     Abdominal carcinomatosis - omental     Peritoneal carcinomatosis     Hypothyroidism due to Hashimoto's thyroiditis      Allan Kraft is a 63 year old female recently found to have metastatic malignancy / peritoneal carcinomatosis of possible pancreatic origin (EUS was done however sample was limited and this was nondiagnostic), peritoneal cytology was positive for malignancy with pancreaticobiliary source favored, history of hypothyroidism, anxiety/depression, followed by medical oncology for the metastatic malignancy and underwent chemotherapy initiated on 12/29/2023.  Patient also was  diagnosed with severe constipation recently.  She developed worsening nausea/vomiting and was brought to the emergency department with decompensated/hypoxemic respiratory failure and altered mental status on 1/8/2023.  She underwent emergent endotracheal intubation.  CT of the head, CTA of the chest/abdomen/pelvis were done and the patient was found to have extensive bilateral pulmonary emboli without significant right heart strain.  She also had patchy airspace disease in the lower lobes and ascites with peritoneal carcinomatosis.  She was hypotensive requiring pressors and was started on antibiotics, heparin drip and admitted to the intensive care unit for ongoing management.     Patient ultimately was extubated on 1/11/2024 however was extremely encephalopathic and agitated postextubation and developed progressive nausea/vomiting.  She ultimately required reintubation.     Patient remains intubated/on MV.  She continues to have significant vomiting of bilious material.  She had an EGD with J-tube placement on 1/12/2024.  A nasogastric tube was attempted during that procedure but likely terminates in the hiatal hernia sac.     Endotracheal tube cuff developed 1/15/2024  which was replaced.      Biggest acute problem right now is her ongoing respiratory failure in the setting of recalcitrant vomiting.  I spoke with gastroenterology who suspects that the patient has gastroparesis in addition to the hiatal hernia.  She has underwent large-volume paracentesis again on 1/17/2024.  Ongoing bilious vomiting and on 1/22 TF was present in emesis prompting repeat CT abdomen.      Patient has persistent leukocytosis.  Cultures nonrevealing.  Antibiotics were stopped and white blood cell count is slowly trending down.     Family wished for a second opinion at [facility] regarding her diagnosis and treatment options. Previous provider contacted [facility] (last week) who initially accepted her to the intensive  care unit but after reviewing material did not feel like they could offer anything different and cancelled transfer // called previous ICU provider to explain on 1/22 AM.     Had an approximately 40 minute [facility] family meeting with patients , 3x siblings and son (via telephone) on 1/22-- chiefly to explain DC transfer following [facility] review. Made DNR and no ACLS on 1/22 and now considering comfort measures pending conversations with other providers over the next 24 hours. Will continue ongoing support for now without de-escalation. CODE status changed in EPIC.      #Acute hypoxemic respiratory failure: Extubated 1/11/2024 requiring reintubation secondary to progressive hypoxemia and recalcitrant nausea/vomiting.  Continue mechanical ventilation.  Endotracheal tube changed 1/15/2024 secondary to cuff leak.  Continuing to require 50% FiO2    #Peritoneal carcinomatosis/metastatic malignancy likely pancreatic origin malignant ascites: Status post initiation of palliative chemotherapy 12/29/2023.  Oncology following.  Repeat large-volume paracentesis 1/17/2023. Cytology was positive on 12/8/2023 for malignancy of likely pancreaticobiliary origin. Discussed halted transfer with Oncology on 1/22 who will also touch base with family on 1/23. Limited options     #Vomiting: Unfortunately this has been persistent.  No definite obstruction on prior CT abdomen 1/9/2024.  Continuing Reglan. Repeated CTAP on 1/22 which showed evolution of illness but no obvious obstruction    #Hypothyroidism: Continue levothyroxine.  #Anemia: Stable.  Monitor with transfusion as needed.  #Leukocytosis/pulmonary infiltrates: Continue Zosyn empirically.  Respiratory culture from 1/17/2024 without growth.  #Acute pulmonary embolism: Continue heparin drip    F-Tube feeds per dietary recs  A-Fentynl   S-Versed   T-Heparin gtt for PE   H-Head of bed greater than 30 degrees  U-PPI  G-FSBS per unit protocol, correction dose insulin  S-SBT   B-Will  monitor daily and provide regimen if indicated  I-PIV  D-NA    Advance Directives:   Code Status and Medical Interventions:   Ordered at: 01/09/24 0236     Code Status (Patient has no pulse and is not breathing):    CPR (Attempt to Resuscitate)     Medical Interventions (Patient has pulse or is breathing):    Full Support     High level of risk due to severe exacerbation of chronic illness and illness with threat to life or bodily function.    I conducted multidisciplinary rounds in the plan of care was discussed with the multidisciplinary team at that time. In attendance at multidisciplinary rounds was clinical pharmacist, dietitian, nursing staff and case management.    I discussed the patient's findings and my recommendations with patient, family, nursing staff, and consulting provider    Critical Care Time: Critical Care time spent in direct patient care: 50 minutes (excluding procedure time, if applicable) including high complexity decision making to assess, manipulate, and support vital organ system failure in this individual who has impairment of one or more vital organ systems such that there is a high probability of imminent or life threatening deterioration in the patient’s condition.     -- Alonzo Martinez MD  Pulmonary/Critical Care

## 2024-01-23 NOTE — PROGRESS NOTES
"HEMATOLOGY/ONCOLOGY PROGRESS NOTE    S: Respiratory at bedside suctioning patient at time of my assessment.  Patient's  and son were updated by phone.      ALLERGIES:  No Known Allergies      Physical Exam    VITAL SIGNS:  BP 94/65 (BP Location: Left arm, Patient Position: Lying)   Pulse 91   Temp 99.1 °F (37.3 °C) (Bladder)   Resp 24   Ht 165.1 cm (65\")   Wt 104 kg (229 lb 4.5 oz)   LMP  (LMP Unknown) Comment: N/A  SpO2 96%   BMI 38.15 kg/m²   Temp:  [98.8 °F (37.1 °C)-99.5 °F (37.5 °C)] 99.1 °F (37.3 °C)      Performance Status:                Physical Exam  Remains intubated and sedated.    She is stimulating to functioning at the time of the interview.    CV regular rate and rhythm  Clear to auscultation anteriorly  Diffuse anasarca and lower extremity edema.        RECENT LABS:    Lab Results   Component Value Date    HGB 7.8 (L) 01/23/2024    HCT 25.1 (L) 01/23/2024    MCV 85.7 01/23/2024     01/23/2024    WBC 18.12 (H) 01/23/2024    NEUTROABS 16.13 (H) 01/23/2024    LYMPHSABS 0.45 (L) 01/10/2024    MONOSABS 0.27 01/10/2024    EOSABS 0.18 01/23/2024    BASOSABS 0.00 01/23/2024       Lab Results   Component Value Date    GLUCOSE 111 (H) 01/23/2024    BUN 17 01/23/2024    CREATININE 0.47 (L) 01/23/2024     (L) 01/23/2024    K 3.9 01/23/2024    CL 93 (L) 01/23/2024    CO2 23.0 01/23/2024    CALCIUM 7.9 (L) 01/23/2024    PROTEINTOT 5.6 (L) 01/23/2024    ALBUMIN 2.2 (L) 01/23/2024    BILITOT 0.2 01/23/2024    ALKPHOS 182 (H) 01/23/2024    AST 25 01/23/2024    ALT 7 01/23/2024         Assessment/Plan  Metastatic carcinoma with diffuse peritoneal metastases and malignant ascites, most consistent with primary pancreatic malignancy.  Respiratory failure in the context of aspiration pneumonia and massive PE status post thrombectomy  Malignancy induced nausea and vomiting secondary to peritoneal involvement of her disease    Patient's case was discussed yesterday with pulmonary attending, and " discussed today with bedside nurse as well as the patient's family.  In the context of her primary refractory metastatic disease, her prognosis is very poor.  Unfortunately, she has not recovered from her recent pulmonary insults including aspiration pneumonia and extensive PE.  She also has ongoing gut dysmotility which is likely related to her underlying peritoneal disease.    I discussed guarded prognosis with the patient's  and son.  They are clear that they do not want to continue to prolong life support measures without substantial hope of recovery, and do not want to escalate measures (tracheostomy etc.).  I recommended that they continue to discuss with the palliative care team and ICU team consideration of de-escalation of care/transition to comfort care approach when the family is ready.  They had multiple insightful questions regarding her prognosis and what to anticipate during this transition.  I remain available and the family knows to contact me with additional questions.    MDM aggressive malignancy with disease progression, critically ill, goals of care discussion.  Time spent 50 min throughout the day.    Sherri العراقي MD  [facility]    1/23/2024

## 2024-01-23 NOTE — PROGRESS NOTES
"Palliative Care Daily Progress Note     Referring: Marco A Pugh    C/C: pt unable    S: Medical record reviewed. Events noted. Family requested and referral made to .  Initially acc epted but then declined by .  Family expresses they felt they had to try eveything and that they now feel they have done so.  Expressed with satisfaction with care provided at [facility].  Requested to answer questions re: palliative extubation.  + ongoing NG output.    ROS: pt u nable    O: Code Status:   Code Status and Medical Interventions:   Ordered at: 01/22/24 1506     Medical Intervention Limits:    NO cardioversion     Code Status (Patient has no pulse and is not breathing):    No CPR (Do Not Attempt to Resuscitate)     Medical Interventions (Patient has pulse or is breathing):    Limited Support      Advanced Directives: Advance Directive Status: Patient does not have advance directive   Goals of Care: Ongoing.   Palliative Performance Scale Score: 10%     /70   Pulse 88   Temp 99.5 °F (37.5 °C)   Resp 24   Ht 165.1 cm (65\")   Wt 104 kg (229 lb 4.5 oz)   LMP  (LMP Unknown) Comment: N/A  SpO2 92%   BMI 38.15 kg/m²     Intake/Output Summary (Last 24 hours) at 1/23/2024 0817  Last data filed at 1/23/2024 0600  Gross per 24 hour   Intake 2866.03 ml   Output 1885 ml   Net 981.03 ml       PE:  General Appearance:    Alert, cooperative, NAD   HEENT:    anicteric, MMM, face relaxed   Neck:   supple, trachea midline, no JVD   Lungs:     Coarse BS bilaterally, diminished in bases; respirations regular, even and unlabored, overbreathes vent    Heart:    RRR, normal S1 and S2, no M/R/G   Abdomen:     Normal bowel sounds, distended, + anasarca   G/U:   Deferred   MSK/Extremities:   + edema   Pulses:   Pulses palpable and equal bilaterally   Skin:   Warm, dry   Neurologic:   A/Ox3, cooperative, RAMSEY   Psych:   Calm, appropriate     Meds: Reviewed and changes not noted    Labs:   Results from last 7 days   Lab Units 01/23/24  0446 "   WBC 10*3/mm3 18.12*   HEMOGLOBIN g/dL 7.8*   HEMATOCRIT % 25.1*   PLATELETS 10*3/mm3 213     Results from last 7 days   Lab Units 01/23/24  0446   SODIUM mmol/L 125*   POTASSIUM mmol/L 3.9   CHLORIDE mmol/L 93*   CO2 mmol/L 23.0   BUN mg/dL 17   CREATININE mg/dL 0.47*   GLUCOSE mg/dL 111*   CALCIUM mg/dL 7.9*     Results from last 7 days   Lab Units 01/23/24  0446   SODIUM mmol/L 125*   POTASSIUM mmol/L 3.9   CHLORIDE mmol/L 93*   CO2 mmol/L 23.0   BUN mg/dL 17   CREATININE mg/dL 0.47*   CALCIUM mg/dL 7.9*   BILIRUBIN mg/dL 0.2   ALK PHOS U/L 182*   ALT (SGPT) U/L 7   AST (SGOT) U/L 25   GLUCOSE mg/dL 111*     Imaging Results (Last 72 Hours)       Procedure Component Value Units Date/Time    XR Chest 1 View [[id number]] Collected: 01/23/24 0740     Updated: 01/23/24 0745    Narrative:      XR CHEST 1 VW    Date of Exam: 1/23/2024 2:10 AM EST    Indication: f/u respiratory illness    Comparison: 1/22/2024    Findings:  Exam limited due to rotation. There is stable position of endotracheal, feeding and nasogastric tubes. Stable appearance of the cardiopulmonary silhouette with pulmonary vascular congestion. No interval change diffuse coarse reticular lung disease with   patchy areas of airspace component or mass in particular in the left perihilar region. Stable mild to moderate left pleural effusion. Right intrajugular chest port in place      Impression:      Impression:  Stable chest      Electronically Signed: Leighton Spivey MD    1/23/2024 7:42 AM EST    Workstation ID: [id number]    CT Abdomen Pelvis With & Without Contrast [[id number]] Collected: 01/22/24 1815     Updated: 01/22/24 1831    Narrative:      CT ABDOMEN PELVIS W WO CONTRAST    Date of Exam: 1/22/2024 5:29 PM EST    Indication: Bowel obstruction suspected.    Comparison: 1/9/2024    Technique: Axial CT images were obtained of the abdomen and pelvis before and after the uneventful intravenous administration of 100 cc Isovue-300. Sagittal and coronal  reconstructions were performed.  Automated exposure control and iterative   reconstruction methods were used.    Findings:  Visualized Chest: Moderate bilateral pleural effusions, left greater than right. There is associated compressive atelectasis of the left lung. Additional patchy opacities are noted within the lung bases consistent with ongoing infectious/inflammatory   process.    Liver: Subtle nodular hepatic contour. Scattered small hypodensities are unchanged, nonspecific and too small to be characterized on this study.    Gallbladder: Status post cholecystectomy.    Bile Ducts: No billiary dcutal dilation.    Spleen: Unremarkable    Pancreas: Atrophy of the pancreatic body with associated mild pancreatic ductal dilation, unchanged    Adrenals: Adrenal glands are unremarkable.    Kidneys: Kidneys are normal in size. There are no stones or hydronephrosis.    Gastrointestinal: A small to moderate size hiatal/paraesophageal hernia is once again noted. An enteric tube is noted with the distal tip in the stomach. An additional enteric tube is noted with the distal tip in the jejunum.  Oral contrast is present within the small bowel. No evidence of significantly dilated loops of bowel to suggest bowel obstruction.  Mild to moderate stool burden throughout the colon and rectum. No definite acute inflammatory changes.    Bladder: Almost completely decompressed with a Romero catheter.    Pelvis: Unchanged in appearance.    Peritoneum/Mesentery: Peritoneal carcinomatosis is once again noted. Superimposed moderate to large sized ascites is also noted.      Lymph Nodes: No lymphadenopathy.    Vasculature: There is mild narrowing of the proximal portal vein, nonspecific. Distally the portal vein is patent. The SMV is also patent.  Otherwise the visualized vessels    Abdominal Wall: Extensive soft tissue edema, worsened since prior study. No definite organizing fluid collection.    Bony Structures: No acute osseous  abnormality      Impression:      Impression:  No definite evidence of bowel obstruction.    Mild to moderate stool burden throughout the colon and rectum may represent constipation.    Peritoneal carcinomatosis is once again noted with superimposed moderate to large size ascites.    Indeterminant subtle hypodensities within the liver, too small to be characterized.    Possible narrowing at the confluence of the portal venous system, although distally the vessels are patent. Subtle thrombosis or stenosis/mass effect of this region is not excluded.    Increase in size of bilateral pleural effusion and anasarca.    Patchy opacities in the visualized right lung is consistent with ongoing infectious/inflammatory process.    Subtle nodular contour of the liver is nonspecific but early cirrhosis is not excluded      Electronically Signed: Mert Briggs DO    1/22/2024 6:28 PM EST    Workstation ID: [id number]    XR Chest 1 View [[id number]] Collected: 01/22/24 0803     Updated: 01/22/24 0808    Narrative:      XR CHEST 1 VW    Date of Exam: 1/22/2024 5:36 AM EST    Indication: f/u respiratory illness    Comparison: Chest radiograph 1/21/2024.    Findings:  Unchanged position of endotracheal tube, right chest port, nasogastric tube, and visualized portions of the feeding catheter. Cardiomediastinal silhouette is unchanged. Similar multifocal patchy airspace opacities, including similar focal opacity in the   left perihilar region. Similar small bilateral pleural effusions. No pneumothorax. Osseous structures are unchanged.      Impression:      Impression:  No significant interval change.      Electronically Signed: Mansoor Shelley MD    1/22/2024 8:05 AM EST    Workstation ID: [id number]    XR Chest 1 View [[id number]] Collected: 01/21/24 0845     Updated: 01/21/24 0850    Narrative:      XR CHEST 1 VW    Date of Exam: 1/21/2024 3:10 AM EST    Indication: f/u respiratory illness    Comparison: January 20,  2024    Findings:  An endotracheal tube has its tip in the mid trachea. A Dobbhoff tube has its tip below the diaphragm. A right internal jugular port has its tip at the low SVC. Patchy bilateral airspace opacities appear unchanged. There is a small left pleural effusion.   The heart and mediastinal contours appear normal. The osseous structures appear intact.      Impression:      Impression:  1.Endotracheal tube in good position.  2.Patchy bilateral airspace opacities appear unchanged, which could reflect pneumonia or pulmonary edema.  3.Small left pleural effusion.      Electronically Signed: Sam Ahuja MD    1/21/2024 8:46 AM EST    Workstation ID: [id number]    XR Chest 1 View [[id number]] Collected: 01/20/24 0909     Updated: 01/20/24 0914    Narrative:      XR CHEST 1 VW    Date of Exam: 1/20/2024 2:45 AM EST    Indication: f/u respiratory illness    Comparison: January 19, 2024 and prior    Findings:  Tubes and lines appear in stable positions. Enteric tube likely terminates in hiatal hernia seen on recent prior chest CT.    Patchy bilateral airspace opacities appear similar to the prior exam. There appears to be mild left basilar consolidation and probable small left pleural effusion, as before. No definite pneumothorax. Heart size appears unchanged.      Impression:      Impression:  1.Patchy bilateral airspace opacities and mild left basilar consolidation, as before.  2.Tubes and lines appear in stable positions.  3.Probable small left pleural effusion, as before.        Electronically Signed: Roderick Al    1/20/2024 9:11 AM EST    Workstation ID: [id number]                  Diagnostics: Reviewed    A:     Acute respiratory failure with hypoxia    Hypothyroidism due to Hashimoto's thyroiditis    Abdominal carcinomatosis - omental    Peritoneal carcinomatosis    Pancreatic cancer    Acute pulmonary embolism    Aspiration pneumonia due to vomitus       Impression:  Metsastatic CA  - presumed pancreatic -  with carcinomatosis  Acute hypoxic resp failure  Hypothyroidism due to Hashimoto's  Acute PE  Aspiration PNA     Symptoms:  Dyspnea  Debility  CA pain  Vomiting  Anasarca    P:   Met with spouse and son.  Answered questions re: palliative extubation.  They are considering near future.  Answered all questions.  Discussed with RN and intensivist.  Happy to assist with transitoin when family ready.  Palliative Care Team will continue to follow patient.     Yesenia Pugh MD  1/23/2024  Time spent:41

## 2024-01-23 NOTE — PROGRESS NOTES
"INTENSIVIST   PROGRESS NOTE     Hospital:  LOS: 14 days     Chief Complaint: Respiratory failure     Subjective   S     Interval History: Patient remains critically ill.  On mechanical ventilation.  Afebrile.    The patient's relevant past medical, surgical and social history were reviewed and updated in Epic as appropriate.      ROS: Unable to obtain as patient intubated and sedated    Objective   O     Intake/Ouptut 24 hrs (7:00AM - 6:59 AM)  Intake & Output (last 3 days)         01/19 0701 01/20 0700 01/20 0701 01/21 0700 01/21 0701 01/22 0700 01/22 0701 01/23 0700    I.V. (mL/kg) 1154.3 (11) 1838.6 (17.5) 1434.3 (13.7) 170.7 (1.6)    Blood   360     Other 30 90 40     NG/ 742 4919 183    IV Piggyback  50      Total Intake(mL/kg) 2100.3 (20) 2949.6 (28.1) 3505.3 (33.4) 353.7 (3.4)    Urine (mL/kg/hr) 1200 (0.5) 1085 (0.4) 1315 (0.5) 125 (0.4)    Emesis/NG output 550 1350 1200 150    Total Output 1750 2435 2515 275    Net +350.3 +514.6 +990.3 +78.7            Emesis Unmeasured Occurrence   1 x      Medications (drips):  fentanyl 10 mcg/mL, Last Rate: 300 mcg/hr (01/23/24 0701)  heparin, Last Rate: 21 Units/kg/hr (01/23/24 0902)  midazolam, Last Rate: 10 mg/hr (01/23/24 0925)  Pharmacy to Dose Heparin    Respiratory Support: MV    Physical Examination:  Vital Signs: Blood pressure 113/71, pulse 98, temperature 98.8 °F (37.1 °C), temperature source Bladder, resp. rate 24, height 165.1 cm (65\"), weight 104 kg (229 lb 4.5 oz), SpO2 96%.    General: Critically ill appearing. On MV.   Chest: Mild expiratory wheezing on today's exam. Normal work of breathing. Equal chest rise.  Cardiac: Regular rhythm, normal rate, S1S2 auscultated. No murmurs, rubs or gallops.   Abdomen: Soft but distended.  No appreciable tenderness with palpation the patient intubated on mechanical ventilation.  Positive fluid wave and body wall edema.  Extremities: 2+ lower extremity edema. No clubbing or cyanosis.  Neuro: Intubated, " sedated.    Lines, Drains & Airways       Active LDAs       Name Placement date Placement time Site Days    Peripheral IV 01/08/24 2138 Left Antecubital 01/08/24  2138  Antecubital  13    NG/OG Tube Nasojejunal 12 Fr Right nostril 01/12/24  1711  Right nostril  9    NG/OG Tube Nasogastric 18 Fr Left nostril 01/12/24  1711  Left nostril  9    Urethral Catheter Temperature probe 16 Fr. 01/09/24  0545  -- 13    Hi-Lo Evac ETT 7.5 01/15/24  1334  Oral  6    Arterial Line 01/09/24 Right Radial 01/09/24  1630  Radial  12    Single Lumen Implantable Port 12/26/23 Right Chest 12/26/23  1515  Chest  26             Results from last 7 days   Lab Units 01/23/24  0446 01/22/24  0441 01/21/24  1328 01/21/24  0344   WBC 10*3/mm3 18.12* 18.03*  --  20.94*   HEMOGLOBIN g/dL 7.8* 7.6* 7.5* 6.7*   MCV fL 85.7 85.5  --  85.1   PLATELETS 10*3/mm3 213 189  --  205     Results from last 7 days   Lab Units 01/23/24  0446 01/22/24  0441 01/21/24  0344   SODIUM mmol/L 125* 128* 125*   POTASSIUM mmol/L 3.9 3.8 3.8   CO2 mmol/L 23.0 23.0 23.0   CREATININE mg/dL 0.47* 0.44* 0.42*   GLUCOSE mg/dL 111* 115* 116*   MAGNESIUM mg/dL 2.4 1.8 2.4   PHOSPHORUS mg/dL 3.5 3.3 3.6     Estimated Creatinine Clearance: 148.5 mL/min (A) (by C-G formula based on SCr of 0.47 mg/dL (L)).  Results from last 7 days   Lab Units 01/23/24 0446 01/22/24  0441 01/21/24  0344   ALK PHOS U/L 182* 136* 161*   BILIRUBIN mg/dL 0.2 0.2 0.2   ALT (SGPT) U/L 7 7 7   AST (SGOT) U/L 25 11 19     Images:  XR Chest 1 View    Result Date: 1/23/2024  Impression: Stable chest Electronically Signed: Leighton Spivey MD  1/23/2024 7:42 AM EST  Workstation ID: [id number]    CT Abdomen Pelvis With & Without Contrast    Result Date: 1/22/2024  Impression: No definite evidence of bowel obstruction. Mild to moderate stool burden throughout the colon and rectum may represent constipation. Peritoneal carcinomatosis is once again noted with superimposed moderate to large size ascites.  Indeterminant subtle hypodensities within the liver, too small to be characterized. Possible narrowing at the confluence of the portal venous system, although distally the vessels are patent. Subtle thrombosis or stenosis/mass effect of this region is not excluded. Increase in size of bilateral pleural effusion and anasarca. Patchy opacities in the visualized right lung is consistent with ongoing infectious/inflammatory process. Subtle nodular contour of the liver is nonspecific but early cirrhosis is not excluded Electronically Signed: Mert Briggs DO  1/22/2024 6:28 PM EST  Workstation ID: [id number]    XR Chest 1 View    Result Date: 1/22/2024  Impression: No significant interval change. Electronically Signed: Mansoor Shelley MD  1/22/2024 8:05 AM EST  Workstation ID: [id number]     Results: Reviewed.  I reviewed the patient's new laboratory and imaging results.  I independently reviewed the patient's new images.    Medications: Reviewed.    Assessment & Plan    A / P     Active Hospital Problems    Diagnosis     **Acute respiratory failure with hypoxia     Acute pulmonary embolism     Aspiration pneumonia due to vomitus     Pancreatic cancer     Abdominal carcinomatosis - omental     Peritoneal carcinomatosis     Hypothyroidism due to Hashimoto's thyroiditis      Allan Kraft is a 63 year old female recently found to have metastatic malignancy / peritoneal carcinomatosis of possible pancreatic origin (EUS was done however sample was limited and this was nondiagnostic), peritoneal cytology was positive for malignancy with pancreaticobiliary source favored, history of hypothyroidism, anxiety/depression, followed by medical oncology for the metastatic malignancy and underwent chemotherapy initiated on 12/29/2023.  Patient also was diagnosed with severe constipation recently.  She developed worsening nausea/vomiting and was brought to the emergency department with decompensated/hypoxemic respiratory failure and  altered mental status on 1/8/2023.  She underwent emergent endotracheal intubation.  CT of the head, CTA of the chest/abdomen/pelvis were done and the patient was found to have extensive bilateral pulmonary emboli without significant right heart strain.  She also had patchy airspace disease in the lower lobes and ascites with peritoneal carcinomatosis.  She was hypotensive requiring pressors and was started on antibiotics, heparin drip and admitted to the intensive care unit for ongoing management.     Patient ultimately was extubated on 1/11/2024 however was extremely encephalopathic and agitated postextubation and developed progressive nausea/vomiting.  She ultimately required reintubation.     Patient remains intubated/on MV.  She continues to have significant vomiting of bilious material.  She had an EGD with J-tube placement on 1/12/2024.  A nasogastric tube was attempted during that procedure but likely terminates in the hiatal hernia sac.     Endotracheal tube cuff developed 1/15/2024  which was replaced.      Biggest acute problem right now is her ongoing respiratory failure in the setting of recalcitrant vomiting.  I spoke with gastroenterology who suspects that the patient has gastroparesis in addition to the hiatal hernia.  She has underwent large-volume paracentesis again on 1/17/2024.  Ongoing bilious vomiting and on 1/22 TF was present in emesis prompting repeat CT abdomen.      Patient has persistent leukocytosis.  Cultures nonrevealing.  Antibiotics were stopped and white blood cell count is slowly trending down.     Family wished for a second opinion at [facility] regarding her diagnosis and treatment options. Previous provider contacted [facility] (last week) who initially accepted her to the intensive care unit but after reviewing material did not feel like they could offer anything different and cancelled transfer // called previous ICU provider to explain on 1/22 AM.     Had an  approximately 40 minute [facility] family meeting with patients , 3x siblings and son (via telephone) on 1/22-- chiefly to explain DC transfer following [facility] review. Made DNR and no ACLS on 1/22 and now considering comfort measures pending conversations with other providers over the next 24 hours. Will continue ongoing support for now without de-escalation. CODE status changed in [facility]. Palliative care and oncology spoke with family on 1/23 AM     #Acute hypoxemic respiratory failure: Extubated 1/11/2024 requiring reintubation secondary to progressive hypoxemia and recalcitrant nausea/vomiting.  Continue mechanical ventilation.  Endotracheal tube changed 1/15/2024 secondary to cuff leak. Titration as able    #Peritoneal carcinomatosis/metastatic malignancy likely pancreatic origin malignant ascites: Status post initiation of palliative chemotherapy 12/29/2023.  Oncology following.  Repeat large-volume paracentesis 1/17/2023. Cytology was positive on 12/8/2023 for malignancy of likely pancreaticobiliary origin. Discussed halted transfer with Oncology on 1/22 who will also touch base with family on 1/23. Limited options     #Vomiting: Unfortunately this has been persistent.  No definite obstruction on prior CT abdomen 1/9/2024.  Continuing Reglan. Repeated CTAP on 1/22 which showed evolution of illness but no obvious obstruction (see details above)     #Hypothyroidism: Continue levothyroxine.  #Anemia: Stable.  Monitor with transfusion as needed.  #Leukocytosis/pulmonary infiltrates: Continue Zosyn empirically.  Respiratory culture from 1/17/2024 without growth.  #Acute pulmonary embolism: Continue heparin drip    F-Tube feeds per dietary recs  A-Fentynl   S-Versed   T-Heparin gtt for PE   H-Head of bed greater than 30 degrees  U-PPI  G-FSBS per unit protocol, correction dose insulin  S-SBT   B-Will monitor daily and provide regimen if indicated  I-PIV  D-NA    Advance Directives:   Code Status and Medical Interventions:    Ordered at: 01/22/24 1506     Medical Intervention Limits:    NO cardioversion     Code Status (Patient has no pulse and is not breathing):    No CPR (Do Not Attempt to Resuscitate)     Medical Interventions (Patient has pulse or is breathing):    Limited Support     High level of risk due to severe exacerbation of chronic illness and illness with threat to life or bodily function.    I conducted multidisciplinary rounds in the plan of care was discussed with the multidisciplinary team at that time. In attendance at multidisciplinary rounds was clinical pharmacist, dietitian, nursing staff and case management.    I discussed the patient's findings and my recommendations with patient, family, nursing staff, and consulting provider    Critical Care Time: Critical Care time spent in direct patient care: 30 minutes (excluding procedure time, if applicable) including high complexity decision making to assess, manipulate, and support vital organ system failure in this individual who has impairment of one or more vital organ systems such that there is a high probability of imminent or life threatening deterioration in the patient’s condition.     -- Alonzo Martinez MD  Pulmonary/Critical Care

## 2024-01-23 NOTE — PLAN OF CARE
Problem: Adult Inpatient Plan of Care  Goal: Plan of Care Review  Outcome: Ongoing, Not Progressing  Flowsheets (Taken 1/23/2024 0554)  Progress: no change  Outcome Evaluation: Patient with no significant changes overnight. Mechanical ventilation- FiO2 65%, PEEP 8. Fentanyl and midzolam for comfort/sedation. Unable to tolerate weaning d/t excessive coughing/gagging. Patient arousable and following commands. Heparin infusing per pharmacy. NG to Intermittent LWS, 350mL yellow/green output. HiLo port remains to continuous suction with 100mL of yellow ouput. BMx4. UOP adequate. Tmax 37.5.     Problem: Restraint, Nonviolent  Goal: Absence of Harm or Injury  Outcome: Ongoing, Not Progressing  Intervention: Implement Least Restrictive Safety Strategies  Recent Flowsheet Documentation  Taken 1/23/2024 0428 by Destiney Sheth, RN  Medical Device Protection:   IV pole/bag removed from visual field   tubing secured  Less Restrictive Alternative:   bed alarm in use   safety enhancements provided   sensory stimulation limited   surveillance provided   therapeutic music  De-Escalation Techniques:   appropriate behavior reinforced   quiet time facilitated   reoriented   stimulation decreased   verbally redirected   medication administered  Diversional Activities: music  Taken 1/23/2024 0400 by Destiney Sheth, RN  Medical Device Protection:   IV pole/bag removed from visual field   tubing secured  Less Restrictive Alternative:   bed alarm in use   safety enhancements provided   sensory stimulation limited   surveillance provided   therapeutic music  De-Escalation Techniques:   appropriate behavior reinforced   quiet time facilitated   reoriented   stimulation decreased   verbally redirected   medication administered  Diversional Activities: music  Taken 1/23/2024 0200 by Destiney Sheth, RN  Medical Device Protection:   tubing secured   IV pole/bag removed from visual field  Less Restrictive Alternative:   bed alarm in use   safety  enhancements provided   sensory stimulation limited   surveillance provided   therapeutic music  De-Escalation Techniques:   appropriate behavior reinforced   quiet time facilitated   reoriented   stimulation decreased   verbally redirected   medication administered  Diversional Activities: music  Taken 1/23/2024 0000 by Destiney Sheth RN  Medical Device Protection:   IV pole/bag removed from visual field   tubing secured  Less Restrictive Alternative:   bed alarm in use   safety enhancements provided   sensory stimulation limited   surveillance provided   therapeutic music  De-Escalation Techniques:   appropriate behavior reinforced   quiet time facilitated   reoriented   stimulation decreased   verbally redirected   medication administered  Diversional Activities: music  Taken 1/22/2024 2200 by Destiney Sheth RN  Medical Device Protection:   IV pole/bag removed from visual field   tubing secured  Less Restrictive Alternative:   bed alarm in use   safety enhancements provided   sensory stimulation limited   surveillance provided   therapeutic music  De-Escalation Techniques:   appropriate behavior reinforced   quiet time facilitated   reoriented   stimulation decreased   verbally redirected   medication administered  Diversional Activities: music  Taken 1/22/2024 2000 by Destiney Sheth RN  Medical Device Protection:   IV pole/bag removed from visual field   tubing secured  Less Restrictive Alternative:   bed alarm in use   safety enhancements provided   sensory stimulation limited   surveillance provided   therapeutic music  De-Escalation Techniques:   appropriate behavior reinforced   quiet time facilitated   reoriented   stimulation decreased   verbally redirected   medication administered  Diversional Activities: music  Intervention: Protect Dignity, Rights, and Personal Wellbeing  Recent Flowsheet Documentation  Taken 1/23/2024 0400 by Destiney Sheth RN  Trust Relationship/Rapport:   care explained   reassurance  provided  Taken 1/23/2024 0200 by Destiney Sheth RN  Trust Relationship/Rapport:   care explained   reassurance provided  Taken 1/23/2024 0000 by Destiney Sheth RN  Trust Relationship/Rapport:   care explained   reassurance provided  Taken 1/22/2024 2200 by Destiney Sheth RN  Trust Relationship/Rapport:   care explained   reassurance provided  Taken 1/22/2024 2000 by Destiney Sheth RN  Trust Relationship/Rapport:   care explained   reassurance provided  Intervention: Protect Skin and Joint Integrity  Recent Flowsheet Documentation  Taken 1/23/2024 0400 by Destiney Sheth RN  Body Position:   weight shifting   tilted   left   lower extremity elevated   upper extremity elevated  Taken 1/23/2024 0200 by Destiney Sheth RN  Body Position:   weight shifting   supine   upper extremity elevated   lower extremity elevated  Taken 1/23/2024 0000 by Destiney Sheth RN  Body Position:   weight shifting   tilted   right   lower extremity elevated   upper extremity elevated  Taken 1/22/2024 2200 by Destiney Sheth RN  Body Position:   weight shifting   tilted   left   lower extremity elevated   upper extremity elevated  Taken 1/22/2024 2000 by Destiney Sheth RN  Body Position:   weight shifting   supine   upper extremity elevated   lower extremity elevated

## 2024-01-23 NOTE — PROGRESS NOTES
Clinical Nutrition     Nutrition Support Assessment  Reason for Visit: Follow-up protocol, EN      Patient Name: Jessica Kraft  YOB: 1960  MRN: [id number]  Date of Encounter: 01/23/24 13:56 EST  Admission date: 1/8/2024  Nutrition Assessment   Admission Diagnosis:  Acute respiratory failure with hypoxia [J96.01]      Problem List:    Acute respiratory failure with hypoxia    Hypothyroidism due to Hashimoto's thyroiditis    Abdominal carcinomatosis - omental    Peritoneal carcinomatosis    Pancreatic cancer    Acute pulmonary embolism    Aspiration pneumonia due to vomitus        PMH:   She  has a past medical history of Abscess, Anxiety, Bilateral ovarian cysts, Depression, Encounter for routine gynecological examination, Foot pain, H/O bone density study (06/2014), H/O mammogram (07/2016), Hot flashes, menopausal, Hypothyroidism due to Hashimoto's thyroiditis, Insomnia, Miscarriage, Osteopenia, Pap smear for cervical cancer screening (06/2014), Routine general medical examination at a health care facility, Urinary incontinence, Urinary tract infection, and Vitamin D deficiency.    PSH:  She  has a past surgical history that includes Cholecystectomy (2008); Hernia repair (2010); Abdominoplasty (2001); Colonoscopy (07/2012); Ectopic pregnancy surgery (Left, 1994); Esophagogastroduodenoscopy (N/A, 12/13/2023); Cardiac catheterization (N/A, 1/9/2024); and Esophagogastroduodenoscopy (N/A, 1/12/2024).      Applicable Nutrition Concerns:   Skin: skin tear  GI: ascites, last bm 1-23, ngt= 850ml past 24 hours      Applicable Interval History:     ARF/VENT 1-8-24    s/p mechanical thrombectomy 1-9-24    Extubated, and re-intubated 1-11-24    ET changed out 1-15-24    s/p Paracentesis 1-17-24: 5.75L drained off    Reported/Observed/Food/Nutrition Related History:     1-23-24: pt intubated, sedated,  + fentanyl, versed, heparin    Per RN: TF decreased to 35ml per MD, pt tolerating at present,  has had ~ 500ml bile from ngt, is having bm's now,  plan for palliative extubation tomorrow    1-19-24: pt intubated, sedated, fentanyl, versed, heparin    Per RN: pt follow some commands, has been tolerating TF, did vomit once this am, TF paused, ngt output is clear yellow bile      1-17-24: pt intubated, sedated + fentanyl ,versed, heparin    Per RN: pt tolerating TF, has not had any vomiting this shift, had ~ 6L taken off during paracentesis    1-15-24: pt intubated, sedated+ fentanyl, versed, heparin    Per RN: pt vomited last night, had massive bm, has had another bm this shift, did vomit again when turned, ngt to LWS, output appears to be bile, no TF seen, rate increased to 30ml/hr      1-12-24: pt intubated, sedated  + precedex, fentanyl, heparin, levophed 0.08mcg, NS@75ml    Per RN: pt will follow commands, but intermittently wild, thrashing around, still having a lot of ngt output, abdomen taut, ? need for paracentesis    Plan for njt placement per GI today, and initiation of trophic feed      1-11-24:pt intubated, sedated + precedex, fentanyl, heparin, levophed 0.08mcg. NS@75ml    Per RN: ogt just replaced with ngt, pt still having significant output, had had ~300ml this am so far, (noted total of 850ml past 24 hours), no bm yet, have started reglan today      1-9-24: Pt intubated and sedated,  at bedside  + precedex, fentanyl, levophed 0.08mcg, heparin     reports pt has had nausea and vomiting for the past day and a half    Per RN: plan for possible EKOS catheter today    Labs    Labs Reviewed: Yes     Results from last 7 days   Lab Units 01/23/24  0446 01/22/24  0441 01/21/24  0344 01/17/24  0536 01/16/24  2222   GLUCOSE mg/dL 111* 115* 116*   < >  --    BUN mg/dL 17 15 17   < >  --    CREATININE mg/dL 0.47* 0.44* 0.42*   < >  --    SODIUM mmol/L 125* 128* 125*   < >  --    CHLORIDE mmol/L 93* 95* 91*   < >  --    POTASSIUM mmol/L 3.9 3.8 3.8   < >  --    PHOSPHORUS mg/dL 3.5 3.3 3.6   < >  " --    MAGNESIUM mg/dL 2.4 1.8 2.4   < >  --    ALT (SGPT) U/L 7 7 7   < >  --    LACTATE mmol/L  --   --   --   --  1.4    < > = values in this interval not displayed.       Results from last 7 days   Lab Units 01/23/24  0446 01/22/24  0441 01/21/24  0344 01/20/24  0500 01/19/24  1605   ALBUMIN g/dL 2.2* 2.0* 2.3*   < >  --    PREALBUMIN mg/dL  --   --   --   --  7.8*   CRP mg/dL  --   --   --   --  19.80*    < > = values in this interval not displayed.       Results from last 7 days   Lab Units 01/23/24  1156 01/23/24  0535 01/22/24  2348 01/22/24  1730 01/22/24  1149 01/22/24  0601   GLUCOSE mg/dL 127 118 130 106 118 119     Lab Results   Lab Value Date/Time    HGBA1C 5.70 (H) 12/07/2023 1959                   Medications    Medications Reviewed: Yes  Pertinent: bowel regimen, diflucan, reglan, protonix, scopolamine, thiamine  Infusion: fentanyl, versed, heparin    Intake/Ouptut 24 hrs (0701 - 0700)   I&O's Reviewed: Yes     Intake & Output (last day)         01/22 0701 01/23 0700 01/23 0701 01/24 0700    I.V. (mL/kg) 1480.7 (14.2) 365.9 (3.5)    Blood      Other 0 30    NG/GT 1739 367    Total Intake(mL/kg) 3219.7 (31) 762.9 (7.3)    Urine (mL/kg/hr) 1310 (0.5) 225 (0.3)    Emesis/NG output 850 300    Stool 0 0    Total Output 2160 525    Net +1059.7 +237.9          Stool Unmeasured Occurrence 5 x 2 x    Emesis Unmeasured Occurrence 4 x               Anthropometrics     Flowsheet Rows      Flowsheet Row First Filed Value   Admission Height 167.6 cm (66\") Documented at 01/08/2024 2036   Admission Weight 91.2 kg (201 lb) Documented at 01/08/2024 2036          Height: Height: 165.1 cm (65\")  Last Filed Weight: Weight: 104 kg (229 lb 4.5 oz) (01/23/24 0000)  Method: Weight Method: Bed scale  BMI: BMI (Calculated): 38.2  BMI classification: Obese Class I: 30-34.9kg/m2  IBW:  130lb    UBW: ?  Weight change: per EMR ~ 17lb wt loss over the past month     Weight       Weight (kg) Weight (lbs) Weight Method Visit " "Report   5/16/2016 96.979 kg  213 lb 12.8 oz   --    8/18/2016 98.703 kg  217 lb 9.6 oz   --    11/10/2016 97.705 kg  215 lb 6.4 oz   --    2/3/2017 98.068 kg  216 lb 3.2 oz   --    3/6/2017 96.253 kg  212 lb 3.2 oz   --    5/18/2017 97.342 kg  214 lb 9.6 oz   --    8/2/2017 101.696 kg  224 lb 3.2 oz   --    12/6/2023 97.07 kg  214 lb  Stated     12/7/2023 97.523 kg  215 lb  Stated      95.255 kg  210 lb  Stated     12/8/2023 98.975 kg  218 lb 3.2 oz  Standing scale     12/9/2023 97.569 kg  215 lb 1.6 oz  Bed scale     12/10/2023 97.523 kg  215 lb  Bed scale     12/11/2023 92.987 kg  205 lb  Standing scale     12/12/2023 93.804 kg  206 lb 12.8 oz  Standing scale     12/13/2023 104.826 kg  231 lb 1.6 oz  Bed scale      105 kg  231 lb 7.7 oz      12/14/2023 94.076 kg  207 lb 6.4 oz  Standing scale     12/15/2023 92.08 kg  203 lb  Standing scale     12/19/2023 91.173 kg  201 lb   --    12/22/2023 91.173 kg  201 lb  Stated     12/25/2023 91.173 kg  201 lb  Stated     1/3/2024 91.627 kg  202 lb   --    1/8/2024 91.173 kg  201 lb  Stated     1/9/2024 91.173 kg  201 lb            Nutrition Focused Physical Exam     Date:     Unable to perform exam due to: Pt unable to participate at time of visit      Needs Assessment   Date: 1-9-24    Height used:Height: 165.1 cm (65\")  Weights used/ ABW: 201lb/ 91.4kg  IBW\" 130lb/ 59.1kg      Estimated Calorie needs: ~ 1300kcal   Method:  14Kcals/KG ABW:1280kcal  Method:  MSJ ABW: 1491kcal  Method:  PSU ABW: 1839kcal    Estimated Protein needs: ~118g protein  Method: 2g protein per kg IBW: 118g protein  Method: 1.2-1.5g protein per kg ABW: 110-137g protein    Current Nutrition Prescription     PO: NPO Diet NPO Type: Strict NPO  Oral Nutrition Supplement:   Intake: N/A    EN: Peptamen Intense VHP  Goal Rate:70ml  Water Flushes: 0ml  Modular: None  Route: NJ  Tube: Small bore    At goal over: 20Hrs/day    Rx will supply:   Goal Volume 1400 mL/day     Flush Volume 0 mL/day     Energy 1400 " Kcal/day 108 % Est Need   Protein 129 g/day 109 % Est Need   Fiber 6 g/day     Water in  EN 1176 mL     Total Water 1176 mL     Meet DRI No        --------------------------------------------------------------------------  Product/Rate verified at bedside: Yes  Infusing Rate at time of visit: 35ml    Average Delivery from [facility] Day:  Volume 1344 mL/day 96  % Goal Vol.   Flush Volume 395 mL/day     Energy  Kcal/day  % Est Need   Protein  g/day  % Est Need   Fiber  g/day     Water in  EN  mL     Total Water  mL     Meet DRI No            Nutrition Diagnosis   Date: 24 Updated:   Problem Inadequate energy intake   Etiology ARF/VENT/ Vomiting   Signs/Symptoms 96% goal volume EN   Status: TF decreased to 35ml    Goal:   General: Nutrition support treatment, Palliative care  PO: N/A  EN/PN: Tolerate EN at goal     Palliative following for GOC    Nutrition Intervention      Follow treatment progress, Care plan reviewed      Monitoring/Evaluation:   Per protocol, I&O, Pertinent labs, Weight, Skin status, GI status, Symptoms, POC/GOC, Hemodynamic stability      Gauri Ayala, JANETH  Time Spent: 30min

## 2024-01-24 NOTE — PROGRESS NOTES
"Palliative Care Daily Progress Note     Referring: Marco A Pugh    C/C: Pt unable     S: Medical record reviewed. Events noted. Seen earlier in day when family not yet present.  Family arrived later and spoke with pulm.  Decision made for palliative extubation.  Pt opens eyes and follows commands for staff on current level of sedation.  Ongoing NG output.    ROS: did not answer any questions    O: Code Status:   Code Status and Medical Interventions:   Ordered at: 01/22/24 1506     Medical Intervention Limits:    NO cardioversion     Code Status (Patient has no pulse and is not breathing):    No CPR (Do Not Attempt to Resuscitate)     Medical Interventions (Patient has pulse or is breathing):    Limited Support      Advanced Directives: Advance Directive Status: Patient does not have advance directive   Goals of Care: Ongoing.   Palliative Performance Scale Score: 20     /74   Pulse 88   Temp 98.6 °F (37 °C) (Bladder)   Resp 24   Ht 165.1 cm (65\")   Wt 104 kg (229 lb 4.5 oz)   LMP  (LMP Unknown) Comment: N/A  SpO2 96%   BMI 38.15 kg/m²     Intake/Output Summary (Last 24 hours) at 1/24/2024 1244  Last data filed at 1/24/2024 1200  Gross per 24 hour   Intake 1971.24 ml   Output 1515 ml   Net 456.24 ml       PE:  General Appearance:    Opens eyes at times, NAD   HEENT:    NC/AT, EOMI, anicteric, MMM, face relaxed   Neck:   supple, trachea midline, no JVD   Lungs:     CTA bilaterally, diminished in bases; respirations regular, even and unlabored; RR on exam    Heart:    RRR, normal S1 and S2, no M/R/G   Abdomen:     +bowel sounds, not soft, nontender, distended   G/U:   Deferred   MSK/Extremities:   Wasting, no edema   Pulses:   Pulses palpable and equal bilaterally   Skin:   Warm, dry   Neurologic:   Opens eyes at times, follows one step commands intermittently   Psych:   Calm, appropriate     Meds: Reviewed and changes noted    Labs:   Results from last 7 days   Lab Units 01/24/24  0358   WBC 10*3/mm3 " 16.69*   HEMOGLOBIN g/dL 7.5*   HEMATOCRIT % 24.0*   PLATELETS 10*3/mm3 209     Results from last 7 days   Lab Units 01/24/24  0358   SODIUM mmol/L 128*   POTASSIUM mmol/L 4.0   CHLORIDE mmol/L 94*   CO2 mmol/L 23.0   BUN mg/dL 17   CREATININE mg/dL 0.44*   GLUCOSE mg/dL 102*   CALCIUM mg/dL 7.9*     Results from last 7 days   Lab Units 01/24/24  0358   SODIUM mmol/L 128*   POTASSIUM mmol/L 4.0   CHLORIDE mmol/L 94*   CO2 mmol/L 23.0   BUN mg/dL 17   CREATININE mg/dL 0.44*   CALCIUM mg/dL 7.9*   BILIRUBIN mg/dL 0.2   ALK PHOS U/L 162*   ALT (SGPT) U/L 7   AST (SGOT) U/L 17   GLUCOSE mg/dL 102*     Imaging Results (Last 72 Hours)       Procedure Component Value Units Date/Time    XR Chest 1 View [[id number]] Collected: 01/24/24 0826     Updated: 01/24/24 0832    Narrative:      XR CHEST 1 VW    Date of Exam: 1/24/2024 1:55 AM EST    Indication: f/u respiratory illness    Comparison: Chest radiograph 1/23/2024.    Findings:  Unchanged position of endotracheal tube, right chest port, nasogastric tube, and visualized portions of the feeding catheter. Cardiomediastinal silhouette is unchanged. Similar to slightly decreased diffuse interstitial and airspace opacities, with   otherwise similar confluent airspace disease in the left mid and lower lung. Similar bilateral pleural effusions, left greater than right, similar. No discrete pneumothorax. Osseous structures are unchanged.      Impression:      Impression:  Similar to slightly decreased diffuse interstitial and airspace disease, with persistent dense consolidation within the left mid and lower lung. Similar small/moderate left and trace right pleural effusions. Lines/tubes unchanged in position.      Electronically Signed: Mansoor Shelley MD    1/24/2024 8:29 AM EST    Workstation ID: [id number]    XR Chest 1 View [[id number]] Collected: 01/23/24 0740     Updated: 01/23/24 0745    Narrative:      XR CHEST 1 VW    Date of Exam: 1/23/2024 2:10 AM EST    Indication: f/u  respiratory illness    Comparison: 1/22/2024    Findings:  Exam limited due to rotation. There is stable position of endotracheal, feeding and nasogastric tubes. Stable appearance of the cardiopulmonary silhouette with pulmonary vascular congestion. No interval change diffuse coarse reticular lung disease with   patchy areas of airspace component or mass in particular in the left perihilar region. Stable mild to moderate left pleural effusion. Right intrajugular chest port in place      Impression:      Impression:  Stable chest      Electronically Signed: Leighton Spivey MD    1/23/2024 7:42 AM EST    Workstation ID: [id number]    CT Abdomen Pelvis With & Without Contrast [[id number]] Collected: 01/22/24 1815     Updated: 01/22/24 1831    Narrative:      CT ABDOMEN PELVIS W WO CONTRAST    Date of Exam: 1/22/2024 5:29 PM EST    Indication: Bowel obstruction suspected.    Comparison: 1/9/2024    Technique: Axial CT images were obtained of the abdomen and pelvis before and after the uneventful intravenous administration of 100 cc Isovue-300. Sagittal and coronal reconstructions were performed.  Automated exposure control and iterative   reconstruction methods were used.    Findings:  Visualized Chest: Moderate bilateral pleural effusions, left greater than right. There is associated compressive atelectasis of the left lung. Additional patchy opacities are noted within the lung bases consistent with ongoing infectious/inflammatory   process.    Liver: Subtle nodular hepatic contour. Scattered small hypodensities are unchanged, nonspecific and too small to be characterized on this study.    Gallbladder: Status post cholecystectomy.    Bile Ducts: No billiary dcutal dilation.    Spleen: Unremarkable    Pancreas: Atrophy of the pancreatic body with associated mild pancreatic ductal dilation, unchanged    Adrenals: Adrenal glands are unremarkable.    Kidneys: Kidneys are normal in size. There are no stones or  hydronephrosis.    Gastrointestinal: A small to moderate size hiatal/paraesophageal hernia is once again noted. An enteric tube is noted with the distal tip in the stomach. An additional enteric tube is noted with the distal tip in the jejunum.  Oral contrast is present within the small bowel. No evidence of significantly dilated loops of bowel to suggest bowel obstruction.  Mild to moderate stool burden throughout the colon and rectum. No definite acute inflammatory changes.    Bladder: Almost completely decompressed with a Romero catheter.    Pelvis: Unchanged in appearance.    Peritoneum/Mesentery: Peritoneal carcinomatosis is once again noted. Superimposed moderate to large sized ascites is also noted.      Lymph Nodes: No lymphadenopathy.    Vasculature: There is mild narrowing of the proximal portal vein, nonspecific. Distally the portal vein is patent. The SMV is also patent.  Otherwise the visualized vessels    Abdominal Wall: Extensive soft tissue edema, worsened since prior study. No definite organizing fluid collection.    Bony Structures: No acute osseous abnormality      Impression:      Impression:  No definite evidence of bowel obstruction.    Mild to moderate stool burden throughout the colon and rectum may represent constipation.    Peritoneal carcinomatosis is once again noted with superimposed moderate to large size ascites.    Indeterminant subtle hypodensities within the liver, too small to be characterized.    Possible narrowing at the confluence of the portal venous system, although distally the vessels are patent. Subtle thrombosis or stenosis/mass effect of this region is not excluded.    Increase in size of bilateral pleural effusion and anasarca.    Patchy opacities in the visualized right lung is consistent with ongoing infectious/inflammatory process.    Subtle nodular contour of the liver is nonspecific but early cirrhosis is not excluded      Electronically Signed: Mert Briggs DO     1/22/2024 6:28 PM EST    Workstation ID: [id number]    XR Chest 1 View [[id number]] Collected: 01/22/24 0803     Updated: 01/22/24 0808    Narrative:      XR CHEST 1 VW    Date of Exam: 1/22/2024 5:36 AM EST    Indication: f/u respiratory illness    Comparison: Chest radiograph 1/21/2024.    Findings:  Unchanged position of endotracheal tube, right chest port, nasogastric tube, and visualized portions of the feeding catheter. Cardiomediastinal silhouette is unchanged. Similar multifocal patchy airspace opacities, including similar focal opacity in the   left perihilar region. Similar small bilateral pleural effusions. No pneumothorax. Osseous structures are unchanged.      Impression:      Impression:  No significant interval change.      Electronically Signed: Mansoor Shelley MD    1/22/2024 8:05 AM EST    Workstation ID: [id number]                  Diagnostics: Reviewed    A:     Acute respiratory failure with hypoxia    Hypothyroidism due to Hashimoto's thyroiditis    Abdominal carcinomatosis - omental    Peritoneal carcinomatosis    Pancreatic cancer    Acute pulmonary embolism    Aspiration pneumonia due to vomitus       Impression:  Metsastatic CA  - presumed pancreatic - with carcinomatosis  Acute hypoxic resp failure  Hypothyroidism due to Hashimoto's  Acute PE  Aspiration PNA     Symptoms:  Dyspnea  Debility  CA pain  Vomiting  Anasarca    P:   Confirmed plan for extubation.  Discussed importance of pt comfort.  Discussed continuing NG for comfort.  All questions answered.   Meds/orders adjusted.  Discussed with RN.  Palliative Care Team will continue to follow patient.     Yesenia Pugh MD  1/24/2024  Time spent:62

## 2024-01-24 NOTE — PROGRESS NOTES
"INTENSIVIST   PROGRESS NOTE     Hospital:  LOS: 15 days     Chief Complaint: Respiratory failure     Subjective   S     Interval History: No acute issues overnight. Remains critically ill.     The patient's relevant past medical, surgical and social history were reviewed and updated in Epic as appropriate.      ROS: Unable to obtain as patient intubated and sedated    Objective   O     Intake/Ouptut 24 hrs (7:00AM - 6:59 AM)  Intake & Output (last 3 days)         01/19 0701 01/20 0700 01/20 0701 01/21 0700 01/21 0701 01/22 0700 01/22 0701 01/23 0700    I.V. (mL/kg) 1154.3 (11) 1838.6 (17.5) 1434.3 (13.7) 170.7 (1.6)    Blood   360     Other 30 90 40     NG/ 285 1660 183    IV Piggyback  50      Total Intake(mL/kg) 2100.3 (20) 2949.6 (28.1) 3505.3 (33.4) 353.7 (3.4)    Urine (mL/kg/hr) 1200 (0.5) 1085 (0.4) 1315 (0.5) 125 (0.4)    Emesis/NG output 550 1350 1200 150    Total Output 1750 2435 2515 275    Net +350.3 +514.6 +990.3 +78.7            Emesis Unmeasured Occurrence   1 x      Medications (drips):  fentanyl 10 mcg/mL, Last Rate: 300 mcg/hr (01/24/24 1508)  midazolam, Last Rate: 10 mg/hr (01/24/24 1314)    Respiratory Support: MV    Physical Examination:  Vital Signs: Blood pressure 99/63, pulse 91, temperature 98.2 °F (36.8 °C), temperature source Bladder, resp. rate 20, height 165.1 cm (65\"), weight 104 kg (229 lb 4.5 oz), SpO2 (!) 80%.    Unchanged exam since 1/23/2024:  General: Critically ill appearing. On MV.    Chest: Mild expiratory wheezing on today's exam. Normal work of breathing. Equal chest rise.  Cardiac: Regular rhythm, normal rate, S1S2 auscultated. No murmurs, rubs or gallops.   Abdomen: Soft but distended.  No appreciable tenderness with palpation the patient intubated on mechanical ventilation.  Positive fluid wave and body wall edema.  Extremities: 2+ lower extremity edema. No clubbing or cyanosis.  Neuro: Intubated, sedated.      Lines, Drains & Airways       Active LDAs       Name " Placement date Placement time Site Days    Peripheral IV 01/08/24 2138 Left Antecubital 01/08/24  2138  Antecubital  13    NG/OG Tube Nasojejunal 12 Fr Right nostril 01/12/24  1711  Right nostril  9    NG/OG Tube Nasogastric 18 Fr Left nostril 01/12/24  1711  Left nostril  9    Urethral Catheter Temperature probe 16 Fr. 01/09/24  0545  -- 13    Hi-Lo Evac ETT 7.5 01/15/24  1334  Oral  6    Arterial Line 01/09/24 Right Radial 01/09/24  1630  Radial  12    Single Lumen Implantable Port 12/26/23 Right Chest 12/26/23  1515  Chest  26             Results from last 7 days   Lab Units 01/24/24  0358 01/23/24 0446 01/22/24 0441   WBC 10*3/mm3 16.69* 18.12* 18.03*   HEMOGLOBIN g/dL 7.5* 7.8* 7.6*   MCV fL 84.8 85.7 85.5   PLATELETS 10*3/mm3 209 213 189     Results from last 7 days   Lab Units 01/24/24  0358 01/23/24  0446 01/22/24  0441   SODIUM mmol/L 128* 125* 128*   POTASSIUM mmol/L 4.0 3.9 3.8   CO2 mmol/L 23.0 23.0 23.0   CREATININE mg/dL 0.44* 0.47* 0.44*   GLUCOSE mg/dL 102* 111* 115*   MAGNESIUM mg/dL 1.9 2.4 1.8   PHOSPHORUS mg/dL 3.3 3.5 3.3     Estimated Creatinine Clearance: 158.6 mL/min (A) (by C-G formula based on SCr of 0.44 mg/dL (L)).  Results from last 7 days   Lab Units 01/24/24  0358 01/23/24 0446 01/22/24 0441   ALK PHOS U/L 162* 182* 136*   BILIRUBIN mg/dL 0.2 0.2 0.2   ALT (SGPT) U/L 7 7 7   AST (SGOT) U/L 17 25 11     Images:  XR Chest 1 View    Result Date: 1/24/2024  Impression: Similar to slightly decreased diffuse interstitial and airspace disease, with persistent dense consolidation within the left mid and lower lung. Similar small/moderate left and trace right pleural effusions. Lines/tubes unchanged in position. Electronically Signed: Mansoor Shelley MD  1/24/2024 8:29 AM EST  Workstation ID: [id number]    XR Chest 1 View    Result Date: 1/23/2024  Impression: Stable chest Electronically Signed: Leighton Spivey MD  1/23/2024 7:42 AM EST  Workstation ID: [id number]    CT Abdomen Pelvis With &  Without Contrast    Result Date: 1/22/2024  Impression: No definite evidence of bowel obstruction. Mild to moderate stool burden throughout the colon and rectum may represent constipation. Peritoneal carcinomatosis is once again noted with superimposed moderate to large size ascites. Indeterminant subtle hypodensities within the liver, too small to be characterized. Possible narrowing at the confluence of the portal venous system, although distally the vessels are patent. Subtle thrombosis or stenosis/mass effect of this region is not excluded. Increase in size of bilateral pleural effusion and anasarca. Patchy opacities in the visualized right lung is consistent with ongoing infectious/inflammatory process. Subtle nodular contour of the liver is nonspecific but early cirrhosis is not excluded Electronically Signed: Mert Briggs DO  1/22/2024 6:28 PM EST  Workstation ID: [id number]     Results: Reviewed.  - I reviewed the patient's new laboratory and imaging results.  - I independently reviewed the patient's new images.    Medications: Reviewed.    Assessment & Plan    A / P     Active Hospital Problems    Diagnosis     **Acute respiratory failure with hypoxia     Acute pulmonary embolism     Aspiration pneumonia due to vomitus     Pancreatic cancer     Abdominal carcinomatosis - omental     Peritoneal carcinomatosis     Hypothyroidism due to Hashimoto's thyroiditis      Allan Kraft is a 63 year old female recently found to have metastatic malignancy / peritoneal carcinomatosis of possible pancreatic origin (EUS was done however sample was limited and this was nondiagnostic), peritoneal cytology was positive for malignancy with pancreaticobiliary source favored, history of hypothyroidism, anxiety/depression, followed by medical oncology for the metastatic malignancy and underwent chemotherapy initiated on 12/29/2023.  Patient also was diagnosed with severe constipation recently. She developed worsening  nausea/vomiting and was brought to the emergency department with decompensated/hypoxemic respiratory failure and altered mental status on 1/8/2023.  She underwent emergent endotracheal intubation. CT of the head, CTA of the chest/abdomen/pelvis were done and the patient was found to have extensive bilateral pulmonary emboli without significant right heart strain.  She also had patchy airspace disease in the lower lobes and ascites with peritoneal carcinomatosis.  She was hypotensive requiring pressors and was started on antibiotics, heparin drip and admitted to the intensive care unit for ongoing management.     Patient ultimately was extubated on 1/11/2024 however was extremely encephalopathic and agitated postextubation and developed progressive nausea/vomiting.  She ultimately required reintubation.     Patient remains intubated/on MV.  She continues to have significant vomiting of bilious material.  She had an EGD with J-tube placement on 1/12/2024.  A nasogastric tube was attempted during that procedure but likely terminates in the hiatal hernia sac.     Endotracheal tube cuff leak developed 1/15/2024 and was replaced.      Biggest acute problem right now is her ongoing respiratory failure in the setting of recalcitrant vomiting.Gastroenterology suspects that patient has gastroparesis in addition to the hiatal hernia.  She last underwent large-volume paracentesis on 1/17/2024.  Ongoing bilious vomiting and on 1/22 TF was present in emesis prompting repeat CT abdomen which showed no overt obstruction.      Patient has persistent leukocytosis. Cultures nonrevealing. Antibiotics were stopped and white blood cell count is stable.      Family wished for a second opinion at [facility] regarding her diagnosis and treatment options. Previous provider contacted [facility] (last week) who initially accepted her to the intensive care unit but after reviewing material did not feel like they could offer  anything different and cancelled transfer // called previous ICU provider to explain on 1/22 AM. To be fair, agree with this assessment as [unfortunately] options are very limited and patient continues to be critically ill despite aggressive care and support. She certainly could not tolerate CA treatment or further diagnostic procedures in current state.      Had an approximate 40 minute [facility] family meeting with patients , 3x siblings and son (via telephone) on 1/22-- chiefly to explain DC transfer following [facility] review. Made DNR and no ACLS on 1/22. CODE status changed in EPIC. Palliative care and oncology spoke with family on 1/23 AM. Personally spoke with patient's son and  again on 1/24 for approximately 40 minutes.  Agreed for comfort measures and terminal extubation on 1/24.     Palliative care following who placed comfort and pain orders today. Patient was very high doses of continuous Versed and fentanyl today and original plan was to wean prior to initiation of comfort measures.  Despite these infusions she was alert and agitated.  Personally spoke with palliative physician who felt comfortable about continuing these without weaning given clinical circumstance.  Spoke with family about extubating on infusions as discontinuing these prior to ETT removal would likely result in significant distress.  Son,  agreed.    Advance Directives:   Code Status and Medical Interventions:   Ordered at: 01/24/24 1525     Level Of Support Discussed With:    Next of Kin (If No Surrogate)    Health Care Surrogate     Code Status (Patient has no pulse and is not breathing):    No CPR (Do Not Attempt to Resuscitate)     Medical Interventions (Patient has pulse or is breathing):    Comfort Measures     High level of risk due to severe exacerbation of chronic illness and illness with threat to life or bodily function.    I conducted multidisciplinary rounds in the plan of care was discussed with the  multidisciplinary team at that time. In attendance at multidisciplinary rounds was clinical pharmacist, dietitian, nursing staff and case management.    I discussed the patient's findings and my recommendations with patient, family, nursing staff, and consulting provider    Critical Care Time: Critical Care time spent in direct patient care: 50 minutes (excluding procedure time, if applicable) including high complexity decision making to assess, manipulate, and support vital organ system failure in this individual who has impairment of one or more vital organ systems such that there is a high probability of imminent or life threatening deterioration in the patient’s condition.      -- Alonzo Martinez MD  Pulmonary/Critical Care

## 2024-01-24 NOTE — PLAN OF CARE
Problem: Restraint, Nonviolent  Goal: Absence of Harm or Injury  1/24/2024 0454 by Destiney Sheth RN  Outcome: Ongoing, Progressing  1/24/2024 0453 by Destiney Sheth RN  Outcome: Ongoing, Progressing  Intervention: Implement Least Restrictive Safety Strategies  Recent Flowsheet Documentation  Taken 1/24/2024 0400 by Destiney Sheth RN  Medical Device Protection: tubing secured  Less Restrictive Alternative:   bed alarm in use   safety enhancements provided   sensory stimulation limited   surveillance provided   therapeutic music  De-Escalation Techniques:   appropriate behavior reinforced   quiet time facilitated   reoriented   stimulation decreased   verbally redirected   medication administered  Diversional Activities: music  Taken 1/24/2024 0200 by Destiney Sheth RN  Medical Device Protection: tubing secured  Less Restrictive Alternative:   bed alarm in use   safety enhancements provided   sensory stimulation limited   surveillance provided   therapeutic music  De-Escalation Techniques:   appropriate behavior reinforced   quiet time facilitated   reoriented   stimulation decreased   verbally redirected   medication administered  Diversional Activities: music  Taken 1/24/2024 0000 by Destiney Sheth RN  Medical Device Protection: tubing secured  Less Restrictive Alternative:   bed alarm in use   safety enhancements provided   sensory stimulation limited   surveillance provided   therapeutic music  De-Escalation Techniques:   appropriate behavior reinforced   quiet time facilitated   reoriented   stimulation decreased   verbally redirected   medication administered  Diversional Activities: music  Taken 1/23/2024 2200 by Destiney Sheth RN  Medical Device Protection: tubing secured  Less Restrictive Alternative:   bed alarm in use   safety enhancements provided   sensory stimulation limited   surveillance provided   therapeutic music  De-Escalation Techniques:   appropriate behavior reinforced   quiet time facilitated    reoriented   stimulation decreased   verbally redirected   medication administered  Diversional Activities: music  Taken 1/23/2024 2000 by Destiney Sheth RN  Medical Device Protection:   IV pole/bag removed from visual field   tubing secured  Less Restrictive Alternative:   bed alarm in use   safety enhancements provided   sensory stimulation limited   surveillance provided   therapeutic music  De-Escalation Techniques:   stimulation decreased   medication administered   appropriate behavior reinforced  Diversional Activities: music  Intervention: Protect Dignity, Rights, and Personal Wellbeing  Recent Flowsheet Documentation  Taken 1/24/2024 0400 by Destiney Sheth RN  Trust Relationship/Rapport:   care explained   reassurance provided  Taken 1/24/2024 0200 by Destiney Sheth RN  Trust Relationship/Rapport:   care explained   reassurance provided  Taken 1/24/2024 0000 by Destiney Sheth RN  Trust Relationship/Rapport:   care explained   reassurance provided  Taken 1/23/2024 2200 by Destiney Sheth RN  Trust Relationship/Rapport:   care explained   reassurance provided  Taken 1/23/2024 2000 by Destiney Sheth RN  Trust Relationship/Rapport:   care explained   reassurance provided  Intervention: Protect Skin and Joint Integrity  Recent Flowsheet Documentation  Taken 1/24/2024 0400 by Destiney Sheth RN  Body Position:   weight shifting   tilted   left   lower extremity elevated   upper extremity elevated  Taken 1/24/2024 0200 by Destiney Sheth RN  Body Position:   weight shifting   supine   upper extremity elevated   lower extremity elevated  Taken 1/24/2024 0000 by Destiney Sheth RN  Body Position:   weight shifting   tilted   right   lower extremity elevated   upper extremity elevated  Taken 1/23/2024 2200 by Destiney Sheth RN  Body Position:   weight shifting   supine   upper extremity elevated   lower extremity elevated  Taken 1/23/2024 2000 by Destiney Sheth RN  Body Position:   weight shifting   tilted   left   lower extremity  elevated   upper extremity elevated   Goal Outcome Evaluation:           Progress: no change  Outcome Evaluation: Patient with no significant changes overnight. Mechanical ventilation- FiO2 65%, PEEP 8. Fentanyl and midzolam for comfort/sedation. Unable to tolerate weaning d/t excessive coughing/gagging. Patient arousable and following commands. Heparin infusing per pharmacy. NG to Intermittent LWS, 350mL yellow/green output. HiLo port remains to continuous suction with 100mL of yellow ouput. BMx4. UOP adequate. Tmax 37.5.

## 2024-01-24 NOTE — PLAN OF CARE
Goal Outcome Evaluation:     -550 mls out of NG tube   -Multiple bowel movements   -UOP ~ 400 mls   -Restlessness and agitation with stimulation and turning; haldol given x1   -Fi02 between 65-70% to keep O2sats >89%  -Heparin gtt continued   -Fentanyl and versed continued   -Family had palliative care discussion today; see note   -Status update with  and son

## 2024-01-24 NOTE — PROGRESS NOTES
"HEMATOLOGY/ONCOLOGY PROGRESS NOTE    S: Status post palliative extubation this afternoon.  She is resting comfortably.  NG tube in place.  No emesis today.  Multiple supportive family members are in the lobby.      Physical Exam    VITAL SIGNS:  BP 99/63   Pulse 91   Temp 98.6 °F (37 °C) (Bladder)   Resp 20   Ht 165.1 cm (65\")   Wt 104 kg (229 lb 4.5 oz)   LMP  (LMP Unknown) Comment: N/A  SpO2 (!) 78%   BMI 38.15 kg/m²   Temp:  [98.6 °F (37 °C)-99.5 °F (37.5 °C)] 98.6 °F (37 °C)                     Physical Exam    Somnolent  NGT in place  RRR          RECENT LABS:    Lab Results   Component Value Date    HGB 7.5 (L) 01/24/2024    HCT 24.0 (L) 01/24/2024    MCV 84.8 01/24/2024     01/24/2024    WBC 16.69 (H) 01/24/2024    NEUTROABS 12.26 (H) 01/24/2024    LYMPHSABS 1.30 01/24/2024    MONOSABS 1.39 (H) 01/24/2024    EOSABS 0.22 01/24/2024    BASOSABS 0.19 01/24/2024       Lab Results   Component Value Date    GLUCOSE 102 (H) 01/24/2024    BUN 17 01/24/2024    CREATININE 0.44 (L) 01/24/2024     (L) 01/24/2024    K 4.0 01/24/2024    CL 94 (L) 01/24/2024    CO2 23.0 01/24/2024    CALCIUM 7.9 (L) 01/24/2024    PROTEINTOT 5.5 (L) 01/24/2024    ALBUMIN 2.2 (L) 01/24/2024    BILITOT 0.2 01/24/2024    ALKPHOS 162 (H) 01/24/2024    AST 17 01/24/2024    ALT 7 01/24/2024         Assessment/Plan  Metastatic malignancy involving the peritoneum with malignant ascites  Pulmonary emboli  Respiratory failure  Refractory nausea and emesis    -Comfort care measures in place  -Spoke with patient's  and son  -Please contact oncology service if we can be of any assistance the patient's family during this process.    Sherri العراقي MD  Psychiatric Hematology and Oncology    1/24/2024    "

## 2024-01-24 NOTE — CASE MANAGEMENT/SOCIAL WORK
Continued Stay Note   [place]     Patient Name: Jessica Kraft  MRN: [id number]  Today's Date: 1/24/2024    Admit Date: 1/8/2024    Plan: ongoing   Discharge Plan       Row Name 01/24/24 1532       Plan    Plan ongoing    Patient/Family in Agreement with Plan other (see comments)    Plan Comments Per MDR, plan for extubation today. Palliative following.    Final Discharge Disposition Code 30 - still a patient                   Discharge Codes    No documentation.                 Expected Discharge Date and Time       Expected Discharge Date Expected Discharge Time    Jan 23, 2024               Danilo Turk RN

## 2024-01-24 NOTE — PLAN OF CARE
Problem: Adult Inpatient Plan of Care  Goal: Plan of Care Review  Outcome: Ongoing, Progressing  Flowsheets  Taken 1/24/2024 8023  Progress: no change  Taken 1/23/2024 5796  Outcome Evaluation: Patient with no significant changes overnight. Mechanical ventilation- FiO2 65%, PEEP 8. Fentanyl and midzolam for comfort/sedation. Unable to tolerate weaning d/t excessive coughing/gagging. Patient arousable and following commands. Heparin infusing per pharmacy. NG to Intermittent LWS, 500mL yellow/green output. HiLo port remains to continuous suction with 75mL of yellow ouput. No BM. UOP low. Tmax 37.5.

## 2024-01-25 NOTE — PROGRESS NOTES
Continued Stay Note  [facility]     Patient Name: Jessica Kraft  MRN: [id number]  Today's Date: 1/25/2024    Admit Date: 1/25/2024    Plan: Inpatient hospice   Discharge Plan       Row Name 01/25/24 9468       Plan    Plan Inpatient hospice    Plan Comments Hospice consult placed per team. Chart reviewed. Hospice RN,  and SW  met with patient's  and son in conference room. Hospice services including POC/GOC, DNR/DNI were discussed. Family opted for hospice care/comfort measures only at this time. Admission approved by Acacia Copeland NP. ANNA Norton completed hospice admission paperwork with patient's , Dominick Kraft. Pt will be admitted to hospice services with a terminal dx of Periotneal Carcinoma with anticipated unmanaged symptoms of pain, dyspnea, and anxiety. Patient will be transferred to bed on 5B when one is available. If hospice can be of any assistance please call [phone].    Final Discharge Disposition Code 51 - hospice medical facility      Row Name 01/25/24 1603       Plan    Plan Hospice    Patient/Family in Agreement with Plan other (see comments)    Plan Comments Patient now inpatient hospice admit.    Final Discharge Disposition Code 51 - hospice medical facility                   Discharge Codes    No documentation.                       Faith Gaines

## 2024-01-25 NOTE — PLAN OF CARE
No changes. Patient remains on comfort measures   Problem: Adult Inpatient Plan of Care  Goal: Plan of Care Review  Outcome: Ongoing, Not Progressing  Goal: Patient-Specific Goal (Individualized)  Outcome: Ongoing, Not Progressing  Goal: Absence of Hospital-Acquired Illness or Injury  Outcome: Ongoing, Not Progressing  Intervention: Identify and Manage Fall Risk  Recent Flowsheet Documentation  Taken 1/25/2024 0400 by Tanvi Lucero RN  Safety Promotion/Fall Prevention:   clutter free environment maintained   fall prevention program maintained   lighting adjusted   room organization consistent   safety round/check completed  Taken 1/25/2024 0200 by Tanvi Lucero RN  Safety Promotion/Fall Prevention:   clutter free environment maintained   fall prevention program maintained   lighting adjusted   room organization consistent   safety round/check completed  Taken 1/25/2024 0000 by Tanvi Lucero RN  Safety Promotion/Fall Prevention:   clutter free environment maintained   fall prevention program maintained   lighting adjusted   room organization consistent   safety round/check completed  Taken 1/24/2024 2200 by Tanvi Lucero RN  Safety Promotion/Fall Prevention:   clutter free environment maintained   fall prevention program maintained   lighting adjusted   room organization consistent   safety round/check completed  Taken 1/24/2024 2000 by Tanvi Lucero RN  Safety Promotion/Fall Prevention:   clutter free environment maintained   fall prevention program maintained   lighting adjusted   room organization consistent   safety round/check completed  Intervention: Prevent Skin Injury  Recent Flowsheet Documentation  Taken 1/25/2024 0400 by Tanvi Lucero RN  Body Position: turned  Skin Protection:   adhesive use limited   incontinence pads utilized   skin-to-device areas padded   skin-to-skin areas padded   transparent dressing maintained   tubing/devices free from skin contact  Taken 1/25/2024 0200 by  Nic, Tanvi, RN  Body Position: turned  Skin Protection:   adhesive use limited   incontinence pads utilized   skin-to-device areas padded   skin-to-skin areas padded   transparent dressing maintained   tubing/devices free from skin contact  Taken 1/25/2024 0000 by Tanvi Lucero RN  Body Position: turned  Skin Protection:   adhesive use limited   incontinence pads utilized   skin-to-device areas padded   skin-to-skin areas padded   transparent dressing maintained   tubing/devices free from skin contact  Taken 1/24/2024 2200 by Tanvi Lucero RN  Body Position: turned  Skin Protection:   adhesive use limited   incontinence pads utilized   skin-to-device areas padded   skin-to-skin areas padded   transparent dressing maintained   tubing/devices free from skin contact  Taken 1/24/2024 2000 by Tanvi Lucero RN  Body Position: turned  Skin Protection:   adhesive use limited   incontinence pads utilized   skin-to-device areas padded   skin-to-skin areas padded   transparent dressing maintained   tubing/devices free from skin contact  Intervention: Prevent and Manage VTE (Venous Thromboembolism) Risk  Recent Flowsheet Documentation  Taken 1/25/2024 0400 by Tanvi Lucero RN  Activity Management: bedrest  Taken 1/25/2024 0200 by Tanvi Lucero RN  Activity Management: bedrest  Taken 1/25/2024 0000 by Tanvi Lucero RN  Activity Management: bedrest  Taken 1/24/2024 2200 by Tanvi Lucero RN  Activity Management: bedrest  Taken 1/24/2024 2000 by Tanvi Lucero RN  Activity Management: bedrest  Intervention: Prevent Infection  Recent Flowsheet Documentation  Taken 1/25/2024 0400 by Tanvi Lucero RN  Infection Prevention:   environmental surveillance performed   equipment surfaces disinfected   hand hygiene promoted   personal protective equipment utilized   rest/sleep promoted   single patient room provided  Taken 1/25/2024 0200 by Tanvi Lucero RN  Infection Prevention:   environmental  surveillance performed   equipment surfaces disinfected   hand hygiene promoted   personal protective equipment utilized   rest/sleep promoted   single patient room provided  Taken 1/25/2024 0000 by Tanvi Lucero RN  Infection Prevention:   environmental surveillance performed   equipment surfaces disinfected   hand hygiene promoted   personal protective equipment utilized   rest/sleep promoted   single patient room provided  Taken 1/24/2024 2200 by Tanvi Lucero RN  Infection Prevention:   environmental surveillance performed   equipment surfaces disinfected   hand hygiene promoted   personal protective equipment utilized   rest/sleep promoted   single patient room provided  Taken 1/24/2024 2000 by Tanvi Lucero RN  Infection Prevention:   environmental surveillance performed   equipment surfaces disinfected   hand hygiene promoted   personal protective equipment utilized   rest/sleep promoted   single patient room provided  Goal: Optimal Comfort and Wellbeing  Outcome: Ongoing, Not Progressing  Intervention: Monitor Pain and Promote Comfort  Recent Flowsheet Documentation  Taken 1/24/2024 2000 by Tanvi Lucero RN  Pain Management Interventions:   around-the-clock dosing utilized   position adjusted  Intervention: Provide Person-Centered Care  Recent Flowsheet Documentation  Taken 1/25/2024 0400 by Tanvi Lucero RN  Trust Relationship/Rapport:   care explained   reassurance provided  Taken 1/25/2024 0200 by Tanvi Lucero RN  Trust Relationship/Rapport:   care explained   reassurance provided  Taken 1/25/2024 0000 by Tanvi Lucero RN  Trust Relationship/Rapport:   care explained   reassurance provided  Taken 1/24/2024 2200 by Tanvi Lucero RN  Trust Relationship/Rapport:   care explained   reassurance provided  Taken 1/24/2024 2000 by Tanvi Lucero RN  Trust Relationship/Rapport: reassurance provided  Goal: Readiness for Transition of Care  Outcome: Ongoing, Not Progressing      Problem: Restraint, Nonviolent  Goal: Absence of Harm or Injury  Outcome: Ongoing, Not Progressing  Intervention: Implement Least Restrictive Safety Strategies  Recent Flowsheet Documentation  Taken 1/25/2024 0400 by Tanvi Lucero RN  Medical Device Protection: tubing secured  Diversional Activities: music  Taken 1/25/2024 0200 by Tanvi Lucero RN  Medical Device Protection: tubing secured  Diversional Activities: music  Taken 1/25/2024 0000 by Tanvi Lucero RN  Medical Device Protection: tubing secured  Diversional Activities: music  Taken 1/24/2024 2200 by Tanvi Lucero RN  Medical Device Protection: tubing secured  Diversional Activities: music  Taken 1/24/2024 2000 by Tanvi Lucero RN  Medical Device Protection: tubing secured  Diversional Activities: music  Intervention: Protect Dignity, Rights, and Personal Wellbeing  Recent Flowsheet Documentation  Taken 1/25/2024 0400 by Tanvi Lucero RN  Trust Relationship/Rapport:   care explained   reassurance provided  Taken 1/25/2024 0200 by Tanvi Lucero RN  Trust Relationship/Rapport:   care explained   reassurance provided  Taken 1/25/2024 0000 by Tanvi Lucero RN  Trust Relationship/Rapport:   care explained   reassurance provided  Taken 1/24/2024 2200 by Tanvi Lucero RN  Trust Relationship/Rapport:   care explained   reassurance provided  Taken 1/24/2024 2000 by Tanvi Lucero RN  Trust Relationship/Rapport: reassurance provided  Intervention: Protect Skin and Joint Integrity  Recent Flowsheet Documentation  Taken 1/25/2024 0400 by Tanvi Lucero RN  Body Position: turned  Taken 1/25/2024 0200 by Tanvi Lucero RN  Body Position: turned  Taken 1/25/2024 0000 by Tanvi Lucero RN  Body Position: turned  Taken 1/24/2024 2200 by Tanvi Lucero RN  Body Position: turned  Taken 1/24/2024 2000 by Tanvi Lucero RN  Body Position: turned     Problem: Skin Injury Risk Increased  Goal: Skin Health and  Integrity  Outcome: Ongoing, Not Progressing  Intervention: Optimize Skin Protection  Recent Flowsheet Documentation  Taken 1/25/2024 0400 by Tanvi Lucero RN  Pressure Reduction Techniques:   heels elevated off bed   positioned off wounds   pressure points protected   weight shift assistance provided  Head of Bed ([facility]) Positioning: HOB at 20-30 degrees  Pressure Reduction Devices:   positioning supports utilized   pressure-redistributing mattress utilized   specialty bed utilized  Skin Protection:   adhesive use limited   incontinence pads utilized   skin-to-device areas padded   skin-to-skin areas padded   transparent dressing maintained   tubing/devices free from skin contact  Taken 1/25/2024 0200 by Tanvi Lucero RN  Pressure Reduction Techniques:   heels elevated off bed   positioned off wounds   pressure points protected   weight shift assistance provided  Head of Bed ([facility]) Positioning: HOB at 20-30 degrees  Pressure Reduction Devices:   positioning supports utilized   pressure-redistributing mattress utilized   specialty bed utilized  Skin Protection:   adhesive use limited   incontinence pads utilized   skin-to-device areas padded   skin-to-skin areas padded   transparent dressing maintained   tubing/devices free from skin contact  Taken 1/25/2024 0000 by Tanvi Lucero RN  Pressure Reduction Techniques:   heels elevated off bed   positioned off wounds   pressure points protected   weight shift assistance provided  Head of Bed ([facility]) Positioning: HOB at 20-30 degrees  Pressure Reduction Devices:   positioning supports utilized   pressure-redistributing mattress utilized   specialty bed utilized  Skin Protection:   adhesive use limited   incontinence pads utilized   skin-to-device areas padded   skin-to-skin areas padded   transparent dressing maintained   tubing/devices free from skin contact  Taken 1/24/2024 2200 by Tanvi Lucero RN  Pressure Reduction Techniques:   heels elevated off bed    positioned off wounds   pressure points protected   weight shift assistance provided  Head of Bed ([facility]) Positioning: HOB at 20-30 degrees  Pressure Reduction Devices:   positioning supports utilized   pressure-redistributing mattress utilized   specialty bed utilized  Skin Protection:   adhesive use limited   incontinence pads utilized   skin-to-device areas padded   skin-to-skin areas padded   transparent dressing maintained   tubing/devices free from skin contact  Taken 1/24/2024 2000 by Tanvi Lucero RN  Pressure Reduction Techniques:   heels elevated off bed   positioned off wounds   pressure points protected   weight shift assistance provided  Head of Bed ([facility]) Positioning: HOB at 20-30 degrees  Pressure Reduction Devices:   positioning supports utilized   pressure-redistributing mattress utilized   specialty bed utilized  Skin Protection:   adhesive use limited   incontinence pads utilized   skin-to-device areas padded   skin-to-skin areas padded   transparent dressing maintained   tubing/devices free from skin contact     Problem: Fall Injury Risk  Goal: Absence of Fall and Fall-Related Injury  Outcome: Ongoing, Not Progressing  Intervention: Identify and Manage Contributors  Recent Flowsheet Documentation  Taken 1/25/2024 0400 by Tanvi Lucero RN  Medication Review/Management: medications reviewed  Taken 1/25/2024 0200 by Tanvi Lucero RN  Medication Review/Management: medications reviewed  Taken 1/25/2024 0000 by Tanvi Lucero RN  Medication Review/Management: medications reviewed  Taken 1/24/2024 2200 by Tanvi Lucero, RN  Medication Review/Management: medications reviewed  Taken 1/24/2024 2000 by Tanvi Lucero RN  Medication Review/Management: medications reviewed  Intervention: Promote Injury-Free Environment  Recent Flowsheet Documentation  Taken 1/25/2024 0400 by Tanvi Lucero, RN  Safety Promotion/Fall Prevention:   clutter free environment maintained   fall prevention  program maintained   lighting adjusted   room organization consistent   safety round/check completed  Taken 1/25/2024 0200 by Tanvi Lucero RN  Safety Promotion/Fall Prevention:   clutter free environment maintained   fall prevention program maintained   lighting adjusted   room organization consistent   safety round/check completed  Taken 1/25/2024 0000 by Tanvi Lucero RN  Safety Promotion/Fall Prevention:   clutter free environment maintained   fall prevention program maintained   lighting adjusted   room organization consistent   safety round/check completed  Taken 1/24/2024 2200 by Tanvi Lucero RN  Safety Promotion/Fall Prevention:   clutter free environment maintained   fall prevention program maintained   lighting adjusted   room organization consistent   safety round/check completed  Taken 1/24/2024 2000 by Tanvi Lucero RN  Safety Promotion/Fall Prevention:   clutter free environment maintained   fall prevention program maintained   lighting adjusted   room organization consistent   safety round/check completed     Problem: Fluid Imbalance (Pneumonia)  Goal: Fluid Balance  Outcome: Ongoing, Not Progressing     Problem: Infection (Pneumonia)  Goal: Resolution of Infection Signs and Symptoms  Outcome: Ongoing, Not Progressing     Problem: Respiratory Compromise (Pneumonia)  Goal: Effective Oxygenation and Ventilation  Outcome: Ongoing, Not Progressing  Intervention: Promote Airway Secretion Clearance  Recent Flowsheet Documentation  Taken 1/24/2024 2000 by Tanvi Lucero RN  Cough And Deep Breathing: unable to perform  Intervention: Optimize Oxygenation and Ventilation  Recent Flowsheet Documentation  Taken 1/25/2024 0400 by Tanvi Lucero, RN  Head of Bed ([facility]) Positioning: HOB at 20-30 degrees  Taken 1/25/2024 0200 by Tanvi Lucero, RN  Head of Bed ([facility]) Positioning: HOB at 20-30 degrees  Taken 1/25/2024 0000 by Tanvi Lucero, RN  Head of Bed ([facility]) Positioning: HOB at 20-30  degrees  Taken 1/24/2024 2200 by Tanvi Lucero RN  Head of Bed ([facility]) Positioning: HOB at 20-30 degrees  Taken 1/24/2024 2000 by Tanvi Lucero RN  Head of Bed ([facility]) Positioning: HOB at 20-30 degrees     Problem: Communication Impairment (Mechanical Ventilation, Invasive)  Goal: Effective Communication  Outcome: Ongoing, Not Progressing  Intervention: Ensure Effective Communication  Recent Flowsheet Documentation  Taken 1/24/2024 2000 by Tanvi Lucero RN  Communication Enhancement Strategies:   repetition utilized   extra time allowed for response     Problem: Device-Related Complication Risk (Mechanical Ventilation, Invasive)  Goal: Optimal Device Function  Outcome: Ongoing, Not Progressing  Intervention: Optimize Device Care and Function  Recent Flowsheet Documentation  Taken 1/25/2024 0400 by Tanvi Lucero RN  Airway Safety Measures:   mask valve resuscitator at bedside   oxygen flowmeter at bedside   suction at bedside  Taken 1/25/2024 0200 by Tanvi Lucero RN  Airway Safety Measures:   mask valve resuscitator at bedside   oxygen flowmeter at bedside   suction at bedside  Taken 1/25/2024 0000 by Tanvi Lucero RN  Airway Safety Measures:   mask valve resuscitator at bedside   oxygen flowmeter at bedside   suction at bedside  Taken 1/24/2024 2200 by Tanvi Lucero RN  Airway Safety Measures:   mask valve resuscitator at bedside   oxygen flowmeter at bedside   suction at bedside  Taken 1/24/2024 2000 by Tanvi Lucero RN  Airway Safety Measures:   mask valve resuscitator at bedside   oxygen flowmeter at bedside   suction at bedside     Problem: Inability to Wean (Mechanical Ventilation, Invasive)  Goal: Mechanical Ventilation Liberation  Outcome: Ongoing, Not Progressing  Intervention: Promote Extubation and Mechanical Ventilation Liberation  Recent Flowsheet Documentation  Taken 1/25/2024 0400 by Tanvi Lucero, RN  Medication Review/Management: medications reviewed  Taken 1/25/2024  0200 by Tanvi Lucero RN  Medication Review/Management: medications reviewed  Taken 1/25/2024 0000 by Tanvi Lucero RN  Medication Review/Management: medications reviewed  Taken 1/24/2024 2200 by Tanvi Lucero RN  Medication Review/Management: medications reviewed  Taken 1/24/2024 2000 by Tanvi Lucero RN  Medication Review/Management: medications reviewed     Problem: Nutrition Impairment (Mechanical Ventilation, Invasive)  Goal: Optimal Nutrition Delivery  Outcome: Ongoing, Not Progressing     Problem: Skin and Tissue Injury (Mechanical Ventilation, Invasive)  Goal: Absence of Device-Related Skin and Tissue Injury  Outcome: Ongoing, Not Progressing  Intervention: Maintain Skin and Tissue Health  Recent Flowsheet Documentation  Taken 1/25/2024 0400 by Tanvi Lucero RN  Device Skin Pressure Protection:   absorbent pad utilized/changed   adhesive use limited   pressure points protected   skin-to-device areas padded   skin-to-skin areas padded  Taken 1/25/2024 0200 by Tanvi Lucero RN  Device Skin Pressure Protection:   absorbent pad utilized/changed   adhesive use limited   pressure points protected   skin-to-device areas padded   skin-to-skin areas padded  Taken 1/25/2024 0000 by Tanvi Lucero RN  Device Skin Pressure Protection:   absorbent pad utilized/changed   adhesive use limited   pressure points protected   skin-to-device areas padded   skin-to-skin areas padded  Taken 1/24/2024 2200 by Tanvi Lucero RN  Device Skin Pressure Protection:   absorbent pad utilized/changed   adhesive use limited   pressure points protected   skin-to-device areas padded   skin-to-skin areas padded  Taken 1/24/2024 2000 by Tanvi Lucero RN  Device Skin Pressure Protection:   absorbent pad utilized/changed   adhesive use limited   pressure points protected   skin-to-device areas padded   skin-to-skin areas padded     Problem: Ventilator-Induced Lung Injury (Mechanical Ventilation, Invasive)  Goal:  Absence of Ventilator-Induced Lung Injury  Outcome: Ongoing, Not Progressing  Intervention: Prevent Ventilator-Associated Pneumonia  Recent Flowsheet Documentation  Taken 1/25/2024 0400 by Tanvi Lucero RN  Head of Bed ([facility]) Positioning: HOB at 20-30 degrees  Taken 1/25/2024 0200 by Tanvi Lucero RN  Head of Bed ([facility]) Positioning: HOB at 20-30 degrees  Oral Care:   swabbed with antiseptic solution   tongue brushed  Taken 1/25/2024 0000 by Tanvi Lucero RN  Head of Bed ([facility]) Positioning: HOB at 20-30 degrees  Taken 1/24/2024 2200 by Tanvi Lucero RN  Head of Bed ([facility]) Positioning: HOB at 20-30 degrees  Taken 1/24/2024 2000 by aTnvi Lucero RN  Head of Bed ([facility]) Positioning: HOB at 20-30 degrees  Oral Care:   swabbed with antiseptic solution   teeth brushed - suction toothbrush   tongue brushed     Problem: VTE (Venous Thromboembolism)  Goal: VTE (Venous Thromboembolism) Symptom Resolution  Outcome: Ongoing, Not Progressing     Problem: Adjustment to Illness (Sepsis/Septic Shock)  Goal: Optimal Coping  Outcome: Ongoing, Not Progressing  Intervention: Optimize Psychosocial Adjustment to Illness  Recent Flowsheet Documentation  Taken 1/25/2024 0400 by Tanvi Lucero RN  Family/Support System Care: support provided  Taken 1/25/2024 0200 by Tanvi Lucero RN  Family/Support System Care: support provided  Taken 1/25/2024 0000 by Tanvi Lucero RN  Family/Support System Care: support provided  Taken 1/24/2024 2200 by Tanvi Lucero RN  Family/Support System Care: support provided  Taken 1/24/2024 2000 by Tanvi Lucero RN  Family/Support System Care: support provided     Problem: Bleeding (Sepsis/Septic Shock)  Goal: Absence of Bleeding  Outcome: Ongoing, Not Progressing     Problem: Glycemic Control Impaired (Sepsis/Septic Shock)  Goal: Blood Glucose Level Within Desired Range  Outcome: Ongoing, Not Progressing     Problem: Infection Progression (Sepsis/Septic Shock)  Goal: Absence of  Infection Signs and Symptoms  Outcome: Ongoing, Not Progressing  Intervention: Initiate Sepsis Management  Recent Flowsheet Documentation  Taken 1/25/2024 0400 by Tanvi Lucero RN  Infection Prevention:   environmental surveillance performed   equipment surfaces disinfected   hand hygiene promoted   personal protective equipment utilized   rest/sleep promoted   single patient room provided  Taken 1/25/2024 0200 by Tanvi Lucero RN  Infection Prevention:   environmental surveillance performed   equipment surfaces disinfected   hand hygiene promoted   personal protective equipment utilized   rest/sleep promoted   single patient room provided  Taken 1/25/2024 0000 by Tanvi Lucero RN  Infection Prevention:   environmental surveillance performed   equipment surfaces disinfected   hand hygiene promoted   personal protective equipment utilized   rest/sleep promoted   single patient room provided  Taken 1/24/2024 2200 by Tanvi Lucero RN  Infection Prevention:   environmental surveillance performed   equipment surfaces disinfected   hand hygiene promoted   personal protective equipment utilized   rest/sleep promoted   single patient room provided  Taken 1/24/2024 2000 by Tanvi Lucero RN  Infection Prevention:   environmental surveillance performed   equipment surfaces disinfected   hand hygiene promoted   personal protective equipment utilized   rest/sleep promoted   single patient room provided  Intervention: Promote Recovery  Recent Flowsheet Documentation  Taken 1/25/2024 0400 by Tanvi Lucero, RN  Activity Management: bedrest  Taken 1/25/2024 0200 by Tanvi Lucero, RN  Activity Management: bedrest  Taken 1/25/2024 0000 by Tanvi Lucero, RN  Activity Management: bedrest  Taken 1/24/2024 2200 by Tanvi Lucero, RN  Activity Management: bedrest  Taken 1/24/2024 2000 by Tanvi Lucero, RN  Activity Management: bedrest     Problem: Nutrition Impaired (Sepsis/Septic Shock)  Goal: Optimal  Nutrition Intake  Outcome: Ongoing, Not Progressing     Problem: Aspiration (Enteral Nutrition)  Goal: Absence of Aspiration Signs and Symptoms  Outcome: Ongoing, Not Progressing  Intervention: Minimize Aspiration Risk  Recent Flowsheet Documentation  Taken 1/25/2024 0400 by Tanvi Lucero RN  Head of Bed ([facility]) Positioning: HOB at 20-30 degrees  Taken 1/25/2024 0200 by Tanvi Lucero RN  Head of Bed ([facility]) Positioning: HOB at 20-30 degrees  Oral Care:   swabbed with antiseptic solution   tongue brushed  Taken 1/25/2024 0000 by Tanvi Lucero RN  Head of Bed ([facility]) Positioning: HOB at 20-30 degrees  Taken 1/24/2024 2200 by Tanvi Lucero RN  Head of Bed ([facility]) Positioning: HOB at 20-30 degrees  Taken 1/24/2024 2000 by Tanvi Lucero RN  Head of Bed ([facility]) Positioning: HOB at 20-30 degrees  Oral Care:   swabbed with antiseptic solution   teeth brushed - suction toothbrush   tongue brushed     Problem: Device-Related Complication Risk (Enteral Nutrition)  Goal: Safe, Effective Therapy Delivery  Outcome: Ongoing, Not Progressing     Problem: Feeding Intolerance (Enteral Nutrition)  Goal: Feeding Tolerance  Outcome: Ongoing, Not Progressing     Problem: Palliative Care  Goal: Enhanced Quality of Life  Outcome: Ongoing, Not Progressing  Intervention: Maximize Comfort  Recent Flowsheet Documentation  Taken 1/25/2024 0200 by Tanvi Lucero RN  Oral Care:   swabbed with antiseptic solution   tongue brushed  Taken 1/24/2024 2000 by Tanvi Lucero RN  Pain Management Interventions:   around-the-clock dosing utilized   position adjusted  Oral Care:   swabbed with antiseptic solution   teeth brushed - suction toothbrush   tongue brushed  Intervention: Optimize Function  Recent Flowsheet Documentation  Taken 1/24/2024 2000 by Tanvi Lucero RN  Sensory Stimulation Regulation: care clustered  Intervention: Optimize Psychosocial Wellbeing  Recent Flowsheet Documentation  Taken 1/25/2024 0400 by Tanvi Lucero  RN  Family/Support System Care: support provided  Taken 1/25/2024 0200 by Tanvi Lucero RN  Family/Support System Care: support provided  Taken 1/25/2024 0000 by Tanvi Lucero, RN  Family/Support System Care: support provided  Taken 1/24/2024 2200 by Tanvi Lucero, RN  Family/Support System Care: support provided  Taken 1/24/2024 2000 by Tanvi Lucero, RN  Family/Support System Care: support provided   Goal Outcome Evaluation:

## 2024-01-25 NOTE — DISCHARGE PLACEMENT REQUEST
"Michael Bush (63 y.o. Female)       Date of Birth   1960    Social Security Number       Address   [address] [address]    Home Phone   [phone]    MRN   [id number]       [facility]    Marital Status                               Admission Date   1/8/24    Admission Type   Emergency    Admitting Provider   Kvng Nieves MD    Attending Provider   Roderick Alfredo MD    Department, Room/Bed   [address] ICU, N220/1       Discharge Date       Discharge Disposition       Discharge Destination                                 Attending Provider: Roderick Alfredo MD    Allergies: No Known Allergies    Isolation: None   Infection: None   Code Status: No CPR    Ht: 165.1 cm (65\")   Wt: 104 kg (229 lb 4.5 oz)    Admission Cmt: None   Principal Problem: Acute respiratory failure with hypoxia [J96.01]                   Active Insurance as of 1/8/2024       Primary Coverage       Payor Plan Insurance Group Employer/Plan Group    [employer] EMPLOYEE [id number]       Payor Plan Address Payor Plan Phone Number Payor Plan Fax Number Effective Dates    [address] [phone]  1/1/2015 - None Entered    [place] [address]         Subscriber Name Subscriber Birth Date Member ID       MICHAEL BUSH 1960 [id number]                     Emergency Contacts        (Rel.) Home Phone Work Phone Mobile Phone    JAMIE BUSH (Spouse) [phone] -- [phone]    DEBBIE BUSH (Son) [phone] -- [phone]              Emergency Contact Information       Name Relation Home Work Mobile    JAMIE BUSH Spouse [phone]    DEBBIE BUSH Son [phone]          Insurance Information                  Upplication/[facility] [facility] EMPLOYEE Phone: [phone]    Subscriber: Michael Bush Subscriber#: [id number]    Group#: [id number] Precert#: --             History & " Physical        Kvng Nieves MD at 01/09/24 0219                Chief complaint: Vomiting    Subjective    63-year-old female who developed progressive abdominal and back pain as well as distention over several months resulting in her presentation in early December.  She developed significant constipation.  CT imaging at that time revealed a mass in the mid body of the pancreas measuring 2.4 cm there was peritoneal and omental carcinomatosis and large volume ascites.  There was some small indeterminate liver lesions and very tiny pulmonary nodules up to 3 mm and bilateral pleural fluid.  She underwent paracentesis.  She underwent EGD and pancreatic EUS with biopsy on 12/13.  A large hiatal hernia was incidentally noted.  The biopsy was nondiagnostic felt possibly due to the large hiatal hernia which inhibited sampling.  She was diagnosed with a pancreaticobiliary primary.    She has been seen by Dr. العراقي in oncology.  A PET scan is planned but it was decided to proceed with first-line therapy with FOLFIRINOX.  She has had ongoing problems with poor p.o. intake and constipation but there has been no evidence of bowel obstruction by imaging.  She has had significant hyponatremia.    She has had increasing nausea and vomiting over the last 24 hours.  What she has been vomiting looks like bile.  She has been very weak.  She was brought to the ER this evening.  Soon after arrival she developed intractable vomiting and then had respiratory deterioration likely due to aspiration requiring intubation.  I was asked to admit her to ICU for further management.      History  Past Medical History:   Diagnosis Date    Abscess     Anxiety     Bilateral ovarian cysts     Depression     Encounter for routine gynecological examination     Foot pain     H/O bone density study 06/2014    H/O mammogram 07/2016    [facility]    Hot flashes, menopausal     Hypothyroidism due to Hashimoto's thyroiditis     Insomnia      "Miscarriage     x3    Osteopenia     Pap smear for cervical cancer screening 06/2014    Routine general medical examination at a health care facility     Urinary incontinence     Urinary tract infection     Vitamin D deficiency      Past Surgical History:   Procedure Laterality Date    ABDOMINOPLASTY  2001    CHOLECYSTECTOMY  2008    COLONOSCOPY  07/2012    [facility] Gaines in [facility] normal    ECTOPIC PREGNANCY SURGERY Left 1994    ENDOSCOPY N/A 12/13/2023    Procedure: ESOPHAGOGASTRODUODENOSCOPY;  Surgeon: Flakito Abrams MD;  Location: [facility] ENDOSCOPY;  Service: Gastroenterology;  Laterality: N/A;    HERNIA REPAIR  2010     Family History   Problem Relation Age of Onset    Mental illness Mother     Depression Mother     Thyroid disease Mother     Hypertension Father     Thyroid disease Sister     Breast cancer Other     Breast cancer Maternal Aunt      Social History     Tobacco Use    Smoking status: Never    Smokeless tobacco: Never   Vaping Use    Vaping Use: Never used   Substance Use Topics    Alcohol use: No    Drug use: No       Review of Systems   Review of systems could not be obtained due to   patient intubated.      Objective    Vital Signs  Temp:  [97.4 °F (36.3 °C)] 97.4 °F (36.3 °C)  Heart Rate:  [] 97  Resp:  [18] 18  BP: (115-152)/(79-89) 115/79  FiO2 (%):  [100 %] 100 %    Physical Exam:    Objective:  General Appearance:  Ill-appearing.    Vital signs: (most recent): Blood pressure 115/79, pulse 97, temperature 97.4 °F (36.3 °C), temperature source Oral, resp. rate 18, height 167.6 cm (65.98\"), weight 91.2 kg (201 lb), SpO2 100%.    HEENT: (Oral ET tube)    Lungs:  There are rhonchi.  No rales or wheezes.    Heart: Normal rate.  Regular rhythm.  S1 normal and S2 normal.  No murmur, gallop or friction rub.   Chest: Symmetric chest wall expansion.   Abdomen: Abdomen is soft and non-distended.  Bowel sounds are normal.   There is no abdominal tenderness.   There is no mass. There is no " splenomegaly. There is no hepatomegaly.   Extremities: There is no deformity or dependent edema.    Neurological: (Sedated).    Pupils:  Pupils are equal, round, and reactive to light.    Skin:  Warm and dry.                Results Review:    I reviewed the patient's new clinical results.  I reviewed the patient's new imaging results and agree with the interpretation.  I reviewed the patient's other test results and agree with the interpretation    Assessment & Plan    Assessment:    Active Hospital Problems    Diagnosis     **Acute respiratory failure with hypoxia     Acute pulmonary embolism     Aspiration pneumonia due to vomitus     Pancreatic cancer     Abdominal carcinomatosis - omental     Peritoneal carcinomatosis     Hypothyroidism due to Hashimoto's thyroiditis        63-year-old female with a recent diagnosis of metastatic pancreatic cancer in December and ongoing issues with poor p.o. intake and diminished bowel function now presents with nausea and vomiting with witnessed aspiration and also has evidence of bilateral pulmonary emboli on CT scan.  She appears to have respiratory failure primarily on the basis of her aspiration.  She is normotensive and there is no right heart strain on CT scan so plans are for standard anticoagulation and no thrombolytics.  Will also proceed with broad-spectrum antibiotics due to her pneumonia.    Plan:    ICU admission  Mechanical ventilation  Heparin bolus and drip  Broad-spectrum antibiotics with vancomycin and Zosyn  IV fluid resuscitation  Guarded prognosis has been discussed with the patient's  and at this point family wants all aggressive measures.    I discussed the patients findings and my recommendations with family, nursing staff, and Dr. Marcus .     Critical Care time spent in direct patient care: 50 minutes (excluding procedure time, if applicable) including high complexity decision making to assess, manipulate, and support vital organ system  failure in this individual who has impairment of one or more vital organ systems such that there is a high probability of imminent or life threatening deterioration in the patient’s condition.      Kvng Nieves MD  Pulmonary and Critical Care Medicine  01/09/24  02:29 EST            Electronically signed by Kvng Nieves MD at 01/09/24 1942

## 2024-01-25 NOTE — PROGRESS NOTES
"Palliative Care Daily Progress Note     Referring: Marco A Pugh    C/C: pt unable    S: Medical record reviewed. Events noted. Received dilaudid x 2 and robinul x 2 in addition to fent drip, versed drip and scopolamine patch.  Family not present.      ROS:   Pt unable    O: Code Status:   Code Status and Medical Interventions:   Ordered at: 01/24/24 1525     Level Of Support Discussed With:    Next of Kin (If No Surrogate)    Health Care Surrogate     Code Status (Patient has no pulse and is not breathing):    No CPR (Do Not Attempt to Resuscitate)     Medical Interventions (Patient has pulse or is breathing):    Comfort Measures      Advanced Directives: Advance Directive Status: Patient does not have advance directive   Goals of Care: Ongoing.   Palliative Performance Scale Score: 10%     /47 (BP Location: Other (Comment))   Pulse 104   Temp 99.1 °F (37.3 °C) (Bladder)   Resp 12   Ht 165.1 cm (65\")   Wt 104 kg (229 lb 4.5 oz)   LMP  (LMP Unknown) Comment: N/A  SpO2 (!) 68%   BMI 38.15 kg/m²     Intake/Output Summary (Last 24 hours) at 1/25/2024 0824  Last data filed at 1/25/2024 0500  Gross per 24 hour   Intake 775.42 ml   Output 1825 ml   Net -1049.58 ml       PE:  General Appearance:    Alert, cooperative, NAD   HEENT:    NC/AT, EOMI, anicteric, MMM, face relaxed   Neck:   supple, trachea midline, no JVD   Lungs:     CTA bilaterally, diminished in bases; respirations regular, even and unlabored; RR on exam    Heart:    RRR, normal S1 and S2, no M/R/G   Abdomen:     Normal bowel sounds, soft, nontender, nondistended   G/U:   Deferred   MSK/Extremities:  No clubbing cyanosis   Pulses:   Pulses palpable and equal bilaterally   Skin:   Warm, dry, no mottling   Neurologic:   Opens eyes, HR and RR respond to personal care   Psych:   Calm     Meds: Reviewed and changes noted    Labs:   Results from last 7 days   Lab Units 01/24/24  0358   WBC 10*3/mm3 16.69*   HEMOGLOBIN g/dL 7.5*   HEMATOCRIT % 24.0* "   PLATELETS 10*3/mm3 209     Results from last 7 days   Lab Units 01/24/24  0358   SODIUM mmol/L 128*   POTASSIUM mmol/L 4.0   CHLORIDE mmol/L 94*   CO2 mmol/L 23.0   BUN mg/dL 17   CREATININE mg/dL 0.44*   GLUCOSE mg/dL 102*   CALCIUM mg/dL 7.9*     Results from last 7 days   Lab Units 01/24/24  0358   SODIUM mmol/L 128*   POTASSIUM mmol/L 4.0   CHLORIDE mmol/L 94*   CO2 mmol/L 23.0   BUN mg/dL 17   CREATININE mg/dL 0.44*   CALCIUM mg/dL 7.9*   BILIRUBIN mg/dL 0.2   ALK PHOS U/L 162*   ALT (SGPT) U/L 7   AST (SGOT) U/L 17   GLUCOSE mg/dL 102*     Imaging Results (Last 72 Hours)       Procedure Component Value Units Date/Time    XR Chest 1 View [[id number]] Collected: 01/24/24 0826     Updated: 01/24/24 0832    Narrative:      XR CHEST 1 VW    Date of Exam: 1/24/2024 1:55 AM EST    Indication: f/u respiratory illness    Comparison: Chest radiograph 1/23/2024.    Findings:  Unchanged position of endotracheal tube, right chest port, nasogastric tube, and visualized portions of the feeding catheter. Cardiomediastinal silhouette is unchanged. Similar to slightly decreased diffuse interstitial and airspace opacities, with   otherwise similar confluent airspace disease in the left mid and lower lung. Similar bilateral pleural effusions, left greater than right, similar. No discrete pneumothorax. Osseous structures are unchanged.      Impression:      Impression:  Similar to slightly decreased diffuse interstitial and airspace disease, with persistent dense consolidation within the left mid and lower lung. Similar small/moderate left and trace right pleural effusions. Lines/tubes unchanged in position.      Electronically Signed: Mansoor Shelley MD    1/24/2024 8:29 AM EST    Workstation ID: [id number]    XR Chest 1 View [[id number]] Collected: 01/23/24 0740     Updated: 01/23/24 0745    Narrative:      XR CHEST 1 VW    Date of Exam: 1/23/2024 2:10 AM EST    Indication: f/u respiratory illness    Comparison:  1/22/2024    Findings:  Exam limited due to rotation. There is stable position of endotracheal, feeding and nasogastric tubes. Stable appearance of the cardiopulmonary silhouette with pulmonary vascular congestion. No interval change diffuse coarse reticular lung disease with   patchy areas of airspace component or mass in particular in the left perihilar region. Stable mild to moderate left pleural effusion. Right intrajugular chest port in place      Impression:      Impression:  Stable chest      Electronically Signed: Leighton Spivey MD    1/23/2024 7:42 AM EST    Workstation ID: [id number]    CT Abdomen Pelvis With & Without Contrast [[id number]] Collected: 01/22/24 1815     Updated: 01/22/24 1831    Narrative:      CT ABDOMEN PELVIS W WO CONTRAST    Date of Exam: 1/22/2024 5:29 PM EST    Indication: Bowel obstruction suspected.    Comparison: 1/9/2024    Technique: Axial CT images were obtained of the abdomen and pelvis before and after the uneventful intravenous administration of 100 cc Isovue-300. Sagittal and coronal reconstructions were performed.  Automated exposure control and iterative   reconstruction methods were used.    Findings:  Visualized Chest: Moderate bilateral pleural effusions, left greater than right. There is associated compressive atelectasis of the left lung. Additional patchy opacities are noted within the lung bases consistent with ongoing infectious/inflammatory   process.    Liver: Subtle nodular hepatic contour. Scattered small hypodensities are unchanged, nonspecific and too small to be characterized on this study.    Gallbladder: Status post cholecystectomy.    Bile Ducts: No billiary dcutal dilation.    Spleen: Unremarkable    Pancreas: Atrophy of the pancreatic body with associated mild pancreatic ductal dilation, unchanged    Adrenals: Adrenal glands are unremarkable.    Kidneys: Kidneys are normal in size. There are no stones or hydronephrosis.    Gastrointestinal: A small to  moderate size hiatal/paraesophageal hernia is once again noted. An enteric tube is noted with the distal tip in the stomach. An additional enteric tube is noted with the distal tip in the jejunum.  Oral contrast is present within the small bowel. No evidence of significantly dilated loops of bowel to suggest bowel obstruction.  Mild to moderate stool burden throughout the colon and rectum. No definite acute inflammatory changes.    Bladder: Almost completely decompressed with a Romero catheter.    Pelvis: Unchanged in appearance.    Peritoneum/Mesentery: Peritoneal carcinomatosis is once again noted. Superimposed moderate to large sized ascites is also noted.      Lymph Nodes: No lymphadenopathy.    Vasculature: There is mild narrowing of the proximal portal vein, nonspecific. Distally the portal vein is patent. The SMV is also patent.  Otherwise the visualized vessels    Abdominal Wall: Extensive soft tissue edema, worsened since prior study. No definite organizing fluid collection.    Bony Structures: No acute osseous abnormality      Impression:      Impression:  No definite evidence of bowel obstruction.    Mild to moderate stool burden throughout the colon and rectum may represent constipation.    Peritoneal carcinomatosis is once again noted with superimposed moderate to large size ascites.    Indeterminant subtle hypodensities within the liver, too small to be characterized.    Possible narrowing at the confluence of the portal venous system, although distally the vessels are patent. Subtle thrombosis or stenosis/mass effect of this region is not excluded.    Increase in size of bilateral pleural effusion and anasarca.    Patchy opacities in the visualized right lung is consistent with ongoing infectious/inflammatory process.    Subtle nodular contour of the liver is nonspecific but early cirrhosis is not excluded      Electronically Signed: Mert Briggs DO    1/22/2024 6:28 PM EST    Workstation ID:  [id number]                  Diagnostics: Reviewed    A:     Acute respiratory failure with hypoxia    Hypothyroidism due to Hashimoto's thyroiditis    Abdominal carcinomatosis - omental    Peritoneal carcinomatosis    Pancreatic cancer    Acute pulmonary embolism    Aspiration pneumonia due to vomitus       Impression:  Metsastatic CA  - presumed pancreatic - with carcinomatosis  Acute hypoxic resp failure  Hypothyroidism due to Hashimoto's  Acute PE  Aspiration PNA     Symptoms:  Dyspnea  Debility  CA pain  Vomiting  Anasarca       P:  Will refer to inpt hospice.  Monitor for symptom needs.  Support family through decisions and process.  Palliative Care Team will continue to follow patient.     Yesenia Pugh MD  1/25/2024  Time spent:27

## 2024-01-25 NOTE — DISCHARGE SUMMARY
Date of Admission: 01/25/2024   Date and Time of Death: 1/25/2024 at 1:45 PM    Hospital Course:  Jessica Kraft is a 63 y.o. female admitted to inpatient hospice with diagnosis of Peritoneal carcinomatosis [C78.6]  for symptom management. Medications were available throughout admission for symptom management and comfort. Patient continued to decline after admission as expected ultimately to their death on 1/25/2024 at 1:45 PM. Patient was pronounced dead by Clinical House Supervisor. Spiritual and psychosocial support were available to patient and family throughout admission. Patient's remains were released per [facility] protocol.       Acacia Copeland, MSN, APRN, ACHPN  [facility] Nav[facility]ators  Hospice and Palliative Care Nurse Practitioner  01/25/24  16:09 EST

## 2024-01-25 NOTE — SIGNIFICANT NOTE
Exam confirms with auscultation zero audible heart tones and zero audible respirations. Ms.Jeannie Kraft was pronounced dead at 1345.  MD notified by Patient's RN.    Roderick Kent RN  Clinical House Supervisor  1/25/2024 14:27 EST

## 2024-01-25 NOTE — CASE MANAGEMENT/SOCIAL WORK
Continued Stay Note  SIDNEY Givens     Patient Name: Jessica Kraft  MRN: [id number]  Today's Date: 1/25/2024    Admit Date: 1/25/2024    Plan: Hospice   Discharge Plan       Row Name 01/25/24 1357       Plan    Plan Hospice    Patient/Family in Agreement with Plan other (see comments)    Plan Comments Patient now inpatient hospice admit.    Final Discharge Disposition Code 51 - hospice medical facility                   Discharge Codes    No documentation.                       Danilo Turk RN

## 2024-01-25 NOTE — PROGRESS NOTES
Clinical Nutrition     Reason for Visit: Follow-up protocol      Patient Name: Jessica Kraft  YOB: 1960  MRN: [id number]  Date of Encounter: 01/25/24 12:50 EST  Admission date: 1/25/2024  Nutrition Assessment   Admission Diagnosis:  Peritoneal carcinomatosis [C78.6]      Problem List:    Peritoneal carcinomatosis        PMH:   She  has a past medical history of Abscess, Anxiety, Bilateral ovarian cysts, Depression, Encounter for routine gynecological examination, Foot pain, H/O bone density study (06/2014), H/O mammogram (07/2016), Hot flashes, menopausal, Hypothyroidism due to Hashimoto's thyroiditis, Insomnia, Miscarriage, Osteopenia, Pap smear for cervical cancer screening (06/2014), Routine general medical examination at a health care facility, Urinary incontinence, Urinary tract infection, and Vitamin D deficiency.    PSH:  She  has a past surgical history that includes Cholecystectomy (2008); Hernia repair (2010); Abdominoplasty (2001); Colonoscopy (07/2012); Ectopic pregnancy surgery (Left, 1994); Esophagogastroduodenoscopy (N/A, 12/13/2023); Cardiac catheterization (N/A, 1/9/2024); and Esophagogastroduodenoscopy (N/A, 1/12/2024).      Applicable Nutrition Concerns:   Skin: skin tear  GI: ascites      Applicable Interval History:     ARF/VENT 1-8-24    s/p mechanical thrombectomy 1-9-24    Extubated, and re-intubated 1-11-24    ET changed out 1-15-24    s/p Paracentesis 1-17-24: 5.75L drained off    s/p Palliative Extubation 1-24-24      Reported/Observed/Food/Nutrition Related History:       1-25-24: pt resting in bed, not alert, + fentanyl, versed    Pt has transitioned to Hospice Care    1-23-24: pt intubated, sedated,  + fentanyl, versed, heparin    Per RN: TF decreased to 35ml per MD, pt tolerating at present, has had ~ 500ml bile from ngt, is having bm's now,  plan for palliative extubation tomorrow    1-19-24: pt intubated, sedated, fentanyl, versed, heparin    Per RN:  pt follow some commands, has been tolerating TF, did vomit once this am, TF paused, ngt output is clear yellow bile      1-17-24: pt intubated, sedated + fentanyl ,versed, heparin    Per RN: pt tolerating TF, has not had any vomiting this shift, had ~ 6L taken off during paracentesis    1-15-24: pt intubated, sedated+ fentanyl, versed, heparin    Per RN: pt vomited last night, had massive bm, has had another bm this shift, did vomit again when turned, ngt to LWS, output appears to be bile, no TF seen, rate increased to 30ml/hr      1-12-24: pt intubated, sedated  + precedex, fentanyl, heparin, levophed 0.08mcg, NS@75ml    Per RN: pt will follow commands, but intermittently wild, thrashing around, still having a lot of ngt output, abdomen taut, ? need for paracentesis    Plan for njt placement per GI today, and initiation of trophic feed      1-11-24:pt intubated, sedated + precedex, fentanyl, heparin, levophed 0.08mcg. NS@75ml    Per RN: ogt just replaced with ngt, pt still having significant output, had had ~300ml this am so far, (noted total of 850ml past 24 hours), no bm yet, have started reglan today      1-9-24: Pt intubated and sedated,  at bedside  + precedex, fentanyl, levophed 0.08mcg, heparin     reports pt has had nausea and vomiting for the past day and a half    Per RN: plan for possible EKOS catheter today    Labs    Labs Reviewed: Yes     Results from last 7 days   Lab Units 01/24/24  0358 01/23/24  0446 01/22/24  0441   GLUCOSE mg/dL 102* 111* 115*   BUN mg/dL 17 17 15   CREATININE mg/dL 0.44* 0.47* 0.44*   SODIUM mmol/L 128* 125* 128*   CHLORIDE mmol/L 94* 93* 95*   POTASSIUM mmol/L 4.0 3.9 3.8   PHOSPHORUS mg/dL 3.3 3.5 3.3   MAGNESIUM mg/dL 1.9 2.4 1.8   ALT (SGPT) U/L 7 7 7       Results from last 7 days   Lab Units 01/24/24  0358 01/23/24  0446 01/22/24  0441 01/20/24  0500 01/19/24  1605   ALBUMIN g/dL 2.2* 2.2* 2.0*   < >  --    PREALBUMIN mg/dL  --   --   --   --  7.8*   CRP  "mg/dL  --   --   --   --  19.80*    < > = values in this interval not displayed.       Results from last 7 days   Lab Units 01/24/24  1118 01/24/24  0517 01/23/24  1757 01/23/24  1156 01/23/24  0535 01/22/24  2348   GLUCOSE mg/dL 111 111 111 127 118 130     Lab Results   Lab Value Date/Time    HGBA1C 5.70 (H) 12/07/2023 1959                   Medications    Medications Reviewed: Yes  Pertinent: bowel regimen, diflucan, reglan, protonix, scopolamine, thiamine  Infusion: fentanyl, versed, heparin    Intake/Ouptut 24 hrs (0701 - 0700)   I&O's Reviewed: Yes     Intake & Output (last day)       None              Anthropometrics     Flowsheet Rows      Flowsheet Row First Filed Value   Admission Height 167.6 cm (66\") Documented at 01/08/2024 2036   Admission Weight 91.2 kg (201 lb) Documented at 01/08/2024 2036          Height:  66in  Last Filed Weight:  201lb  Method:  stated  BMI:  32  BMI classification: Obese Class I: 30-34.9kg/m2  IBW:  130lb    UBW: ?  Weight change: per EMR ~ 17lb wt loss over the past month     Weight       Weight (kg) Weight (lbs) Weight Method Visit Report   5/16/2016 96.979 kg  213 lb 12.8 oz   --    8/18/2016 98.703 kg  217 lb 9.6 oz   --    11/10/2016 97.705 kg  215 lb 6.4 oz   --    2/3/2017 98.068 kg  216 lb 3.2 oz   --    3/6/2017 96.253 kg  212 lb 3.2 oz   --    5/18/2017 97.342 kg  214 lb 9.6 oz   --    8/2/2017 101.696 kg  224 lb 3.2 oz   --    12/6/2023 97.07 kg  214 lb  Stated     12/7/2023 97.523 kg  215 lb  Stated      95.255 kg  210 lb  Stated     12/8/2023 98.975 kg  218 lb 3.2 oz  Standing scale     12/9/2023 97.569 kg  215 lb 1.6 oz  Bed scale     12/10/2023 97.523 kg  215 lb  Bed scale     12/11/2023 92.987 kg  205 lb  Standing scale     12/12/2023 93.804 kg  206 lb 12.8 oz  Standing scale     12/13/2023 104.826 kg  231 lb 1.6 oz  Bed scale      105 kg  231 lb 7.7 oz      12/14/2023 94.076 kg  207 lb 6.4 oz  Standing scale     12/15/2023 92.08 kg  203 lb  Standing scale   " "  12/19/2023 91.173 kg  201 lb   --    12/22/2023 91.173 kg  201 lb  Stated     12/25/2023 91.173 kg  201 lb  Stated     1/3/2024 91.627 kg  202 lb   --    1/8/2024 91.173 kg  201 lb  Stated     1/9/2024 91.173 kg  201 lb            Nutrition Focused Physical Exam     Date:     Unable to perform exam due to: Pt unable to participate at time of visit      Needs Assessment   Date: 1-9-24    Height used:   Weights used/ ABW: 201lb/ 91.4kg  IBW\" 130lb/ 59.1kg      Estimated Calorie needs: ~ 1300kcal   Method:  14Kcals/KG ABW:1280kcal  Method:  MSJ ABW: 1491kcal  Method:  PSU ABW: 1839kcal    Estimated Protein needs: ~118g protein  Method: 2g protein per kg IBW: 118g protein  Method: 1.2-1.5g protein per kg ABW: 110-137g protein    Current Nutrition Prescription     PO: NPO Diet NPO Type: Strict NPO  Oral Nutrition Supplement:   Intake: N/A    EN: Peptamen Intense VHP  Goal Rate:70ml  Water Flushes: 0ml  Modular: None  Route: NJ  Tube: Small bore    At goal over: 20Hrs/day    Rx will supply:   Goal Volume 1400 mL/day     Flush Volume 0 mL/day     Energy 1400 Kcal/day 108 % Est Need   Protein 129 g/day 109 % Est Need   Fiber 6 g/day     Water in  EN 1176 mL     Total Water 1176 mL     Meet DRI No        --------------------------------------------------------------------------  Product/Rate verified at bedside: Elizabeth BARRY dc'd 1-24-24    Nutrition Diagnosis   Date: 1-9-24 Updated: 1-23  Problem No nutrition diagnosis at this time: Comfort Measures/ Hospice Care   Etiology    Signs/Symptoms        Goal:   General: Nutrition support treatment, Palliative care, Hospice care  PO: N/A  EN/PN: N/A     Nutrition Intervention      Follow treatment progress, Care plan reviewed      Monitoring/Evaluation:   Per protocol, I&O, Pertinent labs, Weight, Skin status, GI status, Symptoms, POC/GOC      Gauri Ayala RD  Time Spent: 20min  "

## 2024-01-25 NOTE — H&P
Hospice History and Physical     Patient Name:  Jessica Kraft   : 1960   Sex: female    Patient Care Team:  Leonarda Díaz MD as PCP - General (Internal Medicine)    Code Status: Comfort measures     Subjective     63 yoF presented to ED on 24 for worsening N/V, constipation and AMS.      PMH significant for: peritoneal carcinomatosis of possible pancreatic origin (EUS was done however sample was limited and this was nondiagnostic), peritoneal cytology was positive for malignancy with pancreaticobiliary source favored, history of hypothyroidism, anxiety/depression, followed by medical oncology for the metastatic malignancy and underwent chemotherapy initiated on 2023.     Workup in ED revealed: CT of the head, CTA of the chest/abdomen/pelvis were done and the patient was found to have extensive bilateral pulmonary emboli without significant right heart strain.  She also had patchy airspace disease in the lower lobes and ascites with peritoneal carcinomatosis.  She was hypotensive requiring pressors and was started on antibiotics and a heparin drip and was admitted to the intensive care unit for ongoing management.      -On 24 pt was extubated but required reintubation after developing encephalopathy, agitation and severe nausea and vomiting.     -24- Underwent EGD with J tube placement.  Continued to have moderate vomiting of bilious material.  Unable to get NG tube placed during procedure.      -24- Pt had large volume paracentesis.      -24- tube feeding was present in emesis prompting repeat CT abdomen, which showed no overt obstruction.  Patient was made DNR.  Family seeking 2nd opinion at , however transfer was declined as there are no options for systemic treatment in pts current state.     -23- pt was extubated.  Versed and Fentanyl gtt continued.  Palliative care resumed care.     -24- Inpatient hospice team met with spouse and son.  Reviewed inpatient  hospice service, medication regimen and goals of care.  Family elected comfort care with inpatient hospice admission for management of pain, dyspnea, terminal restless/agitation, terminal secretions related to end of life care that cannot be managed without continuous infusions.  Prognosis hours to days.         Review of Systems  Review of Systems   Unable to perform ROS: Acuity of condition       History  Past Medical History:   Diagnosis Date    Abscess     Anxiety     Bilateral ovarian cysts     Depression     Encounter for routine gynecological examination     Foot pain     H/O bone density study 06/2014    H/O mammogram 07/2016    [facility]    Hot flashes, menopausal     Hypothyroidism due to Hashimoto's thyroiditis     Insomnia     Miscarriage     x3    Osteopenia     Pap smear for cervical cancer screening 06/2014    Routine general medical examination at a health care facility     Urinary incontinence     Urinary tract infection     Vitamin D deficiency      Past Surgical History:   Procedure Laterality Date    ABDOMINOPLASTY  2001    CARDIAC CATHETERIZATION N/A 1/9/2024    Procedure: Percutaneous Manual Thrombectomy - Inari Bilateral Pulmonary;  Surgeon: Felipe Forde MD;  Location:  [employer] [facility] [employer] CATH INVASIVE LOCATION;  Service: Cardiovascular;  Laterality: N/A;    CHOLECYSTECTOMY  2008    COLONOSCOPY  07/2012    [facility] in [facility] normal    ECTOPIC PREGNANCY SURGERY Left 1994    ENDOSCOPY N/A 12/13/2023    Procedure: ESOPHAGOGASTRODUODENOSCOPY;  Surgeon: Flakito Abrams MD;  Location:  [facility];  Service: Gastroenterology;  Laterality: N/A;    ENDOSCOPY N/A 1/12/2024    Procedure: ESOPHAGOGASTRODUODENOSCOPY AT BEDSIDE WITH NASOJEJUNAL TUBE INSERTION;  Surgeon: Flakito Abrams MD;  Location:  [facility];  Service: Gastroenterology;  Laterality: N/A;    HERNIA REPAIR  2010     Current Facility-Administered Medications   Medication Dose Route Frequency Provider Last Rate Last Admin     acetaminophen (TYLENOL) tablet 650 mg  650 mg Oral Q4H PRN Acacia Copeland APRN        Or    acetaminophen (TYLENOL) 160 MG/5ML oral solution 650 mg  650 mg Oral Q4H PRN Acacia Copeland APRN        Or    acetaminophen (TYLENOL) suppository 650 mg  650 mg Rectal Q4H PRN Acacia Copeland APRN        bisacodyl (DULCOLAX) suppository 10 mg  10 mg Rectal Daily PRN Acacia Copeland APRN        glycopyrrolate (ROBINUL) injection 0.4 mg  0.4 mg Intravenous Q4H PRN Acacia Copeland APRN   0.4 mg at 01/25/24 1247    haloperidol lactate (HALDOL) injection 5 mg  5 mg Intravenous Q4H PRN Acacia Copeland APRN        HYDROmorphone (DILAUDID) 50 mg in 50 mL infusion  3 mg/hr Intravenous Continuous Acacia Copeland APRN        HYDROmorphone (DILAUDID) injection 2 mg  2 mg Intravenous Q30 Min PRN Acacia Copeland APRN   2 mg at 01/25/24 1245    ketorolac (TORADOL) injection 15 mg  15 mg Intravenous Q6H PRN Acacia Copeland APRN        midazolam (VERSED) 100 mg in 100mL NS infusion  1-10 mg/hr Intravenous Titrated Acacia Copeland APRN        midazolam (VERSED) injection 2 mg  2 mg Intravenous Q30 Min PRN Acacia Copeland APRN        palliative care oral rinse   Mouth/Throat PRN Acacia Copeland APRN        pantoprazole (PROTONIX) injection 40 mg  40 mg Intravenous Q24H Acacia Copeland APRN        polyvinyl alcohol (LIQUIFILM) 1.4 % ophthalmic solution 1 drop  1 drop Both Eyes Q30 Min PRN Acacia Copeland APRN        scopolamine patch 1 mg/72 hr  1 patch Transdermal Q72H PRN Acacia Copeland APRN        sodium chloride 0.9 % flush 10 mL  10 mL Intravenous PRN Acacia Copeland APRN         HYDROmorphone 1 mg/ml, 3 mg/hr  midazolam, 1-10 mg/hr        acetaminophen **OR** acetaminophen **OR** acetaminophen    bisacodyl    glycopyrrolate    haloperidol lactate    HYDROmorphone    ketorolac    midazolam    palliative care oral rinse    polyvinyl alcohol    Scopolamine    sodium chloride  No Known  Allergies  Family History   Problem Relation Age of Onset    Mental illness Mother     Depression Mother     Thyroid disease Mother     Hypertension Father     Thyroid disease Sister     Breast cancer Other     Breast cancer Maternal Aunt      Social History     Socioeconomic History    Marital status:    Tobacco Use    Smoking status: Never    Smokeless tobacco: Never   Vaping Use    Vaping Use: Never used   Substance and Sexual Activity    Alcohol use: No    Drug use: No    Sexual activity: Yes     Partners: Male     Birth control/protection: None, Post-menopausal     Comment:        Objective     Vital Signs  Temp:  [98.2 °F (36.8 °C)-99.1 °F (37.3 °C)] 99.1 °F (37.3 °C)  Heart Rate:  [] 98  Resp:  [11-24] 12  BP: (133)/(47) 133/47  FiO2 (%):  [65 %] 65 %    PPS: Palliative Performance Scale score as of 1/25/2024, 14:01 EST is 10% based on the following measures:   Ambulation: Totally bed bound  Activity and Evidence of Disease: Unable to do any work, extensive evidence of disease  Self-Care: Total care  Intake:  Mouth care only  LOC: Drowsy or coma     Physical Exam:  Physical Exam  Vitals and nursing note reviewed. Exam conducted with a chaperone present.   Constitutional:       General: She is in acute distress.      Appearance: She is ill-appearing.   HENT:      Mouth/Throat:      Pharynx: Oropharyngeal exudate present.   Cardiovascular:      Rate and Rhythm: Tachycardia present.   Pulmonary:      Effort: Pulmonary effort is normal.      Breath sounds: Rales present.   Abdominal:      General: Bowel sounds are normal.   Musculoskeletal:         General: Swelling present.   Neurological:      Mental Status: She is unresponsive.   Psychiatric:         Speech: She is noncommunicative.      Comments: Restless          Results Reviewed:  LAB RESULTS:      Lab 01/24/24  0358 01/23/24  1155 01/23/24  0446 01/22/24  0441 01/21/24  1328 01/21/24  0344 01/20/24  0445 01/20/24  0432 01/19/24  1605    WBC 16.69*  --  18.12* 18.03*  --  20.94* 24.48*  --   --    HEMOGLOBIN 7.5*  --  7.8* 7.6* 7.5* 6.7* 7.0*  --   --    HEMATOCRIT 24.0*  --  25.1* 23.5* 23.6* 20.6* 21.8*  --   --    PLATELETS 209  --  213 189  --  205 192  --   --    NEUTROS ABS 12.26*  --  16.13* 16.23*  --  18.64* 21.54*  --   --    IMMATURE GRANS (ABS) 1.33*  --   --   --   --   --   --   --   --    LYMPHS ABS 1.30  --   --   --   --   --   --   --   --    MONOS ABS 1.39*  --   --   --   --   --   --   --   --    EOS ABS 0.22  --  0.18 0.18  --  0.21 0.00  --   --    MCV 84.8  --  85.7 85.5  --  85.1 84.8  --   --    CRP  --   --   --   --   --   --   --   --  19.80*   HEPARIN ANTI-XA 0.53 0.52 0.36 0.53  --  0.57  --    < >  --     < > = values in this interval not displayed.         Lab 01/24/24  0358 01/23/24  0446 01/22/24  0441 01/21/24  0344 01/20/24  0500   SODIUM 128* 125* 128* 125* 130*   POTASSIUM 4.0 3.9 3.8 3.8 4.1   CHLORIDE 94* 93* 95* 91* 96*   CO2 23.0 23.0 23.0 23.0 22.0   ANION GAP 11.0 9.0 10.0 11.0 12.0   BUN 17 17 15 17 16   CREATININE 0.44* 0.47* 0.44* 0.42* 0.44*   EGFR 109.5 107.8 109.5 110.8 109.5   GLUCOSE 102* 111* 115* 116* 117*   CALCIUM 7.9* 7.9* 7.7* 7.5* 7.6*   MAGNESIUM 1.9 2.4 1.8 2.4 1.8   PHOSPHORUS 3.3 3.5 3.3 3.6 3.1         Lab 01/24/24  0358 01/23/24  0446 01/22/24  0441 01/21/24  0344 01/20/24  0500   TOTAL PROTEIN 5.5* 5.6* 5.2* 5.5* 5.3*   ALBUMIN 2.2* 2.2* 2.0* 2.3* 2.0*   GLOBULIN 3.3 3.4 3.2 3.2 3.3   ALT (SGPT) 7 7 7 7 5   AST (SGOT) 17 25 11 19 10   BILIRUBIN 0.2 0.2 0.2 0.2 0.2   ALK PHOS 162* 182* 136* 161* 133*                 Lab 01/21/24  0502   ABO TYPING O   RH TYPING Positive   ANTIBODY SCREEN Negative         Lab 01/24/24  0344 01/22/24  1031   PH, ARTERIAL 7.536* 7.491*   PCO2, ARTERIAL 29.5* 33.4*   PO2 ART 91.2 93.4   FIO2 65 50   HCO3 ART 24.9 25.5   BASE EXCESS ART 2.4* 2.2*   CARBOXYHEMOGLOBIN 0.7 0.6     Brief Urine Lab Results  (Last result in the past 365 days)        Color    Clarity   Blood   Leuk Est   Nitrite   Protein   CREAT   Urine HCG        01/08/24 2138 Dark Yellow   Cloudy   Negative   Trace   Negative   30 mg/dL (1+)                   Microbiology Results Abnormal       None              Peritoneal carcinomatosis      Assessment & Plan     Assessment/Plan:   -Admit to inpatient hospice service 01/25/2024 with Peritoneal carcinomatosis [C78.6].     -Coordination of care with nursing staff and BCN IDT.     -Pain: Discontinue Fentanyl gtt (300 mcg/hr); Dilaudid 3 mg/hr cont infusion.  Dilaudid 2 mg q 1 hr PRN for BT pain or dyspnea.     -Dyspnea:  Dilaudid as above, extubated 1/24/24; oxygen via NC PRN     -Nausea/Vomiting: Zofran 4 mg scheduled q 6 hr PRN       -Anxiety/Agitation: Continue versed infusion at 10 ml/hr; haldol 5 mg q 4 hr PRN      -Bowel/bladder: bisacodyl supp q day PRN     -Nutrition: NPO; comfort diet as tolerated     -ADLs: total care     -Terminal fever: tylenol supp 650 mg q 6 hr PRN. tordal 15 mg IV q 6 hr PRN     -Terminal secretions:  May start PRN robinul/scop patch if needed     -Patient comfort: palliative oral rinse PRN, liquifilm PRN        Goals of Care: achieve comfortable death, educate and support family through this process.         Total Visit Time: 50 min   Face to Face Time: 30 min   Total time spent includes time reviewing chart, face-to-face time, counseling patient/family/caregiver, ordering medications/tests/procedures, communicating with other health care professionals, documenting clinical information in the electronic health record, and coordination of care with facility staff and BCN IDT.      Justification for care:  Patient meets criteria for acute in-patient care with required nursing assessment and interventions for symptoms with IV medications.      TIMMY GUADALUPE, MSN, APRN  [facility]  Hospice and Palliative Care Nurse Practitioner  01/25/24  12:56 EST

## 2024-01-25 NOTE — DISCHARGE SUMMARY
Discharge Summary    Patient name: Jessica Kraft  CSN: [id number]  MRN: [id number]  : 1960  Today's date: 2024     Date of Admission: 2024  Date of Discharge:  2024    Admitting Physician:  Dr. Kvng Nieves MD; Pulmonology  Primary Care Provider: Leonarda Díaz MD  Consultations:  Dr. Sherri العراقي MD; Hematology/Oncology     Dr. Yesenia Pugh MD; Palliative Care    Admission Diagnosis: Acute respiratory failure with hypoxia     Discharge Diagnoses:   Active Hospital Problems    Diagnosis     **Acute respiratory failure with hypoxia     Acute pulmonary embolism     Aspiration pneumonia due to vomitus     Pancreatic cancer     Abdominal carcinomatosis - omental     Peritoneal carcinomatosis     Hypothyroidism due to Hashimoto's thyroiditis      Allergies:  Patient has no known allergies.    Code Status and Medical Interventions:   Ordered at: 24 1525     Level Of Support Discussed With:    Next of Kin (If No Surrogate)    Health Care Surrogate     Code Status (Patient has no pulse and is not breathing):    No CPR (Do Not Attempt to Resuscitate)     Medical Interventions (Patient has pulse or is breathing):    Comfort Measures       Procedures/Testing:  Procedure(s):  ESOPHAGOGASTRODUODENOSCOPY AT BEDSIDE WITH NASOJEJUNAL TUBE INSERTION     History of Present Illness:  Jessica Kraft is a 63 y.o. female recently found to have metastatic malignancy / peritoneal carcinomatosis of possible pancreatic origin (EUS was done however sample was limited and this was nondiagnostic), peritoneal cytology was positive for malignancy with pancreaticobiliary source favored, history of hypothyroidism, anxiety/depression, followed by medical oncology for the metastatic malignancy and underwent chemotherapy initiated on 2023.  Patient also was diagnosed with severe constipation recently. She developed worsening nausea/vomiting and was brought to the emergency department with  decompensated/hypoxemic respiratory failure and altered mental status on 1/8/2023.  She underwent emergent endotracheal intubation. CT of the head, CTA of the chest/abdomen/pelvis were done and the patient was found to have extensive bilateral pulmonary emboli without significant right heart strain.  She also had patchy airspace disease in the lower lobes and ascites with peritoneal carcinomatosis.  She was hypotensive requiring pressors and was started on antibiotics and a heparin drip and was admitted to the intensive care unit for ongoing management.     Hospital Course:  Patient was extubated on 1/11/2024; however, she was extremely encephalopathic and agitated post-extubation and developed progressive nausea/vomiting.  She ultimately required reintubation.     Patient remained intubated and on MV.  She continued to have significant vomiting of bilious material.  She had an EGD with J-tube placement on 1/12/2024.  A nasogastric tube was attempted during that procedure but terminated in the hiatal hernia sac.     Endotracheal tube cuff leak developed 1/15/2024 and was replaced.      Patient had ongoing respiratory failure in the setting of recalcitrant vomiting.Gastroenterology suspects that patient has gastroparesis in addition to the hiatal hernia.  She last underwent large-volume paracentesis on 1/17/2024.  She had ongoing bilious vomiting and, on 1/22/24, tube feeding was present in emesis prompting repeat CT abdomen, which showed no overt obstruction.      Patient had persistent leukocytosis. Cultures nonrevealing. Antibiotics were stopped and white blood cell count remained stable.      Family wished for a second opinion at [facility] regarding her diagnosis and treatment options. Previous provider contacted [facility] (last week) who initially accepted her to the intensive care unit but, after reviewing her chart, did not feel like they could offer anything different and cancelled the  "transfer. Intensivist agreed with this assessment as options are very limited and patient continues to be critically ill despite aggressive care and support. She certainly could not tolerate CA treatment or further diagnostic procedures in current state. Dr. Sherri العراقي MD, with Hematology/Oncology also discussed plan of care and recommended palliative care.      On 1/22/24, the patient was made DNR. CODE status changed in EPIC. Palliative care and oncology spoke with family on 1/23/24 along with intensivist. Family agreed for comfort measures and terminal extubation on 1/24/24. Given patient's agitation, she was extubated on Versed and Fentanyl and palliative care assumed control of pain medications. Hospice met with family on 1/25/25 and she will be admitted to inpatient hospice care today.    Vitals:  /47 (BP Location: Other (Comment))   Pulse 98   Temp 99.1 °F (37.3 °C) (Bladder)   Resp 12   Ht 165.1 cm (65\")   Wt 104 kg (229 lb 4.5 oz)   LMP  (LMP Unknown) Comment: N/A  SpO2 (!) 76%   BMI 38.15 kg/m²     Labs:  Results from last 7 days   Lab Units 01/24/24  0358   WBC 10*3/mm3 16.69*   HEMOGLOBIN g/dL 7.5*   HEMATOCRIT % 24.0*   PLATELETS 10*3/mm3 209     Results from last 7 days   Lab Units 01/24/24  0358   SODIUM mmol/L 128*   POTASSIUM mmol/L 4.0   CHLORIDE mmol/L 94*   CO2 mmol/L 23.0   BUN mg/dL 17   CREATININE mg/dL 0.44*   CALCIUM mg/dL 7.9*   BILIRUBIN mg/dL 0.2   ALK PHOS U/L 162*   ALT (SGPT) U/L 7   AST (SGOT) U/L 17   GLUCOSE mg/dL 102*         Magnesium   Date Value Ref Range Status   01/24/2024 1.9 1.6 - 2.4 mg/dL Final   01/23/2024 2.4 1.6 - 2.4 mg/dL Final     Phosphorus   Date Value Ref Range Status   01/24/2024 3.3 2.5 - 4.5 mg/dL Final   01/23/2024 3.5 2.5 - 4.5 mg/dL Final                    Discharge Medications        ASK your doctor about these medications        Instructions Start Date   ALPRAZolam 2 MG tablet  Commonly known as: XANAX  Ask about: Which instructions " should I use?   1 mg, Oral, 2 Times Daily PRN      aspirin 81 MG chewable tablet   81 mg, Oral, Daily      cyclobenzaprine 10 MG tablet  Commonly known as: FLEXERIL   10 mg, Oral, Daily PRN      dicyclomine 20 MG tablet  Commonly known as: BENTYL   20 mg, Oral, Every 4 to 6 Hours PRN      HYDROcodone-acetaminophen 5-325 MG per tablet  Commonly known as: NORCO  Ask about: Which instructions should I use?   1-2 tablets, Oral, Every 8 Hours PRN      Levoxyl 125 MCG tablet  Generic drug: levothyroxine   125 mcg, Oral, Every Morning, On an empty stomach, do not substitute      mesalamine 1000 MG suppository  Commonly known as: CANASA   1,000 mg, Rectal, Daily PRN      ondansetron 8 MG tablet  Commonly known as: ZOFRAN   8 mg, Oral, 3 Times Daily PRN      promethazine 25 MG suppository  Commonly known as: PHENERGAN  Ask about: Which instructions should I use?   25 mg, Rectal, Every 4 to 6 Hours PRN      RABEprazole 20 MG EC tablet  Commonly known as: ACIPHEX   20 mg, Oral, Daily      rosuvastatin 5 MG tablet  Commonly known as: CRESTOR   5 mg, Oral, Daily      venlafaxine  MG 24 hr capsule  Commonly known as: EFFEXOR-XR   150 mg, Oral, Daily             Discharge Instructions:  Discharge to  Hospice today  Medications per Hospice    Tanisha Riojas, MSN, APRN, ACN-AG  Pulmonary and Critical Care Medicine  Electronically signed by ANGELICA Jones, 01/25/24, 12:04 PM EST.     Time: I spent 35  minutes on this discharge activity which included: face-to-face encounter with the patient, reviewing the data in the system, coordination of the care with the nursing staff as well as consultants, documentation, and entering orders.      CC: Leonarda Díaz MD

## (undated) DEVICE — LN SMPL CO2 SHTRM SD STREAM W/M LUER

## (undated) DEVICE — ADAPT CLN BIOGUARD AIR/H2O DISP

## (undated) DEVICE — INTRO ACCSR BLNT TP

## (undated) DEVICE — BALN CATH PRESS WEDGE 6F 110CM

## (undated) DEVICE — Device

## (undated) DEVICE — TBG IRR ENDOGATOR 24HR UNIV REUS

## (undated) DEVICE — SYR LUERLOK 50ML

## (undated) DEVICE — ST EXT IV SMRTSTE 2VLV FIX M LL 6ML 41

## (undated) DEVICE — SAFELINER SUCTION CANISTER 1000CC: Brand: DEROYAL

## (undated) DEVICE — GW AMPLTZ SUPERSTIFF SHT/TPR STR .035IN 260CM

## (undated) DEVICE — THE DISPOSABLE ROTH NET FOREIGN BODY STANDARD RETRIEVAL DEVICE IS USED IN THE ENDOSCOPIC RETRIEVAL OF FOREIGN BODY, FOOD BOLUS AND EXCISED TISSUE SUCH AS POLYPS.: Brand: ROTH NET

## (undated) DEVICE — HYBRID CO2 TUBING/CAP SET FOR OLYMPUS® SCOPES & CO2 SOURCE: Brand: ERBE

## (undated) DEVICE — ST ACC MICROPUNCTURE .018 TRANSLSS/SS/TP 5F/10CM 21G/7CM

## (undated) DEVICE — TBG SXN CONN/F UNIV 1/4IN 10FT LF STRL

## (undated) DEVICE — DEV CLS VESL PERCLOSE PROGLIDE

## (undated) DEVICE — LUBE GEL ENDOGLIDE 1.1OZ

## (undated) DEVICE — ST INF PRI SMRTSTE 20DRP 2VLV 24ML 117

## (undated) DEVICE — THE BITE BLOCK MAXI, LATEX FREE STRAP IS USED TO PROTECT THE ENDOSCOPE INSERTION TUBE FROM BEING BITTEN BY THE PATIENT.

## (undated) DEVICE — PK CATH CARD 10

## (undated) DEVICE — KT ORCA ORCAPOD DISP STRL

## (undated) DEVICE — CONTN GRAD MEAS TRIANG 32OZ BLK

## (undated) DEVICE — SENSR O2 OXIMAX FNGR A/ 18IN NONSTR

## (undated) DEVICE — BITEBLOCK MAXIBITE W STRAP 60FLF

## (undated) DEVICE — CANN O2 ETCO2 FITS ALL CONN CO2 SMPL A/ 7IN DISP LF

## (undated) DEVICE — SOLIDIFIER LIQ PREMISORB 1500CC

## (undated) DEVICE — CATH DIAG EXPO FR4 .056IN 6F 125CM RT

## (undated) DEVICE — ENDOSCOPIC ULTRASOUND FINE NEEDLE BIOPSY (FNB) DEVICE: Brand: ACQUIRE

## (undated) DEVICE — SINGLE-USE BIOPSY FORCEPS: Brand: RADIAL JAW 4

## (undated) DEVICE — SHEATH INTRO PINN CORNRY .038 8F10CM

## (undated) DEVICE — TUBING, SUCTION, 1/4" X 10', STRAIGHT: Brand: MEDLINE

## (undated) DEVICE — KT CONTRST INJ ISOL/MANFLD W/TRANSDCR

## (undated) DEVICE — CATH DIAG EXPO .056 FR4 6F 100CM

## (undated) DEVICE — LUBE JELLY FOIL PACK 1.4 OZ: Brand: MEDLINE INDUSTRIES, INC.

## (undated) DEVICE — GW PERIPH VASC ADX J/TP SS .035 150CM 3MM

## (undated) DEVICE — KT CONTRL HND ACIST CVI PREM HIPRESS 65

## (undated) DEVICE — DIL VESL .038 14F 19CM

## (undated) DEVICE — CAP FLOW TRIEVER INARI MEDICAL

## (undated) DEVICE — GW JAG STR .035IN 450CM

## (undated) DEVICE — FIRST STEP BEDSIDE ADD WATER KIT - RESEALABLE STAND-UP POUCH, ENDOSCOPIC CLEANING PAD - 1 POUCH: Brand: FIRST STEP BEDSIDE ADD WATER KIT - RESEALABLE STAND-UP POUCH, ENDOSCOPIC CLEANIN

## (undated) DEVICE — ADAPT CLN LUM OLYMP AIR/H20

## (undated) DEVICE — SOL IRR H2O BTL 1000ML STRL